# Patient Record
Sex: FEMALE | Race: BLACK OR AFRICAN AMERICAN | NOT HISPANIC OR LATINO | Employment: FULL TIME | URBAN - METROPOLITAN AREA
[De-identification: names, ages, dates, MRNs, and addresses within clinical notes are randomized per-mention and may not be internally consistent; named-entity substitution may affect disease eponyms.]

---

## 2017-01-17 ENCOUNTER — CLINICAL SUPPORT (OUTPATIENT)
Dept: OPHTHALMOLOGY | Facility: CLINIC | Age: 62
End: 2017-01-17
Payer: COMMERCIAL

## 2017-01-17 ENCOUNTER — OFFICE VISIT (OUTPATIENT)
Dept: OPHTHALMOLOGY | Facility: CLINIC | Age: 62
End: 2017-01-17
Payer: COMMERCIAL

## 2017-01-17 DIAGNOSIS — H35.373 EPIRETINAL MEMBRANE, BILATERAL: ICD-10-CM

## 2017-01-17 DIAGNOSIS — H43.11 VITREOUS HEMORRHAGE, RIGHT: ICD-10-CM

## 2017-01-17 DIAGNOSIS — H40.033 ANATOMICAL NARROW ANGLE BORDERLINE GLAUCOMA, BILATERAL: ICD-10-CM

## 2017-01-17 DIAGNOSIS — H40.053 OCULAR HYPERTENSION, BILATERAL: Primary | ICD-10-CM

## 2017-01-17 DIAGNOSIS — H26.9 CORTICAL CATARACT: ICD-10-CM

## 2017-01-17 DIAGNOSIS — E11.3593 PROLIFERATIVE DIABETIC RETINOPATHY OF BOTH EYES WITHOUT MACULAR EDEMA ASSOCIATED WITH TYPE 2 DIABETES MELLITUS: ICD-10-CM

## 2017-01-17 DIAGNOSIS — H40.053 OCULAR HYPERTENSION, BILATERAL: ICD-10-CM

## 2017-01-17 DIAGNOSIS — H35.033 HYPERTENSIVE RETINOPATHY, BILATERAL: ICD-10-CM

## 2017-01-17 PROCEDURE — 92134 CPTRZ OPH DX IMG PST SGM RTA: CPT | Mod: S$GLB,,, | Performed by: OPHTHALMOLOGY

## 2017-01-17 PROCEDURE — 92020 GONIOSCOPY: CPT | Mod: S$GLB,,, | Performed by: OPHTHALMOLOGY

## 2017-01-17 PROCEDURE — 99999 PR PBB SHADOW E&M-EST. PATIENT-LVL II: CPT | Mod: PBBFAC,,, | Performed by: OPHTHALMOLOGY

## 2017-01-17 PROCEDURE — 92012 INTRM OPH EXAM EST PATIENT: CPT | Mod: S$GLB,,, | Performed by: OPHTHALMOLOGY

## 2017-01-17 NOTE — PROGRESS NOTES
HPI     DLS: 7/18/16    Pt here for HRT review;    Meds:No GTTS    1. Ocular hypertension, bilateral  2. Anatomical narrow angle borderline glaucoma, bilateral  3. Proliferative diabetic retinopathy without macular edema associated   with type 2 diabetes mellitus  4. Vitreous hemorrhage, right  5. Hypertensive retinopathy, bilateral  6. Epiretinal membrane, bilateral  7. Cortical cataract        Last edited by Tomasa Montilla on 1/17/2017  8:27 AM.         Assessment /Plan     For exam results, see Encounter Report.    Ocular hypertension, bilateral    Proliferative diabetic retinopathy of both eyes without macular edema associated with type 2 diabetes mellitus    Hypertensive retinopathy, bilateral    Epiretinal membrane, bilateral    Vitreous hemorrhage, right    Cortical cataract    1. OHT vs Pre-perimetric POAG OU (angles  Narrowing over time)  Followed at [facility] since '04   First seen by Scarlet '08   Never on gtts   VF defects 2/2 PRP     Family history none   Glaucoma meds None   H/O adverse rxn to glaucoma drops none   LASERS Mulitple PRP OU   GLAUCOMA SURGERIES None   OTHER EYE SURGERIES none   CDR 0.3/0.35   Tbase 15-23/15-25   Tmax 25 OU   Ttarget ??   HVF 6 tests: 2008 to 2016 SAD/IAD OU 2/2 PRP OU   Gonio +1-3 OU 1/8/2016 (doubt occludable)   /575   OCT 3 tests: 2008 to 2016 -  RNFL nl od // nl os    HRT 5 tests: 2009 to 2016 - MR -  nl od // nl os /// CDR 0.30 od // 0.23 os  Disc photos 2002, 2003, 2005 (slides) // 2008, 2013  (OIS)     - Ttoday  21/20  - Test done today HRT/Gonio    2. Narrow Angles:  +1-3 w/ bowing. Consider PI. Likely resolve in future once pseudophakic.  Anterior Segment OCT today w/ Open Angles w/ narrow approach OU  Doubt occludable - monitor gonio     3. PDR OU s/p PDR OU -stable exam on last eval w/ Retina on 7/25/13.   S/p avastin x 4 od - last 1/4/2016     4. Hx of VH OD -stable. Last seen by retina - jesse 1/4/2016    5. Cataract OU -not visually significant. observe    6.  "Hx of Transient Visual Obscurations OS - ? BP elevation. Seen w/ stable Retinal exam on 9/13/12.     7. Had a gastric "sleve" done 12/18/2015 - for weight loss    So far doing well    On less insulin now     Plan:  Stable IOP (thick k's), and HRT wnl today   Cont no gtts  Continue to work on blood sugars  oht vs glaucoma    RTC:  Angles continue to narrow   rec laser PI's // OS then OD - argon then yag     Recheck gonio after PI's and then will need HVF / DFE / OCT     Cont with regular F/U's with Dr. Pagan    I have seen and personally examined the patient.  I agree with the findings, assessment and plan of the resident and/or fellow.     Alma Goodrich MD                          "

## 2017-01-26 ENCOUNTER — OFFICE VISIT (OUTPATIENT)
Dept: OPHTHALMOLOGY | Facility: CLINIC | Age: 62
End: 2017-01-26
Payer: COMMERCIAL

## 2017-01-26 DIAGNOSIS — E11.3593 PROLIFERATIVE DIABETIC RETINOPATHY OF BOTH EYES WITHOUT MACULAR EDEMA ASSOCIATED WITH TYPE 2 DIABETES MELLITUS: ICD-10-CM

## 2017-01-26 DIAGNOSIS — H43.11 VITREOUS HEMORRHAGE, RIGHT: ICD-10-CM

## 2017-01-26 DIAGNOSIS — H40.033 ANATOMICAL NARROW ANGLE BORDERLINE GLAUCOMA, BILATERAL: Primary | ICD-10-CM

## 2017-01-26 DIAGNOSIS — H35.373 EPIRETINAL MEMBRANE, BILATERAL: ICD-10-CM

## 2017-01-26 DIAGNOSIS — H35.033 HYPERTENSIVE RETINOPATHY, BILATERAL: ICD-10-CM

## 2017-01-26 DIAGNOSIS — H26.9 CORTICAL CATARACT: ICD-10-CM

## 2017-01-26 DIAGNOSIS — H40.053 OCULAR HYPERTENSION, BILATERAL: ICD-10-CM

## 2017-01-26 DIAGNOSIS — E11.3599 PROLIFERATIVE DIABETIC RETINOPATHY WITHOUT MACULAR EDEMA ASSOCIATED WITH TYPE 2 DIABETES MELLITUS, UNSPECIFIED LATERALITY: ICD-10-CM

## 2017-01-26 PROCEDURE — 66761 REVISION OF IRIS: CPT | Mod: LT,S$GLB,, | Performed by: OPHTHALMOLOGY

## 2017-01-26 PROCEDURE — 99499 UNLISTED E&M SERVICE: CPT | Mod: S$GLB,,, | Performed by: OPHTHALMOLOGY

## 2017-01-26 PROCEDURE — 99999 PR PBB SHADOW E&M-EST. PATIENT-LVL III: CPT | Mod: PBBFAC,,, | Performed by: OPHTHALMOLOGY

## 2017-01-26 NOTE — PROGRESS NOTES
HPI     Laser Treatment    Additional comments: laser pi           Comments    DLS: 9/17/13    1) OHT vs POAG  2) Narrow Angles  3) PDR OU  4) Vit. Heme OD  5) NS OU  6) Hx of TVO OS         Last edited by Alma Goodrich MD on 1/26/2017 12:23 PM. (History)            Assessment /Plan     For exam results, see Encounter Report.    Anatomical narrow angle borderline glaucoma, bilateral  -     Yag Iridotomy - OS - Left Eye    Ocular hypertension, bilateral    Proliferative diabetic retinopathy of both eyes without macular edema associated with type 2 diabetes mellitus    Hypertensive retinopathy, bilateral    Epiretinal membrane, bilateral    Vitreous hemorrhage, right    Cortical cataract    Proliferative diabetic retinopathy without macular edema associated with type 2 diabetes mellitus, unspecified laterality      1. OHT vs Pre-perimetric POAG OU (angles  Narrowing over time)  Followed at [facility] since '04   First seen by Scarlet '08   Never on gtts   VF defects 2/2 PRP     Family history none   Glaucoma meds None   H/O adverse rxn to glaucoma drops none   LASERS Mulitple PRP OU   GLAUCOMA SURGERIES None   OTHER EYE SURGERIES none   CDR 0.3/0.35   Tbase 15-23/15-25   Tmax 25 OU   Ttarget ??   HVF 6 tests: 2008 to 2016 SAD/IAD OU 2/2 PRP OU   Gonio +1-3 OU 1/8/2016 (doubt occludable)   /575   OCT 3 tests: 2008 to 2016 -  RNFL nl od // nl os    HRT 5 tests: 2009 to 2016 - MR -  nl od // nl os /// CDR 0.30 od // 0.23 os  Disc photos 2002, 2003, 2005 (slides) // 2008, 2013  (OIS)     - Ttoday  ?? / 18   - Test done today PI os     2. Narrow Angles:  +1-3 w/ bowing. Consider PI. Likely resolve in future once pseudophakic.  Anterior Segment OCT today w/ Open Angles w/ narrow approach OU  Doubt occludable - monitor gonio     3. PDR OU s/p PDR OU -stable exam on last eval w/ Retina on 7/25/13.   S/p avastin x 4 od - last 1/4/2016     4. Hx of VH OD -stable. Last seen by retina - arend 1/4/2016    5. Cataract OU  "-not visually significant. observe    6. Hx of Transient Visual Obscurations OS - ? BP elevation. Seen w/ stable Retinal exam on 9/13/12.     7. Had a gastric "sleve" done 12/18/2015 - for weight loss    So far doing well    On less insulin now     Plan:  Stable IOP (thick k's), and HRT wnl today   Cont no gtts  Continue to work on blood sugars  oht vs glaucoma    RTC:  Angles continue to narrow   rec laser PI's // OS then OD - argon then yag     PI done os 1/26/2017 - steroie taper   PI od - pending     Recheck gonio after PI's and then will need HVF / DFE / OCT     Cont with regular F/U's with Dr. Pagan    I have seen and personally examined the patient.  I agree with the findings, assessment and plan of the resident and/or fellow.     Alam Goodrich MD    "

## 2017-02-01 ENCOUNTER — OFFICE VISIT (OUTPATIENT)
Dept: ENDOCRINOLOGY | Facility: CLINIC | Age: 62
End: 2017-02-01
Payer: COMMERCIAL

## 2017-02-01 VITALS
SYSTOLIC BLOOD PRESSURE: 124 MMHG | WEIGHT: 231.38 LBS | HEART RATE: 70 BPM | DIASTOLIC BLOOD PRESSURE: 82 MMHG | HEIGHT: 66 IN | BODY MASS INDEX: 37.18 KG/M2

## 2017-02-01 DIAGNOSIS — E66.9 NON MORBID OBESITY, UNSPECIFIED OBESITY TYPE: ICD-10-CM

## 2017-02-01 DIAGNOSIS — I10 HTN (HYPERTENSION), BENIGN: ICD-10-CM

## 2017-02-01 DIAGNOSIS — Z98.84 HISTORY OF BARIATRIC SURGERY: ICD-10-CM

## 2017-02-01 DIAGNOSIS — E78.5 HYPERLIPIDEMIA, UNSPECIFIED HYPERLIPIDEMIA TYPE: ICD-10-CM

## 2017-02-01 DIAGNOSIS — E11.3599 PROLIFERATIVE DIABETIC RETINOPATHY WITHOUT MACULAR EDEMA ASSOCIATED WITH TYPE 2 DIABETES MELLITUS, UNSPECIFIED LATERALITY: Primary | ICD-10-CM

## 2017-02-01 PROCEDURE — 3060F POS MICROALBUMINURIA REV: CPT | Mod: S$GLB,,, | Performed by: NURSE PRACTITIONER

## 2017-02-01 PROCEDURE — 99214 OFFICE O/P EST MOD 30 MIN: CPT | Mod: S$GLB,,, | Performed by: NURSE PRACTITIONER

## 2017-02-01 PROCEDURE — 3045F PR MOST RECENT HEMOGLOBIN A1C LEVEL 7.0-9.0%: CPT | Mod: S$GLB,,, | Performed by: NURSE PRACTITIONER

## 2017-02-01 PROCEDURE — 3074F SYST BP LT 130 MM HG: CPT | Mod: S$GLB,,, | Performed by: NURSE PRACTITIONER

## 2017-02-01 PROCEDURE — 99999 PR PBB SHADOW E&M-EST. PATIENT-LVL III: CPT | Mod: PBBFAC,,, | Performed by: NURSE PRACTITIONER

## 2017-02-01 PROCEDURE — 3079F DIAST BP 80-89 MM HG: CPT | Mod: S$GLB,,, | Performed by: NURSE PRACTITIONER

## 2017-02-01 NOTE — MR AVS SNAPSHOT
[facility] - Endocrinology  [address]  [facility] [address]  Phone: [phone]                  Aaliyah Angela   2017 2:30 PM   Office Visit    Description:  Female : 1955   Provider:  DAMARI Rae, LIVIER   Department:  [facility]in[facility]           Reason for Visit     Diabetes Mellitus           Diagnoses this Visit        Comments    Proliferative diabetic retinopathy without macular edema associated with type 2 diabetes mellitus, unspecified laterality    -  Primary     HTN (hypertension), benign         History of bariatric surgery         Hyperlipidemia, unspecified hyperlipidemia type         Non morbid obesity, unspecified obesity type         Type 2 diabetes, uncontrolled, with neuropathy                To Do List           Future Appointments        Provider Department Dept Phone    2017 9:30 AM Alma Goodrich MD [facility] Ophthalmology [phone]    2017 9:00 AM LAB, APPOINTMENT [facility] [phone]    2017 9:00 AM LAB, APPOINTMENT [facility] [phone]    2017 8:00 AM DAMARI Rae, ELIAZARP [facility] [phone]      Goals (5 Years of Data)              Today    16    Blood Pressure < 140/90   124/82    160/80    HDL > 40     45      HEMOGLOBIN A1C < 7.0     8.1      LDL CHOLESTEROL < 70     67.8      Weight < 200 lb (90.719 kg)   105 kg (231 lb 6.4 oz)    104 kg (229 lb 4.8 oz)      Follow-Up and Disposition     Return in about 3 months (around 2017).       These Medications        Disp Refills Start End    dulaglutide (TRULICITY) 0.75 mg/0.5 mL PnIj 4 Syringe 6 2017     Inject 0.75 mg into the skin every 7 days. - Subcutaneous    Pharmacy: [id number] - [facility] [address] [place], [place] [address]GET RODRIGUEZ AT [facility] Ph #: [phone]         [facility]     [facility]  "Line - 24/7 Assistance  Registered nurses in the [facility] provide clinical advisement, health education, appointment booking, and other advisory services.  Call for this free service at [phone].             Medications           Message regarding Medications     Verify the changes and/or additions to your medication regime listed below are the same as discussed with your clinician today.  If any of these changes or additions are incorrect, please notify your healthcare provider.        START taking these NEW medications        Refills    dulaglutide (TRULICITY) 0.75 mg/0.5 mL PnIj 6    Sig: Inject 0.75 mg into the skin every 7 days.    Class: Print    Route: Subcutaneous           Verify that the below list of medications is an accurate representation of the medications you are currently taking.  If none reported, the list may be blank. If incorrect, please contact your healthcare provider. Carry this list with you in case of emergency.           Current Medications     amitriptyline (ELAVIL) 10 MG tablet TAKE 2 TABLETS BY MOUTH EVERY NIGHT AT DINNER    amlodipine (NORVASC) 5 MG tablet Take 1 tablet (5 mg total) by mouth 2 (two) times daily.    aspirin 81 MG Chew Take 81 mg by mouth once daily.      atorvastatin (LIPITOR) 20 MG tablet Take 1 tablet (20 mg total) by mouth once daily.    BD ULTRA-FINE DIEGO PEN NEEDLES 32 gauge x 5/32" Ndle USE THREE TIMES DAILY    BLOOD GLUCOSE TEST test strip TEST AS DIRECTED AFTER MEALS    calcium-vitamin D3 500 mg(1,250mg) -200 unit per tablet Take 1 tablet by mouth once daily.    diosmin complex no.1 630 mg Tab Take 1 tablet by mouth once daily.    ketorolac (TORADOL) 10 mg tablet 1 pill every 8 hours as needed for migraine, no more than 3 days use in week    metformin (GLUCOPHAGE-XR) 500 MG 24 hr tablet Take 2 tablets (1,000 mg total) by mouth 2 (two) times daily with meals.    metoprolol succinate (TOPROL-XL) 50 MG 24 hr tablet TAKE 1 TABLET BY MOUTH EVERY " "DAY    multivitamin (ONE DAILY MULTIVITAMIN) per tablet Take 1 tablet by mouth once daily.    ranitidine (ZANTAC) 150 MG tablet Take 150 mg by mouth as needed for Heartburn.    dulaglutide (TRULICITY) 0.75 mg/0.5 mL PnIj Inject 0.75 mg into the skin every 7 days.           Clinical Reference Information           Vital Signs - Last Recorded  Most recent update: 2/1/2017  2:44 PM by Lilliam Jim MA    BP Pulse Ht Wt BMI    124/82 (BP Location: Right arm, Patient Position: Sitting) 70 5' 6" (1.676 m) 105 kg (231 lb 6.4 oz) 37.35 kg/m2      Blood Pressure          Most Recent Value    BP  124/82      Allergies as of 2/1/2017     No Known Allergies      Immunizations Administered on Date of Encounter - 2/1/2017     None      Orders Placed During Today's Visit     Future Labs/Procedures Expected by Expires    FRUCTOSAMINE  2/1/2017 4/2/2018    Hemoglobin A1c  2/1/2017 4/2/2018      Instructions    Snacks can be an important part of a balanced, healthy meal plan. They allow you to eat more frequently, feeling full and satisfied throughout the day. Also, they allow you to spread carbohydrates evenly, which may stabilize blood sugars.  Plus, snacks are enjoyable!     The amount of carbohydrate needed at snacks varies. Generally, about 15-30 grams of carbohydrate per snack is recommended.  Below you will find some tasty treats.       0-5 gm carb   Crystal Light   Vitamin Water Zero   Herbal tea, unsweetened   2 tsp peanut butter on celery   1./2 cup sugar-free jell-o   1 sugar-free popsicle   ¼ cup blueberries   8oz Blue Brenda unsweetened almond milk   5 baby carrots & celery sticks, cucumbers, bell peppers dipped in ¼ cup salsa, 2Tbsp light ranch dressing or 2Tbsp plain Greek yogurt   10 Goldfish crackers   ½ oz low-fat cheese or string cheese   1 closed handful of nuts, unsalted   1 Tbsp of sunflower seeds, unsalted   1 cup Smart Pop popcorn   1 whole grain brown rice cake        15 gm carb   1 " small piece of fruit or ½ banana or 1/2 cup lite canned fruit   3 concepcion cracker squares   3 cups Smart Pop popcorn, top spray butter, Jaramillo lite salt or cinnamon and Truvia   5 Vanilla Wafers   ½ cup low fat, no added sugar ice cream or frozen yogurt (Blue bell, Blue Bunny, Weight Watchers, Skinny Cow)   ½ turkey, ham, or chicken sandwich   ½ c fruit with ½ c Cottage cheese   4-6 unsalted wheat crackers with 1 oz low fat cheese or 1 tbsp peanut butter    30-45 goldfish crackers (depending on flavor)    7-8 Sabianist mini brown rice cakes (caramel, apple cinnamon, chocolate)    12 Sabianist mini brown rice cakes (cheddar, bbq, ranch)    1/3 cup hummus dip with raw veg   1/2 whole wheat love, 1Tbsp hummus   Mini Pizza (1/2 whole wheat English muffin, low-fat  cheese, tomato sauce)   100 calorie snack pack (Oreo, Chips Ahoy, Ritz Mix, Baked Cheetos)   4-6 oz. light or Greek Style yogurt (Chobani, Yoplait, Okios, Stoneyfield)   ½ cup sugar-free pudding     6 in. wheat tortilla or love oven toasted chips (topped with spray butter flavoring, cinnamon, Truvia OR spray butter, garlic powder, chili powder)    18 BBQ Popchips (available at Target, Whole Foods, Fresh Market)                Diabetes Support Group Meetings    Date Topics   February 9 Health Promotion/Nutrition   March 9 Taking Care of Your Kidneys   April 13 Taking Care of Your Feet   May 11 Ease Your Mind with Diabetes   June 8 Hurricane and Emergency Preparedness   July 13 Family & Caregiver Support for Diabetes   *August 17  Taking Care of Your Eyes   September 14 Devices & Technology   October 12 Recipes & Treats   November 9 Getting Pumped Up for Diabetes   December 14 Year-End Close Out            Meetings are held in the [facility] (A) of the [facility] located at [address] [place] [address]. Please call [phone] for additional information.    Free service, offered every 2nd  Thursday of every month, except in August! Family members and/or friends are welcome as well!  Support group is for patients with type 1 or type 2 diabetes.    From 3:30p to 4:30p        Dulaglutide Solution for injection  What is this medicine?  DULAGLUTIDE (DOO la GLOO tide) is used to improve blood sugar control in adults with type 2 diabetes. This medicine may be used with other oral diabetes medicines.  This medicine may be used for other purposes; ask your health care provider or pharmacist if you have questions.  What should I tell my health care provider before I take this medicine?  They need to know if you have any of these conditions:  · endocrine tumors (MEN 2) or if someone in your family had these tumors  · history of pancreatitis  · kidney disease  · liver disease  · stomach problems  · thyroid cancer or if someone in your family had thyroid cancer  · an unusual or allergic reaction to dulaglutide, other medicines, foods, dyes, or preservatives  · pregnant or trying to get pregnant  · breast-feeding  How should I use this medicine?  This medicine is for injection under the skin of your upper leg (thigh), stomach area, or upper arm. It is usually given once every week (every 7 days). You will be taught how to prepare and give this medicine. Use exactly as directed. Take your medicine at regular intervals. Do not take it more often than directed.  If you use this medicine with insulin, you should inject this medicine and the insulin separately. Do not mix them together. Do not give the injections right next to each other. Change (rotate) injection sites with each injection.  It is important that you put your used needles and syringes in a special sharps container. Do not put them in a trash can. If you do not have a sharps container, call your pharmacist or healthcare provider to get one.  A special MedGuide will be given to you by the pharmacist with each prescription and refill. Be sure to read this  information carefully each time.  Talk to your pediatrician regarding the use of this medicine in children. Special care may be needed.  Overdosage: If you think you've taken too much of this medicine contact a poison control center or emergency room at once.  NOTE: This medicine is only for you. Do not share this medicine with others.  What if I miss a dose?  If you miss a dose, take it as soon as you can within 3 days after the missed dose. Then take your next dose at your regular weekly time. If it has been longer than 3 days after the missed dose, do not take the missed dose. Take the next dose at your regular time. Do not take double or extra doses. If you have questions about a missed dose, contact your health care provider for advice.  What may interact with this medicine?  Do not take this medicine with any of the following medications:  · gatifloxacin  Many medications may cause changes in blood sugar, these include:  · alcohol containing beverages  · aspirin and aspirin-like drugs  · chloramphenicol  · chromium  · diuretics  · female hormones, such as estrogens or progestins, birth control pills  · heart medicines  · isoniazid  · male hormones or anabolic steroids  · medications for weight loss  · medicines for allergies, asthma, cold, or cough  · medicines for mental problems  · medicines called MAO inhibitors - Nardil, Parnate, Marplan, Eldepryl  · niacin  · NSAIDS, such as ibuprofen  · pentamidine  · phenytoin  · probenecid  · quinolone antibiotics such as ciprofloxacin, levofloxacin, ofloxacin  · some herbal dietary supplements  · steroid medicines such as prednisone or cortisone  · thyroid hormones  Some medications can hide the warning symptoms of low blood sugar (hypoglycemia). You may need to monitor your blood sugar more closely if you are taking one of these medications. These include:  · beta-blockers, often used for high blood pressure or heart problems (examples include atenolol, metoprolol,  propranolol)  · clonidine  · guanethidine  · reserpine  This list may not describe all possible interactions. Give your health care provider a list of all the medicines, herbs, non-prescription drugs, or dietary supplements you use. Also tell them if you smoke, drink alcohol, or use illegal drugs. Some items may interact with your medicine.  What should I watch for while using this medicine?  Visit your doctor or health care professional for regular checks on your progress.  A test called the HbA1C (A1C) will be monitored. This is a simple blood test. It measures your blood sugar control over the last 2 to 3 months. You will receive this test every 3 to 6 months.  Learn how to check your blood sugar. Learn the symptoms of low and high blood sugar and how to manage them.  Always carry a quick-source of sugar with you in case you have symptoms of low blood sugar. Examples include hard sugar candy or glucose tablets. Make sure others know that you can choke if you eat or drink when you develop serious symptoms of low blood sugar, such as seizures or unconsciousness. They must get medical help at once.  Tell your doctor or health care professional if you have high blood sugar. You might need to change the dose of your medicine. If you are sick or exercising more than usual, you might need to change the dose of your medicine.  Do not skip meals. Ask your doctor or health care professional if you should avoid alcohol. Many nonprescription cough and cold products contain sugar or alcohol. These can affect blood sugar.  Wear a medical ID bracelet or chain, and carry a card that describes your disease and details of your medicine and dosage times.  What side effects may I notice from receiving this medicine?  Side effects that you should report to your doctor or health care professional as soon as possible:  · allergic reactions like skin rash, itching or hives, swelling of the face, lips, or tongue  · breathing  problems  · signs and symptoms of low blood sugar such as feeling anxious, confusion, dizziness, increased hunger, unusually weak or tired, sweating, shakiness, cold, irritable, headache, blurred vision, fast heartbeat, loss of consciousness  · unusual stomach upset or pain  · vomiting  Side effects that usually do not require medical attention (Report these to your doctor or health care professional if they continue or are bothersome.):diarrhea  · heartburn  · loss of appetite  · nausea  · pain, redness, or irritation at site where injected  This list may not describe all possible side effects. Call your doctor for medical advice about side effects. You may report side effects to FDA at 5-992-CMS-1155.  Where should I keep my medicine?  Keep out of the reach of children.  Store this medicine in a refrigerator between 2 and 8 degrees C (36 and 46 degrees F). Do not freeze or use if the medicine has been frozen. Protect from light and excessive heat. Each single-dose pen or prefilled syringe can be kept at room temperature, not to exceed 30 degrees C (86 degrees F) for a total of 14 days, if needed. Store in the carton until use. Throw away any unused medicine after the expiration date.  NOTE: This sheet is a summary. It may not cover all possible information. If you have questions about this medicine, talk to your doctor, pharmacist, or health care provider.  NOTE:This sheet is a summary. It may not cover all possible information. If you have questions about this medicine, talk to your doctor, pharmacist, or health care provider. Copyright© 2016 [employer] Standard        Hypoglycemia (Low Blood Sugar)  Too little sugar (glucose) in your blood is called hypoglycemia or low blood sugar. Low blood sugar usually means anything lower than 70 mg/dL. Talk with your healthcare provider about your target range and what level is too low for you. Diabetes itself doesnt cause low blood sugar. But some of the treatments for  diabetes, such as pills or insulin, may raise your risk for it. Low blood sugar may cause you to pass out or have a seizure. So always treat low blood sugar right away, but don't overeat.  Special note: Always carry a source of fast-acting sugar and a snack in case of hypoglycemia.     What you may notice  If you have low blood sugar, you may have one or more of these symptoms:  · Shakiness or dizziness  · Cold, clammy skin or sweating  · Feelings of hunger  · Headache  · Nervousness  · A hard, fast heartbeat  · Weakness  · Confusion or irritability  · Blurred vision  · Having nightmares or waking up confused or sweating  · Numbness or tingling in the lips or tongue  What you should do  Here are tips to follow if you have hypoglycemia:   · First check your blood sugar. If it is too low (out of your target range), eat or drink 15 to 20 grams of fast-acting sugar. This may be 3 to 4 glucose tablets, 4 ounces (half a cup) of fruit juice or regular (nondiet) soda, 8 ounces (1 cup) of fat-free milk, or 1 tablespoon of honey. Dont take more than this, or your blood sugar may go too high.  · Wait 15 minutes. Then recheck your blood sugar if you can.  · If your blood sugar is still too low, repeat the steps above and check your blood sugar again. If your blood sugar still has not returned to your target range, contact your healthcare provider or seek emergency care.  · Once your blood sugar returns to target range, eat a snack or meal.  Preventing low blood sugar  Things you can do include the following:   · If your condition needs a strict treatment plan, eat your meals and snacks at the same times each day. Dont skip meals!  · If your treatment plan lets you change when you eat and what you eat, learn how to change the time and dose of your rapid-acting insulin to match this.   · Ask your healthcare provider if it is safe for you to drink alcohol. Never drink on an empty stomach.  · Take your medicine at the prescribed  times.  · Always carry a source of fast-acting sugar and a snack when youre away from home.  Other things to do  Additional tips include the following:  · Carry a medical ID card, a compact USB drive, or wear a medical alert bracelet or necklace. It should say that you have diabetes. It should also say what to do if you pass out or have a seizure.  · Make sure your family, friends, and coworkers know the signs of low blood sugar. Tell them what to do if your blood sugar falls very low and you cant treat yourself.  · Keep a glucagon emergency kit handy. Be sure your family, friends, and coworkers know how and when to use it. Check it regularly and replace the glucagon before it expires.  · Talk with your health care team about other things you can do to prevent low blood sugar.     If you have unexplained hypoglycemia or hypoglycemia several times, call your healthcare provider.   © 5946-2525 [employer]. [address] [place] [address], [place] [address]. All rights reserved. This information is not intended as a substitute for professional medical care. Always follow your healthcare professional's instructions.

## 2017-02-01 NOTE — PROGRESS NOTES
"CC: Patient is here for management of Type 2 diabetes and review of medical conditions as listed in visit diagnosis.      HPI: Mrs. Aaliyah Angela is a 61 y.o. Black or  female who was diagnosed w/ gestational DM at age 25, then diagnosed in the 30s. Pt was last seen by Dr. Montero, followed by her for years.  Pt had gastric sleeve in December 2015 and has loss #39 since then.   a1c was recently checked x 6 weeks ago, 8.1%.  Pt was treated in November/December 2016 for shingles. No issues since then.  With this result, pt decided to go back to lantus 10 units at night, metformin 500 mg bid.       PREVIOUS DIABETES MEDICATIONS  Metformin, lantus, novolog, humalog  Januvia, onglyza    CURRENT DIABETIC MEDS: lantus 10 units qhs, metformin 500 mg bid    Last Podiatry Exam: 2016 annual    REVIEW OF SYSTEMS  General: no weakness, fatigue, or + weight changes (loss).   Eyes: no double or blurred vision, eye pain, or redness; last eye exam=2017.   Cardiovascular: no chest pain, palpitations, edema, or murmurs.   Respiratory: no cough or dyspnea.   GI: + heartburn, nausea, or changes in bowel patterns; good appetite +gastric sleeve 12/2015  Skin: no rashes, dryness, itching, or reactions at insulin injection sites.   Neuro: + (R) hand--numbness, tingling, tremors, or vertigo.   Endocrine: + polyuria (nocturia 3-4x), polydipsia, polyphagia, heat or cold intolerance.     Vital Signs  Visit Vitals    /82 (BP Location: Right arm, Patient Position: Sitting)    Pulse 70    Ht 5' 6" (1.676 m)    Wt 105 kg (231 lb 6.4 oz)    BMI 37.35 kg/m2       Hemoglobin A1C   Date Value Ref Range Status   12/17/2016 8.1 (H) 4.5 - 6.2 % Final     Comment:     According to ADA guidelines, hemoglobin A1C <7.0% represents  optimal control in non-pregnant diabetic patients.  Different  metrics may apply to specific populations.   Standards of Medical Care in Diabetes - 2016.  For the purpose of screening for the presence of " diabetes:  <5.7%     Consistent with the absence of diabetes  5.7-6.4%  Consistent with increasing risk for diabetes   (prediabetes)  >or=6.5%  Consistent with diabetes  Currently no consensus exists for use of hemoglobin A1C  for diagnosis of diabetes for children.     08/20/2016 7.1 (H) 4.5 - 6.2 % Final     Comment:     According to ADA guidelines, hemoglobin A1C <7.0% represents  optimal control in non-pregnant diabetic patients.  Different  metrics may apply to specific populations.   Standards of Medical Care in Diabetes - 2016.  For the purpose of screening for the presence of diabetes:  <5.7%     Consistent with the absence of diabetes  5.7-6.4%  Consistent with increasing risk for diabetes   (prediabetes)  >or=6.5%  Consistent with diabetes  Currently no consensus exists for use of hemoglobin A1C  for diagnosis of diabetes for children.     04/28/2016 7.5 (H) 4.5 - 6.2 % Final      Lab Results   Component Value Date    CREATININE 0.9 12/17/2016      Lab Results   Component Value Date    TSH 1.742 08/08/2015      Lab Results   Component Value Date    LDLCALC 67.8 12/17/2016        PHYSICAL EXAMINATION  Constitutional: Appears well, no distress  Neck: Supple, trachea midline.   Respiratory: CTA without wheezes, even and unlabored.  Cardiovascular: RRR; no carotid bruits or murmurs.   Lymph: DP pulses  2+ bilaterally; no edema.   Skin: warm and dry; no injection site reactions, no acanthosis nigracans observed.  Neuro:patient alert and cooperative, normal affect.    Diabetes Foot Exam:   Positive vibratory response to 128 Hz tuning fork bilaterally.     Visual Inspection:  Normal -  Bilateral    Pedal Pulses:   Right: Present  Left: Present    Posterior tibialis:   Right:Present  Left: Present        Assessment/Plan     1. Proliferative diabetic retinopathy without macular edema associated with type 2 diabetes mellitus, unspecified laterality/ neuropathy- a1c next time, contact info given, trial of no lantus,  start trulicity 0.75 mg weekly, print rx  Coupons given, continue metformin 1000 mg bid  novolog prn w/ mod correction scale  novolog mod dose correction only  F/u in 3 mos    2. HTN (hypertension), benign -controlled, continue med(s)   3. History of bariatric surgery,  -f/u with primary   4. Hyperlipidemia, unspecified hyperlipidemia type   Lab Results   Component Value Date    LDLCALC 67.8 12/17/2016        5. Non morbid obesity, unspecified obesity type Body mass index is 37.35 kg/(m^2). reviewed flowsheet, lose 10% in next 6 mos        FOLLOW UP  Return in about 3 months (around 5/1/2017).     Orders Placed This Encounter   Procedures    Hemoglobin A1c     Standing Status:   Future     Standing Expiration Date:   4/2/2018

## 2017-02-01 NOTE — LETTER
February 1, 2017    Aaliyah Angela  [address] [address]             [facility] - Endocrinology  [address] [facility] [address]  Phone: [phone] To whom it may concern:      Mrs. Angela was seen by me today on 2/1/17. She may return to work on 2/2/17. Mrs. Angela is followed here for diabetes management.       If you have any questions or concerns, please don't hesitate to call.    Sincerely,        Maribell Christianson, APRN, FNP

## 2017-02-01 NOTE — PATIENT INSTRUCTIONS
Snacks can be an important part of a balanced, healthy meal plan. They allow you to eat more frequently, feeling full and satisfied throughout the day. Also, they allow you to spread carbohydrates evenly, which may stabilize blood sugars.  Plus, snacks are enjoyable!     The amount of carbohydrate needed at snacks varies. Generally, about 15-30 grams of carbohydrate per snack is recommended.  Below you will find some tasty treats.       0-5 gm carb   Crystal Light   Vitamin Water Zero   Herbal tea, unsweetened   2 tsp peanut butter on celery   1./2 cup sugar-free jell-o   1 sugar-free popsicle   ¼ cup blueberries   8oz Blue Brenda unsweetened almond milk   5 baby carrots & celery sticks, cucumbers, bell peppers dipped in ¼ cup salsa, 2Tbsp light ranch dressing or 2Tbsp plain Greek yogurt   10 Goldfish crackers   ½ oz low-fat cheese or string cheese   1 closed handful of nuts, unsalted   1 Tbsp of sunflower seeds, unsalted   1 cup Smart Pop popcorn   1 whole grain brown rice cake        15 gm carb   1 small piece of fruit or ½ banana or 1/2 cup lite canned fruit   3 concepcion cracker squares   3 cups Smart Pop popcorn, top spray butter, Jaramillo lite salt or cinnamon and Truvia   5 Vanilla Wafers   ½ cup low fat, no added sugar ice cream or frozen yogurt (Blue bell, Blue Bunny, Weight Watchers, Skinny Cow)   ½ turkey, ham, or chicken sandwich   ½ c fruit with ½ c Cottage cheese   4-6 unsalted wheat crackers with 1 oz low fat cheese or 1 tbsp peanut butter    30-45 goldfish crackers (depending on flavor)    7-8 Christianity mini brown rice cakes (caramel, apple cinnamon, chocolate)    12 Christianity mini brown rice cakes (cheddar, bbq, ranch)    1/3 cup hummus dip with raw veg   1/2 whole wheat love, 1Tbsp hummus   Mini Pizza (1/2 whole wheat English muffin, low-fat  cheese, tomato sauce)   100 calorie snack pack (Oreo, Chips Ahoy, Ritz Mix, Baked Cheetos)   4-6 oz. light or Greek Style yogurt  (Andre Lyn, Barb, [facility])   ½ cup sugar-free pudding     6 in. wheat tortilla or love oven toasted chips (topped with spray butter flavoring, cinnamon, Truvia OR spray butter, garlic powder, chili powder)    18 BBQ Popchips (available at Target, Whole Foods, Fresh Market)                Diabetes Support Group Meetings    Date Topics   February 9 Health Promotion/Nutrition   March 9 Taking Care of Your Kidneys   April 13 Taking Care of Your Feet   May 11 Ease Your Mind with Diabetes   June 8 Hurricane and Emergency Preparedness   July 13 Family & Caregiver Support for Diabetes   *August 17  Taking Care of Your Eyes   September 14 Devices & Technology   October 12 Recipes & Treats   November 9 Getting Pumped Up for Diabetes   December 14 Year-End Close Out            Meetings are held in the [facility] (A) of the [facility] located at [address] [place] [address]. Please call [phone] for additional information.    Free service, offered every 2nd Thursday of every month, except in August! Family members and/or friends are welcome as well!  Support group is for patients with type 1 or type 2 diabetes.    From 3:30p to 4:30p        Dulaglutide Solution for injection  What is this medicine?  DULAGLUTIDE (DOO la GLOO tide) is used to improve blood sugar control in adults with type 2 diabetes. This medicine may be used with other oral diabetes medicines.  This medicine may be used for other purposes; ask your health care provider or pharmacist if you have questions.  What should I tell my health care provider before I take this medicine?  They need to know if you have any of these conditions:  · endocrine tumors (MEN 2) or if someone in your family had these tumors  · history of pancreatitis  · kidney disease  · liver disease  · stomach problems  · thyroid cancer or if someone in your family had thyroid cancer  · an unusual or allergic reaction to  dulaglutide, other medicines, foods, dyes, or preservatives  · pregnant or trying to get pregnant  · breast-feeding  How should I use this medicine?  This medicine is for injection under the skin of your upper leg (thigh), stomach area, or upper arm. It is usually given once every week (every 7 days). You will be taught how to prepare and give this medicine. Use exactly as directed. Take your medicine at regular intervals. Do not take it more often than directed.  If you use this medicine with insulin, you should inject this medicine and the insulin separately. Do not mix them together. Do not give the injections right next to each other. Change (rotate) injection sites with each injection.  It is important that you put your used needles and syringes in a special sharps container. Do not put them in a trash can. If you do not have a sharps container, call your pharmacist or healthcare provider to get one.  A special MedGuide will be given to you by the pharmacist with each prescription and refill. Be sure to read this information carefully each time.  Talk to your pediatrician regarding the use of this medicine in children. Special care may be needed.  Overdosage: If you think you've taken too much of this medicine contact a poison control center or emergency room at once.  NOTE: This medicine is only for you. Do not share this medicine with others.  What if I miss a dose?  If you miss a dose, take it as soon as you can within 3 days after the missed dose. Then take your next dose at your regular weekly time. If it has been longer than 3 days after the missed dose, do not take the missed dose. Take the next dose at your regular time. Do not take double or extra doses. If you have questions about a missed dose, contact your health care provider for advice.  What may interact with this medicine?  Do not take this medicine with any of the following medications:  · gatifloxacin  Many medications may cause changes in  blood sugar, these include:  · alcohol containing beverages  · aspirin and aspirin-like drugs  · chloramphenicol  · chromium  · diuretics  · female hormones, such as estrogens or progestins, birth control pills  · heart medicines  · isoniazid  · male hormones or anabolic steroids  · medications for weight loss  · medicines for allergies, asthma, cold, or cough  · medicines for mental problems  · medicines called MAO inhibitors - Nardil, Parnate, Marplan, Eldepryl  · niacin  · NSAIDS, such as ibuprofen  · pentamidine  · phenytoin  · probenecid  · quinolone antibiotics such as ciprofloxacin, levofloxacin, ofloxacin  · some herbal dietary supplements  · steroid medicines such as prednisone or cortisone  · thyroid hormones  Some medications can hide the warning symptoms of low blood sugar (hypoglycemia). You may need to monitor your blood sugar more closely if you are taking one of these medications. These include:  · beta-blockers, often used for high blood pressure or heart problems (examples include atenolol, metoprolol, propranolol)  · clonidine  · guanethidine  · reserpine  This list may not describe all possible interactions. Give your health care provider a list of all the medicines, herbs, non-prescription drugs, or dietary supplements you use. Also tell them if you smoke, drink alcohol, or use illegal drugs. Some items may interact with your medicine.  What should I watch for while using this medicine?  Visit your doctor or health care professional for regular checks on your progress.  A test called the HbA1C (A1C) will be monitored. This is a simple blood test. It measures your blood sugar control over the last 2 to 3 months. You will receive this test every 3 to 6 months.  Learn how to check your blood sugar. Learn the symptoms of low and high blood sugar and how to manage them.  Always carry a quick-source of sugar with you in case you have symptoms of low blood sugar. Examples include hard sugar candy or  glucose tablets. Make sure others know that you can choke if you eat or drink when you develop serious symptoms of low blood sugar, such as seizures or unconsciousness. They must get medical help at once.  Tell your doctor or health care professional if you have high blood sugar. You might need to change the dose of your medicine. If you are sick or exercising more than usual, you might need to change the dose of your medicine.  Do not skip meals. Ask your doctor or health care professional if you should avoid alcohol. Many nonprescription cough and cold products contain sugar or alcohol. These can affect blood sugar.  Wear a medical ID bracelet or chain, and carry a card that describes your disease and details of your medicine and dosage times.  What side effects may I notice from receiving this medicine?  Side effects that you should report to your doctor or health care professional as soon as possible:  · allergic reactions like skin rash, itching or hives, swelling of the face, lips, or tongue  · breathing problems  · signs and symptoms of low blood sugar such as feeling anxious, confusion, dizziness, increased hunger, unusually weak or tired, sweating, shakiness, cold, irritable, headache, blurred vision, fast heartbeat, loss of consciousness  · unusual stomach upset or pain  · vomiting  Side effects that usually do not require medical attention (Report these to your doctor or health care professional if they continue or are bothersome.):diarrhea  · heartburn  · loss of appetite  · nausea  · pain, redness, or irritation at site where injected  This list may not describe all possible side effects. Call your doctor for medical advice about side effects. You may report side effects to FDA at 2-442-FDA-8280.  Where should I keep my medicine?  Keep out of the reach of children.  Store this medicine in a refrigerator between 2 and 8 degrees C (36 and 46 degrees F). Do not freeze or use if the medicine has been  frozen. Protect from light and excessive heat. Each single-dose pen or prefilled syringe can be kept at room temperature, not to exceed 30 degrees C (86 degrees F) for a total of 14 days, if needed. Store in the carton until use. Throw away any unused medicine after the expiration date.  NOTE: This sheet is a summary. It may not cover all possible information. If you have questions about this medicine, talk to your doctor, pharmacist, or health care provider.  NOTE:This sheet is a summary. It may not cover all possible information. If you have questions about this medicine, talk to your doctor, pharmacist, or health care provider. Copyright© 2016 [employer]        Hypoglycemia (Low Blood Sugar)  Too little sugar (glucose) in your blood is called hypoglycemia or low blood sugar. Low blood sugar usually means anything lower than 70 mg/dL. Talk with your healthcare provider about your target range and what level is too low for you. Diabetes itself doesnt cause low blood sugar. But some of the treatments for diabetes, such as pills or insulin, may raise your risk for it. Low blood sugar may cause you to pass out or have a seizure. So always treat low blood sugar right away, but don't overeat.  Special note: Always carry a source of fast-acting sugar and a snack in case of hypoglycemia.     What you may notice  If you have low blood sugar, you may have one or more of these symptoms:  · Shakiness or dizziness  · Cold, clammy skin or sweating  · Feelings of hunger  · Headache  · Nervousness  · A hard, fast heartbeat  · Weakness  · Confusion or irritability  · Blurred vision  · Having nightmares or waking up confused or sweating  · Numbness or tingling in the lips or tongue  What you should do  Here are tips to follow if you have hypoglycemia:   · First check your blood sugar. If it is too low (out of your target range), eat or drink 15 to 20 grams of fast-acting sugar. This may be 3 to 4 glucose tablets, 4 ounces (half  a cup) of fruit juice or regular (nondiet) soda, 8 ounces (1 cup) of fat-free milk, or 1 tablespoon of honey. Dont take more than this, or your blood sugar may go too high.  · Wait 15 minutes. Then recheck your blood sugar if you can.  · If your blood sugar is still too low, repeat the steps above and check your blood sugar again. If your blood sugar still has not returned to your target range, contact your healthcare provider or seek emergency care.  · Once your blood sugar returns to target range, eat a snack or meal.  Preventing low blood sugar  Things you can do include the following:   · If your condition needs a strict treatment plan, eat your meals and snacks at the same times each day. Dont skip meals!  · If your treatment plan lets you change when you eat and what you eat, learn how to change the time and dose of your rapid-acting insulin to match this.   · Ask your healthcare provider if it is safe for you to drink alcohol. Never drink on an empty stomach.  · Take your medicine at the prescribed times.  · Always carry a source of fast-acting sugar and a snack when youre away from home.  Other things to do  Additional tips include the following:  · Carry a medical ID card, a compact USB drive, or wear a medical alert bracelet or necklace. It should say that you have diabetes. It should also say what to do if you pass out or have a seizure.  · Make sure your family, friends, and coworkers know the signs of low blood sugar. Tell them what to do if your blood sugar falls very low and you cant treat yourself.  · Keep a glucagon emergency kit handy. Be sure your family, friends, and coworkers know how and when to use it. Check it regularly and replace the glucagon before it expires.  · Talk with your health care team about other things you can do to prevent low blood sugar.     If you have unexplained hypoglycemia or hypoglycemia several times, call your healthcare provider.   © 9631-3780 [facility]  [employer]. [address] [place] [address], [place] [address]. All rights reserved. This information is not intended as a substitute for professional medical care. Always follow your healthcare professional's instructions.

## 2017-02-06 RX ORDER — AMITRIPTYLINE HYDROCHLORIDE 10 MG/1
TABLET, FILM COATED ORAL
Qty: 180 TABLET | Refills: 0 | Status: SHIPPED | OUTPATIENT
Start: 2017-02-06 | End: 2017-04-25 | Stop reason: SDUPTHER

## 2017-02-09 ENCOUNTER — OFFICE VISIT (OUTPATIENT)
Dept: OPHTHALMOLOGY | Facility: CLINIC | Age: 62
End: 2017-02-09
Payer: COMMERCIAL

## 2017-02-09 VITALS — SYSTOLIC BLOOD PRESSURE: 152 MMHG | DIASTOLIC BLOOD PRESSURE: 96 MMHG | HEART RATE: 91 BPM

## 2017-02-09 DIAGNOSIS — H26.9 CORTICAL CATARACT: ICD-10-CM

## 2017-02-09 DIAGNOSIS — H35.373 EPIRETINAL MEMBRANE, BILATERAL: ICD-10-CM

## 2017-02-09 DIAGNOSIS — H40.033 ANATOMICAL NARROW ANGLE BORDERLINE GLAUCOMA, BILATERAL: Primary | ICD-10-CM

## 2017-02-09 DIAGNOSIS — H40.053 OCULAR HYPERTENSION, BILATERAL: ICD-10-CM

## 2017-02-09 DIAGNOSIS — E11.3593 PROLIFERATIVE DIABETIC RETINOPATHY OF BOTH EYES WITHOUT MACULAR EDEMA ASSOCIATED WITH TYPE 2 DIABETES MELLITUS: ICD-10-CM

## 2017-02-09 DIAGNOSIS — H43.11 VITREOUS HEMORRHAGE, RIGHT: ICD-10-CM

## 2017-02-09 DIAGNOSIS — H35.033 HYPERTENSIVE RETINOPATHY, BILATERAL: ICD-10-CM

## 2017-02-09 PROCEDURE — 99999 PR PBB SHADOW E&M-EST. PATIENT-LVL III: CPT | Mod: PBBFAC,,, | Performed by: OPHTHALMOLOGY

## 2017-02-09 PROCEDURE — 99499 UNLISTED E&M SERVICE: CPT | Mod: S$GLB,,, | Performed by: OPHTHALMOLOGY

## 2017-02-09 PROCEDURE — 66761 REVISION OF IRIS: CPT | Mod: RT,S$GLB,, | Performed by: OPHTHALMOLOGY

## 2017-02-09 NOTE — PROGRESS NOTES
HPI     DLS: 1/26/17    Pt here for Argon/YAG PI OD  S/P Argon/YAG PI OS- 1/26/17    Meds; No GTTS    1. Anatomical narrow angle borderline glaucoma, bilateral  2. Ocular hypertension, bilateral  3. Proliferative diabetic retinopathy of both eyes without macular edema   associated with type 2 diabetes mellitus  4. Hypertensive retinopathy, bilateral  5. Epiretinal membrane, bilateral  6. Vitreous hemorrhage, right  7. Cortical cataract  8. Proliferative diabetic retinopathy without macular edema associated   with type 2 diabetes mellitus, unspecified laterality          Last edited by Tomasa Montilla on 2/9/2017  9:27 AM.         Assessment /Plan     For exam results, see Encounter Report.    Anatomical narrow angle borderline glaucoma, bilateral  -     Posterior Segment OCT Optic Nerve- Both eyes; Future  -     Crocker Visual Field - OU - Extended - Both Eyes; Future    Ocular hypertension, bilateral  -     Posterior Segment OCT Optic Nerve- Both eyes; Future  -     Crocker Visual Field - OU - Extended - Both Eyes; Future    Proliferative diabetic retinopathy of both eyes without macular edema associated with type 2 diabetes mellitus    Hypertensive retinopathy, bilateral    Epiretinal membrane, bilateral    Vitreous hemorrhage, right    Cortical cataract      1. OHT vs Pre-perimetric POAG OU (angles  Narrowing over time)  Followed at [facility] since '04   First seen by Scarlet '08   Never on gtts   VF defects 2/2 PRP     Family history none   Glaucoma meds None   H/O adverse rxn to glaucoma drops none   LASERS Mulitple PRP OU   GLAUCOMA SURGERIES None   OTHER EYE SURGERIES none   CDR 0.3/0.35   Tbase 15-23/15-25   Tmax 25 OU   Ttarget ??   HVF 6 tests: 2008 to 2016 SAD/IAD OU 2/2 PRP OU   Gonio +1-3 OU 1/8/2016 (doubt occludable)   /575   OCT 3 tests: 2008 to 2016 -  RNFL nl od // nl os    HRT 5 tests: 2009 to 2016 - MR -  nl od // nl os /// CDR 0.30 od // 0.23 os  Disc photos 2002, 2003, 2005 (slides) // 2008,  "2013  (OIS)     - Ttoday  ?? / 18   - Test done today PI os     2. Narrow Angles:  +1-3 w/ bowing. Consider PI. Likely resolve in future once pseudophakic.  Anterior Segment OCT today w/ Open Angles w/ narrow approach OU  Doubt occludable - monitor gonio     3. PDR OU s/p PDR OU -stable exam on last eval w/ Retina on 7/25/13.   S/p avastin x 4 od - last 1/4/2016     4. Hx of VH OD -stable. Last seen by retina - jesse 1/4/2016    5. Cataract OU -not visually significant. observe    6. Hx of Transient Visual Obscurations OS - ? BP elevation. Seen w/ stable Retinal exam on 9/13/12.     7. Had a gastric "sleve" done 12/18/2015 - for weight loss    So far doing well    On less insulin now     Plan:  Stable IOP (thick k's), and HRT wnl today   Cont no gtts  Continue to work on blood sugars  oht vs glaucoma    RTC:  Angles continue to narrow   rec laser PI's // OS then OD - argon then yag     PI done os 1/26/2017 -   PI od - 2/9/2017 - steroid taper     Recheck gonio after PI's and then will need - more open os post PI  F/U 2-3 months with HVF / DFE / OCT     Cont with regular F/U's with Dr. Pagan    I have seen and personally examined the patient.  I agree with the findings, assessment and plan of the resident and/or fellow.     Alma Goodrich MD  "

## 2017-02-11 ENCOUNTER — LAB VISIT (OUTPATIENT)
Dept: LAB | Facility: HOSPITAL | Age: 62
End: 2017-02-11
Payer: COMMERCIAL

## 2017-02-11 DIAGNOSIS — E11.3599 PROLIFERATIVE DIABETIC RETINOPATHY WITHOUT MACULAR EDEMA ASSOCIATED WITH TYPE 2 DIABETES MELLITUS, UNSPECIFIED LATERALITY: ICD-10-CM

## 2017-02-11 PROCEDURE — 82985 ASSAY OF GLYCATED PROTEIN: CPT

## 2017-02-11 PROCEDURE — 36415 COLL VENOUS BLD VENIPUNCTURE: CPT

## 2017-02-14 LAB — FRUCTOSAMINE SERPL-SCNC: 347 UMOL /L (ref 0–285)

## 2017-04-10 ENCOUNTER — CLINICAL SUPPORT (OUTPATIENT)
Dept: OPHTHALMOLOGY | Facility: CLINIC | Age: 62
End: 2017-04-10
Payer: COMMERCIAL

## 2017-04-10 ENCOUNTER — OFFICE VISIT (OUTPATIENT)
Dept: OPHTHALMOLOGY | Facility: CLINIC | Age: 62
End: 2017-04-10
Payer: COMMERCIAL

## 2017-04-10 DIAGNOSIS — H40.053 OCULAR HYPERTENSION, BILATERAL: ICD-10-CM

## 2017-04-10 DIAGNOSIS — H40.033 ANATOMICAL NARROW ANGLE BORDERLINE GLAUCOMA, BILATERAL: ICD-10-CM

## 2017-04-10 DIAGNOSIS — H43.11 VITREOUS HEMORRHAGE, RIGHT: ICD-10-CM

## 2017-04-10 DIAGNOSIS — H35.033 HYPERTENSIVE RETINOPATHY, BILATERAL: ICD-10-CM

## 2017-04-10 DIAGNOSIS — E11.3593 PROLIFERATIVE DIABETIC RETINOPATHY OF BOTH EYES WITHOUT MACULAR EDEMA ASSOCIATED WITH TYPE 2 DIABETES MELLITUS: Primary | ICD-10-CM

## 2017-04-10 DIAGNOSIS — H35.373 EPIRETINAL MEMBRANE, BILATERAL: ICD-10-CM

## 2017-04-10 DIAGNOSIS — H26.9 CORTICAL CATARACT: ICD-10-CM

## 2017-04-10 PROCEDURE — 92133 CPTRZD OPH DX IMG PST SGM ON: CPT | Mod: S$GLB,,, | Performed by: OPHTHALMOLOGY

## 2017-04-10 PROCEDURE — 92083 EXTENDED VISUAL FIELD XM: CPT | Mod: S$GLB,,, | Performed by: OPHTHALMOLOGY

## 2017-04-10 PROCEDURE — 92014 COMPRE OPH EXAM EST PT 1/>: CPT | Mod: S$GLB,,, | Performed by: OPHTHALMOLOGY

## 2017-04-10 PROCEDURE — 99999 PR PBB SHADOW E&M-EST. PATIENT-LVL III: CPT | Mod: PBBFAC,,, | Performed by: OPHTHALMOLOGY

## 2017-04-10 NOTE — PROGRESS NOTES
HPI     DLS: 2/09/17    Pt here for HVF review;    Meds: No GTTS    1. Anatomical narrow angle borderline glaucoma, bilateral  2. Ocular hypertension, bilateral  3. Proliferative diabetic retinopathy of both eyes without macular edema   associated with type 2 diabetes mellitus  4. Hypertensive retinopathy, bilateral  5. Epiretinal membrane, bilateral  6. Vitreous hemorrhage, right  7. Cortical cataract        Last edited by Tomasa Montilla on 4/10/2017  3:36 PM.         Assessment /Plan     For exam results, see Encounter Report.    Proliferative diabetic retinopathy of both eyes without macular edema associated with type 2 diabetes mellitus    Anatomical narrow angle borderline glaucoma, bilateral  -     Posterior Segment OCT Optic Nerve- Both eyes    Ocular hypertension, bilateral  -     Posterior Segment OCT Optic Nerve- Both eyes    Hypertensive retinopathy, bilateral    Epiretinal membrane, bilateral    Vitreous hemorrhage, right    Cortical cataract    1. OHT vs Pre-perimetric POAG OU (angles  Narrowing over time)  Followed at [facility] since '04   First seen by Scarlet '08   Never on gtts   VF defects 2/2 PRP     Family history none   Glaucoma meds None   H/O adverse rxn to glaucoma drops none   LASERS Mulitple PRP OU // PI OD 2/9/2017 /// PI os 1/26/2017   GLAUCOMA SURGERIES None   OTHER EYE SURGERIES none   CDR 0.3/0.35   Tbase 15-23/15-25   Tmax 25 OU   Ttarget ??   HVF 7 tests: 2008 to 2017 SAD/IAD OU 2/2 PRP OU   Gonio +1-3 OU 1/8/2016 (doubt occludable)   /575   OCT 4 tests: 2008 to 2017 -  RNFL nl od // nl os    HRT 5 tests: 2009 to 2016 - MR -  nl od // nl os /// CDR 0.30 od // 0.23 os  Disc photos 2002, 2003, 2005 (slides) // 2008, 2013  (OIS)     - Ttoday  17 / 17   - Test done today HVF/OCT/DFE     2. Narrow Angles:  +1-3 w/ bowing. Consider PI. Likely resolve in future once pseudophakic.  Anterior Segment OCT today w/ Open Angles w/ narrow approach OU  Doubt occludable - monitor gonio     3. PDR OU  "s/p PDR OU -stable exam on last eval w/ Retina on 7/25/13.   S/p avastin x 4 od - last 1/4/2016     4. Hx of VH OD -stable. Last seen by retina - jesse 1/4/2016    5. Cataract OU -not visually significant. observe    6. Hx of Transient Visual Obscurations OS - ? BP elevation. Seen w/ stable Retinal exam on 9/13/12.     7. Had a gastric "sleve" done 12/18/2015 - for weight loss    So far doing well    On less insulin now     Plan:  Stable IOP (thick k's), and HRT wnl today   Cont no gtts  Continue to work on blood sugars  oht vs glaucoma    RTC:  Angles continue to narrow    S/P PI done os 1/26/2017 -   S/P PI od - 2/9/2017 -     F/U 4 months with IOP check- other tests up to date // check gonio ou post PI's - done recently  // AR/MR/BAT cataract check     Cont with regular F/U's with Dr. Pagan or nae - needs yearly checks - last seen 9/2016     I have seen and personally examined the patient.  I agree with the findings, assessment and plan of the resident and/or fellow.     Alma Goodrich MD                 "

## 2017-04-20 ENCOUNTER — OFFICE VISIT (OUTPATIENT)
Dept: OPHTHALMOLOGY | Facility: CLINIC | Age: 62
End: 2017-04-20
Payer: COMMERCIAL

## 2017-04-20 DIAGNOSIS — H40.031 ANATOMICAL NARROW ANGLE OF RIGHT EYE: ICD-10-CM

## 2017-04-20 DIAGNOSIS — H40.032 ANATOMICAL NARROW ANGLE GLAUCOMA OF LEFT EYE: ICD-10-CM

## 2017-04-20 DIAGNOSIS — H25.13 NUCLEAR SCLEROSIS, BILATERAL: ICD-10-CM

## 2017-04-20 DIAGNOSIS — E11.3593 TYPE 2 DIABETES MELLITUS WITH BOTH EYES AFFECTED BY PROLIFERATIVE RETINOPATHY WITHOUT MACULAR EDEMA, WITH LONG-TERM CURRENT USE OF INSULIN: Primary | ICD-10-CM

## 2017-04-20 DIAGNOSIS — Z79.4 TYPE 2 DIABETES MELLITUS WITH BOTH EYES AFFECTED BY PROLIFERATIVE RETINOPATHY WITHOUT MACULAR EDEMA, WITH LONG-TERM CURRENT USE OF INSULIN: Primary | ICD-10-CM

## 2017-04-20 PROCEDURE — 92226 PR SPECIAL EYE EXAM, SUBSEQUENT: CPT | Mod: 59,LT,S$GLB, | Performed by: OPHTHALMOLOGY

## 2017-04-20 PROCEDURE — 99999 PR PBB SHADOW E&M-EST. PATIENT-LVL II: CPT | Mod: PBBFAC,,, | Performed by: OPHTHALMOLOGY

## 2017-04-20 PROCEDURE — 92134 CPTRZ OPH DX IMG PST SGM RTA: CPT | Mod: S$GLB,,, | Performed by: OPHTHALMOLOGY

## 2017-04-20 PROCEDURE — 92014 COMPRE OPH EXAM EST PT 1/>: CPT | Mod: S$GLB,,, | Performed by: OPHTHALMOLOGY

## 2017-04-20 NOTE — PROGRESS NOTES
[facility] SDOCT:   OD: good quality, good contour, no ME  OS: good quality, good contour, no ME    QPDR OU s/p PRP ou  H/o Avastin    Narrow angles.  Good IOP today.  Had laser PI recently  Keep scheduled f/u with Dr. Goodrich    Cataracts- happy with [facility]  Observe    Diabetic Retinopathy discussed in detail, all questions answered  Stressed importance of good BS/BP/Chol Control  RTC 1 yr, sooner PRN

## 2017-04-25 ENCOUNTER — OFFICE VISIT (OUTPATIENT)
Dept: OBSTETRICS AND GYNECOLOGY | Facility: CLINIC | Age: 62
End: 2017-04-25
Payer: COMMERCIAL

## 2017-04-25 ENCOUNTER — TELEPHONE (OUTPATIENT)
Dept: OBSTETRICS AND GYNECOLOGY | Facility: CLINIC | Age: 62
End: 2017-04-25

## 2017-04-25 VITALS
BODY MASS INDEX: 36.49 KG/M2 | HEIGHT: 66 IN | WEIGHT: 227.06 LBS | SYSTOLIC BLOOD PRESSURE: 136 MMHG | DIASTOLIC BLOOD PRESSURE: 72 MMHG

## 2017-04-25 DIAGNOSIS — Z01.419 VISIT FOR GYNECOLOGIC EXAMINATION: Primary | ICD-10-CM

## 2017-04-25 DIAGNOSIS — Z78.0 POSTMENOPAUSE: ICD-10-CM

## 2017-04-25 DIAGNOSIS — Z12.39 BREAST CANCER SCREENING: ICD-10-CM

## 2017-04-25 PROCEDURE — 3078F DIAST BP <80 MM HG: CPT | Mod: S$GLB,,, | Performed by: NURSE PRACTITIONER

## 2017-04-25 PROCEDURE — 99396 PREV VISIT EST AGE 40-64: CPT | Mod: S$GLB,,, | Performed by: NURSE PRACTITIONER

## 2017-04-25 PROCEDURE — 99999 PR PBB SHADOW E&M-EST. PATIENT-LVL III: CPT | Mod: PBBFAC,,, | Performed by: NURSE PRACTITIONER

## 2017-04-25 PROCEDURE — 3075F SYST BP GE 130 - 139MM HG: CPT | Mod: S$GLB,,, | Performed by: NURSE PRACTITIONER

## 2017-04-25 RX ORDER — METOPROLOL SUCCINATE 50 MG/1
TABLET, EXTENDED RELEASE ORAL
Qty: 90 TABLET | Refills: 0 | Status: SHIPPED | OUTPATIENT
Start: 2017-04-25 | End: 2017-07-27 | Stop reason: SDUPTHER

## 2017-04-25 NOTE — PROGRESS NOTES
"HISTORY OF PRESENT ILLNESS:    Aaliyah Angela is a 61 y.o. female , presents for a routine exam and has no gyn complaints.    -Reports at least 90 pound weight loss from the Gastric Bypass and has been able to stop some of her diabetic medications.    Past Medical History:   Diagnosis Date    Allergy     Anatomical narrow angle borderline glaucoma     AR (allergic rhinitis)     Arthritis     CAD (coronary artery disease) 2013    Non obstructive,  PET     Cataract     Diabetes mellitus type II     with retinopathy    Diabetic retinopathy     Epiretinal membrane, both eyes     Hyperlipidemia     Hypertensive retinopathy of both eyes     Lumbar disc disease     Migraines     Morbid obesity 10/3/2012    Proliferative diabetic retinopathy     PVD (peripheral vascular disease) 2013    Carotid US 1-39% bilat      Rupture of right Achilles tendon     Unspecified essential hypertension     Unspecified vitamin D deficiency     Vitreous hemorrhage of left eye        Past Surgical History:   Procedure Laterality Date     SECTION      x3    GASTRIC BYPASS      Sleeve    LASER PERIPHERAL IRIDOTOMY Bilateral         PANRETINAL PHOTOCOAGULATION      Both eyes    TUBAL LIGATION          MEDICATIONS AND ALLERGIES:      Current Outpatient Prescriptions:     amitriptyline (ELAVIL) 10 MG tablet, TAKE 2 TABLETS BY MOUTH EVERY NIGHT AT DINNER, Disp: 180 tablet, Rfl: 0    amlodipine (NORVASC) 5 MG tablet, Take 1 tablet (5 mg total) by mouth 2 (two) times daily., Disp: 180 tablet, Rfl: 3    aspirin 81 MG Chew, Take 81 mg by mouth once daily.  , Disp: , Rfl:     atorvastatin (LIPITOR) 20 MG tablet, Take 1 tablet (20 mg total) by mouth once daily., Disp: 90 tablet, Rfl: 3    BD ULTRA-FINE DIEGO PEN NEEDLES 32 gauge x 5/32" Ndle, USE THREE TIMES DAILY, Disp: 100 each, Rfl: 0    BLOOD GLUCOSE TEST test strip, TEST AS DIRECTED AFTER MEALS, Disp: 500 strip, Rfl: 0    " calcium-vitamin D3 500 mg(1,250mg) -200 unit per tablet, Take 1 tablet by mouth once daily., Disp: , Rfl:     dulaglutide (TRULICITY) 0.75 mg/0.5 mL PnIj, Inject 0.75 mg into the skin every 7 days., Disp: 4 Syringe, Rfl: 6    metformin (GLUCOPHAGE-XR) 500 MG 24 hr tablet, Take 2 tablets (1,000 mg total) by mouth 2 (two) times daily with meals., Disp: 360 tablet, Rfl: 2    multivitamin (ONE DAILY MULTIVITAMIN) per tablet, Take 1 tablet by mouth once daily., Disp: , Rfl:     ranitidine (ZANTAC) 150 MG tablet, Take 150 mg by mouth as needed for Heartburn., Disp: , Rfl:     Review of patient's allergies indicates:  No Known Allergies    Family History   Problem Relation Age of Onset    Diabetes Mother     Hypertension Mother     Diabetes Father     Hypertension Father     Stroke Father     Diabetes Sister     Diabetes Brother     Cancer Brother      prostate    Prostate cancer Brother     Diabetes Sister     Diabetes Sister     Diabetes Sister     Diabetes Brother     Stroke Brother     Diabetes Brother     Diabetes Brother     Breast cancer Neg Hx     Colon cancer Neg Hx     Ovarian cancer Neg Hx     Melanoma Neg Hx     Amblyopia Neg Hx     Blindness Neg Hx     Cataracts Neg Hx     Glaucoma Neg Hx     Macular degeneration Neg Hx     Retinal detachment Neg Hx     Strabismus Neg Hx        Social History     Social History    Marital status: Single     Spouse name: N/A    Number of children: N/A    Years of education: N/A     Occupational History    Not on file.     Social History Main Topics    Smoking status: Never Smoker    Smokeless tobacco: Never Used    Alcohol use No    Drug use: No    Sexual activity: No     Other Topics Concern    Not on file     Social History Narrative    Work a LPN at [employer]    Single, lives alone       OBSTETRIC HISTORY: Number of cesareans: 3    COMPREHENSIVE GYN HISTORY:  PAP History: Denies abnormal Paps. LAST PAP 3-15-16 NORMAL.  Infection  "History: Denies STDs. Denies PID.  Benign History: Denies uterine fibroids.Denies ovarian cysts. Denies endometriosis. Reports stable right hydrosalpinx.  Cancer History: Denies cervical cancer. Denies uterine cancer or hyperplasia. Denies ovarian cancer. Denies vulvar cancer or pre-cancer. Denies vaginal cancer or pre-cancer. Denies breast cancer. Denies colon cancer.   Sexual Activity History: Denies currently being sexually active.   Menstrual History: Denies menses. Pt is , not on HRT.    ROS:  GENERAL: + INTENTIONAL WEIGHT LOSS. No swelling. No fatigue. No fever.  CARDIOVASCULAR: No chest pain. No shortness of breath. No leg cramps.   NEUROLOGICAL: No headaches. No vision changes.  BREASTS: No pain. No lumps. No discharge.  ABDOMEN: No pain. No nausea. No vomiting. No diarrhea. No constipation.  REPRODUCTIVE: No abnormal bleeding.   VULVA: No pain. No lesions. No itching.  VAGINA: No relaxation. No itching. No odor. No discharge. No lesions.  URINARY: No incontinence. No nocturia. No frequency. No dysuria.    /72  Ht 5' 6" (1.676 m)  Wt 103 kg (227 lb 1.2 oz)  BMI 36.65 kg/m2    PE:  APPEARANCE: Well nourished, well developed, in no acute distress.  AFFECT: WNL, alert and oriented x 3.  SKIN: No hirsutism or acne.  NECK: Neck symmetric without masses or thyromegaly.  NODES: No inguinal, cervical, axillary or femoral lymph node enlargement.  CHEST: Good respiratory effort.   ABDOMEN: Soft. No tenderness or masses.   BREASTS: Symmetrical, no skin changes or visible lesions. No palpable masses, nipple discharge bilaterally. CHRONIC BILATERAL INVERTED NIPPLES.  PELVIC: ATROPHIC EXTERNAL FEMALE GENITALIA without lesions. Normal hair distribution. Adequate perineal body, normal urethral meatus. VAGINA DRY without lesions or discharge. CERVIX STENOTIC without lesions, discharge or tenderness. No significant cystocele or rectocele. Bimanual exam shows uterus to be normal size, regular, mobile and nontender. " Adnexa without masses or tenderness.  RECTAL: Rectovaginal exam confirms above with normal sphincter tone, no masses.  EXTREMITIES: No edema.    DIAGNOSIS:  1. Visit for gynecologic examination    2. Postmenopause        PLAN:    LABS AND TESTS ORDERED:  Mammogram  Need colonoscopy    MEDICATIONS PRESCRIBED:  None    COUNSELING:  The patient was counseled today on:  -osteoporosis prevention, calcium supplementation, regular weight bearing exercise;  -A.C.S. Pap and pelvic exam guidelines (pap every 3 years, no pap after age 65) and recommendations for yearly mammogram;  -to see her PCP for other health maintenance.    FOLLOW-UP with me annually.

## 2017-05-01 ENCOUNTER — HOSPITAL ENCOUNTER (OUTPATIENT)
Dept: RADIOLOGY | Facility: HOSPITAL | Age: 62
Discharge: HOME | End: 2017-05-01
Attending: NURSE PRACTITIONER
Payer: COMMERCIAL

## 2017-05-01 ENCOUNTER — OFFICE VISIT (OUTPATIENT)
Dept: INTERNAL MEDICINE | Facility: CLINIC | Age: 62
End: 2017-05-01
Payer: COMMERCIAL

## 2017-05-01 VITALS
DIASTOLIC BLOOD PRESSURE: 80 MMHG | OXYGEN SATURATION: 99 % | HEART RATE: 73 BPM | BODY MASS INDEX: 37.06 KG/M2 | SYSTOLIC BLOOD PRESSURE: 140 MMHG | WEIGHT: 230.63 LBS | HEIGHT: 66 IN

## 2017-05-01 DIAGNOSIS — I10 HTN (HYPERTENSION), BENIGN: Primary | ICD-10-CM

## 2017-05-01 DIAGNOSIS — E78.5 HYPERLIPIDEMIA, UNSPECIFIED HYPERLIPIDEMIA TYPE: ICD-10-CM

## 2017-05-01 DIAGNOSIS — E11.3593 TYPE 2 DIABETES MELLITUS WITH BOTH EYES AFFECTED BY PROLIFERATIVE RETINOPATHY WITHOUT MACULAR EDEMA, WITH LONG-TERM CURRENT USE OF INSULIN: ICD-10-CM

## 2017-05-01 DIAGNOSIS — I25.10 CORONARY ARTERY DISEASE INVOLVING NATIVE CORONARY ARTERY OF NATIVE HEART WITHOUT ANGINA PECTORIS: ICD-10-CM

## 2017-05-01 DIAGNOSIS — Z12.39 BREAST CANCER SCREENING: ICD-10-CM

## 2017-05-01 DIAGNOSIS — Z12.31 VISIT FOR SCREENING MAMMOGRAM: ICD-10-CM

## 2017-05-01 DIAGNOSIS — I73.9 PVD (PERIPHERAL VASCULAR DISEASE): ICD-10-CM

## 2017-05-01 DIAGNOSIS — Z79.4 TYPE 2 DIABETES MELLITUS WITH BOTH EYES AFFECTED BY PROLIFERATIVE RETINOPATHY WITHOUT MACULAR EDEMA, WITH LONG-TERM CURRENT USE OF INSULIN: ICD-10-CM

## 2017-05-01 LAB
CREAT UR-MCNC: 130 MG/DL
PROT UR-MCNC: 14 MG/DL
PROT/CREAT RATIO, UR: 0.11

## 2017-05-01 PROCEDURE — 3079F DIAST BP 80-89 MM HG: CPT | Mod: S$GLB,,, | Performed by: FAMILY MEDICINE

## 2017-05-01 PROCEDURE — 3077F SYST BP >= 140 MM HG: CPT | Mod: S$GLB,,, | Performed by: FAMILY MEDICINE

## 2017-05-01 PROCEDURE — 99214 OFFICE O/P EST MOD 30 MIN: CPT | Mod: S$GLB,,, | Performed by: FAMILY MEDICINE

## 2017-05-01 PROCEDURE — 1160F RVW MEDS BY RX/DR IN RCRD: CPT | Mod: S$GLB,,, | Performed by: FAMILY MEDICINE

## 2017-05-01 PROCEDURE — 77063 BREAST TOMOSYNTHESIS BI: CPT | Mod: 26,,, | Performed by: RADIOLOGY

## 2017-05-01 PROCEDURE — 82570 ASSAY OF URINE CREATININE: CPT

## 2017-05-01 PROCEDURE — 77067 SCR MAMMO BI INCL CAD: CPT | Mod: 26,,, | Performed by: RADIOLOGY

## 2017-05-01 PROCEDURE — 77067 SCR MAMMO BI INCL CAD: CPT | Mod: TC

## 2017-05-01 PROCEDURE — 99999 PR PBB SHADOW E&M-EST. PATIENT-LVL III: CPT | Mod: PBBFAC,,, | Performed by: FAMILY MEDICINE

## 2017-05-01 PROCEDURE — 3045F PR MOST RECENT HEMOGLOBIN A1C LEVEL 7.0-9.0%: CPT | Mod: S$GLB,,, | Performed by: FAMILY MEDICINE

## 2017-05-01 RX ORDER — LOSARTAN POTASSIUM 25 MG/1
25 TABLET ORAL DAILY
Qty: 90 TABLET | Refills: 3 | Status: SHIPPED | OUTPATIENT
Start: 2017-05-01 | End: 2017-11-21 | Stop reason: ALTCHOICE

## 2017-05-01 NOTE — MR AVS SNAPSHOT
[facility]  [address]  [facility] [address]  Phone: [phone]  Fax: [phone]                  Aaliyah Angela   2017 3:40 PM   Office Visit    Description:  Female : 1955   Provider:  Reggie Glover MD   Department:  [facility]           Reason for Visit     Follow-up           Diagnoses this Visit        Comments    HTN (hypertension), benign    -  Primary     PVD (peripheral vascular disease)         Coronary artery disease involving native coronary artery of native heart without angina pectoris         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 by proliferative retinopathy without macular edema, with long-term current use of insulin         Hyperlipidemia, unspecified hyperlipidemia type                To Do List           Future Appointments        Provider Department Dept Phone    2017 8:00 AM DAMARI Rae, LIVIER [facility] Endocrinology [phone]      Goals (5 Years of Data)              Today    17    Blood Pressure < 140/90   140/80  140/80  140/80    HDL > 40           HEMOGLOBIN A1C < 7.0           LDL CHOLESTEROL < 70           Weight < 200 lb (90.719 kg)   104.6 kg (230 lb 9.6 oz)  103 kg (227 lb 1.2 oz)        Follow-Up and Disposition     Return for bp.    Follow-up and Disposition History       These Medications        Disp Refills Start End    losartan (COZAAR) 25 MG tablet 90 tablet 3 2017    Take 1 tablet (25 mg total) by mouth once daily. - Oral    Pharmacy: [facility] [address] [place], [place] [address]DONTAE RODRIGUEZ AT [facility] Ph #: [phone]         [facility] On Call     [facility] On Call Nurse Care Line - / Assistance  Unless otherwise directed by your provider, please contact [facility], our nurse care line that is available for  assistance.     Registered nurses in the [facility] provide: appointment scheduling,  "clinical advisement, health education, and other advisory services.  Call: [phone] (toll free)               Medications           Message regarding Medications     Verify the changes and/or additions to your medication regime listed below are the same as discussed with your clinician today.  If any of these changes or additions are incorrect, please notify your healthcare provider.        START taking these NEW medications        Refills    losartan (COZAAR) 25 MG tablet 3    Sig: Take 1 tablet (25 mg total) by mouth once daily.    Class: Normal    Route: Oral           Verify that the below list of medications is an accurate representation of the medications you are currently taking.  If none reported, the list may be blank. If incorrect, please contact your healthcare provider. Carry this list with you in case of emergency.           Current Medications     amitriptyline (ELAVIL) 10 MG tablet TAKE 2 TABLETS BY MOUTH EVERY NIGHT AT DINNER    amlodipine (NORVASC) 5 MG tablet Take 1 tablet (5 mg total) by mouth 2 (two) times daily.    aspirin 81 MG Chew Take 81 mg by mouth once daily.      atorvastatin (LIPITOR) 20 MG tablet Take 1 tablet (20 mg total) by mouth once daily.    BD ULTRA-FINE DIEGO PEN NEEDLES 32 gauge x 5/32" Ndle USE THREE TIMES DAILY    BLOOD GLUCOSE TEST test strip TEST AS DIRECTED AFTER MEALS    calcium-vitamin D3 500 mg(1,250mg) -200 unit per tablet Take 1 tablet by mouth once daily.    dulaglutide (TRULICITY) 0.75 mg/0.5 mL PnIj Inject 0.75 mg into the skin every 7 days.    metformin (GLUCOPHAGE-XR) 500 MG 24 hr tablet Take 2 tablets (1,000 mg total) by mouth 2 (two) times daily with meals.    metoprolol succinate (TOPROL-XL) 50 MG 24 hr tablet TAKE 1 TABLET BY MOUTH EVERY DAY    multivitamin (ONE DAILY MULTIVITAMIN) per tablet Take 1 tablet by mouth once daily.    ranitidine (ZANTAC) 150 MG tablet Take 150 mg by mouth as needed for Heartburn.    losartan (COZAAR) 25 MG tablet Take 1 " "tablet (25 mg total) by mouth once daily.           Clinical Reference Information           Your Vitals Were     BP Pulse Height Weight SpO2 BMI    140/80 73 5' 6" (1.676 m) 104.6 kg (230 lb 9.6 oz) 99% 37.22 kg/m2      Blood Pressure          Most Recent Value    BP  (!)  140/80      Allergies as of 5/1/2017     No Known Allergies      Immunizations Administered on Date of Encounter - 5/1/2017     None      Orders Placed During Today's Visit      Normal Orders This Visit    Protein / creatinine ratio, urine       [facility] Sign-Up     Activating your [facility] account is as easy as 1-2-3!     1) Visit [email or web address], select Sign Up Now, enter this activation code and your date of birth, then select Next.  Activation code not generated  Current Patient Portal Status: Account disabled      2) Create a username and password to use when you visit [facility] in the future and select a security question in case you lose your password and select Next.    3) Enter your e-mail address and click Sign Up!    Additional Information  If you have questions, please e-mail [email or web address] or call [phone] to talk to our [facility] staff. Remember, [facility] is NOT to be used for urgent needs. For medical emergencies, dial 911.         Language Assistance Services     ATTENTION: Language assistance services are available, free of charge. Please call [phone].      ATENCIÓN: Si habla español, tiene a ambriz disposición servicios gratuitos de asistencia lingüística. Llame al [phone].     YOSEPH Ý: N?u b?n nói Ti?ng Vi?t, có các d?ch v? h? tr? ngôn ng? mi?n phí dành cho b?n. G?i s? [phone].         [facility] complies with applicable Federal civil rights laws and does not discriminate on the basis of race, color, national origin, age, disability, or sex.        "

## 2017-05-01 NOTE — PROGRESS NOTES
"Subjective:       Patient ID: Aaliyah Angela is a 61 y.o. female.    Chief Complaint: Follow-up    HPI Comments: Patient is here for follow-up of their chronic diseases, see last note for details  LPN at [employer]    DM followed by endocrine  Hemoglobin A1C       Date                     Value               Ref Range           Status                12/17/2016               8.1 (H)             4.5 - 6.2 %         Final                 08/20/2016               7.1 (H)             4.5 - 6.2 %         Final                 04/28/2016               7.5 (H)             4.5 - 6.2 %         Final            ----------  HTN not well controled with BP (!) 140/80  Pulse 73  Ht 5' 6" (1.676 m)  Wt 104.6 kg (230 lb 9.6 oz)  SpO2 99%  BMI 37.22 kg/m2    CAD and PVD (carotid stenosis <40%) require LDL <70 and meeting goal, she is also on ASA 81mg    S/p bariatric surgery - but not exercising  Wt Readings from Last 40 Encounters:  05/01/17 : 104.6 kg (230 lb 9.6 oz)  04/25/17 : 103 kg (227 lb 1.2 oz)  02/01/17 : 105 kg (231 lb 6.4 oz)  11/11/16 : 104 kg (229 lb 4.8 oz)  10/28/16 : 106.7 kg (235 lb 3.7 oz)  08/29/16 : 108.5 kg (239 lb 3.2 oz)  07/12/16 : 111.5 kg (245 lb 12.8 oz)  04/28/16 : 116.3 kg (256 lb 8 oz)  03/28/16 : 119 kg (262 lb 5.6 oz)  03/15/16 : 120.2 kg (264 lb 14.4 oz)  03/12/16 : 119.4 kg (263 lb 3.7 oz)  03/01/16 : 122 kg (269 lb)  03/01/16 : 122.2 kg (269 lb 6.4 oz)  02/15/16 : 122.8 kg (270 lb 12.8 oz)  01/04/16 : (!) 137.4 kg (303 lb)  12/09/15 : (!) 137.9 kg (303 lb 14.5 oz)  12/02/15 : (!) 138.4 kg (305 lb 1.9 oz)  11/09/15 : (!) 138.3 kg (304 lb 14.3 oz)  10/16/15 : (!) 137.5 kg (303 lb 1.6 oz)  08/17/15 : (!) 138.8 kg (306 lb)  06/15/15 : (!) 143.1 kg (315 lb 6.4 oz)  03/20/15 : (!) 143.3 kg (316 lb)  03/10/15 : (!) 144.1 kg (317 lb 11.2 oz)  02/13/15 : (!) 143.8 kg (317 lb)  11/05/14 : (!) 143.6 kg (316 lb 9.6 oz)  08/15/14 : (!) 144.2 kg (317 lb 12.8 oz)  08/08/14 : 135.2 kg (298 lb)  04/10/14 " : (!) 142.1 kg (313 lb 4.8 oz)  02/28/14 : (!) 140.2 kg (309 lb)  02/24/14 : (!) 142.9 kg (315 lb)  12/06/13 : (!) 141.7 kg (312 lb 6.4 oz)  11/12/13 : (!) 139.7 kg (308 lb)  09/23/13 : (!) 137.2 kg (302 lb 6.4 oz)  04/22/13 : (!) 139.1 kg (306 lb 11.2 oz)  03/22/13 : (!) 137.6 kg (303 lb 4.8 oz)  02/04/13 : (!) 137.4 kg (303 lb)  01/25/13 : (!) 137.9 kg (304 lb)  01/03/13 : (!) 138.6 kg (305 lb 9.6 oz)  12/26/12 : (!) 138.7 kg (305 lb 12.8 oz)  12/14/12 : (!) 138.2 kg (304 lb 9.6 oz)        Review of Systems   Constitutional: Negative for chills and fever.   HENT: Negative for ear pain.    Eyes: Negative for pain.   Respiratory: Negative for chest tightness.    Cardiovascular: Negative for chest pain.   Gastrointestinal: Negative for abdominal pain.   Genitourinary: Negative for flank pain.   Musculoskeletal: Negative for gait problem.   Neurological: Negative for syncope.   Psychiatric/Behavioral: Negative for behavioral problems.       Objective:      Physical Exam   Constitutional: She appears well-developed and well-nourished.   HENT:   Head: Normocephalic and atraumatic.   Eyes: EOM are normal.   Neck: Neck supple.   Cardiovascular: Normal rate and normal heart sounds.    Pulmonary/Chest: Breath sounds normal. No respiratory distress. She has no wheezes. She has no rales.   Abdominal: Soft.   Musculoskeletal: She exhibits no edema.   Neurological: She is alert.   Skin: Skin is dry.   Psychiatric: Her behavior is normal.   Nursing note and vitals reviewed.      Assessment:       1. HTN (hypertension), benign    2. PVD (peripheral vascular disease)    3. Coronary artery disease involving native coronary artery of native heart without angina pectoris    4. 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 by proliferative retinopathy without macular edema, with long-term current use of insulin    5. Hyperlipidemia, unspecified hyperlipidemia type        Plan:       Aaliyah was seen today for  follow-up.    Diagnoses and all orders for this visit:    HTN (hypertension), benign  -  ADD   losartan (COZAAR) 25 MG tablet; Take 1 tablet (25 mg total) by mouth once daily.  Return for bp.      PVD (peripheral vascular disease)  Coronary artery disease involving native coronary artery of native heart without angina pectoris  Hyperlipidemia, unspecified hyperlipidemia type  - meeting goal of LDL <70    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 by proliferative retinopathy without macular edema, with long-term current use of insulin  -     Protein / creatinine ratio, urine  - followed by endocrine

## 2017-05-05 ENCOUNTER — TELEPHONE (OUTPATIENT)
Dept: INTERNAL MEDICINE | Facility: CLINIC | Age: 62
End: 2017-05-05

## 2017-05-19 RX ORDER — AMLODIPINE BESYLATE 10 MG/1
TABLET ORAL
Qty: 90 TABLET | Refills: 3 | Status: SHIPPED | OUTPATIENT
Start: 2017-05-19 | End: 2018-08-24 | Stop reason: SDUPTHER

## 2017-06-02 DIAGNOSIS — E78.5 HYPERLIPIDEMIA: ICD-10-CM

## 2017-06-05 DIAGNOSIS — E78.5 HYPERLIPIDEMIA, UNSPECIFIED HYPERLIPIDEMIA TYPE: ICD-10-CM

## 2017-06-05 RX ORDER — ATORVASTATIN CALCIUM 20 MG/1
20 TABLET, FILM COATED ORAL DAILY
Qty: 90 TABLET | Refills: 3 | Status: SHIPPED | OUTPATIENT
Start: 2017-06-05 | End: 2018-07-26 | Stop reason: SDUPTHER

## 2017-06-05 NOTE — TELEPHONE ENCOUNTER
----- Message from Anitra Torre sent at 6/5/2017  4:33 PM CDT -----  Contact: self/[phone]  Pt called in regards to checking the status of her Rx refill request for atorvastatin (LIPITOR) 20 MG tablet.        [facility]  Please advise

## 2017-06-19 ENCOUNTER — OFFICE VISIT (OUTPATIENT)
Dept: PODIATRY | Facility: CLINIC | Age: 62
End: 2017-06-19
Payer: COMMERCIAL

## 2017-06-19 VITALS
HEIGHT: 66 IN | WEIGHT: 232 LBS | BODY MASS INDEX: 37.28 KG/M2 | DIASTOLIC BLOOD PRESSURE: 70 MMHG | SYSTOLIC BLOOD PRESSURE: 139 MMHG | HEART RATE: 88 BPM

## 2017-06-19 DIAGNOSIS — E11.65 UNCONTROLLED TYPE 2 DIABETES MELLITUS WITH DIABETIC NEUROPATHY, UNSPECIFIED LONG TERM INSULIN USE STATUS: Primary | ICD-10-CM

## 2017-06-19 DIAGNOSIS — E11.9 ENCOUNTER FOR DIABETIC FOOT EXAM: ICD-10-CM

## 2017-06-19 DIAGNOSIS — E11.40 UNCONTROLLED TYPE 2 DIABETES MELLITUS WITH DIABETIC NEUROPATHY, UNSPECIFIED LONG TERM INSULIN USE STATUS: Primary | ICD-10-CM

## 2017-06-19 PROCEDURE — 99213 OFFICE O/P EST LOW 20 MIN: CPT | Mod: S$GLB,,, | Performed by: PODIATRIST

## 2017-06-19 PROCEDURE — 99999 PR PBB SHADOW E&M-EST. PATIENT-LVL III: CPT | Mod: PBBFAC,,, | Performed by: PODIATRIST

## 2017-06-19 PROCEDURE — 3045F PR MOST RECENT HEMOGLOBIN A1C LEVEL 7.0-9.0%: CPT | Mod: S$GLB,,, | Performed by: PODIATRIST

## 2017-06-19 PROCEDURE — 4010F ACE/ARB THERAPY RXD/TAKEN: CPT | Mod: S$GLB,,, | Performed by: PODIATRIST

## 2017-06-19 NOTE — PROGRESS NOTES
"CC:   Diabetic foot exam    HPI:   Aaliyah Angela is a 61 y.o. female here for diabetic foot exam.  No pain in her feet today.   She is using diabetic foot cream daily.  No wounds noted.    She is wearing slip on clogs today.    S/p Gastric bypass on Dec. 18, 2015.        PCP:  Reggie Glover MD   Last PCP visit:   5/1/17         Past Medical History:   Diagnosis Date    Allergy     Anatomical narrow angle borderline glaucoma     AR (allergic rhinitis)     Arthritis     CAD (coronary artery disease) 4/22/2013    Non obstructive, 2012 PET     Cataract     Diabetes mellitus type II     with retinopathy    Diabetic retinopathy     Epiretinal membrane, both eyes     Hyperlipidemia     Hypertensive retinopathy of both eyes     Lumbar disc disease     Migraines     Morbid obesity 10/3/2012    Proliferative diabetic retinopathy     PVD (peripheral vascular disease) 4/22/2013    Carotid US 1-39% bilat 2012     Rupture of right Achilles tendon     Unspecified essential hypertension     Unspecified vitamin D deficiency     Vitreous hemorrhage of left eye            Current Outpatient Prescriptions on File Prior to Visit   Medication Sig Dispense Refill    amitriptyline (ELAVIL) 10 MG tablet TAKE 2 TABLETS BY MOUTH EVERY NIGHT AT DINNER 180 tablet 0    amlodipine (NORVASC) 10 MG tablet TAKE 1 TABLET BY MOUTH EVERY DAY 90 tablet 3    aspirin 81 MG Chew Take 81 mg by mouth once daily.        atorvastatin (LIPITOR) 20 MG tablet Take 1 tablet (20 mg total) by mouth once daily. 90 tablet 3    BD ULTRA-FINE DIEGO PEN NEEDLES 32 gauge x 5/32" Ndle USE THREE TIMES DAILY 100 each 0    BLOOD GLUCOSE TEST test strip TEST AS DIRECTED AFTER MEALS 500 strip 0    calcium-vitamin D3 500 mg(1,250mg) -200 unit per tablet Take 1 tablet by mouth once daily.      dulaglutide (TRULICITY) 0.75 mg/0.5 mL PnIj Inject 0.75 mg into the skin every 7 days. 4 Syringe 6    losartan (COZAAR) 25 MG tablet Take 1 tablet (25 mg " "total) by mouth once daily. 90 tablet 3    metformin (GLUCOPHAGE-XR) 500 MG 24 hr tablet Take 2 tablets (1,000 mg total) by mouth 2 (two) times daily with meals. 360 tablet 2    metoprolol succinate (TOPROL-XL) 50 MG 24 hr tablet TAKE 1 TABLET BY MOUTH EVERY DAY 90 tablet 0    multivitamin (ONE DAILY MULTIVITAMIN) per tablet Take 1 tablet by mouth once daily.      ranitidine (ZANTAC) 150 MG tablet Take 150 mg by mouth as needed for Heartburn.       No current facility-administered medications on file prior to visit.        ALLG:   No Known Allergies          ROS:   General ROS: negative for - chills, fever or night sweats  Respiratory ROS: no cough, shortness of breath, or wheezing  Cardiovascular ROS: no chest pain or dyspnea on exertion  Musculoskeletal ROS: negative  Neurological ROS: no TIA or stroke symptoms  Dermatological ROS: negative          EXAM:   Vitals:    06/19/17 1547   BP: 139/70   Pulse: 88   Weight: 105.2 kg (232 lb)   Height: 5' 6" (1.676 m)        General: Patient is well-developed, well-nourished. Alert and oriented x 3 and in no acute distress.   Lower extremity physical exam:   Vascular: Dorsalis pedis and posterior tibial pulses are 2/4 bilaterally. 3 secs capillary refill time and toes are warm to touch. There is presence of digital hair.   Neurological: Light touch, proprioception, and sharp/dull sensation are all intact bilaterally. Protective threshold with the [place]-Wienstein monofilament is intact bilaterally.   Dermatological: There is intact skin tone and Turgor. There is no open wounds. There is no ecchymoses.   Toenails mildly dystrophic.  bilateral heels dry and xerotic, no fissures.   Musculoskeletal:  Muscle strength is 5/5 in all groups bilaterally. Subtalar, and MTPJ range of motion are within normal limits without crepitus bilaterally.   right 2nd flexible hammertoe, mild hyperpigmentation at the proximal interphalangeal joint  No wounds or infection. "           ASSESSMENT / PLAN:    Uncontrolled type 2 diabetes mellitus with diabetic neuropathy, unspecified long term insulin use status    Encounter for diabetic foot exam      Shoe inspection. Diabetic Foot Education. Patient reminded of the importance of good nutrition and blood sugar control to help prevent podiatric complications of diabetes. Patient instructed on proper foot hygiene. We discussed wearing proper shoe gear, daily foot inspections, never walking without protective shoe gear, never putting sharp instruments to feet, and routine diabetic foot exam and care every 3-6 months to prevent complications.        Procedures:  None today.       follow up annually for diabetes foot exam.

## 2017-07-13 RX ORDER — ATORVASTATIN CALCIUM 20 MG/1
TABLET, FILM COATED ORAL
Qty: 90 TABLET | Refills: 0 | OUTPATIENT
Start: 2017-07-13

## 2017-07-18 ENCOUNTER — HOSPITAL ENCOUNTER (OUTPATIENT)
Dept: RADIOLOGY | Facility: HOSPITAL | Age: 62
Discharge: HOME | End: 2017-07-18
Attending: INTERNAL MEDICINE
Payer: COMMERCIAL

## 2017-07-18 ENCOUNTER — OFFICE VISIT (OUTPATIENT)
Dept: RHEUMATOLOGY | Facility: CLINIC | Age: 62
End: 2017-07-18
Payer: COMMERCIAL

## 2017-07-18 VITALS
HEIGHT: 66 IN | SYSTOLIC BLOOD PRESSURE: 140 MMHG | HEART RATE: 77 BPM | DIASTOLIC BLOOD PRESSURE: 79 MMHG | WEIGHT: 233 LBS | BODY MASS INDEX: 37.45 KG/M2

## 2017-07-18 DIAGNOSIS — M17.0 PRIMARY OSTEOARTHRITIS OF KNEES, BILATERAL: Primary | ICD-10-CM

## 2017-07-18 DIAGNOSIS — M17.0 PRIMARY OSTEOARTHRITIS OF KNEES, BILATERAL: ICD-10-CM

## 2017-07-18 DIAGNOSIS — M15.9 PRIMARY OSTEOARTHRITIS INVOLVING MULTIPLE JOINTS: ICD-10-CM

## 2017-07-18 PROBLEM — M15.0 PRIMARY OSTEOARTHRITIS INVOLVING MULTIPLE JOINTS: Status: ACTIVE | Noted: 2017-07-18

## 2017-07-18 PROCEDURE — 99999 PR PBB SHADOW E&M-EST. PATIENT-LVL III: CPT | Mod: PBBFAC,,, | Performed by: INTERNAL MEDICINE

## 2017-07-18 PROCEDURE — 73565 X-RAY EXAM OF KNEES: CPT | Mod: TC

## 2017-07-18 PROCEDURE — 73565 X-RAY EXAM OF KNEES: CPT | Mod: 26,,, | Performed by: RADIOLOGY

## 2017-07-18 PROCEDURE — 99214 OFFICE O/P EST MOD 30 MIN: CPT | Mod: S$GLB,,, | Performed by: INTERNAL MEDICINE

## 2017-07-18 RX ORDER — IBUPROFEN 800 MG/1
800 TABLET ORAL 3 TIMES DAILY
Qty: 90 TABLET | Refills: 3 | Status: SHIPPED | OUTPATIENT
Start: 2017-07-18 | End: 2017-09-12

## 2017-07-19 ENCOUNTER — TELEPHONE (OUTPATIENT)
Dept: RHEUMATOLOGY | Facility: CLINIC | Age: 62
End: 2017-07-19

## 2017-07-19 NOTE — PROGRESS NOTES
History of present illness: 61-year-old female I evaluated one year ago because of a positive ASHISH.  She had no evidence of underlying connective tissue disease.  She doesn't have evidence of osteoarthritis but did not require any treatment at that time.  She comes in now because of a three-week history of increased pain in the left knee.  The pain was of gradual onset.  She has had some swelling in the knee.  She has no erythema or increased warmth accompanying the swelling.  This is a recurrent problem.  She has had previous aspiration of the knee.  The pain is worse with change of position.  It really does not bother her with walking.  The pain does not wake her up at night.  She has no similar pain on the right side.  Had some pain in the right hip.  She has also had intermittent pain in the left shoulder.    She has tried topical medications with some relief.  Heat has helped.  Tylenol gives her some relief.  She had been able to tolerate ibuprofen in the past.  She denies other recent medical problems.    Physical examination:  Musculoskeletal: She has decreased range of motion of the left shoulder.  She is tender over the biceps tendon and the acromioclavicular joint.  She has no swelling.  Right shoulder is unremarkable.  Elbows, wrists, small joints of the hands are unremarkable.  Lumbar spine has good range of motion without pain on range of motion.  She has full range of motion of the hips.  She has bony hypertrophy of the knees, worse on the left than on the right.  She has pain on range of motion of both knees.  She has tenderness of the left knee but not the right knee.  She has no effusion.  She has bony hypertrophy of both ankles.  Small joints the feet are unremarkable.    Assessment: Her problem seems to be osteoarthritis    Plans: I offered to inject her knee but she declines at this time.  I placed her on ibuprofen 800 mg 3 times daily as needed.  I did not give her regular return appointment but  would be happy to see her in follow-up if her pain does not resolve.

## 2017-07-20 NOTE — PROGRESS NOTES
"Established Patient   SUBJECTIVE:  Patient ID: Aaliyah Angela is a 61 y.o. female.  Chief Complaint: Consult    History of Present Illness:  Aaliyah Angela is a 61 y.o. female with history of BRADFORD, chronic TTH, PVD, CAD, T2DM, HLD, and hx of bariatric surgery, who presents to the clinic alone for follow-up of headaches.     Recommendations made at last Office Visit on 3/28/2016:  - Continue use of CPAP for BRADFORD  - Continue Elavil 20 mg at dinner - refills given   - Toradol PRN for bad HA, no pain medicine more than 3 days per week   - Can consider nerve blocks if HA acutely worsen     07/21/2017 - Interval History:  - Headaches 1-2 times per week, When they come they only last 20 minutes to 1 hour   - Toradol 10 mg knocks out the headache   - Is happy with her current medication regimen and does not wish to make changes at this time   - Has been using her CPAP nightly     Initial HA HPI:  Start:teenager started, was put on topamax (and taken off in 2014 d/t her eye problems). Since then CISNEROS very regularly   History of trauma (no), History of CNS infection (no)   Location: Left parietal region most of the time and can radiate to whole head  Severity:  Range: 4-10/10, sharp pain then aching pain   Duration:5 hours on average up to a full day   Frequency:20/30 for bad HA ("feels whole side of head hurting"), and 30/30 for every day light HA (note with light HA, could last all day)   Associated factors (bolded positive) WITH HA: Nausea, vomiting, photophobia, phonophobia, tinnitus, scalp pain, vision loss, diplopia, scintillations, eye pain, jaw pain, weakness?    Tried:fioricet or excedrin migraine, will take norco once in every 3-4 months, excedrin ("I take this regularly").   Followed by opthalmology (last visit 11/2/2015, notes reviewed)  Triggers:stress is what she feels factoring into her HA, does have BRADFORD and is using CPAP (5 days out of 7).   Positives in bold: Hx of Kidney Stones, asthma, GI bleed, osteoporosis, " "CAD/MI, CVA/TIA, DM    Currently having one:yes mild HA (though told my staff she was not)   Imaging on file: CT Head 2015: NML  Therapies tried in past:  Topamax, worked, but had to stop d/t glaucoma  Norvasc for BP  Fioricet, ineff  Norco: will help  toprol : for BP  Cozaar : for BP  Excedrin: for HA, ineff though takes regularly   Elavil: took in past, up to 25 mg, felt it helped   Toradol: written today     Current Medications:    Current Outpatient Prescriptions:     amitriptyline (ELAVIL) 10 MG tablet, TAKE 2 TABLETS BY MOUTH EVERY NIGHT AT DINNER, Disp: 180 tablet, Rfl: 0    amlodipine (NORVASC) 10 MG tablet, TAKE 1 TABLET BY MOUTH EVERY DAY, Disp: 90 tablet, Rfl: 3    aspirin 81 MG Chew, Take 81 mg by mouth once daily.  , Disp: , Rfl:     atorvastatin (LIPITOR) 20 MG tablet, Take 1 tablet (20 mg total) by mouth once daily., Disp: 90 tablet, Rfl: 3    BD ULTRA-FINE DIEGO PEN NEEDLES 32 gauge x 5/32" Ndle, USE THREE TIMES DAILY, Disp: 100 each, Rfl: 0    BLOOD GLUCOSE TEST test strip, TEST AS DIRECTED AFTER MEALS, Disp: 500 strip, Rfl: 0    calcium-vitamin D3 500 mg(1,250mg) -200 unit per tablet, Take 1 tablet by mouth once daily., Disp: , Rfl:     dulaglutide (TRULICITY) 0.75 mg/0.5 mL PnIj, Inject 0.75 mg into the skin every 7 days., Disp: 4 Syringe, Rfl: 6    ibuprofen (ADVIL,MOTRIN) 800 MG tablet, Take 1 tablet (800 mg total) by mouth 3 (three) times daily., Disp: 90 tablet, Rfl: 3    losartan (COZAAR) 25 MG tablet, Take 1 tablet (25 mg total) by mouth once daily., Disp: 90 tablet, Rfl: 3    metformin (GLUCOPHAGE-XR) 500 MG 24 hr tablet, Take 2 tablets (1,000 mg total) by mouth 2 (two) times daily with meals., Disp: 360 tablet, Rfl: 2    metoprolol succinate (TOPROL-XL) 50 MG 24 hr tablet, TAKE 1 TABLET BY MOUTH EVERY DAY, Disp: 90 tablet, Rfl: 0    multivitamin (ONE DAILY MULTIVITAMIN) per tablet, Take 1 tablet by mouth once daily., Disp: , Rfl:     ranitidine (ZANTAC) 150 MG tablet, Take 150 " "mg by mouth as needed for Heartburn., Disp: , Rfl:     Review of Systems:    OBJECTIVE:  Vitals:  /74   Pulse 74   Ht 5' 6" (1.676 m)   Wt 106 kg (233 lb 11 oz)   BMI 37.72 kg/m²     Physical Exam:  Constitutional: She appears well-developed and well-nourished. She is well groomed. NAD  HENT:    Head: Normocephalic and atraumatic  Eyes: Conjunctivae and EOM are normal.   Musculoskeletal: Normal range of motion. No joint stiffness. No vertebral point tenderness.  Skin: Skin is warm and dry.  Psychiatric: Normal mood and affect.     Neuro exam:    Mental status:  The patient is awake, alert, and oriented to person, place and time.  Language is intact and fluent  Remote and recent memory are intact  Normal attention and concentration  Mood is stable    Cranial Nerves:  Extraocular movements are intact and without nystagmus.    Visual fields are full to confrontation testing.   Facial movement is symmetric.  Facial sensation is intact.    FROM of neck in all (6) directions.  Shoulder shrug symmetrical.    Motor:  Normal muscle bulk and symmetry. No fasciculations were noted.   RUE:appropriate against gravity and medium force as tested 5/5  LUE: appropriate against gravity and medium force as tested 5/5  RLE:appropriate against gravity and medium force as tested 5/5              LLE: appropriate against gravity and medium force as tested 5/5                        Sensory:  RUE  intact light touch and temperature  LUE intact light touch and temperature    RLE intact light touch  LLE intact light touch    Gait Normal     Review of Data:   Notes from Lev Hickman PA-C  Labs:  Results for CHRISTI DAVILA (MRN [id number]) as of 7/24/2017 13:05   Ref. Range 12/17/2016 08:34   Sodium Latest Ref Range: 136 - 145 mmol/L 139   Potassium Latest Ref Range: 3.5 - 5.1 mmol/L 4.4   Chloride Latest Ref Range: 95 - 110 mmol/L 105   CO2 Latest Ref Range: 23 - 29 mmol/L 29   Anion Gap Latest Ref Range: 8 - 16 mmol/L 5 (L)   BUN, " Bld Latest Ref Range: 6 - 20 mg/dL 15   Creatinine Latest Ref Range: 0.5 - 1.4 mg/dL 0.9   eGFR if non African American Latest Ref Range: >60 mL/min/1.73 m^2 >60.0   eGFR if African American Latest Ref Range: >60 mL/min/1.73 m^2 >60.0   Glucose Latest Ref Range: 70 - 110 mg/dL 124 (H)   Calcium Latest Ref Range: 8.7 - 10.5 mg/dL 9.0   Alkaline Phosphatase Latest Ref Range: 55 - 135 U/L 60   Total Protein Latest Ref Range: 6.0 - 8.4 g/dL 7.0   Albumin Latest Ref Range: 3.5 - 5.2 g/dL 3.5   Total Bilirubin Latest Ref Range: 0.1 - 1.0 mg/dL 0.3   AST Latest Ref Range: 10 - 40 U/L 17   ALT Latest Ref Range: 10 - 44 U/L 10   Triglycerides Latest Ref Range: 30 - 150 mg/dL 66   Cholesterol Latest Ref Range: 120 - 199 mg/dL 126   HDL Latest Ref Range: 40 - 75 mg/dL 45   LDL Cholesterol Latest Ref Range: 63.0 - 159.0 mg/dL 67.8   Total Cholesterol/HDL Ratio Latest Ref Range: 2.0 - 5.0  2.8   Hemoglobin A1C Latest Ref Range: 4.5 - 6.2 % 8.1 (H)   Estimated Avg Glucose Latest Ref Range: 68 - 131 mg/dL 186 (H)   HDL/Chol Ratio Latest Ref Range: 20.0 - 50.0 % 35.7   Non-HDL Cholesterol Latest Units: mg/dL 81     Imagin2015 - CT Head W/O Contrast - no acute abnormalities identified     Note: I have independently reviewed any/all imaging/labs/tests and agree with the report (s) as documented.  Any discrepancies will be as noted/demarcated by free text.  TOBIAS ANGUIANO 2017    Focused examination was undertaken today.     ASSESSMENT:  1. Chronic nonintractable headache, unspecified headache type    2. Migraine without aura and without status migrainosus, not intractable    3. Trouble in sleeping      PLAN:  - Continue Elavil 20 mg nightly   - For acute HA attacks - toradol 10 mg every 6-8 hours as needed   - Refills given of toradol and elavil   - Continue tracking headaches   - Discussed goals of therapy are to decrease the frequency, intensity, and duration of headaches  - Follow up in 6 months     Orders Placed This  Encounter    amitriptyline (ELAVIL) 10 MG tablet    ketorolac (TORADOL) 10 mg tablet     I spent 25 minutes face-to-face with the patient with >50% of the time spent with counseling and education regarding:  - results of data, diagnosis, and recommendations stated above  - risks and benefits of elavil including potential to cause weight gain  - importance of healthy diet, regular exercise and sleep hygiene in the treatment of headaches      The patient verbalizes understanding and agreement with the treatment plan. Questions were sought and answered to her stated verbal satisfaction.    CC: MD Eulalia Camejo, FNP-C  [facility]   [phone]

## 2017-07-21 ENCOUNTER — OFFICE VISIT (OUTPATIENT)
Dept: NEUROLOGY | Facility: CLINIC | Age: 62
End: 2017-07-21
Payer: COMMERCIAL

## 2017-07-21 VITALS
WEIGHT: 233.69 LBS | HEIGHT: 66 IN | HEART RATE: 74 BPM | BODY MASS INDEX: 37.56 KG/M2 | DIASTOLIC BLOOD PRESSURE: 74 MMHG | SYSTOLIC BLOOD PRESSURE: 139 MMHG

## 2017-07-21 DIAGNOSIS — G89.29 CHRONIC NONINTRACTABLE HEADACHE, UNSPECIFIED HEADACHE TYPE: Primary | ICD-10-CM

## 2017-07-21 DIAGNOSIS — G43.009 MIGRAINE WITHOUT AURA AND WITHOUT STATUS MIGRAINOSUS, NOT INTRACTABLE: ICD-10-CM

## 2017-07-21 DIAGNOSIS — R51.9 CHRONIC NONINTRACTABLE HEADACHE, UNSPECIFIED HEADACHE TYPE: Primary | ICD-10-CM

## 2017-07-21 DIAGNOSIS — G47.9 TROUBLE IN SLEEPING: ICD-10-CM

## 2017-07-21 PROCEDURE — 99999 PR PBB SHADOW E&M-EST. PATIENT-LVL III: CPT | Mod: PBBFAC,,, | Performed by: NURSE PRACTITIONER

## 2017-07-21 PROCEDURE — 99214 OFFICE O/P EST MOD 30 MIN: CPT | Mod: S$GLB,,, | Performed by: NURSE PRACTITIONER

## 2017-07-21 RX ORDER — KETOROLAC TROMETHAMINE 10 MG/1
TABLET, FILM COATED ORAL
Qty: 40 TABLET | Refills: 6 | Status: SHIPPED | OUTPATIENT
Start: 2017-07-21 | End: 2018-11-30

## 2017-07-21 RX ORDER — AMITRIPTYLINE HYDROCHLORIDE 10 MG/1
20 TABLET, FILM COATED ORAL NIGHTLY
Qty: 180 TABLET | Refills: 3 | Status: SHIPPED | OUTPATIENT
Start: 2017-07-21 | End: 2018-08-24 | Stop reason: SDUPTHER

## 2017-07-21 NOTE — LETTER
July 24, 2017      Reggie Glover MD  [address]  [facility] [address]           [place] [facility] Neurology  [address]  [facility] [address]  Phone: [phone]  Fax: [phone]          Patient: Aaliyah Angela   MR Number: [id number]   YOB: 1955   Date of Visit: 7/21/2017       Dear Dr. Reggie Glover:    Thank you for referring Aaliyah Angela to me for evaluation. Attached you will find relevant portions of my assessment and plan of care.    If you have questions, please do not hesitate to call me. I look forward to following Aaliyah Angela along with you.    Sincerely,        Enclosure  CC:  No Recipients    If you would like to receive this communication electronically, please contact [email or web address] or [phone] to request more information on [employer] access.    For providers and/or their staff who would like to refer a patient to [facility], please contact us through our one-stop-shop provider referral line, Ty Mcginnis, at [phone].    If you feel you have received this communication in error or would no longer like to receive these types of communications, please e-mail [email or web address]

## 2017-07-27 RX ORDER — METOPROLOL SUCCINATE 50 MG/1
TABLET, EXTENDED RELEASE ORAL
Qty: 90 TABLET | Refills: 0 | Status: SHIPPED | OUTPATIENT
Start: 2017-07-27 | End: 2017-10-22 | Stop reason: SDUPTHER

## 2017-08-03 ENCOUNTER — OFFICE VISIT (OUTPATIENT)
Dept: ENDOCRINOLOGY | Facility: CLINIC | Age: 62
End: 2017-08-03
Payer: COMMERCIAL

## 2017-08-03 VITALS
WEIGHT: 231.56 LBS | HEIGHT: 66 IN | HEART RATE: 76 BPM | DIASTOLIC BLOOD PRESSURE: 76 MMHG | BODY MASS INDEX: 37.21 KG/M2 | RESPIRATION RATE: 18 BRPM | SYSTOLIC BLOOD PRESSURE: 140 MMHG

## 2017-08-03 DIAGNOSIS — I10 HTN (HYPERTENSION), BENIGN: ICD-10-CM

## 2017-08-03 DIAGNOSIS — Z98.84 HISTORY OF BARIATRIC SURGERY: ICD-10-CM

## 2017-08-03 DIAGNOSIS — E11.3593 TYPE 2 DIABETES MELLITUS WITH BOTH EYES AFFECTED BY PROLIFERATIVE RETINOPATHY WITHOUT MACULAR EDEMA, WITH LONG-TERM CURRENT USE OF INSULIN: Primary | ICD-10-CM

## 2017-08-03 DIAGNOSIS — E78.5 HYPERLIPIDEMIA, UNSPECIFIED HYPERLIPIDEMIA TYPE: ICD-10-CM

## 2017-08-03 DIAGNOSIS — Z79.4 TYPE 2 DIABETES MELLITUS WITH BOTH EYES AFFECTED BY PROLIFERATIVE RETINOPATHY WITHOUT MACULAR EDEMA, WITH LONG-TERM CURRENT USE OF INSULIN: Primary | ICD-10-CM

## 2017-08-03 DIAGNOSIS — M15.9 PRIMARY OSTEOARTHRITIS INVOLVING MULTIPLE JOINTS: ICD-10-CM

## 2017-08-03 DIAGNOSIS — I25.10 CORONARY ARTERY DISEASE INVOLVING NATIVE CORONARY ARTERY OF NATIVE HEART WITHOUT ANGINA PECTORIS: ICD-10-CM

## 2017-08-03 DIAGNOSIS — H35.033 HYPERTENSIVE RETINOPATHY, BILATERAL: ICD-10-CM

## 2017-08-03 PROCEDURE — 3045F PR MOST RECENT HEMOGLOBIN A1C LEVEL 7.0-9.0%: CPT | Mod: S$GLB,,, | Performed by: NURSE PRACTITIONER

## 2017-08-03 PROCEDURE — 3008F BODY MASS INDEX DOCD: CPT | Mod: S$GLB,,, | Performed by: NURSE PRACTITIONER

## 2017-08-03 PROCEDURE — 99214 OFFICE O/P EST MOD 30 MIN: CPT | Mod: S$GLB,,, | Performed by: NURSE PRACTITIONER

## 2017-08-03 PROCEDURE — 99999 PR PBB SHADOW E&M-EST. PATIENT-LVL IV: CPT | Mod: PBBFAC,,, | Performed by: NURSE PRACTITIONER

## 2017-08-03 NOTE — PROGRESS NOTES
CHIEF COMPLAINT: Type 2 Diabetes     HPI: Mrs. Aaliyah Angela is a 61 y.o. female who was diagnosed with gestational diabetes age 20s, Type 2 DM age 30s.  Pt was last seen by me in February 2017 and is now being seen by me again today.  Needs lab work.    Current A1C -pending today  Lab Results   Component Value Date    HGBA1C 8.1 (H) 12/17/2016       Do you understand your A1C? yes      DM COMPLICATIONS:  Retinopathy  neuropathy      RECENT HOSPITALIZATIONS/PROCEDURES:  none      PREVIOUS DIABETES MEDICATIONS TRIED:  Metformin  trulicity  lantus   januvia  invokana-SEs  onglyza       CURRENT DIABETIC MEDS: trulicity 0.75 mg weekly  Metformin 500 mg bid     Any financial burdens/barriers?   No issues    Are you on a statin? yes    Any statin allergies/intolerance? No issues    Are you on a baby ASA? yes    Are you on ACEI/ARB? Any recent BP issues?arb, controlled    How is your current sleep pattern? How many hours of sleep per night?  7 hours nightly     Last Podiatry Exam: x 3 mos ago 2017     Any current issues w/ feet? No issues       REVIEW OF SYSTEMS  General: no weakness, fatigue, or weight changes.   Eyes: no double or blurred vision, eye pain, or redness; Last Eye Exam-appt next Friday 8/2017.   Cardiovascular: +chest pain(GERD), palpitations, edema, or murmurs.   Respiratory: no cough or dyspnea.   GI: no heartburn, nausea, or changes in bowel patterns; good appetite. +GERD  Skin: no rashes, dryness, itching, or reactions at insulin injection sites.  Neuro: + numbness, tingling, tremors, or vertigo.   Endocrine: no polyuria, polydipsia, polyphagia, heat or cold intolerance.     Psych/Holistic:    What are your fears?  None    How do you cope?  Take a ride, occupy self by staying busy    Where does your strength come from?  family    Do you have support?  Yes     Do you feel hopelessness or despair?  No issues    How is your dietary habits?  Not always eating the right things    Breakfast- protein bar-not a  "big breakfast eater    Lunch - half turkey sandwich, fries    Dinner - meat, crackers, breads    What goal do you have for caloric intake per day?  Trying to start a goal    Are you comfortable w/ your body wt?  Not so much    Are you interested in Wt Loss Clinic or Bariatric Consultation?   Has f/u annually    How is your activity level?  Restarting activity---    Treadmill goal 3 x a week     Do you need to see dietician/diabetes educator?  Not really    Recommendations: 150 minutes of moderate PA or 75 mins of vigorous PA a week  Moderate or high intensity, involve all major muscle groups on >2 days per week.    Vital Signs  BP (!) 140/76 (BP Location: Left arm, Patient Position: Sitting, BP Method: Manual)   Pulse 76   Resp 18   Ht 5' 6" (1.676 m)   Wt 105 kg (231 lb 9.5 oz)   BMI 37.38 kg/m²     Hemoglobin A1C   Date Value Ref Range Status   12/17/2016 8.1 (H) 4.5 - 6.2 % Final     Comment:     According to ADA guidelines, hemoglobin A1C <7.0% represents  optimal control in non-pregnant diabetic patients.  Different  metrics may apply to specific populations.   Standards of Medical Care in Diabetes - 2016.  For the purpose of screening for the presence of diabetes:  <5.7%     Consistent with the absence of diabetes  5.7-6.4%  Consistent with increasing risk for diabetes   (prediabetes)  >or=6.5%  Consistent with diabetes  Currently no consensus exists for use of hemoglobin A1C  for diagnosis of diabetes for children.     08/20/2016 7.1 (H) 4.5 - 6.2 % Final     Comment:     According to ADA guidelines, hemoglobin A1C <7.0% represents  optimal control in non-pregnant diabetic patients.  Different  metrics may apply to specific populations.   Standards of Medical Care in Diabetes - 2016.  For the purpose of screening for the presence of diabetes:  <5.7%     Consistent with the absence of diabetes  5.7-6.4%  Consistent with increasing risk for diabetes   (prediabetes)  >or=6.5%  Consistent with " diabetes  Currently no consensus exists for use of hemoglobin A1C  for diagnosis of diabetes for children.     04/28/2016 7.5 (H) 4.5 - 6.2 % Final        Chemistry        Component Value Date/Time     12/17/2016 0834    K 4.4 12/17/2016 0834     12/17/2016 0834    CO2 29 12/17/2016 0834    BUN 15 12/17/2016 0834    CREATININE 0.9 12/17/2016 0834     (H) 12/17/2016 0834        Component Value Date/Time    CALCIUM 9.0 12/17/2016 0834    ALKPHOS 60 12/17/2016 0834    AST 17 12/17/2016 0834    ALT 10 12/17/2016 0834    BILITOT 0.3 12/17/2016 0834    ESTGFRAFRICA >60.0 12/17/2016 0834    EGFRNONAA >60.0 12/17/2016 0834           Lab Results   Component Value Date    TSH 1.742 08/08/2015      Lab Results   Component Value Date    CHOL 126 12/17/2016    CHOL 126 08/20/2016    CHOL 98 (L) 02/15/2016     Lab Results   Component Value Date    HDL 45 12/17/2016    HDL 43 08/20/2016    HDL 31 (L) 02/15/2016     Lab Results   Component Value Date    LDLCALC 67.8 12/17/2016    LDLCALC 71.8 08/20/2016    LDLCALC 54.2 (L) 02/15/2016     Lab Results   Component Value Date    TRIG 66 12/17/2016    TRIG 56 08/20/2016    TRIG 64 02/15/2016     Lab Results   Component Value Date    CHOLHDL 35.7 12/17/2016    CHOLHDL 34.1 08/20/2016    CHOLHDL 31.6 02/15/2016         PHYSICAL EXAMINATION  Constitutional: Appears well, no distress  Neck: Supple, trachea midline.   Respiratory: Np wheezes, even and unlabored.  Cardiovascular: RRR; no murmurs; (+) radial pulses, no edema.   Lymph: no lymphadenopathy palpated,  hoses to BLE  Skin: warm and dry; no injection site reactions, no acanthosis nigracans observed.  Neuro:patient alert and cooperative, normal affect; steady gait.    Diabetes Foot Exam: defer    Assessment/Plan    1. 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 by proliferative retinopathy without macular edema, with long-term current use of insulin  Hemoglobin A1c today and next time f/u in 3 mos   Increase  trulicity to 1.5 mg weekly  Continue metformin  Discussed dietary habits  bg monitoring 1-2 times a day.   2. Coronary artery disease involving native coronary artery of native heart without angina pectoris  Avoid hypoglycemia, f/u with cards or primary   3. HTN (hypertension), benign  Controlled, continue med(s)   4. Hyperlipidemia, unspecified hyperlipidemia type  Lab Results   Component Value Date    LDLCALC 67.8 12/17/2016     At goal continue statin   5. Primary osteoarthritis involving multiple joints  F/u with primary   6. Hypertensive retinopathy, bilateral  F/u with retinal specialist   7. History of bariatric surgery, 12-  F/u annually w/ bariatrics        FOLLOW UP  Return in about 3 months (around 11/3/2017).

## 2017-08-03 NOTE — PATIENT INSTRUCTIONS
Snacks can be an important part of a balanced, healthy meal plan. They allow you to eat more frequently, feeling full and satisfied throughout the day. Also, they allow you to spread carbohydrates evenly, which may stabilize blood sugars.  Plus, snacks are enjoyable!     The amount of carbohydrate needed at snacks varies. Generally, about 15-30 grams of carbohydrate per snack is recommended.  Below you will find some tasty treats.       0-5 gm carb   Crystal Light   Vitamin Water Zero   Herbal tea, unsweetened   2 tsp peanut butter on celery   1./2 cup sugar-free jell-o   1 sugar-free popsicle   ¼ cup blueberries   8oz Blue Brenda unsweetened almond milk   5 baby carrots & celery sticks, cucumbers, bell peppers dipped in ¼ cup salsa, 2Tbsp light ranch dressing or 2Tbsp plain Greek yogurt   10 Goldfish crackers   ½ oz low-fat cheese or string cheese   1 closed handful of nuts, unsalted   1 Tbsp of sunflower seeds, unsalted   1 cup Smart Pop popcorn   1 whole grain brown rice cake        15 gm carb   1 small piece of fruit or ½ banana or 1/2 cup lite canned fruit   3 concepcion cracker squares   3 cups Smart Pop popcorn, top spray butter, Jaramillo lite salt or cinnamon and Truvia   5 Vanilla Wafers   ½ cup low fat, no added sugar ice cream or frozen yogurt (Blue bell, Blue Bunny, Weight Watchers, Skinny Cow)   ½ turkey, ham, or chicken sandwich   ½ c fruit with ½ c Cottage cheese   4-6 unsalted wheat crackers with 1 oz low fat cheese or 1 tbsp peanut butter    30-45 goldfish crackers (depending on flavor)    7-8 Taoism mini brown rice cakes (caramel, apple cinnamon, chocolate)    12 Taoism mini brown rice cakes (cheddar, bbq, ranch)    1/3 cup hummus dip with raw veg   1/2 whole wheat love, 1Tbsp hummus   Mini Pizza (1/2 whole wheat English muffin, low-fat  cheese, tomato sauce)   100 calorie snack pack (Oreo, Chips Ahoy, Ritz Mix, Baked Cheetos)   4-6 oz. light or Greek Style yogurt  (Andre Lyn, Barb, [facility])   ½ cup sugar-free pudding     6 in. wheat tortilla or love oven toasted chips (topped with spray butter flavoring, cinnamon, Truvia OR spray butter, garlic powder, chili powder)    18 BBQ Popchips (available at Target, Whole Foods, Fresh Market)                   Diabetes Support Group Meetings    Date Topics   February 9 Health Promotion/Nutrition   March 9 Taking Care of Your Kidneys   April 13 Taking Care of Your Feet   May 11 Ease Your Mind with Diabetes   June 8 Hurricane and Emergency Preparedness   July 13 Family & Caregiver Support for Diabetes   *August 17  Taking Care of Your Eyes   September 14 Devices & Technology   October 12 Recipes & Treats   November 9 Getting Pumped Up for Diabetes   December 14 Year-End Close Out            Meetings are held in the [facility] (A) of the [facility] located at [address] [place] [address]. Please call [phone] for additional information.    Free service, offered every 2nd Thursday of every month, except in August! Family members and/or friends are welcome as well!  Support group is for patients with type 1 or type 2 diabetes.    From 3:30p to 4:30p

## 2017-08-11 ENCOUNTER — OFFICE VISIT (OUTPATIENT)
Dept: OPHTHALMOLOGY | Facility: CLINIC | Age: 62
End: 2017-08-11
Payer: COMMERCIAL

## 2017-08-11 DIAGNOSIS — Z79.4 TYPE 2 DIABETES MELLITUS WITH BOTH EYES AFFECTED BY PROLIFERATIVE RETINOPATHY WITHOUT MACULAR EDEMA, WITH LONG-TERM CURRENT USE OF INSULIN: Primary | ICD-10-CM

## 2017-08-11 DIAGNOSIS — H25.13 NUCLEAR SCLEROSIS, BILATERAL: ICD-10-CM

## 2017-08-11 DIAGNOSIS — E11.3593 TYPE 2 DIABETES MELLITUS WITH BOTH EYES AFFECTED BY PROLIFERATIVE RETINOPATHY WITHOUT MACULAR EDEMA, WITH LONG-TERM CURRENT USE OF INSULIN: Primary | ICD-10-CM

## 2017-08-11 DIAGNOSIS — H40.053 OCULAR HYPERTENSION, BILATERAL: ICD-10-CM

## 2017-08-11 DIAGNOSIS — H40.032 ANATOMICAL NARROW ANGLE GLAUCOMA OF LEFT EYE: ICD-10-CM

## 2017-08-11 DIAGNOSIS — H40.031 ANATOMICAL NARROW ANGLE OF RIGHT EYE: ICD-10-CM

## 2017-08-11 PROCEDURE — 92020 GONIOSCOPY: CPT | Mod: S$GLB,,, | Performed by: OPHTHALMOLOGY

## 2017-08-11 PROCEDURE — 99999 PR PBB SHADOW E&M-EST. PATIENT-LVL II: CPT | Mod: PBBFAC,,, | Performed by: OPHTHALMOLOGY

## 2017-08-11 PROCEDURE — 92014 COMPRE OPH EXAM EST PT 1/>: CPT | Mod: S$GLB,,, | Performed by: OPHTHALMOLOGY

## 2017-08-11 PROCEDURE — 92250 FUNDUS PHOTOGRAPHY W/I&R: CPT | Mod: S$GLB,,, | Performed by: OPHTHALMOLOGY

## 2017-08-11 NOTE — PROGRESS NOTES
"HPI     Glaucoma    Additional comments: 4 mon iop chk/MR/AR/GONIO/BAT           Comments   DLS: 4/10/2017    Pt here for MR/AR/GONIO/BAT  Pt states she has been seeing a "black floater" [place] for about 1 week   now. No flashes.    Meds: No GTTS    1. Anatomical narrow angle borderline glaucoma, bilateral  2. Ocular hypertension, bilateral  3. Proliferative diabetic retinopathy of both eyes without macular edema   associated with type 2 diabetes mellitus  4. Hypertensive retinopathy, bilateral  5. Epiretinal membrane, bilateral  6. Vitreous hemorrhage, right  7. Cortical cataract        Last edited by David Peraza on 8/11/2017  3:08 PM. (History)            Assessment /Plan     For exam results, see Encounter Report.    There are no diagnoses linked to this encounter.    1. OHT vs Pre-perimetric POAG OU (angles  Narrowing over time)  Followed at [facility] since '04   First seen by Scarlet '08   Never on gtts   VF defects 2/2 PRP     Family history none   Glaucoma meds None   H/O adverse rxn to glaucoma drops none   LASERS Mulitple PRP OU // PI OD 2/9/2017 /// PI os 1/26/2017   GLAUCOMA SURGERIES None   OTHER EYE SURGERIES none   CDR 0.3/0.35   Tbase 15-23/15-25   Tmax 25 OU   Ttarget ??   HVF 7 tests: 2008 to 2017 SAD/IAD OU 2/2 PRP OU   Gonio 3+ with steep approach OU 1/8/2016 (doubt occludable)   /575   OCT 4 tests: 2008 to 2017 -  RNFL nl od // nl os    HRT 5 tests: 2009 to 2016 - MR -  nl od // nl os /// CDR 0.30 od // 0.23 os  Disc photos 2002, 2003, 2005 (slides) // 2008, 2013  (OIS)     - Ttoday  18/18  - Test done today Gonio / DFE / Photos    2. Narrow Angles:  +1-3 w/ bowing. s/p PI. Likely resolve in future once pseudophakic.  Anterior Segment OCT today w/ Open Angles w/ narrow approach OU  Doubt occludable - monitor gonio     3. PDR OU s/p PDR OU -stable exam on last eval w/ Retina on 7/25/13.   S/p avastin x 4 od - last 1/4/2016     4. Hx of VH OD -stable. Last seen by retina - jesse 1/4/2016    5. " "Cataract OU -not visually significant. observe    6. Hx of Transient Visual Obscurations OS - ? BP elevation. Seen w/ stable Retinal exam on 9/13/12.     7. Had a gastric "sleve" done 12/18/2015 - for weight loss    So far doing well    On less insulin now     Plan:  Stable IOP (thick k's), and HRT wnl today   Cont no gtts  Continue to work on blood sugars  oht vs glaucoma    RTC:  Angles continue narrow but open     S/P PI  os 1/26/2017 -   S/P PI od - 2/9/2017 -     F/U 9 months with IOP check- check gonio ou   // hrt //  AR/MR/BAT cataract check     Cont with regular F/U's with Dr. Mckeon     I have seen and personally examined the patient.  I agree with the findings, assessment and plan of the resident and/or fellow.     Alma Goodrich MD               "

## 2017-08-28 ENCOUNTER — PATIENT MESSAGE (OUTPATIENT)
Dept: ADMINISTRATIVE | Facility: OTHER | Age: 62
End: 2017-08-28

## 2017-08-28 ENCOUNTER — OFFICE VISIT (OUTPATIENT)
Dept: INTERNAL MEDICINE | Facility: CLINIC | Age: 62
End: 2017-08-28
Payer: COMMERCIAL

## 2017-08-28 VITALS
SYSTOLIC BLOOD PRESSURE: 164 MMHG | BODY MASS INDEX: 37.52 KG/M2 | WEIGHT: 233.44 LBS | HEIGHT: 66 IN | DIASTOLIC BLOOD PRESSURE: 78 MMHG | HEART RATE: 81 BPM

## 2017-08-28 DIAGNOSIS — I25.10 CORONARY ARTERY DISEASE INVOLVING NATIVE CORONARY ARTERY OF NATIVE HEART WITHOUT ANGINA PECTORIS: ICD-10-CM

## 2017-08-28 DIAGNOSIS — I10 ESSENTIAL HYPERTENSION: Primary | ICD-10-CM

## 2017-08-28 DIAGNOSIS — I10 HTN (HYPERTENSION), BENIGN: ICD-10-CM

## 2017-08-28 DIAGNOSIS — M25.562 CHRONIC PAIN OF LEFT KNEE: ICD-10-CM

## 2017-08-28 DIAGNOSIS — Z98.84 HISTORY OF BARIATRIC SURGERY: ICD-10-CM

## 2017-08-28 DIAGNOSIS — E11.3593 TYPE 2 DIABETES MELLITUS WITH BOTH EYES AFFECTED BY PROLIFERATIVE RETINOPATHY WITHOUT MACULAR EDEMA, WITH LONG-TERM CURRENT USE OF INSULIN: ICD-10-CM

## 2017-08-28 DIAGNOSIS — E66.9 NON MORBID OBESITY, UNSPECIFIED OBESITY TYPE: ICD-10-CM

## 2017-08-28 DIAGNOSIS — G89.29 CHRONIC PAIN OF LEFT KNEE: ICD-10-CM

## 2017-08-28 DIAGNOSIS — Z79.4 TYPE 2 DIABETES MELLITUS WITH BOTH EYES AFFECTED BY PROLIFERATIVE RETINOPATHY WITHOUT MACULAR EDEMA, WITH LONG-TERM CURRENT USE OF INSULIN: ICD-10-CM

## 2017-08-28 PROCEDURE — 3045F PR MOST RECENT HEMOGLOBIN A1C LEVEL 7.0-9.0%: CPT | Mod: S$GLB,,, | Performed by: FAMILY MEDICINE

## 2017-08-28 PROCEDURE — 3077F SYST BP >= 140 MM HG: CPT | Mod: S$GLB,,, | Performed by: FAMILY MEDICINE

## 2017-08-28 PROCEDURE — 99999 PR PBB SHADOW E&M-EST. PATIENT-LVL IV: CPT | Mod: PBBFAC,,, | Performed by: FAMILY MEDICINE

## 2017-08-28 PROCEDURE — 99214 OFFICE O/P EST MOD 30 MIN: CPT | Mod: S$GLB,,, | Performed by: FAMILY MEDICINE

## 2017-08-28 PROCEDURE — 3078F DIAST BP <80 MM HG: CPT | Mod: S$GLB,,, | Performed by: FAMILY MEDICINE

## 2017-08-28 PROCEDURE — 3008F BODY MASS INDEX DOCD: CPT | Mod: S$GLB,,, | Performed by: FAMILY MEDICINE

## 2017-08-28 NOTE — PROGRESS NOTES
Subjective:       Patient ID: Aaliyah Angela is a 61 y.o. female.    Chief Complaint: Follow-up    Patient is here for follow-up of their chronic diseases, see last note for details  Gets good BPs at home with diastolic down to 65 so reluctant to increase meds, but high today and often high in office  Agrees to sign up for digital HTN program and will use that to determine if she needs more meds for BP  Also given Rx for shingles vaccine for when she turns 62 next month  C/o left knee preventing more exercise. XRAY done when she saw rheum. SHe is on motrin and probably better off not on NSAIs from view of gerd/kidneys/heart - open to injection and discussion of knee replacement with ortho - will refer    Review of Systems   Constitutional: Negative for chills and fever.   HENT: Negative for ear pain.    Eyes: Negative for pain.   Respiratory: Negative for chest tightness.    Cardiovascular: Negative for chest pain.   Gastrointestinal: Negative for abdominal pain.   Genitourinary: Negative for flank pain.   Musculoskeletal: Positive for arthralgias. Negative for gait problem.   Neurological: Negative for syncope.   Psychiatric/Behavioral: Negative for behavioral problems.       Objective:      Physical Exam   Constitutional: She appears well-developed and well-nourished.   HENT:   Head: Normocephalic and atraumatic.   Eyes: EOM are normal.   Neck: Neck supple.   Cardiovascular: Normal rate and normal heart sounds.    Pulmonary/Chest: Breath sounds normal. No respiratory distress. She has no wheezes. She has no rales.   Abdominal: Soft.   Musculoskeletal: She exhibits no edema.   Neurological: She is alert.   Skin: Skin is dry.   Psychiatric: Her behavior is normal.   Nursing note and vitals reviewed.      Assessment:       1. Essential hypertension    2. 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 by proliferative retinopathy without macular edema, with long-term current use of insulin    3. HTN (hypertension),  benign    4. Coronary artery disease involving native coronary artery of native heart without angina pectoris    5. Non morbid obesity, unspecified obesity type    6. History of bariatric surgery, 12-    7. Chronic pain of left knee        Plan:       Aaliyah was seen today for follow-up.    Diagnoses and all orders for this visit:    Essential hypertension  -     NURSING COMMUNICATION: Create [facility] Account  -     Hypertension Digital Medicine (HDMP) Enrollment Order  -     Hypertension Digital Medicine (HDMP): Assign Onboarding Questionnaires    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 by proliferative retinopathy without macular edema, with long-term current use of insulin  -     Comprehensive metabolic panel; Future  - followed by endocrine    HTN (hypertension), benign - uncontrolled in office, very good control at home - will sign up for digital HTN program  -     CBC auto differential; Future  -     Comprehensive metabolic panel; Future    Coronary artery disease involving native coronary artery of native heart without angina pectoris  - stable, but needs good BP and a regular exercise program, so knee needs fixing via injection or replacement, and should avoid NSAIDs for safety    Non morbid obesity, unspecified obesity type  History of bariatric surgery, 12-  -stable    Chronic pain of left knee  -     Ambulatory consult to Orthopedics        f/u q 6 mo

## 2017-09-05 ENCOUNTER — PATIENT OUTREACH (OUTPATIENT)
Dept: OTHER | Facility: OTHER | Age: 62
End: 2017-09-05

## 2017-09-05 NOTE — LETTER
Kristie Pavon, Angelica  [address] [place] [address]     Dear Aaliyah Angela,    Welcome to the [facility] Hypert[facility] Digital Medicine Program!         My name is Kristie Pavon PharmD and I am your dedicated Digital Medicine clinician.  As an expert in medication management, I will help ensure that the medications you are taking continue to provide you with the intended benefits.      I am Sherley Guzman and I will be your health  for the duration of the program.  My  job is to help you identify lifestyle changes to improve your blood pressure control.  We will talk about nutrition, exercise, and other ways that you may be able to adjust your current habits to better your health. Together, we will work to improve your overall health and encourage you to meet your goals for a healthier lifestyle.    What we expect from YOU:    You will need to take blood pressure readings multiple times a week and no less than one reading per week.   It is important that you take your measurements at different times during the day, when possible.     What you should expect from your Digital Medicine Care Team:   We will provide you with education about high blood pressure, including lifestyle changes that could help you to control your blood pressure.   We will review your weekly readings and provide you with monthly blood pressure progress reports after you have been in the program for more than 30 days.   We will send monthly progress reports on your blood pressure control to your physician so they can follow along with your progress as well.    You will be able to reach me by phone at [phone] or through your [facility] account by clicking my name under Care Team on the right side of the home screen.    I look forward to working with you to achieve your blood pressure goals!    Sincerely,    Kristie Pavon PharmD  Your personal clinician    Please visit [email or web address]  to learn more about high blood pressure and what you can do lower your blood pressure.                                                                                           Aaliyah Angela  [address] [facility] [address]

## 2017-09-12 NOTE — TELEPHONE ENCOUNTER
"HTN Digital Medicine Program Medication Reconciliation Outreach    Called patient to introduce her into the HDMP. Reviewed program details including blood pressure goals, technique for taking readings (timing, cuff placement, body positioning, i[facility] milena), and what to do in case of emergency. Introduced patient to members of care team (health , clinician, and responsible provider). Verified patient's understanding of [facility] My[facility] milena use for contacting clinical team and to ensure that iHealth cuff readings continue to transmit by logging in once every 2 weeks. Confirmation text sent and patient received.  Pt has been taking BP readings regularly. Reportedly takes Amlodipine 10mg in PM, Losartan 25mg + Metoprolol XL 50mg in AM with no reported side effects.  Does have some vision impairment r/t DM, but reports compliance with all medications.  Has experienced episodes in the past where "her pressure shoots up and she gets a headache, but then it goes right back down again." Not currently experiencing any s/s of elevated BP.     Screening Questionnaire Review:  1. Depression - no  2. Sleep apnea - yes       BRADFORD screening results for this patient suggest a high likelihood of sleep apnea, which can contribute to hypertension. Patient has been previously diagnosed with sleep apnea and is not interested in referral at this time.  Pt has noticed decrease in symptoms since she had weight loss surgery.     Patient reports the following symptoms of sleep apnea: periods of not breathing.    Patient reports inconsistent CPAP use at least 8 hours per night. BRADFORD is managed effectively with CPAP use. Has not used machine consistently in about 1 month but states that she is sleeping ok without it.      Verified the following information with the patient:  1. Medication list  Current Outpatient Prescriptions on File Prior to Visit   Medication Sig Dispense Refill    amitriptyline (ELAVIL) 10 MG tablet Take 2 tablets (20 mg " "total) by mouth every evening. 180 tablet 3    amlodipine (NORVASC) 10 MG tablet TAKE 1 TABLET BY MOUTH EVERY DAY 90 tablet 3    aspirin 81 MG Chew Take 81 mg by mouth once daily.        atorvastatin (LIPITOR) 20 MG tablet Take 1 tablet (20 mg total) by mouth once daily. 90 tablet 3    BD ULTRA-FINE DIEGO PEN NEEDLES 32 gauge x 5/32" Ndle USE THREE TIMES DAILY 100 each 0    BLOOD GLUCOSE TEST test strip TEST AS DIRECTED AFTER MEALS 500 strip 0    calcium-vitamin D3 500 mg(1,250mg) -200 unit per tablet Take 1 tablet by mouth once daily.      dulaglutide (TRULICITY) 1.5 mg/0.5 mL PnIj Inject 1.5 mg into the skin every 7 days. 12 Syringe 3    ibuprofen (ADVIL,MOTRIN) 800 MG tablet Take 1 tablet (800 mg total) by mouth 3 (three) times daily. 90 tablet 3    ketorolac (TORADOL) 10 mg tablet 1-2 tabs every 6-10 hours as needed for headaches. 40 tablet 6    losartan (COZAAR) 25 MG tablet Take 1 tablet (25 mg total) by mouth once daily. 90 tablet 3    metformin (GLUCOPHAGE-XR) 500 MG 24 hr tablet Take 2 tablets (1,000 mg total) by mouth 2 (two) times daily with meals. (Patient taking differently: Take 500 mg by mouth 2 (two) times daily with meals. ) 360 tablet 2    metoprolol succinate (TOPROL-XL) 50 MG 24 hr tablet TAKE 1 TABLET BY MOUTH EVERY DAY 90 tablet 0    multivitamin (ONE DAILY MULTIVITAMIN) per tablet Take 1 tablet by mouth once daily.      ranitidine (ZANTAC) 150 MG tablet Take 150 mg by mouth as needed for Heartburn.       No current facility-administered medications on file prior to visit.        Previous ADRs  NKDA    2. Medication compliance: has been compliant with the medicaiton regimen    3. Medication Allergies: Review of patient's allergies indicates:  No Known Allergies    Explained that our goal is to get her BP consistently below 140/90mmHg.  Patient denies having further questions, concerns. BP is at goal.       Last 5 Patient Entered Redings Current 30 Day Average: 135/81     Recent " Readings 9/12/2017 9/11/2017 9/8/2017 9/7/2017 9/7/2017    Systolic BP (mmHg) 139 139 127 108 141    Diastolic BP (mmHg) 83 87 79 72 88    Pulse 78 77 80 80 80

## 2017-09-18 ENCOUNTER — PATIENT OUTREACH (OUTPATIENT)
Dept: OTHER | Facility: OTHER | Age: 62
End: 2017-09-18

## 2017-09-18 NOTE — LETTER
Sherley Guzman  [address] [facility] [place] [address]     Dear Aaliyah,    Thank you for enrolling in the [facility] Program. To participate in our program, we ask that you submit a blood pressure reading at least once weekly through your [facility] Account and maintain regular contact with your Care Team.  We have not received any data or heard from you in some time.     The Digital Medicine Care Team has attempted to reach you on multiple occasions to determine if you would like to continue participating in the program. While we encourage you to continue participating fully, we understand that circumstances may change.      To continue participating in the program, please contact me at [phone]. If we do not hear back, you will be un-enrolled and your physician will be notified of your decision.    If you have submitted blood pressure readings during the past 0 days and believe you are receiving this letter in error, please call the Digital Medicine Patient Support Line at [phone] for troubleshooting.      We look forward to hearing from you soon.    Sincerely,     Sherley Guzman  Your Personal Health                                                                                                                         Aaliyah Angela  [address] [facility] [address]

## 2017-09-18 NOTE — PROGRESS NOTES
Last 5 Patient Entered Redings Current 30 Day Average: 136/81     Recent Readings 9/18/2017 9/15/2017 9/13/2017 9/12/2017 9/11/2017    Systolic BP (mmHg) 136 141 147 139 139    Diastolic BP (mmHg) 80 81 82 83 87    Pulse 81 77 81 78 77        LVM and requested Ms. Aaliyah Angela call back so I can complete the initial introduction of the health coaches role in the program.

## 2017-09-18 NOTE — LETTER
Sherley Guzman  [address] [place] [address]     Dear Aaliyah,    Thank you for enrolling in the [facility] Program. To participate in our program, we ask that you submit a blood pressure reading at least once weekly through your [facility] and maintain regular contact with your Care Team.  We have not yet heard from you in some time.     The Digital Medicine Care Team has attempted to reach you on multiple occasions to determine if you would like to continue participating in the program. While we encourage you to continue participating fully, we understand that circumstances may change.      To continue participating in the program, please contact me at [phone]. If we do not hear back, you will be un-enrolled and your physician will be notified of your decision.    .      We look forward to hearing from you soon.    Sincerely,     Sherley Guzman  Your Personal Health                                                                                                                         Aaliyah Angela  [address] [facility] [address]

## 2017-09-19 ENCOUNTER — OFFICE VISIT (OUTPATIENT)
Dept: ORTHOPEDICS | Facility: CLINIC | Age: 62
End: 2017-09-19
Payer: COMMERCIAL

## 2017-09-19 VITALS — BODY MASS INDEX: 36.84 KG/M2 | WEIGHT: 229.25 LBS | HEIGHT: 66 IN

## 2017-09-19 DIAGNOSIS — M17.12 PRIMARY OSTEOARTHRITIS OF LEFT KNEE: Primary | ICD-10-CM

## 2017-09-19 PROCEDURE — 99999 PR PBB SHADOW E&M-EST. PATIENT-LVL III: CPT | Mod: PBBFAC,,, | Performed by: PHYSICIAN ASSISTANT

## 2017-09-19 PROCEDURE — 3008F BODY MASS INDEX DOCD: CPT | Mod: S$GLB,,, | Performed by: PHYSICIAN ASSISTANT

## 2017-09-19 PROCEDURE — 99203 OFFICE O/P NEW LOW 30 MIN: CPT | Mod: 25,S$GLB,, | Performed by: PHYSICIAN ASSISTANT

## 2017-09-19 PROCEDURE — 20610 DRAIN/INJ JOINT/BURSA W/O US: CPT | Mod: LT,S$GLB,, | Performed by: PHYSICIAN ASSISTANT

## 2017-09-19 RX ADMIN — TRIAMCINOLONE ACETONIDE 40 MG: 40 INJECTION, SUSPENSION INTRA-ARTICULAR; INTRAMUSCULAR at 04:09

## 2017-09-19 NOTE — LETTER
September 20, 2017      Reggie Glover MD  [address]  [facility] [address]           [facility]  [address], [address]  [facility] [address]  Phone: [phone]          Patient: Aaliyah Angela   MR Number: [id number]   YOB: 1955   Date of Visit: 9/19/2017       Dear Dr. Reggie Glover:    Thank you for referring Aaliyah Angela to me for evaluation. Attached you will find relevant portions of my assessment and plan of care.    If you have questions, please do not hesitate to call me. I look forward to following Aaliyah Angela along with you.    Sincerely,    Radha Wylie PA-C    Enclosure  CC:  No Recipients    If you would like to receive this communication electronically, please contact [email or web address] or [phone] to request more information on [facility] access.    For providers and/or their staff who would like to refer a patient to [facility], please contact us through our one-stop-shop provider referral line, [facility] , at [phone].    If you feel you have received this communication in error or would no longer like to receive these types of communications, please e-mail [email or web address]

## 2017-09-20 RX ORDER — TRIAMCINOLONE ACETONIDE 40 MG/ML
40 INJECTION, SUSPENSION INTRA-ARTICULAR; INTRAMUSCULAR
Status: COMPLETED | OUTPATIENT
Start: 2017-09-19 | End: 2017-09-19

## 2017-09-20 NOTE — PROGRESS NOTES
"  SUBJECTIVE:     Chief Complaint : left knee pain    History of Present Illness:  Aaliyah Angela is a 61 y.o. female seen in clinic today with a chief complaint of chronic left knee pain. Patient is LPN at [employer]. She has had pain for many years and had knee aspirated in the 80s.  Her pain is medial. She denies mechanical symptoms or instability. She has tried OTC medications. Pain is affecting ADL.     Past Medical History:   Diagnosis Date    Allergy     Anatomical narrow angle borderline glaucoma     AR (allergic rhinitis)     Arthritis     CAD (coronary artery disease) 4/22/2013    Non obstructive, 2012 PET     Cataract     Diabetes mellitus type II     with retinopathy    Diabetic retinopathy     Epiretinal membrane, both eyes     Hyperlipidemia     Hypertensive retinopathy of both eyes     Lumbar disc disease     Migraines     Morbid obesity 10/3/2012    Prolif diab retinopathy     PVD (peripheral vascular disease) 4/22/2013    Carotid US 1-39% bilat 2012     Rupture of right Achilles tendon     Unspecified essential hypertension     Unspecified vitamin D deficiency     Vitreous hemorrhage of left eye        Review of Systems:  Constitutional: no fever or chills  ENT: no nasal congestion or sore throat  Respiratory: no cough or shortness of breath  Cardiovascular: no chest pain or palpitations  Gastrointestinal: no nausea or vomiting, tolerating diet  Genitourinary: no hematuria or dysuria  Integument/Breast: no rash or pruritis  Hematologic/Lymphatic: no easy bruising or lymphadenopathy  Musculoskeletal: see HPI  Neurological: no seizures or tremors  Behavioral/Psych: no auditory or visual hallucinations    OBJECTIVE:     PHYSICAL EXAM:  Height 5' 6" (1.676 m), weight 104 kg (229 lb 4.5 oz).   General Appearance: WDWN, NAD  Gait: Normal  Neuro/Psych: Mood & affect appropriate  Lungs: Respirations equal and unlabored.   CV: 2+ bilateral upper and lower extremity pulses.   Skin: Intact " throughout LE  Extremities: No LE edema    Right Knee Exam  Range of Motion:0-130 active   Effusion:none  Condition of skin:intact  Location of tenderness:None   Strength:5 of 5 quadriceps strength and 5 of 5 hamstring strength  Stability:stable to testing    Left Knee Exam  Range of Motion:0-120 active   Effusion:none  Condition of skin:intact  Location of tenderness:Medial joint line   Strength:5 of 5 quadriceps strength and 5 of 5 hamstring strength  Stability:stable to testing  Karen: negative/negative    Left Hip Examination: no pain with PROM     RADIOGRAPHS: AP, lateral and merchant knee x-rays ordered and images reviewed today by me reveal advanced degenerative changes in both knees with loss of medial joint space    ASSESSMENT/PLAN:   Osteoarthritis of both knees  - Inject left knee today. Monitor glucose.   - Low impact activity  - F/u as needed    Knee Injection Procedure Note    Pre-operative Diagnosis: left knee degenerative arthritis    Post-operative Diagnosis: same    Indications: left knee pain    Anesthesia: none    Procedure Details     Verbal consent was obtained for the procedure. The injection site was identified and the skin was prepared with alcohol. The left knee was injected from an anterolateral approach with 1 ml of Kenalog and 3 ml Lidocaine under sterile technique using a 22 gauge needle. The needle was removed and the area cleansed and dressed.    Complications:  None; patient tolerated the procedure well.    she was advised to rest the knee today, using ice and elevation as needed for comfort and swelling. she did receive immediate relief of the knee pain. she was told this would be short lived and is secondary to the lidocaine. she may have an increase in discomfort tonight followed by steady improvement over the next several days. It may take 1-3 weeks following the injection to get the full benefit of the medication.

## 2017-10-03 NOTE — PROGRESS NOTES
Last 5 Patient Entered Redings Current 30 Day Average: 137/80     Recent Readings 9/29/2017 9/28/2017 9/28/2017 9/28/2017 9/27/2017    Systolic BP (mmHg) 143 151 152 159 121    Diastolic BP (mmHg) 73 82 82 92 75    Pulse 83 86 67 69 67          LVM and requested Ms. Aaliyah Angela call back so I can complete the initial introduction of the health coaches role in the program.

## 2017-10-11 NOTE — PROGRESS NOTES
Last 5 Patient Entered Redings Current 30 Day Average: 141/81     Recent Readings 10/11/2017 10/10/2017 10/9/2017 9/29/2017 9/28/2017    Systolic BP (mmHg) 145 141 148 143 151    Diastolic BP (mmHg) 81 85 84 73 82    Pulse 71 77 72 83 86          Hypertension Digital Medicine (HDMP) Health  Follow Up    LVM to follow up with Ms. Aaliyah Angela.    Per 30 day average, blood pressure is not well controlled 141/81 mmHg. Encouraged adherence to low sodium diet and physical activity guidelines. Advised patient to call or message with questions or concerns. WCB in 2 weeks.      I will be sending a non- compliance letter to the patient d/t not being able to contact her. I will wait for 3 weeks for patient to contact me or message me before I drop the patient.

## 2017-10-22 RX ORDER — METOPROLOL SUCCINATE 50 MG/1
TABLET, EXTENDED RELEASE ORAL
Qty: 90 TABLET | Refills: 0 | Status: SHIPPED | OUTPATIENT
Start: 2017-10-22 | End: 2018-01-20 | Stop reason: SDUPTHER

## 2017-11-03 NOTE — PROGRESS NOTES
Last 5 Patient Entered Readings Current 30 Day Average: 139/81     Recent Readings 11/2/2017 10/28/2017 10/25/2017 10/19/2017 10/16/2017    Systolic BP (mmHg) 120 142 129 138 145    Diastolic BP (mmHg) 67 84 80 83 82    Pulse 80 75 85 70 74          Hypertension Digital Medicine Program (HDMP): Health  Introduction    Introduced Ms. Aaliyah Angela to [facility]. Discussed health  role and recommended lifestyle modifications.    Diet (i.e. Low sodium, food labels): Patient reports she eats at her job. Patient reports she eats turkey on bun or fries at her job. Patient reports she eats a lot of junk food at home. We discussed cutting back on junk food and choosing low sodium options.     Exercise: Patient does not get any physical activity d/t having knee pain. Patient reports since she got injection in her knee she can start exercising. We discussed doing different exercises to increase her exercise. I will send patient some chair exercises to do dt/ having knee pain.     Alcohol/Tobacco: Patient does not drink or smoke.    Medication Adherence: has been compliant with the medicaiton regimen.    Other goals:Patient reports she wants to focus on diet and getting her BP down.     Patient reports she does get a lot of migraines and notices her BP readings are high. I will put in a task in for Angelica Pavon to see what's going on.     Reviewed AHA recommendations:  Limit sodium intake to <2000mg/day  Perform 150 minutes of physical activity per week    Patient is aware of the importance of taking daily BP readings at various times of the day  Patient aware that the health  can be used as a resource for lifestyle modifications to help reduce or maintain a healthy BP  Patient is aware of the importance of medication adherence.  Patient is aware that HDMP team is not available for emergencies. Patient should call 911 or [facility] on [facility] if one arises.

## 2017-11-04 ENCOUNTER — LAB VISIT (OUTPATIENT)
Dept: LAB | Facility: HOSPITAL | Age: 62
End: 2017-11-04
Payer: COMMERCIAL

## 2017-11-04 DIAGNOSIS — Z79.4 TYPE 2 DIABETES MELLITUS WITH BOTH EYES AFFECTED BY PROLIFERATIVE RETINOPATHY WITHOUT MACULAR EDEMA, WITH LONG-TERM CURRENT USE OF INSULIN: ICD-10-CM

## 2017-11-04 DIAGNOSIS — E11.3593 TYPE 2 DIABETES MELLITUS WITH BOTH EYES AFFECTED BY PROLIFERATIVE RETINOPATHY WITHOUT MACULAR EDEMA, WITH LONG-TERM CURRENT USE OF INSULIN: ICD-10-CM

## 2017-11-04 LAB
ESTIMATED AVG GLUCOSE: 154 MG/DL
HBA1C MFR BLD HPLC: 7 %

## 2017-11-04 PROCEDURE — 83036 HEMOGLOBIN GLYCOSYLATED A1C: CPT

## 2017-11-04 PROCEDURE — 36415 COLL VENOUS BLD VENIPUNCTURE: CPT

## 2017-11-06 ENCOUNTER — PATIENT OUTREACH (OUTPATIENT)
Dept: OTHER | Facility: OTHER | Age: 62
End: 2017-11-06

## 2017-11-06 DIAGNOSIS — I10 HTN (HYPERTENSION), BENIGN: Primary | ICD-10-CM

## 2017-11-06 NOTE — PROGRESS NOTES
Last 5 Patient Entered Readings Current 30 Day Average: 131/77     Recent Readings 11/17/2017 11/11/2017 11/11/2017 11/11/2017 11/8/2017    Systolic BP (mmHg) 137 133 109 147 147    Diastolic BP (mmHg) 81 82 84 85 84    Pulse 81 70 75 75 77        Called patient to introduce her to the Hypertension Digital Medicine Program.     Ms. Valeros BP is approaching goal of <130/80. She is on losartan 25 mg, will change to valsartan 80 mg QD and follow up in 2 weeks. Asked that she call with any concerns or questions once starting valsartan.     Reviewed patient's medications and verified allergies on file.   Hypertension Medications             amlodipine (NORVASC) 10 MG tablet TAKE 1 TABLET BY MOUTH EVERY DAY    valsartan (DIOVAN) 80 MG tablet Take 1 tablet (80 mg total) by mouth once daily.    metoprolol succinate (TOPROL-XL) 50 MG 24 hr tablet TAKE 1 TABLET BY MOUTH EVERY DAY        Explained that we expect her to obtain several blood pressures/week at random times of day. Also asked that the BP be taken at least 1 hour after taking BP medications.     Explained that our goal is to get her BP to consistently below 130/80mmHg.     Patient and I agreed that the patient will take her BP daily to every other day at varying times of the day.     Emailed patient link to [facility] HTN webpage as well as my direct phone number in case she has any questions.

## 2017-11-08 ENCOUNTER — OFFICE VISIT (OUTPATIENT)
Dept: ENDOCRINOLOGY | Facility: CLINIC | Age: 62
End: 2017-11-08
Payer: COMMERCIAL

## 2017-11-08 VITALS
DIASTOLIC BLOOD PRESSURE: 72 MMHG | WEIGHT: 229.13 LBS | SYSTOLIC BLOOD PRESSURE: 126 MMHG | HEART RATE: 79 BPM | BODY MASS INDEX: 36.82 KG/M2 | HEIGHT: 66 IN

## 2017-11-08 DIAGNOSIS — I10 HTN (HYPERTENSION), BENIGN: ICD-10-CM

## 2017-11-08 DIAGNOSIS — G47.33 OSA (OBSTRUCTIVE SLEEP APNEA): ICD-10-CM

## 2017-11-08 DIAGNOSIS — E78.5 HYPERLIPIDEMIA, UNSPECIFIED HYPERLIPIDEMIA TYPE: ICD-10-CM

## 2017-11-08 DIAGNOSIS — I25.10 CORONARY ARTERY DISEASE INVOLVING NATIVE CORONARY ARTERY OF NATIVE HEART WITHOUT ANGINA PECTORIS: ICD-10-CM

## 2017-11-08 DIAGNOSIS — Z98.84 HISTORY OF BARIATRIC SURGERY: ICD-10-CM

## 2017-11-08 DIAGNOSIS — Z79.4 TYPE 2 DIABETES MELLITUS WITH BOTH EYES AFFECTED BY PROLIFERATIVE RETINOPATHY WITHOUT MACULAR EDEMA, WITH LONG-TERM CURRENT USE OF INSULIN: Primary | ICD-10-CM

## 2017-11-08 DIAGNOSIS — E11.3593 TYPE 2 DIABETES MELLITUS WITH BOTH EYES AFFECTED BY PROLIFERATIVE RETINOPATHY WITHOUT MACULAR EDEMA, WITH LONG-TERM CURRENT USE OF INSULIN: Primary | ICD-10-CM

## 2017-11-08 PROCEDURE — 99214 OFFICE O/P EST MOD 30 MIN: CPT | Mod: S$GLB,,, | Performed by: NURSE PRACTITIONER

## 2017-11-08 PROCEDURE — 99999 PR PBB SHADOW E&M-EST. PATIENT-LVL IV: CPT | Mod: PBBFAC,,, | Performed by: NURSE PRACTITIONER

## 2017-11-08 RX ORDER — METFORMIN HYDROCHLORIDE 500 MG/1
500 TABLET, EXTENDED RELEASE ORAL 2 TIMES DAILY WITH MEALS
Qty: 180 TABLET | Refills: 3
Start: 2017-11-08 | End: 2018-02-09 | Stop reason: SDUPTHER

## 2017-11-08 NOTE — PATIENT INSTRUCTIONS
Snacks can be an important part of a balanced, healthy meal plan. They allow you to eat more frequently, feeling full and satisfied throughout the day. Also, they allow you to spread carbohydrates evenly, which may stabilize blood sugars.  Plus, snacks are enjoyable!     The amount of carbohydrate needed at snacks varies. Generally, about 15-30 grams of carbohydrate per snack is recommended.  Below you will find some tasty treats.       0-5 gm carb   Crystal Light   Vitamin Water Zero   Herbal tea, unsweetened   2 tsp peanut butter on celery   1./2 cup sugar-free jell-o   1 sugar-free popsicle   ¼ cup blueberries   8oz Blue Brenda unsweetened almond milk   5 baby carrots & celery sticks, cucumbers, bell peppers dipped in ¼ cup salsa, 2Tbsp light ranch dressing or 2Tbsp plain Greek yogurt   10 Goldfish crackers   ½ oz low-fat cheese or string cheese   1 closed handful of nuts, unsalted   1 Tbsp of sunflower seeds, unsalted   1 cup Smart Pop popcorn   1 whole grain brown rice cake        15 gm carb   1 small piece of fruit or ½ banana or 1/2 cup lite canned fruit   3 concepcion cracker squares   3 cups Smart Pop popcorn, top spray butter, Jaramillo lite salt or cinnamon and Truvia   5 Vanilla Wafers   ½ cup low fat, no added sugar ice cream or frozen yogurt (Blue bell, Blue Bunny, Weight Watchers, Skinny Cow)   ½ turkey, ham, or chicken sandwich   ½ c fruit with ½ c Cottage cheese   4-6 unsalted wheat crackers with 1 oz low fat cheese or 1 tbsp peanut butter    30-45 goldfish crackers (depending on flavor)    7-8 Temple mini brown rice cakes (caramel, apple cinnamon, chocolate)    12 Temple mini brown rice cakes (cheddar, bbq, ranch)    1/3 cup hummus dip with raw veg   1/2 whole wheat love, 1Tbsp hummus   Mini Pizza (1/2 whole wheat English muffin, low-fat  cheese, tomato sauce)   100 calorie snack pack (Oreo, Chips Ahoy, Ritz Mix, Baked Cheetos)   4-6 oz. light or Greek Style yogurt  (Andre Lyn, Barb, [facility])   ½ cup sugar-free pudding     6 in. wheat tortilla or love oven toasted chips (topped with spray butter flavoring, cinnamon, Truvia OR spray butter, garlic powder, chili powder)    18 BBQ Popchips (available at Target, Whole Foods, Fresh Market)                   Diabetes Support Group Meetings    Date Topics   February 9 Health Promotion/Nutrition   March 9 Taking Care of Your Kidneys   April 13 Taking Care of Your Feet   May 11 Ease Your Mind with Diabetes   June 8 Hurricane and Emergency Preparedness   July 13 Family & Caregiver Support for Diabetes   *August 17  Taking Care of Your Eyes   September 14 Devices & Technology   October 12 Recipes & Treats   November 9 Getting Pumped Up for Diabetes   December 14 Year-End Close Out            Meetings are held in the [facility] (A) of the [facility] located at [address] [place] [address]. Please call [phone] for additional information.    Free service, offered every 2nd Thursday of every month, except in August! Family members and/or friends are welcome as well!  Support group is for patients with type 1 or type 2 diabetes.    From 3:30p to 4:30p

## 2017-11-08 NOTE — PROGRESS NOTES
CHIEF COMPLAINT: Type 2 Diabetes     HPI: Mrs. Aaliyah Angela is a 62 y.o. female who was diagnosed with gestational diabetes age 20s, Type 2 DM age 30s.  Pt was last seen by me in August 2017 and is now being seen by me again today.  a1c is at goal, no issues.    Current A1C -pending today  Lab Results   Component Value Date    HGBA1C 7.0 (H) 11/04/2017       Do you understand your A1C? yes      DM COMPLICATIONS:  Retinopathy  neuropathy      RECENT HOSPITALIZATIONS/PROCEDURES:  none      PREVIOUS DIABETES MEDICATIONS TRIED:  Metformin  trulicity  lantus   januvia  invokana-SEs  onglyza       CURRENT DIABETIC MEDS: trulicity 1.5 mg weekly  Metformin 500 mg bid     Any financial burdens/barriers?   No issues    Are you on a statin? yes    Any statin allergies/intolerance? No issues    Are you on a baby ASA? yes    Are you on ACEI/ARB? Any recent BP issues?arb, controlled    How is your current sleep pattern? How many hours of sleep per night?  7 hours nightly     Last Podiatry Exam: 2017    Any current issues w/ feet? No issues       REVIEW OF SYSTEMS  General: no weakness, fatigue, or weight changes #2-4 wt loss.   Eyes: no double or blurred vision, eye pain, or redness; Last Eye Exam-appt 8/2017.   Cardiovascular: +chest pain(GERD), palpitations, edema, or murmurs.   Respiratory: no cough or dyspnea.   GI: no heartburn, nausea, or changes in bowel patterns; good appetite. +GERD  Skin: no rashes, dryness, itching, or reactions at insulin injection sites.  Neuro: + numbness, tingling, tremors, or vertigo. (L) knee-steroid injection x 1 mo. -much improved w/ pain  Endocrine: no polyuria, polydipsia, polyphagia, heat or cold intolerance.     Psych/Holistic:    What are your fears?  None    How do you cope?  Take a ride, occupy self by staying busy    Where does your strength come from?  family    Do you have support?  Yes     Do you feel hopelessness or despair?  No issues    How is your dietary habits?  Not always  "eating the right things    Breakfast- protein bar-not a big breakfast eater    Lunch - half turkey sandwich, fries    Dinner - meat, crackers, breads    What goal do you have for caloric intake per day?  Trying to start a goal    Are you comfortable w/ your body wt?  Not so much    Are you interested in Wt Loss Clinic or Bariatric Consultation?   Has f/u annually    How is your activity level?  Restarting activity---    Treadmill goal 3 x a week     Do you need to see dietician/diabetes educator?  Not really    Recommendations: 150 minutes of moderate PA or 75 mins of vigorous PA a week  Moderate or high intensity, involve all major muscle groups on >2 days per week.    Vital Signs  /72 (BP Location: Left arm, Patient Position: Sitting)   Pulse 79   Ht 5' 6" (1.676 m)   Wt 103.9 kg (229 lb 1.6 oz)   BMI 36.98 kg/m²     Hemoglobin A1C   Date Value Ref Range Status   11/04/2017 7.0 (H) 4.0 - 5.6 % Final     Comment:     According to ADA guidelines, hemoglobin A1c <7.0% represents  optimal control in non-pregnant diabetic patients. Different  metrics may apply to specific patient populations.   Standards of Medical Care in Diabetes-2016.  For the purpose of screening for the presence of diabetes:  <5.7%     Consistent with the absence of diabetes  5.7-6.4%  Consistent with increasing risk for diabetes   (prediabetes)  >or=6.5%  Consistent with diabetes  Currently, no consensus exists for use of hemoglobin A1c  for diagnosis of diabetes for children.  This Hemoglobin A1c assay has significant interference with fetal   hemoglobin   (HbF). The results are invalid for patients with abnormal amounts of   HbF,   including those with known Hereditary Persistence   of Fetal Hemoglobin. Heterozygous hemoglobin variants (HbAS, HbAC,   HbAD, HbAE, HbA2) do not significantly interfere with this assay;   however, presence of multiple variants in a sample may impact the %   interference.     08/03/2017 7.1 (H) 4.0 - 5.6 % " Final     Comment:     According to ADA guidelines, hemoglobin A1c <7.0% represents  optimal control in non-pregnant diabetic patients. Different  metrics may apply to specific patient populations.   Standards of Medical Care in Diabetes-2016.  For the purpose of screening for the presence of diabetes:  <5.7%     Consistent with the absence of diabetes  5.7-6.4%  Consistent with increasing risk for diabetes   (prediabetes)  >or=6.5%  Consistent with diabetes  Currently, no consensus exists for use of hemoglobin A1c  for diagnosis of diabetes for children.  This Hemoglobin A1c assay has significant interference with fetal   hemoglobin   (HbF). The results are invalid for patients with abnormal amounts of   HbF,   including those with known Hereditary Persistence   of Fetal Hemoglobin. Heterozygous hemoglobin variants (HbAS, HbAC,   HbAD, HbAE, HbA2) do not significantly interfere with this assay;   however, presence of multiple variants in a sample may impact the %   interference.     12/17/2016 8.1 (H) 4.5 - 6.2 % Final     Comment:     According to ADA guidelines, hemoglobin A1C <7.0% represents  optimal control in non-pregnant diabetic patients.  Different  metrics may apply to specific populations.   Standards of Medical Care in Diabetes - 2016.  For the purpose of screening for the presence of diabetes:  <5.7%     Consistent with the absence of diabetes  5.7-6.4%  Consistent with increasing risk for diabetes   (prediabetes)  >or=6.5%  Consistent with diabetes  Currently no consensus exists for use of hemoglobin A1C  for diagnosis of diabetes for children.          Chemistry        Component Value Date/Time     08/28/2017 1133    K 4.7 08/28/2017 1133     08/28/2017 1133    CO2 30 (H) 08/28/2017 1133    BUN 13 08/28/2017 1133    CREATININE 0.9 08/28/2017 1133     08/28/2017 1133        Component Value Date/Time    CALCIUM 9.3 08/28/2017 1133    ALKPHOS 69 08/28/2017 1133    AST 19 08/28/2017  1133    ALT 18 08/28/2017 1133    BILITOT 0.3 08/28/2017 1133    ESTGFRAFRICA >60 08/28/2017 1133    EGFRNONAA >60 08/28/2017 1133           Lab Results   Component Value Date    TSH 1.054 08/03/2017      Lab Results   Component Value Date    CHOL 126 12/17/2016    CHOL 126 08/20/2016    CHOL 98 (L) 02/15/2016     Lab Results   Component Value Date    HDL 45 12/17/2016    HDL 43 08/20/2016    HDL 31 (L) 02/15/2016     Lab Results   Component Value Date    LDLCALC 67.8 12/17/2016    LDLCALC 71.8 08/20/2016    LDLCALC 54.2 (L) 02/15/2016     Lab Results   Component Value Date    TRIG 66 12/17/2016    TRIG 56 08/20/2016    TRIG 64 02/15/2016     Lab Results   Component Value Date    CHOLHDL 35.7 12/17/2016    CHOLHDL 34.1 08/20/2016    CHOLHDL 31.6 02/15/2016         PHYSICAL EXAMINATION  Constitutional: Appears well, no distress  Neck: Supple, trachea midline.   Respiratory: Np wheezes, even and unlabored.  Cardiovascular: RRR; no murmurs; (+) radial pulses, no edema.   Lymph: no lymphadenopathy palpated,  hoses to BLE  Skin: warm and dry; no injection site reactions, no acanthosis nigracans observed.  Neuro:patient alert and cooperative, normal affect; steady gait.    Diabetes Foot Exam: x 3-4 mos ago - no issues     Assessment/Plan    1. 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 by proliferative retinopathy without macular edema, with long-term current use of insulin  Hemoglobin A1c, lipid next time   continue trulicity 1.5 mg weekly  Continue metformin 500 mg bid  Discussed dietary habits  bg monitoring 1-2 times a day.   2. Coronary artery disease involving native coronary artery of native heart without angina pectoris  Avoid hypoglycemia, f/u with cards or primary   3. HTN (hypertension), benign  Controlled, continue med(s)   4. Hyperlipidemia, unspecified hyperlipidemia type  Lab Results   Component Value Date    LDLCALC 67.8 12/17/2016     At goal continue statin   5. Hypertensive retinopathy, bilateral   F/u with retinal specialist   6. History of bariatric surgery, 12-  F/u annually w/ bariatrics        FOLLOW UP  Return in about 4 months (around 3/8/2018).

## 2017-11-21 RX ORDER — VALSARTAN 80 MG/1
80 TABLET ORAL DAILY
Qty: 30 TABLET | Refills: 2 | Status: SHIPPED | OUTPATIENT
Start: 2017-11-21 | End: 2018-02-28 | Stop reason: SDUPTHER

## 2017-11-22 ENCOUNTER — PATIENT OUTREACH (OUTPATIENT)
Dept: OTHER | Facility: OTHER | Age: 62
End: 2017-11-22

## 2017-11-22 NOTE — LETTER
Sherley Guzman  [address] [place] [address]     Dear Aaliyah,    Thank you for enrolling in the [facility]. To participate in our program, we ask that you submit a blood pressure reading at least once weekly through your [facility] Account and maintain regular contact with your Care Team.  We have not yet heard from you in some time.     The Digital Medicine Care Team has attempted to reach you on multiple occasions to determine if you would like to continue participating in the program. While we encourage you to continue participating fully, we understand that circumstances may change.      To continue participating in the program, please contact me at [phone]. If we do not hear back, you will be un-enrolled and your physician will be notified of your decision.      We look forward to hearing from you soon.    Sincerely,     Sherley Guzman  Your Personal Health                                                                                                                         Aaliyah Angela  [address] [facility] [address]

## 2017-12-06 ENCOUNTER — PATIENT OUTREACH (OUTPATIENT)
Dept: OTHER | Facility: OTHER | Age: 62
End: 2017-12-06

## 2017-12-06 NOTE — PROGRESS NOTES
Last 5 Patient Entered Readings Current 30 Day Average: 127/75     Recent Readings 12/4/2017 12/3/2017 12/2/2017 12/2/2017 12/1/2017    Systolic BP (mmHg) 121 106 111 146 138    Diastolic BP (mmHg) 72 68 71 90 76    Pulse 77 75 76 79 74        Patient's BP average is controlled based on 2017 ACC/AHA HTN guidelines of goal BP <130/80.      Ms. Angela's BP is improving and average is at goal now. She had a spike in BP yesterday, but it was prior to her morning medications. She is feeling well and has no questions or concerns.     Patient's health , Sherley Guzman, will be following up every 3-4 weeks. I will continue to monitor regularly and will follow up in 2-3 months, sooner if BP begins to trend upward or downward.    Patient has my contact information and knows to call with any concerns or clinical changes.     Current HTN regimen:  Hypertension Medications             amlodipine (NORVASC) 10 MG tablet TAKE 1 TABLET BY MOUTH EVERY DAY    metoprolol succinate (TOPROL-XL) 50 MG 24 hr tablet TAKE 1 TABLET BY MOUTH EVERY DAY    valsartan (DIOVAN) 80 MG tablet Take 1 tablet (80 mg total) by mouth once daily.

## 2018-01-16 RX ORDER — DULAGLUTIDE 0.75 MG/.5ML
INJECTION, SOLUTION SUBCUTANEOUS
Qty: 2 ML | Refills: 6 | Status: SHIPPED | OUTPATIENT
Start: 2018-01-16 | End: 2018-08-24

## 2018-01-18 ENCOUNTER — TELEPHONE (OUTPATIENT)
Dept: OPTOMETRY | Facility: CLINIC | Age: 63
End: 2018-01-18

## 2018-01-18 ENCOUNTER — PATIENT MESSAGE (OUTPATIENT)
Dept: OPTOMETRY | Facility: CLINIC | Age: 63
End: 2018-01-18

## 2018-01-18 NOTE — TELEPHONE ENCOUNTER
Called pt  Cell number, voice recorder informed mailbox full can not accept any incoming calls.  Called home number phone rung and rung then turned into fax machine.  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

## 2018-01-21 RX ORDER — METOPROLOL SUCCINATE 50 MG/1
TABLET, EXTENDED RELEASE ORAL
Qty: 90 TABLET | Refills: 3 | Status: SHIPPED | OUTPATIENT
Start: 2018-01-21 | End: 2019-01-13 | Stop reason: SDUPTHER

## 2018-02-09 RX ORDER — METFORMIN HYDROCHLORIDE 500 MG/1
500 TABLET, EXTENDED RELEASE ORAL 2 TIMES DAILY WITH MEALS
Qty: 180 TABLET | Refills: 3 | Status: SHIPPED | OUTPATIENT
Start: 2018-02-09 | End: 2018-08-24 | Stop reason: SDUPTHER

## 2018-02-09 NOTE — TELEPHONE ENCOUNTER
----- Message from Misti Guzman sent at 2/9/2018  1:01 PM CST -----  Contact: Pt  [phone]  Pt calling to get an refill on medication metFORMIN (GLUCOPHAGE-XR) 500 MG 24 hr tablet    Call the back to to verify

## 2018-02-10 ENCOUNTER — LAB VISIT (OUTPATIENT)
Dept: LAB | Facility: HOSPITAL | Age: 63
End: 2018-02-10
Attending: SURGERY
Payer: COMMERCIAL

## 2018-02-10 DIAGNOSIS — I10 HYPERTENSION: ICD-10-CM

## 2018-02-10 DIAGNOSIS — Z13.6 SCREENING FOR ISCHEMIC HEART DISEASE: ICD-10-CM

## 2018-02-10 DIAGNOSIS — R63.4 ABNORMAL WEIGHT LOSS: ICD-10-CM

## 2018-02-10 DIAGNOSIS — E11.9 DIABETES MELLITUS WITHOUT COMPLICATION: Primary | ICD-10-CM

## 2018-02-10 DIAGNOSIS — M15.0 PRIMARY GENERALIZED HYPERTROPHIC OSTEOARTHROSIS: ICD-10-CM

## 2018-02-10 DIAGNOSIS — Z13.21 SCREENING FOR MALNUTRITION: ICD-10-CM

## 2018-02-10 DIAGNOSIS — R53.83 FATIGUE: ICD-10-CM

## 2018-02-10 DIAGNOSIS — E78.5 HYPERLIPEMIA: ICD-10-CM

## 2018-02-10 LAB
25(OH)D3+25(OH)D2 SERPL-MCNC: 40 NG/ML
ALBUMIN SERPL BCP-MCNC: 3.7 G/DL
ALP SERPL-CCNC: 71 U/L
ALT SERPL W/O P-5'-P-CCNC: 29 U/L
ANION GAP SERPL CALC-SCNC: 7 MMOL/L
AST SERPL-CCNC: 24 U/L
BASOPHILS # BLD AUTO: 0.04 K/UL
BASOPHILS NFR BLD: 0.9 %
BILIRUB SERPL-MCNC: 0.4 MG/DL
BUN SERPL-MCNC: 19 MG/DL
CALCIUM SERPL-MCNC: 9.1 MG/DL
CHLORIDE SERPL-SCNC: 106 MMOL/L
CHOLEST SERPL-MCNC: 136 MG/DL
CHOLEST/HDLC SERPL: 2.6 {RATIO}
CO2 SERPL-SCNC: 28 MMOL/L
CREAT SERPL-MCNC: 0.8 MG/DL
DIFFERENTIAL METHOD: ABNORMAL
EOSINOPHIL # BLD AUTO: 0.1 K/UL
EOSINOPHIL NFR BLD: 2.4 %
ERYTHROCYTE [DISTWIDTH] IN BLOOD BY AUTOMATED COUNT: 12.8 %
EST. GFR  (AFRICAN AMERICAN): >60 ML/MIN/1.73 M^2
EST. GFR  (NON AFRICAN AMERICAN): >60 ML/MIN/1.73 M^2
ESTIMATED AVG GLUCOSE: 169 MG/DL
FERRITIN SERPL-MCNC: 67 NG/ML
FOLATE SERPL-MCNC: 13.3 NG/ML
GLUCOSE SERPL-MCNC: 136 MG/DL
HBA1C MFR BLD HPLC: 7.5 %
HCT VFR BLD AUTO: 35.7 %
HDLC SERPL-MCNC: 53 MG/DL
HDLC SERPL: 39 %
HGB BLD-MCNC: 11.4 G/DL
LDLC SERPL CALC-MCNC: 73.6 MG/DL
LYMPHOCYTES # BLD AUTO: 2.3 K/UL
LYMPHOCYTES NFR BLD: 49.5 %
MAGNESIUM SERPL-MCNC: 1.8 MG/DL
MCH RBC QN AUTO: 29.3 PG
MCHC RBC AUTO-ENTMCNC: 31.9 G/DL
MCV RBC AUTO: 92 FL
MONOCYTES # BLD AUTO: 0.4 K/UL
MONOCYTES NFR BLD: 8.3 %
NEUTROPHILS # BLD AUTO: 1.8 K/UL
NEUTROPHILS NFR BLD: 38.7 %
NONHDLC SERPL-MCNC: 83 MG/DL
PLATELET # BLD AUTO: 236 K/UL
PMV BLD AUTO: 10.2 FL
POTASSIUM SERPL-SCNC: 4.8 MMOL/L
PROT SERPL-MCNC: 7 G/DL
RBC # BLD AUTO: 3.89 M/UL
SODIUM SERPL-SCNC: 141 MMOL/L
TRIGL SERPL-MCNC: 47 MG/DL
TSH SERPL DL<=0.005 MIU/L-ACNC: 1.54 UIU/ML
VIT B12 SERPL-MCNC: 1309 PG/ML
WBC # BLD AUTO: 4.59 K/UL

## 2018-02-10 PROCEDURE — 85025 COMPLETE CBC W/AUTO DIFF WBC: CPT

## 2018-02-10 PROCEDURE — 82746 ASSAY OF FOLIC ACID SERUM: CPT

## 2018-02-10 PROCEDURE — 84443 ASSAY THYROID STIM HORMONE: CPT

## 2018-02-10 PROCEDURE — 82607 VITAMIN B-12: CPT

## 2018-02-10 PROCEDURE — 82728 ASSAY OF FERRITIN: CPT

## 2018-02-10 PROCEDURE — 83735 ASSAY OF MAGNESIUM: CPT

## 2018-02-10 PROCEDURE — 82306 VITAMIN D 25 HYDROXY: CPT

## 2018-02-10 PROCEDURE — 84630 ASSAY OF ZINC: CPT

## 2018-02-10 PROCEDURE — 80061 LIPID PANEL: CPT

## 2018-02-10 PROCEDURE — 84590 ASSAY OF VITAMIN A: CPT

## 2018-02-10 PROCEDURE — 84425 ASSAY OF VITAMIN B-1: CPT

## 2018-02-10 PROCEDURE — 80053 COMPREHEN METABOLIC PANEL: CPT

## 2018-02-10 PROCEDURE — 83036 HEMOGLOBIN GLYCOSYLATED A1C: CPT

## 2018-02-10 PROCEDURE — 36415 COLL VENOUS BLD VENIPUNCTURE: CPT

## 2018-02-13 LAB — ZINC SERPL-MCNC: 67 UG/DL (ref 60–130)

## 2018-02-15 LAB
VIT A SERPL-MCNC: 40 UG/DL (ref 38–106)
VIT B1 SERPL-MCNC: 72 UG/L (ref 38–122)

## 2018-02-16 ENCOUNTER — OFFICE VISIT (OUTPATIENT)
Dept: OPTOMETRY | Facility: CLINIC | Age: 63
End: 2018-02-16
Payer: COMMERCIAL

## 2018-02-16 DIAGNOSIS — H52.203 ASTIGMATISM OF BOTH EYES, UNSPECIFIED TYPE: Primary | ICD-10-CM

## 2018-02-16 DIAGNOSIS — H52.4 PRESBYOPIA: ICD-10-CM

## 2018-02-16 PROCEDURE — 92015 DETERMINE REFRACTIVE STATE: CPT | Mod: S$GLB,,, | Performed by: OPTOMETRIST

## 2018-02-16 PROCEDURE — 99999 PR PBB SHADOW E&M-EST. PATIENT-LVL I: CPT | Mod: PBBFAC,,, | Performed by: OPTOMETRIST

## 2018-02-16 RX ORDER — IBUPROFEN 800 MG/1
TABLET ORAL
Refills: 3 | COMMUNITY
Start: 2018-01-20 | End: 2018-11-30

## 2018-02-16 NOTE — PROGRESS NOTES
HPI     Concerns About Ocular Health    Additional comments: Eye glasses           Comments   Patient's age: 62 y.o.    Approximate date of last eye examination:  8/11/17  Name of last eye doctor seen: Dr. Goodrich    Pt states that she is here for get prescription for glasses, see the other   doctors for eye health.    Wears glasses? yes       ! OTC eyedrops currently using:  none   ! Prescription eye meds currently using:  none           Last edited by Yaquelin Griffith MA on 2/16/2018  2:17 PM. (History)            Assessment /Plan     For exam results, see Encounter Report.    Astigmatism of both eyes, unspecified type    Presbyopia      Rx specs  Pt has appt to return to Dr. Goodrich and Dr. Mckeon already scheduled

## 2018-02-28 DIAGNOSIS — I10 HTN (HYPERTENSION), BENIGN: ICD-10-CM

## 2018-02-28 RX ORDER — VALSARTAN 80 MG/1
TABLET ORAL
Qty: 30 TABLET | Refills: 0 | Status: SHIPPED | OUTPATIENT
Start: 2018-02-28 | End: 2018-03-02 | Stop reason: SDUPTHER

## 2018-03-02 ENCOUNTER — OFFICE VISIT (OUTPATIENT)
Dept: INTERNAL MEDICINE | Facility: CLINIC | Age: 63
End: 2018-03-02
Payer: COMMERCIAL

## 2018-03-02 VITALS
DIASTOLIC BLOOD PRESSURE: 70 MMHG | OXYGEN SATURATION: 99 % | HEIGHT: 66 IN | WEIGHT: 242.94 LBS | SYSTOLIC BLOOD PRESSURE: 140 MMHG | BODY MASS INDEX: 39.04 KG/M2 | HEART RATE: 93 BPM

## 2018-03-02 DIAGNOSIS — Z79.4 TYPE 2 DIABETES MELLITUS WITH BOTH EYES AFFECTED BY PROLIFERATIVE RETINOPATHY WITHOUT MACULAR EDEMA, WITH LONG-TERM CURRENT USE OF INSULIN: ICD-10-CM

## 2018-03-02 DIAGNOSIS — E11.3593 TYPE 2 DIABETES MELLITUS WITH BOTH EYES AFFECTED BY PROLIFERATIVE RETINOPATHY WITHOUT MACULAR EDEMA, WITH LONG-TERM CURRENT USE OF INSULIN: ICD-10-CM

## 2018-03-02 DIAGNOSIS — I10 HTN (HYPERTENSION), BENIGN: ICD-10-CM

## 2018-03-02 DIAGNOSIS — K21.9 GASTROESOPHAGEAL REFLUX DISEASE, ESOPHAGITIS PRESENCE NOT SPECIFIED: Primary | ICD-10-CM

## 2018-03-02 PROCEDURE — 3077F SYST BP >= 140 MM HG: CPT | Mod: S$GLB,,, | Performed by: FAMILY MEDICINE

## 2018-03-02 PROCEDURE — 99214 OFFICE O/P EST MOD 30 MIN: CPT | Mod: S$GLB,,, | Performed by: FAMILY MEDICINE

## 2018-03-02 PROCEDURE — 3078F DIAST BP <80 MM HG: CPT | Mod: S$GLB,,, | Performed by: FAMILY MEDICINE

## 2018-03-02 PROCEDURE — 99999 PR PBB SHADOW E&M-EST. PATIENT-LVL IV: CPT | Mod: PBBFAC,,, | Performed by: FAMILY MEDICINE

## 2018-03-02 RX ORDER — VALSARTAN 160 MG/1
160 TABLET ORAL DAILY
Qty: 90 TABLET | Refills: 3 | Status: SHIPPED | OUTPATIENT
Start: 2018-03-02 | End: 2018-04-17 | Stop reason: SDUPTHER

## 2018-03-02 RX ORDER — OMEPRAZOLE 20 MG/1
20 CAPSULE, DELAYED RELEASE ORAL DAILY
Qty: 30 CAPSULE | Refills: 0 | Status: SHIPPED | OUTPATIENT
Start: 2018-03-02 | End: 2020-08-24 | Stop reason: ALTCHOICE

## 2018-03-02 NOTE — PROGRESS NOTES
Subjective:       Patient ID: Aaliyah Angela is a 62 y.o. female.    Chief Complaint: Follow-up    Patient is here for follow-up of their chronic diseases, also complaing of bad GERD and epigastic soreness for the past week, started OTC zantac without benefit yet    Review of Systems   Constitutional: Negative for chills and fever.   HENT: Negative for ear pain.    Eyes: Negative for pain.   Respiratory: Negative for chest tightness.    Cardiovascular: Negative for chest pain.   Gastrointestinal: Negative for abdominal pain.        Epigastric burning pain   Genitourinary: Negative for flank pain.   Musculoskeletal: Negative for gait problem.   Neurological: Negative for syncope.   Psychiatric/Behavioral: Negative for behavioral problems.       Objective:      Physical Exam   Constitutional: She appears well-developed and well-nourished.   HENT:   Head: Normocephalic and atraumatic.   Eyes: EOM are normal.   Neck: Neck supple.   Cardiovascular: Normal rate and normal heart sounds.    Pulmonary/Chest: Breath sounds normal. No respiratory distress. She has no wheezes. She has no rales. She exhibits tenderness.   Soreness over GE junction/xiphoid area   Abdominal: Soft.   Musculoskeletal: She exhibits no edema.   Neurological: She is alert.   Skin: Skin is dry.   Psychiatric: Her behavior is normal.   Nursing note and vitals reviewed.      Assessment:       1. Gastroesophageal reflux disease, esophagitis presence not specified    2. HTN (hypertension), benign    3. 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 by proliferative retinopathy without macular edema, with long-term current use of insulin        Plan:       Aaliyah was seen today for follow-up.    Diagnoses and all orders for this visit:    Gastroesophageal reflux disease, esophagitis presence not specified  -     omeprazole (PRILOSEC) 20 MG capsule; Take 1 capsule (20 mg total) by mouth once daily. X 30 days only, then return to zantac  - if nto getting  "good results in 3-5 days, or it returns when she stops it in 30 days, notify me so we can set up a GI consult for possible EGD    HTN (hypertension), benign - not controlled  BP (!) 140/70   Pulse 93   Ht 5' 6" (1.676 m)   Wt 110.2 kg (242 lb 15.2 oz)   SpO2 99%   BMI 39.21 kg/m²     -     valsartan (DIOVAN) 160 MG tablet; Take 1 tablet (160 mg total) by mouth once daily. [doubled form 80mg]    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 by proliferative retinopathy without macular edema, with long-term current use of insulin  Diabetes Management Status    Statin: Taking  ACE/ARB: Taking    Screening or Prevention Patient's value Goal Complete/Controlled?   HgA1C Testing and Control   Lab Results   Component Value Date    HGBA1C 7.5 (H) 02/10/2018      Annually/Less than 8% Yes   Lipid profile : 02/10/2018 Annually Yes   LDL control Lab Results   Component Value Date    LDLCALC 73.6 02/10/2018    Annually/Less than 100 mg/dl  Yes   Nephropathy screening Lab Results   Component Value Date    LABMICR 4.0 12/09/2015     Lab Results   Component Value Date    PROTEINUA Negative 10/16/2015    Annually No   Blood pressure BP Readings from Last 1 Encounters:   03/02/18 (!) 140/70    Less than 140/90 Yes   Dilated retinal exam : 08/11/2017 Annually Yes   Foot exam   : 08/03/2017 Annually Yes       Follow-up in about 6 months (around 9/2/2018) for dm, htn.        "

## 2018-03-26 NOTE — PROGRESS NOTES
Last 5 Patient Entered Readings                                      Current 30 Day Average: 124/75     Recent Readings 3/23/2018 3/19/2018 3/14/2018 3/9/2018 3/2/2018    SBP (mmHg) 117 123 130 120 127    DBP (mmHg) 70 74 74 70 77    Pulse 76 77 80 81 80        3/26- Returning patient's call from 3/23. LVM for patient to call back and I will be meassaging her PCP today to encourage the patient to participate in the program by contacting her care team.  Will call back in two weeks, if no answer Ms. Angela will be receiving a discharge letter.

## 2018-04-17 ENCOUNTER — PATIENT OUTREACH (OUTPATIENT)
Dept: OTHER | Facility: OTHER | Age: 63
End: 2018-04-17

## 2018-04-17 DIAGNOSIS — I10 HTN (HYPERTENSION), BENIGN: ICD-10-CM

## 2018-04-17 RX ORDER — VALSARTAN 80 MG/1
80 TABLET ORAL DAILY
Qty: 90 TABLET | Refills: 1
Start: 2018-04-17 | End: 2018-04-26

## 2018-04-17 NOTE — PROGRESS NOTES
Last 5 Patient Entered Readings                                      Current 30 Day Average: 125/73     Recent Readings 4/13/2018 4/13/2018 4/13/2018 4/13/2018 4/13/2018    SBP (mmHg) 135 138 124 143 143    DBP (mmHg) 86 80 69 83 83    Pulse 75 76 76 77 77        Ms. Angela's BP average is well controlled. Dr. Glover increased her valsartan to 160 mg QD about 6 weeks ago, but she only took the first dose today of the higher dose. Her BP was 111/?? And she reports feeling dizzy/LH with this BP reading. Instructed her to continue on previous dose of valsartan 80 mg QD for now. She acknowledges spikes in BP and says it is due to sinus and sinus headaches. If readings remain above 130/80, will increase dose to 160 mg QD.     Will continue to monitor regularly. Will follow up in 2-3 weeks, sooner if BP begins to trend upward or downward.    Patient has my contact information and knows to call with any concerns or clinical changes.     Current HTN regimen:  Hypertension Medications             amlodipine (NORVASC) 10 MG tablet TAKE 1 TABLET BY MOUTH EVERY DAY    metoprolol succinate (TOPROL-XL) 50 MG 24 hr tablet TAKE 1 TABLET BY MOUTH EVERY DAY    valsartan (DIOVAN) 80 MG tablet Take 1 tablet (80 mg total) by mouth once daily.

## 2018-04-25 NOTE — PROGRESS NOTES
Called patient to follow-up and reminded her to take a B reading since her last BP reading was 4/17. Patient states she will take a BP reading today.   Last 5 Patient Entered Readings                                      Current 30 Day Average: 126/74     Recent Readings 4/17/2018 4/14/2018 4/13/2018 4/13/2018 4/13/2018    SBP (mmHg) 121 125 135 138 124    DBP (mmHg) 75 74 86 80 69    Pulse 90 77 75 76 76          Hypertension Digital Medicine Program (HDMP): Health  Follow Up    Lifestyle Modifications:    1.Low sodium diet: yes. Patient reports she has been watching her sodium intake.     2.Physical activity: yes. Patient reports she has been walking to work by parking her car in parking lot and waling from there to her job.     3.Hypotension/Hypertension symptoms: no. Patient reports she has no s/s of hypotension ( dizziness, LH , nausea, or fatigue ) with low readings. Patient reports she has no s/s of hypertension ( CP , SOB, severe headaches, or change in vision ) with high readings.   Frequency/Alleviating factors/Precipitating factors, etc.     4.Patient has been compliant with the medication regimen.     Follow up with MsAbram Aaliyah Julio César completed. No further questions or concerns.    Patient 's current 30 day average BP is at goal.     Plan:  I will follow up in a few weeks to assess progress.

## 2018-04-26 ENCOUNTER — OFFICE VISIT (OUTPATIENT)
Dept: OBSTETRICS AND GYNECOLOGY | Facility: CLINIC | Age: 63
End: 2018-04-26
Payer: COMMERCIAL

## 2018-04-26 ENCOUNTER — TELEPHONE (OUTPATIENT)
Dept: OBSTETRICS AND GYNECOLOGY | Facility: CLINIC | Age: 63
End: 2018-04-26

## 2018-04-26 VITALS
HEIGHT: 66 IN | SYSTOLIC BLOOD PRESSURE: 134 MMHG | WEIGHT: 243.81 LBS | BODY MASS INDEX: 39.18 KG/M2 | DIASTOLIC BLOOD PRESSURE: 76 MMHG

## 2018-04-26 DIAGNOSIS — Z01.419 WELL WOMAN EXAM WITH ROUTINE GYNECOLOGICAL EXAM: Primary | ICD-10-CM

## 2018-04-26 DIAGNOSIS — Z78.0 POSTMENOPAUSE: ICD-10-CM

## 2018-04-26 DIAGNOSIS — Z12.39 BREAST CANCER SCREENING: ICD-10-CM

## 2018-04-26 PROCEDURE — 3078F DIAST BP <80 MM HG: CPT | Mod: CPTII,S$GLB,, | Performed by: NURSE PRACTITIONER

## 2018-04-26 PROCEDURE — 3075F SYST BP GE 130 - 139MM HG: CPT | Mod: CPTII,S$GLB,, | Performed by: NURSE PRACTITIONER

## 2018-04-26 PROCEDURE — 99396 PREV VISIT EST AGE 40-64: CPT | Mod: S$GLB,,, | Performed by: NURSE PRACTITIONER

## 2018-04-26 PROCEDURE — 99999 PR PBB SHADOW E&M-EST. PATIENT-LVL III: CPT | Mod: PBBFAC,,, | Performed by: NURSE PRACTITIONER

## 2018-05-05 ENCOUNTER — HOSPITAL ENCOUNTER (OUTPATIENT)
Dept: RADIOLOGY | Facility: HOSPITAL | Age: 63
Discharge: HOME | End: 2018-05-05
Attending: NURSE PRACTITIONER
Payer: COMMERCIAL

## 2018-05-05 DIAGNOSIS — Z12.39 BREAST CANCER SCREENING: ICD-10-CM

## 2018-05-05 PROCEDURE — 77063 BREAST TOMOSYNTHESIS BI: CPT | Mod: 26,,, | Performed by: RADIOLOGY

## 2018-05-05 PROCEDURE — 77067 SCR MAMMO BI INCL CAD: CPT | Mod: 26,,, | Performed by: RADIOLOGY

## 2018-05-05 PROCEDURE — 77067 SCR MAMMO BI INCL CAD: CPT | Mod: TC

## 2018-05-07 RX ORDER — AMLODIPINE BESYLATE 10 MG/1
TABLET ORAL
Qty: 90 TABLET | Refills: 0 | Status: SHIPPED | OUTPATIENT
Start: 2018-05-07 | End: 2018-08-12 | Stop reason: SDUPTHER

## 2018-05-09 ENCOUNTER — OFFICE VISIT (OUTPATIENT)
Dept: OPHTHALMOLOGY | Facility: CLINIC | Age: 63
End: 2018-05-09
Payer: COMMERCIAL

## 2018-05-09 VITALS — HEART RATE: 83 BPM | SYSTOLIC BLOOD PRESSURE: 161 MMHG | DIASTOLIC BLOOD PRESSURE: 81 MMHG

## 2018-05-09 DIAGNOSIS — H40.033 ANATOMICAL NARROW ANGLE BORDERLINE GLAUCOMA OF BOTH EYES: ICD-10-CM

## 2018-05-09 DIAGNOSIS — E11.3593 TYPE 2 DIABETES MELLITUS WITH BOTH EYES AFFECTED BY PROLIFERATIVE RETINOPATHY WITHOUT MACULAR EDEMA, WITH LONG-TERM CURRENT USE OF INSULIN: Primary | ICD-10-CM

## 2018-05-09 DIAGNOSIS — Z79.4 TYPE 2 DIABETES MELLITUS WITH BOTH EYES AFFECTED BY PROLIFERATIVE RETINOPATHY WITHOUT MACULAR EDEMA, WITH LONG-TERM CURRENT USE OF INSULIN: Primary | ICD-10-CM

## 2018-05-09 DIAGNOSIS — H25.13 NUCLEAR SCLEROSIS OF BOTH EYES: ICD-10-CM

## 2018-05-09 PROCEDURE — 92014 COMPRE OPH EXAM EST PT 1/>: CPT | Mod: S$GLB,,, | Performed by: OPHTHALMOLOGY

## 2018-05-09 PROCEDURE — 92226 PR SPECIAL EYE EXAM, SUBSEQUENT: CPT | Mod: RT,S$GLB,, | Performed by: OPHTHALMOLOGY

## 2018-05-09 PROCEDURE — 92134 CPTRZ OPH DX IMG PST SGM RTA: CPT | Mod: S$GLB,,, | Performed by: OPHTHALMOLOGY

## 2018-05-09 PROCEDURE — 99999 PR PBB SHADOW E&M-EST. PATIENT-LVL III: CPT | Mod: PBBFAC,,, | Performed by: OPHTHALMOLOGY

## 2018-05-09 NOTE — PROGRESS NOTES
HPI     DLS: 4/10/2017  Dr. Mckeon    Pt states that she is here for annual  Retina eye exam, no change in   vision since last visit. VA stable. Rare floaters. Formications right   cheek 3 days at night. No pain/burning.     (-) Pain  (-)Flashe , (+)Floaters (-)Double Vision    Meds: No GTTS    LBS - 115 x 2 day ago a.m. Hour    Hemoglobin A1C       Date                     Value               Ref Range             Status                02/10/2018               7.5 (H)             4.0 - 5.6 %           Final                    11/04/2017               7.0 (H)             4.0 - 5.6 %           Final                     08/03/2017               7.1 (H)             4.0 - 5.6 %           Final                  1. Anatomical narrow angle borderline glaucoma, bilateral  2. Ocular hypertension, bilateral  3. Proliferative diabetic retinopathy of both eyes without macular edema   associated with type 2 diabetes mellitus  4. Hypertensive retinopathy, bilateral  5. Epiretinal membrane, bilateral  6. Vitreous hemorrhage, right  7. Cortical cataract     Last edited by Bhaskar Damon MD on 5/9/2018  3:12 PM. (History)        OCT   OD: Good quality. Normal foveal contour without IRF, SRF, or exudates.  stalbe since 4/20/17 scan  OS: Good quality. Normal foveal contour without IRF, SRF, or exudates. Inf thinning. OU stable from 4/20/17      Assessment /Plan     For exam results, see Encounter Report.    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 by proliferative retinopathy without macular edema, with long-term current use of insulin    Anatomical narrow angle borderline glaucoma of both eyes    Nuclear sclerosis of both eyes      T2DM with Stable PDR   - full PRP OU with hx of avastin   - Discussed blood sugar, pressure, and cholesterol control. Diet/weight loss   - Next PCP appt 6/2018    NAG OU s/p PI   - appear patent   - [facility] IOP check May 2018   - IOP WNL today without gtts.     Nuclear sclerosis   - Pt  satisfied with VA. Observe    Formications   - Feeling of bugs crawling on cheek. No evidence of dermatitis. Worse at night. Seems to be improving. Admits inc caffeine consumption. Advised to consult PCP regarding this issue.     RTC 1 year/OCT

## 2018-05-10 ENCOUNTER — PATIENT OUTREACH (OUTPATIENT)
Dept: OTHER | Facility: OTHER | Age: 63
End: 2018-05-10

## 2018-05-10 NOTE — PROGRESS NOTES
Last 5 Patient Entered Readings                                      Current 30 Day Average: 127/75     Recent Readings 5/8/2018 5/8/2018 5/7/2018 5/2/2018 4/26/2018    SBP (mmHg) 146 153 102 114 137    DBP (mmHg) 82 93 69 64 78    Pulse 76 81 79 78 75        Patient's BP average is controlled based on 2017 ACC/AHA HTN guidelines of goal BP <130/80.      Ms. Angela had a spike in BP this week. She is unsure if it was due to food or not resting prior to checking her BP. Encouraged her to follow low salt diet and rest before taking her BP. She is feeling well and has no questions or concerns at this time.     Patient's health , Sherley Guzman, will be following up every 3-4 weeks. I will continue to monitor regularly and will follow up in 2-3 months, sooner if BP begins to trend upward or downward.    Patient has my contact information and knows to call with any concerns or clinical changes.     Current HTN regimen:  Hypertension Medications             amlodipine (NORVASC) 10 MG tablet TAKE 1 TABLET BY MOUTH EVERY DAY    amLODIPine (NORVASC) 10 MG tablet TAKE 1 TABLET BY MOUTH EVERY DAY    metoprolol succinate (TOPROL-XL) 50 MG 24 hr tablet TAKE 1 TABLET BY MOUTH EVERY DAY

## 2018-05-13 RX ORDER — AMLODIPINE BESYLATE 10 MG/1
TABLET ORAL
Qty: 90 TABLET | Refills: 0 | Status: SHIPPED | OUTPATIENT
Start: 2018-05-13 | End: 2018-08-24 | Stop reason: SDUPTHER

## 2018-05-17 ENCOUNTER — PATIENT OUTREACH (OUTPATIENT)
Dept: OTHER | Facility: OTHER | Age: 63
End: 2018-05-17

## 2018-05-17 NOTE — PROGRESS NOTES
Called patient to follow-up and remind her to take a BP reading. Asked patient caused her BP to spike up on 5/8. Patient states she was snacking on some chips that day. Encouraged the patient to choose a low sodium snack .   Last 5 Patient Entered Readings                                      Current 30 Day Average: 124/74     Recent Readings 5/8/2018 5/8/2018 5/7/2018 5/2/2018 4/26/2018    SBP (mmHg) 146 153 102 114 137    DBP (mmHg) 82 93 69 64 78    Pulse 76 81 79 78 75          Digital Medicine: Health  Follow Up    Lifestyle Modifications:    1.Dietary Modifications (Sodium intake <2,000mg/day, food labels, dining out): Patient reports she still watching her sodium intake.     2.Physical Activity: Patient reports she still walking to walk from the parking lot.     3.Medication Therapy: Patient has been compliant with the medication regimen.    4.Patient has the following medication side effects/concerns: Patient reports she has no s/s of hypotension ( dizziness, LH, nausea, or fatigue) with low readings. Patient reports she has no s/s of hypertension ( CP, SOB, sever headaches, or change in vision  ) with high readings.   (Frequency/Alleviating factors/Precipitating factors, etc.)     Follow up with Ms. Aaliyah Angela completed. No further questions or concerns.     Patient's current 30 day average  BP is at goal.     Plan: Will continue follow up to achieve health goals.

## 2018-05-18 ENCOUNTER — TELEPHONE (OUTPATIENT)
Dept: INTERNAL MEDICINE | Facility: CLINIC | Age: 63
End: 2018-05-18

## 2018-05-18 DIAGNOSIS — E11.3593 TYPE 2 DIABETES MELLITUS WITH BOTH EYES AFFECTED BY PROLIFERATIVE RETINOPATHY WITHOUT MACULAR EDEMA, WITH LONG-TERM CURRENT USE OF INSULIN: Primary | ICD-10-CM

## 2018-05-18 DIAGNOSIS — Z79.4 TYPE 2 DIABETES MELLITUS WITH BOTH EYES AFFECTED BY PROLIFERATIVE RETINOPATHY WITHOUT MACULAR EDEMA, WITH LONG-TERM CURRENT USE OF INSULIN: Primary | ICD-10-CM

## 2018-05-18 NOTE — TELEPHONE ENCOUNTER
Received: Today   Message Contents   Tahmina JORDAN Staff   Caller: Pt [phone] (Today, 11:44 AM)             PT would like a script for FreeStyle Life meter and strips sent to [facility] [id number] [place], [place] [address]GET RODRIGUEZ AT [facility] [phone] (Phone)   [phone] (Fax)        Please advise.  Thank you  KAREN Contreras

## 2018-05-18 NOTE — TELEPHONE ENCOUNTER
----- Message from Tahmina Nance sent at 5/18/2018 11:44 AM CDT -----  Contact: Pt [phone]  PT would like a script for FreeStyle Life meter and strips [facility] to [facility] [address] [place] [address] - [address] [place] [address] MUBIGET RODRIGUEZ AT [facility] [phone] (Phone)  [phone] (Fax)

## 2018-05-31 ENCOUNTER — CLINICAL SUPPORT (OUTPATIENT)
Dept: OPHTHALMOLOGY | Facility: CLINIC | Age: 63
End: 2018-05-31
Payer: COMMERCIAL

## 2018-05-31 ENCOUNTER — OFFICE VISIT (OUTPATIENT)
Dept: OPHTHALMOLOGY | Facility: CLINIC | Age: 63
End: 2018-05-31
Payer: COMMERCIAL

## 2018-05-31 DIAGNOSIS — H35.033 HYPERTENSIVE RETINOPATHY, BILATERAL: ICD-10-CM

## 2018-05-31 DIAGNOSIS — Z79.4 TYPE 2 DIABETES MELLITUS WITH BOTH EYES AFFECTED BY PROLIFERATIVE RETINOPATHY WITHOUT MACULAR EDEMA, WITH LONG-TERM CURRENT USE OF INSULIN: ICD-10-CM

## 2018-05-31 DIAGNOSIS — H40.053 OCULAR HYPERTENSION, BILATERAL: ICD-10-CM

## 2018-05-31 DIAGNOSIS — E11.3593 TYPE 2 DIABETES MELLITUS WITH BOTH EYES AFFECTED BY PROLIFERATIVE RETINOPATHY WITHOUT MACULAR EDEMA, WITH LONG-TERM CURRENT USE OF INSULIN: ICD-10-CM

## 2018-05-31 DIAGNOSIS — H40.033 ANATOMICAL NARROW ANGLE BORDERLINE GLAUCOMA OF BOTH EYES: Primary | ICD-10-CM

## 2018-05-31 DIAGNOSIS — E11.3593 PROLIFERATIVE DIABETIC RETINOPATHY OF BOTH EYES WITHOUT MACULAR EDEMA ASSOCIATED WITH TYPE 2 DIABETES MELLITUS: ICD-10-CM

## 2018-05-31 DIAGNOSIS — H25.13 NUCLEAR SCLEROSIS OF BOTH EYES: ICD-10-CM

## 2018-05-31 DIAGNOSIS — H35.373 EPIRETINAL MEMBRANE, BILATERAL: ICD-10-CM

## 2018-05-31 PROCEDURE — 92012 INTRM OPH EXAM EST PATIENT: CPT | Mod: S$GLB,,, | Performed by: OPHTHALMOLOGY

## 2018-05-31 PROCEDURE — 92133 CPTRZD OPH DX IMG PST SGM ON: CPT | Mod: S$GLB,,, | Performed by: OPHTHALMOLOGY

## 2018-05-31 PROCEDURE — 92020 GONIOSCOPY: CPT | Mod: S$GLB,,, | Performed by: OPHTHALMOLOGY

## 2018-05-31 PROCEDURE — 99999 PR PBB SHADOW E&M-EST. PATIENT-LVL II: CPT | Mod: PBBFAC,,, | Performed by: OPHTHALMOLOGY

## 2018-05-31 NOTE — PROGRESS NOTES
"        Assessment /Plan     For exam results, see Encounter Report.    Anatomical narrow angle borderline glaucoma of both eyes    Nuclear sclerosis of both eyes    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 by proliferative retinopathy without macular edema, with long-term current use of insulin    Ocular hypertension, bilateral    Proliferative diabetic retinopathy of both eyes without macular edema associated with type 2 diabetes mellitus    Hypertensive retinopathy, bilateral    Epiretinal membrane, bilateral        1. OHT vs Pre-perimetric POAG OU (angles  Narrowing over time)  Followed at [facility] since '04   First seen by Scarlet '08   Never on gtts   VF defects 2/2 PRP     Family history none   Glaucoma meds None   H/O adverse rxn to glaucoma drops none   LASERS Mulitple PRP OU // PI OD 2/9/2017 /// PI os 1/26/2017   GLAUCOMA SURGERIES None   OTHER EYE SURGERIES none   CDR 0.3/0.35   Tbase 15-23/15-25   Tmax 25 OU   Ttarget ??   HVF 7 tests: 2008 to 2017 SAD/IAD OU 2/2 PRP OU   Gonio +1-3  with steep approach OU 2018  (doubt occludable)   /575   OCT 4 tests: 2008 to 2017 -  RNFL nl od // nl os    HRT 6 tests: 2009 to 2018 - MR -  nl od // nl os /// CDR 0.33 od // 0.26 os  Disc photos 2002, 2003, 2005 (slides) // 2008, 2013  (OIS)     - Ttoday  15/16  - Test done today HRT , gonio     2. Narrow Angles:  +1-3 w/ bowing  s/p PI. Likely will resolve in future once pseudophakic.  Anterior Segment OCT today w/ Open Angles w/ narrow approach OU  Doubt occludable - monitor gonio     3. PDR OU s/p PDR OU -stable exam on last eval w/ Retina on 7/25/13.   S/p avastin x 4 od - last 1/4/2016     4. Hx of VH OD -stable. Last seen by retina - arend 1/4/2016    5. Cataract OU -not visually significant. observe    6. Hx of Transient Visual Obscurations OS - ? BP elevation. Seen w/ stable Retinal exam on 9/13/12.     7. Had a gastric "sleve" done 12/18/2015 - for weight loss    So far doing well    On less insulin " now     Plan:  Stable IOP (thick k's), and HRT wnl today   Cont no gtts  Continue to work on blood sugars  oht vs glaucoma    RTC:  Angles continue narrow but open     S/P PI  os 1/26/2017 -   S/P PI od - 2/9/2017 -     F/U 9 months with IOP check- check gonio ou   HVF / DFE / OCT     Remove cataract  Prn - BATh 20/100 - pt is content with vision for now and IOP is still ok     Cont with regular F/U's with Dr. Mckeon     I have seen and personally examined the patient.  I agree with the findings, assessment and plan of the resident and/or fellow.     Alma Goodrich MD

## 2018-06-11 ENCOUNTER — PATIENT OUTREACH (OUTPATIENT)
Dept: OTHER | Facility: OTHER | Age: 63
End: 2018-06-11

## 2018-06-11 NOTE — PROGRESS NOTES
Last 5 Patient Entered Readings                                      Current 30 Day Average: 129/74     Recent Readings 6/7/2018 6/6/2018 6/6/2018 6/5/2018 6/5/2018    SBP (mmHg) 142 125 120 120 120    DBP (mmHg) 78 74 80 80 80    Pulse 74 83 80 80 80        6/11-Was not bale to [facility] d/t a busy signal. Will call back in one week.

## 2018-07-17 NOTE — PROGRESS NOTES
Last 5 Patient Entered Readings                                      Current 30 Day Average: 126/74     Recent Readings 7/5/2018 6/23/2018 6/22/2018 6/22/2018 6/22/2018    SBP (mmHg) 118 136 137 141 146    DBP (mmHg) 65 85 81 76 79    Pulse 81 72 73 73 76        7/17-Mail box was full and could not leave message.

## 2018-07-26 DIAGNOSIS — E78.5 HYPERLIPIDEMIA, UNSPECIFIED HYPERLIPIDEMIA TYPE: ICD-10-CM

## 2018-07-27 RX ORDER — ATORVASTATIN CALCIUM 20 MG/1
TABLET, FILM COATED ORAL
Qty: 90 TABLET | Refills: 0 | Status: SHIPPED | OUTPATIENT
Start: 2018-07-27 | End: 2018-11-15 | Stop reason: SDUPTHER

## 2018-07-27 RX ORDER — IBUPROFEN 800 MG/1
TABLET ORAL
Qty: 90 TABLET | Refills: 0 | OUTPATIENT
Start: 2018-07-27

## 2018-08-02 NOTE — PROGRESS NOTES
Last 5 Patient Entered Readings                                      Current 30 Day Average: 125/69     Recent Readings 7/31/2018 7/30/2018 7/19/2018 7/17/2018 7/17/2018    SBP (mmHg) 125 136 126 118 143    DBP (mmHg) 67 76 75 64 84    Pulse 74 75 92 76 74        8/2- Was not able to leave VM d/t getting a busy signal.

## 2018-08-12 RX ORDER — AMLODIPINE BESYLATE 10 MG/1
TABLET ORAL
Qty: 90 TABLET | Refills: 0 | Status: SHIPPED | OUTPATIENT
Start: 2018-08-12 | End: 2018-11-05 | Stop reason: SDUPTHER

## 2018-08-22 NOTE — PROGRESS NOTES
Called patient for a follow-up. Patient reports she had concerns about the Valsartan medicine. Patient states she saw on tv the medicine can casued liver cancer and would like to be switched to a new medication. Patient states she only takes half of valsartan when she feels her BP is high. Will let her PharmD Librado know about patient concerns.  Last 5 Patient Entered Readings                                      Current 30 Day Average: 124/73     Recent Readings 8/16/2018 8/15/2018 8/13/2018 8/4/2018 7/31/2018    SBP (mmHg) 122 129 117 115 125    DBP (mmHg) 76 74 69 74 67    Pulse 80 78 81 101 74        Digital Medicine: Health  Follow Up    Lifestyle Modifications:    1.Dietary Modifications (Sodium intake <2,000mg/day, food labels, dining out): Patient reports she has been watching her sodium intake and has cut back on sweet to help with her BG.    2.Physical Activity: Patient reports she still walking from the parking alot where she wilde her car and walks to work.     3.Medication Therapy: Patient has been compliant with the medication regimen.    4.Patient has the following medication side effects/concerns: Patient denies any s/s of hypotension (dizziness, LH,nausea,or fatigue) with low readings. Patient denies any s/s of hypertension (CP,SOB,severe headaches,or change in vision) with high readings.   (Frequency/Alleviating factors/Precipitating factors, etc.)     Follow up with MsAbram Aaliyah Angela completed. No further questions or concerns.     Patient's current 30 day average BP is at goal.     Plan: Will continue follow up to achieve health goals.

## 2018-08-24 ENCOUNTER — OFFICE VISIT (OUTPATIENT)
Dept: ENDOCRINOLOGY | Facility: CLINIC | Age: 63
End: 2018-08-24
Payer: COMMERCIAL

## 2018-08-24 ENCOUNTER — LAB VISIT (OUTPATIENT)
Dept: LAB | Facility: HOSPITAL | Age: 63
End: 2018-08-24
Payer: COMMERCIAL

## 2018-08-24 VITALS
BODY MASS INDEX: 40.71 KG/M2 | HEIGHT: 66 IN | WEIGHT: 253.31 LBS | DIASTOLIC BLOOD PRESSURE: 86 MMHG | RESPIRATION RATE: 16 BRPM | SYSTOLIC BLOOD PRESSURE: 126 MMHG

## 2018-08-24 DIAGNOSIS — I73.9 PVD (PERIPHERAL VASCULAR DISEASE): ICD-10-CM

## 2018-08-24 DIAGNOSIS — H35.033 HYPERTENSIVE RETINOPATHY OF BOTH EYES: ICD-10-CM

## 2018-08-24 DIAGNOSIS — G44.221 CHRONIC TENSION-TYPE HEADACHE, INTRACTABLE: ICD-10-CM

## 2018-08-24 DIAGNOSIS — Z98.84 HISTORY OF BARIATRIC SURGERY: ICD-10-CM

## 2018-08-24 DIAGNOSIS — Z79.4 TYPE 2 DIABETES MELLITUS WITH BOTH EYES AFFECTED BY PROLIFERATIVE RETINOPATHY WITHOUT MACULAR EDEMA, WITH LONG-TERM CURRENT USE OF INSULIN: Primary | ICD-10-CM

## 2018-08-24 DIAGNOSIS — H25.13 NUCLEAR SCLEROSIS OF BOTH EYES: ICD-10-CM

## 2018-08-24 DIAGNOSIS — I10 HTN (HYPERTENSION), BENIGN: ICD-10-CM

## 2018-08-24 DIAGNOSIS — E11.3593 TYPE 2 DIABETES MELLITUS WITH BOTH EYES AFFECTED BY PROLIFERATIVE RETINOPATHY WITHOUT MACULAR EDEMA, WITH LONG-TERM CURRENT USE OF INSULIN: ICD-10-CM

## 2018-08-24 DIAGNOSIS — Z79.4 TYPE 2 DIABETES MELLITUS WITH BOTH EYES AFFECTED BY PROLIFERATIVE RETINOPATHY WITHOUT MACULAR EDEMA, WITH LONG-TERM CURRENT USE OF INSULIN: ICD-10-CM

## 2018-08-24 DIAGNOSIS — E78.5 HYPERLIPIDEMIA, UNSPECIFIED HYPERLIPIDEMIA TYPE: ICD-10-CM

## 2018-08-24 DIAGNOSIS — E11.3593 TYPE 2 DIABETES MELLITUS WITH BOTH EYES AFFECTED BY PROLIFERATIVE RETINOPATHY WITHOUT MACULAR EDEMA, WITH LONG-TERM CURRENT USE OF INSULIN: Primary | ICD-10-CM

## 2018-08-24 LAB
ALBUMIN/CREAT UR: 10.6 UG/MG
CREAT UR-MCNC: 141 MG/DL
MICROALBUMIN UR DL<=1MG/L-MCNC: 15 UG/ML

## 2018-08-24 PROCEDURE — 3079F DIAST BP 80-89 MM HG: CPT | Mod: CPTII,S$GLB,, | Performed by: NURSE PRACTITIONER

## 2018-08-24 PROCEDURE — 3045F PR MOST RECENT HEMOGLOBIN A1C LEVEL 7.0-9.0%: CPT | Mod: CPTII,S$GLB,, | Performed by: NURSE PRACTITIONER

## 2018-08-24 PROCEDURE — 3008F BODY MASS INDEX DOCD: CPT | Mod: CPTII,S$GLB,, | Performed by: NURSE PRACTITIONER

## 2018-08-24 PROCEDURE — 82043 UR ALBUMIN QUANTITATIVE: CPT

## 2018-08-24 PROCEDURE — 99214 OFFICE O/P EST MOD 30 MIN: CPT | Mod: S$GLB,,, | Performed by: NURSE PRACTITIONER

## 2018-08-24 PROCEDURE — 3074F SYST BP LT 130 MM HG: CPT | Mod: CPTII,S$GLB,, | Performed by: NURSE PRACTITIONER

## 2018-08-24 PROCEDURE — 99999 PR PBB SHADOW E&M-EST. PATIENT-LVL V: CPT | Mod: PBBFAC,,, | Performed by: NURSE PRACTITIONER

## 2018-08-24 RX ORDER — AMITRIPTYLINE HYDROCHLORIDE 10 MG/1
20 TABLET, FILM COATED ORAL NIGHTLY
Qty: 180 TABLET | Refills: 3 | Status: SHIPPED | OUTPATIENT
Start: 2018-08-24 | End: 2019-09-25 | Stop reason: SDUPTHER

## 2018-08-24 RX ORDER — METFORMIN HYDROCHLORIDE 500 MG/1
500 TABLET, EXTENDED RELEASE ORAL 2 TIMES DAILY WITH MEALS
Qty: 180 TABLET | Refills: 3 | Status: SHIPPED | OUTPATIENT
Start: 2018-08-24 | End: 2018-08-24

## 2018-08-24 RX ORDER — INSULIN ASPART 100 [IU]/ML
6 INJECTION, SOLUTION INTRAVENOUS; SUBCUTANEOUS
COMMUNITY
End: 2018-08-24

## 2018-08-24 RX ORDER — METFORMIN HYDROCHLORIDE 500 MG/1
1000 TABLET, EXTENDED RELEASE ORAL 2 TIMES DAILY WITH MEALS
Qty: 360 TABLET | Refills: 3 | Status: SHIPPED | OUTPATIENT
Start: 2018-08-24 | End: 2019-05-08 | Stop reason: SDUPTHER

## 2018-08-24 NOTE — PROGRESS NOTES
CHIEF COMPLAINT: Type 2 Diabetes     HPI: Mrs. Aaliyah Angela is a 62 y.o. female who was diagnosed with gestational diabetes age 20s, Type 2 DM age 30s.  Pt was last seen by me late 2017 and is now being seen by me again today.  Pt had a fall at work, hit head, still has tenderness, has h/o chronic HAs (tension).  She is interested in freestyle [email or web address].  A1c, other labs are due.  a1c had gone up since last visit.  Checking bg 180-250s at times.  Has h/o gastric sleeve 2015. At that time, certain meds were d/heri.    Current A1C -pending today  Lab Results   Component Value Date    HGBA1C 7.5 (H) 02/10/2018       Do you understand your A1C? yes      DM COMPLICATIONS:  Retinopathy  neuropathy      RECENT HOSPITALIZATIONS/PROCEDURES:  none      PREVIOUS DIABETES MEDICATIONS TRIED:  Metformin  trulicity  lantus   januvia  invokana-SEs  onglyza       CURRENT DIABETIC MEDS: trulicity 1.5 mg weekly, Metformin 500 mg bid, novolog correction scale     Any financial burdens/barriers?   No issues    Are you on a statin? yes    Any statin allergies/intolerance? No issues    Are you on a baby ASA? yes    Are you on ACEI/ARB? Any recent BP issues?arb, controlled    How is your current sleep pattern? How many hours of sleep per night?  7 hours nightly     Last Podiatry Exam: 2017, overdue    Any current issues w/ feet? No issues       REVIEW OF SYSTEMS  General: no weakness, fatigue, + weight changes #24  Wt gain over the past 8 mos.   Eyes: no double or blurred vision, eye pain, or redness; Last Eye Exam-appt 7/2017, overdue   Cardiovascular:  No chest pain, palpitations, edema, or murmurs.   Respiratory: no cough or dyspnea.   GI: no heartburn, nausea, or changes in bowel patterns; good appetite. +GERD  Skin: no rashes, dryness, itching, or reactions at insulin injection sites.  Neuro: + numbness, tingling, tremors, or vertigo. +HAs (chronic)  Endocrine: no polyuria, polydipsia, polyphagia, heat or cold intolerance.  "    Psych/Holistic:    What are your fears?  None    How do you cope?  Take a ride, occupy self by staying busy    Where does your strength come from?  family    Do you have support?  Yes     Do you feel hopelessness or despair?  No issues    How is your dietary habits?  Not always eating the right things    Breakfast- protein bar-not a big breakfast eater    Lunch - half turkey sandwich, fries    Dinner - meat, crackers, breads    What goal do you have for caloric intake per day?  Trying to start a goal    Are you comfortable w/ your body wt?  Not so much    Are you interested in Wt Loss Clinic or Bariatric Consultation?   Has f/u annually    How is your activity level?  Not as much-exercise lately    Do you need to see dietician/diabetes educator?  Not really    Recommendations: 150 minutes of moderate PA or 75 mins of vigorous PA a week  Moderate or high intensity, involve all major muscle groups on >2 days per week.    Vital Signs  /86 (BP Location: Left arm, Patient Position: Sitting, BP Method: Medium (Manual))   Resp 16   Ht 5' 6" (1.676 m)   Wt 114.9 kg (253 lb 4.9 oz)   BMI 40.89 kg/m²     Hemoglobin A1C   Date Value Ref Range Status   02/10/2018 7.5 (H) 4.0 - 5.6 % Final     Comment:     According to ADA guidelines, hemoglobin A1c <7.0% represents  optimal control in non-pregnant diabetic patients. Different  metrics may apply to specific patient populations.   Standards of Medical Care in Diabetes-2016.  For the purpose of screening for the presence of diabetes:  <5.7%     Consistent with the absence of diabetes  5.7-6.4%  Consistent with increasing risk for diabetes   (prediabetes)  >or=6.5%  Consistent with diabetes  Currently, no consensus exists for use of hemoglobin A1c  for diagnosis of diabetes for children.  This Hemoglobin A1c assay has significant interference with fetal   hemoglobin   (HbF). The results are invalid for patients with abnormal amounts of   HbF,   including those with " known Hereditary Persistence   of Fetal Hemoglobin. Heterozygous hemoglobin variants (HbAS, HbAC,   HbAD, HbAE, HbA2) do not significantly interfere with this assay;   however, presence of multiple variants in a sample may impact the %   interference.     11/04/2017 7.0 (H) 4.0 - 5.6 % Final     Comment:     According to ADA guidelines, hemoglobin A1c <7.0% represents  optimal control in non-pregnant diabetic patients. Different  metrics may apply to specific patient populations.   Standards of Medical Care in Diabetes-2016.  For the purpose of screening for the presence of diabetes:  <5.7%     Consistent with the absence of diabetes  5.7-6.4%  Consistent with increasing risk for diabetes   (prediabetes)  >or=6.5%  Consistent with diabetes  Currently, no consensus exists for use of hemoglobin A1c  for diagnosis of diabetes for children.  This Hemoglobin A1c assay has significant interference with fetal   hemoglobin   (HbF). The results are invalid for patients with abnormal amounts of   HbF,   including those with known Hereditary Persistence   of Fetal Hemoglobin. Heterozygous hemoglobin variants (HbAS, HbAC,   HbAD, HbAE, HbA2) do not significantly interfere with this assay;   however, presence of multiple variants in a sample may impact the %   interference.     08/03/2017 7.1 (H) 4.0 - 5.6 % Final     Comment:     According to ADA guidelines, hemoglobin A1c <7.0% represents  optimal control in non-pregnant diabetic patients. Different  metrics may apply to specific patient populations.   Standards of Medical Care in Diabetes-2016.  For the purpose of screening for the presence of diabetes:  <5.7%     Consistent with the absence of diabetes  5.7-6.4%  Consistent with increasing risk for diabetes   (prediabetes)  >or=6.5%  Consistent with diabetes  Currently, no consensus exists for use of hemoglobin A1c  for diagnosis of diabetes for children.  This Hemoglobin A1c assay has significant interference with fetal    hemoglobin   (HbF). The results are invalid for patients with abnormal amounts of   HbF,   including those with known Hereditary Persistence   of Fetal Hemoglobin. Heterozygous hemoglobin variants (HbAS, HbAC,   HbAD, HbAE, HbA2) do not significantly interfere with this assay;   however, presence of multiple variants in a sample may impact the %   interference.          Chemistry        Component Value Date/Time     02/10/2018 0854    K 4.8 02/10/2018 0854     02/10/2018 0854    CO2 28 02/10/2018 0854    BUN 19 02/10/2018 0854    CREATININE 0.8 02/10/2018 0854     (H) 02/10/2018 0854        Component Value Date/Time    CALCIUM 9.1 02/10/2018 0854    ALKPHOS 71 02/10/2018 0854    AST 24 02/10/2018 0854    ALT 29 02/10/2018 0854    BILITOT 0.4 02/10/2018 0854    ESTGFRAFRICA >60 02/10/2018 0854    EGFRNONAA >60 02/10/2018 0854           Lab Results   Component Value Date    TSH 1.544 02/10/2018      Lab Results   Component Value Date    CHOL 136 02/10/2018    CHOL 126 12/17/2016    CHOL 126 08/20/2016     Lab Results   Component Value Date    HDL 53 02/10/2018    HDL 45 12/17/2016    HDL 43 08/20/2016     Lab Results   Component Value Date    LDLCALC 73.6 02/10/2018    LDLCALC 67.8 12/17/2016    LDLCALC 71.8 08/20/2016     Lab Results   Component Value Date    TRIG 47 02/10/2018    TRIG 66 12/17/2016    TRIG 56 08/20/2016     Lab Results   Component Value Date    CHOLHDL 39.0 02/10/2018    CHOLHDL 35.7 12/17/2016    CHOLHDL 34.1 08/20/2016         PHYSICAL EXAMINATION  Constitutional: Appears well, no distress  Neck: Supple, trachea midline.   Respiratory: Np wheezes, even and unlabored.  Cardiovascular: RRR; no murmurs; (+) radial pulses  Lymph: no lymphadenopathy palpated,  Trace  BLE edema  Skin: warm and dry; no injection site reactions, no acanthosis nigracans observed.  Neuro:patient alert and cooperative, normal affect; steady gait.    Diabetes Foot Exam:   Protective Sensation (w/ 10 gram  monofilament):  Right: Intact  Left: Intact    Visual Inspection:  Normal -  Bilateral, Callus -  Bilateral and Dry Skin -  Bilateral  Calluses to great toes, mid-heel bilaterally    Pedal Pulses:   Right: Present  Left: Present    Posterior tibialis:   Right:Present  Left: Present        Assessment/Plan  1. 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 by proliferative retinopathy without macular edema, with long-term current use of insulin  Hemoglobin A1c next time  a1c today  Urine mac next time  Bmp today  DE for freestyle kimo start  Increase metformin xr to 1000 mg bid  Continue trulicity 1.5 mg weekly  Add jardiance 10 mg daily  Info placed on avs, discussed MOA, SEs  a1c goal less than 7%  Above goal     2. Uncontrolled type 2 diabetes mellitus with diabetic neuropathy, with long-term current use of insulin  See above   3. History of bariatric surgery, 12-  Ambulatory Referral to Bariatric Medicine  Body mass index is 40.89 kg/m². has gained wt over the past 9 mos  Needs reinforcement, diet plan, etc.   4. HTN (hypertension), benign  Basic metabolic panel  Controlled, continue med(s)   5. Chronic tension-type headache, intractable  Send rx for elavil-f/u with neurology prn   6. Nuclear sclerosis of both eyes  F/u with ophthalmology    7. Hypertensive retinopathy of both eyes  F/u with retinal specialist   8. Hyperlipidemia, unspecified hyperlipidemia type  Lab Results   Component Value Date    LDLCALC 73.6 02/10/2018     Continue lipitor-not at mod/high dose   9. PVD (peripheral vascular disease)  Avoid hypoglycemia  F/u with specialist   10. Class 3 obesity with body mass index (BMI) of 40.0 to 44.9 in adult, unspecified obesity type, unspecified whether serious comorbidity present  Ambulatory Referral to Bariatric Medicine  See above  Exercise 3 times a week for at least 30 mins  Walking, cycling, aerobic         FOLLOW UP  Follow-up in about 3 months (around 11/24/2018).

## 2018-08-24 NOTE — PATIENT INSTRUCTIONS
Snacks can be an important part of a balanced, healthy meal plan. They allow you to eat more frequently, feeling full and satisfied throughout the day. Also, they allow you to spread carbohydrates evenly, which may stabilize blood sugars.  Plus, snacks are enjoyable!     The amount of carbohydrate needed at snacks varies. Generally, about 15-30 grams of carbohydrate per snack is recommended.  Below you will find some tasty treats.       0-5 gm carb   Crystal Light   Vitamin Water Zero   Herbal tea, unsweetened   2 tsp peanut butter on celery   1./2 cup sugar-free jell-o   1 sugar-free popsicle   ¼ cup blueberries   8oz Blue Brenda unsweetened almond milk   5 baby carrots & celery sticks, cucumbers, bell peppers dipped in ¼ cup salsa, 2Tbsp light ranch dressing or 2Tbsp plain Greek yogurt   10 Goldfish crackers   ½ oz low-fat cheese or string cheese   1 closed handful of nuts, unsalted   1 Tbsp of sunflower seeds, unsalted   1 cup Smart Pop popcorn   1 whole grain brown rice cake        15 gm carb   1 small piece of fruit or ½ banana or 1/2 cup lite canned fruit   3 concepcion cracker squares   3 cups Smart Pop popcorn, top spray butter, Jaramillo lite salt or cinnamon and Truvia   5 Vanilla Wafers   ½ cup low fat, no added sugar ice cream or frozen yogurt (Blue bell, Blue Bunny, Weight Watchers, Skinny Cow)   ½ turkey, ham, or chicken sandwich   ½ c fruit with ½ c Cottage cheese   4-6 unsalted wheat crackers with 1 oz low fat cheese or 1 tbsp peanut butter    30-45 goldfish crackers (depending on flavor)    7-8 Faith mini brown rice cakes (caramel, apple cinnamon, chocolate)    12 Faith mini brown rice cakes (cheddar, bbq, ranch)    1/3 cup hummus dip with raw veg   1/2 whole wheat love, 1Tbsp hummus   Mini Pizza (1/2 whole wheat English muffin, low-fat  cheese, tomato sauce)   100 calorie snack pack (Oreo, Chips Ahoy, Ritz Mix, Baked Cheetos)   4-6 oz. light or Greek Style yogurt  (Riki, Andre, Barb, [facility])   ½ cup sugar-free pudding     6 in. wheat tortilla or love oven toasted chips (topped with spray butter flavoring, cinnamon, Truvia OR spray butter, garlic powder, chili powder)    18 BBQ Popchips (available at [employer], [employer], Fresh Market)                 Diabetes Support Group Meetings         Date: Topic:   February 8 Health Promotion/Cooking Demo   March 8 Taking Care of Your Kidneys   April 12 Taking Care of Your Feet   May 10 Ease Your Mind with Diabetes   Roxie 14 Summer Treats/Cooking Demo   July 12 Super Market Sweep   August 9 Taking Care of Your Eyes   Sept 13 Technology/ADA updates   October 11 Recipes & Treats/Cooking Demo   November 8 Heart Health/Pump it up!   December 13 Year-End Close Out        Meetings are held in the [facility] (A) of the [facility] located at [address] [place] [address]. Please call [phone] for additional information.    Free service, offered every 2nd Thursday of every month! Family members and/or friends are welcome as well!  Support group is for patients with type 1 or type 2 diabetes.    From 3:30p to 4:30p        Empagliflozin oral tablets  What is this medicine?  EMPAGLIGLOZIN (EM pa gli FLOE zin) helps to treat type 2 diabetes. It helps to control blood sugar. Treatment is combined with diet and exercise.  How should I use this medicine?  Take this medicine by mouth with a glass of water. Follow the directions on the prescription label. Take it in the morning, with or without food. Take your dose at the same time each day. Do not take more often than directed. Do not stop taking except on your doctor's advice.  Talk to your pediatrician regarding the use of this medicine in children. Special care may be needed.  What side effects may I notice from receiving this medicine?  Side effects that you should report to your doctor or health care professional as soon as  possible:  · allergic reactions like skin rash, itching or hives, swelling of the face, lips, or tongue  · breathing problems  · dizziness  · fast or irregular heartbeat  · feeling faint or lightheaded, falls  · muscle weakness  · nausea, vomiting, unusual stomach upset or pain  · signs and symptoms of low blood sugar such as feeling anxious, confusion, dizziness, increased hunger, unusually weak or tired, sweating, shakiness, cold, irritable, headache, blurred vision, fast heartbeat, loss of consciousness  · signs and symptoms of a urinary tract infection, such as fever, chills, a burning feeling when urinating, blood in the urine, back pain  · trouble passing urine or change in the amount of urine, including an urgent need to urinate more often, in larger amounts, or at night  · penile discharge, itching, or pain in men  · unusual tiredness  · vaginal discharge, itching, or odor in women  Side effects that usually do not require medical attention (Report these to your doctor or health care professional if they continue or are bothersome.):  · joint pain  · mild increase in urination  · thirsty  What may interact with this medicine?  Do not take this medicine with any of the following medications:  · gatifloxacin  This medicine may also interact with the following medications:  · alcohol  · certain medicines for blood pressure, heart disease  · diuretics  What if I miss a dose?  If you miss a dose, take it as soon as you can. If it is almost time for your next dose, take only that dose. Do not take double or extra doses.  Where should I keep my medicine?  Keep out of the reach of children.  Store at room temperature between 20 and 25 degrees C (68 and 77 degrees F). Throw away any unused medicine after the expiration date.  What should I tell my health care provider before I take this medicine?  They need to know if you have any of these conditions:  · dehydration  · diabetic ketoacidosis  · diet low in  salt  · eating less due to illness, surgery, dieting, or any other reason  · having surgery  · high cholesterol  · high levels of potassium in the blood  · history of pancreatitis or pancreas problems  · history of yeast infection of the penis or vagina  · if you often drink alcohol  · infections in the bladder, kidneys, or urinary tract  · kidney disease  · liver disease  · low blood pressure  · on hemodialysis  · problems urinating  · type 1 diabetes  · uncircumcised male  · an unusual or allergic reaction to empagliflozin, other medicines, foods, dyes, or preservatives  · pregnant or trying to get pregnant  · breast-feeding  What should I watch for while using this medicine?  Visit your doctor or health care professional for regular checks on your progress.  This medicine can cause a serious condition in which there is too much acid in the blood. If you develop nausea, vomiting, stomach pain, unusual tiredness, or breathing problems, stop taking this medicine and call your doctor right away. If possible, use a ketone dipstick to check for ketones in your urine.  A test called the HbA1C (A1C) will be monitored. This is a simple blood test. It measures your blood sugar control over the last 2 to 3 months. You will receive this test every 3 to 6 months.  Learn how to check your blood sugar. Learn the symptoms of low and high blood sugar and how to manage them.  Always carry a quick-source of sugar with you in case you have symptoms of low blood sugar. Examples include hard sugar candy or glucose tablets. Make sure others know that you can choke if you eat or drink when you develop serious symptoms of low blood sugar, such as seizures or unconsciousness. They must get medical help at once.  Tell your doctor or health care professional if you have high blood sugar. You might need to change the dose of your medicine. If you are sick or exercising more than usual, you might need to change the dose of your medicine.  Do  not skip meals. Ask your doctor or health care professional if you should avoid alcohol. Many nonprescription cough and cold products contain sugar or alcohol. These can affect blood sugar.  Wear a medical ID bracelet or chain, and carry a card that describes your disease and details of your medicine and dosage times.  NOTE:This sheet is a summary. It may not cover all possible information. If you have questions about this medicine, talk to your doctor, pharmacist, or health care provider. Copyright© 2017 [employer]

## 2018-08-29 ENCOUNTER — PATIENT OUTREACH (OUTPATIENT)
Dept: OTHER | Facility: OTHER | Age: 63
End: 2018-08-29

## 2018-08-29 NOTE — PROGRESS NOTES
Last 5 Patient Entered Readings                                      Current 30 Day Average: 127/74     Recent Readings 8/30/2018 8/16/2018 8/15/2018 8/13/2018 8/4/2018    SBP (mmHg) 138 122 129 117 115    DBP (mmHg) 78 76 74 69 74    Pulse 77 80 78 81 101        Ms. Angela was taking valsartan 80 mg QD, but it was not on her active medication list. Once she heard about the recall, she stopped taking it. Her BP has been stable and at goal without the valsartan. She confirms she is taking amlodipine and metoprolol as prescribed. Will monitor BP on current regimen. If BP starts to trend up, will start irbesartan 75 mg QD.     Will continue to monitor regularly. Will follow up in 3-4 weeks, sooner if BP begins to trend upward or downward.    Patient has my contact information and knows to call with any concerns or clinical changes.     Current HTN regimen:  Hypertension Medications             amLODIPine (NORVASC) 10 MG tablet TAKE 1 TABLET BY MOUTH EVERY DAY    metoprolol succinate (TOPROL-XL) 50 MG 24 hr tablet TAKE 1 TABLET BY MOUTH EVERY DAY

## 2018-09-12 ENCOUNTER — PATIENT OUTREACH (OUTPATIENT)
Dept: OTHER | Facility: OTHER | Age: 63
End: 2018-09-12

## 2018-09-17 NOTE — PROGRESS NOTES
Called patient for a follow-up. Asked patient what she think caused her BP to fluctuate lately. Patient states she has not been relaxing at all when taking her BP reading. Advised patient to relax at least 5-10 minutes or longer then take BP reading.   Last 5 Patient Entered Readings                                      Current 30 Day Average: 137/77     Recent Readings 9/14/2018 9/13/2018 9/6/2018 8/30/2018 8/16/2018    SBP (mmHg) 142 138 129 138 122    DBP (mmHg) 79 77 73 78 76    Pulse 72 77 78 77 80        Digital Medicine: Health  Follow Up    Lifestyle Modifications:    1.Dietary Modifications (Sodium intake <2,000mg/day, food labels, dining out): Patient reports she still watching her sodium intake.     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     3.Medication Therapy: Patient has been compliant with the medication regimen.    4.Patient has the following medication side effects/concerns: Patient denies any s/s of hypotension ( dizziness, LH,nausea,or fatigue) with low readings. Patient denies any s/s of hypertension (CP,SOB,severe headaches,or change in vision) with high readings.   (Frequency/Alleviating factors/Precipitating factors, etc.)     Follow up with MsAbram TrivediAaliyahsana Angela completed. No further questions or concerns.     Patient's current 30 day average BP is not at goal.     Plan: Will continue to follow up to achieve health goals.

## 2018-09-27 ENCOUNTER — PATIENT MESSAGE (OUTPATIENT)
Dept: DIABETES | Facility: CLINIC | Age: 63
End: 2018-09-27

## 2018-10-03 ENCOUNTER — PATIENT OUTREACH (OUTPATIENT)
Dept: OTHER | Facility: OTHER | Age: 63
End: 2018-10-03

## 2018-10-03 NOTE — PROGRESS NOTES
Last 5 Patient Entered Readings                                      Current 30 Day Average: 132/78     Recent Readings 9/26/2018 9/20/2018 9/19/2018 9/17/2018 9/17/2018    SBP (mmHg) 120 133 133 130 137    DBP (mmHg) 80 77 78 80 77    Pulse 80 72 78 74 70        Ms. Angela's BP average is approaching goal. Will not add an ARB back to her regimen at this time. If average remains above goal at next encounter, will add irbesartan 75 mg (valsartan 80 mg if available) to regimen.     Ms. Angela is having connectivity issues with her BP monitor. Assisted with trouble shooting tips. Asked that she call if she continues to have problems and we will have tech support reach out.     Will continue to monitor regularly. Will follow up in 6-8 weeks, sooner if BP begins to trend upward or downward.     Patient has my contact information and knows to call with any concerns or clinical changes.     Current HTN regimen:  Hypertension Medications             amLODIPine (NORVASC) 10 MG tablet TAKE 1 TABLET BY MOUTH EVERY DAY    metoprolol succinate (TOPROL-XL) 50 MG 24 hr tablet TAKE 1 TABLET BY MOUTH EVERY DAY

## 2018-10-22 ENCOUNTER — PATIENT OUTREACH (OUTPATIENT)
Dept: OTHER | Facility: OTHER | Age: 63
End: 2018-10-22

## 2018-10-22 NOTE — PROGRESS NOTES
Last 5 Patient Entered Readings                                      Current 30 Day Average: 120/80     Recent Readings 10/7/2018 10/7/2018 9/26/2018 9/20/2018 9/19/2018    SBP (mmHg) 120 120 120 133 133    DBP (mmHg) 80 80 80 77 78    Pulse 80 80 80 72 78      10/22- LVM about lack of BP readings.

## 2018-10-23 NOTE — PROGRESS NOTES
Last 5 Patient Entered Readings                                      Current 30 Day Average: 120/80     Recent Readings 10/7/2018 10/7/2018 9/26/2018 9/20/2018 9/19/2018    SBP (mmHg) 120 120 120 133 133    DBP (mmHg) 80 80 80 77 78    Pulse 80 80 80 72 78        Patient called back and said she is sending her BP readings in. Patient states she has tired to charging her BP cuff and the readings will not submit through still.Patient states she logged into [employer] about two weeks ago.  Will place a task for tech support contact Ms. Angela.

## 2018-11-05 RX ORDER — AMLODIPINE BESYLATE 10 MG/1
TABLET ORAL
Qty: 90 TABLET | Refills: 0 | Status: SHIPPED | OUTPATIENT
Start: 2018-11-05 | End: 2019-02-01 | Stop reason: SDUPTHER

## 2018-11-15 DIAGNOSIS — E78.5 HYPERLIPIDEMIA, UNSPECIFIED HYPERLIPIDEMIA TYPE: ICD-10-CM

## 2018-11-15 RX ORDER — ATORVASTATIN CALCIUM 20 MG/1
TABLET, FILM COATED ORAL
Qty: 90 TABLET | Refills: 0 | Status: SHIPPED | OUTPATIENT
Start: 2018-11-15 | End: 2019-02-11 | Stop reason: SDUPTHER

## 2018-11-17 ENCOUNTER — LAB VISIT (OUTPATIENT)
Dept: LAB | Facility: HOSPITAL | Age: 63
End: 2018-11-17
Payer: COMMERCIAL

## 2018-11-17 DIAGNOSIS — E11.3593 TYPE 2 DIABETES MELLITUS WITH BOTH EYES AFFECTED BY PROLIFERATIVE RETINOPATHY WITHOUT MACULAR EDEMA, WITH LONG-TERM CURRENT USE OF INSULIN: ICD-10-CM

## 2018-11-17 DIAGNOSIS — Z79.4 TYPE 2 DIABETES MELLITUS WITH BOTH EYES AFFECTED BY PROLIFERATIVE RETINOPATHY WITHOUT MACULAR EDEMA, WITH LONG-TERM CURRENT USE OF INSULIN: ICD-10-CM

## 2018-11-17 LAB
ESTIMATED AVG GLUCOSE: 192 MG/DL
HBA1C MFR BLD HPLC: 8.3 %

## 2018-11-17 PROCEDURE — 83036 HEMOGLOBIN GLYCOSYLATED A1C: CPT

## 2018-11-17 PROCEDURE — 36415 COLL VENOUS BLD VENIPUNCTURE: CPT

## 2018-11-30 ENCOUNTER — OFFICE VISIT (OUTPATIENT)
Dept: INTERNAL MEDICINE | Facility: CLINIC | Age: 63
End: 2018-11-30
Payer: COMMERCIAL

## 2018-11-30 VITALS
SYSTOLIC BLOOD PRESSURE: 130 MMHG | HEART RATE: 83 BPM | BODY MASS INDEX: 39.88 KG/M2 | WEIGHT: 248.13 LBS | OXYGEN SATURATION: 99 % | HEIGHT: 66 IN | DIASTOLIC BLOOD PRESSURE: 60 MMHG

## 2018-11-30 DIAGNOSIS — E11.41 DIABETIC MONONEUROPATHY ASSOCIATED WITH TYPE 2 DIABETES MELLITUS: ICD-10-CM

## 2018-11-30 DIAGNOSIS — I25.10 CORONARY ARTERY DISEASE INVOLVING NATIVE CORONARY ARTERY OF NATIVE HEART WITHOUT ANGINA PECTORIS: ICD-10-CM

## 2018-11-30 DIAGNOSIS — E78.5 HYPERLIPIDEMIA, UNSPECIFIED HYPERLIPIDEMIA TYPE: ICD-10-CM

## 2018-11-30 DIAGNOSIS — E11.3593 TYPE 2 DIABETES MELLITUS WITH BOTH EYES AFFECTED BY PROLIFERATIVE RETINOPATHY WITHOUT MACULAR EDEMA, WITH LONG-TERM CURRENT USE OF INSULIN: Primary | ICD-10-CM

## 2018-11-30 DIAGNOSIS — I10 HTN (HYPERTENSION), BENIGN: ICD-10-CM

## 2018-11-30 DIAGNOSIS — I73.9 PVD (PERIPHERAL VASCULAR DISEASE): ICD-10-CM

## 2018-11-30 DIAGNOSIS — Z79.4 TYPE 2 DIABETES MELLITUS WITH BOTH EYES AFFECTED BY PROLIFERATIVE RETINOPATHY WITHOUT MACULAR EDEMA, WITH LONG-TERM CURRENT USE OF INSULIN: Primary | ICD-10-CM

## 2018-11-30 PROCEDURE — 99214 OFFICE O/P EST MOD 30 MIN: CPT | Mod: S$GLB,,, | Performed by: FAMILY MEDICINE

## 2018-11-30 PROCEDURE — 3045F PR MOST RECENT HEMOGLOBIN A1C LEVEL 7.0-9.0%: CPT | Mod: CPTII,S$GLB,, | Performed by: FAMILY MEDICINE

## 2018-11-30 PROCEDURE — 99999 PR PBB SHADOW E&M-EST. PATIENT-LVL IV: CPT | Mod: PBBFAC,,, | Performed by: FAMILY MEDICINE

## 2018-11-30 PROCEDURE — 3078F DIAST BP <80 MM HG: CPT | Mod: CPTII,S$GLB,, | Performed by: FAMILY MEDICINE

## 2018-11-30 PROCEDURE — 3008F BODY MASS INDEX DOCD: CPT | Mod: CPTII,S$GLB,, | Performed by: FAMILY MEDICINE

## 2018-11-30 PROCEDURE — 3075F SYST BP GE 130 - 139MM HG: CPT | Mod: CPTII,S$GLB,, | Performed by: FAMILY MEDICINE

## 2018-11-30 RX ORDER — GABAPENTIN 100 MG/1
100 CAPSULE ORAL 3 TIMES DAILY
Qty: 90 CAPSULE | Refills: 11 | Status: SHIPPED | OUTPATIENT
Start: 2018-11-30 | End: 2019-11-22

## 2018-11-30 NOTE — PROGRESS NOTES
Subjective:       Patient ID: Aaliyah Angela is a 63 y.o. female.    Chief Complaint: Follow-up (foot pain )    Patient is here for follow-up of their chronic diseases, see last note for details    Diabetes Management Status    Statin: Taking  ACE/ARB: Not taking    Screening or Prevention Patient's value Goal Complete/Controlled?   HgA1C Testing and Control   Lab Results   Component Value Date    HGBA1C 8.3 (H) 11/17/2018      Annually/Less than 8% No   Lipid profile : 02/10/2018 Annually Yes   LDL control Lab Results   Component Value Date    LDLCALC 73.6 02/10/2018    Annually/Less than 100 mg/dl  Yes   Nephropathy screening Lab Results   Component Value Date    LABMICR 15.0 08/24/2018     Lab Results   Component Value Date    PROTEINUA Negative 10/16/2015    Annually Yes   Blood pressure BP Readings from Last 1 Encounters:   11/30/18 130/60    Less than 140/90 Yes   Dilated retinal exam : 05/31/2018 Annually Yes   Foot exam   : 08/24/2018 Annually Yes         Review of Systems   Constitutional: Negative for chills and fever.   HENT: Negative for ear pain.    Eyes: Negative for pain.   Respiratory: Negative for chest tightness.    Cardiovascular: Negative for chest pain.   Gastrointestinal: Negative for abdominal pain.   Genitourinary: Negative for flank pain.   Musculoskeletal: Negative for gait problem.   Neurological: Positive for numbness. Negative for syncope.        Left foot burning sensation around base of toes and medial anterior side of foot. microfiliment is felt in area   Psychiatric/Behavioral: Negative for behavioral problems.       Objective:      Physical Exam   Constitutional: She appears well-developed and well-nourished.   HENT:   Head: Normocephalic and atraumatic.   Eyes: EOM are normal.   Neck: Neck supple.   Cardiovascular: Normal rate.   Pulmonary/Chest: Breath sounds normal. No respiratory distress. She has no wheezes. She has no rales.   Abdominal: Soft.   Musculoskeletal: She exhibits  "no edema.   Neurological: She is alert.   Skin: Skin is dry.   Psychiatric: Her behavior is normal.   Nursing note and vitals reviewed.      Assessment:       1. 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 by proliferative retinopathy without macular edema, with long-term current use of insulin    2. HTN (hypertension), benign    3. Hyperlipidemia, unspecified hyperlipidemia type    4. PVD (peripheral vascular disease)    5. Coronary artery disease involving native coronary artery of native heart without angina pectoris    6. Diabetic mononeuropathy associated with type 2 diabetes mellitus        Plan:       Aaliyah was seen today for follow-up.    Diagnoses and all orders for this visit:    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 by proliferative retinopathy without macular edema, with long-term current use of insulin  - followed by endocrine    HTN (hypertension), benign  /60   Pulse 83   Ht 5' 6" (1.676 m)   Wt 112.5 kg (248 lb 2 oz)   SpO2 99%   BMI 40.05 kg/m²   -controlled, stable, no change in meds  - f/u q 6 mo    Hyperlipidemia, unspecified hyperlipidemia type  -on atorvastatin, goal is LDL <70 and hs eis close to that, no change in dose    PVD (peripheral vascular disease)  - <40% carotid stenosis in past [place]    Coronary artery disease involving native coronary artery of native heart without angina pectoris  - has cardiac PET in 2012    Diabetic mononeuropathy associated with type 2 diabetes mellitus  -     START gabapentin (NEURONTIN) 100 MG capsule; Take 1 capsule (100 mg total) by mouth 3 (three) times daily.  - f/u prn          "

## 2018-11-30 NOTE — PATIENT INSTRUCTIONS
Wt Readings from Last 9 Encounters:   11/30/18 112.5 kg (248 lb 2 oz)   08/24/18 114.9 kg (253 lb 4.9 oz)   04/26/18 110.6 kg (243 lb 12.8 oz)   03/02/18 110.2 kg (242 lb 15.2 oz)   11/08/17 103.9 kg (229 lb 1.6 oz)   09/19/17 104 kg (229 lb 4.5 oz)   08/28/17 105.9 kg (233 lb 7.5 oz)   08/03/17 105 kg (231 lb 9.5 oz)   07/21/17 106 kg (233 lb 11 oz)

## 2018-12-15 ENCOUNTER — CLINICAL SUPPORT (OUTPATIENT)
Dept: DIABETES | Facility: CLINIC | Age: 63
End: 2018-12-15
Payer: COMMERCIAL

## 2018-12-15 DIAGNOSIS — Z79.4 TYPE 2 DIABETES MELLITUS WITH BOTH EYES AFFECTED BY PROLIFERATIVE RETINOPATHY WITHOUT MACULAR EDEMA, WITH LONG-TERM CURRENT USE OF INSULIN: ICD-10-CM

## 2018-12-15 DIAGNOSIS — E11.3593 TYPE 2 DIABETES MELLITUS WITH BOTH EYES AFFECTED BY PROLIFERATIVE RETINOPATHY WITHOUT MACULAR EDEMA, WITH LONG-TERM CURRENT USE OF INSULIN: ICD-10-CM

## 2018-12-15 DIAGNOSIS — E11.8 TYPE 2 DIABETES MELLITUS WITH COMPLICATION, UNSPECIFIED WHETHER LONG TERM INSULIN USE: Primary | ICD-10-CM

## 2018-12-15 PROCEDURE — G0108 DIAB MANAGE TRN  PER INDIV: HCPCS | Mod: S$GLB,,, | Performed by: DIETITIAN, REGISTERED

## 2018-12-15 NOTE — PROGRESS NOTES
DIABETES EDUCATION  FREESTYLE SAV CGM TRAINING  Date of Education: 12/15/2018     Patient referred to clinic today for Freestyle Sav continuous glucose sensor system training. Patient arrived with his reader charged and 1 sensor for application. Provided personal FreeStyle Sav training - reviewed the following with patient:   · no fingersticks required but if feeling s/s that do not match with SG reading or in event of hypoglycemia confirm with fingerstick,   · salicylic acid and ascorbic acid can cause false readings  · sensor is 10 day wear  · FDA approved for back of the arm wear  · site rotation  · 12 hour warm-up period  · scan minimum every 8 hours for continual graph. Encouraged pt to scan more often -  before each meal and before bed.      Pt successfully set up reader and placed sensor on back of upper left arm. Reviewed additional adhesive options if having any difficulty with sensor staying on. Target BG range set for  mg/dL.     Provided pt with coupon for free Freestyle 14 day reader for her to use at her next refill. Rx update requested.      Time spent with patient: 30 minutes

## 2018-12-18 ENCOUNTER — PATIENT OUTREACH (OUTPATIENT)
Dept: OTHER | Facility: OTHER | Age: 63
End: 2018-12-18

## 2018-12-18 NOTE — PROGRESS NOTES
Last 5 Patient Entered Readings                                      Current 30 Day Average: 133/75     Recent Readings 12/17/2018 12/15/2018 12/5/2018 12/4/2018 11/25/2018    SBP (mmHg) 147 137 129 136 116    DBP (mmHg) 78 79 69 76 75    Pulse 77 72 80 72 76        Digital Medicine: Health  Follow Up    Lifestyle Modifications:    1.Dietary Modifications (Sodium intake <2,000mg/day, food labels, dining out): Patient reports she still watching her sodium intake. Patient reports yesterday she did have a pretzel that caused her BP to spike. Advised patient to avoid foods that are high in sodium.     2.Physical Activity: Patient reports she still walks from the car lot to work everyday.     3.Medication Therapy: Patient has been compliant with the medication regimen.    4.Patient has the following medication side effects/concerns: Patient denies any s/s of hypotension (dizziness, LH, nausea, or fatigue) with low readings. Patient denies any s/s of hypertension (CP,SOB,severe headaches, or change in vision) with high readings.   (Frequency/Alleviating factors/Precipitating factors, etc.)     Follow up with Ms. Aaliyah Angela completed. No further questions or concerns.    Patient's current 30 day average BP is slightly elevated.     Plan: Will continue to follow up to achieve health goals.

## 2018-12-31 ENCOUNTER — TELEPHONE (OUTPATIENT)
Dept: PODIATRY | Facility: CLINIC | Age: 63
End: 2018-12-31

## 2018-12-31 ENCOUNTER — OFFICE VISIT (OUTPATIENT)
Dept: PODIATRY | Facility: CLINIC | Age: 63
End: 2018-12-31
Payer: COMMERCIAL

## 2018-12-31 VITALS — BODY MASS INDEX: 39.86 KG/M2 | WEIGHT: 248 LBS | HEIGHT: 66 IN

## 2018-12-31 DIAGNOSIS — L85.3 DRY SKIN: ICD-10-CM

## 2018-12-31 DIAGNOSIS — B35.1 DERMATOPHYTOSIS OF NAIL: ICD-10-CM

## 2018-12-31 DIAGNOSIS — L84 CORN OR CALLUS: ICD-10-CM

## 2018-12-31 DIAGNOSIS — M20.41 HAMMER TOES OF BOTH FEET: ICD-10-CM

## 2018-12-31 DIAGNOSIS — M20.42 HAMMER TOES OF BOTH FEET: ICD-10-CM

## 2018-12-31 DIAGNOSIS — E11.9 ENCOUNTER FOR DIABETIC FOOT EXAM: Primary | ICD-10-CM

## 2018-12-31 PROCEDURE — 11721 DEBRIDE NAIL 6 OR MORE: CPT | Mod: 59,S$GLB,, | Performed by: PODIATRIST

## 2018-12-31 PROCEDURE — 3008F BODY MASS INDEX DOCD: CPT | Mod: CPTII,S$GLB,, | Performed by: PODIATRIST

## 2018-12-31 PROCEDURE — 99999 PR PBB SHADOW E&M-EST. PATIENT-LVL III: CPT | Mod: PBBFAC,,, | Performed by: PODIATRIST

## 2018-12-31 PROCEDURE — 11055 PARING/CUTG B9 HYPRKER LES 1: CPT | Mod: S$GLB,,, | Performed by: PODIATRIST

## 2018-12-31 PROCEDURE — 3045F PR MOST RECENT HEMOGLOBIN A1C LEVEL 7.0-9.0%: CPT | Mod: CPTII,S$GLB,, | Performed by: PODIATRIST

## 2018-12-31 PROCEDURE — 99213 OFFICE O/P EST LOW 20 MIN: CPT | Mod: 25,S$GLB,, | Performed by: PODIATRIST

## 2018-12-31 NOTE — PATIENT INSTRUCTIONS
How to Check Your Feet    Below are tips to help you look for foot problems. Try to check your feet at the same time each day, such as when you get out of bed in the morning.    · Check the top of each foot. The tops of toes, back of the heel, and outer edge of the foot can get a lot of rubbing from poor-fitting shoes.    · Check the bottom of each foot. Daily wear and tear often leads to problems at pressure spots.    · Check the toes and nails. Fungal infections often occur between toes. Toenail problems can also be a sign of fungal infections or lead to breaks in the skin.    · Check your shoes, too. Loose objects inside a shoe can injure the foot. Use your hand to feel inside your shoes for things like hortencia, loose stitching, or rough areas that could irritate your skin.        Diabetic Foot Care    Diabetes can lead to a number of different foot complications. Fortunately, most of these complications can be prevented with a little extra foot care. If diabetes is not well controlled, the high blood sugar can cause damage to blood vessels and result in poor circulation to the foot. When the skin does not get enough blood flow, it becomes prone to pressure sores and ulcers, which heal slowly.  High blood sugar can also damage nerves, interfering with the ability to feel pain and pressure. When you cant feel your foot normally, it is easy to injure your skin, bones and joints without knowing it. For these reasons diabetes increases the risk of fungal infections, bunions and ulcers. Deep ulcers can lead to bone infection. Gangrene is the most serious foot complication of diabetes. It usually occurs on the tips of the toes as blacked areas of skin. The black area is dead tissue. In severe cases, gangrene spreads to involve the entire toe, other toes and the entire foot. Foot or toe amputation may be required. Good foot care and blood sugar control can prevent this.    Home Care  1. Wear comfortable, proper fitting  shoes.  2. Wash your feet daily with warm water and mild soap.  3. After drying, apply a moisturizing cream or lotion.  4. Check your feet daily for skin breaks, blisters, swelling, or redness. Look between your toes also.  5. Wear cotton socks and change them every day.  6. Trim toe nails carefully and do not cut your cuticles.  7. Strive to keep your blood sugar under control with a combination of medicines, diet and activity.  8. If you smoke and have diabetes, it is very important that you stop. Smoking reduces blood flow to your foot.  9. Avoid activities that increase your risk of foot injury:  · Do not walk barefoot.  · Do not use heating pads or hot water bottles on your feet.  · Do not put your foot in a hot tub without first checking the temperature with your hand.  10) Schedule yearly foot exams.    Follow Up  with your doctor or as advised by our staff. Report any cut, puncture, scrape, other injury, blister, ingrown toenail or ulcer on your foot.    Get Prompt Medical Attention  if any of the following occur:  -- Open ulcer with pus draining from the wound  -- Increasing foot or leg pain  -- New areas of redness or swelling or tender areas of the foot    © 7032-0162 [employer]. [address] [place] [address], [place] [address]. All rights reserved. This information is not intended as a substitute for professional medical care. Always follow your healthcare professional's instructions.

## 2018-12-31 NOTE — TELEPHONE ENCOUNTER
Attempted to call patient regarding rescheduling appointment, No answer on any phone numbers listed and unable to leave voicemail.

## 2018-12-31 NOTE — PROGRESS NOTES
"Chief Complaint   Patient presents with    Diabetic Foot Exam     PCP: Reggie Glover 11/30/2018  A1C: 11/17/2018 /8.3    Diabetes Mellitus             HPI:   Aaliyah Angela is a 63 y.o. female here for diabetic foot exam.  No pain in her feet today.   She is using vaseline to her feet daily.  No wounds noted.    She does not get salon pedicures.    She is wearing slip on clogs today.    Needs more hammertoe crest pads.   S/p Gastric bypass on Dec. 18, 2015.            PCP:  Reggie Glover MD   Last PCP visit:  11/30/18         Past Medical History:   Diagnosis Date    Allergy     Anatomical narrow angle borderline glaucoma     AR (allergic rhinitis)     Arthritis     CAD (coronary artery disease) 4/22/2013    Non obstructive, 2012 PET     Cataract     Diabetes mellitus type II     with retinopathy    Diabetic retinopathy     Epiretinal membrane, both eyes     Hyperlipidemia     Hypertensive retinopathy of both eyes     Lumbar disc disease     Migraines     Morbid obesity 10/3/2012    BRADFORD (obstructive sleep apnea) 8/4/2015    Proliferative diabetic retinopathy(362.02)     PVD (peripheral vascular disease) 4/22/2013    Carotid US 1-39% bilat 2012     Rupture of right Achilles tendon     Unspecified essential hypertension     Unspecified vitamin D deficiency     Vitreous hemorrhage of left eye            Current Outpatient Medications on File Prior to Visit   Medication Sig Dispense Refill    amitriptyline (ELAVIL) 10 MG tablet Take 2 tablets (20 mg total) by mouth every evening. 180 tablet 3    amLODIPine (NORVASC) 10 MG tablet TAKE 1 TABLET BY MOUTH EVERY DAY 90 tablet 0    aspirin 81 MG Chew Take 81 mg by mouth once daily.        atorvastatin (LIPITOR) 20 MG tablet TAKE 1 TABLET BY MOUTH EVERY DAY 90 tablet 0    BD ULTRA-FINE DIEGO PEN NEEDLES 32 gauge x 5/32" Ndle USE THREE TIMES DAILY 100 each 0    blood sugar diagnostic (BLOOD GLUCOSE TEST) Strp Inject 1 each into the skin 2 " "(two) times daily. 200 strip 3    calcium-vitamin D3 500 mg(1,250mg) -200 unit per tablet Take 1 tablet by mouth once daily.      dulaglutide (TRULICITY) 1.5 mg/0.5 mL PnIj Inject 1.5 mg into the skin every 7 days. 12 Syringe 3    empagliflozin (JARDIANCE) 10 mg Tab Take 10 mg by mouth once daily. 90 tablet 1    flash glucose sensor (FREESTYLE SAV 14 DAY SENSOR) Kit 1 sensor kit every 14 days (Requires 2 sensor kits per lucretia) 2 kit 11    flash glucose sensor (FREESTYLE SAV SENSOR) Kit 1 Device by Misc.(Non-Drug; Combo Route) route continuous. 3 kit 11    gabapentin (NEURONTIN) 100 MG capsule Take 1 capsule (100 mg total) by mouth 3 (three) times daily. 90 capsule 11    metFORMIN (GLUCOPHAGE-XR) 500 MG 24 hr tablet Take 2 tablets (1,000 mg total) by mouth 2 (two) times daily with meals. 360 tablet 3    metoprolol succinate (TOPROL-XL) 50 MG 24 hr tablet TAKE 1 TABLET BY MOUTH EVERY DAY 90 tablet 3    multivitamin (ONE DAILY MULTIVITAMIN) per tablet Take 1 tablet by mouth once daily.      ranitidine (ZANTAC) 150 MG tablet Take 150 mg by mouth as needed for Heartburn.      omeprazole (PRILOSEC) 20 MG capsule Take 1 capsule (20 mg total) by mouth once daily. 30 capsule 0     No current facility-administered medications on file prior to visit.        ALLG:   No Known Allergies          ROS:   General ROS: negative for - chills, fever or night sweats  Respiratory ROS: no cough, shortness of breath, or wheezing  Cardiovascular ROS: no chest pain or dyspnea on exertion  Musculoskeletal ROS: negative  Neurological ROS: no TIA or stroke symptoms  Dermatological ROS: negative          EXAM:   Vitals:    12/31/18 1104   Weight: 112.5 kg (248 lb)   Height: 5' 6" (1.676 m)        General: Patient is well-developed, well-nourished. Alert and oriented x 3 and in no acute distress.   Lower extremity physical exam:   Vascular: Dorsalis pedis and posterior tibial pulses are 2/4 bilaterally. 3 secs capillary refill time " and toes are warm to touch. There is presence of digital hair.   1+ edema  Neurological: Light touch, proprioception, and sharp/dull sensation are all intact bilaterally. Protective threshold with the Ringgold-Wienstein monofilament is intact bilaterally.   Dermatological: There is intact skin tone and Turgor. There is no open wounds. There is no ecchymoses.   Toenails mildly dystrophic, elongated by 1-2mm.  bilateral heels dry and xerotic, no fissures.   Musculoskeletal:  Muscle strength is 5/5 in all groups bilaterally. Subtalar, and MTPJ range of motion are within normal limits without crepitus bilaterally.   right 2nd flexible hammertoe, mild hyperpigmentation at the proximal interphalangeal joint  No wounds or infection.           ASSESSMENT / PLAN:    Problem List Items Addressed This Visit     None      Visit Diagnoses     Encounter for diabetic foot exam    -  Primary    Dermatophytosis of nail        Relevant Orders    Routine Foot Care    Dry skin        Hammer toes of both feet        Corn or callus        Relevant Orders    Routine Foot Care    Type 2 diabetes, uncontrolled, with neuropathy        Relevant Orders    Routine Foot Care            · Shoe inspection. Diabetic Foot Education. Patient reminded of the importance of good nutrition and blood sugar control to help prevent podiatric complications of diabetes. Patient instructed on proper foot hygiene. We discussed wearing proper shoe gear, daily foot inspections, never walking without protective shoe gear, never putting sharp instruments to feet, and routine diabetic foot exam and to prevent complications.       · Hammertoe crest pad as needed for toe pain relief.     · Consider urea 40% cream/gel for dry heels      Routine Foot Care  Date/Time: 12/31/2018 12:09 PM  Performed by: Mary Angulo DPM  Authorized by: Mary Angulo DPM     Consent Done?:  Yes (Verbal)  Hyperkeratotic Skin Lesions?: Yes    Number of trimmed lesions:  1  Location(s):  Left  Heel    Nail Care Type:  Debride  Location(s): All  (Left 1st Toe, Left 3rd Toe, Left 2nd Toe, Left 4th Toe, Left 5th Toe, Right 1st Toe, Right 2nd Toe, Right 3rd Toe, Right 4th Toe and Right 5th Toe)  Patient tolerance:  Patient tolerated the procedure well with no immediate complications     With patient's permission, the toenails mentioned above were aggressively reduced and debrided using a nail nipper, removing all offending nail and debris. Utilizing a #15 scalpel, I trimmed the corns and calluses at the above mentioned location.      The patient will continue to monitor the areas daily, inspect the feet, wear protective shoe gear when ambulatory, and moisturizer to maintain skin integrity.          follow up annually for diabetes foot exam.

## 2019-01-07 RX ORDER — DULAGLUTIDE 0.75 MG/.5ML
INJECTION, SOLUTION SUBCUTANEOUS
Qty: 2 ML | Refills: 5 | Status: SHIPPED | OUTPATIENT
Start: 2019-01-07 | End: 2019-05-08

## 2019-01-13 RX ORDER — METOPROLOL SUCCINATE 50 MG/1
TABLET, EXTENDED RELEASE ORAL
Qty: 90 TABLET | Refills: 0 | Status: SHIPPED | OUTPATIENT
Start: 2019-01-13 | End: 2019-04-27 | Stop reason: SDUPTHER

## 2019-02-01 RX ORDER — AMLODIPINE BESYLATE 10 MG/1
TABLET ORAL
Qty: 90 TABLET | Refills: 0 | Status: SHIPPED | OUTPATIENT
Start: 2019-02-01 | End: 2020-06-09

## 2019-02-08 ENCOUNTER — TELEPHONE (OUTPATIENT)
Dept: INTERNAL MEDICINE | Facility: CLINIC | Age: 64
End: 2019-02-08

## 2019-02-08 ENCOUNTER — OFFICE VISIT (OUTPATIENT)
Dept: INTERNAL MEDICINE | Facility: CLINIC | Age: 64
End: 2019-02-08
Payer: COMMERCIAL

## 2019-02-08 ENCOUNTER — HOSPITAL ENCOUNTER (OUTPATIENT)
Dept: RADIOLOGY | Facility: HOSPITAL | Age: 64
Discharge: HOME | End: 2019-02-08
Attending: FAMILY MEDICINE
Payer: COMMERCIAL

## 2019-02-08 VITALS
HEIGHT: 66 IN | HEART RATE: 76 BPM | WEIGHT: 256.38 LBS | BODY MASS INDEX: 41.2 KG/M2 | OXYGEN SATURATION: 97 % | SYSTOLIC BLOOD PRESSURE: 142 MMHG | DIASTOLIC BLOOD PRESSURE: 82 MMHG

## 2019-02-08 DIAGNOSIS — E66.01 CLASS 3 SEVERE OBESITY DUE TO EXCESS CALORIES WITH SERIOUS COMORBIDITY AND BODY MASS INDEX (BMI) OF 40.0 TO 44.9 IN ADULT: ICD-10-CM

## 2019-02-08 DIAGNOSIS — E78.5 HYPERLIPIDEMIA, UNSPECIFIED HYPERLIPIDEMIA TYPE: ICD-10-CM

## 2019-02-08 DIAGNOSIS — E11.3593 TYPE 2 DIABETES MELLITUS WITH BOTH EYES AFFECTED BY PROLIFERATIVE RETINOPATHY WITHOUT MACULAR EDEMA, WITH LONG-TERM CURRENT USE OF INSULIN: ICD-10-CM

## 2019-02-08 DIAGNOSIS — J40 BRONCHITIS: ICD-10-CM

## 2019-02-08 DIAGNOSIS — Z79.4 TYPE 2 DIABETES MELLITUS WITH BOTH EYES AFFECTED BY PROLIFERATIVE RETINOPATHY WITHOUT MACULAR EDEMA, WITH LONG-TERM CURRENT USE OF INSULIN: ICD-10-CM

## 2019-02-08 DIAGNOSIS — I10 HTN (HYPERTENSION), BENIGN: ICD-10-CM

## 2019-02-08 DIAGNOSIS — J40 BRONCHITIS: Primary | ICD-10-CM

## 2019-02-08 DIAGNOSIS — I73.9 PVD (PERIPHERAL VASCULAR DISEASE): ICD-10-CM

## 2019-02-08 PROCEDURE — 71046 XR CHEST PA AND LATERAL: ICD-10-PCS | Mod: 26,,, | Performed by: RADIOLOGY

## 2019-02-08 PROCEDURE — 3008F BODY MASS INDEX DOCD: CPT | Mod: CPTII,S$GLB,, | Performed by: FAMILY MEDICINE

## 2019-02-08 PROCEDURE — 71046 X-RAY EXAM CHEST 2 VIEWS: CPT | Mod: TC

## 2019-02-08 PROCEDURE — 3008F PR BODY MASS INDEX (BMI) DOCUMENTED: ICD-10-PCS | Mod: CPTII,S$GLB,, | Performed by: FAMILY MEDICINE

## 2019-02-08 PROCEDURE — 71046 X-RAY EXAM CHEST 2 VIEWS: CPT | Mod: 26,,, | Performed by: RADIOLOGY

## 2019-02-08 PROCEDURE — 3045F PR MOST RECENT HEMOGLOBIN A1C LEVEL 7.0-9.0%: ICD-10-PCS | Mod: CPTII,S$GLB,, | Performed by: FAMILY MEDICINE

## 2019-02-08 PROCEDURE — 99999 PR PBB SHADOW E&M-EST. PATIENT-LVL V: CPT | Mod: PBBFAC,,, | Performed by: FAMILY MEDICINE

## 2019-02-08 PROCEDURE — 3077F SYST BP >= 140 MM HG: CPT | Mod: CPTII,S$GLB,, | Performed by: FAMILY MEDICINE

## 2019-02-08 PROCEDURE — 3079F DIAST BP 80-89 MM HG: CPT | Mod: CPTII,S$GLB,, | Performed by: FAMILY MEDICINE

## 2019-02-08 PROCEDURE — 3077F PR MOST RECENT SYSTOLIC BLOOD PRESSURE >= 140 MM HG: ICD-10-PCS | Mod: CPTII,S$GLB,, | Performed by: FAMILY MEDICINE

## 2019-02-08 PROCEDURE — 99213 OFFICE O/P EST LOW 20 MIN: CPT | Mod: S$GLB,,, | Performed by: FAMILY MEDICINE

## 2019-02-08 PROCEDURE — 3045F PR MOST RECENT HEMOGLOBIN A1C LEVEL 7.0-9.0%: CPT | Mod: CPTII,S$GLB,, | Performed by: FAMILY MEDICINE

## 2019-02-08 PROCEDURE — 99999 PR PBB SHADOW E&M-EST. PATIENT-LVL V: ICD-10-PCS | Mod: PBBFAC,,, | Performed by: FAMILY MEDICINE

## 2019-02-08 PROCEDURE — 99213 PR OFFICE/OUTPT VISIT, EST, LEVL III, 20-29 MIN: ICD-10-PCS | Mod: S$GLB,,, | Performed by: FAMILY MEDICINE

## 2019-02-08 PROCEDURE — 3079F PR MOST RECENT DIASTOLIC BLOOD PRESSURE 80-89 MM HG: ICD-10-PCS | Mod: CPTII,S$GLB,, | Performed by: FAMILY MEDICINE

## 2019-02-08 NOTE — PROGRESS NOTES
"S/  UC visit for cough and chest congestion for the past week or so  No ear pain, +sore throat  No fever, chills, shortness of breath    O/  BP (!) 142/82 (BP Location: Right arm, Patient Position: Sitting, BP Method: Large (Manual))   Pulse 76   Ht 5' 6" (1.676 m)   Wt 116.3 kg (256 lb 6.3 oz)   SpO2 97%   BMI 41.38 kg/m²    TMs WNL  Pharynx WNL  Neck supple  Cor s1s2  Lungs CTA, LLL with less prominent breath sounds  Abd bengn    A/P  Aaliyah was seen today for follow-up, cough, headache and sinusitis.    Diagnoses and all orders for this visit:    Bronchitis  -     X-Ray Chest PA And Lateral; Future  - push fluids with 3 quarts of water/fluids a day for 3-5 days  - can use OTC mucinex DM prn  Colds and most upper respiratory infections are viruses that do not respond to antibiotics, and the main treatment is symptomatic care, e.g., Robitussin DM OTC for cough, lots of fluids and rest, etc.    For her chronic Dz below, keep routine 6 mo appt with me in APril.  These all appear stable:  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 by proliferative retinopathy without macular edema, with long-term current use of insulin  HTN (hypertension), benign  PVD (peripheral vascular disease)  Hyperlipidemia, unspecified hyperlipidemia type  Class 3 severe obesity due to excess calories with serious comorbidity and body mass index (BMI) of 40.0 to 44.9 in adult          "

## 2019-02-11 DIAGNOSIS — E78.5 HYPERLIPIDEMIA, UNSPECIFIED HYPERLIPIDEMIA TYPE: ICD-10-CM

## 2019-02-11 RX ORDER — ATORVASTATIN CALCIUM 20 MG/1
TABLET, FILM COATED ORAL
Qty: 90 TABLET | Refills: 3 | Status: SHIPPED | OUTPATIENT
Start: 2019-02-11 | End: 2020-03-23 | Stop reason: SDUPTHER

## 2019-02-13 ENCOUNTER — PATIENT OUTREACH (OUTPATIENT)
Dept: OTHER | Facility: OTHER | Age: 64
End: 2019-02-13

## 2019-02-13 DIAGNOSIS — I10 HTN (HYPERTENSION), BENIGN: Primary | ICD-10-CM

## 2019-02-13 NOTE — PROGRESS NOTES
Last 5 Patient Entered Readings                                      Current 30 Day Average: 141/78     Recent Readings 2/25/2019 2/23/2019 2/22/2019 2/22/2019 2/22/2019    SBP (mmHg) 124 144 140 120 150    DBP (mmHg) 63 80 74 73 95    Pulse 83 86 80 85 71        Ms. Angela's BP has been elevated. She reports having a headache when BP is elevated. She was previously on losartan 50 mg a few years ago and tolerated it well. Will restart this to see if BP returns to goal.     Patient's BP average is above goal of <130/80.     Will continue to monitor regularly. Will follow up in 2-3 weeks, sooner if BP begins to trend upward or downward.    Patient has my contact information and knows to call with any concerns or clinical changes.     Current HTN regimen:  Hypertension Medications             amLODIPine (NORVASC) 10 MG tablet TAKE 1 TABLET BY MOUTH EVERY DAY    losartan (COZAAR) 50 MG tablet Take 1 tablet (50 mg total) by mouth once daily.    metoprolol succinate (TOPROL-XL) 50 MG 24 hr tablet TAKE 1 TABLET BY MOUTH EVERY DAY

## 2019-02-27 ENCOUNTER — OFFICE VISIT (OUTPATIENT)
Dept: OPTOMETRY | Facility: CLINIC | Age: 64
End: 2019-02-27
Payer: COMMERCIAL

## 2019-02-27 DIAGNOSIS — H52.203 ASTIGMATISM OF BOTH EYES, UNSPECIFIED TYPE: ICD-10-CM

## 2019-02-27 DIAGNOSIS — H52.4 PRESBYOPIA: Primary | ICD-10-CM

## 2019-02-27 PROCEDURE — 99999 PR PBB SHADOW E&M-EST. PATIENT-LVL II: ICD-10-PCS | Mod: PBBFAC,,, | Performed by: OPTOMETRIST

## 2019-02-27 PROCEDURE — 99999 PR PBB SHADOW E&M-EST. PATIENT-LVL II: CPT | Mod: PBBFAC,,, | Performed by: OPTOMETRIST

## 2019-02-27 PROCEDURE — 92015 PR REFRACTION: ICD-10-PCS | Mod: S$GLB,,, | Performed by: OPTOMETRIST

## 2019-02-27 PROCEDURE — 92015 DETERMINE REFRACTIVE STATE: CPT | Mod: S$GLB,,, | Performed by: OPTOMETRIST

## 2019-02-27 RX ORDER — LOSARTAN POTASSIUM 50 MG/1
50 TABLET ORAL DAILY
Qty: 30 TABLET | Refills: 3 | Status: SHIPPED | OUTPATIENT
Start: 2019-02-27 | End: 2019-06-23 | Stop reason: SDUPTHER

## 2019-02-27 NOTE — PROGRESS NOTES
HPI     Aaliyah Angela is a/an 63 y.o. female who returns  for continued eye care   Aaliyah was last seen by us for diabetic eye care on 02/16/2018 for   astigmatism OU. She was also seen By Dr maxwell on 05/31/2018 for   physiological narrow angle and Dr Mckeon on 05/09/2018  for   proliferative retinopathy without macula involvement. Today she states   that she is still satisfied with the vision with her current glasses but   would like to check on her ocular health  Hx of several laser procedures ( retina) and bilateral  iridectomy    (--)blurred vision  (--)Headaches  (--)diplopia  (--)flashes  (+)floaters yes but stable  (--)pain  (--)Itching  (--)tearing  (--)burning  (--)Dryness  (--) OTC Drops  (--)Photophobia    Last edited by Tung Guzman on 2/27/2019  3:36 PM.   (History)            Assessment /Plan     For exam results, see Encounter Report.    Presbyopia    Astigmatism of both eyes, unspecified type    Rx specs  No significant change  Pt says she is ready for cat eval OS  Due for glaucoma folllow up with Dr. Maxwell

## 2019-03-13 ENCOUNTER — PATIENT OUTREACH (OUTPATIENT)
Dept: OTHER | Facility: OTHER | Age: 64
End: 2019-03-13

## 2019-03-13 NOTE — PROGRESS NOTES
Last 5 Patient Entered Readings                                      Current 30 Day Average: 134/79     Recent Readings 3/16/2019 3/16/2019 3/12/2019 3/3/2019 3/3/2019    SBP (mmHg) 146 150 141 120 139    DBP (mmHg) 78 87 87 80 81    Pulse 71 73 71 80 79        Ms. Angela is feeling better since starting losartan 50 mg QD. She has not submitted many readings since starting losartan. Asked that she check BP more frequently to determine if a dose increase is required.     Per 30 day average, 134/79 mmHg, patient's BP is not at goal.     Will continue to monitor regularly. Will follow up in 2-3 weeks, sooner if BP begins to trend upward or downward.    Asked patient to call or message with questions or concerns.     Current HTN regimen:  Hypertension Medications             amLODIPine (NORVASC) 10 MG tablet TAKE 1 TABLET BY MOUTH EVERY DAY    losartan (COZAAR) 50 MG tablet Take 1 tablet (50 mg total) by mouth once daily.    metoprolol succinate (TOPROL-XL) 50 MG 24 hr tablet TAKE 1 TABLET BY MOUTH EVERY DAY

## 2019-03-26 ENCOUNTER — PATIENT OUTREACH (OUTPATIENT)
Dept: OTHER | Facility: OTHER | Age: 64
End: 2019-03-26

## 2019-03-26 NOTE — PROGRESS NOTES
Last 5 Patient Entered Readings                                      Current 30 Day Average: 131/76     Recent Readings 3/25/2019 3/22/2019 3/22/2019 3/20/2019 3/16/2019    SBP (mmHg) 130 131 150 128 146    DBP (mmHg) 71 62 78 67 78    Pulse 88 77 77 73 71        3/26-[facility]  Follow Up  Left voicemail to follow up with MsAbram Aaliyah Angela.  Current BP average 131/76 mmHg is not at goal, [<130/80].

## 2019-04-27 RX ORDER — METOPROLOL SUCCINATE 50 MG/1
TABLET, EXTENDED RELEASE ORAL
Qty: 90 TABLET | Refills: 0 | Status: SHIPPED | OUTPATIENT
Start: 2019-04-27 | End: 2019-07-24 | Stop reason: SDUPTHER

## 2019-04-29 RX ORDER — AMLODIPINE BESYLATE 10 MG/1
TABLET ORAL
Qty: 90 TABLET | Refills: 3 | Status: SHIPPED | OUTPATIENT
Start: 2019-04-29 | End: 2019-06-03

## 2019-04-30 ENCOUNTER — TELEPHONE (OUTPATIENT)
Dept: OBSTETRICS AND GYNECOLOGY | Facility: CLINIC | Age: 64
End: 2019-04-30

## 2019-04-30 ENCOUNTER — OFFICE VISIT (OUTPATIENT)
Dept: OBSTETRICS AND GYNECOLOGY | Facility: CLINIC | Age: 64
End: 2019-04-30
Payer: COMMERCIAL

## 2019-04-30 VITALS
WEIGHT: 255 LBS | DIASTOLIC BLOOD PRESSURE: 80 MMHG | BODY MASS INDEX: 40.98 KG/M2 | SYSTOLIC BLOOD PRESSURE: 155 MMHG | HEIGHT: 66 IN

## 2019-04-30 DIAGNOSIS — Z12.39 BREAST CANCER SCREENING: ICD-10-CM

## 2019-04-30 DIAGNOSIS — Z78.0 POSTMENOPAUSE: ICD-10-CM

## 2019-04-30 DIAGNOSIS — Z01.419 WELL WOMAN EXAM WITH ROUTINE GYNECOLOGICAL EXAM: Primary | ICD-10-CM

## 2019-04-30 PROCEDURE — 3079F PR MOST RECENT DIASTOLIC BLOOD PRESSURE 80-89 MM HG: ICD-10-PCS | Mod: CPTII,S$GLB,, | Performed by: NURSE PRACTITIONER

## 2019-04-30 PROCEDURE — 99396 PR PREVENTIVE VISIT,EST,40-64: ICD-10-PCS | Mod: S$GLB,,, | Performed by: NURSE PRACTITIONER

## 2019-04-30 PROCEDURE — 3077F PR MOST RECENT SYSTOLIC BLOOD PRESSURE >= 140 MM HG: ICD-10-PCS | Mod: CPTII,S$GLB,, | Performed by: NURSE PRACTITIONER

## 2019-04-30 PROCEDURE — 3077F SYST BP >= 140 MM HG: CPT | Mod: CPTII,S$GLB,, | Performed by: NURSE PRACTITIONER

## 2019-04-30 PROCEDURE — 88175 CYTOPATH C/V AUTO FLUID REDO: CPT

## 2019-04-30 PROCEDURE — 99999 PR PBB SHADOW E&M-EST. PATIENT-LVL III: CPT | Mod: PBBFAC,,, | Performed by: NURSE PRACTITIONER

## 2019-04-30 PROCEDURE — 3079F DIAST BP 80-89 MM HG: CPT | Mod: CPTII,S$GLB,, | Performed by: NURSE PRACTITIONER

## 2019-04-30 PROCEDURE — 99396 PREV VISIT EST AGE 40-64: CPT | Mod: S$GLB,,, | Performed by: NURSE PRACTITIONER

## 2019-04-30 PROCEDURE — 99999 PR PBB SHADOW E&M-EST. PATIENT-LVL III: ICD-10-PCS | Mod: PBBFAC,,, | Performed by: NURSE PRACTITIONER

## 2019-04-30 NOTE — PROGRESS NOTES
"HISTORY OF PRESENT ILLNESS:    Aaliyah Angela is a 63 y.o. female , presents for a routine exam and has no complaints.      Past Medical History:   Diagnosis Date    Allergy     Anatomical narrow angle borderline glaucoma     AR (allergic rhinitis)     Arthritis     CAD (coronary artery disease) 2013    Non obstructive,  PET     Cataract     Diabetes mellitus type II     with retinopathy    Diabetic retinopathy     Epiretinal membrane, both eyes     Hyperlipidemia     Hypertensive retinopathy of both eyes     Lumbar disc disease     Migraines     Morbid obesity 10/3/2012    BRADFORD (obstructive sleep apnea) 2015    Proliferative diabetic retinopathy(362.02)     PVD (peripheral vascular disease) 2013    Carotid US 1-39% bilat      Rupture of right Achilles tendon     Unspecified essential hypertension     Unspecified vitamin D deficiency     Vitreous hemorrhage of left eye        Past Surgical History:   Procedure Laterality Date     SECTION      x3    GASTRIC BYPASS      Sleeve    LASER PERIPHERAL IRIDOTOMY Bilateral         PANRETINAL PHOTOCOAGULATION      Both eyes    TUBAL LIGATION          MEDICATIONS AND ALLERGIES:      Current Outpatient Medications:     amitriptyline (ELAVIL) 10 MG tablet, Take 2 tablets (20 mg total) by mouth every evening., Disp: 180 tablet, Rfl: 3    amLODIPine (NORVASC) 10 MG tablet, TAKE 1 TABLET BY MOUTH EVERY DAY, Disp: 90 tablet, Rfl: 0    amLODIPine (NORVASC) 10 MG tablet, TAKE 1 TABLET BY MOUTH EVERY DAY, Disp: 90 tablet, Rfl: 3    aspirin 81 MG Chew, Take 81 mg by mouth once daily.  , Disp: , Rfl:     atorvastatin (LIPITOR) 20 MG tablet, TAKE 1 TABLET BY MOUTH EVERY DAY, Disp: 90 tablet, Rfl: 3    BD ULTRA-FINE DIEGO PEN NEEDLES 32 gauge x 5/32" Ndle, USE THREE TIMES DAILY, Disp: 100 each, Rfl: 0    blood sugar diagnostic (BLOOD GLUCOSE TEST) Strp, Inject 1 each into the skin 2 (two) times daily., " Disp: 200 strip, Rfl: 3    calcium-vitamin D3 500 mg(1,250mg) -200 unit per tablet, Take 1 tablet by mouth once daily., Disp: , Rfl:     dulaglutide (TRULICITY) 1.5 mg/0.5 mL PnIj, Inject 1.5 mg into the skin every 7 days., Disp: 12 Syringe, Rfl: 3    empagliflozin (JARDIANCE) 10 mg Tab, Take 10 mg by mouth once daily., Disp: 90 tablet, Rfl: 1    flash glucose sensor (FREESTYLE SAV 14 DAY SENSOR) Kit, 1 sensor kit every 14 days (Requires 2 sensor kits per lucretia), Disp: 2 kit, Rfl: 11    flash glucose sensor (FREESTYLE SAV SENSOR) Kit, 1 Device by Misc.(Non-Drug; Combo Route) route continuous., Disp: 3 kit, Rfl: 11    gabapentin (NEURONTIN) 100 MG capsule, Take 1 capsule (100 mg total) by mouth 3 (three) times daily., Disp: 90 capsule, Rfl: 11    losartan (COZAAR) 50 MG tablet, Take 1 tablet (50 mg total) by mouth once daily., Disp: 30 tablet, Rfl: 3    metFORMIN (GLUCOPHAGE-XR) 500 MG 24 hr tablet, Take 2 tablets (1,000 mg total) by mouth 2 (two) times daily with meals., Disp: 360 tablet, Rfl: 3    metoprolol succinate (TOPROL-XL) 50 MG 24 hr tablet, TAKE 1 TABLET BY MOUTH EVERY DAY, Disp: 90 tablet, Rfl: 0    multivitamin (ONE DAILY MULTIVITAMIN) per tablet, Take 1 tablet by mouth once daily., Disp: , Rfl:     ranitidine (ZANTAC) 150 MG tablet, Take 150 mg by mouth as needed for Heartburn., Disp: , Rfl:     TRULICITY 0.75 mg/0.5 mL PnIj, INJECT 0.75 ML UNDER THE SKIN EVERY 7 DAYS, Disp: 2 mL, Rfl: 5    omeprazole (PRILOSEC) 20 MG capsule, Take 1 capsule (20 mg total) by mouth once daily., Disp: 30 capsule, Rfl: 0    Review of patient's allergies indicates:  No Known Allergies    Family History   Problem Relation Age of Onset    Diabetes Mother     Hypertension Mother     Diabetes Father     Hypertension Father     Stroke Father     Diabetes Sister     Diabetes Brother     Cancer Brother         prostate    Prostate cancer Brother     Diabetes Sister     Diabetes Sister     Diabetes  Sister     Diabetes Brother     Stroke Brother     Diabetes Brother     Diabetes Brother     Breast cancer Neg Hx     Colon cancer Neg Hx     Ovarian cancer Neg Hx        Social History     Socioeconomic History    Marital status: Single     Spouse name: Not on file    Number of children: Not on file    Years of education: Not on file    Highest education level: Not on file   Occupational History    Not on file   Social Needs    Financial resource strain: Not on file    Food insecurity:     Worry: Not on file     Inability: Not on file    Transportation needs:     Medical: Not on file     Non-medical: Not on file   Tobacco Use    Smoking status: Never Smoker    Smokeless tobacco: Never Used   Substance and Sexual Activity    Alcohol use: No    Drug use: No    Sexual activity: Not Currently     Birth control/protection: Post-menopausal   Lifestyle    Physical activity:     Days per week: Not on file     Minutes per session: Not on file    Stress: Not on file   Relationships    Social connections:     Talks on phone: Not on file     Gets together: Not on file     Attends Sabianism service: Not on file     Active member of club or organization: Not on file     Attends meetings of clubs or organizations: Not on file     Relationship status: Not on file   Other Topics Concern    Are you pregnant or think you may be? Not Asked    Breast-feeding Not Asked   Social History Narrative    Work a LPN at [employer]    Single, lives alone       OBSTETRIC HISTORY: Number of cesareans: 3    COMPREHENSIVE GYN HISTORY:  PAP History: Denies abnormal Paps. LAST PAP 3-15-16 NORMAL.  Infection History: Denies STDs. Denies PID.  Benign History: Denies uterine fibroids.Denies ovarian cysts. Denies endometriosis. Reports stable right hydrosalpinx.  Cancer History: Denies cervical cancer. Denies uterine cancer or hyperplasia. Denies ovarian cancer. Denies vulvar cancer or pre-cancer. Denies vaginal cancer or pre-cancer.  "Denies breast cancer. Denies colon cancer.   Sexual Activity History: Denies currently being sexually active.   Menstrual History: Denies menses. Pt is , not on HRT.       ROS:  GENERAL: + WT GAIN. No swelling. No fatigue. No fever.  CARDIOVASCULAR: No chest pain. No shortness of breath. No leg cramps.   NEUROLOGICAL: No headaches. No vision changes.  BREASTS: No pain. No lumps. No discharge.  ABDOMEN: No pain. No nausea. No vomiting. No diarrhea. No constipation.  REPRODUCTIVE: No abnormal bleeding.   VULVA: No pain. No lesions. No itching.  VAGINA: No relaxation. No itching. No odor. No discharge. No lesions.  URINARY: No incontinence. No nocturia. No frequency. No dysuria.    BP (!) 155/80   Ht 5' 6" (1.676 m)   Wt 115.7 kg (255 lb)   BMI 41.16 kg/m²    4-26-18 Wt 110.6 kg (243 lb 12.8 oz)     PE:  APPEARANCE: Well nourished, well developed, in no acute distress.  AFFECT: WNL, alert and oriented x 3.  SKIN: No hirsutism or acne.  NECK: Neck symmetric without masses or thyromegaly.  NODES: No inguinal, cervical, axillary or femoral lymph node enlargement.  CHEST: Good respiratory effort.   ABDOMEN: Soft. No tenderness or masses. OBESE.  BREASTS: Symmetrical, no skin changes or visible lesions. No palpable masses, nipple discharge bilaterally. CHRONIC INVERTED NIPPLES.  PELVIC: ATROPHIC EXTERNAL FEMALE GENITALIA without lesions. Normal hair distribution. Adequate perineal body, normal urethral meatus. VAGINA DRY / ATROPHIC without lesions or discharge. CERVIX STENOTIC without lesions, discharge or tenderness. No significant cystocele or rectocele. Bimanual exam shows uterus to be normal size, regular, mobile and nontender. Adnexa without masses or tenderness. EXAM DIFFICULT DUE TO BODY HABITUS.   RECTAL: Rectovaginal exam confirms above with normal sphincter tone, no masses.  EXTREMITIES: No edema.    DIAGNOSIS:  1. Well woman exam with routine gynecological exam    2. Postmenopause    3. Breast cancer " screening        PLAN:    Orders Placed This Encounter    Mammo Digital Screening Bilat w/ Vick    Liquid-based pap smear, screening   Up to date on colonoscopy    COUNSELING:  The patient was counseled today on:  -osteoporosis prevention and regular weight bearing exercise;  -diet, exercise, weight loss;  -A.C.S. Pap and pelvic exam guidelines (pap every 3 years, no pap after age 65) and recommendations for yearly mammogram;  -to see her PCP for other health maintenance.    FOLLOW-UP with Dr Patrick annually.

## 2019-05-01 ENCOUNTER — PATIENT OUTREACH (OUTPATIENT)
Dept: OTHER | Facility: OTHER | Age: 64
End: 2019-05-01

## 2019-05-01 ENCOUNTER — OFFICE VISIT (OUTPATIENT)
Dept: NEUROLOGY | Facility: CLINIC | Age: 64
End: 2019-05-01
Payer: COMMERCIAL

## 2019-05-01 VITALS
BODY MASS INDEX: 40.98 KG/M2 | WEIGHT: 255 LBS | HEIGHT: 66 IN | SYSTOLIC BLOOD PRESSURE: 154 MMHG | DIASTOLIC BLOOD PRESSURE: 72 MMHG | HEART RATE: 80 BPM

## 2019-05-01 DIAGNOSIS — M54.2 NECK PAIN: ICD-10-CM

## 2019-05-01 DIAGNOSIS — R20.2 PARESTHESIA OF BOTH HANDS: Primary | ICD-10-CM

## 2019-05-01 PROCEDURE — 99999 PR PBB SHADOW E&M-EST. PATIENT-LVL IV: ICD-10-PCS | Mod: PBBFAC,,, | Performed by: STUDENT IN AN ORGANIZED HEALTH CARE EDUCATION/TRAINING PROGRAM

## 2019-05-01 PROCEDURE — 3077F SYST BP >= 140 MM HG: CPT | Mod: CPTII,S$GLB,, | Performed by: PSYCHIATRY & NEUROLOGY

## 2019-05-01 PROCEDURE — 99215 OFFICE O/P EST HI 40 MIN: CPT | Mod: S$GLB,,, | Performed by: PSYCHIATRY & NEUROLOGY

## 2019-05-01 PROCEDURE — 3077F PR MOST RECENT SYSTOLIC BLOOD PRESSURE >= 140 MM HG: ICD-10-PCS | Mod: CPTII,S$GLB,, | Performed by: PSYCHIATRY & NEUROLOGY

## 2019-05-01 PROCEDURE — 3008F PR BODY MASS INDEX (BMI) DOCUMENTED: ICD-10-PCS | Mod: CPTII,S$GLB,, | Performed by: PSYCHIATRY & NEUROLOGY

## 2019-05-01 PROCEDURE — 99999 PR PBB SHADOW E&M-EST. PATIENT-LVL IV: CPT | Mod: PBBFAC,,, | Performed by: STUDENT IN AN ORGANIZED HEALTH CARE EDUCATION/TRAINING PROGRAM

## 2019-05-01 PROCEDURE — 3078F DIAST BP <80 MM HG: CPT | Mod: CPTII,S$GLB,, | Performed by: PSYCHIATRY & NEUROLOGY

## 2019-05-01 PROCEDURE — 99215 PR OFFICE/OUTPT VISIT, EST, LEVL V, 40-54 MIN: ICD-10-PCS | Mod: S$GLB,,, | Performed by: PSYCHIATRY & NEUROLOGY

## 2019-05-01 PROCEDURE — 3008F BODY MASS INDEX DOCD: CPT | Mod: CPTII,S$GLB,, | Performed by: PSYCHIATRY & NEUROLOGY

## 2019-05-01 PROCEDURE — 3078F PR MOST RECENT DIASTOLIC BLOOD PRESSURE < 80 MM HG: ICD-10-PCS | Mod: CPTII,S$GLB,, | Performed by: PSYCHIATRY & NEUROLOGY

## 2019-05-01 NOTE — PATIENT INSTRUCTIONS
Please obtain wrist splints and use them at nights while sleeping  You can obtain over the counter Capsaicin cream or lidoderm cream for topical use in hands and neck.  Start taking Gabapentin 300 mg every night.

## 2019-05-01 NOTE — PROGRESS NOTES
Neurology Clinic  Initial Consult Visit    Patient Name: Aaliyah Angela  MRN: [id number]    CC: tingling/numbness    HPI: Aaliyah Angela is julito pleasant 63 y.o.  female with  DM, HTN, CAD, HLD, PVD, and Hx of gastric Bypass, who is presenting to the clinic today for evaluation of tingling in hands.    Patient reports tingling in bilateral upper extremities that is worse in the mornings with opening and closing the fists and improves as the day goes by. She denies joint pain and swelling in the fingers. She has had a strongly positive ASHISH, however negative RF and Anti-CCP. She has been prescribed Gabapentin 100 mg TID, previously for lower extremity neuropathy, but has never taken it. 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 These symptoms have been going on intermittently within the last year, however it has become more frequent within the last two months. She also reports neck pain within the last two weeks. She has a history of mechanical fall and subsequent head trauma 3-4 months ago. She reports history of bariatric surgery for weight loss purposes, she denies any dietary restriction.      Review of Systems:  General: fevers -, chills -, fatigue -, change in appetite -  Eyes: blurred vision -, double vision -, photosensitivity -  ENT: hearing loss -, difficulty swallowing -, drooling -, change in voice -  Respiratory: SOB -, cough -,  choking -  CV: chest pain -, palpitations -  GI: nausea -, vomiting -, abdominal pain -  Urinary: dysuria -, hematuria -, frequency -, urine incontinence  Skin: rash -, change of color -  Neurological: Headache -, dizziness -, weakness -, numbness -, seizure -, gait problem -, difficulty in speech -  Psychiatric: hallucinations -, confusion -, dysphoric mood -, anxiety -      Past Medical History  Past Medical History:   Diagnosis Date    Allergy     Anatomical narrow angle borderline glaucoma     AR (allergic rhinitis)     Arthritis     CAD (coronary artery  "disease) 4/22/2013    Non obstructive, 2012 PET     Cataract     Diabetes mellitus type II     with retinopathy    Diabetic retinopathy     Epiretinal membrane, both eyes     Hyperlipidemia     Hypertensive retinopathy of both eyes     Lumbar disc disease     Migraines     Morbid obesity 10/3/2012    BRADFORD (obstructive sleep apnea) 8/4/2015    Proliferative diabetic retinopathy(362.02)     PVD (peripheral vascular disease) 4/22/2013    Carotid US 1-39% bilat 2012     Rupture of right Achilles tendon     Unspecified essential hypertension     Unspecified vitamin D deficiency     Vitreous hemorrhage of left eye        Medications    Current Outpatient Medications:     amitriptyline (ELAVIL) 10 MG tablet, Take 2 tablets (20 mg total) by mouth every evening., Disp: 180 tablet, Rfl: 3    amLODIPine (NORVASC) 10 MG tablet, TAKE 1 TABLET BY MOUTH EVERY DAY, Disp: 90 tablet, Rfl: 0    amLODIPine (NORVASC) 10 MG tablet, TAKE 1 TABLET BY MOUTH EVERY DAY, Disp: 90 tablet, Rfl: 3    aspirin 81 MG Chew, Take 81 mg by mouth once daily.  , Disp: , Rfl:     atorvastatin (LIPITOR) 20 MG tablet, TAKE 1 TABLET BY MOUTH EVERY DAY, Disp: 90 tablet, Rfl: 3    BD ULTRA-FINE DIEGO PEN NEEDLES 32 gauge x 5/32" Ndle, USE THREE TIMES DAILY, Disp: 100 each, Rfl: 0    blood sugar diagnostic (BLOOD GLUCOSE TEST) Strp, Inject 1 each into the skin 2 (two) times daily., Disp: 200 strip, Rfl: 3    calcium-vitamin D3 500 mg(1,250mg) -200 unit per tablet, Take 1 tablet by mouth once daily., Disp: , Rfl:     dulaglutide (TRULICITY) 1.5 mg/0.5 mL PnIj, Inject 1.5 mg into the skin every 7 days., Disp: 12 Syringe, Rfl: 3    empagliflozin (JARDIANCE) 10 mg Tab, Take 10 mg by mouth once daily., Disp: 90 tablet, Rfl: 1    flash glucose sensor (FREESTYLE SAV 14 DAY SENSOR) Kit, 1 sensor kit every 14 days (Requires 2 sensor kits per lucretia), Disp: 2 kit, Rfl: 11    flash glucose sensor (FREESTYLE SAV SENSOR) Kit, 1 Device by " Misc.(Non-Drug; Combo Route) route continuous., Disp: 3 kit, Rfl: 11    gabapentin (NEURONTIN) 100 MG capsule, Take 1 capsule (100 mg total) by mouth 3 (three) times daily., Disp: 90 capsule, Rfl: 11    losartan (COZAAR) 50 MG tablet, Take 1 tablet (50 mg total) by mouth once daily., Disp: 30 tablet, Rfl: 3    metFORMIN (GLUCOPHAGE-XR) 500 MG 24 hr tablet, Take 2 tablets (1,000 mg total) by mouth 2 (two) times daily with meals., Disp: 360 tablet, Rfl: 3    metoprolol succinate (TOPROL-XL) 50 MG 24 hr tablet, TAKE 1 TABLET BY MOUTH EVERY DAY, Disp: 90 tablet, Rfl: 0    multivitamin (ONE DAILY MULTIVITAMIN) per tablet, Take 1 tablet by mouth once daily., Disp: , Rfl:     ranitidine (ZANTAC) 150 MG tablet, Take 150 mg by mouth as needed for Heartburn., Disp: , Rfl:     TRULICITY 0.75 mg/0.5 mL PnIj, INJECT 0.75 ML UNDER THE SKIN EVERY 7 DAYS, Disp: 2 mL, Rfl: 5    omeprazole (PRILOSEC) 20 MG capsule, Take 1 capsule (20 mg total) by mouth once daily., Disp: 30 capsule, Rfl: 0  Any other notable medications as documented in HPI    Allergies  Review of patient's allergies indicates:  No Known Allergies    Social History  Social History     Socioeconomic History    Marital status: Single     Spouse name: Not on file    Number of children: Not on file    Years of education: Not on file    Highest education level: Not on file   Occupational History    Not on file   Social Needs    Financial resource strain: Not on file    Food insecurity:     Worry: Not on file     Inability: Not on file    Transportation needs:     Medical: Not on file     Non-medical: Not on file   Tobacco Use    Smoking status: Never Smoker    Smokeless tobacco: Never Used   Substance and Sexual Activity    Alcohol use: No    Drug use: No    Sexual activity: Not Currently     Birth control/protection: Post-menopausal   Lifestyle    Physical activity:     Days per week: Not on file     Minutes per session: Not on file    Stress: Not  "on file   Relationships    Social connections:     Talks on phone: Not on file     Gets together: Not on file     Attends Restoration service: Not on file     Active member of club or organization: Not on file     Attends meetings of clubs or organizations: Not on file     Relationship status: Not on file   Other Topics Concern    Are you pregnant or think you may be? Not Asked    Breast-feeding Not Asked   Social History Narrative    Work a LPN at [employer]    Single, lives alone     Any other notable Social History as documented in HPI.    Family History  Family History   Problem Relation Age of Onset    Diabetes Mother     Hypertension Mother     Diabetes Father     Hypertension Father     Stroke Father     Diabetes Sister     Diabetes Brother     Cancer Brother         prostate    Prostate cancer Brother     Diabetes Sister     Diabetes Sister     Diabetes Sister     Diabetes Brother     Stroke Brother     Diabetes Brother     Diabetes Brother     Breast cancer Neg Hx     Colon cancer Neg Hx     Ovarian cancer Neg Hx      Any other notable FMH as documented in HPI.    Physical Exam  BP (!) 154/72   Pulse 80   Ht 5' 6" (1.676 m)   Wt 115.7 kg (255 lb)   BMI 41.16 kg/m²     General: Well-developed, well-groomed. No apparent distress  HENT: Normocephalic, atraumatic.   Cardiovascular: Regular rate and rhythm with no murmurs, rubs or gallops.    Chest: Lungs clear to auscultation bilaterally.  No wheezes, stridor, ronchi appreciated.  Abdomen: Normoactive bowel sounds present.  Soft, nontender to palpation.  Musculoskeletal: No peripheral edema, No joint swelling    Neurologic Exam:   Mental status and Cognition:  Awake, alert and oriented x3  Follows commands, answers questions appropriately  Short term memory intact  Praxis normal. Speech Normal.     Cranial nerves:   PERRLA. EOM intact. No Nystagmus. No ophthalmoplegia.   Visual fields are full to confrontation.    Facial sensation is normal " to light touch.   Facial expression is full and symmetric.   Hearing is intact bilaterally.   Palate elevates symmetrically.   SCM and Trapezius full strength bilaterally.   Tongue is midline.     Motor examination   normal bulk and tone in all four limbs. There are no atrophy or fasciculations.   Strength is 5/5 in the upper and lower extremities bilaterally.    Sensory examination  Sensation to light touch: intact in BUE and BLE  Sensation to vibration: intact in BUE and BLE  Could not reproduce symptoms with Tinel and Phalen's tests    Deep tendon reflexes   1+ and symmetric in the upper and lower extremities bilaterally.    No clonus. Babinski normal bilaterally.    Gait and Coordination:  Normal gait.  Finger to nose is normal bilaterally.        Lab and Test Results    WBC   Date Value Ref Range Status   02/10/2018 4.59 3.90 - 12.70 K/uL Final   08/28/2017 6.35 3.90 - 12.70 K/uL Final   01/09/2016 4.16 3.90 - 12.70 K/uL Final     Hemoglobin   Date Value Ref Range Status   02/10/2018 11.4 (L) 12.0 - 16.0 g/dL Final   08/28/2017 11.5 (L) 12.0 - 16.0 g/dL Final   01/09/2016 12.7 12.0 - 16.0 g/dL Final     Hematocrit   Date Value Ref Range Status   02/10/2018 35.7 (L) 37.0 - 48.5 % Final   08/28/2017 36.4 (L) 37.0 - 48.5 % Final   01/09/2016 40.8 37.0 - 48.5 % Final     Platelets   Date Value Ref Range Status   02/10/2018 236 150 - 350 K/uL Final   08/28/2017 253 150 - 350 K/uL Final   01/09/2016 283 150 - 350 K/uL Final     Glucose   Date Value Ref Range Status   08/24/2018 221 (H) 70 - 110 mg/dL Final   02/10/2018 136 (H) 70 - 110 mg/dL Final   08/28/2017 109 70 - 110 mg/dL Final     Sodium   Date Value Ref Range Status   08/24/2018 140 136 - 145 mmol/L Final   02/10/2018 141 136 - 145 mmol/L Final   08/28/2017 141 136 - 145 mmol/L Final     Potassium   Date Value Ref Range Status   08/24/2018 4.0 3.5 - 5.1 mmol/L Final   02/10/2018 4.8 3.5 - 5.1 mmol/L Final   08/28/2017 4.7 3.5 - 5.1 mmol/L Final     Chloride    Date Value Ref Range Status   08/24/2018 104 95 - 110 mmol/L Final   02/10/2018 106 95 - 110 mmol/L Final   08/28/2017 103 95 - 110 mmol/L Final     CO2   Date Value Ref Range Status   08/24/2018 28 23 - 29 mmol/L Final   02/10/2018 28 23 - 29 mmol/L Final   08/28/2017 30 (H) 23 - 29 mmol/L Final     BUN, Bld   Date Value Ref Range Status   08/24/2018 19 6 - 20 mg/dL Final   02/10/2018 19 6 - 20 mg/dL Final   08/28/2017 13 6 - 20 mg/dL Final     Creatinine   Date Value Ref Range Status   08/24/2018 0.9 0.5 - 1.4 mg/dL Final   02/10/2018 0.8 0.5 - 1.4 mg/dL Final   08/28/2017 0.9 0.5 - 1.4 mg/dL Final     Calcium   Date Value Ref Range Status   08/24/2018 9.1 8.7 - 10.5 mg/dL Final   02/10/2018 9.1 8.7 - 10.5 mg/dL Final   08/28/2017 9.3 8.7 - 10.5 mg/dL Final     Magnesium   Date Value Ref Range Status   02/10/2018 1.8 1.6 - 2.6 mg/dL Final   01/09/2016 2.6 1.6 - 2.6 mg/dL Final     Alkaline Phosphatase   Date Value Ref Range Status   02/10/2018 71 55 - 135 U/L Final   08/28/2017 69 55 - 135 U/L Final   12/17/2016 60 55 - 135 U/L Final     ALT   Date Value Ref Range Status   02/10/2018 29 10 - 44 U/L Final   08/28/2017 18 10 - 44 U/L Final   12/17/2016 10 10 - 44 U/L Final     AST   Date Value Ref Range Status   02/10/2018 24 10 - 40 U/L Final   08/28/2017 19 10 - 40 U/L Final   12/17/2016 17 10 - 40 U/L Final     HbA1C 8.3  B12 1309  ASHISH positive, 1:2560  Anti-CCP negative  RF negative    Images:  MRI of brain (2012):  chronic microvascular ischemic changes.      Assessment and Plan    Problem List Items Addressed This Visit        Orthopedic    Neck pain    Current Assessment & Plan     - Lidoderm patch and Capsaicin topical cream as needed (OTC).             Other    Paresthesia of both hands - Primary    Current Assessment & Plan     Patients presentation most consistent with carpal tunnel syndrome.     - Recommend using wrist splints nightly to hold both wrists at neutral position.   - Will obtain EMG of  both upper extremitys to evaluate for CTS.   - Lidoderm patch and Capsaicin topical cream as needed (OTC).   - Gabapentin 300 mg nightly, can increase to 300 mg three times daily as tolerated (patient has supply at home)         Relevant Orders    EMG W/ ULTRASOUND AND NERVE CONDUCTION TEST 2 Extremities              Haven Starks MD  PGY-II, Neurology Resident  [facility]  [address] [place] [address]

## 2019-05-01 NOTE — PROGRESS NOTES
Last 5 Patient Entered Readings                                      Current 30 Day Average: 135/76     Recent Readings 4/30/2019 4/27/2019 4/27/2019 4/24/2019 4/18/2019    SBP (mmHg) 134 141 156 136 139    DBP (mmHg) 80 78 86 64 75    Pulse 77 71 78 75 79        LVM to discuss BP readings and medications.

## 2019-05-07 NOTE — ASSESSMENT & PLAN NOTE
Patients presentation most consistent with carpal tunnel syndrome.     - Recommend using wrist splints nightly to hold both wrists at neutral position.   - Will obtain EMG of both upper extremitys to evaluate for CTS.   - Lidoderm patch and Capsaicin topical cream as needed (OTC).   - Gabapentin 300 mg nightly, can increase to 300 mg three times daily as tolerated (patient has supply at home)

## 2019-05-08 ENCOUNTER — OFFICE VISIT (OUTPATIENT)
Dept: ENDOCRINOLOGY | Facility: CLINIC | Age: 64
End: 2019-05-08
Payer: COMMERCIAL

## 2019-05-08 VITALS
BODY MASS INDEX: 41.03 KG/M2 | DIASTOLIC BLOOD PRESSURE: 82 MMHG | HEIGHT: 66 IN | HEART RATE: 76 BPM | SYSTOLIC BLOOD PRESSURE: 121 MMHG | WEIGHT: 255.31 LBS

## 2019-05-08 DIAGNOSIS — E11.3593 TYPE 2 DIABETES MELLITUS WITH BOTH EYES AFFECTED BY PROLIFERATIVE RETINOPATHY WITHOUT MACULAR EDEMA, WITH LONG-TERM CURRENT USE OF INSULIN: Primary | ICD-10-CM

## 2019-05-08 DIAGNOSIS — Z79.4 TYPE 2 DIABETES MELLITUS WITH BOTH EYES AFFECTED BY PROLIFERATIVE RETINOPATHY WITHOUT MACULAR EDEMA, WITH LONG-TERM CURRENT USE OF INSULIN: Primary | ICD-10-CM

## 2019-05-08 DIAGNOSIS — E66.01 CLASS 3 SEVERE OBESITY DUE TO EXCESS CALORIES WITH SERIOUS COMORBIDITY AND BODY MASS INDEX (BMI) OF 40.0 TO 44.9 IN ADULT: ICD-10-CM

## 2019-05-08 DIAGNOSIS — I25.10 CORONARY ARTERY DISEASE INVOLVING NATIVE CORONARY ARTERY OF NATIVE HEART WITHOUT ANGINA PECTORIS: ICD-10-CM

## 2019-05-08 DIAGNOSIS — G47.33 OSA ON CPAP: ICD-10-CM

## 2019-05-08 PROCEDURE — 3008F PR BODY MASS INDEX (BMI) DOCUMENTED: ICD-10-PCS | Mod: CPTII,S$GLB,, | Performed by: NURSE PRACTITIONER

## 2019-05-08 PROCEDURE — 3008F BODY MASS INDEX DOCD: CPT | Mod: CPTII,S$GLB,, | Performed by: NURSE PRACTITIONER

## 2019-05-08 PROCEDURE — 3074F SYST BP LT 130 MM HG: CPT | Mod: CPTII,S$GLB,, | Performed by: NURSE PRACTITIONER

## 2019-05-08 PROCEDURE — 3079F DIAST BP 80-89 MM HG: CPT | Mod: CPTII,S$GLB,, | Performed by: NURSE PRACTITIONER

## 2019-05-08 PROCEDURE — 99214 PR OFFICE/OUTPT VISIT, EST, LEVL IV, 30-39 MIN: ICD-10-PCS | Mod: S$GLB,,, | Performed by: NURSE PRACTITIONER

## 2019-05-08 PROCEDURE — 3079F PR MOST RECENT DIASTOLIC BLOOD PRESSURE 80-89 MM HG: ICD-10-PCS | Mod: CPTII,S$GLB,, | Performed by: NURSE PRACTITIONER

## 2019-05-08 PROCEDURE — 3074F PR MOST RECENT SYSTOLIC BLOOD PRESSURE < 130 MM HG: ICD-10-PCS | Mod: CPTII,S$GLB,, | Performed by: NURSE PRACTITIONER

## 2019-05-08 PROCEDURE — 99214 OFFICE O/P EST MOD 30 MIN: CPT | Mod: S$GLB,,, | Performed by: NURSE PRACTITIONER

## 2019-05-08 PROCEDURE — 99999 PR PBB SHADOW E&M-EST. PATIENT-LVL IV: ICD-10-PCS | Mod: PBBFAC,,, | Performed by: NURSE PRACTITIONER

## 2019-05-08 PROCEDURE — 99999 PR PBB SHADOW E&M-EST. PATIENT-LVL IV: CPT | Mod: PBBFAC,,, | Performed by: NURSE PRACTITIONER

## 2019-05-08 PROCEDURE — 3045F PR MOST RECENT HEMOGLOBIN A1C LEVEL 7.0-9.0%: ICD-10-PCS | Mod: CPTII,S$GLB,, | Performed by: NURSE PRACTITIONER

## 2019-05-08 PROCEDURE — 3045F PR MOST RECENT HEMOGLOBIN A1C LEVEL 7.0-9.0%: CPT | Mod: CPTII,S$GLB,, | Performed by: NURSE PRACTITIONER

## 2019-05-08 RX ORDER — METFORMIN HYDROCHLORIDE 500 MG/1
1000 TABLET, EXTENDED RELEASE ORAL 2 TIMES DAILY WITH MEALS
Qty: 360 TABLET | Refills: 3 | Status: SHIPPED | OUTPATIENT
Start: 2019-05-08 | End: 2020-07-05 | Stop reason: SDUPTHER

## 2019-05-08 RX ORDER — PEN NEEDLE, DIABETIC 30 GX3/16"
NEEDLE, DISPOSABLE MISCELLANEOUS
Qty: 100 EACH | Refills: 11 | Status: SHIPPED | OUTPATIENT
Start: 2019-05-08 | End: 2022-04-29

## 2019-05-08 NOTE — PATIENT INSTRUCTIONS
Snacks can be an important part of a balanced, healthy meal plan. They allow you to eat more frequently, feeling full and satisfied throughout the day. Also, they allow you to spread carbohydrates evenly, which may stabilize blood sugars.  Plus, snacks are enjoyable!     The amount of carbohydrate needed at snacks varies. Generally, about 15-30 grams of carbohydrate per snack is recommended.  Below you will find some tasty treats.       0-5 gm carb   Crystal Light   Vitamin Water Zero   Herbal tea, unsweetened   2 tsp peanut butter on celery   1./2 cup sugar-free jell-o   1 sugar-free popsicle   ¼ cup blueberries   8oz Blue Brenda unsweetened almond milk   5 baby carrots & celery sticks, cucumbers, bell peppers dipped in ¼ cup salsa, 2Tbsp light ranch dressing or 2Tbsp plain Greek yogurt   10 Goldfish crackers   ½ oz low-fat cheese or string cheese   1 closed handful of nuts, unsalted   1 Tbsp of sunflower seeds, unsalted   1 cup Smart Pop popcorn   1 whole grain brown rice cake        15 gm carb   1 small piece of fruit or ½ banana or 1/2 cup lite canned fruit   3 concepcion cracker squares   3 cups Smart Pop popcorn, top spray butter, Jaramillo lite salt or cinnamon and Truvia   5 Vanilla Wafers   ½ cup low fat, no added sugar ice cream or frozen yogurt (Blue bell, Blue Bunny, Weight Watchers, Skinny Cow)   ½ turkey, ham, or chicken sandwich   ½ c fruit with ½ c Cottage cheese   4-6 unsalted wheat crackers with 1 oz low fat cheese or 1 tbsp peanut butter    30-45 goldfish crackers (depending on flavor)    7-8 Confucianism mini brown rice cakes (caramel, apple cinnamon, chocolate)    12 Confucianism mini brown rice cakes (cheddar, bbq, ranch)    1/3 cup hummus dip with raw veg   1/2 whole wheat love, 1Tbsp hummus   Mini Pizza (1/2 whole wheat English muffin, low-fat  cheese, tomato sauce)   100 calorie snack pack (Oreo, Chips Ahoy, Ritz Mix, Baked Cheetos)   4-6 oz. light or Greek Style yogurt  (Riki, Andre, Barb, [facility])   ½ cup sugar-free pudding     6 in. wheat tortilla or love oven toasted chips (topped with spray butter flavoring, cinnamon, Truvia OR spray butter, garlic powder, chili powder)    18 BBQ Popchips (available at Target, Whole Foods, Fresh Market)                   Diabetes Support Group Meetings         Date: Topic:   February 14 Eat Fit BRUNO/Health Promotion   March 14 Taking Care of Your Smile   April 11 Spring into Healthy Eating/Cooking Demo   May 9 Ease Your Mind with Diabetes   Roxie 13 Summer Treats/Cooking Demo   July 11 Eat Fit BRUNO/Super Market Sweep   August 8 Taking Care of Your Eyes and Feet   Sept 12 Technology/ADA updates   October 10 Recipes & Treats/Cooking Demo   November 14 Heart Health/Pump it up!   December 12 Year-End Close Out        Meetings are held in the [facility] (A) of the [facility] located at [address] [place] [address]. Please call [phone] for additional information.    Free service, offered every 2nd Thursday of every month! Family members and/or friends are welcome as well!  Support group is for patients with type 1 or type 2 diabetes.    From 3:30p to 4:30p

## 2019-05-08 NOTE — PROGRESS NOTES
CHIEF COMPLAINT: Type 2 Diabetes     HPI: Mrs. Aaliyah Angela is a 63 y.o. female who was diagnosed with gestational diabetes age 20s, Type 2 DM age 30s.    Pt was last seen by me in Nov 2018 and is now being seen by me again today.  Pt has not been taking jardiance r/t lawsuits/commericials, worried about SEs.  Has h/o gastric sleeve 2015.   Needs lab work today.     Current meds: off jardiance, trulicity 1.5 mg weekly, metformin 500 mg bid     Lab Results   Component Value Date    HGBA1C 8.3 (H) 11/17/2018     Diabetes Management Status    Statin: Taking  ACE/ARB: Taking    Screening or Prevention Patient's value Goal Complete/Controlled?   HgA1C Testing and Control   Lab Results   Component Value Date    HGBA1C 8.3 (H) 11/17/2018      Annually/Less than 8% No   Lipid profile : 02/10/2018 Annually No   LDL control Lab Results   Component Value Date    LDLCALC 73.6 02/10/2018    Annually/Less than 100 mg/dl  No   Nephropathy screening Lab Results   Component Value Date    LABMICR 15.0 08/24/2018     Lab Results   Component Value Date    PROTEINUA Negative 10/16/2015    Annually Yes   Blood pressure BP Readings from Last 1 Encounters:   05/08/19 121/82    Less than 140/90 No   Dilated retinal exam : 05/31/2018 Annually Yes   Foot exam   : 08/24/2018 Annually Yes     REVIEW OF SYSTEMS  General: no weakness, fatigue, + weight changes #7  Wt gain since Nov 2018   Eyes: no double or blurred vision, eye pain, or redness; Last Eye Exam 5/2018, due 5/16/19, 6/3/19 sees retinal specialist.   Cardiovascular:  No chest pain, palpitations, edema, or murmurs.   Respiratory: no cough or dyspnea.   GI: no heartburn, nausea, or changes in bowel patterns; good appetite. +GERD  Skin: no rashes, dryness, itching, or reactions at insulin injection sites.  Neuro: + numbness, tingling, tremors, or vertigo. +HAs (chronic)  Endocrine: no polyuria, polydipsia, polyphagia, heat or cold intolerance.     Recommendations: 150 minutes of  "moderate PA or 75 mins of vigorous PA a week  Moderate or high intensity, involve all major muscle groups on >2 days per week.    Vital Signs  /82 (BP Location: Left arm, Patient Position: Sitting, BP Method: Medium (Manual))   Pulse 76   Ht 5' 6" (1.676 m)   Wt 115.8 kg (255 lb 4.7 oz)   BMI 41.21 kg/m²     Hemoglobin A1C   Date Value Ref Range Status   11/17/2018 8.3 (H) 4.0 - 5.6 % Final     Comment:     ADA Screening Guidelines:  5.7-6.4%  Consistent with prediabetes  >or=6.5%  Consistent with diabetes  High levels of fetal hemoglobin interfere with the HbA1C  assay. Heterozygous hemoglobin variants (HbS, HgC, etc)do  not significantly interfere with this assay.   However, presence of multiple variants may affect accuracy.     08/24/2018 8.5 (H) 4.0 - 5.6 % Final     Comment:     ADA Screening Guidelines:  5.7-6.4%  Consistent with prediabetes  >or=6.5%  Consistent with diabetes  High levels of fetal hemoglobin interfere with the HbA1C  assay. Heterozygous hemoglobin variants (HbS, HgC, etc)do  not significantly interfere with this assay.   However, presence of multiple variants may affect accuracy.     02/10/2018 7.5 (H) 4.0 - 5.6 % Final     Comment:     According to ADA guidelines, hemoglobin A1c <7.0% represents  optimal control in non-pregnant diabetic patients. Different  metrics may apply to specific patient populations.   Standards of Medical Care in Diabetes-2016.  For the purpose of screening for the presence of diabetes:  <5.7%     Consistent with the absence of diabetes  5.7-6.4%  Consistent with increasing risk for diabetes   (prediabetes)  >or=6.5%  Consistent with diabetes  Currently, no consensus exists for use of hemoglobin A1c  for diagnosis of diabetes for children.  This Hemoglobin A1c assay has significant interference with fetal   hemoglobin   (HbF). The results are invalid for patients with abnormal amounts of   HbF,   including those with known Hereditary Persistence   of Fetal " Hemoglobin. Heterozygous hemoglobin variants (HbAS, HbAC,   HbAD, HbAE, HbA2) do not significantly interfere with this assay;   however, presence of multiple variants in a sample may impact the %   interference.          Chemistry        Component Value Date/Time     08/24/2018 1608    K 4.0 08/24/2018 1608     08/24/2018 1608    CO2 28 08/24/2018 1608    BUN 19 08/24/2018 1608    CREATININE 0.9 08/24/2018 1608     (H) 08/24/2018 1608        Component Value Date/Time    CALCIUM 9.1 08/24/2018 1608    ALKPHOS 71 02/10/2018 0854    AST 24 02/10/2018 0854    ALT 29 02/10/2018 0854    BILITOT 0.4 02/10/2018 0854    ESTGFRAFRICA >60.0 08/24/2018 1608    EGFRNONAA >60.0 08/24/2018 1608           Lab Results   Component Value Date    TSH 1.544 02/10/2018      Lab Results   Component Value Date    CHOL 136 02/10/2018    CHOL 126 12/17/2016    CHOL 126 08/20/2016     Lab Results   Component Value Date    HDL 53 02/10/2018    HDL 45 12/17/2016    HDL 43 08/20/2016     Lab Results   Component Value Date    LDLCALC 73.6 02/10/2018    LDLCALC 67.8 12/17/2016    LDLCALC 71.8 08/20/2016     Lab Results   Component Value Date    TRIG 47 02/10/2018    TRIG 66 12/17/2016    TRIG 56 08/20/2016     Lab Results   Component Value Date    CHOLHDL 39.0 02/10/2018    CHOLHDL 35.7 12/17/2016    CHOLHDL 34.1 08/20/2016       PHYSICAL EXAMINATION  Constitutional: Appears well, no distress  Neck: Supple, trachea midline.   Respiratory: Np wheezes, even and unlabored.  Cardiovascular: RRR; no murmurs; (+) radial pulses  Lymph: no lymphadenopathy palpated,  Trace  BLE edema  Skin: warm and dry; no injection site reactions, no acanthosis nigracans observed.  Neuro:patient alert and cooperative, normal affect; steady gait.    Diabetes Foot Exam:   Deferred   12/31/18         Assessment/Plan  1. 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 by proliferative retinopathy without macular edema, with long-term current use of  insulin  Hemoglobin A1c today    Hemoglobin A1c next time     TSH today    Lipid panel next time    Comprehensive metabolic panel today    Add back jardiance 10 mg daily  Discussed Benefits outweigh risks  Continue trulicity 1.5 mg weekly   Increase metformin to 1000 mg bid  novolog correction scale    Continue use of freestyle kimo    a1c goal < 7%  Needs labs today   2. Class 3 severe obesity due to excess calories with serious comorbidity and body mass index (BMI) of 40.0 to 44.9 in adult  Body mass index is 41.21 kg/m². may increase insulin resistance.  Encourage exercise 3 times a week 30 mins per session   On glp1a   3. Coronary artery disease involving native coronary artery of native heart without angina pectoris  Avoid hypoglycemia    4. BRADFORD on CPAP  Continue use  If uncontrolled, may increase insulin resistance   5. Uncontrolled type 2 diabetes mellitus with diabetic neuropathy, with long-term current use of insulin  metFORMIN (GLUCOPHAGE-XR) 500 MG 24 hr tablet  Optimize bg will help with condition      FOLLOW UP  Follow up in about 4 months (around 9/8/2019).

## 2019-05-11 ENCOUNTER — HOSPITAL ENCOUNTER (OUTPATIENT)
Dept: RADIOLOGY | Facility: HOSPITAL | Age: 64
Discharge: HOME | End: 2019-05-11
Attending: NURSE PRACTITIONER
Payer: COMMERCIAL

## 2019-05-11 DIAGNOSIS — Z12.39 BREAST CANCER SCREENING: ICD-10-CM

## 2019-05-11 PROCEDURE — 77063 MAMMO DIGITAL SCREENING BILAT WITH TOMOSYNTHESIS_CAD: ICD-10-PCS | Mod: 26,,, | Performed by: RADIOLOGY

## 2019-05-11 PROCEDURE — 77067 SCR MAMMO BI INCL CAD: CPT | Mod: 26,,, | Performed by: RADIOLOGY

## 2019-05-11 PROCEDURE — 77067 MAMMO DIGITAL SCREENING BILAT WITH TOMOSYNTHESIS_CAD: ICD-10-PCS | Mod: 26,,, | Performed by: RADIOLOGY

## 2019-05-11 PROCEDURE — 77063 BREAST TOMOSYNTHESIS BI: CPT | Mod: 26,,, | Performed by: RADIOLOGY

## 2019-05-11 PROCEDURE — 77067 SCR MAMMO BI INCL CAD: CPT | Mod: TC

## 2019-05-13 NOTE — PROGRESS NOTES
I have seen the patient, reviewed the Resident's history and physical, assessment and plan. I have personally interviewed and examined the patient at bedside and agree with the findings.       MD Jayden Madden - Neurology

## 2019-05-17 ENCOUNTER — OFFICE VISIT (OUTPATIENT)
Dept: OPHTHALMOLOGY | Facility: CLINIC | Age: 64
End: 2019-05-17
Payer: COMMERCIAL

## 2019-05-17 DIAGNOSIS — Z79.4 TYPE 2 DIABETES MELLITUS WITH BOTH EYES AFFECTED BY PROLIFERATIVE RETINOPATHY WITHOUT MACULAR EDEMA, WITH LONG-TERM CURRENT USE OF INSULIN: Primary | ICD-10-CM

## 2019-05-17 DIAGNOSIS — H25.13 NUCLEAR SCLEROSIS OF BOTH EYES: ICD-10-CM

## 2019-05-17 DIAGNOSIS — E11.3593 TYPE 2 DIABETES MELLITUS WITH BOTH EYES AFFECTED BY PROLIFERATIVE RETINOPATHY WITHOUT MACULAR EDEMA, WITH LONG-TERM CURRENT USE OF INSULIN: Primary | ICD-10-CM

## 2019-05-17 PROCEDURE — 99999 PR PBB SHADOW E&M-EST. PATIENT-LVL III: ICD-10-PCS | Mod: PBBFAC,,, | Performed by: OPHTHALMOLOGY

## 2019-05-17 PROCEDURE — 92134 POSTERIOR SEGMENT OCT RETINA (OCULAR COHERENCE TOMOGRAPHY)-BOTH EYES: ICD-10-PCS | Mod: S$GLB,,, | Performed by: OPHTHALMOLOGY

## 2019-05-17 PROCEDURE — 92014 COMPRE OPH EXAM EST PT 1/>: CPT | Mod: S$GLB,,, | Performed by: OPHTHALMOLOGY

## 2019-05-17 PROCEDURE — 92226 PR SPECIAL EYE EXAM, SUBSEQUENT: ICD-10-PCS | Mod: RT,S$GLB,, | Performed by: OPHTHALMOLOGY

## 2019-05-17 PROCEDURE — 92134 CPTRZ OPH DX IMG PST SGM RTA: CPT | Mod: S$GLB,,, | Performed by: OPHTHALMOLOGY

## 2019-05-17 PROCEDURE — 99999 PR PBB SHADOW E&M-EST. PATIENT-LVL III: CPT | Mod: PBBFAC,,, | Performed by: OPHTHALMOLOGY

## 2019-05-17 PROCEDURE — 92014 PR EYE EXAM, EST PATIENT,COMPREHESV: ICD-10-PCS | Mod: S$GLB,,, | Performed by: OPHTHALMOLOGY

## 2019-05-17 PROCEDURE — 92226 PR SPECIAL EYE EXAM, SUBSEQUENT: CPT | Mod: RT,S$GLB,, | Performed by: OPHTHALMOLOGY

## 2019-05-17 NOTE — PROGRESS NOTES
Subjective:       Patient ID: Aaliyah Angela is a 63 y.o. female      Chief Complaint   Patient presents with    Diabetic Eye Exam     History of Present Illness  HPI     DLS: 4/10/2017  Dr. Mckeon    Pt states that her vision is blurry sometimes. No flashes, some   floaters.OD on/off . No pain    Meds: No GTTS    LBS - 144 this  morning    Hemoglobin A1C       Date                     Value               Ref Range             Status                02/10/2018               7.5 (H)             4.0 - 5.6 %           Final                    11/04/2017               7.0 (H)             4.0 - 5.6 %           Final                     08/03/2017               7.1 (H)             4.0 - 5.6 %           Final                  1. Anatomical narrow angle borderline glaucoma, bilateral  2. Ocular hypertension, bilateral  3. Proliferative diabetic retinopathy of both eyes without macular edema   associated with type 2 diabetes mellitus  4. Hypertensive retinopathy, bilateral  5. Epiretinal membrane, bilateral  6. Vitreous hemorrhage, right  7. Cortical cataract     Last edited by Reggie Mckeon MD on 5/17/2019  5:38 PM. (History)        Imaging:    See report    Assessment/Plan:     1. 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 by proliferative retinopathy without macular edema, with long-term current use of insulin  Stable s/p PRP  Diabetic Retinopathy discussed in detail, all questions answered  Stressed importance of good BS/BP/Chol Control      - Posterior Segment OCT Retina-Both eyes    2. Nuclear sclerosis of both eyes  No contraindication to CE/IOL from retina standpoint if pt desires    Follow up in about 1 year (around 5/17/2020), or if symptoms worsen or fail to improve, for OCT Mac, Comprehensive Examination.

## 2019-05-24 ENCOUNTER — PATIENT OUTREACH (OUTPATIENT)
Dept: OTHER | Facility: OTHER | Age: 64
End: 2019-05-24

## 2019-05-24 NOTE — PROGRESS NOTES
Last 5 Patient Entered Readings                                      Current 30 Day Average: 134/76     Recent Readings 5/17/2019 4/30/2019 4/27/2019 4/27/2019 4/24/2019    SBP (mmHg) 123 134 141 156 136    DBP (mmHg) 74 80 78 86 64    Pulse 78 77 71 78 75          Digital Medicine: Health  Follow Up    Left voicemail to follow up with Ms. Aaliyah Angela.  Current BP average 134/76 mmHg is not at goal, 130/80.  Patient BP is trending downward. Sent [employer] message. WCB in 3 weeks.

## 2019-06-03 ENCOUNTER — OFFICE VISIT (OUTPATIENT)
Dept: OPHTHALMOLOGY | Facility: CLINIC | Age: 64
End: 2019-06-03
Payer: COMMERCIAL

## 2019-06-03 ENCOUNTER — CLINICAL SUPPORT (OUTPATIENT)
Dept: OPHTHALMOLOGY | Facility: CLINIC | Age: 64
End: 2019-06-03
Payer: COMMERCIAL

## 2019-06-03 DIAGNOSIS — H40.053 OCULAR HYPERTENSION, BILATERAL: Primary | ICD-10-CM

## 2019-06-03 DIAGNOSIS — H35.373 EPIRETINAL MEMBRANE, BILATERAL: ICD-10-CM

## 2019-06-03 DIAGNOSIS — E11.3593 TYPE 2 DIABETES MELLITUS WITH BOTH EYES AFFECTED BY PROLIFERATIVE RETINOPATHY WITHOUT MACULAR EDEMA, WITH LONG-TERM CURRENT USE OF INSULIN: ICD-10-CM

## 2019-06-03 DIAGNOSIS — H35.033 HYPERTENSIVE RETINOPATHY, BILATERAL: ICD-10-CM

## 2019-06-03 DIAGNOSIS — Z79.4 TYPE 2 DIABETES MELLITUS WITH BOTH EYES AFFECTED BY PROLIFERATIVE RETINOPATHY WITHOUT MACULAR EDEMA, WITH LONG-TERM CURRENT USE OF INSULIN: ICD-10-CM

## 2019-06-03 DIAGNOSIS — E11.3593 PROLIFERATIVE DIABETIC RETINOPATHY OF BOTH EYES WITHOUT MACULAR EDEMA ASSOCIATED WITH TYPE 2 DIABETES MELLITUS: ICD-10-CM

## 2019-06-03 DIAGNOSIS — H25.13 NUCLEAR SCLEROSIS OF BOTH EYES: ICD-10-CM

## 2019-06-03 DIAGNOSIS — H40.033 ANATOMICAL NARROW ANGLE BORDERLINE GLAUCOMA OF BOTH EYES: ICD-10-CM

## 2019-06-03 PROCEDURE — 92133 POSTERIOR SEGMENT OCT OPTIC NERVE(OCULAR COHERENCE TOMOGRAPHY) - OU - BOTH EYES: ICD-10-PCS | Mod: S$GLB,,, | Performed by: OPHTHALMOLOGY

## 2019-06-03 PROCEDURE — 99999 PR PBB SHADOW E&M-EST. PATIENT-LVL II: ICD-10-PCS | Mod: PBBFAC,,, | Performed by: OPHTHALMOLOGY

## 2019-06-03 PROCEDURE — 92014 COMPRE OPH EXAM EST PT 1/>: CPT | Mod: S$GLB,,, | Performed by: OPHTHALMOLOGY

## 2019-06-03 PROCEDURE — 92014 PR EYE EXAM, EST PATIENT,COMPREHESV: ICD-10-PCS | Mod: S$GLB,,, | Performed by: OPHTHALMOLOGY

## 2019-06-03 PROCEDURE — 92083 HUMPHREY VISUAL FIELD - OU - BOTH EYES: ICD-10-PCS | Mod: S$GLB,,, | Performed by: OPHTHALMOLOGY

## 2019-06-03 PROCEDURE — 92083 EXTENDED VISUAL FIELD XM: CPT | Mod: S$GLB,,, | Performed by: OPHTHALMOLOGY

## 2019-06-03 PROCEDURE — 99999 PR PBB SHADOW E&M-EST. PATIENT-LVL II: CPT | Mod: PBBFAC,,, | Performed by: OPHTHALMOLOGY

## 2019-06-03 PROCEDURE — 92133 CPTRZD OPH DX IMG PST SGM ON: CPT | Mod: S$GLB,,, | Performed by: OPHTHALMOLOGY

## 2019-06-03 NOTE — PROGRESS NOTES
HPI     Glaucoma      Additional comments: HVF and OCT review today               Comments      DLS: 5/31/18    1) OHT vs Pre-perimetric POAG  2) Narrow Angles  3) PDR OU  4) Hx VH OD  5) NS OU  6) Hx TVO OS            Last edited by Josselin Barros MA on 6/3/2019  4:03 PM. (History)            Assessment /Plan     For exam results, see Encounter Report.    Ocular hypertension, bilateral    Anatomical narrow angle borderline glaucoma of both eyes    Nuclear sclerosis of both eyes    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 by proliferative retinopathy without macular edema, with long-term current use of insulin    Proliferative diabetic retinopathy of both eyes without macular edema associated with type 2 diabetes mellitus    Hypertensive retinopathy, bilateral    Epiretinal membrane, bilateral        1. OHT vs Pre-perimetric POAG OU (angles  Narrowing over time)  Followed at [facility] since '04   First seen by [facility] '08   Never on gtts   VF defects 2/2 PRP     Family history none   Glaucoma meds None   H/O adverse rxn to glaucoma drops none   LASERS Mulitple PRP OU // PI OD 2/9/2017 /// PI os 1/26/2017   GLAUCOMA SURGERIES None   OTHER EYE SURGERIES none   CDR 0.3/0.35   Tbase 15-23/15-25   Tmax 25 OU   Ttarget ??   HVF 8 tests: 2008 to 2019 - SAD/IAD OU 2/2 PRP OU   Gonio +1-3  with steep approach OU 2018  (doubt occludable)   /575   OCT 5 tests: 2008 to 2019 -  RNFL nl od // nl os    HRT 6 tests: 2009 to 2018 - MR -  nl od // nl os /// CDR 0.33 od // 0.26 os  Disc photos 2002, 2003, 2005 (slides) // 2008, 2013  (OIS)     - Ttoday  20/21  - Test done today  HVF / DFE / OCT     2. Narrow Angles:  +1-3 w/ bowing  s/p PI. Likely will resolve in future once pseudophakic.  Anterior Segment OCT today w/ Open Angles w/ narrow approach OU  Doubt occludable - monitor gonio     3. PDR OU s/p PDR OU -stable exam on last eval w/ Retina on 7/25/13.   S/p avastin x 4 od - last 1/4/2016     4. Hx of VH OD -stable. Last  "seen by retina - jesse 1/4/2016    5. Cataract OU -not visually significant. observe    6. Hx of Transient Visual Obscurations OS - ? BP elevation. Seen w/ stable Retinal exam on 9/13/12.     7. Had a gastric "sleve" done 12/18/2015 - for weight loss    So far doing well    On less insulin now     Plan:  Stable IOP (thick k's), and HRT wnl today   Cont no gtts  Continue to work on blood sugars  oht vs glaucoma    RTC:  Angles continue narrow but open     S/P PI  os 1/26/2017 -   S/P PI od - 2/9/2017 -     F/U 9 months with IOP check-   HRT/Gonio/ AR/MR/BAT - cataract check     Remove cataract  Prn - BATh 20/100 - pt is content with vision for now and IOP is still ok     Cont with regular F/U's with Dr. Mckeon     I have seen and personally examined the patient.  I agree with the findings, assessment and plan of the resident and/or fellow.     Alma Goodrich MD                   "

## 2019-06-04 ENCOUNTER — PATIENT OUTREACH (OUTPATIENT)
Dept: OTHER | Facility: OTHER | Age: 64
End: 2019-06-04

## 2019-06-04 NOTE — PROGRESS NOTES
hvf done;rel/fix/Poor OD Good OS coop. Good /checked chart for latex allergy.-[facility]

## 2019-06-04 NOTE — PROGRESS NOTES
Last 5 Patient Entered Readings                                      Current 30 Day Average: 152/84     Recent Readings 5/28/2019 5/26/2019 5/26/2019 5/26/2019 5/25/2019    SBP (mmHg) 148 145 139 152 191    DBP (mmHg) 77 73 81 87 98    Pulse 85 85 80 76 105        LVM to discuss BP readings and medications.

## 2019-06-13 NOTE — PROGRESS NOTES
Last 5 Patient Entered Readings                                      Current 30 Day Average: 151/83     Recent Readings 6/5/2019 6/5/2019 6/4/2019 5/28/2019 5/26/2019    SBP (mmHg) 141 154 155 148 145    DBP (mmHg) 83 80 84 77 73    Pulse 73 76 79 85 85          Digital Medicine: Health  Follow Up    Left voicemail to follow up with Ms. Aaliyah Angela.  Current BP average 151/83 mmHg is not at goal, 130/80.  Patient BP is trending upward. WCB in 2 weeks

## 2019-06-20 NOTE — PROGRESS NOTES
Last 5 Patient Entered Readings                                      Current 30 Day Average: 151/82     Recent Readings 6/18/2019 6/17/2019 6/5/2019 6/5/2019 6/4/2019    SBP (mmHg) 138 138 141 154 155    DBP (mmHg) 75 67 83 80 84    Pulse 82 75 73 76 79        LVM to discuss BP readings and medications.

## 2019-06-23 DIAGNOSIS — I10 HTN (HYPERTENSION), BENIGN: ICD-10-CM

## 2019-06-24 RX ORDER — LOSARTAN POTASSIUM 50 MG/1
TABLET ORAL
Qty: 30 TABLET | Refills: 11 | Status: SHIPPED | OUTPATIENT
Start: 2019-06-24 | End: 2020-01-03

## 2019-06-27 NOTE — PROGRESS NOTES
Last 5 Patient Entered Readings                                      Current 30 Day Average: 142/77     Recent Readings 6/26/2019 6/18/2019 6/17/2019 6/5/2019 6/5/2019    SBP (mmHg) 132 138 138 141 154    DBP (mmHg) 75 75 67 83 80    Pulse 74 82 75 73 76          Digital Medicine: Health  Follow Up    Left voicemail to follow up with Ms. Aaliyah Angela.  Current BP average 142/77 mmHg is not at goal, 130/80.  Patient BP is trending downward. WCB in 2-3 weeks.

## 2019-07-02 NOTE — PROGRESS NOTES
Last 5 Patient Entered Readings                                      Current 30 Day Average: 141/78     Recent Readings 6/30/2019 6/28/2019 6/26/2019 6/18/2019 6/17/2019    SBP (mmHg) 137 147 132 138 138    DBP (mmHg) 84 74 75 75 67    Pulse 82 86 74 82 75        LVM to discuss BP readings and medications.

## 2019-07-15 NOTE — PROGRESS NOTES
Last 5 Patient Entered Readings                                      Current 30 Day Average: 133/76     Recent Readings 7/13/2019 7/10/2019 6/30/2019 6/28/2019 6/26/2019    SBP (mmHg) 118 119 137 147 132    DBP (mmHg) 82 73 84 74 75    Pulse 79 81 82 86 74          Digital Medicine: Health  Follow Up    Unable to leave voicemail to follow up with Mrs. Aaliyah Angela.  Current BP average 133/76 mmHg is not at goal, 130/80.  WCB in 2 weeks. Sent [employer] message as well.

## 2019-07-24 RX ORDER — METOPROLOL SUCCINATE 50 MG/1
TABLET, EXTENDED RELEASE ORAL
Qty: 90 TABLET | Refills: 0 | Status: SHIPPED | OUTPATIENT
Start: 2019-07-24 | End: 2019-10-20 | Stop reason: SDUPTHER

## 2019-07-29 NOTE — PROGRESS NOTES
Last 5 Patient Entered Readings                                      Current 30 Day Average: 128/78     Recent Readings 7/16/2019 7/13/2019 7/10/2019 6/30/2019 6/28/2019    SBP (mmHg) 137 118 119 137 147    DBP (mmHg) 73 82 73 84 74    Pulse 83 79 81 82 86          Digital Medicine: Health  Follow Up    Left voicemail to follow up with Ms. Aaliyah Angela.  Current BP average 128/78 mmHg is at goal, 130/80.  Patient BP is at goal. Patient hasn't submitted a reading since the 7/16/19.  Sent [employer] message. WCB in 3 weeks.

## 2019-08-02 ENCOUNTER — PATIENT MESSAGE (OUTPATIENT)
Dept: ENDOCRINOLOGY | Facility: CLINIC | Age: 64
End: 2019-08-02

## 2019-08-12 NOTE — PROGRESS NOTES
Last 5 Patient Entered Readings                                      Current 30 Day Average: 129/77     Recent Readings 8/4/2019 7/28/2019 7/16/2019 7/13/2019 7/10/2019    SBP (mmHg) 144 118 137 118 119    DBP (mmHg) 84 69 73 82 73    Pulse 90 86 83 79 81        Unable to LVM to discuss BP readings and medications.

## 2019-08-19 NOTE — PROGRESS NOTES
Last 5 Patient Entered Readings                                      Current 30 Day Average: 132/78     Recent Readings 8/14/2019 8/4/2019 7/28/2019 7/16/2019 7/13/2019    SBP (mmHg) 133 144 118 137 118    DBP (mmHg) 80 84 69 73 82    Pulse 77 90 86 83 79          Digital Medicine: Health  Follow Up    Unable to leave voicemail to follow up with Ms. Aaliyah Angela because mailbox is full.  Current BP average 132/78 mmHg is not at goal, 130/80.  Patient BP is trending up. Sent [employer] message. WCB in 2 weeks.   7th attempt to contact. Starting non-compliance.

## 2019-08-20 ENCOUNTER — PROCEDURE VISIT (OUTPATIENT)
Dept: NEUROLOGY | Facility: CLINIC | Age: 64
End: 2019-08-20
Payer: COMMERCIAL

## 2019-08-20 DIAGNOSIS — R20.2 PARESTHESIA OF BOTH HANDS: ICD-10-CM

## 2019-08-20 PROCEDURE — 95913 NRV CNDJ TEST 13/> STUDIES: CPT | Mod: S$GLB,,, | Performed by: PSYCHIATRY & NEUROLOGY

## 2019-08-20 PROCEDURE — 95886 MUSC TEST DONE W/N TEST COMP: CPT | Mod: S$GLB,,, | Performed by: PSYCHIATRY & NEUROLOGY

## 2019-08-20 PROCEDURE — 95886 PR EMG COMPLETE, W/ NERVE CONDUCTION STUDIES, 5+ MUSCLES: ICD-10-PCS | Mod: S$GLB,,, | Performed by: PSYCHIATRY & NEUROLOGY

## 2019-08-20 PROCEDURE — 95913 PR NERVE CONDUCTION STUDY; 13 OR MORE STUDIES: ICD-10-PCS | Mod: S$GLB,,, | Performed by: PSYCHIATRY & NEUROLOGY

## 2019-08-23 ENCOUNTER — PATIENT MESSAGE (OUTPATIENT)
Dept: NEUROLOGY | Facility: CLINIC | Age: 64
End: 2019-08-23

## 2019-08-26 NOTE — PROGRESS NOTES
Last 5 Patient Entered Readings                                      Current 30 Day Average: 136/84     Recent Readings 8/23/2019 8/23/2019 8/21/2019 8/14/2019 8/4/2019    SBP (mmHg) 150 134 134 133 144    DBP (mmHg) 84 104 82 80 84    Pulse 85 98 76 77 90        Per 30 day average, 136/84 mmHg, patient's BP is not at goal.     Ms. Angela will check her BP more frequently to ensure average is controlled. Most recent reading was elevated, but she states she wasn't feeling well. Will continue current regimen for now. If BP remains elevated, will increase losartan dose.     Will continue to monitor regularly. Will follow up in 2-3 weeks, sooner if BP begins to trend upward or downward.    Asked patient to call or message with questions or concerns.     Current HTN regimen:  Hypertension Medications             amLODIPine (NORVASC) 10 MG tablet TAKE 1 TABLET BY MOUTH EVERY DAY    losartan (COZAAR) 50 MG tablet TAKE 1 TABLET BY MOUTH EVERY DAY    metoprolol succinate (TOPROL-XL) 50 MG 24 hr tablet TAKE 1 TABLET BY MOUTH EVERY DAY

## 2019-09-12 ENCOUNTER — OFFICE VISIT (OUTPATIENT)
Dept: INTERNAL MEDICINE | Facility: CLINIC | Age: 64
End: 2019-09-12
Payer: COMMERCIAL

## 2019-09-12 ENCOUNTER — DOCUMENTATION ONLY (OUTPATIENT)
Dept: INTERNAL MEDICINE | Facility: CLINIC | Age: 64
End: 2019-09-12

## 2019-09-12 VITALS
HEIGHT: 66 IN | WEIGHT: 251 LBS | DIASTOLIC BLOOD PRESSURE: 80 MMHG | BODY MASS INDEX: 40.34 KG/M2 | SYSTOLIC BLOOD PRESSURE: 142 MMHG

## 2019-09-12 DIAGNOSIS — I10 HTN (HYPERTENSION), BENIGN: ICD-10-CM

## 2019-09-12 DIAGNOSIS — G47.33 OSA ON CPAP: ICD-10-CM

## 2019-09-12 DIAGNOSIS — E66.01 CLASS 3 SEVERE OBESITY DUE TO EXCESS CALORIES WITH SERIOUS COMORBIDITY AND BODY MASS INDEX (BMI) OF 40.0 TO 44.9 IN ADULT: ICD-10-CM

## 2019-09-12 DIAGNOSIS — E78.5 HYPERLIPIDEMIA, UNSPECIFIED HYPERLIPIDEMIA TYPE: ICD-10-CM

## 2019-09-12 DIAGNOSIS — E11.42 DIABETIC POLYNEUROPATHY ASSOCIATED WITH TYPE 2 DIABETES MELLITUS: ICD-10-CM

## 2019-09-12 DIAGNOSIS — Z79.4 TYPE 2 DIABETES MELLITUS WITH BOTH EYES AFFECTED BY PROLIFERATIVE RETINOPATHY WITHOUT MACULAR EDEMA, WITH LONG-TERM CURRENT USE OF INSULIN: Primary | ICD-10-CM

## 2019-09-12 DIAGNOSIS — Z98.84 HISTORY OF BARIATRIC SURGERY: ICD-10-CM

## 2019-09-12 DIAGNOSIS — E11.3593 TYPE 2 DIABETES MELLITUS WITH BOTH EYES AFFECTED BY PROLIFERATIVE RETINOPATHY WITHOUT MACULAR EDEMA, WITH LONG-TERM CURRENT USE OF INSULIN: Primary | ICD-10-CM

## 2019-09-12 PROCEDURE — 99999 PR PBB SHADOW E&M-EST. PATIENT-LVL III: ICD-10-PCS | Mod: PBBFAC,,, | Performed by: NURSE PRACTITIONER

## 2019-09-12 PROCEDURE — 3077F SYST BP >= 140 MM HG: CPT | Mod: CPTII,S$GLB,, | Performed by: NURSE PRACTITIONER

## 2019-09-12 PROCEDURE — 3045F PR MOST RECENT HEMOGLOBIN A1C LEVEL 7.0-9.0%: CPT | Mod: CPTII,S$GLB,, | Performed by: NURSE PRACTITIONER

## 2019-09-12 PROCEDURE — 3079F PR MOST RECENT DIASTOLIC BLOOD PRESSURE 80-89 MM HG: ICD-10-PCS | Mod: CPTII,S$GLB,, | Performed by: NURSE PRACTITIONER

## 2019-09-12 PROCEDURE — 3008F BODY MASS INDEX DOCD: CPT | Mod: CPTII,S$GLB,, | Performed by: NURSE PRACTITIONER

## 2019-09-12 PROCEDURE — 99214 OFFICE O/P EST MOD 30 MIN: CPT | Mod: S$GLB,,, | Performed by: NURSE PRACTITIONER

## 2019-09-12 PROCEDURE — 3045F PR MOST RECENT HEMOGLOBIN A1C LEVEL 7.0-9.0%: ICD-10-PCS | Mod: CPTII,S$GLB,, | Performed by: NURSE PRACTITIONER

## 2019-09-12 PROCEDURE — 99214 PR OFFICE/OUTPT VISIT, EST, LEVL IV, 30-39 MIN: ICD-10-PCS | Mod: S$GLB,,, | Performed by: NURSE PRACTITIONER

## 2019-09-12 PROCEDURE — 3008F PR BODY MASS INDEX (BMI) DOCUMENTED: ICD-10-PCS | Mod: CPTII,S$GLB,, | Performed by: NURSE PRACTITIONER

## 2019-09-12 PROCEDURE — 99999 PR PBB SHADOW E&M-EST. PATIENT-LVL III: CPT | Mod: PBBFAC,,, | Performed by: NURSE PRACTITIONER

## 2019-09-12 PROCEDURE — 3077F PR MOST RECENT SYSTOLIC BLOOD PRESSURE >= 140 MM HG: ICD-10-PCS | Mod: CPTII,S$GLB,, | Performed by: NURSE PRACTITIONER

## 2019-09-12 PROCEDURE — 3079F DIAST BP 80-89 MM HG: CPT | Mod: CPTII,S$GLB,, | Performed by: NURSE PRACTITIONER

## 2019-09-12 NOTE — PATIENT INSTRUCTIONS
Snacks can be an important part of a balanced, healthy meal plan. They allow you to eat more frequently, feeling full and satisfied throughout the day. Also, they allow you to spread carbohydrates evenly, which may stabilize blood sugars.  Plus, snacks are enjoyable!     The amount of carbohydrate needed at snacks varies. Generally, about 15-30 grams of carbohydrate per snack is recommended.  Below you will find some tasty treats.       0-5 gm carb   Crystal Light   Vitamin Water Zero   Herbal tea, unsweetened   2 tsp peanut butter on celery   1./2 cup sugar-free jell-o   1 sugar-free popsicle   ¼ cup blueberries   8oz Blue Brenda unsweetened almond milk   5 baby carrots & celery sticks, cucumbers, bell peppers dipped in ¼ cup salsa, 2Tbsp light ranch dressing or 2Tbsp plain Greek yogurt   10 Goldfish crackers   ½ oz low-fat cheese or string cheese   1 closed handful of nuts, unsalted   1 Tbsp of sunflower seeds, unsalted   1 cup Smart Pop popcorn   1 whole grain brown rice cake        15 gm carb   1 small piece of fruit or ½ banana or 1/2 cup lite canned fruit   3 concepcion cracker squares   3 cups Smart Pop popcorn, top spray butter, Jaramillo lite salt or cinnamon and Truvia   5 Vanilla Wafers   ½ cup low fat, no added sugar ice cream or frozen yogurt (Blue bell, Blue Bunny, Weight Watchers, Skinny Cow)   ½ turkey, ham, or chicken sandwich   ½ c fruit with ½ c Cottage cheese   4-6 unsalted wheat crackers with 1 oz low fat cheese or 1 tbsp peanut butter    30-45 goldfish crackers (depending on flavor)    7-8 Restorationism mini brown rice cakes (caramel, apple cinnamon, chocolate)    12 Restorationism mini brown rice cakes (cheddar, bbq, ranch)    1/3 cup hummus dip with raw veg   1/2 whole wheat love, 1Tbsp hummus   Mini Pizza (1/2 whole wheat English muffin, low-fat  cheese, tomato sauce)   100 calorie snack pack (Oreo, Chips Ahoy, Ritz Mix, Baked Cheetos)   4-6 oz. light or Greek Style yogurt  (Riki, Andre, Barb, [facility])   ½ cup sugar-free pudding     6 in. wheat tortilla or love oven toasted chips (topped with spray butter flavoring, cinnamon, Truvia OR spray butter, garlic powder, chili powder)    18 BBQ Popchips (available at Target, Whole Foods, Fresh Market)                   Diabetes Support Group Meetings         Date: Topic:   February 14 Eat Fit BRUNO/Health Promotion   March 14 Taking Care of Your Smile   April 11 Spring into Healthy Eating/Cooking Demo   May 9 Ease Your Mind with Diabetes   Roxie 13 Summer Treats/Cooking Demo   July 11 Eat Fit BRUNO/Super Market Sweep   August 8 Taking Care of Your Eyes and Feet   Sept 12 Technology/ADA updates   October 10 Recipes & Treats/Cooking Demo   November 14 Heart Health/Pump it up!   December 12 Year-End Close Out        Meetings are held in the [facility] (A) of the [facility] located at [address] [place] [address]. Please call [phone] for additional information.    Free service, offered every 2nd Thursday of every month! Family members and/or friends are welcome as well!  Support group is for patients with type 1 or type 2 diabetes.    From 3:30p to 4:30p

## 2019-09-12 NOTE — PROGRESS NOTES
CHIEF COMPLAINT: Type 2 Diabetes     HPI: Mrs. Aaliyah Angela is a 63 y.o. female who was diagnosed with gestational diabetes age 20s, Type 2 DM age 30s.    Pt was last seen by me in May 2019 and is now being seen by me again today.  Has h/o gastric sleeve 2015.-in [place]   Needs lab work today.   Has freestyle kimo 14 day wear.  Arrives alone.     About to go on cruise to [place] (sept/oct 2019).    Current meds: trulicity 1.5 mg weekly, metformin 500 mg bid, jardiance 10 mg daily, novolog correction scale 150-200+2, etc     BG 67   Highest 260s   Hypoglycemia---r/t gaps of not eating     Lab Results   Component Value Date    HGBA1C 8.1 (H) 05/08/2019     Diabetes Management Status    Statin: Taking  ACE/ARB: Taking    Screening or Prevention Patient's value Goal Complete/Controlled?   HgA1C Testing and Control   Lab Results   Component Value Date    HGBA1C 8.1 (H) 05/08/2019      Annually/Less than 8% No   Lipid profile : 02/10/2018 Annually No   LDL control Lab Results   Component Value Date    LDLCALC 73.6 02/10/2018    Annually/Less than 100 mg/dl  No   Nephropathy screening Lab Results   Component Value Date    LABMICR 15.0 08/24/2018     Lab Results   Component Value Date    PROTEINUA Negative 10/16/2015    Annually Yes   Blood pressure BP Readings from Last 1 Encounters:   09/12/19 (!) 142/80    Less than 140/90 No   Dilated retinal exam : 06/03/2019 Annually Yes   Foot exam   : 12/31/2018 Annually Yes     REVIEW OF SYSTEMS  General: no weakness, fatigue, + weight changes 4# (loss) in the past 5 mos   Eyes: no double or blurred vision, eye pain, or redness; Last Eye Exam 5/2018, due 5/16/19, 6/3/19 sees retinal specialist.   Cardiovascular:  No chest pain, palpitations, +trace edema-BLE, or murmurs.   Respiratory: no cough or dyspnea.   GI: + heartburn, nausea, or changes in bowel patterns; good appetite. +GERD-zantac  Skin: no rashes, dryness, itching, or reactions at insulin injection sites.  Neuro: +  "numbness, tingling-gabapentin (SEs too much, currently off), tremors, or vertigo. +HAs (chronic), +parethesia of both hands- procedure per neuro 8/20/19 -f/u 11/22/19  Endocrine: no polyuria, polydipsia, polyphagia, heat or cold intolerance.     Recommendations: 150 minutes of moderate PA or 75 mins of vigorous PA a week  Moderate or high intensity, involve all major muscle groups on >2 days per week.    Vital Signs  BP (!) 142/80 (BP Location: Left arm, Patient Position: Sitting, BP Method: Large (Manual))   Ht 5' 6" (1.676 m)   Wt 113.9 kg (251 lb)   BMI 40.51 kg/m²     Hemoglobin A1C   Date Value Ref Range Status   05/08/2019 8.1 (H) 4.0 - 5.6 % Final     Comment:     ADA Screening Guidelines:  5.7-6.4%  Consistent with prediabetes  >or=6.5%  Consistent with diabetes  High levels of fetal hemoglobin interfere with the HbA1C  assay. Heterozygous hemoglobin variants (HbS, HgC, etc)do  not significantly interfere with this assay.   However, presence of multiple variants may affect accuracy.     11/17/2018 8.3 (H) 4.0 - 5.6 % Final     Comment:     ADA Screening Guidelines:  5.7-6.4%  Consistent with prediabetes  >or=6.5%  Consistent with diabetes  High levels of fetal hemoglobin interfere with the HbA1C  assay. Heterozygous hemoglobin variants (HbS, HgC, etc)do  not significantly interfere with this assay.   However, presence of multiple variants may affect accuracy.     08/24/2018 8.5 (H) 4.0 - 5.6 % Final     Comment:     ADA Screening Guidelines:  5.7-6.4%  Consistent with prediabetes  >or=6.5%  Consistent with diabetes  High levels of fetal hemoglobin interfere with the HbA1C  assay. Heterozygous hemoglobin variants (HbS, HgC, etc)do  not significantly interfere with this assay.   However, presence of multiple variants may affect accuracy.          Chemistry        Component Value Date/Time     05/08/2019 1557    K 4.8 05/08/2019 1557     05/08/2019 1557    CO2 29 05/08/2019 1557    BUN 14 " 05/08/2019 1557    CREATININE 0.9 05/08/2019 1557     (H) 05/08/2019 1557        Component Value Date/Time    CALCIUM 9.9 05/08/2019 1557    ALKPHOS 78 05/08/2019 1557    AST 18 05/08/2019 1557    ALT 15 05/08/2019 1557    BILITOT 0.3 05/08/2019 1557    ESTGFRAFRICA >60.0 05/08/2019 1557    EGFRNONAA >60.0 05/08/2019 1557           Lab Results   Component Value Date    TSH 1.025 05/08/2019      Lab Results   Component Value Date    CHOL 136 02/10/2018    CHOL 126 12/17/2016    CHOL 126 08/20/2016     Lab Results   Component Value Date    HDL 53 02/10/2018    HDL 45 12/17/2016    HDL 43 08/20/2016     Lab Results   Component Value Date    LDLCALC 73.6 02/10/2018    LDLCALC 67.8 12/17/2016    LDLCALC 71.8 08/20/2016     Lab Results   Component Value Date    TRIG 47 02/10/2018    TRIG 66 12/17/2016    TRIG 56 08/20/2016     Lab Results   Component Value Date    CHOLHDL 39.0 02/10/2018    CHOLHDL 35.7 12/17/2016    CHOLHDL 34.1 08/20/2016       PHYSICAL EXAMINATION  Constitutional: Appears well, no distress  Neck: Supple, trachea midline.   Respiratory: Np wheezes, even and unlabored.  Cardiovascular: RRR; no murmurs; (+) radial pulses  Lymph: Trace  BLE edema  Skin: warm and dry; no injection site reactions, no acanthosis nigracans observed.  Neuro:patient alert and cooperative, normal affect; steady gait.    Diabetes Foot Exam:   Deferred   12/31/18     Assessment/Plan  1. 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 by proliferative retinopathy without macular edema, with long-term current use of insulin  Ambulatory Referral to Diabetes Education    Hemoglobin A1c    Hemoglobin A1c    Microalbumin/creatinine urine ratio    Vitamin D   2. Hyperlipidemia, unspecified hyperlipidemia type     3. HTN (hypertension), benign  Microalbumin/creatinine urine ratio   4. History of bariatric surgery, 12-     5. Class 3 severe obesity due to excess calories with serious comorbidity and body mass index (BMI) of  40.0 to 44.9 in adult     6. BRADFORD on CPAP     7. Diabetic polyneuropathy associated with type 2 diabetes mellitus       1. F/u in 3 mos   [place] in 3 mos   a1c today  a1c next time  New contact info given  a1c goal less than 7%  Continue metformin, trulicity, jardiance  Continue personal cgm freestyle kimo  2.   Lab Results   Component Value Date    LDLCALC 73.6 02/10/2018     At goal   Lipid next time  3. Controlled, continue med(s)  4. F/u with bariatric medicine in [place] /[place] in 6 weeks   5. Encourage exercise 3 x a week at least 30 mins   Freestyle kimo will help with behavior modification  carbs less than 120 per day (gm)  6. If uncontrolled may increase insulin resistance.  7. On gabapentin, optimize bg will help with condition     FOLLOW UP  Follow up in about 3 months (around 12/12/2019).

## 2019-09-13 RX ORDER — EMPAGLIFLOZIN 10 MG/1
TABLET, FILM COATED ORAL
Qty: 90 TABLET | Refills: 2 | Status: SHIPPED | OUTPATIENT
Start: 2019-09-13 | End: 2020-05-28

## 2019-09-25 RX ORDER — AMITRIPTYLINE HYDROCHLORIDE 10 MG/1
TABLET, FILM COATED ORAL
Qty: 180 TABLET | Refills: 2 | Status: SHIPPED | OUTPATIENT
Start: 2019-09-25 | End: 2019-11-22

## 2019-10-04 ENCOUNTER — PATIENT OUTREACH (OUTPATIENT)
Dept: OTHER | Facility: OTHER | Age: 64
End: 2019-10-04

## 2019-10-21 RX ORDER — METOPROLOL SUCCINATE 50 MG/1
TABLET, EXTENDED RELEASE ORAL
Qty: 90 TABLET | Refills: 0 | Status: SHIPPED | OUTPATIENT
Start: 2019-10-21 | End: 2019-10-26 | Stop reason: SDUPTHER

## 2019-10-23 ENCOUNTER — PATIENT OUTREACH (OUTPATIENT)
Dept: ADMINISTRATIVE | Facility: OTHER | Age: 64
End: 2019-10-23

## 2019-10-26 RX ORDER — METOPROLOL SUCCINATE 50 MG/1
TABLET, EXTENDED RELEASE ORAL
Qty: 90 TABLET | Refills: 0 | Status: SHIPPED | OUTPATIENT
Start: 2019-10-26 | End: 2020-01-24

## 2019-11-01 NOTE — PROGRESS NOTES
Digital Medicine: Health  Follow-Up    Patient seems to be doing well. Patient states that her diet needs improvement and she could probably be more physically active. Let patient know I would be her new health , to save my number, and that I would be reaching out to her monthly and would call again before thanksgiving. Will f/u in 3-4 weeks.     The history is provided by the patient.   Hypertension   The problem has been gradually improving.       INTERVENTION(S)  recommended diet modifications, recommend physical activity and encouragement/support          Topic    Lipid (Cholesterol) Test        Last 5 Patient Entered Readings                                      Current 30 Day Average: 133/79     Recent Readings 10/29/2019 10/27/2019 10/23/2019 10/15/2019 10/1/2019    SBP (mmHg) 138 120 141 133 128    DBP (mmHg) 88 81 73 72 67    Pulse 76 87 75 78 72                      Diet Screening   Patient reports eating or drinking the following: fast food, water and restaurant food    The patient drinks 52 ounces of water per day.    She has the following dietary restrictions: diabetic    Barriers to a Healthy Diet: time/convenience    Patient states that when she cooks dinner at home that it is mostly meat and sometimes there will be a vegetable. Patient usually purchases lunch at the cafeteria or whatever is closest for convenience. Will work on meal planning at next outreach.    Intervention(s): meal planning    Physical Activity Screening   When asked if exercising, patient responded: no    Patient says she gets most of her walking from the parking lot to her office and back. Will look for ways to increase physical activity.     Medication Adherence Screening       Patient identified the following reasons for non-compliance: none    Patient reports no s/s or issues taking BP medications as prescribed.       SDOH

## 2019-11-19 ENCOUNTER — PATIENT OUTREACH (OUTPATIENT)
Dept: ADMINISTRATIVE | Facility: OTHER | Age: 64
End: 2019-11-19

## 2019-11-22 ENCOUNTER — OFFICE VISIT (OUTPATIENT)
Dept: NEUROLOGY | Facility: CLINIC | Age: 64
End: 2019-11-22
Payer: COMMERCIAL

## 2019-11-22 VITALS
HEART RATE: 96 BPM | BODY MASS INDEX: 39.86 KG/M2 | SYSTOLIC BLOOD PRESSURE: 150 MMHG | WEIGHT: 248 LBS | HEIGHT: 66 IN | DIASTOLIC BLOOD PRESSURE: 80 MMHG

## 2019-11-22 DIAGNOSIS — G56.03 BILATERAL CARPAL TUNNEL SYNDROME: ICD-10-CM

## 2019-11-22 DIAGNOSIS — E11.42 DIABETIC POLYNEUROPATHY ASSOCIATED WITH TYPE 2 DIABETES MELLITUS: Primary | ICD-10-CM

## 2019-11-22 PROCEDURE — 3079F PR MOST RECENT DIASTOLIC BLOOD PRESSURE 80-89 MM HG: ICD-10-PCS | Mod: CPTII,S$GLB,, | Performed by: PSYCHIATRY & NEUROLOGY

## 2019-11-22 PROCEDURE — 3077F SYST BP >= 140 MM HG: CPT | Mod: CPTII,S$GLB,, | Performed by: PSYCHIATRY & NEUROLOGY

## 2019-11-22 PROCEDURE — 3008F PR BODY MASS INDEX (BMI) DOCUMENTED: ICD-10-PCS | Mod: CPTII,S$GLB,, | Performed by: PSYCHIATRY & NEUROLOGY

## 2019-11-22 PROCEDURE — 3008F BODY MASS INDEX DOCD: CPT | Mod: CPTII,S$GLB,, | Performed by: PSYCHIATRY & NEUROLOGY

## 2019-11-22 PROCEDURE — 99999 PR PBB SHADOW E&M-EST. PATIENT-LVL III: CPT | Mod: PBBFAC,,, | Performed by: STUDENT IN AN ORGANIZED HEALTH CARE EDUCATION/TRAINING PROGRAM

## 2019-11-22 PROCEDURE — 99214 OFFICE O/P EST MOD 30 MIN: CPT | Mod: S$GLB,,, | Performed by: PSYCHIATRY & NEUROLOGY

## 2019-11-22 PROCEDURE — 3079F DIAST BP 80-89 MM HG: CPT | Mod: CPTII,S$GLB,, | Performed by: PSYCHIATRY & NEUROLOGY

## 2019-11-22 PROCEDURE — 3077F PR MOST RECENT SYSTOLIC BLOOD PRESSURE >= 140 MM HG: ICD-10-PCS | Mod: CPTII,S$GLB,, | Performed by: PSYCHIATRY & NEUROLOGY

## 2019-11-22 PROCEDURE — 99999 PR PBB SHADOW E&M-EST. PATIENT-LVL III: ICD-10-PCS | Mod: PBBFAC,,, | Performed by: STUDENT IN AN ORGANIZED HEALTH CARE EDUCATION/TRAINING PROGRAM

## 2019-11-22 PROCEDURE — 99214 PR OFFICE/OUTPT VISIT, EST, LEVL IV, 30-39 MIN: ICD-10-PCS | Mod: S$GLB,,, | Performed by: PSYCHIATRY & NEUROLOGY

## 2019-11-22 RX ORDER — AMITRIPTYLINE HYDROCHLORIDE 25 MG/1
50 TABLET, FILM COATED ORAL NIGHTLY
Qty: 60 TABLET | Refills: 11 | Status: SHIPPED | OUTPATIENT
Start: 2019-11-22 | End: 2021-03-29 | Stop reason: SDUPTHER

## 2019-11-22 NOTE — PROGRESS NOTES
Neurology Clinic      Patient Name: Aaliyah Angela  MRN: [id number]    CC: tingling/numbness    HPI: Aaliyah Angela is julito pleasant 64 y.o. RH female with  DM, HTN, CAD, HLD, PVD, and Hx of gastric Bypass, who is presenting to the clinic today for follow up on hand numbness.    Interval Hx (11/22/19):  Patient reports continued numbness and tingling in both hands in the Median nerve distribution, this is interfering with fine motor tasks, sometimes causing her not be able to hold things. She has stopped Gabapentin due to drowsiness. She is on Elavil 20 mg daily for tension headaches and has been tolerating it well. We discussed the effect of Elavil on neuropathic pain and that we can increase the dose. Currently she does not complain of headaches at this visit (gets them twice weekly).    Initial encounter (5/13/19):  Patient reports tingling in bilateral upper extremities that is worse in the mornings with opening and closing the fists and improves as the day goes by. She denies joint pain and swelling in the fingers. She has had a strongly positive ASHISH, however negative RF and Anti-CCP. She has been prescribed Gabapentin 100 mg TID, previously for lower extremity neuropathy, but has never taken it. 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 These symptoms have been going on intermittently within the last year, however it has become more frequent within the last two months. She also reports neck pain within the last two weeks. She has a history of mechanical fall and subsequent head trauma 3-4 months ago. She reports history of bariatric surgery for weight loss purposes, she denies any dietary restriction.      Review of Systems:  General: fevers -, chills -, fatigue -, change in appetite -  Eyes: blurred vision -, double vision -, photosensitivity -  ENT: hearing loss -, difficulty swallowing -, drooling -, change in voice -  Respiratory: SOB -, cough -,  choking -  CV: chest pain -, palpitations -  GI:  nausea -, vomiting -, abdominal pain -  Urinary: dysuria -, hematuria -, frequency -, urine incontinence  Skin: rash -, change of color -  Neurological: Headache +, dizziness -, weakness -, numbness +, seizure -, gait problem -, difficulty in speech -  Psychiatric: hallucinations -, confusion -, dysphoric mood -, anxiety -      Past Medical History  Past Medical History:   Diagnosis Date    Allergy     Anatomical narrow angle borderline glaucoma     AR (allergic rhinitis)     Arthritis     CAD (coronary artery disease) 4/22/2013    Non obstructive, 2012 PET     Cataract     Diabetes mellitus type II     with retinopathy    Diabetic retinopathy     Epiretinal membrane, both eyes     Hyperlipidemia     Hypertensive retinopathy of both eyes     Lumbar disc disease     Migraines     Morbid obesity 10/3/2012    BRADFORD (obstructive sleep apnea) 8/4/2015    Proliferative diabetic retinopathy(362.02)     PVD (peripheral vascular disease) 4/22/2013    Carotid US 1-39% bilat 2012     Rupture of right Achilles tendon     Unspecified essential hypertension     Unspecified vitamin D deficiency     Vitreous hemorrhage of left eye        Medications    Current Outpatient Medications:     amitriptyline (ELAVIL) 25 MG tablet, Take 2 tablets (50 mg total) by mouth every evening., Disp: 60 tablet, Rfl: 11    amLODIPine (NORVASC) 10 MG tablet, TAKE 1 TABLET BY MOUTH EVERY DAY, Disp: 90 tablet, Rfl: 0    aspirin 81 MG Chew, Take 81 mg by mouth once daily.  , Disp: , Rfl:     atorvastatin (LIPITOR) 20 MG tablet, TAKE 1 TABLET BY MOUTH EVERY DAY, Disp: 90 tablet, Rfl: 3    blood sugar diagnostic (BLOOD GLUCOSE TEST) Strp, Inject 1 each into the skin 2 (two) times daily., Disp: 200 strip, Rfl: 3    calcium-vitamin D3 500 mg(1,250mg) -200 unit per tablet, Take 1 tablet by mouth once daily., Disp: , Rfl:     dulaglutide (TRULICITY) 1.5 mg/0.5 mL PnIj, Inject 1.5 mg into the skin every 7 days., Disp: 12 Syringe, Rfl:  "3    empagliflozin (JARDIANCE) 10 mg Tab, Take 10 mg by mouth once daily., Disp: 90 tablet, Rfl: 1    flash glucose sensor (FREESTYLE SAV 14 DAY SENSOR) Kit, 1 sensor kit every 14 days (Requires 2 sensor kits per lucretia), Disp: 2 kit, Rfl: 11    flash glucose sensor (FREESTYLE SAV SENSOR) Kit, 1 Device by Misc.(Non-Drug; Combo Route) route continuous., Disp: 3 kit, Rfl: 11    gabapentin (NEURONTIN) 100 MG capsule, Take 1 capsule (100 mg total) by mouth 3 (three) times daily., Disp: 90 capsule, Rfl: 11    JARDIANCE 10 mg Tab, TAKE 1 TABLET BY MOUTH EVERY DAY, Disp: 90 tablet, Rfl: 2    losartan (COZAAR) 50 MG tablet, TAKE 1 TABLET BY MOUTH EVERY DAY, Disp: 30 tablet, Rfl: 11    metFORMIN (GLUCOPHAGE-XR) 500 MG 24 hr tablet, Take 2 tablets (1,000 mg total) by mouth 2 (two) times daily with meals., Disp: 360 tablet, Rfl: 3    metoprolol succinate (TOPROL-XL) 50 MG 24 hr tablet, TAKE 1 TABLET BY MOUTH EVERY DAY, Disp: 90 tablet, Rfl: 0    multivitamin (ONE DAILY MULTIVITAMIN) per tablet, Take 1 tablet by mouth once daily., Disp: , Rfl:     omeprazole (PRILOSEC) 20 MG capsule, Take 1 capsule (20 mg total) by mouth once daily., Disp: 30 capsule, Rfl: 0    pen needle, diabetic (BD ULTRA-FINE DIEGO PEN NEEDLE) 32 gauge x 5/32" Ndle, Uses 3 times a day., Disp: 100 each, Rfl: 11    ranitidine (ZANTAC) 150 MG tablet, Take 150 mg by mouth as needed for Heartburn., Disp: , Rfl:   Any other notable medications as documented in HPI    Allergies  Review of patient's allergies indicates:  No Known Allergies    Social History  Socioeconomic History    Marital status: Single   Tobacco Use    Smoking status: Never Smoker    Smokeless tobacco: Never Used   Substance and Sexual Activity    Alcohol use: No    Drug use: No    Sexual activity: Not Currently     Birth control/protection: Post-menopausal   Social History Narrative    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    Family History  Family History   Problem Relation Age of Onset    Diabetes Mother     Hypertension Mother     Diabetes Father     Hypertension Father     Stroke Father     Diabetes Sister     Diabetes Brother     Cancer Brother         prostate    Prostate cancer Brother     Diabetes Sister     Diabetes Sister     Diabetes Sister     Diabetes Brother     Stroke Brother     Diabetes Brother     Diabetes Brother      Any other notable FMH as documented in HPI.    Physical Exam  BP (!) 150/80   Pulse 96   Ht 5' 6" (1.676 m)   Wt 112.5 kg (248 lb 0.3 oz)   BMI 40.03 kg/m²     General: Well-developed, well-groomed. No apparent distress  HENT: Normocephalic, atraumatic.   Musculoskeletal: No peripheral edema, No joint swelling  Skin: No rash    Neurologic Exam:   Awake, alert and oriented x3  Short term memory intact  Praxis normal  Speech Normal. Language is fluent.      CN II - CN XII:  PERRLA. EOM intact. No Nystagmus. No ophthalmoplegia.   Visual fields are full to confrontation.    Facial sensation is normal to light touch.   Facial expression is full and symmetric.   Hearing is intact bilaterally.   Palate elevates symmetrically.   SCM and Trapezius full strength bilaterally.   Tongue is midline.     Motor:  normal bulk and tone in all four limbs.   There are no atrophy or fasciculations.   Strength is 5/5 throughout.  No bradykinesia.  No dysdiadochokinesia.    Sensory:  Sensation to light touch: intact in BUE and BLE  Could not reproduce symptoms with Tinel and Phalen's tests    DTRs:  1+ and symmetric throughout.    Babinski normal bilaterally.  No clonus.     Gait and Coordination:  Normal gait.  Normal tandem walking.  Finger to nose and heel to shin testing is normal bilaterally.      Lab and Test Results    WBC   Date Value Ref Range Status   02/10/2018 4.59 3.90 - 12.70 K/uL Final   08/28/2017 6.35 3.90 - 12.70 K/uL Final   01/09/2016 4.16 3.90 - 12.70 K/uL Final "     Hemoglobin   Date Value Ref Range Status   02/10/2018 11.4 (L) 12.0 - 16.0 g/dL Final   08/28/2017 11.5 (L) 12.0 - 16.0 g/dL Final   01/09/2016 12.7 12.0 - 16.0 g/dL Final     Hematocrit   Date Value Ref Range Status   02/10/2018 35.7 (L) 37.0 - 48.5 % Final   08/28/2017 36.4 (L) 37.0 - 48.5 % Final   01/09/2016 40.8 37.0 - 48.5 % Final     Platelets   Date Value Ref Range Status   02/10/2018 236 150 - 350 K/uL Final   08/28/2017 253 150 - 350 K/uL Final   01/09/2016 283 150 - 350 K/uL Final     Glucose   Date Value Ref Range Status   05/08/2019 161 (H) 70 - 110 mg/dL Final   08/24/2018 221 (H) 70 - 110 mg/dL Final   02/10/2018 136 (H) 70 - 110 mg/dL Final     Sodium   Date Value Ref Range Status   05/08/2019 139 136 - 145 mmol/L Final   08/24/2018 140 136 - 145 mmol/L Final   02/10/2018 141 136 - 145 mmol/L Final     Potassium   Date Value Ref Range Status   05/08/2019 4.8 3.5 - 5.1 mmol/L Final   08/24/2018 4.0 3.5 - 5.1 mmol/L Final   02/10/2018 4.8 3.5 - 5.1 mmol/L Final     Chloride   Date Value Ref Range Status   05/08/2019 102 95 - 110 mmol/L Final   08/24/2018 104 95 - 110 mmol/L Final   02/10/2018 106 95 - 110 mmol/L Final     CO2   Date Value Ref Range Status   05/08/2019 29 23 - 29 mmol/L Final   08/24/2018 28 23 - 29 mmol/L Final   02/10/2018 28 23 - 29 mmol/L Final     BUN, Bld   Date Value Ref Range Status   05/08/2019 14 8 - 23 mg/dL Final   08/24/2018 19 6 - 20 mg/dL Final   02/10/2018 19 6 - 20 mg/dL Final     Creatinine   Date Value Ref Range Status   05/08/2019 0.9 0.5 - 1.4 mg/dL Final   08/24/2018 0.9 0.5 - 1.4 mg/dL Final   02/10/2018 0.8 0.5 - 1.4 mg/dL Final     Calcium   Date Value Ref Range Status   05/08/2019 9.9 8.7 - 10.5 mg/dL Final   08/24/2018 9.1 8.7 - 10.5 mg/dL Final   02/10/2018 9.1 8.7 - 10.5 mg/dL Final     Magnesium   Date Value Ref Range Status   02/10/2018 1.8 1.6 - 2.6 mg/dL Final   01/09/2016 2.6 1.6 - 2.6 mg/dL Final     Alkaline Phosphatase   Date Value Ref Range  Status   05/08/2019 78 55 - 135 U/L Final   02/10/2018 71 55 - 135 U/L Final   08/28/2017 69 55 - 135 U/L Final     ALT   Date Value Ref Range Status   05/08/2019 15 10 - 44 U/L Final   02/10/2018 29 10 - 44 U/L Final   08/28/2017 18 10 - 44 U/L Final     AST   Date Value Ref Range Status   05/08/2019 18 10 - 40 U/L Final   02/10/2018 24 10 - 40 U/L Final   08/28/2017 19 10 - 40 U/L Final     HbA1C 8.3 -> 8.1  B12 1309  ASHISH positive, 1:2560  Anti-CCP negative  RF negative    Images:  MRI Brain w/wo (2012):  chronic microvascular ischemic changes.    Other diagnostic studies:  EMG-NCS (8/20/19):  1. Mild to moderate severity right carpal tunnel syndrome;   2. Mild severity left carpal tunnel syndrome;   3. Primarily axonal bilateral ulnar sensory neuropathies, which may be secondary to a length dependant polyneuropathy secondary to her chronic diabetes. The sural sensory responses in the lower extremity were also diminished.       Assessment and Plan    Problem List Items Addressed This Visit        Neuro    Bilateral carpal tunnel syndrome    Current Assessment & Plan     Patient with numbness and tingling in bilateral Median nerve distribution  EMG-NCS consistent with bilateral CTS (R>L)    - Recommend using wrist splints nightly to hold both wrists at neutral position.   - can stop taking Gabapentin due to side effects (drowsiness)  - increase Amitriptyline to 50 mg nightly   - ambulatory referral to hand surgery clinic for evaluation and possible intervention (injection vs surgery)  - follow up in clinic in 3 months         Relevant Medications    amitriptyline (ELAVIL) 25 MG tablet    Other Relevant Orders    Ambulatory referral to Hand Surgery    Diabetic polyneuropathy associated with type 2 diabetes mellitus - Primary    Current Assessment & Plan     Evidence of axonal bilateral ulnar sensory neuropathies as well as diminished sensory responses in Sural nerves in lower extremities is consistent with diabetic  neuropathy    - advised tight glucose control with goal Hb A1C<7   - will treat symptomatically as with amitriptyline         Relevant Medications    amitriptyline (ELAVIL) 25 MG tablet              Haven Starks MD  PGY-II, Neurology Resident  [facility]  [address] [place] [address]

## 2019-11-22 NOTE — ASSESSMENT & PLAN NOTE
Patient with numbness and tingling in bilateral Median nerve distribution  EMG-NCS consistent with bilateral CTS (R>L)    - Recommend using wrist splints nightly to hold both wrists at neutral position.   - can stop taking Gabapentin due to side effects (drowsiness)  - increase Amitriptyline to 50 mg nightly   - ambulatory referral to hand surgery clinic for evaluation and possible intervention (injection vs surgery)  - follow up in clinic in 3 months

## 2019-11-22 NOTE — LETTER
November 22, 2019      [facility] - Neurology  [address]  [facility] [address]  Phone: [phone]  Fax: [phone]       Patient: Aaliyah Angela   YOB: 1955  Date of Visit: 11/22/2019    To Whom It May Concern:    Heath Angela  was at [facility] on 11/22/2019 for a clinic visit. If you have any questions or concerns, or if I can be of further assistance, please do not hesitate to contact me.    Sincerely,    Haven Starks MD

## 2019-11-22 NOTE — PROGRESS NOTES
I have seen the patient and reviewed the resident's history, physical, assessment, and plan. I have personally interviewed and examined the patient and agree with the findings.

## 2019-11-22 NOTE — ASSESSMENT & PLAN NOTE
Evidence of axonal bilateral ulnar sensory neuropathies as well as diminished sensory responses in Sural nerves in lower extremities is consistent with diabetic neuropathy    - advised tight glucose control with goal Hb A1C<7   - will treat symptomatically as with amitriptyline

## 2019-11-26 ENCOUNTER — PATIENT OUTREACH (OUTPATIENT)
Dept: OTHER | Facility: OTHER | Age: 64
End: 2019-11-26

## 2019-12-03 ENCOUNTER — PATIENT OUTREACH (OUTPATIENT)
Dept: ADMINISTRATIVE | Facility: OTHER | Age: 64
End: 2019-12-03

## 2019-12-19 ENCOUNTER — TELEPHONE (OUTPATIENT)
Dept: ORTHOPEDICS | Facility: CLINIC | Age: 64
End: 2019-12-19

## 2019-12-20 ENCOUNTER — TELEPHONE (OUTPATIENT)
Dept: ORTHOPEDICS | Facility: CLINIC | Age: 64
End: 2019-12-20

## 2019-12-20 ENCOUNTER — TELEPHONE (OUTPATIENT)
Dept: INTERNAL MEDICINE | Facility: CLINIC | Age: 64
End: 2019-12-20

## 2019-12-20 DIAGNOSIS — M25.532 BILATERAL WRIST PAIN: Primary | ICD-10-CM

## 2019-12-20 DIAGNOSIS — M25.531 BILATERAL WRIST PAIN: Primary | ICD-10-CM

## 2019-12-20 NOTE — TELEPHONE ENCOUNTER
vm box full couldn't leave vm    ----- Message from Tayla Lowe sent at 12/20/2019  3:14 PM CST -----  Contact: Self  Pt is calling to reschedule her appt for today because her job would not let her off; however,  was unable to find availability due to an exhausted template.    She can be reached at [phone].     Thank you.     no

## 2019-12-27 ENCOUNTER — PATIENT OUTREACH (OUTPATIENT)
Dept: ADMINISTRATIVE | Facility: OTHER | Age: 64
End: 2019-12-27

## 2019-12-27 DIAGNOSIS — Z12.11 ENCOUNTER FOR FECAL IMMUNOCHEMICAL TEST SCREENING: Primary | ICD-10-CM

## 2019-12-30 ENCOUNTER — TELEPHONE (OUTPATIENT)
Dept: ORTHOPEDICS | Facility: CLINIC | Age: 64
End: 2019-12-30

## 2019-12-31 ENCOUNTER — OFFICE VISIT (OUTPATIENT)
Dept: ORTHOPEDICS | Facility: CLINIC | Age: 64
End: 2019-12-31
Payer: COMMERCIAL

## 2019-12-31 ENCOUNTER — HOSPITAL ENCOUNTER (OUTPATIENT)
Dept: RADIOLOGY | Facility: OTHER | Age: 64
Discharge: HOME | End: 2019-12-31
Attending: PHYSICIAN ASSISTANT
Payer: COMMERCIAL

## 2019-12-31 VITALS
SYSTOLIC BLOOD PRESSURE: 130 MMHG | HEART RATE: 79 BPM | HEIGHT: 66 IN | DIASTOLIC BLOOD PRESSURE: 81 MMHG | BODY MASS INDEX: 39.86 KG/M2 | WEIGHT: 248 LBS

## 2019-12-31 DIAGNOSIS — G56.01 CARPAL TUNNEL SYNDROME ON RIGHT: Primary | ICD-10-CM

## 2019-12-31 DIAGNOSIS — G56.03 BILATERAL CARPAL TUNNEL SYNDROME: Primary | ICD-10-CM

## 2019-12-31 DIAGNOSIS — M25.532 BILATERAL WRIST PAIN: ICD-10-CM

## 2019-12-31 DIAGNOSIS — M25.531 BILATERAL WRIST PAIN: ICD-10-CM

## 2019-12-31 PROCEDURE — 73110 XR WRIST COMPLETE 3 VIEWS BILATERAL: ICD-10-PCS | Mod: 26,,, | Performed by: RADIOLOGY

## 2019-12-31 PROCEDURE — 99204 PR OFFICE/OUTPT VISIT, NEW, LEVL IV, 45-59 MIN: ICD-10-PCS | Mod: S$GLB,,, | Performed by: PHYSICIAN ASSISTANT

## 2019-12-31 PROCEDURE — 3079F DIAST BP 80-89 MM HG: CPT | Mod: CPTII,S$GLB,, | Performed by: PHYSICIAN ASSISTANT

## 2019-12-31 PROCEDURE — 73110 X-RAY EXAM OF WRIST: CPT | Mod: 50,TC,FY

## 2019-12-31 PROCEDURE — 3008F BODY MASS INDEX DOCD: CPT | Mod: CPTII,S$GLB,, | Performed by: PHYSICIAN ASSISTANT

## 2019-12-31 PROCEDURE — 3008F PR BODY MASS INDEX (BMI) DOCUMENTED: ICD-10-PCS | Mod: CPTII,S$GLB,, | Performed by: PHYSICIAN ASSISTANT

## 2019-12-31 PROCEDURE — 99204 OFFICE O/P NEW MOD 45 MIN: CPT | Mod: S$GLB,,, | Performed by: PHYSICIAN ASSISTANT

## 2019-12-31 PROCEDURE — 3079F PR MOST RECENT DIASTOLIC BLOOD PRESSURE 80-89 MM HG: ICD-10-PCS | Mod: CPTII,S$GLB,, | Performed by: PHYSICIAN ASSISTANT

## 2019-12-31 PROCEDURE — 99999 PR PBB SHADOW E&M-EST. PATIENT-LVL IV: CPT | Mod: PBBFAC,,, | Performed by: PHYSICIAN ASSISTANT

## 2019-12-31 PROCEDURE — 3075F SYST BP GE 130 - 139MM HG: CPT | Mod: CPTII,S$GLB,, | Performed by: PHYSICIAN ASSISTANT

## 2019-12-31 PROCEDURE — 99999 PR PBB SHADOW E&M-EST. PATIENT-LVL IV: ICD-10-PCS | Mod: PBBFAC,,, | Performed by: PHYSICIAN ASSISTANT

## 2019-12-31 PROCEDURE — 3075F PR MOST RECENT SYSTOLIC BLOOD PRESS GE 130-139MM HG: ICD-10-PCS | Mod: CPTII,S$GLB,, | Performed by: PHYSICIAN ASSISTANT

## 2019-12-31 PROCEDURE — 73110 X-RAY EXAM OF WRIST: CPT | Mod: 26,,, | Performed by: RADIOLOGY

## 2019-12-31 NOTE — LETTER
December 31, 2019      Haven Starks MD  [address]  [facility] [address]           [facility] [address]  [address], [address]  [facility] [address]  Phone: [phone]          Patient: Aaliyah Angela   MR Number: [id number]   YOB: 1955   Date of Visit: 12/31/2019       Dear Dr. Haven Starks:    Thank you for referring Aaliyah Angela to me for evaluation. Attached you will find relevant portions of my assessment and plan of care.    If you have questions, please do not hesitate to call me. I look forward to following Aaliyah Angela along with you.    Sincerely,    TONY Nascimento    Enclosure  CC:  No Recipients    If you would like to receive this communication electronically, please contact [email or web address] or [phone] to request more information on [employer] Link access.    For providers and/or their staff who would like to refer a patient to [facility], please contact us through our one-stop-shop provider referral line, [facility], at [phone].    If you feel you have received this communication in error or would no longer like to receive these types of communications, please e-mail [email or web address]

## 2019-12-31 NOTE — PROGRESS NOTES
Subjective:      Patient ID: Aaliyah Angela is a 64 y.o. female.    Chief Complaint: Pain of the Left Hand and Pain of the Right Hand      HPI  Aaliyah Angela is a right hand dominant 64 y.o. female presenting today for finger numbness and tingling.  There was not a history of trauma.  Her right hand numbness and tingling is worse at night, she occasionally has to shake out her hands.  The left finger numbness and tingling is not as severe, it is mostly in the small finger.  She reports feeling weakness in the right hand, occasionally having difficulty holding onto items.  She underwent EMG evaluation in August, reports that she was informed having carpal tunnel syndrome as well as some diabetic neuropathy.  She has tried using a right carpal tunnel brace at night, reports that she continues to get nighttime symptoms.  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      Review of patient's allergies indicates:  No Known Allergies      Current Outpatient Medications   Medication Sig Dispense Refill    amitriptyline (ELAVIL) 25 MG tablet Take 2 tablets (50 mg total) by mouth every evening. 60 tablet 11    amLODIPine (NORVASC) 10 MG tablet TAKE 1 TABLET BY MOUTH EVERY DAY 90 tablet 0    aspirin 81 MG Chew Take 81 mg by mouth once daily.        atorvastatin (LIPITOR) 20 MG tablet TAKE 1 TABLET BY MOUTH EVERY DAY 90 tablet 3    blood sugar diagnostic (BLOOD GLUCOSE TEST) Strp Inject 1 each into the skin 2 (two) times daily. 200 strip 3    calcium-vitamin D3 500 mg(1,250mg) -200 unit per tablet Take 1 tablet by mouth once daily.      dulaglutide (TRULICITY) 1.5 mg/0.5 mL PnIj Inject 1.5 mg into the skin every 7 days. 12 Syringe 3    empagliflozin (JARDIANCE) 10 mg Tab Take 10 mg by mouth once daily. 90 tablet 1    flash glucose sensor (FREESTYLE SAV 14 DAY SENSOR) Kit 1 sensor kit every 14 days (Requires 2 sensor kits per lucretia) 2 kit 11    flash glucose sensor (FREESTYLE SAV SENSOR) Kit 1 Device by  "Misc.(Non-Drug; Combo Route) route continuous. 3 kit 11    JARDIANCE 10 mg Tab TAKE 1 TABLET BY MOUTH EVERY DAY 90 tablet 2    losartan (COZAAR) 50 MG tablet TAKE 1 TABLET BY MOUTH EVERY DAY 30 tablet 11    metFORMIN (GLUCOPHAGE-XR) 500 MG 24 hr tablet Take 2 tablets (1,000 mg total) by mouth 2 (two) times daily with meals. 360 tablet 3    metoprolol succinate (TOPROL-XL) 50 MG 24 hr tablet TAKE 1 TABLET BY MOUTH EVERY DAY 90 tablet 0    multivitamin (ONE DAILY MULTIVITAMIN) per tablet Take 1 tablet by mouth once daily.      pen needle, diabetic (BD ULTRA-FINE DIEGO PEN NEEDLE) 32 gauge x 5/32" Ndle Uses 3 times a day. 100 each 11    ranitidine (ZANTAC) 150 MG tablet Take 150 mg by mouth as needed for Heartburn.      omeprazole (PRILOSEC) 20 MG capsule Take 1 capsule (20 mg total) by mouth once daily. 30 capsule 0     No current facility-administered medications for this visit.        Past Medical History:   Diagnosis Date    Allergy     Anatomical narrow angle borderline glaucoma     AR (allergic rhinitis)     Arthritis     CAD (coronary artery disease) 2013    Non obstructive, 2012 PET     Cataract     Diabetes mellitus type II     with retinopathy    Diabetic retinopathy     Epiretinal membrane, both eyes     Hyperlipidemia     Hypertensive retinopathy of both eyes     Lumbar disc disease     Migraines     Morbid obesity 10/3/2012    BRADFORD (obstructive sleep apnea) 2015    Proliferative diabetic retinopathy(362.02)     PVD (peripheral vascular disease) 2013    Carotid US 1-39% bilat      Rupture of right Achilles tendon     Unspecified essential hypertension     Unspecified vitamin D deficiency     Vitreous hemorrhage of left eye        Past Surgical History:   Procedure Laterality Date     SECTION      x3    GASTRIC BYPASS      Sleeve    LASER PERIPHERAL IRIDOTOMY Bilateral 2017 and 2017        PANRETINAL PHOTOCOAGULATION      Both eyes " "   TUBAL LIGATION           Review of Systems:  Review of Systems   Constitution: Negative for chills and fever.   Skin: Negative for rash and suspicious lesions.   Musculoskeletal:        See HPI   Neurological: Negative for dizziness, headaches, light-headedness, numbness and paresthesias.   Psychiatric/Behavioral: Negative for depression. The patient is not nervous/anxious.          OBJECTIVE:     PHYSICAL EXAM:  Height: 5' 6" (167.6 cm) Weight: 112.5 kg (248 lb)  Vitals:    12/31/19 0832   BP: 130/81   Pulse: 79   Weight: 112.5 kg (248 lb)   Height: 5' 6" (1.676 m)   PainSc:   3     General    Vitals reviewed.  Constitutional: She is oriented to person, place, and time. She appears well-developed and well-nourished.   HENT:   Head: Normocephalic and atraumatic.   Neck: Normal range of motion.   Cardiovascular: Normal rate.    Pulmonary/Chest: Effort normal. No respiratory distress.   Neurological: She is alert and oriented to person, place, and time.   Psychiatric: She has a normal mood and affect. Her behavior is normal. Judgment and thought content normal.             Musculoskeletal:  No scars or edema appreciated.  She is nontender to palpation.  Good finger, wrist, and elbow range of motion bilaterally. Neurovascularly intact-good sensation and motor function, good capillary refill, 2+ radial pulses. Positive Tinel's right carpal tunnel, negative over Guyon's canal and cubital tunnel.  Negative Tinel's over the left carpal tunnel, Guyon's canal, and cubital tunnel.  Positive ulnar nerve compression test on the left, negative on the right.  Positive Durkan's on the right, negative on the left.    RADIOGRAPHS:  Bilateral Wrist XRay,   FINDINGS:  On the patient's left side there is a small os styloideum present.  The radiocarpal and intercarpal anatomic relationships are preserved.  There is a carpal boss present.  No significant soft tissue swelling.    On the patient's right side the radiocarpal and " intercarpal relationships are preserved.  A carpal boss is also present.      Impression       Bilateral accessory ossicles as described above which are of uncertain clinical significance.     Comments: I have personally reviewed the imaging and I agree with the above radiologist's report.    EMG, 8/2019  Impression   This is an abnormal study. There is electrophysiologic evidence of:   1. Mild to moderate severity right carpal tunnel syndrome;   2. Mild severity left carpal tunnel syndrome;   3. Primarily axonal bilateral ulnar sensory neuropathies, which may be secondary to a length dependant polyneuropathy secondary to her chronic diabetes. The sural sensory responses in the lower extremity were also diminished.       ASSESSMENT/PLAN:   Aaliyah was seen today for pain and pain.    Diagnoses and all orders for this visit:    Bilateral carpal tunnel syndrome           - We talked at length about the anatomy and pathophysiology of   Encounter Diagnosis   Name Primary?    Bilateral carpal tunnel syndrome Yes       - patient's symptoms and physical exam findings were discussed in detail, we discussed the EMG results.  Discussed conservative and surgical treatment options for carpal tunnel syndrome.  Also discussed possible ulnar nerve compression on the left, also possible diabetic polyneuropathy.  - patient is not interested in steroid injection. She has previously tried bracing with no significant improvement.  - continue bracing, nerve glide handout provided.  - patient is interested in a right carpal tunnel release.  The procedure was discussed in detail, the indications and risks of the procedure were discussed.  Patient's questions were answered.  Consent form signed today in clinic.    Disclaimer: This note has been generated using voice-recognition software. There may be typographical errors that have been missed during proof-reading.

## 2020-01-03 ENCOUNTER — OFFICE VISIT (OUTPATIENT)
Dept: INTERNAL MEDICINE | Facility: CLINIC | Age: 65
End: 2020-01-03
Payer: COMMERCIAL

## 2020-01-03 ENCOUNTER — TELEPHONE (OUTPATIENT)
Dept: INTERNAL MEDICINE | Facility: CLINIC | Age: 65
End: 2020-01-03

## 2020-01-03 VITALS
WEIGHT: 244.69 LBS | OXYGEN SATURATION: 98 % | DIASTOLIC BLOOD PRESSURE: 84 MMHG | HEIGHT: 66 IN | HEART RATE: 75 BPM | SYSTOLIC BLOOD PRESSURE: 142 MMHG | BODY MASS INDEX: 39.32 KG/M2

## 2020-01-03 DIAGNOSIS — Z12.11 COLON CANCER SCREENING: ICD-10-CM

## 2020-01-03 DIAGNOSIS — E11.42 DIABETIC POLYNEUROPATHY ASSOCIATED WITH TYPE 2 DIABETES MELLITUS: ICD-10-CM

## 2020-01-03 DIAGNOSIS — G47.33 OSA ON CPAP: ICD-10-CM

## 2020-01-03 DIAGNOSIS — E66.01 CLASS 3 SEVERE OBESITY DUE TO EXCESS CALORIES WITH SERIOUS COMORBIDITY AND BODY MASS INDEX (BMI) OF 40.0 TO 44.9 IN ADULT: ICD-10-CM

## 2020-01-03 DIAGNOSIS — I10 HTN (HYPERTENSION), BENIGN: ICD-10-CM

## 2020-01-03 DIAGNOSIS — E11.3593 TYPE 2 DIABETES MELLITUS WITH BOTH EYES AFFECTED BY PROLIFERATIVE RETINOPATHY WITHOUT MACULAR EDEMA, WITH LONG-TERM CURRENT USE OF INSULIN: ICD-10-CM

## 2020-01-03 DIAGNOSIS — I73.9 PVD (PERIPHERAL VASCULAR DISEASE): Primary | ICD-10-CM

## 2020-01-03 DIAGNOSIS — Z98.84 HISTORY OF BARIATRIC SURGERY: ICD-10-CM

## 2020-01-03 DIAGNOSIS — E78.5 HYPERLIPIDEMIA, UNSPECIFIED HYPERLIPIDEMIA TYPE: ICD-10-CM

## 2020-01-03 DIAGNOSIS — I25.10 CORONARY ARTERY DISEASE INVOLVING NATIVE CORONARY ARTERY OF NATIVE HEART WITHOUT ANGINA PECTORIS: ICD-10-CM

## 2020-01-03 DIAGNOSIS — Z79.4 TYPE 2 DIABETES MELLITUS WITH BOTH EYES AFFECTED BY PROLIFERATIVE RETINOPATHY WITHOUT MACULAR EDEMA, WITH LONG-TERM CURRENT USE OF INSULIN: ICD-10-CM

## 2020-01-03 PROCEDURE — 99999 PR PBB SHADOW E&M-EST. PATIENT-LVL V: CPT | Mod: PBBFAC,,, | Performed by: FAMILY MEDICINE

## 2020-01-03 PROCEDURE — 99396 PR PREVENTIVE VISIT,EST,40-64: ICD-10-PCS | Mod: S$GLB,,, | Performed by: FAMILY MEDICINE

## 2020-01-03 PROCEDURE — 3077F SYST BP >= 140 MM HG: CPT | Mod: CPTII,S$GLB,, | Performed by: FAMILY MEDICINE

## 2020-01-03 PROCEDURE — 99999 PR PBB SHADOW E&M-EST. PATIENT-LVL V: ICD-10-PCS | Mod: PBBFAC,,, | Performed by: FAMILY MEDICINE

## 2020-01-03 PROCEDURE — 3077F PR MOST RECENT SYSTOLIC BLOOD PRESSURE >= 140 MM HG: ICD-10-PCS | Mod: CPTII,S$GLB,, | Performed by: FAMILY MEDICINE

## 2020-01-03 PROCEDURE — 99396 PREV VISIT EST AGE 40-64: CPT | Mod: S$GLB,,, | Performed by: FAMILY MEDICINE

## 2020-01-03 PROCEDURE — 3079F DIAST BP 80-89 MM HG: CPT | Mod: CPTII,S$GLB,, | Performed by: FAMILY MEDICINE

## 2020-01-03 PROCEDURE — 3079F PR MOST RECENT DIASTOLIC BLOOD PRESSURE 80-89 MM HG: ICD-10-PCS | Mod: CPTII,S$GLB,, | Performed by: FAMILY MEDICINE

## 2020-01-03 RX ORDER — LOSARTAN POTASSIUM 100 MG/1
100 TABLET ORAL DAILY
Qty: 90 TABLET | Refills: 3 | Status: SHIPPED | OUTPATIENT
Start: 2020-01-03 | End: 2021-02-18

## 2020-01-03 NOTE — TELEPHONE ENCOUNTER
----- Message from Lizeth Argueta sent at 1/3/2020  3:47 PM CST -----  Patient had to leave and stated will come back to schedule appointments.

## 2020-01-03 NOTE — PROGRESS NOTES
Subjective:       Patient ID: Aaliyah Angela is a 64 y.o. female.    Chief Complaint: Annual Exam    Patient is here for follow-up of their chronic diseases, annual exam  Works at [employer]    Diabetes Management Status    Statin: Taking  ACE/ARB: Taking    Screening or Prevention Patient's value Goal Complete/Controlled?   HgA1C Testing and Control   Lab Results   Component Value Date    HGBA1C 7.5 (H) 12/31/2019      Annually/Less than 8% Yes   Lipid profile : 02/10/2018 Annually No   LDL control Lab Results   Component Value Date    LDLCALC 73.6 02/10/2018    Annually/Less than 100 mg/dl  No   Nephropathy screening Lab Results   Component Value Date    LABMICR <2.5 09/12/2019     Lab Results   Component Value Date    PROTEINUA Negative 10/16/2015    Annually Yes   Blood pressure BP Readings from Last 1 Encounters:   01/03/20 (!) 142/84    Less than 140/90 No   Dilated retinal exam : 06/03/2019 Annually Yes   Foot exam   : 12/31/2018 Annually No         Review of Systems   Constitutional: Negative for chills and fever.   HENT: Negative for ear pain.    Eyes: Negative for pain.   Respiratory: Negative for chest tightness.    Cardiovascular: Negative for chest pain.   Gastrointestinal: Negative for abdominal pain.   Genitourinary: Negative for flank pain.   Musculoskeletal: Negative for gait problem.   Neurological: Negative for syncope.   Psychiatric/Behavioral: Negative for behavioral problems.       Objective:      Physical Exam   Constitutional: She appears well-developed and well-nourished.   HENT:   Head: Normocephalic and atraumatic.   Eyes: EOM are normal.   Neck: Neck supple.   Cardiovascular: Normal rate.   Pulmonary/Chest: Breath sounds normal. No respiratory distress. She has no wheezes. She has no rales.   Abdominal: Soft.   Musculoskeletal: She exhibits no edema.   Neurological: She is alert.   Skin: Skin is dry.   Psychiatric: Her behavior is normal.   Nursing note and vitals  "reviewed.      Assessment:       1. PVD (peripheral vascular disease)    2. Hyperlipidemia, unspecified hyperlipidemia type    3. HTN (hypertension), benign    4. Coronary artery disease involving native coronary artery of native heart without angina pectoris    5. History of bariatric surgery, 12-    6. BRADFORD on CPAP    7. Class 3 severe obesity due to excess calories with serious comorbidity and body mass index (BMI) of 40.0 to 44.9 in adult    8. 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 by proliferative retinopathy without macular edema, with long-term current use of insulin    9. Diabetic polyneuropathy associated with type 2 diabetes mellitus    10. Colon cancer screening        Plan:       Aaliyah was seen today for annual exam.    Diagnoses and all orders for this visit:    PVD (peripheral vascular disease)  -stable    Hyperlipidemia, unspecified hyperlipidemia type  -     Lipid panel; Future    HTN (hypertension), benign  BP (!) 142/84   Pulse 75   Ht 5' 6" (1.676 m)   Wt 111 kg (244 lb 11.4 oz)   SpO2 98%   BMI 39.50 kg/m²   -    DOUBLE LOSARTAN TO:  losartan (COZAAR) 100 MG tablet; Take 1 tablet (100 mg total) by mouth once daily.  -     CBC auto differential; Future  -     Lipid panel; Future  -     TSH; Future  -     Comprehensive metabolic panel; Future  -already in digital HTN program    Coronary artery disease involving native coronary artery of native heart without angina pectoris (2012 PET showed no ischemia but +CAD)  -     Lipid panel; Future  -     Comprehensive metabolic panel; Future  We discussed whole food plant based diets and the good outcomes from recent clinical trails. Recommended the book How Not to Die  by Edmundo Grimaldo M.D     History of bariatric surgery, 12-    BRADFORD on CPAP  -uses with some benefit but feels less need than in the past, does not use every night    Class 3 severe obesity due to excess calories with serious comorbidity and body mass index (BMI) " of 40.0 to 44.9 in adult  We discussed whole food plant based diets and the good outcomes from recent clinical trials. Recommended the book How Not to Die  by Edmundo Grimaldo M.D    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 by proliferative retinopathy without macular edema, with long-term current use of insulin  -     Lipid panel; Future  -     TSH; Future  -     Ambulatory consult to Podiatry    Diabetic polyneuropathy associated with type 2 diabetes mellitus  -stable    Colon cancer screening  -     Case request GI: COLONOSCOPY    Follow up in about 6 months (around 7/3/2020).

## 2020-01-04 ENCOUNTER — LAB VISIT (OUTPATIENT)
Dept: LAB | Facility: HOSPITAL | Age: 65
End: 2020-01-04
Attending: FAMILY MEDICINE
Payer: COMMERCIAL

## 2020-01-04 DIAGNOSIS — I10 HTN (HYPERTENSION), BENIGN: ICD-10-CM

## 2020-01-04 DIAGNOSIS — I25.10 CORONARY ARTERY DISEASE INVOLVING NATIVE CORONARY ARTERY OF NATIVE HEART WITHOUT ANGINA PECTORIS: ICD-10-CM

## 2020-01-04 DIAGNOSIS — E11.3593 TYPE 2 DIABETES MELLITUS WITH BOTH EYES AFFECTED BY PROLIFERATIVE RETINOPATHY WITHOUT MACULAR EDEMA, WITH LONG-TERM CURRENT USE OF INSULIN: ICD-10-CM

## 2020-01-04 DIAGNOSIS — E78.5 HYPERLIPIDEMIA, UNSPECIFIED HYPERLIPIDEMIA TYPE: ICD-10-CM

## 2020-01-04 DIAGNOSIS — Z79.4 TYPE 2 DIABETES MELLITUS WITH BOTH EYES AFFECTED BY PROLIFERATIVE RETINOPATHY WITHOUT MACULAR EDEMA, WITH LONG-TERM CURRENT USE OF INSULIN: ICD-10-CM

## 2020-01-04 LAB
ALBUMIN SERPL BCP-MCNC: 3.8 G/DL (ref 3.5–5.2)
ALP SERPL-CCNC: 63 U/L (ref 55–135)
ALT SERPL W/O P-5'-P-CCNC: 12 U/L (ref 10–44)
ANION GAP SERPL CALC-SCNC: 8 MMOL/L (ref 8–16)
AST SERPL-CCNC: 18 U/L (ref 10–40)
BASOPHILS # BLD AUTO: 0.03 K/UL (ref 0–0.2)
BASOPHILS NFR BLD: 0.7 % (ref 0–1.9)
BILIRUB SERPL-MCNC: 0.5 MG/DL (ref 0.1–1)
BUN SERPL-MCNC: 15 MG/DL (ref 8–23)
CALCIUM SERPL-MCNC: 9.5 MG/DL (ref 8.7–10.5)
CHLORIDE SERPL-SCNC: 104 MMOL/L (ref 95–110)
CHOLEST SERPL-MCNC: 115 MG/DL (ref 120–199)
CHOLEST/HDLC SERPL: 2.2 {RATIO} (ref 2–5)
CO2 SERPL-SCNC: 29 MMOL/L (ref 23–29)
CREAT SERPL-MCNC: 0.9 MG/DL (ref 0.5–1.4)
DIFFERENTIAL METHOD: ABNORMAL
EOSINOPHIL # BLD AUTO: 0.2 K/UL (ref 0–0.5)
EOSINOPHIL NFR BLD: 3.3 % (ref 0–8)
ERYTHROCYTE [DISTWIDTH] IN BLOOD BY AUTOMATED COUNT: 13.7 % (ref 11.5–14.5)
EST. GFR  (AFRICAN AMERICAN): >60 ML/MIN/1.73 M^2
EST. GFR  (NON AFRICAN AMERICAN): >60 ML/MIN/1.73 M^2
GLUCOSE SERPL-MCNC: 98 MG/DL (ref 70–110)
HCT VFR BLD AUTO: 37.9 % (ref 37–48.5)
HDLC SERPL-MCNC: 53 MG/DL (ref 40–75)
HDLC SERPL: 46.1 % (ref 20–50)
HGB BLD-MCNC: 12 G/DL (ref 12–16)
LDLC SERPL CALC-MCNC: 50.2 MG/DL (ref 63–159)
LYMPHOCYTES # BLD AUTO: 2.2 K/UL (ref 1–4.8)
LYMPHOCYTES NFR BLD: 48.1 % (ref 18–48)
MCH RBC QN AUTO: 29.1 PG (ref 27–31)
MCHC RBC AUTO-ENTMCNC: 31.7 G/DL (ref 32–36)
MCV RBC AUTO: 92 FL (ref 82–98)
MONOCYTES # BLD AUTO: 0.4 K/UL (ref 0.3–1)
MONOCYTES NFR BLD: 8.3 % (ref 4–15)
NEUTROPHILS # BLD AUTO: 1.8 K/UL (ref 1.8–7.7)
NEUTROPHILS NFR BLD: 39.6 % (ref 38–73)
NONHDLC SERPL-MCNC: 62 MG/DL
PLATELET # BLD AUTO: 256 K/UL (ref 150–350)
PMV BLD AUTO: 10.5 FL (ref 9.2–12.9)
POTASSIUM SERPL-SCNC: 4.4 MMOL/L (ref 3.5–5.1)
PROT SERPL-MCNC: 7.6 G/DL (ref 6–8.4)
RBC # BLD AUTO: 4.13 M/UL (ref 4–5.4)
SODIUM SERPL-SCNC: 141 MMOL/L (ref 136–145)
TRIGL SERPL-MCNC: 59 MG/DL (ref 30–150)
TSH SERPL DL<=0.005 MIU/L-ACNC: 1.07 UIU/ML (ref 0.4–4)
WBC # BLD AUTO: 4.57 K/UL (ref 3.9–12.7)

## 2020-01-04 PROCEDURE — 36415 COLL VENOUS BLD VENIPUNCTURE: CPT

## 2020-01-04 PROCEDURE — 80053 COMPREHEN METABOLIC PANEL: CPT

## 2020-01-04 PROCEDURE — 85025 COMPLETE CBC W/AUTO DIFF WBC: CPT

## 2020-01-04 PROCEDURE — 84443 ASSAY THYROID STIM HORMONE: CPT

## 2020-01-04 PROCEDURE — 80061 LIPID PANEL: CPT

## 2020-01-10 DIAGNOSIS — E11.8 TYPE 2 DIABETES MELLITUS WITH COMPLICATION: ICD-10-CM

## 2020-01-10 RX ORDER — FLASH GLUCOSE SENSOR
KIT MISCELLANEOUS
Qty: 2 KIT | Refills: 6 | Status: SHIPPED | OUTPATIENT
Start: 2020-01-10 | End: 2021-03-02

## 2020-01-15 ENCOUNTER — TELEPHONE (OUTPATIENT)
Dept: ORTHOPEDICS | Facility: CLINIC | Age: 65
End: 2020-01-15

## 2020-01-15 NOTE — TELEPHONE ENCOUNTER
----- Message from Ankit Padron sent at 1/15/2020  8:04 AM CST -----  Contact: CHRISTI DAVILA   Name of Who is Calling: CHRISTI DAVILA       What is the request in detail: Patient is requesting a call back regarding FMLA paperwork.. Please contact to further advise.      Can the clinic reply by [facility]: NO      What Number to Call Back [facility] not [facility]: [phone]

## 2020-01-20 ENCOUNTER — TELEPHONE (OUTPATIENT)
Dept: ORTHOPEDICS | Facility: CLINIC | Age: 65
End: 2020-01-20

## 2020-01-20 NOTE — TELEPHONE ENCOUNTER
----- Message from Moriah Daniels sent at 1/20/2020  8:38 AM CST -----  Contact: CHRISTI DAVILA [[id number]]  Name of Who is Calling : CHRISTI DAVILA [[id number]]    Patient is requesting a call from staff in regards to fmla paperwork patient states it is due by the 1/22/20 and would like an update. She states she faxed it over to the office twice  .....Please contact to further discuss and advise.    Can the clinic reply by [facility] : No    What Number to Call Back :  [phone]

## 2020-01-20 NOTE — TELEPHONE ENCOUNTER
Returned call to patient. Notified [facility] paperwork was sent to FMLA/Disability department for completion, they will send it back to us to have Dr Bran sign it and then we send it back to them to send off. She verbalized understanding and states she will let her job know.

## 2020-01-22 ENCOUNTER — TELEPHONE (OUTPATIENT)
Dept: ORTHOPEDICS | Facility: CLINIC | Age: 65
End: 2020-01-22

## 2020-01-22 NOTE — TELEPHONE ENCOUNTER
----- Message from Jocelyn Murdock sent at 1/22/2020 11:45 AM CST -----  Contact: Self            Name of Who is Calling: CHRISTI DAVILA [[id number]      What is the request in detail: Patient states she is checking the status of the FMLA forms being completed. Patient states the forms are due today. Please contact to further discuss and advise.        Can the clinic reply by [facility]: N      What Number to Call Back [facility] not [facility]: [phone]

## 2020-01-24 RX ORDER — METOPROLOL SUCCINATE 50 MG/1
TABLET, EXTENDED RELEASE ORAL
Qty: 90 TABLET | Refills: 3 | Status: SHIPPED | OUTPATIENT
Start: 2020-01-24 | End: 2020-12-31

## 2020-01-24 NOTE — PROGRESS NOTES
Refill Authorization Note     is requesting a refill authorization.    Brief assessment and rationale for refill: APPROVE: prr                                         Comments:   Requested Prescriptions   Pending Prescriptions Disp Refills    metoprolol succinate (TOPROL-XL) 50 MG 24 hr tablet [Pharmacy Med Name: METOPROLOL ER SUCCINATE 50MG TABS] 90 tablet 3     Sig: TAKE 1 TABLET BY MOUTH EVERY DAY       Cardiovascular:  Beta Blockers Failed - 1/24/2020  3:01 PM        Failed - Last BP in normal range within 360 days     BP Readings from Last 3 Encounters:   01/03/20 (!) 142/84   12/31/19 130/81   11/22/19 (!) 150/80              Passed - Patient is at least 18 years old        Passed - Last Heart Rate in normal range within 360 days     Pulse Readings from Last 3 Encounters:   01/03/20 75   12/31/19 79   11/22/19 96             Passed - Office visit in past 12 months or future 90 days     Recent Outpatient Visits            3 weeks ago PVD (peripheral vascular disease)    [facility] Reggie Glover MD    3 weeks ago Bilateral carpal tunnel syndrome    [facility] [place] [address] TONY Nascimento    2 months ago Diabetic polyneuropathy associated with type 2 diabetes mellitus    [facility] - Neurology Haven Starks MD    4 months ago 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 by proliferative retinopathy without macular edema, with long-term current use of insulin    [facility] Medicine DAMARI Raphael, FNP    7 months ago Ocular hypertension, bilateral    [facility] - Ophthalmology Alma Goodrich MD          Future Appointments              In 2 weeks Mary Angulo DPM [place] - Podiatry, [place]    In 3 weeks MD [facility] - [facility]    In 3 weeks TONY Nascimento [facility] [place] [address], [facility]    In 4 months DAMARI Raphael, FNP [facility] PC[facility]

## 2020-01-24 NOTE — TELEPHONE ENCOUNTER
----- Message from Anitra Torre sent at 1/24/2020  1:37 PM CST -----  Contact: self/[phone]  Pt called in regards to checking the status of her Rx refill request for     metoprolol succinate (TOPROL-XL) 50 MG 24 hr tablet 90 tablet 0 10/26/2019  No TAKE 1 TABLET BY MOUTH EVERY DAY    Pt is out of medication.    [facility] [phone]   Please advise

## 2020-01-27 RX ORDER — METOPROLOL SUCCINATE 50 MG/1
50 TABLET, EXTENDED RELEASE ORAL DAILY
Qty: 90 TABLET | Refills: 0 | OUTPATIENT
Start: 2020-01-27

## 2020-01-27 NOTE — PROGRESS NOTES
Quick DC. Duplicate Request  Refill Authorization Note     is requesting a refill authorization.    Brief assessment and rationale for refill: QUICK DC: duplicate request          Medication Therapy Plan: Duplicate request, quick dc; Handled in a previous encounter                              Comments:   Last Prescribed Info:  Authorizing Provider: Reggie Glover MD ALLEY #:  [id number] NPI:  [id number]    Ordering User:  Paulie Gurrola, PharmD               Original Order:  metoprolol succinate (TOPROL-XL) 50 MG 24 hr tablet [[id number]]      Pharmacy:  [facility] #[id number] [address] [employer] AT [facility]        metoprolol succinate (TOPROL-XL) 50 MG 24 hr tablet 90 tablet 3 1/24/2020     Sig: TAKE 1 TABLET BY MOUTH EVERY DAY    Sent to pharmacy as: metoprolol succinate (TOPROL-XL) 50 MG 24 hr tablet    Notes to Pharmacy: Please delete all prior scripts with same name and strength including on holds.    E-Prescribing Status: Receipt confirmed by pharmacy (1/24/2020  3:40 PM CST)

## 2020-01-27 NOTE — PROGRESS NOTES
Refill Authorization Note     is requesting a refill authorization.    Brief assessment and rationale for refill: QUICK DC: duplicate                                         Comments:   Last Prescribed Info:    Ordering Provider: Reggie Glover MD ALLEY #:  [id number] NPI:  [id number]    Authorizing Provider: Reggie Glover MD ALLEY #:  [id number] NPI:  [id number]    Ordering User:  Sun HardingD               Original Order:  metoprolol succinate (TOPROL-XL) 50 MG 24 hr tablet [[id number]]      Pharmacy[facility]  [id number] [address] ALLEY #:  [id number]     Pharmacy Comments:  --          Fill quantity remaining:  -- Fill quantity used:  -- Next fill due: --       Outpatient Medication Detail      Disp Refills Start End    metoprolol succinate (TOPROL-XL) 50 MG 24 hr tablet 90 tablet 3 1/24/2020     Sig: TAKE 1 TABLET BY MOUTH EVERY DAY    Sent to pharmacy as: metoprolol succinate (TOPROL-XL) 50 MG 24 hr tablet    Notes to Pharmacy: Please delete all prior scripts with same name and strength including on holds.    E-Prescribing Status: Receipt confirmed by pharmacy (1/24/2020  3:40 PM CST)         Appointments  past 12m or future 3m with PCP    Date Provider   Last Visit   1/3/2020 Reggie Glover MD   Next Visit   Visit date not found Reggie Glover MD

## 2020-02-03 RX ORDER — ACETAMINOPHEN AND CODEINE PHOSPHATE 300; 30 MG/1; MG/1
1 TABLET ORAL EVERY 6 HOURS PRN
Qty: 25 TABLET | Refills: 0 | Status: SHIPPED | OUTPATIENT
Start: 2020-02-03 | End: 2020-08-24 | Stop reason: ALTCHOICE

## 2020-02-03 NOTE — H&P
The patient has been examined and the H&P has been reviewed:    I concur with the findings and no changes have occurred since H&P was written. Surgical site marked with patient participation.    Anesthesia/Surgery risks, benefits and alternative options discussed and understood by patient/family.    12/31/2019    Subjective:       Patient ID: Aaliyah Angela is a 64 y.o. female.     Chief Complaint: Pain of the Left Hand and Pain of the Right Hand        HPI  Aaliyah Angela is a right hand dominant 64 y.o. female presenting today for finger numbness and tingling.  There was not a history of trauma.  Her right hand numbness and tingling is worse at night, she occasionally has to shake out her hands.  The left finger numbness and tingling is not as severe, it is mostly in the small finger.  She reports feeling weakness in the right hand, occasionally having difficulty holding onto items.  She underwent EMG evaluation in August, reports that she was informed having carpal tunnel syndrome as well as some diabetic neuropathy.  She has tried using a right carpal tunnel brace at night, reports that she continues to get nighttime symptoms.  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        Review of patient's allergies indicates:  No Known Allergies       Current Medications          Current Outpatient Medications   Medication Sig Dispense Refill    amitriptyline (ELAVIL) 25 MG tablet Take 2 tablets (50 mg total) by mouth every evening. 60 tablet 11    amLODIPine (NORVASC) 10 MG tablet TAKE 1 TABLET BY MOUTH EVERY DAY 90 tablet 0    aspirin 81 MG Chew Take 81 mg by mouth once daily.          atorvastatin (LIPITOR) 20 MG tablet TAKE 1 TABLET BY MOUTH EVERY DAY 90 tablet 3    blood sugar diagnostic (BLOOD GLUCOSE TEST) Strp Inject 1 each into the skin 2 (two) times daily. 200 strip 3    calcium-vitamin D3 500 mg(1,250mg) -200 unit per tablet Take 1 tablet by mouth once daily.        dulaglutide (TRULICITY) 1.5 mg/0.5  "mL PnIj Inject 1.5 mg into the skin every 7 days. 12 Syringe 3    empagliflozin (JARDIANCE) 10 mg Tab Take 10 mg by mouth once daily. 90 tablet 1    flash glucose sensor (FREESTYLE SAV 14 DAY SENSOR) Kit 1 sensor kit every 14 days (Requires 2 sensor kits per lucretia) 2 kit 11    flash glucose sensor (FREESTYLE SAV SENSOR) Kit 1 Device by Misc.(Non-Drug; Combo Route) route continuous. 3 kit 11    JARDIANCE 10 mg Tab TAKE 1 TABLET BY MOUTH EVERY DAY 90 tablet 2    losartan (COZAAR) 50 MG tablet TAKE 1 TABLET BY MOUTH EVERY DAY 30 tablet 11    metFORMIN (GLUCOPHAGE-XR) 500 MG 24 hr tablet Take 2 tablets (1,000 mg total) by mouth 2 (two) times daily with meals. 360 tablet 3    metoprolol succinate (TOPROL-XL) 50 MG 24 hr tablet TAKE 1 TABLET BY MOUTH EVERY DAY 90 tablet 0    multivitamin (ONE DAILY MULTIVITAMIN) per tablet Take 1 tablet by mouth once daily.        pen needle, diabetic (BD ULTRA-FINE DIEGO PEN NEEDLE) 32 gauge x 5/32" Ndle Uses 3 times a day. 100 each 11    ranitidine (ZANTAC) 150 MG tablet Take 150 mg by mouth as needed for Heartburn.        omeprazole (PRILOSEC) 20 MG capsule Take 1 capsule (20 mg total) by mouth once daily. 30 capsule 0      No current facility-administered medications for this visit.                  Past Medical History:   Diagnosis Date    Allergy      Anatomical narrow angle borderline glaucoma      AR (allergic rhinitis)      Arthritis      CAD (coronary artery disease) 4/22/2013     Non obstructive, 2012 PET     Cataract      Diabetes mellitus type II       with retinopathy    Diabetic retinopathy      Epiretinal membrane, both eyes      Hyperlipidemia      Hypertensive retinopathy of both eyes      Lumbar disc disease      Migraines      Morbid obesity 10/3/2012    BRADFORD (obstructive sleep apnea) 8/4/2015    Proliferative diabetic retinopathy(362.02)      PVD (peripheral vascular disease) 4/22/2013     Carotid US 1-39% bilat 2012     Rupture of right " "Achilles tendon      Unspecified essential hypertension      Unspecified vitamin D deficiency      Vitreous hemorrhage of left eye                 Past Surgical History:   Procedure Laterality Date     SECTION         x3    GASTRIC BYPASS         Sleeve    LASER PERIPHERAL IRIDOTOMY Bilateral 2017 and 2017         PANRETINAL PHOTOCOAGULATION         Both eyes    TUBAL LIGATION                Review of Systems:  Review of Systems   Constitution: Negative for chills and fever.   Skin: Negative for rash and suspicious lesions.   Musculoskeletal:        See HPI   Neurological: Negative for dizziness, headaches, light-headedness, numbness and paresthesias.   Psychiatric/Behavioral: Negative for depression. The patient is not nervous/anxious.             OBJECTIVE:      PHYSICAL EXAM:  Height: 5' 6" (167.6 cm) Weight: 112.5 kg (248 lb)      Vitals:     19 0832   BP: 130/81   Pulse: 79   Weight: 112.5 kg (248 lb)   Height: 5' 6" (1.676 m)   PainSc:   3      General     Vitals reviewed.  Constitutional: She is oriented to person, place, and time. She appears well-developed and well-nourished.   HENT:   Head: Normocephalic and atraumatic.   Neck: Normal range of motion.   Cardiovascular: Normal rate.    Pulmonary/Chest: Effort normal. No respiratory distress.   Neurological: She is alert and oriented to person, place, and time.   Psychiatric: She has a normal mood and affect. Her behavior is normal. Judgment and thought content normal.                  Musculoskeletal:  No scars or edema appreciated.  She is nontender to palpation.  Good finger, wrist, and elbow range of motion bilaterally. Neurovascularly intact-good sensation and motor function, good capillary refill, 2+ radial pulses. Positive Tinel's right carpal tunnel, negative over Guyon's canal and cubital tunnel.  Negative Tinel's over the left carpal tunnel, Guyon's canal, and cubital tunnel.  Positive ulnar nerve " compression test on the left, negative on the right.  Positive Durkan's on the right, negative on the left.     RADIOGRAPHS:  Bilateral Wrist XRay,        FINDINGS:  On the patient's left side there is a small os styloideum present.  The radiocarpal and intercarpal anatomic relationships are preserved.  There is a carpal boss present.  No significant soft tissue swelling.    On the patient's right side the radiocarpal and intercarpal relationships are preserved.  A carpal boss is also present.       Impression         Bilateral accessory ossicles as described above which are of uncertain clinical significance.      Comments: I have personally reviewed the imaging and I agree with the above radiologist's report.     EMG, 8/2019  Impression   This is an abnormal study. There is electrophysiologic evidence of:   1. Mild to moderate severity right carpal tunnel syndrome;   2. Mild severity left carpal tunnel syndrome;   3. Primarily axonal bilateral ulnar sensory neuropathies, which may be secondary to a length dependant polyneuropathy secondary to her chronic diabetes. The sural sensory responses in the lower extremity were also diminished.         ASSESSMENT/PLAN:   Aaliyah was seen today for pain and pain.     Diagnoses and all orders for this visit:     Bilateral carpal tunnel syndrome              - We talked at length about the anatomy and pathophysiology of        Encounter Diagnosis   Name Primary?    Bilateral carpal tunnel syndrome Yes         - patient's symptoms and physical exam findings were discussed in detail, we discussed the EMG results.  Discussed conservative and surgical treatment options for carpal tunnel syndrome.  Also discussed possible ulnar nerve compression on the left, also possible diabetic polyneuropathy.  - patient is not interested in steroid injection. She has previously tried bracing with no significant improvement.  - continue bracing, nerve glide handout provided.  - patient is  interested in a right carpal tunnel release.  The procedure was discussed in detail, the indications and risks of the procedure were discussed.  Patient's questions were answered.  Consent form signed today in clinic.

## 2020-02-04 ENCOUNTER — TELEPHONE (OUTPATIENT)
Dept: ORTHOPEDICS | Facility: CLINIC | Age: 65
End: 2020-02-04

## 2020-02-04 ENCOUNTER — PATIENT MESSAGE (OUTPATIENT)
Dept: ORTHOPEDICS | Facility: CLINIC | Age: 65
End: 2020-02-04

## 2020-02-04 NOTE — TELEPHONE ENCOUNTER
Informed patient to be at the surgery center 2/5/20 for 6   a.m. Also reminded patient of post op appointment on 2/19/20.Patient verbalized understanding.

## 2020-02-04 NOTE — PRE ADMISSION SCREENING
Pre admit phone call completed.    Instructions given to patient about NPO status as follows:     The evening before surgery do not eat anything after 9 p.m. ( this includes hard candy, chewing gum and mints).  You may only have GATORADE, POWERADE AND WATER from 9 p.m. until you leave your home. DO NOT  DRINK ANY LIQUIDS ON THE WAY TO THE HOSPITAL.      Patient was also instructed on the below information:    Park in the Parking lot behind the hospital or in the [employer] Park[facility]age across the street from the parking lot.  Parking is complimentary.  If you will be discharged the same day as your procedure, please arrange for a responsible adult to drive you home or  to accompany you if traveling by taxi.  YOU WILL NOT BE PERMITTED TO DRIVE OR TO LEAVE THE HOSPITAL ALONE AFTER SURGERY.  It is strongly recommended that you arrange for someone to remain with you for the first 24 hrs following your surgery.    Patient verbalized understanding of above instructions.

## 2020-02-05 ENCOUNTER — HOSPITAL ENCOUNTER (OUTPATIENT)
Facility: OTHER | Age: 65
Discharge: HOME | End: 2020-02-05
Attending: ORTHOPAEDIC SURGERY | Admitting: ORTHOPAEDIC SURGERY
Payer: COMMERCIAL

## 2020-02-05 ENCOUNTER — ANESTHESIA EVENT (OUTPATIENT)
Dept: SURGERY | Facility: OTHER | Age: 65
End: 2020-02-05
Payer: COMMERCIAL

## 2020-02-05 ENCOUNTER — PATIENT OUTREACH (OUTPATIENT)
Dept: ADMINISTRATIVE | Facility: OTHER | Age: 65
End: 2020-02-05

## 2020-02-05 ENCOUNTER — ANESTHESIA (OUTPATIENT)
Dept: SURGERY | Facility: OTHER | Age: 65
End: 2020-02-05
Payer: COMMERCIAL

## 2020-02-05 VITALS
TEMPERATURE: 98 F | OXYGEN SATURATION: 100 % | HEIGHT: 66 IN | BODY MASS INDEX: 39.53 KG/M2 | SYSTOLIC BLOOD PRESSURE: 133 MMHG | WEIGHT: 246 LBS | HEART RATE: 71 BPM | RESPIRATION RATE: 16 BRPM | DIASTOLIC BLOOD PRESSURE: 67 MMHG

## 2020-02-05 DIAGNOSIS — G56.01 RIGHT CARPAL TUNNEL SYNDROME: Primary | ICD-10-CM

## 2020-02-05 DIAGNOSIS — G56.01 CARPAL TUNNEL SYNDROME OF RIGHT WRIST: ICD-10-CM

## 2020-02-05 PROCEDURE — 25000003 PHARM REV CODE 250: Performed by: ORTHOPAEDIC SURGERY

## 2020-02-05 PROCEDURE — 63600175 PHARM REV CODE 636 W HCPCS: Performed by: NURSE ANESTHETIST, CERTIFIED REGISTERED

## 2020-02-05 PROCEDURE — 63600175 PHARM REV CODE 636 W HCPCS: Performed by: ANESTHESIOLOGY

## 2020-02-05 PROCEDURE — 64721 CARPAL TUNNEL SURGERY: CPT | Mod: RT,,, | Performed by: ORTHOPAEDIC SURGERY

## 2020-02-05 PROCEDURE — 71000015 HC POSTOP RECOV 1ST HR: Performed by: ORTHOPAEDIC SURGERY

## 2020-02-05 PROCEDURE — 71000016 HC POSTOP RECOV ADDL HR: Performed by: ORTHOPAEDIC SURGERY

## 2020-02-05 PROCEDURE — 36000707: Performed by: ORTHOPAEDIC SURGERY

## 2020-02-05 PROCEDURE — 64721 PR REVISE MEDIAN N/CARPAL TUNNEL SURG: ICD-10-PCS | Mod: RT,,, | Performed by: ORTHOPAEDIC SURGERY

## 2020-02-05 PROCEDURE — 37000008 HC ANESTHESIA 1ST 15 MINUTES: Performed by: ORTHOPAEDIC SURGERY

## 2020-02-05 PROCEDURE — 37000009 HC ANESTHESIA EA ADD 15 MINS: Performed by: ORTHOPAEDIC SURGERY

## 2020-02-05 PROCEDURE — 36000706: Performed by: ORTHOPAEDIC SURGERY

## 2020-02-05 RX ORDER — ONDANSETRON 2 MG/ML
4 INJECTION INTRAMUSCULAR; INTRAVENOUS DAILY PRN
Status: DISCONTINUED | OUTPATIENT
Start: 2020-02-05 | End: 2020-02-05 | Stop reason: HOSPADM

## 2020-02-05 RX ORDER — OXYCODONE HYDROCHLORIDE 5 MG/1
5 TABLET ORAL
Status: DISCONTINUED | OUTPATIENT
Start: 2020-02-05 | End: 2020-02-05 | Stop reason: HOSPADM

## 2020-02-05 RX ORDER — ACETAMINOPHEN AND CODEINE PHOSPHATE 300; 30 MG/1; MG/1
1 TABLET ORAL EVERY 4 HOURS PRN
Status: DISCONTINUED | OUTPATIENT
Start: 2020-02-05 | End: 2020-02-05 | Stop reason: HOSPADM

## 2020-02-05 RX ORDER — MIDAZOLAM HYDROCHLORIDE 1 MG/ML
INJECTION INTRAMUSCULAR; INTRAVENOUS
Status: DISCONTINUED | OUTPATIENT
Start: 2020-02-05 | End: 2020-02-05

## 2020-02-05 RX ORDER — BUPIVACAINE HYDROCHLORIDE 2.5 MG/ML
INJECTION, SOLUTION EPIDURAL; INFILTRATION; INTRACAUDAL
Status: DISCONTINUED | OUTPATIENT
Start: 2020-02-05 | End: 2020-02-05 | Stop reason: HOSPADM

## 2020-02-05 RX ORDER — PROPOFOL 10 MG/ML
VIAL (ML) INTRAVENOUS CONTINUOUS PRN
Status: DISCONTINUED | OUTPATIENT
Start: 2020-02-05 | End: 2020-02-05

## 2020-02-05 RX ORDER — BACITRACIN ZINC 500 UNIT/G
OINTMENT (GRAM) TOPICAL
Status: DISCONTINUED | OUTPATIENT
Start: 2020-02-05 | End: 2020-02-05 | Stop reason: HOSPADM

## 2020-02-05 RX ORDER — MUPIROCIN 20 MG/G
OINTMENT TOPICAL
Status: DISCONTINUED | OUTPATIENT
Start: 2020-02-05 | End: 2020-02-05

## 2020-02-05 RX ORDER — FENTANYL CITRATE 50 UG/ML
INJECTION, SOLUTION INTRAMUSCULAR; INTRAVENOUS
Status: DISCONTINUED | OUTPATIENT
Start: 2020-02-05 | End: 2020-02-05

## 2020-02-05 RX ORDER — HYDROMORPHONE HYDROCHLORIDE 2 MG/ML
0.4 INJECTION, SOLUTION INTRAMUSCULAR; INTRAVENOUS; SUBCUTANEOUS EVERY 5 MIN PRN
Status: DISCONTINUED | OUTPATIENT
Start: 2020-02-05 | End: 2020-02-05 | Stop reason: HOSPADM

## 2020-02-05 RX ORDER — SODIUM CHLORIDE, SODIUM LACTATE, POTASSIUM CHLORIDE, CALCIUM CHLORIDE 600; 310; 30; 20 MG/100ML; MG/100ML; MG/100ML; MG/100ML
INJECTION, SOLUTION INTRAVENOUS CONTINUOUS PRN
Status: DISCONTINUED | OUTPATIENT
Start: 2020-02-05 | End: 2020-02-05

## 2020-02-05 RX ORDER — PROPOFOL 10 MG/ML
VIAL (ML) INTRAVENOUS
Status: DISCONTINUED | OUTPATIENT
Start: 2020-02-05 | End: 2020-02-05

## 2020-02-05 RX ORDER — SODIUM CHLORIDE 9 MG/ML
INJECTION, SOLUTION INTRAVENOUS CONTINUOUS
Status: ACTIVE | OUTPATIENT
Start: 2020-02-05

## 2020-02-05 RX ORDER — MEPERIDINE HYDROCHLORIDE 25 MG/ML
12.5 INJECTION INTRAMUSCULAR; INTRAVENOUS; SUBCUTANEOUS ONCE AS NEEDED
Status: DISCONTINUED | OUTPATIENT
Start: 2020-02-05 | End: 2020-02-05 | Stop reason: HOSPADM

## 2020-02-05 RX ORDER — LIDOCAINE HYDROCHLORIDE 10 MG/ML
INJECTION, SOLUTION EPIDURAL; INFILTRATION; INTRACAUDAL; PERINEURAL
Status: DISCONTINUED | OUTPATIENT
Start: 2020-02-05 | End: 2020-02-05 | Stop reason: HOSPADM

## 2020-02-05 RX ORDER — LIDOCAINE HYDROCHLORIDE 20 MG/ML
INJECTION INTRAVENOUS
Status: DISCONTINUED | OUTPATIENT
Start: 2020-02-05 | End: 2020-02-05

## 2020-02-05 RX ORDER — SODIUM CHLORIDE 0.9 % (FLUSH) 0.9 %
3 SYRINGE (ML) INJECTION
Status: DISCONTINUED | OUTPATIENT
Start: 2020-02-05 | End: 2020-02-05 | Stop reason: HOSPADM

## 2020-02-05 RX ADMIN — PROPOFOL 75 MCG/KG/MIN: 10 INJECTION, EMULSION INTRAVENOUS at 08:02

## 2020-02-05 RX ADMIN — PROPOFOL 50 MG: 10 INJECTION, EMULSION INTRAVENOUS at 08:02

## 2020-02-05 RX ADMIN — DEXTROSE 2 G: 50 INJECTION, SOLUTION INTRAVENOUS at 08:02

## 2020-02-05 RX ADMIN — SODIUM CHLORIDE, SODIUM LACTATE, POTASSIUM CHLORIDE, AND CALCIUM CHLORIDE: 600; 310; 30; 20 INJECTION, SOLUTION INTRAVENOUS at 07:02

## 2020-02-05 RX ADMIN — LIDOCAINE HYDROCHLORIDE 50 MG: 20 INJECTION, SOLUTION INTRAVENOUS at 08:02

## 2020-02-05 RX ADMIN — MIDAZOLAM HYDROCHLORIDE 2 MG: 1 INJECTION, SOLUTION INTRAMUSCULAR; INTRAVENOUS at 08:02

## 2020-02-05 RX ADMIN — FENTANYL CITRATE 100 MCG: 50 INJECTION, SOLUTION INTRAMUSCULAR; INTRAVENOUS at 08:02

## 2020-02-05 NOTE — OR NURSING
Pt has Freestyle Jaymie glucose monitor on Right upper arm, blood glucose checked with her monitor =83.

## 2020-02-05 NOTE — ANESTHESIA POSTPROCEDURE EVALUATION
Anesthesia Post Evaluation    Patient: Aaliyah Angela    Procedure(s) Performed: Procedure(s) (LRB):  RELEASE, CARPAL TUNNEL RIGHT (Right)    Final Anesthesia Type: MAC    Patient location during evaluation: OPS  Patient participation: Yes- Able to Participate  Level of consciousness: awake and alert  Post-procedure vital signs: reviewed and stable  Pain management: adequate  Airway patency: patent    PONV status at discharge: No PONV  Anesthetic complications: no      Cardiovascular status: hemodynamically stable  Respiratory status: unassisted  Hydration status: euvolemic  Follow-up not needed.          Vitals Value Taken Time   /67 2/5/2020  9:40 AM   Temp 36.7 °C (98 °F) 2/5/2020  9:00 AM   Pulse 71 2/5/2020  9:40 AM   Resp 16 2/5/2020  9:40 AM   SpO2 100 % 2/5/2020  9:40 AM         No case tracking events are documented in the log.      Pain/Diallo Score: Diallo Score: 10 (2/5/2020 10:00 AM)

## 2020-02-05 NOTE — ANESTHESIA PREPROCEDURE EVALUATION
02/05/2020  Aaliyah Angela is a 64 y.o., female.    Anesthesia Evaluation    I have reviewed the Patient Summary Reports.    I have reviewed the Nursing Notes.   I have reviewed the Medications.     Review of Systems  Anesthesia Hx:  No problems with previous Anesthesia    Social:  Non-Smoker    Hematology/Oncology:     Oncology Normal     EENT/Dental:EENT/Dental Normal   Cardiovascular:   Hypertension, well controlled CAD      Pulmonary:   Sleep Apnea    Hepatic/GI:  Hepatic/GI Normal    Musculoskeletal:   Arthritis     Neurological:   Neuromuscular Disease, Headaches    Endocrine:   Diabetes, well controlled        Physical Exam  General:  Obesity    Airway/Jaw/Neck:  Airway Findings: Mouth Opening: Normal Tongue: Normal  General Airway Assessment: Adult  Mallampati: II  TM Distance: Normal, at least 6 cm  Jaw/Neck Findings:     Neck ROM: Normal ROM      Dental:  Dental Findings: In tact             Anesthesia Plan  Type of Anesthesia, risks & benefits discussed:  Anesthesia Type:  MAC  Patient's Preference:   Intra-op Monitoring Plan:   Intra-op Monitoring Plan Comments:   Post Op Pain Control Plan:   Post Op Pain Control Plan Comments:   Induction:   IV  Beta Blocker:         Informed Consent: Patient understands risks and agrees with Anesthesia plan.  Questions answered.   ASA Score: 3     Day of Surgery Review of History & Physical:    H&P update referred to the surgeon.         Ready For Surgery From Anesthesia Perspective.

## 2020-02-05 NOTE — PROGRESS NOTES
Chart reviewed.   Immunizations: Triggered Imm Registry     Orders placed: n/a  Upcoming appts to satisfy KATIE topics: n/a

## 2020-02-05 NOTE — OP NOTE
[facility]  Surgery Department  Operative Note    SUMMARY     Date of Procedure: 2/5/2020     Procedure: Procedure(s) (LRB):  RELEASE, CARPAL TUNNEL RIGHT (Right)     Surgeon(s) and Role:     * Tameka Bran MD - Primary    Assisting Surgeon: Ezequiel    Pre-Operative Diagnosis: Carpal tunnel syndrome on right [G56.01]    Post-Operative Diagnosis: Post-Op Diagnosis Codes:     * Carpal tunnel syndrome on right [G56.01]    Anesthesia: Local MAC    Technical Procedures Used: surgery    Description of the Findings of the Procedure:   SERVICE: Orthopedics.   ATTENDING SURGEON: Tameka Bran M.D.   ASSISTANT SURGEON: Ezequiel  PREOPERATIVE DIAGNOSIS: right carpal tunnel syndrome.   POSTOPERATIVE DIAGNOSIS: right carpal tunnel syndrome.   PROCEDURE: right carpal tunnel release.   ANESTHESIA: Local placed by surgeon and MAC.   FLUIDS: Lactated Ringers.   BLOOD LOSS: None. No blood was given.   TOURNIQUET TIME: 12 minutes.   PACKS AND DRAINS: None.   IMPLANTS: None.   SPECIMENS: None.   COMPLICATIONS: None.   INDICATIONS FOR PROCEDURE: Ms. Angela is a 64-year-old female who had numbness   and tingling into her right hand. She has failed conservative treatment, EMG   was positive for carpal tunnel syndrome. Therefore, operative intervention was   deemed necessary. Risks and benefits were explained to the patient in clinic   since performed in clinic.   PROCEDURE IN DETAIL: After correct site was marked with the patient's   participation in the holding area, the patient was brought to the Operating   Room, placed in supine position, underwent MAC anesthesia. A well-padded   nonsterile tourniquet was placed on the right arm. A time-out was called for   correct site, procedure and patient to be indicated. Under sterile conditions,   an injection of lidocaine 1% was injected into the carpal tunnel space. The arm   was prepped and draped in normal sterile fashion. The incision was marked out   using  Cook cardinal lines. The arm was exsanguinated using Esmarch.   Tourniquet was insufflated to 250 mmHg and that is where it remained for a total   of 10 minutes. The incision was made down to the palmar fascia. The palmar   fascia was sharply incised. The transverse ligament was identified. It was   very thick in nature. It was sharply incised. Median nerve was identified. A   Mosquito hemostat was passed from the undersurface of the transverse ligament   proximally and distally to free it from the median nerve. Metzenbaum scissors   were utilized to cut the transverse ligament proximally and distally. Once that   was completely released, a Mosquito hemostat was passed again to confirm a   complete release. Once that was performed, the area was irrigated with copious   amounts of normal saline. Nylon closed the skin. Sterile dressing was applied.   Tourniquet was deflated. Brisk capillary refill ensued. The patient was   transported to the Recovery Room in stable condition.   POSTOPERATIVE PLAN FOR THIS PATIENT: She is to keep her dressing clean, dry and   intact. We will see her back in 2 weeks for stitch removal.      Significant Surgical Tasks Conducted by the Assistant(s), if Applicable: none    Complications: No    Estimated Blood Loss (EBL): * No values recorded between 2/5/2020 12:00 AM and 2/5/2020  8:14 AM *           Implants: * No implants in log *    Specimens:   Specimen (12h ago, onward)    None                  Condition: Good    Disposition: PACU - hemodynamically stable.    Attestation: I performed the procedure.    Discharge Note    SUMMARY     Admit Date: 2/5/2020    Discharge Date and Time:  02/05/2020 8:16 AM    Hospital Course (synopsis of major diagnoses, care, treatment, and services provided during the course of the hospital stay): surgery     Final Diagnosis: Post-Op Diagnosis Codes:     * Carpal tunnel syndrome on right [G56.01]    Disposition: Home or Self Care    Follow Up/Patient  Instructions:     Medications:  Reconciled Home Medications:      Medication List      ASK your doctor about these medications    acetaminophen-codeine 300-30mg 300-30 mg Tab  Commonly known as:  TYLENOL #3  Take 1 tablet by mouth every 6 (six) hours as needed.     amitriptyline 25 MG tablet  Commonly known as:  ELAVIL  Take 2 tablets (50 mg total) by mouth every evening.     amLODIPine 10 MG tablet  Commonly known as:  NORVASC  TAKE 1 TABLET BY MOUTH EVERY DAY     aspirin 81 MG Chew  Take 81 mg by mouth once daily.     atorvastatin 20 MG tablet  Commonly known as:  LIPITOR  TAKE 1 TABLET BY MOUTH EVERY DAY     blood sugar diagnostic Strp  Commonly known as:  Blood Glucose Test  Inject 1 each into the skin 2 (two) times daily.     calcium-vitamin D3 500 mg(1,250mg) -200 unit per tablet  Commonly known as:  OS-JENNIFER 500 + D3  Take 1 tablet by mouth once daily.     dulaglutide 1.5 mg/0.5 mL Pnij  Commonly known as:  Trulicity  Inject 1.5 mg into the skin every 7 days.     * empagliflozin 10 mg Tab  Commonly known as:  Jardiance  Take 10 mg by mouth once daily.     * Jardiance 10 mg Tab  Generic drug:  empagliflozin  TAKE 1 TABLET BY MOUTH EVERY DAY     * flash glucose sensor Kit  Commonly known as:  FreeStyle Jaymie 10 Day Sensor  1 Device by Misc.(Non-Drug; Combo Route) route continuous.     * FreeStyle Jaymie 14 Day Sensor Kit  Generic drug:  flash glucose sensor  TEST BLOOD SUGAR AS DIRECTED     losartan 100 MG tablet  Commonly known as:  COZAAR  Take 1 tablet (100 mg total) by mouth once daily.     metFORMIN 500 MG XR 24hr tablet  Commonly known as:  GLUCOPHAGE-XR  Take 2 tablets (1,000 mg total) by mouth 2 (two) times daily with meals.     metoprolol succinate 50 MG 24 hr tablet  Commonly known as:  TOPROL-XL  TAKE 1 TABLET BY MOUTH EVERY DAY     omeprazole 20 MG capsule  Commonly known as:  PRILOSEC  Take 1 capsule (20 mg total) by mouth once daily.     One Daily Multivitamin per tablet  Generic drug:   "multivitamin  Take 1 tablet by mouth once daily.     pen needle, diabetic 32 gauge x 5/32" Ndle  Commonly known as:  BD Ultra-Fine Lilliana Pen Needle  Uses 3 times a day.         * This list has 4 medication(s) that are the same as other medications prescribed for you. Read the directions carefully, and ask your doctor or other care provider to review them with you.              No discharge procedures on file.    "

## 2020-02-05 NOTE — BRIEF OP NOTE
[facility]  Brief Operative Note    Surgery Date: 2/5/2020     Surgeon(s) and Role:     * Tameka Bran MD - Primary    Assisting Surgeon: None    Pre-op Diagnosis:  Carpal tunnel syndrome on right [G56.01]    Post-op Diagnosis:  Post-Op Diagnosis Codes:     * Carpal tunnel syndrome on right [G56.01]    Procedure(s) (LRB):  RELEASE, CARPAL TUNNEL RIGHT (Right)    Anesthesia: Local MAC    Description of the findings of the procedure(s): right carpal tunnel release    Estimated Blood Loss: * No values recorded between 2/5/2020 12:00 AM and 2/5/2020  8:25 AM *         Specimens:   Specimen (12h ago, onward)    None            Discharge Note    OUTCOME: Patient tolerated treatment/procedure well without complication and is now ready for discharge.    DISPOSITION: Home or Self Care    FINAL DIAGNOSIS:  Right carpal tunnel syndrome    FOLLOWUP: In clinic

## 2020-02-05 NOTE — PLAN OF CARE
Aaliyah Angela has met all discharge criteria from Phase II. Vital Signs are stable, ambulating  without difficulty. Discharge instructions given, patient verbalized understanding. Discharged from facility via wheelchair in stable condition.

## 2020-02-05 NOTE — DISCHARGE INSTRUCTIONS
Anesthesia: Monitored Anesthesia Care (MAC)  Anesthesia safety  Tips for anesthesia safety include the following:   · Follow all instructions you are given for how long not to eat or drink before your procedure.  · Be sure your healthcare provider knows what medicines you take, especially any anti-inflammatory medicine or blood thinners. This includes aspirin and any other over-the-counter medicines, herbs, and supplements.  · Have an adult family member or friend drive you home after the procedure.  · For the first 24 hours after your surgery:  ¨ Do not drive or use heavy equipment.  ¨ Do not make important decisions or sign documents.  ¨ Avoid alcohol.  ¨ Have someone stay with you, if possible. They can watch for problems and help keep you safe.  Date Last Reviewed: 12/1/2016 © 2000-2017 [employer]. [address] [place] [address], [place] [address]. All rights reserved. This information is not intended as a substitute for professional medical care. Always follow your healthcare professional's instructions.

## 2020-02-05 NOTE — ANESTHESIA POSTPROCEDURE EVALUATION
Anesthesia Post Evaluation    Patient: Aaliyah Angela    Procedure(s) Performed: Procedure(s) (LRB):  RELEASE, CARPAL TUNNEL RIGHT (Right)    Final Anesthesia Type: general    Patient location during evaluation: [facility]  Patient participation: Yes- Able to Participate  Level of consciousness: awake and alert  Post-procedure vital signs: reviewed and stable  Pain management: adequate  Airway patency: patent    PONV status at discharge: No PONV  Anesthetic complications: no      Cardiovascular status: blood pressure returned to baseline  Respiratory status: unassisted, room air and spontaneous ventilation  Hydration status: euvolemic  Follow-up not needed.          Vitals Value Taken Time   /81 2/5/2020  7:24 AM   Temp 36.5 °C (97.7 °F) 2/5/2020  7:24 AM   Pulse 73 2/5/2020  7:24 AM   Resp 16 2/5/2020  7:24 AM   SpO2 100 % 2/5/2020  7:24 AM         No case tracking events are documented in the log.      Pain/Diallo Score: No data recorded

## 2020-02-07 ENCOUNTER — OFFICE VISIT (OUTPATIENT)
Dept: PODIATRY | Facility: CLINIC | Age: 65
End: 2020-02-07
Payer: COMMERCIAL

## 2020-02-07 VITALS — DIASTOLIC BLOOD PRESSURE: 70 MMHG | HEART RATE: 76 BPM | SYSTOLIC BLOOD PRESSURE: 150 MMHG

## 2020-02-07 DIAGNOSIS — L84 CORN OR CALLUS: ICD-10-CM

## 2020-02-07 DIAGNOSIS — E11.9 ENCOUNTER FOR DIABETIC FOOT EXAM: Primary | ICD-10-CM

## 2020-02-07 DIAGNOSIS — L85.3 DRY SKIN: ICD-10-CM

## 2020-02-07 DIAGNOSIS — E11.8 TYPE 2 DIABETES MELLITUS WITH UNSPECIFIED COMPLICATIONS: ICD-10-CM

## 2020-02-07 PROCEDURE — 99213 PR OFFICE/OUTPT VISIT, EST, LEVL III, 20-29 MIN: ICD-10-PCS | Mod: 25,S$GLB,, | Performed by: PODIATRIST

## 2020-02-07 PROCEDURE — 99999 PR PBB SHADOW E&M-EST. PATIENT-LVL III: ICD-10-PCS | Mod: PBBFAC,,, | Performed by: PODIATRIST

## 2020-02-07 PROCEDURE — 11056 PARNG/CUTG B9 HYPRKR LES 2-4: CPT | Mod: S$GLB,,, | Performed by: PODIATRIST

## 2020-02-07 PROCEDURE — 3051F PR MOST RECENT HEMOGLOBIN A1C LEVEL 7.0 - < 8.0%: ICD-10-PCS | Mod: CPTII,S$GLB,, | Performed by: PODIATRIST

## 2020-02-07 PROCEDURE — 3077F PR MOST RECENT SYSTOLIC BLOOD PRESSURE >= 140 MM HG: ICD-10-PCS | Mod: CPTII,S$GLB,, | Performed by: PODIATRIST

## 2020-02-07 PROCEDURE — 3078F PR MOST RECENT DIASTOLIC BLOOD PRESSURE < 80 MM HG: ICD-10-PCS | Mod: CPTII,S$GLB,, | Performed by: PODIATRIST

## 2020-02-07 PROCEDURE — 3051F HG A1C>EQUAL 7.0%<8.0%: CPT | Mod: CPTII,S$GLB,, | Performed by: PODIATRIST

## 2020-02-07 PROCEDURE — 99213 OFFICE O/P EST LOW 20 MIN: CPT | Mod: 25,S$GLB,, | Performed by: PODIATRIST

## 2020-02-07 PROCEDURE — 3077F SYST BP >= 140 MM HG: CPT | Mod: CPTII,S$GLB,, | Performed by: PODIATRIST

## 2020-02-07 PROCEDURE — 3078F DIAST BP <80 MM HG: CPT | Mod: CPTII,S$GLB,, | Performed by: PODIATRIST

## 2020-02-07 PROCEDURE — 11056 ROUTINE FOOT CARE: ICD-10-PCS | Mod: S$GLB,,, | Performed by: PODIATRIST

## 2020-02-07 PROCEDURE — 99999 PR PBB SHADOW E&M-EST. PATIENT-LVL III: CPT | Mod: PBBFAC,,, | Performed by: PODIATRIST

## 2020-02-07 RX ORDER — UREA 200 MG/G
1 CREAM TOPICAL DAILY
Qty: 75 G | Refills: 10 | Status: SHIPPED | OUTPATIENT
Start: 2020-02-07 | End: 2022-02-07

## 2020-02-07 NOTE — LETTER
February 7, 2020      Reggie Glover MD  [address]  [facility] [address]           [facility]  [address] [address]  Phone: [phone]  Fax: [phone]          Patient: Aaliyah Angela   MR Number: [id number]   YOB: 1955   Date of Visit: 2/7/2020       Dear Dr. Reggie Glover:    Thank you for referring Aaliyah Angela to me for evaluation. Attached you will find relevant portions of my assessment and plan of care.    If you have questions, please do not hesitate to call me. I look forward to following Aaliyah Angela along with you.    Sincerely,    Mary Angulo DPM    Enclosure  CC:  No Recipients    If you would like to receive this communication electronically, please contact [email or web address] or [phone] to request more information on [facility] access.    For providers and/or their staff who would like to refer a patient to [facility], please contact us through our one-stop-shop provider referral line, [facility], at [phone].    If you feel you have received this communication in error or would no longer like to receive these types of communications, please e-mail [email or web address]

## 2020-02-07 NOTE — PATIENT INSTRUCTIONS
General nail care measures for abnormal nails include:    ? Keeping nails trimmed short    ? Avoiding trauma     ? Avoiding contact irritants     ? Keeping nails dry (avoiding wet work)    ? Avoiding all nail cosmetics   How to Check Your Feet    Below are tips to help you look for foot problems. Try to check your feet at the same time each day, such as when you get out of bed in the morning.    · Check the top of each foot. The tops of toes, back of the heel, and outer edge of the foot can get a lot of rubbing from poor-fitting shoes.    · Check the bottom of each foot. Daily wear and tear often leads to problems at pressure spots.    · Check the toes and nails. Fungal infections often occur between toes. Toenail problems can also be a sign of fungal infections or lead to breaks in the skin.    · Check your shoes, too. Loose objects inside a shoe can injure the foot. Use your hand to feel inside your shoes for things like hortencia, loose stitching, or rough areas that could irritate your skin.        Diabetic Foot Care    Diabetes can lead to a number of different foot complications. Fortunately, most of these complications can be prevented with a little extra foot care. If diabetes is not well controlled, the high blood sugar can cause damage to blood vessels and result in poor circulation to the foot. When the skin does not get enough blood flow, it becomes prone to pressure sores and ulcers, which heal slowly.  High blood sugar can also damage nerves, interfering with the ability to feel pain and pressure. When you cant feel your foot normally, it is easy to injure your skin, bones and joints without knowing it. For these reasons diabetes increases the risk of fungal infections, bunions and ulcers. Deep ulcers can lead to bone infection. Gangrene is the most serious foot complication of diabetes. It usually occurs on the tips of the toes as blacked areas of skin. The black area is dead tissue. In severe cases,  gangrene spreads to involve the entire toe, other toes and the entire foot. Foot or toe amputation may be required. Good foot care and blood sugar control can prevent this.    Home Care  1. Wear comfortable, proper fitting shoes.  2. Wash your feet daily with warm water and mild soap.  3. After drying, apply a moisturizing cream or lotion.  4. Check your feet daily for skin breaks, blisters, swelling, or redness. Look between your toes also.  5. Wear cotton socks and change them every day.  6. Trim toe nails carefully and do not cut your cuticles.  7. Strive to keep your blood sugar under control with a combination of medicines, diet and activity.  8. If you smoke and have diabetes, it is very important that you stop. Smoking reduces blood flow to your foot.  9. Avoid activities that increase your risk of foot injury:  · Do not walk barefoot.  · Do not use heating pads or hot water bottles on your feet.  · Do not put your foot in a hot tub without first checking the temperature with your hand.  10) Schedule yearly foot exams.    Follow Up  with your doctor or as advised by our staff. Report any cut, puncture, scrape, other injury, blister, ingrown toenail or ulcer on your foot.    Get Prompt Medical Attention  if any of the following occur:  -- Open ulcer with pus draining from the wound  -- Increasing foot or leg pain  -- New areas of redness or swelling or tender areas of the foot    © 0102-5732 [employer]. [address] [place] [address], [place] [address]. All rights reserved. This information is not intended as a substitute for professional medical care. Always follow your healthcare professional's instructions.

## 2020-02-07 NOTE — PROGRESS NOTES
Chief Complaint   Patient presents with    Diabetes Mellitus     PCP Reggie Glover 1/30/2020              HPI:   Aaliyah Angela is a 64 y.o. female here for diabetic foot exam.  No pain in her feet today.     She is using vaseline to her feet.  No wounds noted.    She does not get salon pedicures.    She is wearing slip on clogs today.      S/p Gastric bypass on Dec. 18, 2015.          PCP:  Reggie Glover MD   Last PCP visit: Diabetes Mellitus (PCP Reggie Glover 1/30/2020 )        Patient Active Problem List   Diagnosis    Hyperlipidemia    HTN (hypertension), benign    Chronic tension-type headache, intractable    Hypertensive retinopathy    Transient vision disturbance    Cortical cataract    Vitreous hemorrhage    Class 3 severe obesity due to excess calories with serious comorbidity and body mass index (BMI) of 40.0 to 44.9 in adult    Chest pain on exertion    Ocular hypertension    Nuclear sclerosis    Anatomical narrow angle borderline glaucoma    PVD (peripheral vascular disease)    CAD (coronary artery disease)    BRADFORD on CPAP    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 by proliferative retinopathy without macular edema, with long-term current use of insulin    History of bariatric surgery, 12-    Erythema nodosum    Primary osteoarthritis involving multiple joints    Bilateral carpal tunnel syndrome    Neck pain    Diabetic polyneuropathy associated with type 2 diabetes mellitus    Right carpal tunnel syndrome             Current Outpatient Medications on File Prior to Visit   Medication Sig Dispense Refill    amitriptyline (ELAVIL) 25 MG tablet Take 2 tablets (50 mg total) by mouth every evening. 60 tablet 11    amLODIPine (NORVASC) 10 MG tablet TAKE 1 TABLET BY MOUTH EVERY DAY (Patient taking differently: 10 mg every evening. ) 90 tablet 0    aspirin 81 MG Chew Take 81 mg by mouth once daily.        atorvastatin (LIPITOR) 20 MG tablet TAKE 1 TABLET BY MOUTH  "EVERY DAY 90 tablet 3    blood sugar diagnostic (BLOOD GLUCOSE TEST) Strp Inject 1 each into the skin 2 (two) times daily. 200 strip 3    calcium-vitamin D3 500 mg(1,250mg) -200 unit per tablet Take 1 tablet by mouth once daily.      dulaglutide (TRULICITY) 1.5 mg/0.5 mL PnIj Inject 1.5 mg into the skin every 7 days. 12 Syringe 3    empagliflozin (JARDIANCE) 10 mg Tab Take 10 mg by mouth once daily. 90 tablet 1    flash glucose sensor (FREESTYLE SAV SENSOR) Kit 1 Device by Misc.(Non-Drug; Combo Route) route continuous. 3 kit 11    FREESTYLE SAV 14 DAY SENSOR Kit TEST BLOOD SUGAR AS DIRECTED 2 kit 6    JARDIANCE 10 mg Tab TAKE 1 TABLET BY MOUTH EVERY DAY 90 tablet 2    losartan (COZAAR) 100 MG tablet Take 1 tablet (100 mg total) by mouth once daily. 90 tablet 3    metFORMIN (GLUCOPHAGE-XR) 500 MG 24 hr tablet Take 2 tablets (1,000 mg total) by mouth 2 (two) times daily with meals. 360 tablet 3    metoprolol succinate (TOPROL-XL) 50 MG 24 hr tablet TAKE 1 TABLET BY MOUTH EVERY DAY 90 tablet 3    multivitamin (ONE DAILY MULTIVITAMIN) per tablet Take 1 tablet by mouth once daily.      pen needle, diabetic (BD ULTRA-FINE DIEGO PEN NEEDLE) 32 gauge x 5/32" Ndle Uses 3 times a day. 100 each 11    acetaminophen-codeine 300-30mg (TYLENOL #3) 300-30 mg Tab Take 1 tablet by mouth every 6 (six) hours as needed. (Patient not taking: Reported on 2/7/2020) 25 tablet 0    omeprazole (PRILOSEC) 20 MG capsule Take 1 capsule (20 mg total) by mouth once daily. 30 capsule 0     Current Facility-Administered Medications on File Prior to Visit   Medication Dose Route Frequency Provider Last Rate Last Dose    0.9%  NaCl infusion   Intravenous Continuous Aristeo Bueno MD             ALLG:   No Known Allergies          ROS:   General ROS: negative for - chills, fever or night sweats  Respiratory ROS: no cough, shortness of breath, or wheezing  Cardiovascular ROS: no chest pain or dyspnea on " exertion  Musculoskeletal ROS: negative  Neurological ROS: no TIA or stroke symptoms  Dermatological ROS: negative          EXAM:   Vitals:    02/07/20 1142   BP: (!) 150/70   BP Location: Left arm   Patient Position: Sitting   BP Method: Large (Automatic)   Pulse: 76        General: Patient is well-developed, well-nourished. Alert and oriented x 3 and in no acute distress.   Lower extremity physical exam:     Vascular:   Dorsalis pedis and posterior tibial pulses are 1/4 bilaterally. 3 secs capillary refill time and toes are warm to touch.   There is reduced presence of digital hair.     1+ edema    Neurological:   Light touch, proprioception, and sharp/dull sensation are all intact bilaterally.   Protective threshold with the Grover-Wienstein monofilament is intact bilaterally.   +paresthesias (Abnormal spontaneous sensations in feet)      Dermatological:   There is thin and shiny skin.    There is no open wounds. There is no ecchymoses.     Toenails mildly dystrophic, elongated by 1-2mm.  bilateral heels dry and xerotic, no fissures.       Musculoskeletal:  Muscle strength is 5/5 in all groups bilaterally. Subtalar, and MTPJ range of motion are within normal limits without crepitus bilaterally.   right 2nd flexible hammertoe, mild hyperpigmentation at the proximal interphalangeal joint  No wounds or infection.           ASSESSMENT / PLAN:    Problem List Items Addressed This Visit     None      Visit Diagnoses     Encounter for diabetic foot exam    -  Primary    Dry skin        Relevant Medications    urea 20 % Crea    Corn or callus                · Shoe inspection. Diabetic Foot Education. Patient reminded of the importance of good nutrition and blood sugar control to help prevent podiatric complications of diabetes. Patient instructed on proper foot hygiene. We discussed wearing proper shoe gear, daily foot inspections, never walking without protective shoe gear, never putting sharp instruments to feet, and  routine diabetic foot exam and to prevent complications.     · Urea 20% for dry skin  · Follow up annually for diabetes foot exam.       Routine Foot Care  Date/Time: 2/7/2020 11:00 AM  Performed by: Mary Angulo DPM  Authorized by: Mary Angulo DPM     Consent Done?:  Yes (Verbal)  Hyperkeratotic Skin Lesions?: Yes    Number of trimmed lesions:  2  Location(s):  Left 1st Toe and Right 1st Toe    Nail Care Type:  Trim (no nails trimmed)  Patient tolerance:  Patient tolerated the procedure well with no immediate complications     With patient's permission, utilizing a #15 scalpel, I trimmed the corns and calluses at the above mentioned location.      The patient will continue to monitor the areas daily, inspect the feet, wear protective shoe gear when ambulatory, and moisturizer to maintain skin integrity.

## 2020-02-12 ENCOUNTER — PATIENT OUTREACH (OUTPATIENT)
Dept: ADMINISTRATIVE | Facility: OTHER | Age: 65
End: 2020-02-12

## 2020-02-17 ENCOUNTER — TELEPHONE (OUTPATIENT)
Dept: ORTHOPEDICS | Facility: CLINIC | Age: 65
End: 2020-02-17

## 2020-02-19 ENCOUNTER — OFFICE VISIT (OUTPATIENT)
Dept: ORTHOPEDICS | Facility: CLINIC | Age: 65
End: 2020-02-19
Payer: COMMERCIAL

## 2020-02-19 VITALS
WEIGHT: 246 LBS | DIASTOLIC BLOOD PRESSURE: 79 MMHG | BODY MASS INDEX: 39.53 KG/M2 | HEIGHT: 66 IN | HEART RATE: 65 BPM | SYSTOLIC BLOOD PRESSURE: 126 MMHG

## 2020-02-19 DIAGNOSIS — G56.03 BILATERAL CARPAL TUNNEL SYNDROME: Primary | ICD-10-CM

## 2020-02-19 PROBLEM — G56.01 RIGHT CARPAL TUNNEL SYNDROME: Status: RESOLVED | Noted: 2020-02-05 | Resolved: 2020-02-19

## 2020-02-19 PROCEDURE — 99999 PR PBB SHADOW E&M-EST. PATIENT-LVL IV: ICD-10-PCS | Mod: PBBFAC,,, | Performed by: PHYSICIAN ASSISTANT

## 2020-02-19 PROCEDURE — 99024 POSTOP FOLLOW-UP VISIT: CPT | Mod: S$GLB,,, | Performed by: PHYSICIAN ASSISTANT

## 2020-02-19 PROCEDURE — 99024 PR POST-OP FOLLOW-UP VISIT: ICD-10-PCS | Mod: S$GLB,,, | Performed by: PHYSICIAN ASSISTANT

## 2020-02-19 PROCEDURE — 99999 PR PBB SHADOW E&M-EST. PATIENT-LVL IV: CPT | Mod: PBBFAC,,, | Performed by: PHYSICIAN ASSISTANT

## 2020-02-19 NOTE — PROGRESS NOTES
"Ms. Angela is here today for a post-operative visit.  She is 14 days status post right carpal tunnel release by Dr. Bran on 2/5/2020. She reports that she is doing well.  Pain is mild, no current pain.  Finger numbness and tingling improved.  She denies fever, chills, and sweats since the time of the surgery.     Physical exam:    Vitals:    02/19/20 0806   BP: 126/79   Pulse: 65   Weight: 111.6 kg (246 lb)   Height: 5' 6" (1.676 m)   PainSc: 0-No pain     Vital signs are stable, patient is afebrile.  Patient is well dressed and well groomed, no acute distress.  Alert and oriented to person, place, and time.  Post op dressing taken down.  Incision is clean, dry and intact.  There is no erythema or exudate.  There is no sign of any infection. She is NVI. Sutures removed without difficulty.  Good finger ROM    EMG, 8/2019  Impression   This is an abnormal study. There is electrophysiologic evidence of:   1. Mild to moderate severity right carpal tunnel syndrome;   2. Mild severity left carpal tunnel syndrome;   3. Primarily axonal bilateral ulnar sensory neuropathies, which may be secondary to a length dependant polyneuropathy secondary to her chronic diabetes. The sural sensory responses in the lower extremity were also diminished.       Assessment: 14 days status post right carpal tunnel release    Plan:  Aaliyah was seen today for pain.    Diagnoses and all orders for this visit:    Bilateral carpal tunnel syndrome        - PO instruction reviewed and provided to patient  - Gel brace provided  - Discussed scar massage  - Follow up in 4 weeks if needed  - Carpal tunnel bracing for left hand  - Call with questions or concerns      "

## 2020-02-21 ENCOUNTER — PATIENT OUTREACH (OUTPATIENT)
Dept: OTHER | Facility: OTHER | Age: 65
End: 2020-02-21

## 2020-03-16 ENCOUNTER — PATIENT OUTREACH (OUTPATIENT)
Dept: ADMINISTRATIVE | Facility: OTHER | Age: 65
End: 2020-03-16

## 2020-03-16 NOTE — PROGRESS NOTES
LINKS immunization registry, Care Everywhere and Health Maintenance updated.  Chart reviewed for overdue Proactive [facility]coun[facility] health maintenance testing.Patient has open case request for colonoscopy.

## 2020-03-17 ENCOUNTER — OFFICE VISIT (OUTPATIENT)
Dept: OTOLARYNGOLOGY | Facility: CLINIC | Age: 65
End: 2020-03-17
Payer: COMMERCIAL

## 2020-03-17 ENCOUNTER — TELEPHONE (OUTPATIENT)
Dept: OTOLARYNGOLOGY | Facility: CLINIC | Age: 65
End: 2020-03-17

## 2020-03-17 DIAGNOSIS — E78.5 HYPERLIPIDEMIA, UNSPECIFIED HYPERLIPIDEMIA TYPE: ICD-10-CM

## 2020-03-17 DIAGNOSIS — H93.8X1 EAR POPPING, RIGHT: Primary | ICD-10-CM

## 2020-03-17 PROCEDURE — 99202 OFFICE O/P NEW SF 15 MIN: CPT | Mod: 95,,, | Performed by: NURSE PRACTITIONER

## 2020-03-17 PROCEDURE — 99202 PR OFFICE/OUTPT VISIT, NEW, LEVL II, 15-29 MIN: ICD-10-PCS | Mod: 95,,, | Performed by: NURSE PRACTITIONER

## 2020-03-17 NOTE — PATIENT INSTRUCTIONS
Here ear clicking is likely ETD vs TMJ.  I recommend fluticasone 2 sprays each nostril once daily.  Use ibuprofen for 2 weeks to reduce inflammation of the jaw joint.  Place an ice pack on jaw joint (in front of ear) 4 times a day for 2 weeks.  Avoid hard or crunchy food for 2 weeks.  If symptoms persist, you should be evaluated by a dentist or oral surgeon.

## 2020-03-17 NOTE — PROGRESS NOTES
The patient location is: work  The chief complaint leading to consultation is: ear clicking  Visit type: Virtual visit with synchronous audio and video  Total time spent with patient: 10 minutes  Each patient to whom he or she provides medical services by telemedicine is:  (1) informed of the relationship between the physician and patient and the respective role of any other health care provider with respect to management of the patient; and (2) notified that he or she may decline to receive medical services by telemedicine and may withdraw from such care at any time.        Subjective:      Aaliyah Angela is a 64 y.o. female who was self-referred for right ear clicking.    Mr. Angela reports a clicking in right ear for the past 2 weeks that occurs with chewing or opening jaw. She denies ear pain, ear drainage or change in hearing. She reports intermittent nasal congestion and sneezing. She denies nasal drainage, post-nasal drip, fever, sinus pressure or cough. She has not tried anything for her ear clicking.      Past Medical History  She has a past medical history of Allergy, Anatomical narrow angle borderline glaucoma, AR (allergic rhinitis), Arthritis, CAD (coronary artery disease), Cataract, Diabetes mellitus type II, Diabetic retinopathy, Epiretinal membrane, both eyes, Hyperlipidemia, Hypertensive retinopathy of both eyes, Lumbar disc disease, Migraines, Morbid obesity, BRADFORD (obstructive sleep apnea), Proliferative diabetic retinopathy(362.02), PVD (peripheral vascular disease), Rupture of right Achilles tendon, Unspecified essential hypertension, Unspecified vitamin D deficiency, and Vitreous hemorrhage of left eye.    Past Surgical History  She has a past surgical history that includes Tubal ligation; Panretinal photocoagulation;  section; Gastric bypass; LASER PERIPHERAL IRIDOTOMY (Bilateral, 2017 and 2017); Carpal tunnel release (Right, 2020); and Carpal tunnel release (Right,  02/03/2020).    Family History  Her family history includes Cancer in her brother; Diabetes in her brother, brother, brother, brother, father, mother, sister, sister, sister, and sister; Hypertension in her father and mother; Prostate cancer in her brother; Stroke in her brother and father.    Social History  She reports that she has never smoked. She has never used smokeless tobacco. She reports that she does not drink alcohol or use drugs.    Allergies  She has No Known Allergies.    Medications  She has a current medication list which includes the following prescription(s): acetaminophen-codeine 300-30mg, amitriptyline, amlodipine, aspirin, atorvastatin, blood sugar diagnostic, calcium-vitamin d3, dulaglutide, empagliflozin, flash glucose sensor, freestyle kimo 14 day sensor, jardiance, losartan, metformin, metoprolol succinate, multivitamin, omeprazole, pen needle, diabetic, and urea, and the following Facility-Administered Medications: sodium chloride 0.9%.    Review of Systems   Constitutional: Negative for chills, fever and unexpected weight change.   HENT: Positive for sneezing and tinnitus. Negative for congestion, ear discharge, ear pain, facial swelling, hearing loss, postnasal drip, sinus pressure, sore throat and trouble swallowing.    Eyes: Negative for pain and visual disturbance.   Respiratory: Negative for apnea and shortness of breath.    Cardiovascular: Negative for chest pain and palpitations.   Gastrointestinal: Negative for abdominal pain and nausea.   Endocrine: Negative for cold intolerance and heat intolerance.   Musculoskeletal: Negative for joint swelling and neck stiffness.   Skin: Negative for color change and rash.   Neurological: Positive for headaches. Negative for dizziness and facial asymmetry.   Hematological: Negative for adenopathy. Does not bruise/bleed easily.   Psychiatric/Behavioral: Negative for agitation. The patient is not nervous/anxious.           Objective:     There  were no vitals taken for this visit.   A complete physical exam was not performed due to this being a video clinic visit.     Procedure    None      Data Reviewed    WBC (K/uL)   Date Value   01/04/2020 4.57     Platelets (K/uL)   Date Value   01/04/2020 256      Creatinine (mg/dL)   Date Value   01/04/2020 0.9     TSH (uIU/mL)   Date Value   01/04/2020 1.067     Glucose (mg/dL)   Date Value   01/04/2020 98     Hemoglobin A1C (%)   Date Value   12/31/2019 7.5 (H)          Assessment:     1. Ear popping, right         Plan:     I had a long discussion with the patient regarding her condition and the further workup and management options.    Here ear clicking is likely ETD vs TMJ.  I recommend fluticasone 2 sprays each nostril once daily.  Use ibuprofen for 2 weeks to reduce inflammation of the jaw joint.  Place an ice pack on jaw joint (in front of ear) 4 times a day for 2 weeks.  Avoid hard or crunchy food for 2 weeks.  If symptoms persist, you should be evaluated by a dentist or oral surgeon.      Follow up if symptoms worsen or fail to improve.

## 2020-03-23 RX ORDER — ATORVASTATIN CALCIUM 20 MG/1
TABLET, FILM COATED ORAL
Qty: 90 TABLET | Refills: 3 | OUTPATIENT
Start: 2020-03-23

## 2020-03-23 NOTE — PROGRESS NOTES
Refill Authorization Note     is requesting a refill authorization.    Brief assessment and rationale for refill: APPROVE: prr                                         Comments:   Refill Center Care Gap Closure protocols temporarily suspended.   Requested Prescriptions   Pending Prescriptions Disp Refills    atorvastatin (LIPITOR) 20 MG tablet [Pharmacy Med Name: ATORVASTATIN 20MG TABLETS] 90 tablet 3     Sig: TAKE 1 TABLET BY MOUTH EVERY DAY       Cardiovascular:  Antilipid - Statins Passed - 3/17/2020  2:10 PM        Passed - Patient is at least 18 years old        Passed - Office visit in past 12 months or future 90 days.     Recent Outpatient Visits            5 days ago Ear popping, right    [facility] O[facility]ology Britt Duarte NP    1 month ago Bilateral carpal tunnel syndrome    [place] [address] TONY Nascimento    1 month ago Encounter for diabetic foot exam    [place] - Podiatry Mary Angulo DPM    2 months ago PVD (peripheral vascular disease)    [facility] - Internal Medicine Reggie Glover MD    2 months ago Bilateral carpal tunnel syndrome    [place] [address] TONY Nascimento          Future Appointments              In 1 month MD [facility] - Ophthalmology, [facility]    In 1 month MD [facility] - OB/GYN 5th Floor, [facility]    In 1 month MD [facility] - Ophthalmology, [facility]    In 2 months Maribell Noble, APRN, FNP [facility] Internal Medicine, [facility]                Passed - Lipid Panel completed in last 360 days     Lab Results   Component Value Date    CHOL 115 (L) 01/04/2020    HDL 53 01/04/2020    LDLCALC 50.2 (L) 01/04/2020    TRIG 59 01/04/2020             Passed - ALT is 94 or below and within 360 days     ALT   Date Value Ref Range Status   01/04/2020 12 10 - 44 U/L Final   05/08/2019 15 10 - 44 U/L Final   02/10/2018 29 10 - 44 U/L Final              Passed - AST  is 54 or below and within 360 days     AST   Date Value Ref Range Status   01/04/2020 18 10 - 40 U/L Final   05/08/2019 18 10 - 40 U/L Final   02/10/2018 24 10 - 40 U/L Final               Appointments  past 12m or future 3m with PCP    Date Provider   Last Visit   1/3/2020 Reggie Glover MD   Next Visit   Visit date not found Reggie Glover MD   .  ED visits in past 90 days: 0       Note composed:10:48 PM 03/22/2020

## 2020-03-23 NOTE — TELEPHONE ENCOUNTER
I have reviewed and agree with the assessment below with changes. Medication reordered by PCP. Quick dc duplicate.    Last Prescribed Info:   Outpatient Medication Detail      Disp Refills Start End    atorvastatin (LIPITOR) 20 MG tablet 90 tablet 3 3/23/2020     Sig - Route: Take 1 tablet (20 mg total) by mouth once daily. - Oral    Sent to pharmacy as: atorvastatin (LIPITOR) 20 MG tablet    E-Prescribing Status: Receipt confirmed by pharmacy (3/23/2020  2:07 PM CDT)    Ordering Encounter Report     Associated Reports   View Encounter

## 2020-04-20 ENCOUNTER — TELEPHONE (OUTPATIENT)
Dept: OBSTETRICS AND GYNECOLOGY | Facility: CLINIC | Age: 65
End: 2020-04-20

## 2020-04-29 NOTE — PROGRESS NOTES
Last 5 Patient Entered Readings                                      Current 30 Day Average: 138/77     Recent Readings 4/30/2019 4/27/2019 4/27/2019 4/24/2019 4/18/2019    SBP (mmHg) 134 141 156 136 139    DBP (mmHg) 80 78 86 64 75    Pulse 77 71 78 75 79        Ms. Angela's digital BP monitor is not working. She will be going to the [facility] on Friday to get a new one. Will follow up once she is able to submit readings.     Per 30 day average, 138/77 mmHg, patient's BP is not at goal.     Will continue to monitor regularly. Will follow up in 2-3 weeks, sooner if BP begins to trend upward or downward.    Asked patient to call or message with questions or concerns.     Current HTN regimen:  Hypertension Medications             amLODIPine (NORVASC) 10 MG tablet TAKE 1 TABLET BY MOUTH EVERY DAY    amLODIPine (NORVASC) 10 MG tablet TAKE 1 TABLET BY MOUTH EVERY DAY    losartan (COZAAR) 50 MG tablet Take 1 tablet (50 mg total) by mouth once daily.    metoprolol succinate (TOPROL-XL) 50 MG 24 hr tablet TAKE 1 TABLET BY MOUTH EVERY DAY                             Normal for race

## 2020-05-12 ENCOUNTER — PATIENT OUTREACH (OUTPATIENT)
Dept: ADMINISTRATIVE | Facility: OTHER | Age: 65
End: 2020-05-12

## 2020-05-12 ENCOUNTER — OFFICE VISIT (OUTPATIENT)
Dept: OBSTETRICS AND GYNECOLOGY | Facility: CLINIC | Age: 65
End: 2020-05-12
Payer: COMMERCIAL

## 2020-05-12 VITALS
WEIGHT: 240.31 LBS | BODY MASS INDEX: 38.62 KG/M2 | DIASTOLIC BLOOD PRESSURE: 80 MMHG | SYSTOLIC BLOOD PRESSURE: 138 MMHG | HEIGHT: 66 IN

## 2020-05-12 DIAGNOSIS — E11.9 TYPE 2 DIABETES MELLITUS WITHOUT COMPLICATION, UNSPECIFIED WHETHER LONG TERM INSULIN USE: Primary | ICD-10-CM

## 2020-05-12 DIAGNOSIS — Z12.39 BREAST CANCER SCREENING: ICD-10-CM

## 2020-05-12 DIAGNOSIS — N95.9 MENOPAUSAL AND PERIMENOPAUSAL DISORDER: ICD-10-CM

## 2020-05-12 DIAGNOSIS — Z01.419 VISIT FOR GYNECOLOGIC EXAMINATION: Primary | ICD-10-CM

## 2020-05-12 PROCEDURE — 99396 PR PREVENTIVE VISIT,EST,40-64: ICD-10-PCS | Mod: S$GLB,,, | Performed by: OBSTETRICS & GYNECOLOGY

## 2020-05-12 PROCEDURE — 3079F PR MOST RECENT DIASTOLIC BLOOD PRESSURE 80-89 MM HG: ICD-10-PCS | Mod: CPTII,S$GLB,, | Performed by: OBSTETRICS & GYNECOLOGY

## 2020-05-12 PROCEDURE — 99999 PR PBB SHADOW E&M-EST. PATIENT-LVL III: ICD-10-PCS | Mod: PBBFAC,,, | Performed by: OBSTETRICS & GYNECOLOGY

## 2020-05-12 PROCEDURE — 3075F PR MOST RECENT SYSTOLIC BLOOD PRESS GE 130-139MM HG: ICD-10-PCS | Mod: CPTII,S$GLB,, | Performed by: OBSTETRICS & GYNECOLOGY

## 2020-05-12 PROCEDURE — 99396 PREV VISIT EST AGE 40-64: CPT | Mod: S$GLB,,, | Performed by: OBSTETRICS & GYNECOLOGY

## 2020-05-12 PROCEDURE — 3075F SYST BP GE 130 - 139MM HG: CPT | Mod: CPTII,S$GLB,, | Performed by: OBSTETRICS & GYNECOLOGY

## 2020-05-12 PROCEDURE — 3079F DIAST BP 80-89 MM HG: CPT | Mod: CPTII,S$GLB,, | Performed by: OBSTETRICS & GYNECOLOGY

## 2020-05-12 PROCEDURE — 99999 PR PBB SHADOW E&M-EST. PATIENT-LVL III: CPT | Mod: PBBFAC,,, | Performed by: OBSTETRICS & GYNECOLOGY

## 2020-05-12 NOTE — PROGRESS NOTES
HISTORY OF PRESENT ILLNESS:    Aaliyah Angela is a 64 y.o. female , presents for a routine exam and has no complaints.  PAP DUE  AND MAMMO ORDERED.  NO GYN C/O, URINARY LEAKAGE.  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 female , presents for a routine exam and has no complaints.      Past Medical History:   Diagnosis Date    Allergy     Anatomical narrow angle borderline glaucoma     AR (allergic rhinitis)     Arthritis     CAD (coronary artery disease) 2013    Non obstructive,  PET     Cataract     Diabetes mellitus type II     with retinopathy    Diabetic retinopathy     Epiretinal membrane, both eyes     Hyperlipidemia     Hypertensive retinopathy of both eyes     Lumbar disc disease     Migraines     Morbid obesity 10/3/2012    BRADFORD (obstructive sleep apnea) 2015    Proliferative diabetic retinopathy(362.02)     PVD (peripheral vascular disease) 2013    Carotid US 1-39% bilat      Rupture of right Achilles tendon     Unspecified essential hypertension     Unspecified vitamin D deficiency     Vitreous hemorrhage of left eye        Past Surgical History:   Procedure Laterality Date    CARPAL TUNNEL RELEASE Right 2020    Procedure: RELEASE, CARPAL TUNNEL RIGHT;  Surgeon: Tameka Bran MD;  Location: [facility];  Service: Orthopedics;  Laterality: Right;    CARPAL TUNNEL RELEASE Right 2020     SECTION      x3    GASTRIC BYPASS      Sleeve    LASER PERIPHERAL IRIDOTOMY Bilateral 2017 and 2017        PANRETINAL PHOTOCOAGULATION      Both eyes    TUBAL LIGATION          MEDICATIONS AND ALLERGIES:      Current Outpatient Medications:     amitriptyline (ELAVIL) 25 MG tablet, Take 2 tablets (50 mg total) by mouth every evening., Disp: 60 tablet, Rfl: 11    amLODIPine (NORVASC) 10 MG tablet, TAKE 1 TABLET BY MOUTH EVERY DAY (Patient taking differently: 10 mg every evening. ),  "Disp: 90 tablet, Rfl: 0    aspirin 81 MG Chew, Take 81 mg by mouth once daily.  , Disp: , Rfl:     atorvastatin (LIPITOR) 20 MG tablet, Take 1 tablet (20 mg total) by mouth once daily., Disp: 90 tablet, Rfl: 3    blood sugar diagnostic (BLOOD GLUCOSE TEST) Strp, Inject 1 each into the skin 2 (two) times daily., Disp: 200 strip, Rfl: 3    calcium-vitamin D3 500 mg(1,250mg) -200 unit per tablet, Take 1 tablet by mouth once daily., Disp: , Rfl:     dulaglutide (TRULICITY) 1.5 mg/0.5 mL PnIj, Inject 1.5 mg into the skin every 7 days., Disp: 12 Syringe, Rfl: 3    flash glucose sensor (FREESTYLE SAV SENSOR) Kit, 1 Device by Misc.(Non-Drug; Combo Route) route continuous., Disp: 3 kit, Rfl: 11    FREESTYLE SAV 14 DAY SENSOR Kit, TEST BLOOD SUGAR AS DIRECTED, Disp: 2 kit, Rfl: 6    JARDIANCE 10 mg Tab, TAKE 1 TABLET BY MOUTH EVERY DAY, Disp: 90 tablet, Rfl: 2    losartan (COZAAR) 100 MG tablet, Take 1 tablet (100 mg total) by mouth once daily., Disp: 90 tablet, Rfl: 3    metFORMIN (GLUCOPHAGE-XR) 500 MG 24 hr tablet, Take 2 tablets (1,000 mg total) by mouth 2 (two) times daily with meals., Disp: 360 tablet, Rfl: 3    metoprolol succinate (TOPROL-XL) 50 MG 24 hr tablet, TAKE 1 TABLET BY MOUTH EVERY DAY, Disp: 90 tablet, Rfl: 3    multivitamin (ONE DAILY MULTIVITAMIN) per tablet, Take 1 tablet by mouth once daily., Disp: , Rfl:     pen needle, diabetic (BD ULTRA-FINE DIEGO PEN NEEDLE) 32 gauge x 5/32" Ndle, Uses 3 times a day., Disp: 100 each, Rfl: 11    acetaminophen-codeine 300-30mg (TYLENOL #3) 300-30 mg Tab, Take 1 tablet by mouth every 6 (six) hours as needed. (Patient not taking: Reported on 5/12/2020), Disp: 25 tablet, Rfl: 0    omeprazole (PRILOSEC) 20 MG capsule, Take 1 capsule (20 mg total) by mouth once daily., Disp: 30 capsule, Rfl: 0    urea 20 % Crea, Apply 1 application topically once daily. To dry skin on the heels (Patient not taking: Reported on 5/12/2020), Disp: 75 g, Rfl: 10  No current " facility-administered medications for this visit.     Facility-Administered Medications Ordered in Other Visits:     0.9%  NaCl infusion, , Intravenous, Continuous, Aristeo Bueno MD    Review of patient's allergies indicates:  No Known Allergies    Family History   Problem Relation Age of Onset    Diabetes Mother     Hypertension Mother     Diabetes Father     Hypertension Father     Stroke Father     Diabetes Sister     Diabetes Brother     Cancer Brother         prostate    Prostate cancer Brother     Diabetes Sister     Diabetes Sister     Diabetes Sister     Diabetes Brother     Stroke Brother     Diabetes Brother     Diabetes Brother     Breast cancer Neg Hx     Colon cancer Neg Hx     Ovarian cancer Neg Hx     Amblyopia Neg Hx     Blindness Neg Hx     Cataracts Neg Hx     Glaucoma Neg Hx     Macular degeneration Neg Hx     Retinal detachment Neg Hx     Strabismus Neg Hx        Social History     Socioeconomic History    Marital status: Single     Spouse name: Not on file    Number of children: Not on file    Years of education: Not on file    Highest education level: Not on file   Occupational History    Not on file   Social Needs    Financial resource strain: Not on file    Food insecurity:     Worry: Not on file     Inability: Not on file    Transportation needs:     Medical: Not on file     Non-medical: Not on file   Tobacco Use    Smoking status: Never Smoker    Smokeless tobacco: Never Used   Substance and Sexual Activity    Alcohol use: No    Drug use: No    Sexual activity: Not Currently     Birth control/protection: Post-menopausal   Lifestyle    Physical activity:     Days per week: Not on file     Minutes per session: Not on file    Stress: Not on file   Relationships    Social connections:     Talks on phone: Not on file     Gets together: Not on file     Attends Taoist service: Not on file     Active member of club or organization: Not on  "file     Attends meetings of clubs or organizations: Not on file     Relationship status: Not on file   Other Topics Concern    Are you pregnant or think you may be? Not Asked    Breast-feeding Not Asked   Social History Narrative    Work a LPN at [employer]    Single, lives alone       COMPREHENSIVE GYN HISTORY:  PAP History:  Denies abnormal Paps except a noted above.  Infection History: Denies STDs. Denies PID.  Benign History: Denies uterine fibroids. Denies ovarian cysts. Denies endometriosis.  Denies other conditions.  Cancer History: Denies cervical cancer. Denies uterine cancer or hyperplasia. Denies ovarian cancer. Denies vulvar cancer or pre-cancer. Denies vaginal cancer or pre-cancer. Denies breast cancer. Denies colon cancer.    ROS:  GENERAL: No weight changes. No swelling. No fatigue. No fever.  CARDIOVASCULAR: No chest pain. No shortness of breath. No leg cramps.   NEUROLOGICAL: No headaches. No vision changes.  BREASTS: No pain. No lumps. No discharge.  ABDOMEN: No pain. No nausea. No vomiting. No diarrhea. No constipation.  REPRODUCTIVE: No abnormal bleeding.   VULVA: No pain. No lesions. No itching.  VAGINA: No relaxation. No itching. No odor. No discharge. No lesions.  URINARY: No incontinence. No nocturia. No frequency. No dysuria.    /80   Ht 5' 6" (1.676 m)   Wt 109 kg (240 lb 4.8 oz)   BMI 38.79 kg/m²     PE:  APPEARANCE: Well nourished, well developed, in no acute distress.  AFFECT: WNL, alert and oriented x 3.  SKIN: No hirsutism or acne.  NECK: Neck symmetric without masses or thyromegaly.  NODES: No inguinal, cervical, axillary or femoral lymph node enlargement.  CHEST: Good respiratory effort.   ABDOMEN: Soft. No tenderness or masses. No hepatosplenomegaly. No hernias.  BREASTS: Symmetrical, no skin changes or visible lesions. No palpable masses, nipple discharge bilaterally.  PELVIC: ATROPHIC EXTERNAL FEMALE GENITALIA without lesions. Normal hair distribution. Adequate perineal " body, normal urethral meatus. VAGINA DRY without lesions or discharge. CERVIX STENOTIC without lesions, discharge or tenderness. No significant cystocele or rectocele. Bimanual exam shows uterus to be normal size, regular, mobile and nontender. Adnexa without masses or tenderness.  EXTREMITIES: No edema.    PROCEDURES:    DIAGNOSIS:  1. Visit for gynecologic examination     2. Menopausal and perimenopausal disorder     3. Breast cancer screening  Mammo Digital Screening Bilat w/ Vick       PLAN:    LABS AND TESTS ORDERED:  Mammogram    COUNSELING:  The patient was counseled today on osteoporosis prevention, calcium supplementation, and regular weight bearing exercise. The patient was also counseled today on ACS PAP guidelines, with recommendations for yearly pelvic exams unless their uterus, cervix, and ovaries were removed for benign reasons; in that case, examinations every 3-5 years are recommended.  The patient was also counseled regarding monthly breast self-examination, routine STD screening for at-risk populations, prophylactic immunizations for transmitted infections such as  HPV, Pertussis, or Influenza as appropriate, and yearly mammograms when indicated by ACS guidelines.  She was advised to see her primary care physician for all other health maintenance.    FOLLOW-UP with me annually.

## 2020-05-12 NOTE — PROGRESS NOTES
Care Everywhere: updated  Immunization: n/a  Health Maintenance: n/a  Media Review: reviewed for possible outside colon cancer report  Legacy Review: n/a  Order placed: hemoglobin a1c   Upcoming appts:n/a  Colonoscopy case request 1.3.2020

## 2020-05-16 RX ORDER — DULAGLUTIDE 1.5 MG/.5ML
INJECTION, SOLUTION SUBCUTANEOUS
Qty: 6 ML | Refills: 2 | Status: SHIPPED | OUTPATIENT
Start: 2020-05-16 | End: 2020-05-28 | Stop reason: SDUPTHER

## 2020-05-18 ENCOUNTER — OFFICE VISIT (OUTPATIENT)
Dept: OPHTHALMOLOGY | Facility: CLINIC | Age: 65
End: 2020-05-18
Payer: COMMERCIAL

## 2020-05-18 ENCOUNTER — PATIENT OUTREACH (OUTPATIENT)
Dept: ADMINISTRATIVE | Facility: OTHER | Age: 65
End: 2020-05-18

## 2020-05-18 DIAGNOSIS — Z79.4 TYPE 2 DIABETES MELLITUS WITH BOTH EYES AFFECTED BY PROLIFERATIVE RETINOPATHY WITHOUT MACULAR EDEMA, WITH LONG-TERM CURRENT USE OF INSULIN: Primary | ICD-10-CM

## 2020-05-18 DIAGNOSIS — E11.3593 TYPE 2 DIABETES MELLITUS WITH BOTH EYES AFFECTED BY PROLIFERATIVE RETINOPATHY WITHOUT MACULAR EDEMA, WITH LONG-TERM CURRENT USE OF INSULIN: Primary | ICD-10-CM

## 2020-05-18 DIAGNOSIS — H25.13 NUCLEAR SCLEROSIS OF BOTH EYES: ICD-10-CM

## 2020-05-18 DIAGNOSIS — H35.373 PRERETINAL FIBROSIS, BILATERAL: ICD-10-CM

## 2020-05-18 DIAGNOSIS — H40.033 ANATOMICAL NARROW ANGLE BORDERLINE GLAUCOMA OF BOTH EYES: ICD-10-CM

## 2020-05-18 PROCEDURE — 92014 COMPRE OPH EXAM EST PT 1/>: CPT | Mod: S$GLB,,, | Performed by: OPHTHALMOLOGY

## 2020-05-18 PROCEDURE — 92134 POSTERIOR SEGMENT OCT RETINA (OCULAR COHERENCE TOMOGRAPHY)-BOTH EYES: ICD-10-PCS | Mod: S$GLB,,, | Performed by: OPHTHALMOLOGY

## 2020-05-18 PROCEDURE — 99999 PR PBB SHADOW E&M-EST. PATIENT-LVL II: CPT | Mod: PBBFAC,,, | Performed by: OPHTHALMOLOGY

## 2020-05-18 PROCEDURE — 92014 PR EYE EXAM, EST PATIENT,COMPREHESV: ICD-10-PCS | Mod: S$GLB,,, | Performed by: OPHTHALMOLOGY

## 2020-05-18 PROCEDURE — 99999 PR PBB SHADOW E&M-EST. PATIENT-LVL II: ICD-10-PCS | Mod: PBBFAC,,, | Performed by: OPHTHALMOLOGY

## 2020-05-18 PROCEDURE — 92202 OPSCPY EXTND ON/MAC DRAW: CPT | Mod: S$GLB,,, | Performed by: OPHTHALMOLOGY

## 2020-05-18 PROCEDURE — 92134 CPTRZ OPH DX IMG PST SGM RTA: CPT | Mod: S$GLB,,, | Performed by: OPHTHALMOLOGY

## 2020-05-18 PROCEDURE — 92202 PR OPHTHALMOSCOPY, EXT, W/DRAW OPTIC NERVE/MACULA, I&R, UNI/BI: ICD-10-PCS | Mod: S$GLB,,, | Performed by: OPHTHALMOLOGY

## 2020-05-18 NOTE — PROGRESS NOTES
Patient's chart was reviewed.   Requested updates within Care Everywhere.  Immunizations reconciled.    Health Maintenance was updated.  Fit kit and A1c previously ordered.

## 2020-05-18 NOTE — PROGRESS NOTES
Subjective:       Patient ID: Aaliyah Angela is a 64 y.o. female      Chief Complaint   Patient presents with    Eye Problem     NODR w/o ME OU     History of Present Illness  HPI     Eye Problem      Additional comments: NODR w/o ME OU              Comments      DLS: 06/03/2019 Dr. Mckeon    Patient states her vision has been stable since her last visit. Denies   flashes, floaters, diplopia, photophobia, glare, HA's, or pain.    Her cataract surgery was rescheduled.    1) OHT vs Pre-perimetric POAG  2) Narrow Angles  3) PDR OU  4) Hx VH OD  5) NS OU  6) Hx TVO OS            Last edited by Reggie Mckeon MD on 5/18/2020  2:57 PM. (History)        Imaging:    See report    Assessment/Plan:     1. 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 by proliferative retinopathy without macular edema, with long-term current use of insulin  Stable.  Doing well s/p PRP.  Diabetic Retinopathy discussed in detail, all questions answered  Stressed importance of good BS/BP/Chol Control  RTC immediately PRN any vision changes, jonas blurry vision, missing vision, floaters, distortions, etc    - Posterior Segment OCT Retina-Both eyes    2. Nuclear sclerosis of both eyes  No contraindication to CE/IOL if pt desires    3. Anatomical narrow angle borderline glaucoma of both eyes  F/u Dr Goodrich in July as planned    4. Preretinal fibrosis, bilateral  Stable.  Observe    - Posterior Segment OCT Retina-Both eyes    Follow up in about 1 year (around 5/18/2021), or if symptoms worsen or fail to improve, for Comprehensive Examination, OCT Mac.

## 2020-05-23 ENCOUNTER — HOSPITAL ENCOUNTER (OUTPATIENT)
Dept: RADIOLOGY | Facility: HOSPITAL | Age: 65
Discharge: HOME | End: 2020-05-23
Attending: OBSTETRICS & GYNECOLOGY
Payer: COMMERCIAL

## 2020-05-23 DIAGNOSIS — Z12.39 BREAST CANCER SCREENING: ICD-10-CM

## 2020-05-23 PROCEDURE — 77067 MAMMO DIGITAL SCREENING BILAT WITH TOMOSYNTHESIS_CAD: ICD-10-PCS | Mod: 26,,, | Performed by: RADIOLOGY

## 2020-05-23 PROCEDURE — 77067 SCR MAMMO BI INCL CAD: CPT | Mod: 26,,, | Performed by: RADIOLOGY

## 2020-05-23 PROCEDURE — 77067 SCR MAMMO BI INCL CAD: CPT | Mod: TC

## 2020-05-23 PROCEDURE — 77063 BREAST TOMOSYNTHESIS BI: CPT | Mod: 26,,, | Performed by: RADIOLOGY

## 2020-05-23 PROCEDURE — 77063 MAMMO DIGITAL SCREENING BILAT WITH TOMOSYNTHESIS_CAD: ICD-10-PCS | Mod: 26,,, | Performed by: RADIOLOGY

## 2020-05-26 ENCOUNTER — PATIENT OUTREACH (OUTPATIENT)
Dept: ADMINISTRATIVE | Facility: OTHER | Age: 65
End: 2020-05-26

## 2020-05-27 NOTE — PROGRESS NOTES
CHIEF COMPLAINT: Type 2 Diabetes     HPI: Mrs. Aaliyah Angela is a 64 y.o. female who was diagnosed with gestational diabetes age 20s, Type 2 DM age 30s.    Pt was last seen by me in Sept 2019 and is now being seen by me again today.  Has h/o gastric sleeve 2015.-[place]   Needs lab work this week  See media for download of kimo.  Has freestyle kimo 14 day wear.  Snacking more during pandemic.    Current meds: trulicity 1.5 mg weekly, metformin 1000 mg bid, jardiance 10 mg daily, novolog correction scale 150-200+2, etc      BG 80  No hypoglycemia  Highest 302 mg/dl    Hypoglycemia---r/t gaps of not eating     Lab Results   Component Value Date    HGBA1C 7.5 (H) 12/31/2019     Diabetes Management Status    Statin: Taking  ACE/ARB: Taking    Screening or Prevention Patient's value Goal Complete/Controlled?   HgA1C Testing and Control   Lab Results   Component Value Date    HGBA1C 7.5 (H) 12/31/2019      Annually/Less than 8% No   Lipid profile : 01/04/2020 Annually No   LDL control Lab Results   Component Value Date    LDLCALC 50.2 (L) 01/04/2020    Annually/Less than 100 mg/dl  No   Nephropathy screening Lab Results   Component Value Date    LABMICR <2.5 09/12/2019     Lab Results   Component Value Date    PROTEINUA Negative 10/16/2015    Annually Yes   Blood pressure BP Readings from Last 1 Encounters:   05/12/20 138/80    Less than 140/90 No   Dilated retinal exam : 05/18/2020 Annually Yes   Foot exam   : 02/07/2020 Annually Yes     REVIEW OF SYSTEMS  General: no weakness, fatigue, + weight changes 6# (gain) in the past 4 mos   Eyes: no double or blurred vision, eye pain, or redness; Last Eye Exam 5/2018, due 5/16/19, 6/3/19 sees retinal /glaucoma specialist.   5/18/2020- cataract removal sx pending  Cardiovascular:  No chest pain, palpitations, +trace edema-BLE, or murmurs.   Respiratory: no cough or dyspnea.   GI: + heartburn, nausea, or changes in bowel patterns; good appetite. +GERD-zantac  Skin: no  "rashes, dryness, itching, or reactions at insulin injection sites.  Neuro: + numbness, tingling-gabapentin (SEs too much, currently off), tremors, or vertigo. +HAs (chronic), +parethesia of both hands- procedure per neuro 8/20/19 -f/u 11/22/19  Endocrine: no polyuria, polydipsia, polyphagia, heat or cold intolerance.     Recommendations: 150 minutes of moderate PA or 75 mins of vigorous PA a week  Moderate or high intensity, involve all major muscle groups on >2 days per week.    Vital Signs  /68   Pulse 76   Ht 5' 6" (1.676 m)   Wt 108.9 kg (240 lb)   SpO2 100%   BMI 38.74 kg/m²     Hemoglobin A1C   Date Value Ref Range Status   12/31/2019 7.5 (H) 4.0 - 5.6 % Final     Comment:     ADA Screening Guidelines:  5.7-6.4%  Consistent with prediabetes  >or=6.5%  Consistent with diabetes  High levels of fetal hemoglobin interfere with the HbA1C  assay. Heterozygous hemoglobin variants (HbS, HgC, etc)do  not significantly interfere with this assay.   However, presence of multiple variants may affect accuracy.     09/12/2019 7.7 (H) 4.0 - 5.6 % Final     Comment:     ADA Screening Guidelines:  5.7-6.4%  Consistent with prediabetes  >or=6.5%  Consistent with diabetes  High levels of fetal hemoglobin interfere with the HbA1C  assay. Heterozygous hemoglobin variants (HbS, HgC, etc)do  not significantly interfere with this assay.   However, presence of multiple variants may affect accuracy.     05/08/2019 8.1 (H) 4.0 - 5.6 % Final     Comment:     ADA Screening Guidelines:  5.7-6.4%  Consistent with prediabetes  >or=6.5%  Consistent with diabetes  High levels of fetal hemoglobin interfere with the HbA1C  assay. Heterozygous hemoglobin variants (HbS, HgC, etc)do  not significantly interfere with this assay.   However, presence of multiple variants may affect accuracy.          Chemistry        Component Value Date/Time     01/04/2020 0903    K 4.4 01/04/2020 0903     01/04/2020 0903    CO2 29 01/04/2020 " 0903    BUN 15 01/04/2020 0903    CREATININE 0.9 01/04/2020 0903    GLU 98 01/04/2020 0903        Component Value Date/Time    CALCIUM 9.5 01/04/2020 0903    ALKPHOS 63 01/04/2020 0903    AST 18 01/04/2020 0903    ALT 12 01/04/2020 0903    BILITOT 0.5 01/04/2020 0903    ESTGFRAFRICA >60.0 01/04/2020 0903    EGFRNONAA >60.0 01/04/2020 0903           Lab Results   Component Value Date    TSH 1.067 01/04/2020      Lab Results   Component Value Date    CHOL 115 (L) 01/04/2020    CHOL 136 02/10/2018    CHOL 126 12/17/2016     Lab Results   Component Value Date    HDL 53 01/04/2020    HDL 53 02/10/2018    HDL 45 12/17/2016     Lab Results   Component Value Date    LDLCALC 50.2 (L) 01/04/2020    LDLCALC 73.6 02/10/2018    LDLCALC 67.8 12/17/2016     Lab Results   Component Value Date    TRIG 59 01/04/2020    TRIG 47 02/10/2018    TRIG 66 12/17/2016     Lab Results   Component Value Date    CHOLHDL 46.1 01/04/2020    CHOLHDL 39.0 02/10/2018    CHOLHDL 35.7 12/17/2016       PHYSICAL EXAMINATION  Constitutional: Appears well, no distress  Neck: Supple, trachea midline.   Respiratory: Np wheezes, even and unlabored.  Cardiovascular: deferred  Lymph: no BLE edema  Skin: warm and dry; no injection site reactions, no acanthosis nigracans observed.  Neuro:patient alert and cooperative, normal affect; steady gait.    Diabetes Foot Exam:   Deferred   2/2020    Assessment/Plan  1. Diabetic polyneuropathy associated with type 2 diabetes mellitus  Hemoglobin A1C    Microalbumin/creatinine urine ratio    Hemoglobin A1C   2. 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 by proliferative retinopathy without macular edema, with long-term current use of insulin  Hemoglobin A1C    Microalbumin/creatinine urine ratio    Hemoglobin A1C   3. Class 3 severe obesity due to excess calories with serious comorbidity and body mass index (BMI) of 40.0 to 44.9 in adult     4. Coronary artery disease involving native coronary artery of native heart  without angina pectoris     5. BRADFORD on CPAP     6. Hyperlipidemia, unspecified hyperlipidemia type     7. HTN (hypertension), benign  Microalbumin/creatinine urine ratio   8. History of bariatric surgery, 12-     9. Proteinuria, unspecified type  Ambulatory referral/consult to Nephrology     1.-2. F/u in 3-4 mos w/ me  Increase jardiance to 25 mg daily   Continue max metformin and trulicity   a1c goal less than 7%  Discussed dietary habits  novolog prn correction scale   Voucher given-novocare  3. Body mass index is 38.74 kg/m². may increase insulin resistance.  4. Avoid hypoglycemia  5. On cpap-not always consistent  6.   Lab Results   Component Value Date    LDLCALC 50.2 (L) 01/04/2020     At goal   7. Controlled, continue med(s)  8. F/u with bariatric med prn   9. Nephrology referral -no recent proteinuria  Pt worried about appearance of urine lately-bubbling    FOLLOW UP  Follow up in about 4 months (around 9/28/2020).

## 2020-05-28 ENCOUNTER — OFFICE VISIT (OUTPATIENT)
Dept: INTERNAL MEDICINE | Facility: CLINIC | Age: 65
End: 2020-05-28
Payer: COMMERCIAL

## 2020-05-28 VITALS
BODY MASS INDEX: 38.57 KG/M2 | DIASTOLIC BLOOD PRESSURE: 68 MMHG | HEART RATE: 76 BPM | HEIGHT: 66 IN | WEIGHT: 240 LBS | SYSTOLIC BLOOD PRESSURE: 110 MMHG | OXYGEN SATURATION: 100 %

## 2020-05-28 DIAGNOSIS — E66.01 CLASS 3 SEVERE OBESITY DUE TO EXCESS CALORIES WITH SERIOUS COMORBIDITY AND BODY MASS INDEX (BMI) OF 40.0 TO 44.9 IN ADULT: ICD-10-CM

## 2020-05-28 DIAGNOSIS — Z98.84 HISTORY OF BARIATRIC SURGERY: ICD-10-CM

## 2020-05-28 DIAGNOSIS — E78.5 HYPERLIPIDEMIA, UNSPECIFIED HYPERLIPIDEMIA TYPE: ICD-10-CM

## 2020-05-28 DIAGNOSIS — Z79.4 TYPE 2 DIABETES MELLITUS WITH BOTH EYES AFFECTED BY PROLIFERATIVE RETINOPATHY WITHOUT MACULAR EDEMA, WITH LONG-TERM CURRENT USE OF INSULIN: ICD-10-CM

## 2020-05-28 DIAGNOSIS — E11.3593 TYPE 2 DIABETES MELLITUS WITH BOTH EYES AFFECTED BY PROLIFERATIVE RETINOPATHY WITHOUT MACULAR EDEMA, WITH LONG-TERM CURRENT USE OF INSULIN: ICD-10-CM

## 2020-05-28 DIAGNOSIS — E11.42 DIABETIC POLYNEUROPATHY ASSOCIATED WITH TYPE 2 DIABETES MELLITUS: Primary | ICD-10-CM

## 2020-05-28 DIAGNOSIS — I10 HTN (HYPERTENSION), BENIGN: ICD-10-CM

## 2020-05-28 DIAGNOSIS — I25.10 CORONARY ARTERY DISEASE INVOLVING NATIVE CORONARY ARTERY OF NATIVE HEART WITHOUT ANGINA PECTORIS: ICD-10-CM

## 2020-05-28 DIAGNOSIS — R80.9 PROTEINURIA, UNSPECIFIED TYPE: ICD-10-CM

## 2020-05-28 DIAGNOSIS — G47.33 OSA ON CPAP: ICD-10-CM

## 2020-05-28 PROCEDURE — 3074F PR MOST RECENT SYSTOLIC BLOOD PRESSURE < 130 MM HG: ICD-10-PCS | Mod: CPTII,S$GLB,, | Performed by: NURSE PRACTITIONER

## 2020-05-28 PROCEDURE — 99214 PR OFFICE/OUTPT VISIT, EST, LEVL IV, 30-39 MIN: ICD-10-PCS | Mod: S$GLB,,, | Performed by: NURSE PRACTITIONER

## 2020-05-28 PROCEDURE — 99999 PR PBB SHADOW E&M-EST. PATIENT-LVL V: ICD-10-PCS | Mod: PBBFAC,,, | Performed by: NURSE PRACTITIONER

## 2020-05-28 PROCEDURE — 3008F BODY MASS INDEX DOCD: CPT | Mod: CPTII,S$GLB,, | Performed by: NURSE PRACTITIONER

## 2020-05-28 PROCEDURE — 3078F DIAST BP <80 MM HG: CPT | Mod: CPTII,S$GLB,, | Performed by: NURSE PRACTITIONER

## 2020-05-28 PROCEDURE — 99214 OFFICE O/P EST MOD 30 MIN: CPT | Mod: S$GLB,,, | Performed by: NURSE PRACTITIONER

## 2020-05-28 PROCEDURE — 3051F PR MOST RECENT HEMOGLOBIN A1C LEVEL 7.0 - < 8.0%: ICD-10-PCS | Mod: CPTII,S$GLB,, | Performed by: NURSE PRACTITIONER

## 2020-05-28 PROCEDURE — 3074F SYST BP LT 130 MM HG: CPT | Mod: CPTII,S$GLB,, | Performed by: NURSE PRACTITIONER

## 2020-05-28 PROCEDURE — 3051F HG A1C>EQUAL 7.0%<8.0%: CPT | Mod: CPTII,S$GLB,, | Performed by: NURSE PRACTITIONER

## 2020-05-28 PROCEDURE — 99999 PR PBB SHADOW E&M-EST. PATIENT-LVL V: CPT | Mod: PBBFAC,,, | Performed by: NURSE PRACTITIONER

## 2020-05-28 PROCEDURE — 3078F PR MOST RECENT DIASTOLIC BLOOD PRESSURE < 80 MM HG: ICD-10-PCS | Mod: CPTII,S$GLB,, | Performed by: NURSE PRACTITIONER

## 2020-05-28 PROCEDURE — 3008F PR BODY MASS INDEX (BMI) DOCUMENTED: ICD-10-PCS | Mod: CPTII,S$GLB,, | Performed by: NURSE PRACTITIONER

## 2020-05-28 RX ORDER — INSULIN ASPART 100 [IU]/ML
INJECTION, SOLUTION INTRAVENOUS; SUBCUTANEOUS
Qty: 2 BOX | Refills: 1 | Status: SHIPPED | OUTPATIENT
Start: 2020-05-28 | End: 2021-02-22

## 2020-05-28 NOTE — PATIENT INSTRUCTIONS
Snacks can be an important part of a balanced, healthy meal plan. They allow you to eat more frequently, feeling full and satisfied throughout the day. Also, they allow you to spread carbohydrates evenly, which may stabilize blood sugars.  Plus, snacks are enjoyable!     The amount of carbohydrate needed at snacks varies. Generally, about 15-30 grams of carbohydrate per snack is recommended.  Below you will find some tasty treats.       0-5 gm carb   Crystal Light   Vitamin Water Zero   Herbal tea, unsweetened   2 tsp peanut butter on celery   1./2 cup sugar-free jell-o   1 sugar-free popsicle   ¼ cup blueberries   8oz Blue Brenda unsweetened almond milk   5 baby carrots & celery sticks, cucumbers, bell peppers dipped in ¼ cup salsa, 2Tbsp light ranch dressing or 2Tbsp plain Greek yogurt   10 Goldfish crackers   ½ oz low-fat cheese or string cheese   1 closed handful of nuts, unsalted   1 Tbsp of sunflower seeds, unsalted   1 cup Smart Pop popcorn   1 whole grain brown rice cake        15 gm carb   1 small piece of fruit or ½ banana or 1/2 cup lite canned fruit   3 concepcion cracker squares   3 cups Smart Pop popcorn, top spray butter, Jaramillo lite salt or cinnamon and Truvia   5 Vanilla Wafers   ½ cup low fat, no added sugar ice cream or frozen yogurt (Blue bell, Blue Bunny, Weight Watchers, Skinny Cow)   ½ turkey, ham, or chicken sandwich   ½ c fruit with ½ c Cottage cheese   4-6 unsalted wheat crackers with 1 oz low fat cheese or 1 tbsp peanut butter    30-45 goldfish crackers (depending on flavor)    7-8 Orthodoxy mini brown rice cakes (caramel, apple cinnamon, chocolate)    12 Orthodoxy mini brown rice cakes (cheddar, bbq, ranch)    1/3 cup hummus dip with raw veg   1/2 whole wheat love, 1Tbsp hummus   Mini Pizza (1/2 whole wheat English muffin, low-fat  cheese, tomato sauce)   100 calorie snack pack (Oreo, Chips Ahoy, Ritz Mix, Baked Cheetos)   4-6 oz. light or Greek Style yogurt  (Riki, Andre, [facility], [facility])   ½ cup sugar-free pudding     6 in. wheat tortilla or love oven toasted chips (topped with spray butter flavoring, cinnamon, Truvia OR spray butter, garlic powder, chili powder)    18 BBQ Popchips (available at [employer], [employer] Foods, Fresh Market)

## 2020-05-28 NOTE — LETTER
May 28, 2020    Aaliyah Angela  [address]  [facility] [address]             [facility]  [address]  [place] [address]  Phone: [phone]  Fax: [phone] To whom it may concern:    Ms. Angela was seen in clinic today, 5/28/20. She may return to work today, no restrictions.  If you have any questions or concerns, please contact me at .        Sincerely,        DAMARI Raphael, FNP

## 2020-06-09 RX ORDER — AMLODIPINE BESYLATE 10 MG/1
TABLET ORAL
Qty: 90 TABLET | Refills: 1 | Status: SHIPPED | OUTPATIENT
Start: 2020-06-09 | End: 2020-12-17 | Stop reason: SDUPTHER

## 2020-06-10 NOTE — PROGRESS NOTES
Refill Authorization Note    is requesting a refill authorization.    Brief assessment and rationale for refill: APPROVE: prr               Medication reconciliation completed: No                         Comments:      Requested Prescriptions   Pending Prescriptions Disp Refills    amLODIPine (NORVASC) 10 MG tablet [Pharmacy Med Name: AMLODIPINE BESYLATE 10MG TABLETS] 90 tablet 1     Sig: TAKE 1 TABLET BY MOUTH EVERY DAY       Cardiovascular:  Calcium Channel Blockers Passed - 6/9/2020 10:49 PM        Passed - Patient is at least 18 years old        Passed - Last BP in normal range within 360 days.     BP Readings from Last 3 Encounters:   05/28/20 110/68   05/12/20 138/80   02/19/20 126/79              Passed - Office visit in past 12 months or future 90 days.     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 edema, with long-term current use of insulin    [facility] Ophthalmology Reggie Mckeon MD    4 weeks ago Visit for gynecologic examination    [facility], [place] [address] Eleanor Fuentes MD    2 months ago Ear popping, right    [facility] Otorhinol[facility]ology Britt Duarte NP    3 months ago Bilateral carpal tunnel syndrome    [facility] [place] [address] TONY Nascimento          Future Appointments              In 2 weeks MD [facility] - Neph[facility], [facility]    In 1 month MD [facility] - Ophthalmology, [facility]    In 3 months LAB, APPOINTMENT [facility]    In 3 months LAB, APPOINTMENT [facility]    In 3 months DAMARI Raphael, ELIAZARP [facility], [facility]                 Appointments  past 12m  or future 3m with PCP    Date Provider   Last Visit   1/3/2020 Reggie Glover MD   Next Visit   Visit date not found Reggie Glover MD   ED visits in past 90 days: 0     Note composed:10:50 PM 06/09/2020

## 2020-06-17 ENCOUNTER — TELEPHONE (OUTPATIENT)
Dept: NEPHROLOGY | Facility: CLINIC | Age: 65
End: 2020-06-17

## 2020-06-22 NOTE — PROGRESS NOTES
Digital Medicine: Health  Follow-Up    Patient reports that she is doing well. Patient was at work so conversation was brief. Patient AVG BP is down a few points since our last encounter. Patient attributes elevated BP readings to eating food that she knows she shouldn't be eating. Encouraged patient to be mindful of her sodium intake and to keep salty meals to a minimum. Patient understood. Will f/u in 4-6 weeks.     The history is provided by the spouse. No  was used.   Follow Up  Follow-up reason(s): reading review          INTERVENTION(S)  encouragement/support          Topic    Hemoglobin A1C        Last 5 Patient Entered Readings                                      Current 30 Day Average: 132/81     Recent Readings 6/18/2020 6/8/2020 6/8/2020 6/6/2020 6/6/2020    SBP (mmHg) 139 133 137 138 148    DBP (mmHg) 79 76 95 84 82    Pulse 76 69 73 74 83                      Diet Screening   No change to diet.  She has the following dietary restrictions: low sodium diet and diabeticShe cooks for self.    Patient does the shopping for groceries.  She gets groceries from the grocery store.      Barriers to a Healthy Diet: no barriers to healthy eating    Patient reports that she has made no major changes to her diet since our last encounter.     Physical Activity Screening   No change to exercise routine.    When asked if exercising, patient responded: yes    Patient participates in the following activities: walking and yard/housework    She identified the following barriers to physical activity: no barriers to being active    Patient reports that she hasn't made any major changes to her activity levels since our last encounter.     Medication Adherence Screening   She did not miss a dose this month.    Patient identified the following reasons for non-compliance: None    Patient reports no s/s or issues taking BP medication as prescribed.       SDOH

## 2020-06-23 ENCOUNTER — PATIENT OUTREACH (OUTPATIENT)
Dept: ADMINISTRATIVE | Facility: OTHER | Age: 65
End: 2020-06-23

## 2020-06-23 ENCOUNTER — TELEPHONE (OUTPATIENT)
Dept: NEPHROLOGY | Facility: CLINIC | Age: 65
End: 2020-06-23

## 2020-06-24 ENCOUNTER — LAB VISIT (OUTPATIENT)
Dept: LAB | Facility: HOSPITAL | Age: 65
End: 2020-06-24
Attending: INTERNAL MEDICINE
Payer: COMMERCIAL

## 2020-06-24 DIAGNOSIS — I10 HYPERTENSION, UNSPECIFIED TYPE: ICD-10-CM

## 2020-06-24 DIAGNOSIS — I10 HYPERTENSION, UNSPECIFIED TYPE: Primary | ICD-10-CM

## 2020-06-24 LAB
ALBUMIN SERPL BCP-MCNC: 3.9 G/DL (ref 3.5–5.2)
ALP SERPL-CCNC: 76 U/L (ref 55–135)
ALT SERPL W/O P-5'-P-CCNC: 32 U/L (ref 10–44)
ANION GAP SERPL CALC-SCNC: 7 MMOL/L (ref 8–16)
AST SERPL-CCNC: 27 U/L (ref 10–40)
BASOPHILS # BLD AUTO: 0.05 K/UL (ref 0–0.2)
BASOPHILS NFR BLD: 0.8 % (ref 0–1.9)
BILIRUB SERPL-MCNC: 0.2 MG/DL (ref 0.1–1)
BUN SERPL-MCNC: 16 MG/DL (ref 8–23)
CALCIUM SERPL-MCNC: 9.1 MG/DL (ref 8.7–10.5)
CHLORIDE SERPL-SCNC: 102 MMOL/L (ref 95–110)
CHOLEST SERPL-MCNC: 140 MG/DL (ref 120–199)
CHOLEST/HDLC SERPL: 2.7 {RATIO} (ref 2–5)
CO2 SERPL-SCNC: 29 MMOL/L (ref 23–29)
CREAT SERPL-MCNC: 0.9 MG/DL (ref 0.5–1.4)
DIFFERENTIAL METHOD: ABNORMAL
EOSINOPHIL # BLD AUTO: 0.1 K/UL (ref 0–0.5)
EOSINOPHIL NFR BLD: 2.3 % (ref 0–8)
ERYTHROCYTE [DISTWIDTH] IN BLOOD BY AUTOMATED COUNT: 13.3 % (ref 11.5–14.5)
EST. GFR  (AFRICAN AMERICAN): >60 ML/MIN/1.73 M^2
EST. GFR  (NON AFRICAN AMERICAN): >60 ML/MIN/1.73 M^2
GLUCOSE SERPL-MCNC: 133 MG/DL (ref 70–110)
HCT VFR BLD AUTO: 37.9 % (ref 37–48.5)
HDLC SERPL-MCNC: 52 MG/DL (ref 40–75)
HDLC SERPL: 37.1 % (ref 20–50)
HGB BLD-MCNC: 11.4 G/DL (ref 12–16)
IMM GRANULOCYTES # BLD AUTO: 0.01 K/UL (ref 0–0.04)
IMM GRANULOCYTES NFR BLD AUTO: 0.2 % (ref 0–0.5)
LDLC SERPL CALC-MCNC: 68 MG/DL (ref 63–159)
LYMPHOCYTES # BLD AUTO: 2.7 K/UL (ref 1–4.8)
LYMPHOCYTES NFR BLD: 45.1 % (ref 18–48)
MCH RBC QN AUTO: 28.8 PG (ref 27–31)
MCHC RBC AUTO-ENTMCNC: 30.1 G/DL (ref 32–36)
MCV RBC AUTO: 96 FL (ref 82–98)
MONOCYTES # BLD AUTO: 0.6 K/UL (ref 0.3–1)
MONOCYTES NFR BLD: 9.4 % (ref 4–15)
NEUTROPHILS # BLD AUTO: 2.5 K/UL (ref 1.8–7.7)
NEUTROPHILS NFR BLD: 42.2 % (ref 38–73)
NONHDLC SERPL-MCNC: 88 MG/DL
NRBC BLD-RTO: 0 /100 WBC
PHOSPHATE SERPL-MCNC: 4.6 MG/DL (ref 2.7–4.5)
PLATELET # BLD AUTO: 253 K/UL (ref 150–350)
PMV BLD AUTO: 11.5 FL (ref 9.2–12.9)
POTASSIUM SERPL-SCNC: 4.5 MMOL/L (ref 3.5–5.1)
PROT SERPL-MCNC: 7.6 G/DL (ref 6–8.4)
PTH-INTACT SERPL-MCNC: 102 PG/ML (ref 9–77)
RBC # BLD AUTO: 3.96 M/UL (ref 4–5.4)
SODIUM SERPL-SCNC: 138 MMOL/L (ref 136–145)
TRIGL SERPL-MCNC: 100 MG/DL (ref 30–150)
URATE SERPL-MCNC: 3 MG/DL (ref 2.4–5.7)
WBC # BLD AUTO: 5.96 K/UL (ref 3.9–12.7)

## 2020-06-24 PROCEDURE — 83970 ASSAY OF PARATHORMONE: CPT

## 2020-06-24 PROCEDURE — 80061 LIPID PANEL: CPT

## 2020-06-24 PROCEDURE — 83036 HEMOGLOBIN GLYCOSYLATED A1C: CPT

## 2020-06-24 PROCEDURE — 84550 ASSAY OF BLOOD/URIC ACID: CPT

## 2020-06-24 PROCEDURE — 85025 COMPLETE CBC W/AUTO DIFF WBC: CPT

## 2020-06-24 PROCEDURE — 80053 COMPREHEN METABOLIC PANEL: CPT

## 2020-06-24 PROCEDURE — 84100 ASSAY OF PHOSPHORUS: CPT

## 2020-06-24 NOTE — PROGRESS NOTES
Patient's chart was reviewed.   Requested updates within Care Everywhere.  Immunizations reconciled.    Health Maintenance was updated.  Colonoscopy open case request 01/03/2020

## 2020-06-25 ENCOUNTER — OFFICE VISIT (OUTPATIENT)
Dept: NEPHROLOGY | Facility: CLINIC | Age: 65
End: 2020-06-25
Payer: COMMERCIAL

## 2020-06-25 VITALS
WEIGHT: 246.69 LBS | HEIGHT: 66 IN | BODY MASS INDEX: 39.65 KG/M2 | OXYGEN SATURATION: 98 % | HEART RATE: 84 BPM | SYSTOLIC BLOOD PRESSURE: 132 MMHG | DIASTOLIC BLOOD PRESSURE: 80 MMHG

## 2020-06-25 DIAGNOSIS — R80.9 PROTEINURIA, UNSPECIFIED TYPE: ICD-10-CM

## 2020-06-25 LAB
ESTIMATED AVG GLUCOSE: 177 MG/DL (ref 68–131)
HBA1C MFR BLD HPLC: 7.8 % (ref 4–5.6)

## 2020-06-25 PROCEDURE — 99204 OFFICE O/P NEW MOD 45 MIN: CPT | Mod: S$GLB,,, | Performed by: INTERNAL MEDICINE

## 2020-06-25 PROCEDURE — 3075F SYST BP GE 130 - 139MM HG: CPT | Mod: CPTII,S$GLB,, | Performed by: INTERNAL MEDICINE

## 2020-06-25 PROCEDURE — 3008F PR BODY MASS INDEX (BMI) DOCUMENTED: ICD-10-PCS | Mod: CPTII,S$GLB,, | Performed by: INTERNAL MEDICINE

## 2020-06-25 PROCEDURE — 3075F PR MOST RECENT SYSTOLIC BLOOD PRESS GE 130-139MM HG: ICD-10-PCS | Mod: CPTII,S$GLB,, | Performed by: INTERNAL MEDICINE

## 2020-06-25 PROCEDURE — 3008F BODY MASS INDEX DOCD: CPT | Mod: CPTII,S$GLB,, | Performed by: INTERNAL MEDICINE

## 2020-06-25 PROCEDURE — 3079F PR MOST RECENT DIASTOLIC BLOOD PRESSURE 80-89 MM HG: ICD-10-PCS | Mod: CPTII,S$GLB,, | Performed by: INTERNAL MEDICINE

## 2020-06-25 PROCEDURE — 99999 PR PBB SHADOW E&M-EST. PATIENT-LVL IV: ICD-10-PCS | Mod: PBBFAC,,, | Performed by: INTERNAL MEDICINE

## 2020-06-25 PROCEDURE — 3079F DIAST BP 80-89 MM HG: CPT | Mod: CPTII,S$GLB,, | Performed by: INTERNAL MEDICINE

## 2020-06-25 PROCEDURE — 99999 PR PBB SHADOW E&M-EST. PATIENT-LVL IV: CPT | Mod: PBBFAC,,, | Performed by: INTERNAL MEDICINE

## 2020-06-25 PROCEDURE — 99204 PR OFFICE/OUTPT VISIT, NEW, LEVL IV, 45-59 MIN: ICD-10-PCS | Mod: S$GLB,,, | Performed by: INTERNAL MEDICINE

## 2020-06-25 NOTE — LETTER
June 25, 2020      Maribell Noble, APRN, FNP  [address]  [facility] [address]           [facility]  [address]  [facility] [address]  Phone: [phone]  Fax: [phone]          Patient: Aaliyah Angela   MR Number: [id number]   YOB: 1955   Date of Visit: 6/25/2020       Dear Maribell Noble:    Thank you for referring Aaliyah Angela to me for evaluation. Attached you will find relevant portions of my assessment and plan of care.    If you have questions, please do not hesitate to call me. I look forward to following Aaliyah Angela along with you.    Sincerely,    Sonido Diez MD    Enclosure  CC:  No Recipients    If you would like to receive this communication electronically, please contact [email or web address] or [phone] to request more information on [employer] access.    For providers and/or their staff who would like to refer a patient to [facility], please contact us through our one-stop-shop provider referral line, [facility], at [phone].    If you feel you have received this communication in error or would no longer like to receive these types of communications, please e-mail [email or web address]

## 2020-06-25 NOTE — PROGRESS NOTES
New Consultation Report  Nephrology      Consult Requested By: Endocrinology  Reason for Consult: proteinuria    History of Present Illness:  Patient is a 64 y.o. female presents for a new consultation for evaluation of foamy urine and question about proteinuria.    The patient denies SOB, LE edema, hematuria or foamy urine.     The patient denies taking NSAIDs or new antibiotics, recreational drugs, recent episode of dehydration, diarrhea, nausea or vomiting, acute illness, hospitalization or exposure to IV radiocontrast.     Past Medical History:   Diagnosis Date    Allergy     Anatomical narrow angle borderline glaucoma     AR (allergic rhinitis)     Arthritis     CAD (coronary artery disease) 4/22/2013    Non obstructive, 2012 PET     Cataract     Diabetes mellitus type II     with retinopathy    Diabetic retinopathy     Epiretinal membrane, both eyes     Hyperlipidemia     Hypertensive retinopathy of both eyes     Lumbar disc disease     Migraines     Morbid obesity 10/3/2012    BRADFORD (obstructive sleep apnea) 8/4/2015    Proliferative diabetic retinopathy(362.02)     PVD (peripheral vascular disease) 4/22/2013    Carotid US 1-39% bilat 2012     Rupture of right Achilles tendon     Unspecified essential hypertension     Unspecified vitamin D deficiency     Vitreous hemorrhage of left eye          Current Outpatient Medications:     acetaminophen-codeine 300-30mg (TYLENOL #3) 300-30 mg Tab, Take 1 tablet by mouth every 6 (six) hours as needed., Disp: 25 tablet, Rfl: 0    amitriptyline (ELAVIL) 25 MG tablet, Take 2 tablets (50 mg total) by mouth every evening., Disp: 60 tablet, Rfl: 11    amLODIPine (NORVASC) 10 MG tablet, TAKE 1 TABLET BY MOUTH EVERY DAY, Disp: 90 tablet, Rfl: 1    aspirin 81 MG Chew, Take 81 mg by mouth once daily.  , Disp: , Rfl:     atorvastatin (LIPITOR) 20 MG tablet, Take 1 tablet (20 mg total) by mouth once daily., Disp: 90 tablet, Rfl: 3    blood sugar diagnostic  "(BLOOD GLUCOSE TEST) Strp, Inject 1 each into the skin 2 (two) times daily., Disp: 200 strip, Rfl: 3    calcium-vitamin D3 500 mg(1,250mg) -200 unit per tablet, Take 1 tablet by mouth once daily., Disp: , Rfl:     dulaglutide (TRULICITY) 1.5 mg/0.5 mL PnIj, Inject 1.5 mg weekly., Disp: 4 Syringe, Rfl: 1    empagliflozin (JARDIANCE) 25 mg Tab, Take 25 mg by mouth once daily., Disp: 90 tablet, Rfl: 2    flash glucose sensor (FREESTYLE SAV SENSOR) Kit, 1 Device by Misc.(Non-Drug; Combo Route) route continuous., Disp: 3 kit, Rfl: 11    FREESTYLE SAV 14 DAY SENSOR Kit, TEST BLOOD SUGAR AS DIRECTED, Disp: 2 kit, Rfl: 6    insulin aspart U-100 (NOVOLOG FLEXPEN U-100 INSULIN) 100 unit/mL (3 mL) InPn pen, Inject as needed, scale 150-200+2, 201-250+4, 251-300+6, 301-350+8, >350+10. Max daily 30 units. [employer] voucher free boxes(2), Disp: 2 Box, Rfl: 1    losartan (COZAAR) 100 MG tablet, Take 1 tablet (100 mg total) by mouth once daily., Disp: 90 tablet, Rfl: 3    metFORMIN (GLUCOPHAGE-XR) 500 MG 24 hr tablet, Take 2 tablets (1,000 mg total) by mouth 2 (two) times daily with meals., Disp: 360 tablet, Rfl: 3    metoprolol succinate (TOPROL-XL) 50 MG 24 hr tablet, TAKE 1 TABLET BY MOUTH EVERY DAY, Disp: 90 tablet, Rfl: 3    multivitamin (ONE DAILY MULTIVITAMIN) per tablet, Take 1 tablet by mouth once daily., Disp: , Rfl:     omeprazole (PRILOSEC) 20 MG capsule, Take 1 capsule (20 mg total) by mouth once daily., Disp: 30 capsule, Rfl: 0    pen needle, diabetic (BD ULTRA-FINE DIEGO PEN NEEDLE) 32 gauge x 5/32" Ndle, Uses 3 times a day., Disp: 100 each, Rfl: 11    urea 20 % Crea, Apply 1 application topically once daily. To dry skin on the heels, Disp: 75 g, Rfl: 10  No current facility-administered medications for this visit.     Facility-Administered Medications Ordered in Other Visits:     0.9%  NaCl infusion, , Intravenous, Continuous, Aristeo Bueno MD  Review of patient's allergies indicates:  No " Known Allergies     Past Surgical History:   Procedure Laterality Date    CARPAL TUNNEL RELEASE Right 2020    Procedure: RELEASE, CARPAL TUNNEL RIGHT;  Surgeon: Tameka Bran MD;  Location: [facility];  Service: Orthopedics;  Laterality: Right;    CARPAL TUNNEL RELEASE Right 2020     SECTION      x3    GASTRIC BYPASS      Sleeve    LASER PERIPHERAL IRIDOTOMY Bilateral 2017 and 2017        PANRETINAL PHOTOCOAGULATION      Both eyes    TUBAL LIGATION       Family History   Problem Relation Age of Onset    Diabetes Mother     Hypertension Mother     Diabetes Father     Hypertension Father     Stroke Father     Diabetes Sister     Diabetes Brother     Cancer Brother         prostate    Prostate cancer Brother     Diabetes Sister     Diabetes Sister     Diabetes Sister     Diabetes Brother     Stroke Brother     Diabetes Brother     Diabetes Brother     Breast cancer Neg Hx     Colon cancer Neg Hx     Ovarian cancer Neg Hx     Amblyopia Neg Hx     Blindness Neg Hx     Cataracts Neg Hx     Glaucoma Neg Hx     Macular degeneration Neg Hx     Retinal detachment Neg Hx     Strabismus Neg Hx      Social History     Tobacco Use    Smoking status: Never Smoker    Smokeless tobacco: Never Used   Substance Use Topics    Alcohol use: No    Drug use: No       Review of Systems   Constitutional: Negative.    Respiratory: Negative.    Cardiovascular: Negative.    Gastrointestinal: Negative.    Genitourinary: Negative for dysuria, frequency, hematuria and urgency.        Foamy urine   Skin: Negative.    All other systems reviewed and are negative.      Vitals:    20 1433   BP: 132/80   Pulse: 84       PHYSICAL EXAMINATION:  General: no distress, well nourished  Skin: color, texture, turgor normal. No rash or lesions  HEENT: Eyes: reactive pupils, normal conjunctiva. Oral mucosa moist, no ulcers. Throat: no erythema.  Neck: supple, symmetrical,  trachea midline, no JVD, no carotid bruit  Lungs: clear to auscultation bilaterally and normal respiratory effort  Cardiovascular: Heart: regular rate and rhythm, S1, S2 normal, no murmur, rub or gallop. Pulses: 2+ and symmetric.  Abdomen: bowel sounds present, no abdominal bruit, soft, non-tender non-distented; no masses, organomegaly or ascites.   Musculoskeletal: no pitting edema in lower extremities, no clubbing or cyanosis  Lymph Nodes: No cervical or supraclavicular adenopathy  Neurologic: AAOx3, normal strength and tone. No focal deficit. No asterixis.       LABORATORY DATA:  Lab Results   Component Value Date    CREATININE 0.9 06/24/2020       Prot/Creat Ratio, Ur   Date Value Ref Range Status   06/24/2020 Unable to calculate 0.00 - 0.20 Final   05/01/2017 0.11 0.00 - 0.20 Final   08/15/2014 Unable to calculate 0.0 - 0.2 Final       Lab Results   Component Value Date     06/24/2020    K 4.5 06/24/2020    CO2 29 06/24/2020       Lab Results   Component Value Date    .0 (H) 06/24/2020    CALCIUM 9.1 06/24/2020    PHOS 4.6 (H) 06/24/2020       Lab Results   Component Value Date    HGB 11.4 (L) 06/24/2020        Lab Results   Component Value Date    HGBA1C 7.8 (H) 06/24/2020       Lab Results   Component Value Date    LDLCALC 68.0 06/24/2020         IMPRESSION / RECOMMENDATIONS:     1. Proteinuria, unspecified type  No evidence of proteinuria, foamy urine not caused by kidney disease.

## 2020-07-14 ENCOUNTER — TELEPHONE (OUTPATIENT)
Dept: OPHTHALMOLOGY | Facility: CLINIC | Age: 65
End: 2020-07-14

## 2020-07-14 NOTE — TELEPHONE ENCOUNTER
Tried calling pt to reschedule her appt with Dr. Goodrich due to Dr. Goodrich being out but no answer and no voice mail set up.

## 2020-07-17 ENCOUNTER — TELEPHONE (OUTPATIENT)
Dept: OPHTHALMOLOGY | Facility: CLINIC | Age: 65
End: 2020-07-17

## 2020-07-17 ENCOUNTER — TELEPHONE (OUTPATIENT)
Dept: INTERNAL MEDICINE | Facility: CLINIC | Age: 65
End: 2020-07-17

## 2020-07-17 DIAGNOSIS — K21.9 GASTROESOPHAGEAL REFLUX DISEASE, ESOPHAGITIS PRESENCE NOT SPECIFIED: ICD-10-CM

## 2020-07-17 DIAGNOSIS — Z12.11 COLON CANCER SCREENING: Primary | ICD-10-CM

## 2020-07-17 NOTE — TELEPHONE ENCOUNTER
colonoscopy order is in as requested, which is a repeat of the order I placed in jan 2020    Decline order for egd - she should see gastro for evaluation to see if egd needed. Gastro referral is in.

## 2020-07-17 NOTE — TELEPHONE ENCOUNTER
Left voice message for pt to call back to reschedule her appt with Dr. Goodrich due to Dr. Goodrich being out.

## 2020-07-17 NOTE — TELEPHONE ENCOUNTER
----- Message from Sophie Springer MA sent at 7/17/2020  1:20 PM CDT -----  Pt is requesting orders for EGD and Colonoscopy.  Once orders are placed, pt can call or be transferred to [phone]

## 2020-07-19 ENCOUNTER — PATIENT OUTREACH (OUTPATIENT)
Dept: ADMINISTRATIVE | Facility: OTHER | Age: 65
End: 2020-07-19

## 2020-07-19 NOTE — PROGRESS NOTES
Requested updates within Care Everywhere.  Patient's chart was reviewed for overdue KATIE topics.  Open case request for Colonoscopy.  Immunizations reconciled.    Orders placed:n/a  Tasked appts:n/a  Labs Linked:n/a

## 2020-07-20 ENCOUNTER — TELEPHONE (OUTPATIENT)
Dept: OPHTHALMOLOGY | Facility: CLINIC | Age: 65
End: 2020-07-20

## 2020-08-17 ENCOUNTER — TELEPHONE (OUTPATIENT)
Dept: INTERNAL MEDICINE | Facility: CLINIC | Age: 65
End: 2020-08-17

## 2020-08-17 DIAGNOSIS — E11.8 TYPE 2 DIABETES MELLITUS WITH COMPLICATION: ICD-10-CM

## 2020-08-17 RX ORDER — FLASH GLUCOSE SENSOR
2 KIT MISCELLANEOUS
Qty: 2 KIT | Refills: 6 | Status: SHIPPED | OUTPATIENT
Start: 2020-08-17 | End: 2020-09-16

## 2020-08-17 NOTE — TELEPHONE ENCOUNTER
----- Message from Nia Roberto sent at 8/17/2020  3:31 PM CDT -----  Contact: Pt-- [phone]  Type:  Needs Medical Advice    Who Called:  Pt    Best Call Back Number:  [phone]    Additional Information:  Pt has labs scheduled for 12/26 without orders.

## 2020-08-20 NOTE — PROGRESS NOTES
HPI     Glaucoma      Additional comments: HRT review today               Comments      DLS: 6/3/19    1) OHT vs Pre-perimetric POAG  2) Narrow Angles  3) PDR OU  4) Hx VH OD  5) NS OU  6) Hx TVO OS            Last edited by Josselin Barros MA on 8/24/2020  1:36 PM. (History)            Assessment /Plan     For exam results, see Encounter Report.    Anatomical narrow angle borderline glaucoma of both eyes  -     Virgie Retina Tomography (HRT) - OU - Both Eyes    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 by proliferative retinopathy without macular edema, with long-term current use of insulin    Nuclear sclerosis of both eyes    Preretinal fibrosis, bilateral    Ocular hypertension, bilateral    Proliferative diabetic retinopathy of both eyes without macular edema associated with type 2 diabetes mellitus    Hypertensive retinopathy, bilateral          1. OHT vs Pre-perimetric POAG OU (angles  Narrowing over time)  Followed at [facility] since '04   First seen by [facility] '08   Never on gtts   VF defects 2/2 PRP     Family history none   Glaucoma meds None   H/O adverse rxn to glaucoma drops none   LASERS Mulitple PRP OU // PI OD 2/9/2017 /// PI os 1/26/2017   GLAUCOMA SURGERIES None   OTHER EYE SURGERIES none   CDR 0.3/0.35   Tbase 15-23/15-25   Tmax 25 OU   Ttarget ??   HVF 8 tests: 2008 to 2019 - SAD/IAD OU 2/2 PRP OU   Gonio +1-3  with steep approach OU 2018  (doubt occludable)   /575   OCT 5 tests: 2008 to 2019 -  RNFL nl od // nl os    HRT 7 tests: 2009 to 2020 -MR -  nl od // nl os /// CDR 0.33 od // 0.26 os   Disc photos 2002, 2003, 2005 (slides) // 2008, 2013  (OIS)     - Ttoday  20/19  - Test done today  HRT / gonio / AR/MR/BAT - cataract check     2. Narrow Angles:  +1-3 w/ bowing  s/p PI. Likely will resolve in future once pseudophakic.  Anterior Segment OCT today w/ Open Angles w/ narrow approach OU  Doubt occludable - monitor gonio     3. PDR OU s/p PDR OU -stable exam on last eval w/ Retina on  "7/25/13.   S/p avastin x 4 od - last 1/4/2016     4. Hx of VH OD -stable. Last seen by retina - arend 1/4/2016    5. Cataract OU -   Vis sign - OS > OD    Ok for CE per retina     6. Hx of Transient Visual Obscurations OS - ? BP elevation. Seen w/ stable Retinal exam on 9/13/12.     7. Had a gastric "sleve" done 12/18/2015 - for weight loss    So far doing well    On less insulin now     Plan:  Stable IOP (thick k's), and HRT wnl today   Cont no gtts  Continue to work on blood sugars  oht vs glaucoma    RTC:  Angles continue narrow but open     S/P PI  os 1/26/2017 -   S/P PI od - 2/9/2017 -     Remove cataract  Prn - BATh 20/100 - pt c/o blurry vision OS > OD   rec phaco/IOL os first - if does well consider OD   + trypan / dilates ok      Poor multifocal IOL candidate - 2/2 PDR   Suggest monofocal IOL - set for distance and correct with glasses over that   Will likely need glasses for distance and near vision     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 Mckeon     F/U pre-op os // IOL calc ect     I have seen and personally examined the patient.  I agree with the findings, assessment and plan of the resident and/or fellow.     Alma Goodrich MD  "

## 2020-08-21 ENCOUNTER — PATIENT OUTREACH (OUTPATIENT)
Dept: ADMINISTRATIVE | Facility: OTHER | Age: 65
End: 2020-08-21

## 2020-08-21 NOTE — PROGRESS NOTES
LINKS immunization registry updated  Care Everywhere updated  Health Maintenance updated  Chart reviewed for overdue Proactive [facility] Encounters (KATIE) health maintenance testing (CRS, Breast Ca, Diabetic Eye Exam)   Orders entered:N/A  Patient has open case request for colonoscopy.

## 2020-08-24 ENCOUNTER — TELEPHONE (OUTPATIENT)
Dept: ENDOSCOPY | Facility: HOSPITAL | Age: 65
End: 2020-08-24

## 2020-08-24 ENCOUNTER — OFFICE VISIT (OUTPATIENT)
Dept: GASTROENTEROLOGY | Facility: CLINIC | Age: 65
End: 2020-08-24
Payer: COMMERCIAL

## 2020-08-24 ENCOUNTER — OFFICE VISIT (OUTPATIENT)
Dept: OPHTHALMOLOGY | Facility: CLINIC | Age: 65
End: 2020-08-24
Payer: COMMERCIAL

## 2020-08-24 VITALS
DIASTOLIC BLOOD PRESSURE: 73 MMHG | HEIGHT: 66 IN | HEART RATE: 75 BPM | BODY MASS INDEX: 39.72 KG/M2 | WEIGHT: 247.13 LBS | SYSTOLIC BLOOD PRESSURE: 119 MMHG

## 2020-08-24 DIAGNOSIS — H35.033 HYPERTENSIVE RETINOPATHY, BILATERAL: ICD-10-CM

## 2020-08-24 DIAGNOSIS — K21.9 GASTROESOPHAGEAL REFLUX DISEASE, ESOPHAGITIS PRESENCE NOT SPECIFIED: Primary | ICD-10-CM

## 2020-08-24 DIAGNOSIS — H40.033 ANATOMICAL NARROW ANGLE BORDERLINE GLAUCOMA OF BOTH EYES: Primary | ICD-10-CM

## 2020-08-24 DIAGNOSIS — Z12.11 SCREENING FOR MALIGNANT NEOPLASM OF COLON: ICD-10-CM

## 2020-08-24 DIAGNOSIS — H25.13 NUCLEAR SCLEROSIS OF BOTH EYES: ICD-10-CM

## 2020-08-24 DIAGNOSIS — Z12.11 SPECIAL SCREENING FOR MALIGNANT NEOPLASMS, COLON: Primary | ICD-10-CM

## 2020-08-24 DIAGNOSIS — H40.053 OCULAR HYPERTENSION, BILATERAL: ICD-10-CM

## 2020-08-24 DIAGNOSIS — Z79.4 TYPE 2 DIABETES MELLITUS WITH BOTH EYES AFFECTED BY PROLIFERATIVE RETINOPATHY WITHOUT MACULAR EDEMA, WITH LONG-TERM CURRENT USE OF INSULIN: ICD-10-CM

## 2020-08-24 DIAGNOSIS — E11.3593 TYPE 2 DIABETES MELLITUS WITH BOTH EYES AFFECTED BY PROLIFERATIVE RETINOPATHY WITHOUT MACULAR EDEMA, WITH LONG-TERM CURRENT USE OF INSULIN: ICD-10-CM

## 2020-08-24 DIAGNOSIS — E11.3593 PROLIFERATIVE DIABETIC RETINOPATHY OF BOTH EYES WITHOUT MACULAR EDEMA ASSOCIATED WITH TYPE 2 DIABETES MELLITUS: ICD-10-CM

## 2020-08-24 DIAGNOSIS — Z01.818 PRE-OP TESTING: Primary | ICD-10-CM

## 2020-08-24 DIAGNOSIS — R12 PYROSIS: ICD-10-CM

## 2020-08-24 DIAGNOSIS — H35.373 PRERETINAL FIBROSIS, BILATERAL: ICD-10-CM

## 2020-08-24 PROCEDURE — 99999 PR PBB SHADOW E&M-EST. PATIENT-LVL V: CPT | Mod: PBBFAC,,, | Performed by: FAMILY MEDICINE

## 2020-08-24 PROCEDURE — 3078F DIAST BP <80 MM HG: CPT | Mod: CPTII,S$GLB,, | Performed by: FAMILY MEDICINE

## 2020-08-24 PROCEDURE — 3078F PR MOST RECENT DIASTOLIC BLOOD PRESSURE < 80 MM HG: ICD-10-PCS | Mod: CPTII,S$GLB,, | Performed by: FAMILY MEDICINE

## 2020-08-24 PROCEDURE — 3008F PR BODY MASS INDEX (BMI) DOCUMENTED: ICD-10-PCS | Mod: CPTII,S$GLB,, | Performed by: FAMILY MEDICINE

## 2020-08-24 PROCEDURE — 99999 PR PBB SHADOW E&M-EST. PATIENT-LVL III: CPT | Mod: PBBFAC,,, | Performed by: OPHTHALMOLOGY

## 2020-08-24 PROCEDURE — 99999 PR PBB SHADOW E&M-EST. PATIENT-LVL III: ICD-10-PCS | Mod: PBBFAC,,, | Performed by: OPHTHALMOLOGY

## 2020-08-24 PROCEDURE — 3074F PR MOST RECENT SYSTOLIC BLOOD PRESSURE < 130 MM HG: ICD-10-PCS | Mod: CPTII,S$GLB,, | Performed by: FAMILY MEDICINE

## 2020-08-24 PROCEDURE — 99999 PR PBB SHADOW E&M-EST. PATIENT-LVL V: ICD-10-PCS | Mod: PBBFAC,,, | Performed by: FAMILY MEDICINE

## 2020-08-24 PROCEDURE — 3008F BODY MASS INDEX DOCD: CPT | Mod: CPTII,S$GLB,, | Performed by: FAMILY MEDICINE

## 2020-08-24 PROCEDURE — 99204 OFFICE O/P NEW MOD 45 MIN: CPT | Mod: S$GLB,,, | Performed by: FAMILY MEDICINE

## 2020-08-24 PROCEDURE — 92133 HEIDELBERG RETINA TOMOGRAPHY (HRT) - OU - BOTH EYES: ICD-10-PCS | Mod: S$GLB,,, | Performed by: OPHTHALMOLOGY

## 2020-08-24 PROCEDURE — 3074F SYST BP LT 130 MM HG: CPT | Mod: CPTII,S$GLB,, | Performed by: FAMILY MEDICINE

## 2020-08-24 PROCEDURE — 92012 INTRM OPH EXAM EST PATIENT: CPT | Mod: S$GLB,,, | Performed by: OPHTHALMOLOGY

## 2020-08-24 PROCEDURE — 92012 PR EYE EXAM, EST PATIENT,INTERMED: ICD-10-PCS | Mod: S$GLB,,, | Performed by: OPHTHALMOLOGY

## 2020-08-24 PROCEDURE — 99204 PR OFFICE/OUTPT VISIT, NEW, LEVL IV, 45-59 MIN: ICD-10-PCS | Mod: S$GLB,,, | Performed by: FAMILY MEDICINE

## 2020-08-24 PROCEDURE — 92133 CPTRZD OPH DX IMG PST SGM ON: CPT | Mod: S$GLB,,, | Performed by: OPHTHALMOLOGY

## 2020-08-24 RX ORDER — POLYETHYLENE GLYCOL 3350, SODIUM SULFATE ANHYDROUS, SODIUM BICARBONATE, SODIUM CHLORIDE, POTASSIUM CHLORIDE 236; 22.74; 6.74; 5.86; 2.97 G/4L; G/4L; G/4L; G/4L; G/4L
4 POWDER, FOR SOLUTION ORAL ONCE
Qty: 4000 ML | Refills: 0 | Status: SHIPPED | OUTPATIENT
Start: 2020-08-24 | End: 2020-08-24

## 2020-08-24 RX ORDER — FAMOTIDINE 20 MG/1
20 TABLET, FILM COATED ORAL 2 TIMES DAILY PRN
Qty: 60 TABLET | Refills: 2 | Status: SHIPPED | OUTPATIENT
Start: 2020-08-24 | End: 2022-02-07

## 2020-08-24 RX ORDER — ESOMEPRAZOLE MAGNESIUM 20 MG/1
20 GRANULE, DELAYED RELEASE ORAL
Qty: 600 MG | Refills: 3 | Status: SHIPPED | OUTPATIENT
Start: 2020-08-24 | End: 2020-11-04

## 2020-08-24 NOTE — LETTER
August 24, 2020      Reggie Glover MD  [address]  [facility] [address]           [facility] [address] [address]  [address]  [facility] [address]  Phone: [phone]  Fax: [phone]          Patient: Aaliyah Angela   MR Number: [id number]   YOB: 1955   Date of Visit: 8/24/2020       Dear Dr. Reggie Glover:    Thank you for referring Aaliyah Angela to me for evaluation. Attached you will find relevant portions of my assessment and plan of care.    If you have questions, please do not hesitate to call me. I look forward to following Aaliyah Angela along with you.    Sincerely,    Marguerite Grimm, DNP    Enclosure  CC:  No Recipients    If you would like to receive this communication electronically, please contact [email or web address] or [phone] to request more information on [employer] Link access.    For providers and/or their staff who would like to refer a patient to [facility], please contact us through our one-stop-shop provider referral line, [facility], at [phone].    If you feel you have received this communication in error or would no longer like to receive these types of communications, please e-mail [email or web address]

## 2020-08-24 NOTE — PROGRESS NOTES
[facility] Consultation Note    Reason for Consult:  The primary encounter diagnosis was Gastroesophageal reflux disease, esophagitis presence not specified. Diagnoses of Pyrosis and Screening for malignant neoplasm of colon were also pertinent to this visit.    PCP:   Reggie Glover   [address] / [place] [address]    Referring MD:  Reggie Glover Md  [address] [place] [address]    HPI:  This is a 64 y.o. female here for evaluation of GERD. New patient as she has not been seen since 12/2/2015 by Dr Ramsey - visit was also for GERD. Experiences reflux symptoms 3-4 times per week. Improved slightly after weight loss surgery but has since worsened.    Gastric Sleeve sx: 12/2015 at [facility]  Current reflux regimen: Nexium, every other day, usually after dinner  Precipitating factors: certain foods, eating and laying down  Symptoms: +pyrosis, +gagging/regurgitation, +dysphagia  Meds tried:   Zantac (stopped b/c of recall)  Omeprazole (takes occasionally in place of nexium)  Bowel habits - alternating; sometimes requires enemas, sometimes has diarrhea; (always constipated prior to weight loss surgery)  Rectal Bleeding/Melena- no  NSAIDs - stopped NSAIDs ~1 year ago;  Fam hx - no CRC, no family member w/colon polyps      ROS:  Constitutional: No fevers, chills, + weight loss (intentional)  ENT: No allergies  CV: No chest pain  Pulm: No cough, No shortness of breath  Ophtho: No vision changes  GI: see HPI  Derm: No rash  Heme: No lymphadenopathy, No bruising  MSK: No arthritis  : No dysuria, No hematuria  Endo: No hot or cold intolerance  Neuro: No syncope, No seizure  Psych: No anxiety, No depression    Medical History:  has a past medical history of Allergy, Anatomical narrow angle borderline glaucoma, AR (allergic rhinitis), Arthritis, CAD (coronary artery disease) (4/22/2013), Cataract, Diabetes mellitus type II, Diabetic retinopathy, Epiretinal  membrane, both eyes, Hyperlipidemia, Hypertensive retinopathy of both eyes, Lumbar disc disease, Migraines, Morbid obesity (10/3/2012), BRADFORD (obstructive sleep apnea) (2015), Proliferative diabetic retinopathy(362.02), PVD (peripheral vascular disease) (2013), Rupture of right Achilles tendon, Unspecified essential hypertension, Unspecified vitamin D deficiency, and Vitreous hemorrhage of left eye.    Surgical History:  has a past surgical history that includes Tubal ligation; Panretinal photocoagulation;  section; Gastric bypass; LASER PERIPHERAL IRIDOTOMY (Bilateral, 2017 and 2017); Carpal tunnel release (Right, 2020); and Carpal tunnel release (Right, 2020).    Family History: family history includes Cancer in her brother; Diabetes in her brother, brother, brother, brother, father, mother, sister, sister, sister, and sister; Hypertension in her father and mother; Prostate cancer in her brother; Stroke in her brother and father..     Social History:  reports that she has never smoked. She has never used smokeless tobacco. She reports that she does not drink alcohol or use drugs.    Review of patient's allergies indicates:  No Known Allergies    Current Outpatient Medications on File Prior to Visit   Medication Sig Dispense Refill    acetaminophen-codeine 300-30mg (TYLENOL #3) 300-30 mg Tab Take 1 tablet by mouth every 6 (six) hours as needed. 25 tablet 0    amitriptyline (ELAVIL) 25 MG tablet Take 2 tablets (50 mg total) by mouth every evening. 60 tablet 11    amLODIPine (NORVASC) 10 MG tablet TAKE 1 TABLET BY MOUTH EVERY DAY 90 tablet 1    aspirin 81 MG Chew Take 81 mg by mouth once daily.        atorvastatin (LIPITOR) 20 MG tablet Take 1 tablet (20 mg total) by mouth once daily. 90 tablet 3    calcium-vitamin D3 500 mg(1,250mg) -200 unit per tablet Take 1 tablet by mouth once daily.      dulaglutide (TRULICITY) 1.5 mg/0.5 mL PnIj Inject 1.5 mg weekly. 4 Syringe 1     "empagliflozin (JARDIANCE) 25 mg Tab Take 25 mg by mouth once daily. 90 tablet 2    insulin aspart U-100 (NOVOLOG FLEXPEN U-100 INSULIN) 100 unit/mL (3 mL) InPn pen Inject as needed, scale 150-200+2, 201-250+4, 251-300+6, 301-350+8, >350+10. Max daily 30 units. novocare voucher free boxes(2) 2 Box 1    JARDIANCE 10 mg tablet TAKE 1 TABLET BY MOUTH EVERY DAY 90 tablet 2    losartan (COZAAR) 100 MG tablet Take 1 tablet (100 mg total) by mouth once daily. 90 tablet 3    metFORMIN (GLUCOPHAGE-XR) 500 MG XR 24hr tablet TAKE 2 TABLETS(1000 MG) BY MOUTH TWICE DAILY WITH MEALS 360 tablet 3    metoprolol succinate (TOPROL-XL) 50 MG 24 hr tablet TAKE 1 TABLET BY MOUTH EVERY DAY 90 tablet 3    multivitamin (ONE DAILY MULTIVITAMIN) per tablet Take 1 tablet by mouth once daily.      pen needle, diabetic (BD ULTRA-FINE DIEGO PEN NEEDLE) 32 gauge x 5/32" Ndle Uses 3 times a day. 100 each 11    urea 20 % Crea Apply 1 application topically once daily. To dry skin on the heels 75 g 10    blood sugar diagnostic (BLOOD GLUCOSE TEST) Strp Inject 1 each into the skin 2 (two) times daily. (Patient not taking: Reported on 8/24/2020) 200 strip 3    flash glucose sensor (FREESTYLE SAV 14 DAY SENSOR) Kit Inject 2 kits into the skin every 14 (fourteen) days. Place sensor into the skin every 14 days (Patient not taking: Reported on 8/24/2020) 2 kit 6    flash glucose sensor (FREESTYLE SAV SENSOR) Kit 1 Device by Misc.(Non-Drug; Combo Route) route continuous. (Patient not taking: Reported on 8/24/2020) 3 kit 11    [DISCONTINUED] FREESTYLE SAV 14 DAY SENSOR Kit TEST BLOOD SUGAR AS DIRECTED 2 kit 6    [DISCONTINUED] metFORMIN (GLUCOPHAGE-XR) 500 MG 24 hr tablet Take 2 tablets (1,000 mg total) by mouth 2 (two) times daily with meals. 360 tablet 3    [DISCONTINUED] omeprazole (PRILOSEC) 20 MG capsule Take 1 capsule (20 mg total) by mouth once daily. 30 capsule 0     Current Facility-Administered Medications on File Prior to Visit " "  Medication Dose Route Frequency Provider Last Rate Last Dose    0.9%  NaCl infusion   Intravenous Continuous Aristeo Bueno MD             Objective Findings:    Vital Signs Reviewed:  /73   Pulse 75   Ht 5' 6" (1.676 m)   Wt 112.1 kg (247 lb 2.2 oz)   BMI 39.89 kg/m²   Body mass index is 39.89 kg/m².    Physical Exam:  General Appearance: Well appearing in no acute distress. She is overweight  Head:   Normocephalic, without obvious abnormality  Eyes:    No scleral icterus  ENT: Neck supple  Lungs: CTA bilaterally in anterior and posterior fields, no wheezes, no crackles.  Heart:  Regular rate and rhythm, S1, S2 normal, no murmurs heard  Abdomen: Soft, non tender, non distended with positive bowel sounds in all four quadrants. No hepatosplenomegaly, ascites, or mass  Extremities: No edema  Skin: No rash  Neurologic: AAO x 3      Labs Reviewed:  Lab Results   Component Value Date    WBC 5.96 06/24/2020    HGB 11.4 (L) 06/24/2020    HCT 37.9 06/24/2020     06/24/2020    CRP 10.2 (H) 03/12/2016    CHOL 140 06/24/2020    TRIG 100 06/24/2020    HDL 52 06/24/2020    ALT 32 06/24/2020    AST 27 06/24/2020     06/24/2020    K 4.5 06/24/2020     06/24/2020    CREATININE 0.9 06/24/2020    BUN 16 06/24/2020    CO2 29 06/24/2020    TSH 1.067 01/04/2020    HGBA1C 7.8 (H) 06/24/2020       Imaging reviewed:  7/1/2011 CT Abd Pelvis W WO contrast for Microscopic hematuria  1. No abnormality identified to explain this finding, specifically no evidence for nephrolithiasis or solid renal mass  2. No evidence for bowel obstruction or inflammation    Endoscopy reviewed:  3/12/2010 screening colonoscopy with Dr. Watson  - Rayray prep, reached cecum  - Normal exam  - Recommend repeat in 7 years    Reports EGD performed at time of Gastric sleeve surgery, normal per patient    Assessment:  1. Gastroesophageal reflux disease, esophagitis presence not specified    2. Pyrosis    3. Screening for malignant " neoplasm of colon         64yoF for evaluation of recurrent GERD. Pyrosis, regurg, dysphagia. Improved on PPI and with gastric sleeve surgery but has worsened since then. Currently on nexium, we re-educated on how to take this in AM without food. Also add on famotidine PRN breakthrough reflux. Due for screening colon, will obtain EGD at same time for recurrent symptoms. Basic labs are UTD for anesthesia.    Recommendations:  1. Nexium once daily in AM, stop omeprazole  2. Famotidine PRN breakthrough reflux  3. Screening colon and EGD    F/U Pending scopes.      Order summary:  Orders Placed This Encounter    esomeprazole (NEXIUM) 20 mg GrPS    famotidine (PEPCID) 20 MG tablet    Case request GI: EGD (ESOPHAGOGASTRODUODENOSCOPY), COLONOSCOPY         Thank you so much for allowing me to participate in the care of Aaliyah Angela    TOBIAS Santana

## 2020-08-24 NOTE — PATIENT INSTRUCTIONS
Start Nexium once daily, in the morning before breakfast.  Can take Famotidine (pepcid) as needed for breakthrough reflux, especially at night.      Http://www.refluxcookbook.com/  Dropping Acid The Reflux Diet Cookbook and Cure -  Silviano Damon M.D.    GERD  Worst Foods for Acid Reflux  Chocolate (milk chocolate worse than dark chocolate)  Soda (all carbonated beverages)  Alcohol (beer, liquor, wine)  Fried foods  Johansen, sausage, ribs  Cream sauce  Fatty meats (beef)  Butter, margarine, lard, shortening  Coffee, tea  Mint   High fat nuts  Hot sauces and pepper  Citrus fruit/juices      Acidic foods (pH - 1 is MORE acidic, 5 is LESS acidic)     Do not eat or drink these (lower numbers are worse)    Induction diet - For 2 weeks eat nothing below pH 5     Lemon juice 2.3  Grape cranberry juice 2.5  Stomach Acid 2.5  Gelatin Dessert 2.6  Lemon/lime 2.9/2.7  Vinegar 2.9  Gatorade 3.0  Fruits - plums, apricots, strawberries, cherries 3.0  Vitamin C (ascorbic acid) 3.0  Iced tea, Snapple 3.1  Mustard 3.2  Soft drinks 3.3  Nectarines 3.3  Pomegranate 3.3  Applesauce 3.4  Grapefruit 3.4  Kiwi 3.4  Barbecue sauce 3.4  Caesar dressing 3.5  Thousand island dressing 3.6  Strawberries 3.5  Pineapple juice 3.5  Beer 3.5  Wine 3.5  Grape 3.6  Apples 3.6  Pineapple 3.7  Pickle 3.7  Blackberries, blueberries 3.7  Jamison 3.7  Orange 3.8  Cherries 3.9  Red Bull 3.9  Tomatoes 4.2  Coffee 5.1      These are Safe foods:  Agave  Aloe Vera  Apple (only red)  Bagels  Banana (worsens reflux in 1%)  Beans - black, red, lima, lentils  Bread - whole grain, rye  Caramel  Celery  Chamomile tea  Chicken - skinless, never fried  Chicken stock or bouillon  Coffee - one cup/day with milk  Fennel  Fish  Ginger  Green vegetables (no green peppers)  Herbs  Honey  Melon  Milk - skin, soy, or Lactaid skim milk  Mushrooms  Oatmeal  Olive oil  Parsley  Pasta  Pears  Popcorn  Potatoes  Red bell peppers  Rice  Soups  Tofu  Turkey Breast  Turnip  Vegetables -  no onion, tomatoes, peppers  Vinaigrette  Water - non carbonated  Whole grain breads, crackers, breakfast cereals      Best Foods for Acid Reflux  Whole grain breads  Oatmeal  Aloe Vera  Salad (no tomatoes, onions, cheese, or high fat dressing)  Banana  Melon  Fennel  Chicken and turkey (skinless, never fried)  Fish/seafood (never fried)  Celery  Parsley  Couscous and Rice    Maybe bad foods (Everyone is unique)  Tomatoes  Garlic  Onion  Nuts (macadamia nuts)  Apples (especially green)  Cucumber  Green peppers  Spicy food  Some herbal teas    GERD tips  Change what you eat:  Eat smaller meals  Eat slowly and chew thoroughly until food is almost liquid  Cut down on junk carbohydrates such as sugar and white flour  Use herbs in your cooking  Eat more raw foods (more than 10 ingredients is not a raw food)  Avoid trans fats and partially hydrogenated oils  Eat more fish and switch to grass fed beef  Switch your cooking oil to macadamia nut or olive oil  Watch extremes of salt intake (too high or too low is bad)    If just cutting out acidic foods is not enough, change how you eat:  Large breakfast, medium lunch, light dinner  Dont mix fruit juices, sweet fruits, and refined starches with meats and heavy food  Dont wash your food down with a lot of liquid      Change these habits:  Stop smoking  Eat dinner earlier (3-4 hours before lying down to sleep)  Elevate the head of your bed 6 inches (blocks under the head of the bed are better than pillows) OR [employer] sells a special OneSun Reflux relief system  That may be purchased online for a comfortable, individual solution for raising the head of the bed.  Exercise (but wait 2 hours after eating)  Drink more water (between meals)    Take these supplements:  Multi vitamin  Probiotic  Fish oil    Most common food allergens: milk, eggs, peanuts, tree nuts, fish, shellfish, wheat, and soy    All natural immediate relief:  Chew 2-3 soft probiotic capsules - Dr. Chapman's  Probiotics 12 Plus  Chew chewable DGL licorice tablet  Chew papaya tablet with high protein meal - [employer]  Drink 2 ounces of aloe vera juice  Swedish bitters  Prelief- reduce the acid in food to keep it form burning sensitive tissue  Iberogast  [place]  Drink Chamomile Tea  Teaspoon of baking soda in water  Spoonful of vinegar in water      All natural ulcer healers:  Zinc carnosine - 75.5 mg with food twice a day x 8 weeks   Sonido Esteban - $8 for 60 pills  DGL (deglycyrrhizinated licorice) - 2 tablets before meals. Heals stomach lining   Natural Factors brand, Enzymatic therapy brand.  Aloe Vera juice  - 2 to 8 ounces a day   [place] or [place]

## 2020-08-25 ENCOUNTER — PATIENT OUTREACH (OUTPATIENT)
Dept: OTHER | Facility: OTHER | Age: 65
End: 2020-08-25

## 2020-09-02 ENCOUNTER — TELEPHONE (OUTPATIENT)
Dept: OPHTHALMOLOGY | Facility: CLINIC | Age: 65
End: 2020-09-02

## 2020-09-02 ENCOUNTER — TELEPHONE (OUTPATIENT)
Dept: INTERNAL MEDICINE | Facility: CLINIC | Age: 65
End: 2020-09-02

## 2020-09-02 ENCOUNTER — PATIENT MESSAGE (OUTPATIENT)
Dept: OTHER | Facility: OTHER | Age: 65
End: 2020-09-02

## 2020-09-02 DIAGNOSIS — H25.12 NUCLEAR SCLEROTIC CATARACT OF LEFT EYE: Primary | ICD-10-CM

## 2020-09-02 DIAGNOSIS — Z13.9 SCREENING PROCEDURE: ICD-10-CM

## 2020-09-02 DIAGNOSIS — I10 HTN (HYPERTENSION), BENIGN: Primary | ICD-10-CM

## 2020-09-02 NOTE — TELEPHONE ENCOUNTER
----- Message from Prieto Thrasher sent at 9/2/2020  1:22 PM CDT -----  Ms. Angela is scheduled for cataract surgery with Dr. Jacinto on Wednesday 10/28/2020.  Pt will need medical clearance for this procedure.  Surgery is scheduled with local/MAC anesthesia.  Pt can be reached at: [phone]  Please call if you have any questions.    Thanks,   Prieto Thrasher  Surgery Coordinator  Ophthalmology  [facility] [[id number]]  [phone]

## 2020-09-03 ENCOUNTER — TELEPHONE (OUTPATIENT)
Dept: INTERNAL MEDICINE | Facility: CLINIC | Age: 65
End: 2020-09-03

## 2020-09-08 ENCOUNTER — TELEPHONE (OUTPATIENT)
Dept: INTERNAL MEDICINE | Facility: CLINIC | Age: 65
End: 2020-09-08

## 2020-09-08 NOTE — TELEPHONE ENCOUNTER
----- Message from Jalyn Kirkland sent at 9/8/2020  2:00 PM CDT -----  Contact: Pt [phone] work  Patient is returning a phone call.  Who left a message for the patient:   Does patient know what this is regarding:  Pre-op appt for cataract surgery on 10/28  Comments: Patient isn't always able to answer her phone. If no answer please make appt for any Friday at the latest time.

## 2020-09-26 ENCOUNTER — LAB VISIT (OUTPATIENT)
Dept: LAB | Facility: HOSPITAL | Age: 65
End: 2020-09-26
Attending: NURSE PRACTITIONER
Payer: COMMERCIAL

## 2020-09-26 DIAGNOSIS — E11.42 DIABETIC POLYNEUROPATHY ASSOCIATED WITH TYPE 2 DIABETES MELLITUS: ICD-10-CM

## 2020-09-26 DIAGNOSIS — E11.3593 TYPE 2 DIABETES MELLITUS WITH BOTH EYES AFFECTED BY PROLIFERATIVE RETINOPATHY WITHOUT MACULAR EDEMA, WITH LONG-TERM CURRENT USE OF INSULIN: ICD-10-CM

## 2020-09-26 DIAGNOSIS — I10 HTN (HYPERTENSION), BENIGN: ICD-10-CM

## 2020-09-26 DIAGNOSIS — Z79.4 TYPE 2 DIABETES MELLITUS WITH BOTH EYES AFFECTED BY PROLIFERATIVE RETINOPATHY WITHOUT MACULAR EDEMA, WITH LONG-TERM CURRENT USE OF INSULIN: ICD-10-CM

## 2020-09-26 LAB
ALBUMIN/CREAT UR: 10.3 UG/MG (ref 0–30)
CREAT UR-MCNC: 116 MG/DL (ref 15–325)
MICROALBUMIN UR DL<=1MG/L-MCNC: 12 UG/ML

## 2020-09-26 PROCEDURE — 82043 UR ALBUMIN QUANTITATIVE: CPT

## 2020-09-28 ENCOUNTER — OFFICE VISIT (OUTPATIENT)
Dept: INTERNAL MEDICINE | Facility: CLINIC | Age: 65
End: 2020-09-28
Payer: COMMERCIAL

## 2020-09-28 ENCOUNTER — IMMUNIZATION (OUTPATIENT)
Dept: INTERNAL MEDICINE | Facility: CLINIC | Age: 65
End: 2020-09-28
Payer: COMMERCIAL

## 2020-09-28 ENCOUNTER — TELEPHONE (OUTPATIENT)
Dept: INTERNAL MEDICINE | Facility: CLINIC | Age: 65
End: 2020-09-28

## 2020-09-28 VITALS
WEIGHT: 250.69 LBS | OXYGEN SATURATION: 99 % | DIASTOLIC BLOOD PRESSURE: 68 MMHG | SYSTOLIC BLOOD PRESSURE: 130 MMHG | BODY MASS INDEX: 40.46 KG/M2 | HEART RATE: 61 BPM

## 2020-09-28 DIAGNOSIS — H25.13 NUCLEAR SCLEROSIS OF BOTH EYES: ICD-10-CM

## 2020-09-28 DIAGNOSIS — E11.42 DIABETIC POLYNEUROPATHY ASSOCIATED WITH TYPE 2 DIABETES MELLITUS: ICD-10-CM

## 2020-09-28 DIAGNOSIS — G47.33 OSA ON CPAP: ICD-10-CM

## 2020-09-28 DIAGNOSIS — E66.01 CLASS 3 SEVERE OBESITY DUE TO EXCESS CALORIES WITH SERIOUS COMORBIDITY AND BODY MASS INDEX (BMI) OF 40.0 TO 44.9 IN ADULT: ICD-10-CM

## 2020-09-28 DIAGNOSIS — G44.221 CHRONIC TENSION-TYPE HEADACHE, INTRACTABLE: ICD-10-CM

## 2020-09-28 DIAGNOSIS — Z79.4 TYPE 2 DIABETES MELLITUS WITH BOTH EYES AFFECTED BY PROLIFERATIVE RETINOPATHY WITHOUT MACULAR EDEMA, WITH LONG-TERM CURRENT USE OF INSULIN: Primary | ICD-10-CM

## 2020-09-28 DIAGNOSIS — I73.9 PVD (PERIPHERAL VASCULAR DISEASE): ICD-10-CM

## 2020-09-28 DIAGNOSIS — E78.5 HYPERLIPIDEMIA, UNSPECIFIED HYPERLIPIDEMIA TYPE: ICD-10-CM

## 2020-09-28 DIAGNOSIS — I10 HTN (HYPERTENSION), BENIGN: ICD-10-CM

## 2020-09-28 DIAGNOSIS — E11.3593 TYPE 2 DIABETES MELLITUS WITH BOTH EYES AFFECTED BY PROLIFERATIVE RETINOPATHY WITHOUT MACULAR EDEMA, WITH LONG-TERM CURRENT USE OF INSULIN: Primary | ICD-10-CM

## 2020-09-28 PROCEDURE — 90471 IMMUNIZATION ADMIN: CPT | Mod: S$GLB,,, | Performed by: NURSE PRACTITIONER

## 2020-09-28 PROCEDURE — 3051F HG A1C>EQUAL 7.0%<8.0%: CPT | Mod: CPTII,S$GLB,, | Performed by: NURSE PRACTITIONER

## 2020-09-28 PROCEDURE — 3008F PR BODY MASS INDEX (BMI) DOCUMENTED: ICD-10-PCS | Mod: CPTII,S$GLB,, | Performed by: NURSE PRACTITIONER

## 2020-09-28 PROCEDURE — 3078F PR MOST RECENT DIASTOLIC BLOOD PRESSURE < 80 MM HG: ICD-10-PCS | Mod: CPTII,S$GLB,, | Performed by: NURSE PRACTITIONER

## 2020-09-28 PROCEDURE — 90686 IIV4 VACC NO PRSV 0.5 ML IM: CPT | Mod: S$GLB,,, | Performed by: NURSE PRACTITIONER

## 2020-09-28 PROCEDURE — 99999 PR PBB SHADOW E&M-EST. PATIENT-LVL IV: CPT | Mod: PBBFAC,,, | Performed by: NURSE PRACTITIONER

## 2020-09-28 PROCEDURE — 99999 PR PBB SHADOW E&M-EST. PATIENT-LVL IV: ICD-10-PCS | Mod: PBBFAC,,, | Performed by: NURSE PRACTITIONER

## 2020-09-28 PROCEDURE — 3075F PR MOST RECENT SYSTOLIC BLOOD PRESS GE 130-139MM HG: ICD-10-PCS | Mod: CPTII,S$GLB,, | Performed by: NURSE PRACTITIONER

## 2020-09-28 PROCEDURE — 3051F PR MOST RECENT HEMOGLOBIN A1C LEVEL 7.0 - < 8.0%: ICD-10-PCS | Mod: CPTII,S$GLB,, | Performed by: NURSE PRACTITIONER

## 2020-09-28 PROCEDURE — 99214 PR OFFICE/OUTPT VISIT, EST, LEVL IV, 30-39 MIN: ICD-10-PCS | Mod: 25,S$GLB,, | Performed by: NURSE PRACTITIONER

## 2020-09-28 PROCEDURE — 3078F DIAST BP <80 MM HG: CPT | Mod: CPTII,S$GLB,, | Performed by: NURSE PRACTITIONER

## 2020-09-28 PROCEDURE — 90686 FLU VACCINE (QUAD) GREATER THAN OR EQUAL TO 3YO PRESERVATIVE FREE IM: ICD-10-PCS | Mod: S$GLB,,, | Performed by: NURSE PRACTITIONER

## 2020-09-28 PROCEDURE — 90471 FLU VACCINE (QUAD) GREATER THAN OR EQUAL TO 3YO PRESERVATIVE FREE IM: ICD-10-PCS | Mod: S$GLB,,, | Performed by: NURSE PRACTITIONER

## 2020-09-28 PROCEDURE — 3008F BODY MASS INDEX DOCD: CPT | Mod: CPTII,S$GLB,, | Performed by: NURSE PRACTITIONER

## 2020-09-28 PROCEDURE — 3075F SYST BP GE 130 - 139MM HG: CPT | Mod: CPTII,S$GLB,, | Performed by: NURSE PRACTITIONER

## 2020-09-28 PROCEDURE — 99214 OFFICE O/P EST MOD 30 MIN: CPT | Mod: 25,S$GLB,, | Performed by: NURSE PRACTITIONER

## 2020-09-28 RX ORDER — EMPAGLIFLOZIN 25 MG/1
TABLET, FILM COATED ORAL
Qty: 90 TABLET | Refills: 2 | Status: SHIPPED | OUTPATIENT
Start: 2020-09-28 | End: 2021-09-22

## 2020-09-28 NOTE — PATIENT INSTRUCTIONS
Snacks can be an important part of a balanced, healthy meal plan. They allow you to eat more frequently, feeling full and satisfied throughout the day. Also, they allow you to spread carbohydrates evenly, which may stabilize blood sugars.  Plus, snacks are enjoyable!     The amount of carbohydrate needed at snacks varies. Generally, about 15-30 grams of carbohydrate per snack is recommended.  Below you will find some tasty treats.       0-5 gm carb   Crystal Light   Vitamin Water Zero   Herbal tea, unsweetened   2 tsp peanut butter on celery   1./2 cup sugar-free jell-o   1 sugar-free popsicle   ¼ cup blueberries   8oz Blue Brenda unsweetened almond milk   5 baby carrots & celery sticks, cucumbers, bell peppers dipped in ¼ cup salsa, 2Tbsp light ranch dressing or 2Tbsp plain Greek yogurt   10 Goldfish crackers   ½ oz low-fat cheese or string cheese   1 closed handful of nuts, unsalted   1 Tbsp of sunflower seeds, unsalted   1 cup Smart Pop popcorn   1 whole grain brown rice cake        15 gm carb   1 small piece of fruit or ½ banana or 1/2 cup lite canned fruit   3 concepcion cracker squares   3 cups Smart Pop popcorn, top spray butter, Jaramillo lite salt or cinnamon and Truvia   5 Vanilla Wafers   ½ cup low fat, no added sugar ice cream or frozen yogurt (Blue bell, Blue Bunny, Weight Watchers, Skinny Cow)   ½ turkey, ham, or chicken sandwich   ½ c fruit with ½ c Cottage cheese   4-6 unsalted wheat crackers with 1 oz low fat cheese or 1 tbsp peanut butter    30-45 goldfish crackers (depending on flavor)    7-8 Buddhist mini brown rice cakes (caramel, apple cinnamon, chocolate)    12 Buddhist mini brown rice cakes (cheddar, bbq, ranch)    1/3 cup hummus dip with raw veg   1/2 whole wheat love, 1Tbsp hummus   Mini Pizza (1/2 whole wheat English muffin, low-fat  cheese, tomato sauce)   100 calorie snack pack (Oreo, Chips Ahoy, Ritz Mix, Baked Cheetos)   4-6 oz. light or Greek Style yogurt  (Riki, Andre, Barb, [facility])   ½ cup sugar-free pudding     6 in. wheat tortilla or loev oven toasted chips (topped with spray butter flavoring, cinnamon, Truvia OR spray butter, garlic powder, chili powder)    18 BBQ Popchips (available at Target, Whole Foods, Fresh Market)                   Managing Diabetes: The A1C Test       Healthy red blood cells have some glucose stuck to them. A high A1C means that unhealthy amounts of glucose are stuck to the cells.   What is the A1C test?  Using your meter helps you track your blood sugar every day. But your glucose meter tells you the value at the time of testing only. You also need to know if your treatment plan is keeping you healthy over time. The hemoglobin A1C (or glycated hemoglobin) test can help. This test measures your average blood sugar level over a few months. A higher A1C result means that you have a higher risk of developing complications.  The A1C test  The A1C is a blood test done by your healthcare provider. You will likely have an A1C test every 3 to 6 months.  Your blood glucose goal  A1C has been shown as a percentage. But it can also be shown as a number representing the estimated Average Glucose (eAG). Unlike the A1C percentage, eAG is a number similar to the numbers listed on your daily glucose monitor. Both A1C and eAG measure the amount of glucose stuck to a protein called hemoglobin in red blood cells. Your healthcare provider will help you figure out what your ideal A1C or eAG should be. Your target number will depend on your age, general health, and other factors. If your current number is too high, your treatment plan may need changes, such as different medicines.  Sample results  Most people aim for an A1c lower than 7%. Thats an eAG less than 154 mg/dL. Or, your healthcare provider may want you to aim for an A1C of 6%. Thats an eAG of 126 mg/dL.     Glucose  calculator  Visit [email or web address].org/diapro/glucose_calc for a chart that helps convert your A1C percentages into eAG numbers.   Date Last Reviewed: 6/1/2016 © 2000-2017 [employer]. [address] [place] [address], [place] [address]. All rights reserved. This information is not intended as a substitute for professional medical care. Always follow your healthcare professional's instructions.        Hypoglycemia (Low Blood Sugar)     Fast-acting sugar includes a cup of nonfat milk.     Too little sugar (glucose) in your blood is called hypoglycemia or low blood sugar. Low blood sugar usually means anything lower than 70 mg/dL. Talk with your healthcare provider about your target range and what level is too low for you. Diabetes itself doesnt cause low blood sugar. But some of the treatments for diabetes, such as pills or insulin, may raise your risk for it. Low blood sugar may cause you to pass out or have a seizure. So always treat low blood sugar right away, but don't overeat.  Special note: Always carry a source of fast-acting sugar and a snack in case of hypoglycemia.   What you may notice  If you have low blood sugar, you may have one or more of these symptoms:  · Shakiness or dizziness  · Cold, clammy skin or sweating  · Feelings of hunger  · Headache  · Nervousness  · A hard, fast heartbeat  · Weakness  · Confusion or irritability  · Blurred vision  · Having nightmares or waking up confused or sweating  · Numbness or tingling in the lips or tongue  What you should do  Here are tips to follow if you have hypoglycemia:   · First check your blood sugar. If it is too low (out of your target range), eat or drink 15 to 20 grams of fast-acting sugar. This may be 3 to 4 glucose tablets, 4 ounces (half a cup) of fruit juice or regular (nondiet) soda, 8 ounces (1 cup) of fat-free milk, or 1 tablespoon of honey. Dont take more than this, or your blood sugar may go too high.  · Wait 15 minutes. Then  recheck your blood sugar if you can.  · If your blood sugar is still too low, repeat the steps above and check your blood sugar again. If your blood sugar still has not returned to your target range, contact your healthcare provider or seek emergency care.  · Once your blood sugar returns to target range, eat a snack or meal.  Preventing low blood sugar  Things you can do include the following:   · If your condition needs a strict treatment plan, eat your meals and snacks at the same times each day. Dont skip meals!  · If your treatment plan lets you change when you eat and what you eat, learn how to change the time and dose of your rapid-acting insulin to match this.   · Ask your healthcare provider if it is safe for you to drink alcohol. Never drink on an empty stomach.  · Take your medicine at the prescribed times.  · Always carry a source of fast-acting sugar and a snack when youre away from home.  Other things to do  Additional tips include the following:  · Carry a medical ID card, a compact USB drive, or wear a medical alert bracelet or necklace. It should say that you have diabetes. It should also say what to do if you pass out or have a seizure.  · Make sure your family, friends, and coworkers know the signs of low blood sugar. Tell them what to do if your blood sugar falls very low and you cant treat yourself.  · Keep a glucagon emergency kit handy. Be sure your family, friends, and coworkers know how and when to use it. Check it regularly and replace the glucagon before it expires.  · Talk with your health care team about other things you can do to prevent low blood sugar.     If you have unexplained hypoglycemia or hypoglycemia several times, call your healthcare provider.   Date Last Reviewed: 5/1/2016  © 1933-7528 [employer]. [address] [place] [address], [place] [address]. All rights reserved. This information is not intended as a substitute for professional medical care. Always follow  your healthcare professional's instructions.        Exercise: Adding Intensity  You have been exercising for 30 minutes most days of the week. Now you can move on to the next stage: increasing the intensity. This means doing your activity in one or more of these ways:  · Longer. Exercise for 30 minutes or more without a break.  · Faster. Hike, run, or skate fast enough to raise your heart rate moderately--as if you had walked fast to catch a bus.  · More often. Do your activity 4 to 6 times a week instead of 1 to 3 times.    Not just gym class  Be creative. You can reach your health and fitness goals in many ways. Try some of these activities:  · Team sports, like basketball or soccer  · Social or recreational activities, like hiking or dancing  · Individual exercise, like cycling, swimming, or skating  · Group fitness classes, like aerobic classes or weight training  Safety first  Whatever activity you choose, think about safety:  · Wear the right safety gear and shoes for your activity.  · Drink plenty of water during and after workouts.  · Wear light-colored clothing if youre out when its dark.  · Make time to warm up before you exercise and cool down after.  · Carry ID (identification) with you if youre out alone. And be sure someone knows where youre going.  · If youre on foot, travel against traffic (except on blind corners). If youre on a bike, go with traffic. Obey the rules of the road.   Tips for sticking with it  · Find a workout partner or sports club. If you know someone is expecting you, youll be less likely to skip your workout.  · Pack a workout bag with everything you need. Then its ready when you are.  · Choose a few different activities so youll stay interested. Make it fun!  What will help you to stick with it?  1.   2.   3.   Date Last Reviewed: 8/13/2015  © 5486-2554 [employer]. [address] [place] [address], [place] [address]. All rights reserved. This information is not  intended as a substitute for professional medical care. Always follow your healthcare professional's instructions.

## 2020-09-28 NOTE — TELEPHONE ENCOUNTER
----- Message from María Elena Landrum sent at 9/28/2020  8:35 AM CDT -----  Pt received flu shot today and is requesting us to print and mail proof of immunization. Thanks so much for your help!

## 2020-09-28 NOTE — PROGRESS NOTES
CHIEF COMPLAINT: Type 2 Diabetes     HPI: Mrs. Aaliyah Angela is a 65 y.o. female who was diagnosed with gestational diabetes age 20s, Type 2 DM age 30s.  Has h/o gastric sleeve 2015.-in [place]   Past Medical History:   Diagnosis Date    Allergy     Anatomical narrow angle borderline glaucoma     AR (allergic rhinitis)     Arthritis     CAD (coronary artery disease) 4/22/2013    Non obstructive, 2012 PET     Cataract     Diabetes mellitus type II     with retinopathy    Diabetic retinopathy     Epiretinal membrane, both eyes     Hyperlipidemia     Hypertensive retinopathy of both eyes     Lumbar disc disease     Migraines     Morbid obesity 10/3/2012    BRADFORD (obstructive sleep apnea) 8/4/2015    Proliferative diabetic retinopathy(362.02)     PVD (peripheral vascular disease) 4/22/2013    Carotid US 1-39% bilat 2012     Rupture of right Achilles tendon     Unspecified essential hypertension     Unspecified vitamin D deficiency     Vitreous hemorrhage of left eye        Last seen by me in x 4 mos ago  Uses [employer], does not have reader today  Invitation sent via [employer]  bg this am 83 mg/dl   Works 40 hours, walking 2 x a week      Current meds: trulicity 1.5 mg weekly, metformin 1000 mg bid, jardiance 25 mg daily, novolog correction scale 150-200+2, etc  -self adjustment per correction scale    BG 83-mostly under 150 in am  No hypoglycemia  Highest 280 mg/dl    Hypoglycemia---r/t gaps of not eating     Lab Results   Component Value Date    HGBA1C 7.7 (H) 09/26/2020     Diabetes Management Status    Statin: Taking  ACE/ARB: Taking    Screening or Prevention Patient's value Goal Complete/Controlled?   HgA1C Testing and Control   Lab Results   Component Value Date    HGBA1C 7.7 (H) 09/26/2020      Annually/Less than 8% No   Lipid profile : 06/24/2020 Annually No   LDL control Lab Results   Component Value Date    LDLCALC 68.0 06/24/2020    Annually/Less than 100 mg/dl  No   Nephropathy screening Lab  Results   Component Value Date    LABMICR 12.0 09/26/2020     Lab Results   Component Value Date    PROTEINUA Negative 06/24/2020    Annually Yes   Blood pressure BP Readings from Last 1 Encounters:   09/28/20 130/68    Less than 140/90 No   Dilated retinal exam : 08/24/2020 Annually Yes   Foot exam   : 02/07/2020 Annually Yes     REVIEW OF SYSTEMS  General: no weakness, fatigue, + weight changes 4# (gain) in the past 4 mos   Eyes: no double or blurred vision, eye pain, or redness; Last Eye Exam 8/24/20 cataract sx-pending sx , retinal/glaucoma specialist  Cardiovascular:  No chest pain, palpitations, +trace edema-BLE, or murmurs.   Respiratory: no cough or dyspnea.   GI: + heartburn, nausea, or changes in bowel patterns; good appetite. +GERD-nexium and pepcid, off zantac  Skin: no rashes, dryness, itching, or reactions at insulin injection sites.  Neuro: + numbness, tingling-gabapentin (SEs too much, currently off), tremors, or vertigo. +HAs (chronic-migraines), +parethesia of both hands- procedure per neuro 8/20/19 -f/u 11/22/19  Endocrine: no polyuria, polydipsia, polyphagia, heat or cold intolerance.     Vital Signs  /68 (BP Location: Left arm, Patient Position: Sitting, BP Method: Large (Manual))   Pulse 61   Wt 113.7 kg (250 lb 10.6 oz)   SpO2 99%   BMI 40.46 kg/m²     Hemoglobin A1C   Date Value Ref Range Status   09/26/2020 7.7 (H) 4.0 - 5.6 % Final     Comment:     ADA Screening Guidelines:  5.7-6.4%  Consistent with prediabetes  >or=6.5%  Consistent with diabetes  High levels of fetal hemoglobin interfere with the HbA1C  assay. Heterozygous hemoglobin variants (HbS, HgC, etc)do  not significantly interfere with this assay.   However, presence of multiple variants may affect accuracy.     06/24/2020 7.8 (H) 4.0 - 5.6 % Final     Comment:     ADA Screening Guidelines:  5.7-6.4%  Consistent with prediabetes  >or=6.5%  Consistent with diabetes  High levels of fetal hemoglobin interfere with the  HbA1C  assay. Heterozygous hemoglobin variants (HbS, HgC, etc)do  not significantly interfere with this assay.   However, presence of multiple variants may affect accuracy.     12/31/2019 7.5 (H) 4.0 - 5.6 % Final     Comment:     ADA Screening Guidelines:  5.7-6.4%  Consistent with prediabetes  >or=6.5%  Consistent with diabetes  High levels of fetal hemoglobin interfere with the HbA1C  assay. Heterozygous hemoglobin variants (HbS, HgC, etc)do  not significantly interfere with this assay.   However, presence of multiple variants may affect accuracy.          Chemistry        Component Value Date/Time     06/24/2020 1640    K 4.5 06/24/2020 1640     06/24/2020 1640    CO2 29 06/24/2020 1640    BUN 16 06/24/2020 1640    CREATININE 0.9 06/24/2020 1640     (H) 06/24/2020 1640        Component Value Date/Time    CALCIUM 9.1 06/24/2020 1640    ALKPHOS 76 06/24/2020 1640    AST 27 06/24/2020 1640    ALT 32 06/24/2020 1640    BILITOT 0.2 06/24/2020 1640    ESTGFRAFRICA >60.0 06/24/2020 1640    EGFRNONAA >60.0 06/24/2020 1640           Lab Results   Component Value Date    TSH 1.067 01/04/2020      Lab Results   Component Value Date    CHOL 140 06/24/2020    CHOL 115 (L) 01/04/2020    CHOL 136 02/10/2018     Lab Results   Component Value Date    HDL 52 06/24/2020    HDL 53 01/04/2020    HDL 53 02/10/2018     Lab Results   Component Value Date    LDLCALC 68.0 06/24/2020    LDLCALC 50.2 (L) 01/04/2020    LDLCALC 73.6 02/10/2018     Lab Results   Component Value Date    TRIG 100 06/24/2020    TRIG 59 01/04/2020    TRIG 47 02/10/2018     Lab Results   Component Value Date    CHOLHDL 37.1 06/24/2020    CHOLHDL 46.1 01/04/2020    CHOLHDL 39.0 02/10/2018       PHYSICAL EXAMINATION  Constitutional: Appears well, no distress  Neck: Supple, trachea midline.   Respiratory: Np wheezes, even and unlabored.  Cardiovascular: deferred  Lymph: no BLE edema  Skin: warm and dry; no injection site reactions, no acanthosis  nigracans observed.  Neuro:patient alert and cooperative, normal affect; steady gait.    Diabetes Foot Exam:   Deferred   2/2020    Assessment/Plan  1. 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 by proliferative retinopathy without macular edema, with long-term current use of insulin  Hemoglobin A1C   2. Diabetic polyneuropathy associated with type 2 diabetes mellitus  Hemoglobin A1C   3. Nuclear sclerosis of both eyes     4. Hyperlipidemia, unspecified hyperlipidemia type     5. HTN (hypertension), benign     6. BRADFORD on CPAP     7. PVD (peripheral vascular disease)     8. Class 3 severe obesity due to excess calories with serious comorbidity and body mass index (BMI) of 40.0 to 44.9 in adult     9. Chronic tension-type headache, intractable       1.-2. F/u in 4 mos w/ me  a1c next time  Consider switch to ozempic in 2021  Make sure to increase jardiance to 25 mg daily  Continue max metformin  novolog prn   a1c goal less than 7%  3. F/u with ophthalmology   4.   Lab Results   Component Value Date    LDLCALC 68.0 06/24/2020     At goal   5. Continue med(s)  6. Not consistent  Has cpap   7. F/u with vascular prn   8. Body mass index is 40.46 kg/m². may increase insulin resistance  Encourage weight loss 5-10% in the next 3-6 mos  9. F/ u with neuro    FOLLOW UP  Follow up in about 4 months (around 1/28/2021).

## 2020-10-02 ENCOUNTER — PATIENT OUTREACH (OUTPATIENT)
Dept: ADMINISTRATIVE | Facility: HOSPITAL | Age: 65
End: 2020-10-02

## 2020-10-02 NOTE — PROGRESS NOTES
Health Maintenance Due   Topic Date Due    Colorectal Cancer Screening  03/12/2017     Patient is scheduled for colonoscopy on 10/8/2020.  Chart review completed.

## 2020-10-05 ENCOUNTER — LAB VISIT (OUTPATIENT)
Dept: PRIMARY CARE CLINIC | Facility: CLINIC | Age: 65
End: 2020-10-05
Payer: COMMERCIAL

## 2020-10-05 DIAGNOSIS — Z01.818 PRE-OP TESTING: ICD-10-CM

## 2020-10-05 LAB — SARS-COV-2 RNA RESP QL NAA+PROBE: NOT DETECTED

## 2020-10-05 PROCEDURE — U0003 INFECTIOUS AGENT DETECTION BY NUCLEIC ACID (DNA OR RNA); SEVERE ACUTE RESPIRATORY SYNDROME CORONAVIRUS 2 (SARS-COV-2) (CORONAVIRUS DISEASE [COVID-19]), AMPLIFIED PROBE TECHNIQUE, MAKING USE OF HIGH THROUGHPUT TECHNOLOGIES AS DESCRIBED BY CMS-2020-01-R: HCPCS

## 2020-10-08 ENCOUNTER — HOSPITAL ENCOUNTER (OUTPATIENT)
Facility: HOSPITAL | Age: 65
Discharge: HOME | End: 2020-10-08
Attending: INTERNAL MEDICINE | Admitting: INTERNAL MEDICINE
Payer: COMMERCIAL

## 2020-10-08 ENCOUNTER — ANESTHESIA (OUTPATIENT)
Dept: ENDOSCOPY | Facility: HOSPITAL | Age: 65
End: 2020-10-08
Payer: COMMERCIAL

## 2020-10-08 ENCOUNTER — ANESTHESIA EVENT (OUTPATIENT)
Dept: ENDOSCOPY | Facility: HOSPITAL | Age: 65
End: 2020-10-08
Payer: COMMERCIAL

## 2020-10-08 VITALS
RESPIRATION RATE: 15 BRPM | WEIGHT: 248 LBS | TEMPERATURE: 98 F | SYSTOLIC BLOOD PRESSURE: 122 MMHG | OXYGEN SATURATION: 99 % | BODY MASS INDEX: 39.86 KG/M2 | HEART RATE: 76 BPM | DIASTOLIC BLOOD PRESSURE: 66 MMHG | HEIGHT: 66 IN

## 2020-10-08 DIAGNOSIS — K21.9 GASTROESOPHAGEAL REFLUX DISEASE, UNSPECIFIED WHETHER ESOPHAGITIS PRESENT: Primary | ICD-10-CM

## 2020-10-08 DIAGNOSIS — K21.9 GERD (GASTROESOPHAGEAL REFLUX DISEASE): ICD-10-CM

## 2020-10-08 LAB — POCT GLUCOSE: 126 MG/DL (ref 70–110)

## 2020-10-08 PROCEDURE — 37000009 HC ANESTHESIA EA ADD 15 MINS: Performed by: INTERNAL MEDICINE

## 2020-10-08 PROCEDURE — E9220 PRA ENDO ANESTHESIA: HCPCS | Mod: ,,, | Performed by: NURSE ANESTHETIST, CERTIFIED REGISTERED

## 2020-10-08 PROCEDURE — 27201012 HC FORCEPS, HOT/COLD, DISP: Performed by: INTERNAL MEDICINE

## 2020-10-08 PROCEDURE — G0121 COLON CA SCRN NOT HI RSK IND: HCPCS | Mod: ,,, | Performed by: INTERNAL MEDICINE

## 2020-10-08 PROCEDURE — E9220 PRA ENDO ANESTHESIA: ICD-10-PCS | Mod: ,,, | Performed by: NURSE ANESTHETIST, CERTIFIED REGISTERED

## 2020-10-08 PROCEDURE — 43239 EGD BIOPSY SINGLE/MULTIPLE: CPT | Performed by: INTERNAL MEDICINE

## 2020-10-08 PROCEDURE — 43239 EGD BIOPSY SINGLE/MULTIPLE: CPT | Mod: 51,,, | Performed by: INTERNAL MEDICINE

## 2020-10-08 PROCEDURE — G0121 COLON CA SCRN NOT HI RSK IND: HCPCS | Performed by: INTERNAL MEDICINE

## 2020-10-08 PROCEDURE — 82962 GLUCOSE BLOOD TEST: CPT | Performed by: INTERNAL MEDICINE

## 2020-10-08 PROCEDURE — 25000003 PHARM REV CODE 250: Performed by: NURSE ANESTHETIST, CERTIFIED REGISTERED

## 2020-10-08 PROCEDURE — G0121 COLON CA SCRN NOT HI RSK IND: ICD-10-PCS | Mod: ,,, | Performed by: INTERNAL MEDICINE

## 2020-10-08 PROCEDURE — 43239 PR EGD, FLEX, W/BIOPSY, SGL/MULTI: ICD-10-PCS | Mod: 51,,, | Performed by: INTERNAL MEDICINE

## 2020-10-08 PROCEDURE — 88305 TISSUE EXAM BY PATHOLOGIST: CPT | Performed by: PATHOLOGY

## 2020-10-08 PROCEDURE — 37000008 HC ANESTHESIA 1ST 15 MINUTES: Performed by: INTERNAL MEDICINE

## 2020-10-08 PROCEDURE — 25000003 PHARM REV CODE 250: Performed by: INTERNAL MEDICINE

## 2020-10-08 PROCEDURE — 88305 TISSUE EXAM BY PATHOLOGIST: CPT | Mod: 26,,, | Performed by: PATHOLOGY

## 2020-10-08 PROCEDURE — 63600175 PHARM REV CODE 636 W HCPCS: Performed by: NURSE ANESTHETIST, CERTIFIED REGISTERED

## 2020-10-08 PROCEDURE — 88305 TISSUE EXAM BY PATHOLOGIST: ICD-10-PCS | Mod: 26,,, | Performed by: PATHOLOGY

## 2020-10-08 RX ORDER — PROPOFOL 10 MG/ML
VIAL (ML) INTRAVENOUS
Status: DISCONTINUED | OUTPATIENT
Start: 2020-10-08 | End: 2020-10-08

## 2020-10-08 RX ORDER — PROPOFOL 10 MG/ML
VIAL (ML) INTRAVENOUS CONTINUOUS PRN
Status: DISCONTINUED | OUTPATIENT
Start: 2020-10-08 | End: 2020-10-08

## 2020-10-08 RX ORDER — SODIUM CHLORIDE 9 MG/ML
INJECTION, SOLUTION INTRAVENOUS CONTINUOUS
Status: DISCONTINUED | OUTPATIENT
Start: 2020-10-08 | End: 2020-10-08 | Stop reason: HOSPADM

## 2020-10-08 RX ORDER — LIDOCAINE HYDROCHLORIDE 20 MG/ML
INJECTION INTRAVENOUS
Status: DISCONTINUED | OUTPATIENT
Start: 2020-10-08 | End: 2020-10-08

## 2020-10-08 RX ADMIN — PROPOFOL 50 MG: 10 INJECTION, EMULSION INTRAVENOUS at 10:10

## 2020-10-08 RX ADMIN — PROPOFOL 125 MCG/KG/MIN: 10 INJECTION, EMULSION INTRAVENOUS at 10:10

## 2020-10-08 RX ADMIN — SODIUM CHLORIDE: 0.9 INJECTION, SOLUTION INTRAVENOUS at 09:10

## 2020-10-08 RX ADMIN — LIDOCAINE HYDROCHLORIDE 100 MG: 20 INJECTION, SOLUTION INTRAVENOUS at 10:10

## 2020-10-08 NOTE — ANESTHESIA PREPROCEDURE EVALUATION
10/08/2020  Aaliyah Angela is a 65 y.o., female.    Past Medical History:   Diagnosis Date    Allergy     Anatomical narrow angle borderline glaucoma     AR (allergic rhinitis)     Arthritis     CAD (coronary artery disease) 2013    Non obstructive,  PET     Cataract     Diabetes mellitus type II     with retinopathy    Diabetic retinopathy     Epiretinal membrane, both eyes     Hyperlipidemia     Hypertensive retinopathy of both eyes     Lumbar disc disease     Migraines     Morbid obesity 10/3/2012    BRADFORD (obstructive sleep apnea) 2015    Proliferative diabetic retinopathy(362.02)     PVD (peripheral vascular disease) 2013    Carotid US 1-39% bilat      Rupture of right Achilles tendon     Unspecified essential hypertension     Unspecified vitamin D deficiency     Vitreous hemorrhage of left eye      Past Surgical History:   Procedure Laterality Date    CARPAL TUNNEL RELEASE Right 2020    Procedure: RELEASE, CARPAL TUNNEL RIGHT;  Surgeon: Tameka Bran MD;  Location[facility];  Service: Orthopedics;  Laterality: Right;    CARPAL TUNNEL RELEASE Right 2020     SECTION      x3    COLONOSCOPY      GASTRIC BYPASS  2015    Sleeve    LASER PERIPHERAL IRIDOTOMY Bilateral 2017 and 2017        PANRETINAL PHOTOCOAGULATION      Both eyes    TUBAL LIGATION         Anesthesia Evaluation    I have reviewed the Patient Summary Reports.    I have reviewed the Nursing Notes. I have reviewed the NPO Status.   I have reviewed the Medications.     Review of Systems  Anesthesia Hx:  No problems with previous Anesthesia    Hematology/Oncology:  Hematology Normal   Oncology Normal     EENT/Dental:EENT/Dental Normal   Cardiovascular:   Hypertension CAD      Pulmonary:   Sleep Apnea    Renal/:  Renal/ Normal     Hepatic/GI:   GERD,  poorly controlled Adequate control today per patient   Musculoskeletal:   Arthritis     Neurological:   Headaches    Endocrine:   Diabetes    Dermatological:  Skin Normal    Psych:  Psychiatric Normal           Physical Exam  General:  Obesity, Well nourished    Airway/Jaw/Neck:  Airway Findings: Mouth Opening: Normal Tongue: Normal  General Airway Assessment: Adult  Mallampati: I  Improves to I with phonation.  TM Distance: Normal, at least 6 cm  Jaw/Neck Findings:  Neck ROM: Normal ROM     Eyes/Ears/Nose:  EYES/EARS/NOSE FINDINGS: Normal   Dental:  Dental Findings: In tact   Chest/Lungs:  Chest/Lungs Findings: Clear to auscultation, Normal Respiratory Rate     Heart/Vascular:  Heart Findings: Rate: Normal  Rhythm: Regular Rhythm  Sounds: Normal  Heart murmur: negative Vascular Findings: Normal    Abdomen:  Abdomen Findings: Normal    Musculoskeletal:  Musculoskeletal Findings: Normal   Skin:  Skin Findings: Normal    Mental Status:  Mental Status Findings: Normal        Anesthesia Plan  Type of Anesthesia, risks & benefits discussed:  Anesthesia Type:  general  Patient's Preference:   Intra-op Monitoring Plan: standard ASA monitors  Intra-op Monitoring Plan Comments:   Post Op Pain Control Plan: multimodal analgesia  Post Op Pain Control Plan Comments:   Induction:   IV  Beta Blocker:  Patient is on a Beta-Blocker and has received one dose within the past 24 hours (No further documentation required).       Informed Consent: Patient understands risks and agrees with Anesthesia plan.  Questions answered. Anesthesia consent signed with patient.  ASA Score: 3     Day of Surgery Review of History & Physical: I have interviewed and examined the patient. I have reviewed the patient's H&P dated:  There are no significant changes.  H&P update referred to the provider.         Ready For Surgery From Anesthesia Perspective.

## 2020-10-08 NOTE — PLAN OF CARE
Plan of care discussed with patient. All questions and concerns addressed and answered. Free of pain or distress. PIV removed without complications. VS stable. Procedure report and discharge instructions gone over and patient aware of restrictions and when to resume medications. Patient in agreement.    Dr. Talley has spoken with patient post-procedure.

## 2020-10-08 NOTE — H&P
Short Stay Endoscopy History and Physical    PCP - Reggie Glover MD  Referring Physician - Marguerite Grimm, DNP  [address] [place] [address]    Procedure - Endoscopy/Colonoscopy  ASA - per anesthesia  Mallampati - per anesthesia  History of Anesthesia problems - no  Family history Anesthesia problems -  no   Plan of anesthesia - General    HPI  65 y.o. female  Reason for procedure: GERD, dyspepsia, screening for colon cancer.       ROS:  Constitutional: No fevers, chills, No weight loss  CV: No chest pain  Pulm: No cough, No shortness of breath  GI: see HPI    Medical History:  has a past medical history of Allergy, Anatomical narrow angle borderline glaucoma, AR (allergic rhinitis), Arthritis, CAD (coronary artery disease) (2013), Cataract, Diabetes mellitus type II, Diabetic retinopathy, Epiretinal membrane, both eyes, Hyperlipidemia, Hypertensive retinopathy of both eyes, Lumbar disc disease, Migraines, Morbid obesity (10/3/2012), BRADFORD (obstructive sleep apnea) (2015), Proliferative diabetic retinopathy(362.02), PVD (peripheral vascular disease) (2013), Rupture of right Achilles tendon, Unspecified essential hypertension, Unspecified vitamin D deficiency, and Vitreous hemorrhage of left eye.    Surgical History:  has a past surgical history that includes Tubal ligation; Panretinal photocoagulation;  section; LASER PERIPHERAL IRIDOTOMY (Bilateral, 2017 and 2017); Carpal tunnel release (Right, 2020); Carpal tunnel release (Right, 2020); and Gastric bypass ().    Family History: family history includes Cancer in her brother; Diabetes in her brother, brother, brother, brother, father, mother, sister, sister, sister, and sister; Hypertension in her father and mother; Prostate cancer in her brother; Stroke in her brother and father..    Social History:  reports that she has never smoked. She has never used smokeless tobacco. She reports that she does  not drink alcohol or use drugs.    Review of patient's allergies indicates:  No Known Allergies    Medications:   No medications prior to admission.       Physical Exam:    Vital Signs: There were no vitals filed for this visit.    General Appearance: Well appearing in no acute distress  Abdomen: Soft, non tender, non distended with normal bowel sounds, no masses    Labs:  Lab Results   Component Value Date    WBC 5.96 06/24/2020    HGB 11.4 (L) 06/24/2020    HCT 37.9 06/24/2020     06/24/2020    CHOL 140 06/24/2020    TRIG 100 06/24/2020    HDL 52 06/24/2020    ALT 32 06/24/2020    AST 27 06/24/2020     06/24/2020    K 4.5 06/24/2020     06/24/2020    CREATININE 0.9 06/24/2020    BUN 16 06/24/2020    CO2 29 06/24/2020    TSH 1.067 01/04/2020    HGBA1C 7.7 (H) 09/26/2020       I have explained the risks and benefits of this endoscopic procedure to the patient including but not limited to bleeding, inflammation, infection, perforation, and death.      Tima Talley MD

## 2020-10-08 NOTE — [employer] PATIENT INSTRUCTIONS
Discharge Summary/Instructions after an Endoscopic Procedure  Patient Name: Aaliyah Angela  Patient MRN: [id number]  Patient YOB: 1955 Thursday, October 8, 2020  Tima Talley MD  RESTRICTIONS:  During your procedure today, you received medications for sedation.  These   medications may affect your judgment, balance and coordination.  Therefore,   for 24 hours, you have the following restrictions:   - DO NOT drive a car, operate machinery, make legal/financial decisions,   sign important papers or drink alcohol.    ACTIVITY:  Today: no heavy lifting, straining or running due to procedural   sedation/anesthesia.  The following day: return to full activity including work.  DIET:  Eat and drink normally unless instructed otherwise.     TREATMENT FOR COMMON SIDE EFFECTS:  - Mild abdominal pain, nausea, belching, bloating or excessive gas:  rest,   eat lightly and use a heating pad.  - Sore Throat: treat with throat lozenges and/or gargle with warm salt   water.  - Because air was used during the procedure, expelling large amounts of air   from your rectum or belching is normal.  - If a bowel prep was taken, you may not have a bowel movement for 1-3 days.    This is normal.  SYMPTOMS TO WATCH FOR AND REPORT TO YOUR PHYSICIAN:  1. Abdominal pain or bloating, other than gas cramps.  2. Chest pain.  3. Back pain.  4. Signs of infection such as: chills or fever occurring within 24 hours   after the procedure.  5. Rectal bleeding, which would show as bright red, maroon, or black stools.   (A tablespoon of blood from the rectum is not serious, especially if   hemorrhoids are present.)  6. Vomiting.  7. Weakness or dizziness.  GO DIRECTLY TO THE NEAREST EMERGENCY ROOM IF YOU HAVE ANY OF THE FOLLOWING:      Difficulty breathing              Chills and/or fever over 101 F   Persistent vomiting and/or vomiting blood   Severe abdominal pain   Severe chest pain   Black, tarry stools   Bleeding- more than one tablespoon   Any  other symptom or condition that you feel may need urgent attention  Your doctor recommends these additional instructions:  If any biopsies were taken, your doctors clinic will contact you in 1 to 2   weeks with any results.  - Patient has a contact number available for emergencies.  The signs and   symptoms of potential delayed complications were discussed with the   patient.  Return to normal activities tomorrow.  Written discharge   instructions were provided to the patient.   - Discharge patient to home.   - Repeat colonoscopy in 10 years for screening purposes.   - The findings and recommendations were discussed with the designated   responsible adult.   For questions, problems or results please call your physician - Tima Talley MD at Work:  [phone].  [facility], EMERGENCY ROOM PHONE NUMBER: [phone]  IF A COMPLICATION OR EMERGENCY SITUATION ARISES AND YOU ARE UNABLE TO REACH   YOUR PHYSICIAN - GO DIRECTLY TO THE EMERGENCY ROOM.  Tima Talley MD  10/8/2020 10:40:17 AM  This report has been verified and signed electronically.  [employer]

## 2020-10-08 NOTE — PROVATION PATIENT INSTRUCTIONS
Discharge Summary/Instructions after an Endoscopic Procedure  Patient Name: Aaliyah Angela  Patient MRN: [id number]  Patient YOB: 1955 Thursday, October 8, 2020  Tima Talley MD  RESTRICTIONS:  During your procedure today, you received medications for sedation.  These   medications may affect your judgment, balance and coordination.  Therefore,   for 24 hours, you have the following restrictions:   - DO NOT drive a car, operate machinery, make legal/financial decisions,   sign important papers or drink alcohol.    ACTIVITY:  Today: no heavy lifting, straining or running due to procedural   sedation/anesthesia.  The following day: return to full activity including work.  DIET:  Eat and drink normally unless instructed otherwise.     TREATMENT FOR COMMON SIDE EFFECTS:  - Mild abdominal pain, nausea, belching, bloating or excessive gas:  rest,   eat lightly and use a heating pad.  - Sore Throat: treat with throat lozenges and/or gargle with warm salt   water.  - Because air was used during the procedure, expelling large amounts of air   from your rectum or belching is normal.  - If a bowel prep was taken, you may not have a bowel movement for 1-3 days.    This is normal.  SYMPTOMS TO WATCH FOR AND REPORT TO YOUR PHYSICIAN:  1. Abdominal pain or bloating, other than gas cramps.  2. Chest pain.  3. Back pain.  4. Signs of infection such as: chills or fever occurring within 24 hours   after the procedure.  5. Rectal bleeding, which would show as bright red, maroon, or black stools.   (A tablespoon of blood from the rectum is not serious, especially if   hemorrhoids are present.)  6. Vomiting.  7. Weakness or dizziness.  GO DIRECTLY TO THE NEAREST EMERGENCY ROOM IF YOU HAVE ANY OF THE FOLLOWING:      Difficulty breathing              Chills and/or fever over 101 F   Persistent vomiting and/or vomiting blood   Severe abdominal pain   Severe chest pain   Black, tarry stools   Bleeding- more than one tablespoon   Any  other symptom or condition that you feel may need urgent attention  Your doctor recommends these additional instructions:  If any biopsies were taken, your doctors clinic will contact you in 1 to 2   weeks with any results.  - Patient has a contact number available for emergencies.  The signs and   symptoms of potential delayed complications were discussed with the   patient.  Return to normal activities tomorrow.  Written discharge   instructions were provided to the patient.   - Discharge patient to home.   - Await pathology results.   - Repeat upper endoscopy in 3 months for surveillance. Maximize ppi therapy   prior.  - The findings and recommendations were discussed with the designated   responsible adult.  For questions, problems or results please call your physician - Tima Talley MD at Work:  [phone].  [facility], EMERGENCY ROOM PHONE NUMBER: [phone]  IF A COMPLICATION OR EMERGENCY SITUATION ARISES AND YOU ARE UNABLE TO REACH   YOUR PHYSICIAN - GO DIRECTLY TO THE EMERGENCY ROOM.  Tima Talley MD  10/8/2020 10:22:10 AM  This report has been verified and signed electronically.  [employer]

## 2020-10-08 NOTE — ANESTHESIA POSTPROCEDURE EVALUATION
Anesthesia Post Evaluation    Patient: Aaliyah Angela    Procedure(s) Performed: Procedure(s) (LRB):  EGD (ESOPHAGOGASTRODUODENOSCOPY) (N/A)  COLONOSCOPY (N/A)    Final Anesthesia Type: general    Patient location during evaluation: GI PACU  Patient participation: Yes- Able to Participate  Level of consciousness: awake and alert and oriented  Post-procedure vital signs: reviewed and stable  Pain management: adequate  Airway patency: patent    PONV status at discharge: No PONV  Anesthetic complications: no      Cardiovascular status: blood pressure returned to baseline and hemodynamically stable  Respiratory status: unassisted, spontaneous ventilation and room air  Hydration status: euvolemic  Follow-up not needed.          Vitals Value Taken Time   /66 10/08/20 1115   Temp 36.7 °C (98.1 °F) 10/08/20 1045   Pulse 76 10/08/20 1115   Resp 15 10/08/20 1115   SpO2 99 % 10/08/20 1115         Event Time   Out of Recovery 11:24:06         Pain/Diallo Score: Diallo Score: 10 (10/8/2020 11:15 AM)

## 2020-10-13 ENCOUNTER — PATIENT MESSAGE (OUTPATIENT)
Dept: GASTROENTEROLOGY | Facility: CLINIC | Age: 65
End: 2020-10-13

## 2020-10-13 LAB
FINAL PATHOLOGIC DIAGNOSIS: NORMAL
GROSS: NORMAL
Lab: NORMAL

## 2020-10-15 ENCOUNTER — OFFICE VISIT (OUTPATIENT)
Dept: OPHTHALMOLOGY | Facility: CLINIC | Age: 65
End: 2020-10-15
Payer: COMMERCIAL

## 2020-10-15 DIAGNOSIS — H25.13 NUCLEAR SCLEROSIS OF BOTH EYES: ICD-10-CM

## 2020-10-15 DIAGNOSIS — E11.3593 PROLIFERATIVE DIABETIC RETINOPATHY OF BOTH EYES WITHOUT MACULAR EDEMA ASSOCIATED WITH TYPE 2 DIABETES MELLITUS: ICD-10-CM

## 2020-10-15 DIAGNOSIS — H35.373 EPIRETINAL MEMBRANE, BILATERAL: ICD-10-CM

## 2020-10-15 DIAGNOSIS — Z79.4 TYPE 2 DIABETES MELLITUS WITH BOTH EYES AFFECTED BY PROLIFERATIVE RETINOPATHY WITHOUT MACULAR EDEMA, WITH LONG-TERM CURRENT USE OF INSULIN: ICD-10-CM

## 2020-10-15 DIAGNOSIS — H35.373 PRERETINAL FIBROSIS, BILATERAL: ICD-10-CM

## 2020-10-15 DIAGNOSIS — H40.053 OCULAR HYPERTENSION, BILATERAL: ICD-10-CM

## 2020-10-15 DIAGNOSIS — E11.3593 TYPE 2 DIABETES MELLITUS WITH BOTH EYES AFFECTED BY PROLIFERATIVE RETINOPATHY WITHOUT MACULAR EDEMA, WITH LONG-TERM CURRENT USE OF INSULIN: ICD-10-CM

## 2020-10-15 DIAGNOSIS — H25.012 CORTICAL AGE-RELATED CATARACT OF LEFT EYE: ICD-10-CM

## 2020-10-15 DIAGNOSIS — H40.033 ANATOMICAL NARROW ANGLE BORDERLINE GLAUCOMA OF BOTH EYES: ICD-10-CM

## 2020-10-15 DIAGNOSIS — H35.033 HYPERTENSIVE RETINOPATHY, BILATERAL: ICD-10-CM

## 2020-10-15 DIAGNOSIS — H25.12 NUCLEAR SCLEROTIC CATARACT OF LEFT EYE: Primary | ICD-10-CM

## 2020-10-15 PROCEDURE — 99999 PR PBB SHADOW E&M-EST. PATIENT-LVL III: CPT | Mod: PBBFAC,,, | Performed by: OPHTHALMOLOGY

## 2020-10-15 PROCEDURE — 99499 NO LOS: ICD-10-PCS | Mod: S$GLB,,, | Performed by: OPHTHALMOLOGY

## 2020-10-15 PROCEDURE — 99999 PR PBB SHADOW E&M-EST. PATIENT-LVL III: ICD-10-PCS | Mod: PBBFAC,,, | Performed by: OPHTHALMOLOGY

## 2020-10-15 PROCEDURE — 92136 IOL MASTER - OU - BOTH EYES: ICD-10-PCS | Mod: LT,S$GLB,, | Performed by: OPHTHALMOLOGY

## 2020-10-15 PROCEDURE — 99499 UNLISTED E&M SERVICE: CPT | Mod: S$GLB,,, | Performed by: OPHTHALMOLOGY

## 2020-10-15 PROCEDURE — 92136 OPHTHALMIC BIOMETRY: CPT | Mod: LT,S$GLB,, | Performed by: OPHTHALMOLOGY

## 2020-10-15 RX ORDER — KETOROLAC TROMETHAMINE 5 MG/ML
1 SOLUTION OPHTHALMIC
Status: CANCELLED | OUTPATIENT
Start: 2020-10-15

## 2020-10-15 RX ORDER — MOXIFLOXACIN 5 MG/ML
1 SOLUTION/ DROPS OPHTHALMIC
Status: CANCELLED | OUTPATIENT
Start: 2020-10-15

## 2020-10-15 RX ORDER — PHENYLEPHRINE HYDROCHLORIDE 100 MG/ML
1 SOLUTION/ DROPS OPHTHALMIC
Status: CANCELLED | OUTPATIENT
Start: 2020-10-15

## 2020-10-15 RX ORDER — TROPICAMIDE 10 MG/ML
1 SOLUTION/ DROPS OPHTHALMIC
Status: CANCELLED | OUTPATIENT
Start: 2020-10-15

## 2020-10-15 RX ORDER — SODIUM CHLORIDE 0.9 % (FLUSH) 0.9 %
10 SYRINGE (ML) INJECTION
Status: DISCONTINUED | OUTPATIENT
Start: 2020-10-15 | End: 2021-02-05

## 2020-10-15 NOTE — PROGRESS NOTES
HPI     Pre-op Exam      Additional comments: phaoc iol os              Comments     Preop Phaco IOL OS (sx 10/28/20)    1) OHT vs Pre-perimetric POAG  2) Narrow Angles  3) PDR OU  4) Hx VH OD  5) NS OU  6) Hx TVO OS            Last edited by Josselin Barros MA on 10/15/2020  3:19 PM. (History)            Assessment /Plan     For exam results, see Encounter Report.    Nuclear sclerotic cataract of left eye    Cortical age-related cataract of left eye    Nuclear sclerosis of both eyes    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 by proliferative retinopathy without macular edema, with long-term current use of insulin    Anatomical narrow angle borderline glaucoma of both eyes    Preretinal fibrosis, bilateral    Ocular hypertension, bilateral    Proliferative diabetic retinopathy of both eyes without macular edema associated with type 2 diabetes mellitus    Hypertensive retinopathy, bilateral    Epiretinal membrane, bilateral          1. OHT vs Pre-perimetric POAG OU (angles  Narrowing over time)  Followed at [facility] since '04   First seen by [facility] '08   Never on gtts   VF defects 2/2 PRP     Family history none   Glaucoma meds None   H/O adverse rxn to glaucoma drops none   LASERS Mulitple PRP OU // PI OD 2/9/2017 /// PI os 1/26/2017   GLAUCOMA SURGERIES None   OTHER EYE SURGERIES none   CDR 0.3/0.35   Tbase 15-23/15-25   Tmax 25 OU   Ttarget ??   HVF 8 tests: 2008 to 2019 - SAD/IAD OU 2/2 PRP OU   Gonio +1-3  with steep approach OU 2018  (doubt occludable)   /575   OCT 5 tests: 2008 to 2019 -  RNFL nl od // nl os    HRT 7 tests: 2009 to 2020 -MR -  nl od // nl os /// CDR 0.33 od // 0.26 os   Disc photos 2002, 2003, 2005 (slides) // 2008, 2013  (OIS)     - Ttoday  18/17   - Test done today pre-op phaco/IOL OS     2. Narrow Angles:  +1-3 w/ bowing  s/p PI. Likely will resolve in future once pseudophakic.  Anterior Segment OCT today w/ Open Angles w/ narrow approach OU  Doubt occludable - monitor gonio  "    3. PDR OU s/p PDR OU -stable exam on last eval w/ Retina on 7/25/13.   S/p avastin x 4 od - last 1/4/2016     4. Hx of VH OD -stable. Last seen by retina - jesse 1/4/2016    5. Cataract OU -   Vis sign - OS > OD    Ok for CE per retina     6. Hx of Transient Visual Obscurations OS - ? BP elevation. Seen w/ stable Retinal exam on 9/13/12.     7. Had a gastric "sleve" done 12/18/2015 - for weight loss    So far doing well    On less insulin now     Plan:  Stable IOP (thick k's), and HRT wnl today   Cont no gtts  Continue to work on blood sugars  oht vs glaucoma    RTC:  Angles continue narrow but open     S/P PI  os 1/26/2017 -   S/P PI od - 2/9/2017 -     Remove cataract  Prn - BATh 20/100 - pt c/o blurry vision OS > OD   rec phaco/IOL os first - if does well consider OD   + trypan / dilates ok      Poor multifocal IOL candidate - 2/2 PDR   Suggest monofocal IOL - set for distance and correct with glasses over that   Will likely need glasses for distance and near vision     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 Mckeon     F/U pre-op os // IOL calc ect   OS  PCB00 23.0  AC IOL 19.5     Risks and benefits discussed and consent signed.      I have seen and personally examined the patient.  I agree with the findings, assessment and plan of the resident and/or fellow.     Alma Goodrich MD      "

## 2020-10-15 NOTE — H&P
Subjective:       Patient ID: Aaliyah Angela is a 65 y.o. female.    Chief Complaint: Pre-op Exam (phaoc iol os)    HPI  Review of Systems   Constitutional: Negative.    HENT: Negative.    Eyes: Positive for visual disturbance.   Respiratory: Negative.    Cardiovascular: Negative.    Neurological: Negative.    Psychiatric/Behavioral: Negative.          Objective:      Physical Exam  Constitutional:       Appearance: She is well-developed.   HENT:      Head: Normocephalic.   Eyes:      Comments: See eye exam   Cardiovascular:      Rate and Rhythm: Normal rate and regular rhythm.   Pulmonary:      Effort: Pulmonary effort is normal.   Neurological:      Mental Status: She is oriented to person, place, and time.         Assessment:       1. Nuclear sclerotic cataract of left eye    2. Cortical age-related cataract of left eye    3. Nuclear sclerosis of both eyes    4. 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 by proliferative retinopathy without macular edema, with long-term current use of insulin    5. Anatomical narrow angle borderline glaucoma of both eyes    6. Preretinal fibrosis, bilateral    7. Ocular hypertension, bilateral    8. Proliferative diabetic retinopathy of both eyes without macular edema associated with type 2 diabetes mellitus    9. Hypertensive retinopathy, bilateral    10. Epiretinal membrane, bilateral        Plan:       Phaco/IOL os - trypan blue

## 2020-10-16 ENCOUNTER — OFFICE VISIT (OUTPATIENT)
Dept: INTERNAL MEDICINE | Facility: CLINIC | Age: 65
End: 2020-10-16
Payer: COMMERCIAL

## 2020-10-16 ENCOUNTER — LAB VISIT (OUTPATIENT)
Dept: LAB | Facility: HOSPITAL | Age: 65
End: 2020-10-16
Attending: FAMILY MEDICINE
Payer: COMMERCIAL

## 2020-10-16 VITALS
OXYGEN SATURATION: 98 % | DIASTOLIC BLOOD PRESSURE: 72 MMHG | SYSTOLIC BLOOD PRESSURE: 150 MMHG | HEIGHT: 66 IN | BODY MASS INDEX: 40.22 KG/M2 | HEART RATE: 80 BPM | TEMPERATURE: 98 F | RESPIRATION RATE: 18 BRPM | WEIGHT: 250.25 LBS

## 2020-10-16 DIAGNOSIS — Z79.4 TYPE 2 DIABETES MELLITUS WITH BOTH EYES AFFECTED BY PROLIFERATIVE RETINOPATHY WITHOUT MACULAR EDEMA, WITH LONG-TERM CURRENT USE OF INSULIN: ICD-10-CM

## 2020-10-16 DIAGNOSIS — I73.9 PVD (PERIPHERAL VASCULAR DISEASE): ICD-10-CM

## 2020-10-16 DIAGNOSIS — Z01.818 PREOP EXAMINATION: Primary | ICD-10-CM

## 2020-10-16 DIAGNOSIS — R79.89 HIGH SERUM PARATHYROID HORMONE (PTH): ICD-10-CM

## 2020-10-16 DIAGNOSIS — I10 HTN (HYPERTENSION), BENIGN: ICD-10-CM

## 2020-10-16 DIAGNOSIS — E11.3593 TYPE 2 DIABETES MELLITUS WITH BOTH EYES AFFECTED BY PROLIFERATIVE RETINOPATHY WITHOUT MACULAR EDEMA, WITH LONG-TERM CURRENT USE OF INSULIN: ICD-10-CM

## 2020-10-16 DIAGNOSIS — E78.5 HYPERLIPIDEMIA, UNSPECIFIED HYPERLIPIDEMIA TYPE: ICD-10-CM

## 2020-10-16 DIAGNOSIS — I25.10 CORONARY ARTERY DISEASE INVOLVING NATIVE CORONARY ARTERY OF NATIVE HEART WITHOUT ANGINA PECTORIS: ICD-10-CM

## 2020-10-16 DIAGNOSIS — E66.01 CLASS 3 SEVERE OBESITY DUE TO EXCESS CALORIES WITH SERIOUS COMORBIDITY AND BODY MASS INDEX (BMI) OF 40.0 TO 44.9 IN ADULT: ICD-10-CM

## 2020-10-16 LAB
CA-I BLDV-SCNC: 1.2 MMOL/L (ref 1.06–1.42)
PTH-INTACT SERPL-MCNC: 83 PG/ML (ref 9–77)

## 2020-10-16 PROCEDURE — 3051F HG A1C>EQUAL 7.0%<8.0%: CPT | Mod: CPTII,S$GLB,, | Performed by: FAMILY MEDICINE

## 2020-10-16 PROCEDURE — 99999 PR PBB SHADOW E&M-EST. PATIENT-LVL V: CPT | Mod: PBBFAC,,, | Performed by: FAMILY MEDICINE

## 2020-10-16 PROCEDURE — 3077F SYST BP >= 140 MM HG: CPT | Mod: CPTII,S$GLB,, | Performed by: FAMILY MEDICINE

## 2020-10-16 PROCEDURE — 3008F PR BODY MASS INDEX (BMI) DOCUMENTED: ICD-10-PCS | Mod: CPTII,S$GLB,, | Performed by: FAMILY MEDICINE

## 2020-10-16 PROCEDURE — 99999 PR PBB SHADOW E&M-EST. PATIENT-LVL V: ICD-10-PCS | Mod: PBBFAC,,, | Performed by: FAMILY MEDICINE

## 2020-10-16 PROCEDURE — 36415 COLL VENOUS BLD VENIPUNCTURE: CPT

## 2020-10-16 PROCEDURE — 3078F PR MOST RECENT DIASTOLIC BLOOD PRESSURE < 80 MM HG: ICD-10-PCS | Mod: CPTII,S$GLB,, | Performed by: FAMILY MEDICINE

## 2020-10-16 PROCEDURE — 82330 ASSAY OF CALCIUM: CPT

## 2020-10-16 PROCEDURE — 83970 ASSAY OF PARATHORMONE: CPT

## 2020-10-16 PROCEDURE — 99214 OFFICE O/P EST MOD 30 MIN: CPT | Mod: S$GLB,,, | Performed by: FAMILY MEDICINE

## 2020-10-16 PROCEDURE — 3077F PR MOST RECENT SYSTOLIC BLOOD PRESSURE >= 140 MM HG: ICD-10-PCS | Mod: CPTII,S$GLB,, | Performed by: FAMILY MEDICINE

## 2020-10-16 PROCEDURE — 3008F BODY MASS INDEX DOCD: CPT | Mod: CPTII,S$GLB,, | Performed by: FAMILY MEDICINE

## 2020-10-16 PROCEDURE — 3051F PR MOST RECENT HEMOGLOBIN A1C LEVEL 7.0 - < 8.0%: ICD-10-PCS | Mod: CPTII,S$GLB,, | Performed by: FAMILY MEDICINE

## 2020-10-16 PROCEDURE — 3078F DIAST BP <80 MM HG: CPT | Mod: CPTII,S$GLB,, | Performed by: FAMILY MEDICINE

## 2020-10-16 PROCEDURE — 99214 PR OFFICE/OUTPT VISIT, EST, LEVL IV, 30-39 MIN: ICD-10-PCS | Mod: S$GLB,,, | Performed by: FAMILY MEDICINE

## 2020-10-16 NOTE — PROGRESS NOTES
Subjective:       Patient ID: Aaliyah Angela is a 65 y.o. female. 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    Chief Complaint: Pre-op Exam    Patient is here for pre-op clearance for eye surgery with Dr. Goodrich for left cataract    Diabetes Management Status - followed by DANAY Noble    Statin: Taking  ACE/ARB: Taking    Screening or Prevention Patient's value Goal Complete/Controlled?   HgA1C Testing and Control   Lab Results   Component Value Date    HGBA1C 7.7 (H) 09/26/2020      Annually/Less than 8% Yes   Lipid profile : 06/24/2020 Annually Yes   LDL control Lab Results   Component Value Date    LDLCALC 68.0 06/24/2020    Annually/Less than 100 mg/dl  Yes   Nephropathy screening Lab Results   Component Value Date    LABMICR 12.0 09/26/2020     Lab Results   Component Value Date    PROTEINUA Negative 06/24/2020    Annually Yes   Blood pressure BP Readings from Last 1 Encounters:   10/08/20 122/66    Less than 140/90 Yes   Dilated retinal exam : 08/24/2020 Annually Yes   Foot exam   : 02/07/2020 Annually Yes         Social History     Tobacco Use    Smoking status: Never Smoker    Smokeless tobacco: Never Used   Substance Use Topics    Alcohol use: No    Drug use: No       Family History   Problem Relation Age of Onset    Diabetes Mother     Hypertension Mother     Diabetes Father     Hypertension Father     Stroke Father     Diabetes Sister     Diabetes Brother     Cancer Brother         prostate    Prostate cancer Brother     Diabetes Sister     Diabetes Sister     Diabetes Sister     Diabetes Brother     Stroke Brother     Diabetes Brother     Diabetes Brother     Breast cancer Neg Hx     Colon cancer Neg Hx     Ovarian cancer Neg Hx     Amblyopia Neg Hx     Blindness Neg Hx     Cataracts Neg Hx     Glaucoma Neg Hx     Macular degeneration Neg Hx     Retinal detachment Neg Hx     Strabismus Neg Hx        Past Surgical History:   Procedure Laterality Date    CARPAL TUNNEL RELEASE Right 2/5/2020     Procedure: RELEASE, CARPAL TUNNEL RIGHT;  Surgeon: Tameka Bran MD;  Location: [facility] OR;  Service: Orthopedics;  Laterality: Right;    CARPAL TUNNEL RELEASE Right 2020     SECTION      x3    COLONOSCOPY      COLONOSCOPY N/A 10/8/2020    Procedure: COLONOSCOPY;  Surgeon: Tima Talley MD;  Location: [facility] ENDO (4TH FLR);  Service: Endoscopy;  Laterality: N/A;    ESOPHAGOGASTRODUODENOSCOPY N/A 10/8/2020    Procedure: EGD (ESOPHAGOGASTRODUODENOSCOPY);  Surgeon: Tima Talley MD;  Location: [facility] ENDO (4TH FLR);  Service: Endoscopy;  Laterality: N/A;    GASTRIC BYPASS      Sleeve    LASER PERIPHERAL IRIDOTOMY Bilateral 2017 and 2017        PANRETINAL PHOTOCOAGULATION      Both eyes    TUBAL LIGATION         Patient Active Problem List   Diagnosis    Hyperlipidemia    HTN (hypertension), benign    Chronic tension-type headache, intractable    Hypertensive retinopathy    Transient vision disturbance    Cortical cataract    Vitreous hemorrhage    Class 3 severe obesity due to excess calories with serious comorbidity and body mass index (BMI) of 40.0 to 44.9 in adult    Chest pain on exertion    Ocular hypertension    Nuclear sclerosis    Anatomical narrow angle borderline glaucoma    PVD (peripheral vascular disease)    CAD (coronary artery disease)    BRADFORD on CPAP    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 by proliferative retinopathy without macular edema, with long-term current use of insulin    History of bariatric surgery, 12-    Erythema nodosum    Primary osteoarthritis involving multiple joints    Bilateral carpal tunnel syndrome    Neck pain    Diabetic polyneuropathy associated with type 2 diabetes mellitus    GERD (gastroesophageal reflux disease)       Current Outpatient Medications on File Prior to Visit   Medication Sig Dispense Refill    amitriptyline (ELAVIL) 25 MG tablet Take 2 tablets (50 mg total) by mouth every  "evening. 60 tablet 11    amLODIPine (NORVASC) 10 MG tablet TAKE 1 TABLET BY MOUTH EVERY DAY 90 tablet 1    aspirin 81 MG Chew Take 81 mg by mouth once daily.        atorvastatin (LIPITOR) 20 MG tablet Take 1 tablet (20 mg total) by mouth once daily. 90 tablet 3    blood sugar diagnostic (BLOOD GLUCOSE TEST) Strp Inject 1 each into the skin 2 (two) times daily. 200 strip 3    calcium-vitamin D3 500 mg(1,250mg) -200 unit per tablet Take 1 tablet by mouth once daily.      dulaglutide (TRULICITY) 1.5 mg/0.5 mL PnIj Inject 1.5 mg weekly. 4 Syringe 1    empagliflozin (JARDIANCE) 25 mg tablet Take 1 tablet daily. Make sure it is the 25 mg daily 90 tablet 2    esomeprazole (NEXIUM) 20 mg GrPS Take 20 mg by mouth before breakfast. 600 mg 3    famotidine (PEPCID) 20 MG tablet Take 1 tablet (20 mg total) by mouth 2 (two) times daily as needed (as needed for breakthrough reflux). 60 tablet 2    flash glucose sensor (FREESTYLE SAV SENSOR) Kit 1 Device by Misc.(Non-Drug; Combo Route) route continuous. 3 kit 11    insulin aspart U-100 (NOVOLOG FLEXPEN U-100 INSULIN) 100 unit/mL (3 mL) InPn pen Inject as needed, scale 150-200+2, 201-250+4, 251-300+6, 301-350+8, >350+10. Max daily 30 units. novocare voucher free boxes(2) 2 Box 1    losartan (COZAAR) 100 MG tablet Take 1 tablet (100 mg total) by mouth once daily. 90 tablet 3    metFORMIN (GLUCOPHAGE-XR) 500 MG XR 24hr tablet TAKE 2 TABLETS(1000 MG) BY MOUTH TWICE DAILY WITH MEALS 360 tablet 3    metoprolol succinate (TOPROL-XL) 50 MG 24 hr tablet TAKE 1 TABLET BY MOUTH EVERY DAY 90 tablet 3    multivitamin (ONE DAILY MULTIVITAMIN) per tablet Take 1 tablet by mouth once daily.      pen needle, diabetic (BD ULTRA-FINE DIEGO PEN NEEDLE) 32 gauge x 5/32" Ndle Uses 3 times a day. 100 each 11    urea 20 % Crea Apply 1 application topically once daily. To dry skin on the heels 75 g 10    [DISCONTINUED] FREESTYLE SAV 14 DAY SENSOR Kit TEST BLOOD SUGAR AS DIRECTED 2 kit 6 " "   [DISCONTINUED] metFORMIN (GLUCOPHAGE-XR) 500 MG 24 hr tablet Take 2 tablets (1,000 mg total) by mouth 2 (two) times daily with meals. 360 tablet 3     Current Facility-Administered Medications on File Prior to Visit   Medication Dose Route Frequency Provider Last Rate Last Dose    0.9%  NaCl infusion   Intravenous Continuous Aristeo Bueno MD        sodium chloride 0.9% flush 10 mL  10 mL Intravenous PRN Alma Goodrich MD               Review of Systems   Constitutional: Negative for chills and fever.   HENT: Negative for ear pain.    Eyes: Negative for pain.   Respiratory: Negative for chest tightness.    Cardiovascular: Negative for chest pain.   Gastrointestinal: Negative for abdominal pain.   Genitourinary: Negative for flank pain.   Musculoskeletal: Negative for gait problem.   Neurological: Negative for syncope.   Psychiatric/Behavioral: Negative for behavioral problems.       Objective:     BP (!) 150/72 (BP Location: Right arm, Patient Position: Sitting, BP Method: Large (Manual))   Pulse 80   Temp 97.8 °F (36.6 °C)   Resp 18   Ht 5' 6" (1.676 m)   Wt 113.5 kg (250 lb 3.6 oz)   SpO2 98%   BMI 40.39 kg/m²     Physical Exam  Vitals signs and nursing note reviewed.   Constitutional:       Appearance: She is well-developed.   HENT:      Head: Normocephalic and atraumatic.   Neck:      Musculoskeletal: Neck supple.   Cardiovascular:      Rate and Rhythm: Normal rate.      Heart sounds: Normal heart sounds.   Pulmonary:      Effort: No respiratory distress.      Breath sounds: Normal breath sounds. No wheezing or rales.   Abdominal:      Palpations: Abdomen is soft.   Skin:     General: Skin is dry.   Neurological:      Mental Status: She is alert.   Psychiatric:         Behavior: Behavior normal.         Assessment:       1. Preop examination    2. 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 by proliferative retinopathy without macular edema, with long-term current use of insulin " "   3. HTN (hypertension), benign    4. Hyperlipidemia, unspecified hyperlipidemia type    5. Coronary artery disease involving native coronary artery of native heart without angina pectoris    6. PVD (peripheral vascular disease)    7. Class 3 severe obesity due to excess calories with serious comorbidity and body mass index (BMI) of 40.0 to 44.9 in adult    8. High serum parathyroid hormone (PTH)        Plan:       Aaliyah was seen today for pre-op exam.    Diagnoses and all orders for this visit:    Preop examination - for cataracts - Dr. Goodrich  -pt cleared for surgery with benefits > risks. Pt tells me DANAY Noble who manages her diabetes has already reviewed with her how to manage her insulin pre-op. OK ot hold/stop aspirin for up to 7 days pending surgeon's preference. Copy of this note to the requesting surgeon    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 by proliferative retinopathy without macular edema, with long-term current use of insulin  -controlled, managed by DANAY Noble    HTN (hypertension), benign  BP (!) 150/72 (BP Location: Right arm, Patient Position: Sitting, BP Method: Large (Manual))   Pulse 80   Temp 97.8 °F (36.6 °C)   Resp 18   Ht 5' 6" (1.676 m)   Wt 113.5 kg (250 lb 3.6 oz)   SpO2 98%   BMI 40.39 kg/m²    sometimes low, other times elevated (sucha as today), most often in an acceptable range.   -she will call in a week or two with hoem Bps so we can update the record - will copy my MA (JV) on this note to call her in a few weeks if she does not contact us.  -Keep Jan 2021 appt with me    Hyperlipidemia, unspecified hyperlipidemia type  Coronary artery disease involving native coronary artery of native heart without angina pectoris  PVD (peripheral vascular disease)  -on statin    Class 3 severe obesity due to excess calories with serious comorbidity and body mass index (BMI) of 40.0 to 44.9 in adult  -discussed diet and exercise    High serum parathyroid hormone (PTH) - found " incidentally when she saw nephrology, but normal eGFR, normal vit D  -     PTH, Intact; Future  -     Calcium, Ionized; Future  -if PTH remains elevated, will refer to justice for further evaluaiton

## 2020-10-19 ENCOUNTER — TELEPHONE (OUTPATIENT)
Dept: INTERNAL MEDICINE | Facility: CLINIC | Age: 65
End: 2020-10-19

## 2020-10-19 DIAGNOSIS — E21.3 HYPERPARATHYROIDISM: Primary | ICD-10-CM

## 2020-10-19 NOTE — TELEPHONE ENCOUNTER
Please call pt and set up endo appt - referral is in    ===  Aaliyah, the parathyroid test is still high, though closer to normal. Calcium is normal. I recommend you see the endocrinologist. I will put in a referral and ask my office to call you to set it up

## 2020-10-26 ENCOUNTER — LAB VISIT (OUTPATIENT)
Dept: PRIMARY CARE CLINIC | Facility: CLINIC | Age: 65
End: 2020-10-26
Payer: COMMERCIAL

## 2020-10-26 DIAGNOSIS — Z13.9 SCREENING PROCEDURE: ICD-10-CM

## 2020-10-26 PROCEDURE — U0003 INFECTIOUS AGENT DETECTION BY NUCLEIC ACID (DNA OR RNA); SEVERE ACUTE RESPIRATORY SYNDROME CORONAVIRUS 2 (SARS-COV-2) (CORONAVIRUS DISEASE [COVID-19]), AMPLIFIED PROBE TECHNIQUE, MAKING USE OF HIGH THROUGHPUT TECHNOLOGIES AS DESCRIBED BY CMS-2020-01-R: HCPCS

## 2020-10-27 ENCOUNTER — TELEPHONE (OUTPATIENT)
Dept: OPTOMETRY | Facility: CLINIC | Age: 65
End: 2020-10-27

## 2020-10-27 LAB — SARS-COV-2 RNA RESP QL NAA+PROBE: NOT DETECTED

## 2020-10-28 ENCOUNTER — ANESTHESIA EVENT (OUTPATIENT)
Dept: SURGERY | Facility: HOSPITAL | Age: 65
End: 2020-10-28
Payer: COMMERCIAL

## 2020-10-28 ENCOUNTER — ANESTHESIA (OUTPATIENT)
Dept: SURGERY | Facility: HOSPITAL | Age: 65
End: 2020-10-28
Payer: COMMERCIAL

## 2020-10-28 ENCOUNTER — HOSPITAL ENCOUNTER (OUTPATIENT)
Facility: HOSPITAL | Age: 65
Discharge: HOME | End: 2020-10-28
Attending: OPHTHALMOLOGY | Admitting: OPHTHALMOLOGY
Payer: COMMERCIAL

## 2020-10-28 VITALS
TEMPERATURE: 98 F | DIASTOLIC BLOOD PRESSURE: 63 MMHG | OXYGEN SATURATION: 99 % | HEART RATE: 76 BPM | WEIGHT: 250 LBS | BODY MASS INDEX: 40.18 KG/M2 | RESPIRATION RATE: 18 BRPM | SYSTOLIC BLOOD PRESSURE: 128 MMHG | HEIGHT: 66 IN

## 2020-10-28 DIAGNOSIS — H25.012 CORTICAL AGE-RELATED CATARACT OF LEFT EYE: ICD-10-CM

## 2020-10-28 DIAGNOSIS — H25.12 NUCLEAR SCLEROTIC CATARACT OF LEFT EYE: Primary | ICD-10-CM

## 2020-10-28 LAB
POCT GLUCOSE: 115 MG/DL (ref 70–110)
POCT GLUCOSE: 97 MG/DL (ref 70–110)

## 2020-10-28 PROCEDURE — D9220A PRA ANESTHESIA: Mod: ANES,,, | Performed by: ANESTHESIOLOGY

## 2020-10-28 PROCEDURE — 25000003 PHARM REV CODE 250: Performed by: OPHTHALMOLOGY

## 2020-10-28 PROCEDURE — 63600175 PHARM REV CODE 636 W HCPCS: Performed by: NURSE ANESTHETIST, CERTIFIED REGISTERED

## 2020-10-28 PROCEDURE — D9220A PRA ANESTHESIA: Mod: CRNA,,, | Performed by: NURSE ANESTHETIST, CERTIFIED REGISTERED

## 2020-10-28 PROCEDURE — 71000044 HC DOSC ROUTINE RECOVERY FIRST HOUR: Performed by: OPHTHALMOLOGY

## 2020-10-28 PROCEDURE — 25000003 PHARM REV CODE 250

## 2020-10-28 PROCEDURE — 63600175 PHARM REV CODE 636 W HCPCS: Performed by: OPHTHALMOLOGY

## 2020-10-28 PROCEDURE — D9220A PRA ANESTHESIA: ICD-10-PCS | Mod: CRNA,,, | Performed by: NURSE ANESTHETIST, CERTIFIED REGISTERED

## 2020-10-28 PROCEDURE — 37000008 HC ANESTHESIA 1ST 15 MINUTES: Performed by: OPHTHALMOLOGY

## 2020-10-28 PROCEDURE — 71000015 HC POSTOP RECOV 1ST HR: Performed by: OPHTHALMOLOGY

## 2020-10-28 PROCEDURE — 25000003 PHARM REV CODE 250: Performed by: STUDENT IN AN ORGANIZED HEALTH CARE EDUCATION/TRAINING PROGRAM

## 2020-10-28 PROCEDURE — 36000706: Performed by: OPHTHALMOLOGY

## 2020-10-28 PROCEDURE — 36000707: Performed by: OPHTHALMOLOGY

## 2020-10-28 PROCEDURE — D9220A PRA ANESTHESIA: ICD-10-PCS | Mod: ANES,,, | Performed by: ANESTHESIOLOGY

## 2020-10-28 PROCEDURE — 66982 XCAPSL CTRC RMVL CPLX WO ECP: CPT | Mod: LT,,, | Performed by: OPHTHALMOLOGY

## 2020-10-28 PROCEDURE — V2632 POST CHMBR INTRAOCULAR LENS: HCPCS | Performed by: OPHTHALMOLOGY

## 2020-10-28 PROCEDURE — 37000009 HC ANESTHESIA EA ADD 15 MINS: Performed by: OPHTHALMOLOGY

## 2020-10-28 PROCEDURE — 82962 GLUCOSE BLOOD TEST: CPT | Performed by: OPHTHALMOLOGY

## 2020-10-28 PROCEDURE — 66982 PR REMOVAL, CATARACT, W/INSRT INTRAOC LENS, W/O ENDO CYCLO, CMPLX: ICD-10-PCS | Mod: LT,,, | Performed by: OPHTHALMOLOGY

## 2020-10-28 DEVICE — LENS IOL ITEC PRELOAD 23.0D: Type: IMPLANTABLE DEVICE | Site: EYE | Status: FUNCTIONAL

## 2020-10-28 RX ORDER — MIDAZOLAM HYDROCHLORIDE 1 MG/ML
INJECTION, SOLUTION INTRAMUSCULAR; INTRAVENOUS
Status: DISCONTINUED | OUTPATIENT
Start: 2020-10-28 | End: 2020-10-28

## 2020-10-28 RX ORDER — PHENYLEPHRINE HYDROCHLORIDE 100 MG/ML
1 SOLUTION/ DROPS OPHTHALMIC
Status: DISCONTINUED | OUTPATIENT
Start: 2020-10-28 | End: 2020-10-28 | Stop reason: HOSPADM

## 2020-10-28 RX ORDER — PREDNISOLONE ACETATE 10 MG/ML
SUSPENSION/ DROPS OPHTHALMIC
Status: DISCONTINUED
Start: 2020-10-28 | End: 2020-10-28 | Stop reason: HOSPADM

## 2020-10-28 RX ORDER — MOXIFLOXACIN 5 MG/ML
1 SOLUTION/ DROPS OPHTHALMIC
Status: DISCONTINUED | OUTPATIENT
Start: 2020-10-28 | End: 2020-10-28 | Stop reason: HOSPADM

## 2020-10-28 RX ORDER — LIDOCAINE HYDROCHLORIDE 40 MG/ML
INJECTION, SOLUTION RETROBULBAR
Status: DISCONTINUED | OUTPATIENT
Start: 2020-10-28 | End: 2020-10-28 | Stop reason: HOSPADM

## 2020-10-28 RX ORDER — ACETAMINOPHEN 325 MG/1
650 TABLET ORAL EVERY 6 HOURS PRN
Status: DISCONTINUED | OUTPATIENT
Start: 2020-10-28 | End: 2020-10-28 | Stop reason: HOSPADM

## 2020-10-28 RX ORDER — SODIUM CHLORIDE 0.9 % (FLUSH) 0.9 %
3 SYRINGE (ML) INJECTION EVERY 30 MIN PRN
Status: DISCONTINUED | OUTPATIENT
Start: 2020-10-28 | End: 2020-10-28 | Stop reason: HOSPADM

## 2020-10-28 RX ORDER — LIDOCAINE HYDROCHLORIDE 40 MG/ML
INJECTION, SOLUTION RETROBULBAR
Status: DISCONTINUED
Start: 2020-10-28 | End: 2020-10-28 | Stop reason: HOSPADM

## 2020-10-28 RX ORDER — LIDOCAINE HYDROCHLORIDE 10 MG/ML
INJECTION, SOLUTION EPIDURAL; INFILTRATION; INTRACAUDAL; PERINEURAL
Status: DISCONTINUED
Start: 2020-10-28 | End: 2020-10-28 | Stop reason: HOSPADM

## 2020-10-28 RX ORDER — KETOROLAC TROMETHAMINE 5 MG/ML
1 SOLUTION OPHTHALMIC
Status: DISCONTINUED | OUTPATIENT
Start: 2020-10-28 | End: 2020-10-28 | Stop reason: HOSPADM

## 2020-10-28 RX ORDER — LIDOCAINE HYDROCHLORIDE 20 MG/ML
JELLY TOPICAL
Status: DISCONTINUED | OUTPATIENT
Start: 2020-10-28 | End: 2020-10-28 | Stop reason: HOSPADM

## 2020-10-28 RX ORDER — LIDOCAINE HYDROCHLORIDE 20 MG/ML
JELLY TOPICAL
Status: DISCONTINUED
Start: 2020-10-28 | End: 2020-10-28 | Stop reason: HOSPADM

## 2020-10-28 RX ORDER — ACETAZOLAMIDE 500 MG/1
500 CAPSULE, EXTENDED RELEASE ORAL ONCE
Status: COMPLETED | OUTPATIENT
Start: 2020-10-28 | End: 2020-10-28

## 2020-10-28 RX ORDER — PREDNISOLONE ACETATE 10 MG/ML
SUSPENSION/ DROPS OPHTHALMIC
Status: DISCONTINUED | OUTPATIENT
Start: 2020-10-28 | End: 2020-10-28 | Stop reason: HOSPADM

## 2020-10-28 RX ORDER — MOXIFLOXACIN 5 MG/ML
SOLUTION/ DROPS OPHTHALMIC
Status: DISCONTINUED | OUTPATIENT
Start: 2020-10-28 | End: 2020-10-28 | Stop reason: HOSPADM

## 2020-10-28 RX ORDER — TROPICAMIDE 10 MG/ML
1 SOLUTION/ DROPS OPHTHALMIC
Status: DISCONTINUED | OUTPATIENT
Start: 2020-10-28 | End: 2020-10-28 | Stop reason: HOSPADM

## 2020-10-28 RX ORDER — LIDOCAINE HYDROCHLORIDE 10 MG/ML
INJECTION, SOLUTION EPIDURAL; INFILTRATION; INTRACAUDAL; PERINEURAL
Status: DISCONTINUED | OUTPATIENT
Start: 2020-10-28 | End: 2020-10-28 | Stop reason: HOSPADM

## 2020-10-28 RX ADMIN — MIDAZOLAM HYDROCHLORIDE 1 MG: 1 INJECTION, SOLUTION INTRAMUSCULAR; INTRAVENOUS at 10:10

## 2020-10-28 RX ADMIN — PHENYLEPHRINE HYDROCHLORIDE 1 DROP: 100 SOLUTION/ DROPS OPHTHALMIC at 09:10

## 2020-10-28 RX ADMIN — TROPICAMIDE 1 DROP: 10 SOLUTION/ DROPS OPHTHALMIC at 09:10

## 2020-10-28 RX ADMIN — KETOROLAC TROMETHAMINE 1 DROP: 5 SOLUTION/ DROPS OPHTHALMIC at 09:10

## 2020-10-28 RX ADMIN — ACETAZOLAMIDE 500 MG: 500 CAPSULE, EXTENDED RELEASE ORAL at 11:10

## 2020-10-28 RX ADMIN — SODIUM CHLORIDE: 0.9 INJECTION, SOLUTION INTRAVENOUS at 10:10

## 2020-10-28 RX ADMIN — MOXIFLOXACIN 1 DROP: 5 SOLUTION/ DROPS OPHTHALMIC at 09:10

## 2020-10-28 NOTE — PROGRESS NOTES
Dr Goodrich at bedside talking to pt about surgery and post op care.  Pt verba;nalinied understanding.

## 2020-10-28 NOTE — OP NOTE
DATE OF PROCEDURE: 10/28/2020    PREOPERATIVE DIAGNOSIS: mixed Cataract - nuclear and cortical - small pupil  // Nuclear sclerotic cataract of left eye OS.     POSTOPERATIVE DIAGNOSIS: mixed  Cataract - nuclear and cortical  // small pupil // Nuclear sclerotic cataract of left eyeOS, status post procedure.     PROCEDURE PERFORMED: Complex c ataract extraction with trypan blue and phacoemulsification, posterior   chamber intraocular lens placement. - extra viscoat used to dilate the small pupil    SURGEON: Alma Goodrich M.D.     ASSISTANT: Sophie Mcguire MD      COMPLICATIONS: None.     BLOOD LOSS: Less than 5 mL.     ANESTHESIA: Local MAC    IMPLANT DATA:   1. Abbott PCB00 , power 23.0  diopters, serial #    PROCEDURE IN DETAIL: After informed consent including risks, benefits,   alternatives, the patient was brought to the operating room table, placed in   supine position. Monitored anesthesia care was used throughout the entire   procedure. Once adequate anesthesia was confirmed, the patient was then prepped   and draped in usual sterile fashion for intraocular surgery. Wire speculum was   used to hold the eyelids apart and the procedure was initiated by making   a partial thickness corneal wound with a yoli blade temporally.   A supersharp blade was then used to make a second entry into the eye via a paracentesis incision.  Intracameral lidocaine followed by trypan blue, followed by  Viscoat were placed in the anterior chamber.   Extra viscoat was used to physically dilate the pupil.   A 2.4 mm blade was then used to make to complete the clear corneal   incision temporally. A cystotome was used to make a tear in   the anterior capsular flap, which was continued around with Utrata forceps for   continuous curvilinear capsulorrhexis. BSS on a Ahuja cannula was then used for   hydrodissection and hydrodelineation of lens. The lens was noted to spin   freely in the bag. Phacoemulsification handpiece was  then used to remove the   lens in a divide-and-conquer manner. Irrigation aspiration handpiece removed   the remaining cortical material. Provisc was placed in the eye followed by the   lens as mentioned above without any complications. Once the lens was in proper   position, irrigation aspiration handpiece was used to remove the remaining Provisc. The   wounds were then hydrated with BSS and noted to be watertight. The eye had a   good physiological pressure and the lid speculum was removed under the   microscope without any shallowing. The eye was then covered with a collagen   shield soaked in Pred Forte and Vigamox and turned over to Anesthesia in stable   condition after placement of patch and shield.

## 2020-10-28 NOTE — INTERVAL H&P NOTE
H&P completed on 10/15/2020 has been reviewed, the patient examined and I concur with the findings and plan.

## 2020-10-28 NOTE — DISCHARGE INSTRUCTIONS
Discharge Instructions for Cataract Surgery  A surgeon removed the cloudy lens in your eye and replaced it with a clear man-made lens. Be sure to have an adult family member or friend drive you home after surgery. Heres what you can expect following surgery and tips for a healthy recovery.  It is normal to have the following:  · Bruised or bloodshot eye for 7 days  · Itching and mild discomfort for several days  · Some fluid discharge  · Sensitivity to light  · Scratchy, sandlike feeling in the eye for 2 weeks  · Feeling tired, especially during the first 24 hours  Activity level  · Do not drive for 2 days or as instructed by your doctor.  · Do not drink alcohol for at least 24 hours.  · Avoid bending at the waist to  objects or lifting anything heavy for 2 days.  · Relax for the first 24 hours after surgery. Watching TV and reading are OK and wont harm your eye.  Eye protection  · Do not rub or press on your eye.  · Sleep on your back or on your unoperated side for 2 nights.  · If instructed, wear a bandage over your eye for 2 days and 2 nights.  · If instructed, wear a shield to protect your eye for 2 days and 2 nights.  Using eyedrops  You may be given special eyedrops or ointment. Here is one way to use eyedrops:  · Tilt your head back.  · Pull your bottom eyelid down.  · Squeeze one drop into your eye. Do not touch your eye with the bottle tip.  · Close your eyes for a few seconds.  · If you need more than one drop, wait a few minutes before adding the next one.   Call your doctor right away if you have any of the following:  · Bleeding or discharge from the eye  · Your vision suddenly becomes worse  · Pain medicine you are told to take does not ease your pain  · Nausea or vomiting  · Chills or fever over 100.4°F (39.1°C)   Date Last Reviewed: 5/30/2015  © 8389-9629 [employer]. [address] [place] [address], [place] [address]. All rights reserved. This information is not intended as a  substitute for professional medical care. Always follow your healthcare professional's instructions.        Anesthesia: General Anesthesia     You are watched continuously during your procedure by your anesthesia provider.     Youre due to have surgery. During surgery, youll be given medicine called anesthesia or anesthetic. This will keep you comfortable and pain-free. Your anesthesia provider will use general anesthesia.  What is general anesthesia?  General anesthesia puts you into a state like deep sleep. It goes into the bloodstream (IV anesthetics), into the lungs (gas anesthetics), or both. You feel nothing during the procedure. You will not remember it. During the procedure, the anesthesia provider monitors you continuously. He or she checks your heart rate and rhythm, blood pressure, breathing, and blood oxygen.  · IV anesthetics. IV anesthetics are given through an IV line in your arm. Theyre often given first. This is so you are asleep before a gas anesthetic is started. Some kinds of IV anesthetics relieve pain. Others relax you. Your doctor will decide which kind is best in your case.  · Gas anesthetics. Gas anesthetics are breathed into the lungs. They are often used to keep you asleep. They can be given through a facemask or a tube placed in your larynx or trachea (breathing tube).  ¨ If you have a facemask, your anesthesia provider will most likely place it over your nose and mouth while youre still awake. Youll breathe oxygen through the mask as your IV anesthetic is started. Gas anesthetic may be added through the mask.  ¨ If you have a tube in the larynx or trachea, it will be inserted into your throat after youre asleep.  Anesthesia tools and medicines  You will likely have:  · IV anesthetics. These are put into an IV line into your bloodstream.  · Gas anesthetics. You breathe these anesthetics into your lungs, where they pass into your bloodstream.  · Pulse oximeter. This is a small clip  that is attached to the end of your finger. This measures your blood oxygen level.  · Electrocardiography leads (electrodes). These are small sticky pads that are placed on your chest. They record your heart rate and rhythm.  · Blood pressure cuff. This reads your blood pressure.  Risks and possible complications  General anesthesia has some risks. These include:  · Breathing problems  · Nausea and vomiting  · Sore throat or hoarseness (usually temporary)  · Allergic reaction to the anesthetic  · Irregular heartbeat (rare)  · Cardiac arrest (rare)   Anesthesia safety  · Follow all instructions you are given for how long not to eat or drink before your procedure.  · Be sure your doctor knows what medicines and drugs you take. This includes over-the-counter medicines, herbs, supplements, alcohol or other drugs. You will be asked when those were last taken.  · Have an adult family member or friend drive you home after the procedure.  · For the first 24 hours after your surgery:  ¨ Do not drive or use heavy equipment.  ¨ Do not make important decisions or sign legal documents. If important decisions or signing legal documents is necessary during the first 24 hours after surgery, have a trusted family member or spouse act on your behalf.  ¨ Avoid alcohol.  ¨ Have a responsible adult stay with you. He or she can watch for problems and help keep you safe.  Date Last Reviewed: 12/1/2016  © 3555-4692 [employer]. [address] [place] [address], [place] [address]. All rights reserved. This information is not intended as a substitute for professional medical care. Always follow your healthcare professional's instructions.

## 2020-10-28 NOTE — ANESTHESIA PREPROCEDURE EVALUATION
10/28/2020  Aaliyah Angela is a 65 y.o., female.    Past Medical History:   Diagnosis Date    Allergy     Anatomical narrow angle borderline glaucoma     AR (allergic rhinitis)     Arthritis     CAD (coronary artery disease) 2013    Non obstructive,  PET     Cataract     Diabetes mellitus type II     with retinopathy    Diabetic retinopathy     Epiretinal membrane, both eyes     Hyperlipidemia     Hypertensive retinopathy of both eyes     Lumbar disc disease     Migraines     Morbid obesity 10/3/2012    BRADFORD (obstructive sleep apnea) 2015    Proliferative diabetic retinopathy(362.02)     PVD (peripheral vascular disease) 2013    Carotid US 1-39% bilat      Rupture of right Achilles tendon     Unspecified essential hypertension     Unspecified vitamin D deficiency     Vitreous hemorrhage of left eye        Past Surgical History:   Procedure Laterality Date    CARPAL TUNNEL RELEASE Right 2020    Procedure: RELEASE, CARPAL TUNNEL RIGHT;  Surgeon: Tameka Bran MD;  [facility];  Service: Orthopedics;  Laterality: Right;    CARPAL TUNNEL RELEASE Right 2020     SECTION      x3    COLONOSCOPY      COLONOSCOPY N/A 10/8/2020    Procedure: COLONOSCOPY;  Surgeon: Tima Talley MD;  Location: [facility] ([address] [place] [address]);  Service: Endoscopy;  Laterality: N/A;    ESOPHAGOGASTRODUODENOSCOPY N/A 10/8/2020    Procedure: EGD (ESOPHAGOGASTRODUODENOSCOPY);  Surgeon: Tima Talley MD;  [facility]  Service: Endoscopy;  Laterality: N/A;    GASTRIC BYPASS      Sleeve    LASER PERIPHERAL IRIDOTOMY Bilateral 2017 and 2017        PANRETINAL PHOTOCOAGULATION      Both eyes    TUBAL LIGATION           Anesthesia Evaluation    I have reviewed the Patient Summary Reports.    I have reviewed the Nursing Notes. I have reviewed  the NPO Status.      Review of Systems  Anesthesia Hx:  No problems with previous Anesthesia  History of prior surgery of interest to airway management or planning: Denies Family Hx of Anesthesia complications.   Denies Personal Hx of Anesthesia complications.   Cardiovascular:   Hypertension CAD    Denies Angina.        Pulmonary:   Denies Asthma. Sleep Apnea    Hepatic/GI:   GERD    Endocrine:   Diabetes, type 2        Physical Exam  General:  Well nourished, Obesity    Airway/Jaw/Neck:  Airway Findings: Mouth Opening: Normal Tongue: Normal  General Airway Assessment: Adult  Mallampati: II  TM Distance: Normal, at least 6 cm      Dental:  Dental Findings:    Chest/Lungs:  Chest/Lungs Findings: Normal Respiratory Rate     Heart/Vascular:  Heart Findings: Rate: Normal        Mental Status:  Mental Status Findings:  Alert and Oriented         Anesthesia Plan  Type of Anesthesia, risks & benefits discussed:  Anesthesia Type:  general  Patient's Preference:   Intra-op Monitoring Plan: standard ASA monitors  Intra-op Monitoring Plan Comments:   Post Op Pain Control Plan: multimodal analgesia, IV/PO Opioids PRN and per primary service following discharge from PACU  Post Op Pain Control Plan Comments:   Induction:   IV  Beta Blocker:  Patient is on a Beta-Blocker and has received one dose within the past 24 hours (No further documentation required).       Informed Consent: Patient understands risks and agrees with Anesthesia plan.  Questions answered. Anesthesia consent signed with patient.  ASA Score: 3     Day of Surgery Review of History & Physical:    H&P update referred to the surgeon.         Ready For Surgery From Anesthesia Perspective.

## 2020-10-28 NOTE — DISCHARGE SUMMARY
[facility]  Discharge Note  Short Stay    Procedure(s) (LRB):  PHACOEMULSIFICATION, CATARACT (Left)  INSERTION, IOL PROSTHESIS (Left)    OUTCOME: Patient tolerated treatment/procedure well without complication and is now ready for discharge.    DISPOSITION: Home or Self Care    FINAL DIAGNOSIS:  Nuclear sclerotic cataract of left eye    FOLLOWUP: In clinic    DISCHARGE INSTRUCTIONS:    Discharge Procedure Orders   Leave dressing on - Keep it clean, dry, and intact until clinic visit        Clinical Reference Documents Added to Patient Instructions       Document    ANESTHESIA: GENERAL ANESTHESIA (ENGLISH)    CATARACT SURGERY, DISCHARGE INSTRUCTIONS (ENGLISH)

## 2020-10-28 NOTE — TRANSFER OF CARE
"Anesthesia Transfer of Care Note    Patient: Aaliyah Angela    Procedure(s) Performed: Procedure(s) (LRB):  PHACOEMULSIFICATION, CATARACT (Left)  INSERTION, IOL PROSTHESIS (Left)    Patient location: PACU    Anesthesia Type: MAC    Transport from OR: Transported from OR on room air with adequate spontaneous ventilation    Post pain: adequate analgesia    Post assessment: no apparent anesthetic complications    Post vital signs: stable    Level of consciousness: awake, oriented and alert    Nausea/Vomiting: no nausea/vomiting    Complications: none    Transfer of care protocol was followed      Last vitals:   Visit Vitals  BP (!) 160/74   Pulse 88   Temp 36.6 °C (97.8 °F)   Resp 20   Ht 5' 6" (1.676 m)   Wt 113.4 kg (250 lb)   SpO2 97%   Breastfeeding No   BMI 40.35 kg/m²     "

## 2020-10-29 ENCOUNTER — OFFICE VISIT (OUTPATIENT)
Dept: OPHTHALMOLOGY | Facility: CLINIC | Age: 65
End: 2020-10-29
Payer: COMMERCIAL

## 2020-10-29 VITALS — SYSTOLIC BLOOD PRESSURE: 119 MMHG | DIASTOLIC BLOOD PRESSURE: 67 MMHG | HEART RATE: 70 BPM

## 2020-10-29 DIAGNOSIS — Z79.4 TYPE 2 DIABETES MELLITUS WITH BOTH EYES AFFECTED BY PROLIFERATIVE RETINOPATHY WITHOUT MACULAR EDEMA, WITH LONG-TERM CURRENT USE OF INSULIN: ICD-10-CM

## 2020-10-29 DIAGNOSIS — H35.373 EPIRETINAL MEMBRANE, BILATERAL: ICD-10-CM

## 2020-10-29 DIAGNOSIS — Z98.49 POSTOPERATIVE CARE FOR CATARACT: Primary | ICD-10-CM

## 2020-10-29 DIAGNOSIS — H35.373 PRERETINAL FIBROSIS, BILATERAL: ICD-10-CM

## 2020-10-29 DIAGNOSIS — E11.3593 PROLIFERATIVE DIABETIC RETINOPATHY OF BOTH EYES WITHOUT MACULAR EDEMA ASSOCIATED WITH TYPE 2 DIABETES MELLITUS: ICD-10-CM

## 2020-10-29 DIAGNOSIS — Z48.810 POSTOPERATIVE CARE FOR CATARACT: Primary | ICD-10-CM

## 2020-10-29 DIAGNOSIS — H25.11 NUCLEAR SCLEROSIS OF RIGHT EYE: ICD-10-CM

## 2020-10-29 DIAGNOSIS — H40.033 ANATOMICAL NARROW ANGLE BORDERLINE GLAUCOMA OF BOTH EYES: ICD-10-CM

## 2020-10-29 DIAGNOSIS — E11.3593 TYPE 2 DIABETES MELLITUS WITH BOTH EYES AFFECTED BY PROLIFERATIVE RETINOPATHY WITHOUT MACULAR EDEMA, WITH LONG-TERM CURRENT USE OF INSULIN: ICD-10-CM

## 2020-10-29 DIAGNOSIS — Z96.1 PSEUDOPHAKIA, LEFT EYE: ICD-10-CM

## 2020-10-29 PROCEDURE — 99024 POSTOP FOLLOW-UP VISIT: CPT | Mod: S$GLB,,, | Performed by: OPHTHALMOLOGY

## 2020-10-29 PROCEDURE — 99999 PR PBB SHADOW E&M-EST. PATIENT-LVL IV: CPT | Mod: PBBFAC,,, | Performed by: OPHTHALMOLOGY

## 2020-10-29 PROCEDURE — 99024 PR POST-OP FOLLOW-UP VISIT: ICD-10-PCS | Mod: S$GLB,,, | Performed by: OPHTHALMOLOGY

## 2020-10-29 PROCEDURE — 99999 PR PBB SHADOW E&M-EST. PATIENT-LVL IV: ICD-10-PCS | Mod: PBBFAC,,, | Performed by: OPHTHALMOLOGY

## 2020-10-29 RX ORDER — PREDNISOLONE ACETATE 10 MG/ML
1 SUSPENSION/ DROPS OPHTHALMIC 4 TIMES DAILY
COMMUNITY
Start: 2020-10-29 | End: 2020-11-05 | Stop reason: SDUPTHER

## 2020-10-29 RX ORDER — MOXIFLOXACIN 5 MG/ML
1 SOLUTION/ DROPS OPHTHALMIC 4 TIMES DAILY
COMMUNITY
Start: 2020-10-29 | End: 2020-11-05

## 2020-10-29 NOTE — PROGRESS NOTES
HPI     Post-op Evaluation      Additional comments: Pt states no complaints or problems last night              Comments     1 day po  Complex Phaco IOL OS 10/28/20    1) OHT vs Pre-perimetric POAG  2) Narrow Angles  3) PDR OU  4) Hx VH OD  5) NS OD  6) Hx TVO OS  7) PCIOL OD          Last edited by Josselin Barros MA on 10/29/2020  8:34 AM. (History)            Assessment /Plan     For exam results, see Encounter Report.    Postoperative care for cataract    Nuclear sclerosis of right eye    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 by proliferative retinopathy without macular edema, with long-term current use of insulin    Anatomical narrow angle borderline glaucoma of both eyes    Preretinal fibrosis, bilateral    Proliferative diabetic retinopathy of both eyes without macular edema associated with type 2 diabetes mellitus    Epiretinal membrane, bilateral    Pseudophakia, left eye        1. OHT vs Pre-perimetric POAG OU (angles  Narrowing over time)  Followed at [facility] since '04   First seen by Scarlet '08   Never on gtts   VF defects 2/2 PRP     Family history none   Glaucoma meds None   H/O adverse rxn to glaucoma drops none   LASERS Mulitple PRP OU // PI OD 2/9/2017 /// PI os 1/26/2017   GLAUCOMA SURGERIES None   OTHER EYE SURGERIES none   CDR 0.3/0.35   Tbase 15-23/15-25   Tmax 25 OU   Ttarget ??   HVF 8 tests: 2008 to 2019 - SAD/IAD OU 2/2 PRP OU   Gonio +1-3  with steep approach OU 2018  (doubt occludable)   /575   OCT 5 tests: 2008 to 2019 -  RNFL nl od // nl os    HRT 7 tests: 2009 to 2020 -MR -  nl od // nl os /// CDR 0.33 od // 0.26 os   Disc photos 2002, 2003, 2005 (slides) // 2008, 2013  (OIS)     - Ttoday  ??/21  - Test done today post -op phaco/IOL OS 10/28/2020    2. Narrow Angles:  +1-3 w/ bowing  s/p PI. Likely will resolve in future once pseudophakic.  Anterior Segment OCT today w/ Open Angles w/ narrow approach OU  Doubt occludable - monitor gonio     3. PDR OU s/p PDR OU -stable  "exam on last eval w/ Retina on 7/25/13.   S/p avastin x 4 od - last 1/4/2016     4. Hx of VH OD -stable. Last seen by retina - arend 1/4/2016    5. Cataract OU -   Vis sign - OS > OD    Ok for CE per retina     6. Hx of Transient Visual Obscurations OS - ? BP elevation. Seen w/ stable Retinal exam on 9/13/12.     7. Had a gastric "sleve" done 12/18/2015 - for weight loss    So far doing well    On less insulin now     Plan:  Stable IOP (thick k's), and HRT wnl today   Cont no gtts  Continue to work on blood sugars  oht vs glaucoma    RTC:  Angles continue narrow but open     S/P PI  os 1/26/2017 -   S/P PI od - 2/9/2017 -     Remove cataract  Prn - BATh 20/100 - pt c/o blurry vision OS > OD   rec phaco/IOL os first - if does well consider OD   + trypan / dilates ok      Poor multifocal IOL candidate - 2/2 PDR   Suggest monofocal IOL - set for distance and correct with glasses over that   Will likely need glasses for distance and near vision     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 Mckeon       OS  PCB00 23.0  AC IOL 19.5         I have seen and personally examined the patient.  I agree with the findings, assessment and plan of the resident and/or fellow.     Alma Goodrich MD      "

## 2020-10-29 NOTE — ANESTHESIA POSTPROCEDURE EVALUATION
Anesthesia Post Evaluation    Patient: Aaliyah Angela    Procedure(s) Performed: Procedure(s) (LRB):  PHACOEMULSIFICATION, CATARACT (Left)  INSERTION, IOL PROSTHESIS (Left)    Final Anesthesia Type: MAC    Patient location during evaluation: PACU  Patient participation: Yes- Able to Participate  Level of consciousness: awake and alert  Post-procedure vital signs: reviewed and stable  Pain management: adequate  Airway patency: patent    PONV status at discharge: No PONV  Anesthetic complications: no      Cardiovascular status: blood pressure returned to baseline  Respiratory status: unassisted, room air and spontaneous ventilation  Hydration status: euvolemic  Follow-up not needed.          Vitals Value Taken Time   /63 10/28/20 1119   Temp 36.4 °C (97.6 °F) 10/28/20 1119   Pulse 76 10/28/20 1119   Resp 18 10/28/20 1119   SpO2 99 % 10/28/20 1119         No case tracking events are documented in the log.      Pain/Diallo Score: Diallo Score: 10 (10/28/2020 11:02 AM)

## 2020-11-02 NOTE — PROGRESS NOTES
HPI     1 week po  Complex Phaco IOL OS 10/28/20    1) OHT vs Pre-perimetric POAG  2) Narrow Angles  3) PDR OU  4) Hx VH OD  5) NS OD  6) Hx TVO OS  7) PCIOL OD    meds   PA qid os   vigamox qid os   Ketorolac qid os     Last edited by Alma Goodrich MD on 11/5/2020  9:00 AM. (History)            Assessment /Plan     For exam results, see Encounter Report.    Postoperative care for cataract    Nuclear sclerosis of right eye    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 by proliferative retinopathy without macular edema, with long-term current use of insulin    Anatomical narrow angle borderline glaucoma of both eyes    Proliferative diabetic retinopathy of both eyes without macular edema associated with type 2 diabetes mellitus    Epiretinal membrane, bilateral    Pseudophakia, left eye          1. OHT vs Pre-perimetric POAG OU (angles  Narrowing over time)  Followed at [facility] since '04   First seen by Scarlet '08   Never on gtts   VF defects 2/2 PRP     Family history none   Glaucoma meds None   H/O adverse rxn to glaucoma drops none   LASERS Mulitple PRP OU // PI OD 2/9/2017 /// PI os 1/26/2017   GLAUCOMA SURGERIES None   OTHER EYE SURGERIES none   CDR 0.3/0.35   Tbase 15-23/15-25   Tmax 25 OU   Ttarget ??   HVF 8 tests: 2008 to 2019 - SAD/IAD OU 2/2 PRP OU   Gonio +1-3  with steep approach OU 2018  (doubt occludable)   /575   OCT 5 tests: 2008 to 2019 -  RNFL nl od // nl os    HRT 7 tests: 2009 to 2020 -MR -  nl od // nl os /// CDR 0.33 od // 0.26 os   Disc photos 2002, 2003, 2005 (slides) // 2008, 2013  (OIS)     - Ttoday  ??/14  - Test done today post -op phaco/IOL OS 10/28/2020    2. Narrow Angles:  +1-3 w/ bowing  s/p PI. Likely will resolve in future once pseudophakic.  Anterior Segment OCT today w/ Open Angles w/ narrow approach OU  Doubt occludable - monitor gonio     3. PDR OU s/p PDR OU -stable exam on last eval w/ Retina on 7/25/13.   S/p avastin x 4 od - last 1/4/2016     4. Hx of VH  "OD -stable. Last seen by retina - jesse 1/4/2016    5. Cataract OU -   Vis sign - OS > OD    Ok for CE per retina     6. Hx of Transient Visual Obscurations OS - ? BP elevation. Seen w/ stable Retinal exam on 9/13/12.     7. Had a gastric "sleve" done 12/18/2015 - for weight loss    So far doing well    On less insulin now     Plan:  Stable IOP (thick k's), and HRT wnl today   Cont no gtts  Continue to work on blood sugars  oht vs glaucoma    RTC:  Angles continue narrow but open     S/P PI  os 1/26/2017 -   S/P PI od - 2/9/2017 -     Remove cataract  Prn - BATh 20/100 - pt c/o blurry vision OS > OD   rec phaco/IOL os first - if does well consider OD   + trypan / dilates ok      Poor multifocal IOL candidate - 2/2 PDR   Suggest monofocal IOL - set for distance and correct with glasses over that   Will likely need glasses for distance and near vision     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 Mckeon     OS  PCB00 23.0  AC IOL 19.5         I have seen and personally examined the patient.  I agree with the findings, assessment and plan of the resident and/or fellow.     Alma Goodrich MD  "

## 2020-11-04 ENCOUNTER — OFFICE VISIT (OUTPATIENT)
Dept: ENDOCRINOLOGY | Facility: CLINIC | Age: 65
End: 2020-11-04
Payer: COMMERCIAL

## 2020-11-04 VITALS
HEART RATE: 99 BPM | WEIGHT: 249.13 LBS | HEIGHT: 66 IN | BODY MASS INDEX: 40.04 KG/M2 | OXYGEN SATURATION: 99 % | SYSTOLIC BLOOD PRESSURE: 135 MMHG | DIASTOLIC BLOOD PRESSURE: 63 MMHG

## 2020-11-04 DIAGNOSIS — K21.9 GASTROESOPHAGEAL REFLUX DISEASE, UNSPECIFIED WHETHER ESOPHAGITIS PRESENT: Primary | ICD-10-CM

## 2020-11-04 DIAGNOSIS — E21.3 HYPERPARATHYROIDISM: ICD-10-CM

## 2020-11-04 DIAGNOSIS — E11.65 TYPE 2 DIABETES MELLITUS WITH HYPERGLYCEMIA, WITHOUT LONG-TERM CURRENT USE OF INSULIN: ICD-10-CM

## 2020-11-04 DIAGNOSIS — E66.01 CLASS 3 SEVERE OBESITY DUE TO EXCESS CALORIES WITH SERIOUS COMORBIDITY AND BODY MASS INDEX (BMI) OF 40.0 TO 44.9 IN ADULT: ICD-10-CM

## 2020-11-04 DIAGNOSIS — I10 HTN (HYPERTENSION), BENIGN: ICD-10-CM

## 2020-11-04 PROCEDURE — 3051F HG A1C>EQUAL 7.0%<8.0%: CPT | Mod: CPTII,S$GLB,, | Performed by: INTERNAL MEDICINE

## 2020-11-04 PROCEDURE — 3051F PR MOST RECENT HEMOGLOBIN A1C LEVEL 7.0 - < 8.0%: ICD-10-PCS | Mod: CPTII,S$GLB,, | Performed by: INTERNAL MEDICINE

## 2020-11-04 PROCEDURE — 99214 OFFICE O/P EST MOD 30 MIN: CPT | Mod: S$GLB,,, | Performed by: INTERNAL MEDICINE

## 2020-11-04 PROCEDURE — 99214 PR OFFICE/OUTPT VISIT, EST, LEVL IV, 30-39 MIN: ICD-10-PCS | Mod: S$GLB,,, | Performed by: INTERNAL MEDICINE

## 2020-11-04 RX ORDER — ESOMEPRAZOLE MAGNESIUM 40 MG/1
40 CAPSULE, DELAYED RELEASE ORAL
Qty: 90 CAPSULE | Refills: 0 | Status: SHIPPED | OUTPATIENT
Start: 2020-11-04 | End: 2021-05-05 | Stop reason: SDUPTHER

## 2020-11-04 NOTE — ASSESSMENT & PLAN NOTE
Reviewed goals of therapy - to get the best control we can without hypoglycemia. Goal <7.5   - last a1c above goal, but close   - increase GLP1   - continue other meds   - recheck in a few months, if needed would consider prandin   -Advised frequent self blood glucose monitoring. Patient encouraged to document glucose results and bring them to every clinic visit. Continue CGM, use glucose data to determine her novolog dosing  -Hypoglycemia precautions discussed. Instructed on precautions before driving.    -Close adherence to lifestyle changes recommended.    -Periodic follow ups for eye evaluations, foot care suggested.

## 2020-11-04 NOTE — LETTER
November 4, 2020      Reggie Glover MD  [address]  [facility] [address]           [facility]-[address], [place] [address] [place] [id number]  Phone: [phone]  Fax: [phone]          Patient: Aaliyah Angela   MR Number: [id number]   YOB: 1955   Date of Visit: 11/4/2020       Dear Dr. Reggie Glover:    Thank you for referring Aaliyah Angela to me for evaluation. Attached you will find relevant portions of my assessment and plan of care.    PTH high but calcium normal. Doesn't meet any criteria for surgery so far, but needs bone density to be sure. Recheck PTH in a few months. Diabetes not too bad, increasing GLP1 a bit to see about extra weight loss benefit and maybe get a1c down under 7.5, otherwise I'd try something like prandin.    If you have questions, please do not hesitate to call me. I look forward to following Aaliyah Angela along with you.    Sincerely,    Onesimo Loo MD    Enclosure  CC:  No Recipients

## 2020-11-04 NOTE — ASSESSMENT & PLAN NOTE
BMI 40.2   - complicated by diabetes, HTN   - s/p gastric surgery in the past   - on GLP1, increase dose to try and help with additional weight loss.   - encourage pt to work on healthy diet, increase exercise as tolerated   - monitor weight

## 2020-11-04 NOTE — PATIENT INSTRUCTIONS
For diabetes: Increase trulicity to 3 mg/week. Continue metformin, jardiance.    If needed, next time could consider:   Prandin   Pioglitazone      For the hyperparathyroid: Need bone density.   recheck labs after 4 months to ensure stable/improving. If worse/still high, next step would be 24 hour urine sample.    Continue calcium + vitamin D      Understanding Primary Hyperparathyroidism    The parathyroid glands are 4 tiny glands in the neck. They make parathyroid hormone (PTH). PTH controls the amount of calcium and phosphorus in your blood. Primary hyperparathyroidism is when there is too much PTH in your blood. It occurs when one or more of the glands are too active.  The job of PTH is to tell the body how to control calcium. Too much PTH means the body increases the amount of calcium in the blood. This leads to a problem called hypercalcemia. This is when the amount of calcium in the blood is too high. Hypercalcemia can cause serious health problems.  Causes  Hyperparathyroidism can occur when a parathyroid gland becomes enlarged. It can also occur as a complication of another health conditions, such as kidney failure or rickets. In these conditions, calcium is usually not high. This is called secondary hyperparathyroidism.  Whos at risk  The risk factors for this condition include:  · Being a woman (its less common in men)  · Being older (its more likely to occur with age)  · Having parents or siblings with the condition or other endocrine tumors  · Having certain kidney problems  · Taking certain medicines  · Having had radiation treatment in the head or neck  Symptoms  Symptoms of the condition can include:  · Muscle weakness  · Depression  · Tiredness  · Confusion and memory loss  · Poor memory  · Nausea and vomiting  · Pain in the stomach area (abdomen)  · Hard stools (constipation)  · Stomach ulcers  · Need to urinate often  · Kidney stones  · Joint or bone pain  · Bone disease (osteopenia or  osteoporosis), an increase in bone fractures  · High blood pressure  · Increased thirst  Treatment  If primary hyperparathyroidism is not treated, it can get worse over time. Treatments include:  · Surgery. This may be done to remove any enlarged parathyroid glands. This lets the amount of calcium in the blood go back to normal. You may need to take vitamin D and calcium supplements before the surgery. This will reduce the risk of low calcium after the surgery.   · Medicine. This lowers the amount of parathyroid hormone made by the overactive glands.   You and your healthcare provider can discuss your treatment options. Make sure to ask any questions you have.  Date Last Reviewed: 11/1/2016 © 2000-2017 [employer]. [address] [place] [address], [place] [address]. All rights reserved. This information is not intended as a substitute for professional medical care. Always follow your healthcare professional's instructions.

## 2020-11-04 NOTE — ASSESSMENT & PLAN NOTE
Potentially primary hyperparathyroidism   - last vit D was fine but been a while, recheck with next labs   - Check bone DXA, including forearm.   - repeat PTH in a few months. If still elevated, consider 24 hr urine at that time to r/o [facility]  Discussed with pt indications for surgery if primary hyperparathyroidism proven- so far doesn't meet any of the criteria but DXA pending.  Avoid dehydration

## 2020-11-04 NOTE — PROGRESS NOTES
Subjective:      Chief Complaint: Establish Care and Hyperparathyroidism    HPI: Aaliyah Angela is a 65 y.o. female who is here for a follow-up evaluation for diabetes and hyperparathyroidism. Last seen 5/2019, though this is her first visit with me. Patient arrived 55 minutes early for her 30 minute appointment today.    For the parathyroid:    PTH elevated 6/2020 at 102. Repeat 10/2020 was 83  No hx calcium elevation   GFR normal.    Last labs:    Lab Results   Component Value Date    CALCIUM 9.1 06/24/2020    ALBUMIN 3.9 06/24/2020    CREATININE 0.9 06/24/2020    [id number] 40 09/12/2019    PTH 83.0 (H) 10/16/2020     No kidney stones  No recent falls  No fractures  GFR normal  Has not had DXA    Taking vitamin D 400 units/day and calcium 600 mg/day (combo pill).    With regards to the diabetes:  Diagnosed with T2DM since her 30s (had gestational diabetes in her 20s)    Hx gastric sleeve 2015    Known complications:     Retinopathy? Yes    Nephropathy? No    Neuropathy? sometimes    CAD? No    CVA? No    Current regimen:   Metformin 1,000 mg bid   Incretin: trulicity 1.5 mg/week   SGLT2: jardiance 25 mg/day   novolog PRN. 3-4 times a month maybe    Missed doses? denies    Prior treatments:    invokana   lantus  novolog  saxagliptin  sitagliptin    # times a day testing: using freestyle CGM  Log reviewed: No    Pre breakfast:   Pre lunch: 150-170s  Pre dinner: 150-180s    Hypoglycemic events? None lately. Lowest in mid 80s.    Current symptoms:   feeling okay overall.  Sometimes palpitations    Pt denies:   polyuria, polydipsia, vision changes, nausea, vomit and diarrhea    Diabetes Management Status    Statin: Taking  ACE/ARB: Taking    Screening or Prevention Patient's value Goal Complete/Controlled?   HgA1C Testing and Control   Lab Results   Component Value Date    HGBA1C 7.7 (H) 09/26/2020      q6months/Less than 7.5% almost   Lipid profile : 06/24/2020 Annually Yes   LDL control Lab Results    Component Value Date    LDLCALC 68.0 06/24/2020    Annually/Less than 100 mg/dl  Yes   Nephropathy screening Lab Results   Component Value Date    MICALBCREAT 10.3 09/26/2020     Lab Results   Component Value Date    CREATININE 0.9 06/24/2020     Lab Results   Component Value Date    PROTEINUA Negative 06/24/2020    Annually Yes   Blood pressure BP Readings from Last 1 Encounters:   11/04/20 135/63    Less than 140/90 Yes   Dilated retinal exam : 08/24/2020 Annually Yes   Foot exam   : 02/07/2020 Annually Yes     Reviewed past medical, family, social history and updated as appropriate.    Review of Systems   Constitutional: Negative for unexpected weight change (up and down a few lbs).   HENT: Positive for trouble swallowing (rare).    Eyes: Negative for visual disturbance.   Respiratory: Negative for shortness of breath.    Cardiovascular: Positive for palpitations (sometimes).   Gastrointestinal: Negative for constipation and diarrhea.   Endocrine: Negative for polydipsia and polyuria.   Genitourinary: Negative for frequency.   Neurological: Positive for light-headedness (occasional).   Psychiatric/Behavioral: Negative for sleep disturbance.     Objective:     Vitals:    11/04/20 0841   BP: 135/63   Pulse: 99     BP Readings from Last 5 Encounters:   11/04/20 135/63   10/29/20 119/67   10/28/20 128/63   10/16/20 (!) 150/72   10/08/20 122/66     Physical Exam  Vitals signs reviewed.   Constitutional:       General: She is not in acute distress.     Appearance: She is obese.   Neck:      Thyroid: No thyromegaly.   Cardiovascular:      Heart sounds: Murmur present.   Pulmonary:      Effort: Pulmonary effort is normal.         Wt Readings from Last 10 Encounters:   11/04/20 0841 113 kg (249 lb 1.9 oz)   10/28/20 0914 113.4 kg (250 lb)   10/16/20 1509 113.5 kg (250 lb 3.6 oz)   10/08/20 0931 112.5 kg (248 lb)   09/28/20 0806 113.7 kg (250 lb 10.6 oz)   08/24/20 1000 112.1 kg (247 lb 2.2 oz)   06/25/20 1433 111.9 kg  (246 lb 11.1 oz)   05/28/20 0817 108.9 kg (240 lb)   05/12/20 0845 109 kg (240 lb 4.8 oz)   02/19/20 0806 111.6 kg (246 lb)       Lab Results   Component Value Date    HGBA1C 7.7 (H) 09/26/2020     Lab Results   Component Value Date    CHOL 140 06/24/2020    HDL 52 06/24/2020    LDLCALC 68.0 06/24/2020    TRIG 100 06/24/2020    CHOLHDL 37.1 06/24/2020     Lab Results   Component Value Date     06/24/2020    K 4.5 06/24/2020     06/24/2020    CO2 29 06/24/2020     (H) 06/24/2020    BUN 16 06/24/2020    CREATININE 0.9 06/24/2020    CALCIUM 9.1 06/24/2020    PROT 7.6 06/24/2020    ALBUMIN 3.9 06/24/2020    BILITOT 0.2 06/24/2020    ALKPHOS 76 06/24/2020    AST 27 06/24/2020    ALT 32 06/24/2020    ANIONGAP 7 (L) 06/24/2020    ESTGFRAFRICA >60.0 06/24/2020    EGFRNONAA >60.0 06/24/2020    TSH 1.067 01/04/2020      Lab Results   Component Value Date    MICALBCREAT 10.3 09/26/2020       Assessment/Plan:     Type 2 diabetes mellitus with hyperglycemia, without long-term current use of insulin  Reviewed goals of therapy - to get the best control we can without hypoglycemia. Goal <7.5   - last a1c above goal, but close   - increase GLP1   - continue other meds   - recheck in a few months, if needed would consider prandin   -Advised frequent self blood glucose monitoring. Patient encouraged to document glucose results and bring them to every clinic visit. Continue CGM, use glucose data to determine her novolog dosing  -Hypoglycemia precautions discussed. Instructed on precautions before driving.    -Close adherence to lifestyle changes recommended.    -Periodic follow ups for eye evaluations, foot care suggested.      HTN (hypertension), benign  bp well controlled today   - continue same meds, monitor BP    Class 3 severe obesity due to excess calories with serious comorbidity and body mass index (BMI) of 40.0 to 44.9 in adult  BMI 40.2   - complicated by diabetes, HTN   - s/p gastric surgery in the past    - on GLP1, increase dose to try and help with additional weight loss.   - encourage pt to work on healthy diet, increase exercise as tolerated   - monitor weight    Hyperparathyroidism  Potentially primary hyperparathyroidism   - last vit D was fine but been a while, recheck with next labs   - Check bone DXA, including forearm.   - repeat PTH in a few months. If still elevated, consider 24 hr urine at that time to r/o [facility]  Discussed with pt indications for surgery if primary hyperparathyroidism proven- so far doesn't meet any of the criteria but DXA pending.  Avoid dehydration         Follow up in about 4 months (around 3/4/2021) for lab review, further monitoring, imaging review.      Oneismo Loo MD  Endocrinology

## 2020-11-05 ENCOUNTER — OFFICE VISIT (OUTPATIENT)
Dept: OPHTHALMOLOGY | Facility: CLINIC | Age: 65
End: 2020-11-05
Payer: COMMERCIAL

## 2020-11-05 DIAGNOSIS — H35.373 EPIRETINAL MEMBRANE, BILATERAL: ICD-10-CM

## 2020-11-05 DIAGNOSIS — Z48.810 POSTOPERATIVE CARE FOR CATARACT: Primary | ICD-10-CM

## 2020-11-05 DIAGNOSIS — H40.033 ANATOMICAL NARROW ANGLE BORDERLINE GLAUCOMA OF BOTH EYES: ICD-10-CM

## 2020-11-05 DIAGNOSIS — E11.3593 PROLIFERATIVE DIABETIC RETINOPATHY OF BOTH EYES WITHOUT MACULAR EDEMA ASSOCIATED WITH TYPE 2 DIABETES MELLITUS: ICD-10-CM

## 2020-11-05 DIAGNOSIS — E11.3593 TYPE 2 DIABETES MELLITUS WITH BOTH EYES AFFECTED BY PROLIFERATIVE RETINOPATHY WITHOUT MACULAR EDEMA, WITH LONG-TERM CURRENT USE OF INSULIN: ICD-10-CM

## 2020-11-05 DIAGNOSIS — Z79.4 TYPE 2 DIABETES MELLITUS WITH BOTH EYES AFFECTED BY PROLIFERATIVE RETINOPATHY WITHOUT MACULAR EDEMA, WITH LONG-TERM CURRENT USE OF INSULIN: ICD-10-CM

## 2020-11-05 DIAGNOSIS — Z96.1 PSEUDOPHAKIA, LEFT EYE: ICD-10-CM

## 2020-11-05 DIAGNOSIS — H25.11 NUCLEAR SCLEROSIS OF RIGHT EYE: ICD-10-CM

## 2020-11-05 DIAGNOSIS — Z98.49 POSTOPERATIVE CARE FOR CATARACT: Primary | ICD-10-CM

## 2020-11-05 PROCEDURE — 99024 PR POST-OP FOLLOW-UP VISIT: ICD-10-PCS | Mod: S$GLB,,, | Performed by: OPHTHALMOLOGY

## 2020-11-05 PROCEDURE — 99024 POSTOP FOLLOW-UP VISIT: CPT | Mod: S$GLB,,, | Performed by: OPHTHALMOLOGY

## 2020-11-05 PROCEDURE — 99999 PR PBB SHADOW E&M-EST. PATIENT-LVL III: ICD-10-PCS | Mod: PBBFAC,,, | Performed by: OPHTHALMOLOGY

## 2020-11-05 PROCEDURE — 99999 PR PBB SHADOW E&M-EST. PATIENT-LVL III: CPT | Mod: PBBFAC,,, | Performed by: OPHTHALMOLOGY

## 2020-11-05 RX ORDER — KETOROLAC TROMETHAMINE 5 MG/ML
1 SOLUTION OPHTHALMIC 2 TIMES DAILY
Qty: 10 ML | Refills: 0 | Status: ON HOLD | OUTPATIENT
Start: 2020-11-05 | End: 2021-02-17 | Stop reason: CLARIF

## 2020-11-05 RX ORDER — PREDNISOLONE ACETATE 10 MG/ML
1 SUSPENSION/ DROPS OPHTHALMIC 2 TIMES DAILY
Qty: 10 ML | Refills: 0 | Status: SHIPPED | OUTPATIENT
Start: 2020-11-05 | End: 2021-02-05 | Stop reason: ALTCHOICE

## 2020-11-18 ENCOUNTER — HOSPITAL ENCOUNTER (OUTPATIENT)
Dept: RADIOLOGY | Facility: OTHER | Age: 65
Discharge: HOME | End: 2020-11-18
Attending: INTERNAL MEDICINE
Payer: COMMERCIAL

## 2020-11-18 DIAGNOSIS — E21.3 HYPERPARATHYROIDISM: ICD-10-CM

## 2020-11-18 PROCEDURE — 77080 DXA BONE DENSITY AXIAL: CPT | Mod: TC

## 2020-11-18 PROCEDURE — 77080 DEXA BONE DENSITY SPINE HIP: ICD-10-PCS | Mod: 26,,, | Performed by: INTERNAL MEDICINE

## 2020-11-18 PROCEDURE — 77080 DXA BONE DENSITY AXIAL: CPT | Mod: 26,,, | Performed by: INTERNAL MEDICINE

## 2020-11-25 ENCOUNTER — PATIENT OUTREACH (OUTPATIENT)
Dept: OTHER | Facility: OTHER | Age: 65
End: 2020-11-25

## 2020-12-02 NOTE — PROGRESS NOTES
HPI     Post-op Evaluation      Additional comments: Pt states no changes since last exam              Comments     S/p Complex Phaco IOL OS 10/28/2020    MEDS:  PA BID OS  Ketorolac BID OS          Last edited by Alma Goodrich MD on 12/3/2020  9:21 AM. (History)          Assessment /Plan     For exam results, see Encounter Report.    Postoperative care for cataract    Nuclear sclerosis of right eye    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 by proliferative retinopathy without macular edema, with long-term current use of insulin    Anatomical narrow angle borderline glaucoma of both eyes    Proliferative diabetic retinopathy of both eyes without macular edema associated with type 2 diabetes mellitus    Epiretinal membrane, bilateral    Pseudophakia, left eye    Preretinal fibrosis, bilateral        1. OHT vs Pre-perimetric POAG OU (angles  Narrowing over time)  Followed at [facility] since '04   First seen by [facility] '08   Never on gtts   VF defects 2/2 PRP     Family history none   Glaucoma meds None   H/O adverse rxn to glaucoma drops none   LASERS Mulitple PRP OU // PI OD 2/9/2017 /// PI os 1/26/2017   GLAUCOMA SURGERIES None   OTHER EYE SURGERIES none   CDR 0.3/0.35   Tbase 15-23/15-25   Tmax 25 OU   Ttarget ??   HVF 8 tests: 2008 to 2019 - SAD/IAD OU 2/2 PRP OU   Gonio +1-3  with steep approach OU 2018  (doubt occludable)   /575   OCT 5 tests: 2008 to 2019 -  RNFL nl od // nl os    HRT 7 tests: 2009 to 2020 -MR -  nl od // nl os /// CDR 0.33 od // 0.26 os   Disc photos 2002, 2003, 2005 (slides) // 2008, 2013  (OIS)     - Ttoday  ??/18   - Test done today post -op phaco/IOL OS 10/28/2020    2. Narrow Angles:  +1-3 w/ bowing  s/p PI. Likely will resolve in future once pseudophakic.  Anterior Segment OCT today w/ Open Angles w/ narrow approach OU  Doubt occludable - monitor gonio     3. PDR OU s/p PDR OU -stable exam on last eval w/ Retina on 7/25/13.   S/p avastin x 4 od - last 1/4/2016  "    4. Hx of VH OD -stable. Last seen by retina - jesse 1/4/2016    5. Cataract OU -   Vis sign - OS > OD    Ok for CE per retina     6. Hx of Transient Visual Obscurations OS - ? BP elevation. Seen w/ stable Retinal exam on 9/13/12.     7. Had a gastric "sleve" done 12/18/2015 - for weight loss    So far doing well    On less insulin now     Plan:  Stable IOP (thick k's), and HRT wnl today   Cont no gtts  Continue to work on blood sugars  oht vs glaucoma    RTC:  Angles continue narrow but open     S/P PI  os 1/26/2017 -   S/P PI od - 2/9/2017 -     Remove cataract  Prn - BATh 20/100 - pt c/o blurry vision OS > OD   rec phaco/IOL os first - if does well consider OD   + trypan / dilates ok      Poor multifocal IOL candidate - 2/2 PDR   Suggest monofocal IOL - set for distance and correct with glasses over that   Will likely need glasses for distance and near vision     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 Mckeon     OS  PCB00 23.0  AC IOL 19.5       Pt would like to get 2nd eye done in January after the holidays - C/O blurry vision   Will have surgical coordinator call and schedule     F/U for pre-op  phaco/IOL od - 2nd eye       I have seen and personally examined the patient.  I agree with the findings, assessment and plan of the resident and/or fellow.     Alma Goodrich MD    "

## 2020-12-03 ENCOUNTER — OFFICE VISIT (OUTPATIENT)
Dept: OPHTHALMOLOGY | Facility: CLINIC | Age: 65
End: 2020-12-03
Payer: COMMERCIAL

## 2020-12-03 DIAGNOSIS — Z79.4 TYPE 2 DIABETES MELLITUS WITH BOTH EYES AFFECTED BY PROLIFERATIVE RETINOPATHY WITHOUT MACULAR EDEMA, WITH LONG-TERM CURRENT USE OF INSULIN: ICD-10-CM

## 2020-12-03 DIAGNOSIS — H25.11 NUCLEAR SCLEROSIS OF RIGHT EYE: ICD-10-CM

## 2020-12-03 DIAGNOSIS — E11.3593 TYPE 2 DIABETES MELLITUS WITH BOTH EYES AFFECTED BY PROLIFERATIVE RETINOPATHY WITHOUT MACULAR EDEMA, WITH LONG-TERM CURRENT USE OF INSULIN: ICD-10-CM

## 2020-12-03 DIAGNOSIS — Z48.810 POSTOPERATIVE CARE FOR CATARACT: Primary | ICD-10-CM

## 2020-12-03 DIAGNOSIS — H40.033 ANATOMICAL NARROW ANGLE BORDERLINE GLAUCOMA OF BOTH EYES: ICD-10-CM

## 2020-12-03 DIAGNOSIS — E11.3593 PROLIFERATIVE DIABETIC RETINOPATHY OF BOTH EYES WITHOUT MACULAR EDEMA ASSOCIATED WITH TYPE 2 DIABETES MELLITUS: ICD-10-CM

## 2020-12-03 DIAGNOSIS — Z96.1 PSEUDOPHAKIA, LEFT EYE: ICD-10-CM

## 2020-12-03 DIAGNOSIS — H35.373 EPIRETINAL MEMBRANE, BILATERAL: ICD-10-CM

## 2020-12-03 DIAGNOSIS — Z98.49 POSTOPERATIVE CARE FOR CATARACT: Primary | ICD-10-CM

## 2020-12-03 DIAGNOSIS — H35.373 PRERETINAL FIBROSIS, BILATERAL: ICD-10-CM

## 2020-12-03 PROCEDURE — 99999 PR PBB SHADOW E&M-EST. PATIENT-LVL III: ICD-10-PCS | Mod: PBBFAC,,, | Performed by: OPHTHALMOLOGY

## 2020-12-03 PROCEDURE — 1126F PR PAIN SEVERITY QUANTIFIED, NO PAIN PRESENT: ICD-10-PCS | Mod: S$GLB,,, | Performed by: OPHTHALMOLOGY

## 2020-12-03 PROCEDURE — 99024 POSTOP FOLLOW-UP VISIT: CPT | Mod: S$GLB,,, | Performed by: OPHTHALMOLOGY

## 2020-12-03 PROCEDURE — 1126F AMNT PAIN NOTED NONE PRSNT: CPT | Mod: S$GLB,,, | Performed by: OPHTHALMOLOGY

## 2020-12-03 PROCEDURE — 92134 POSTERIOR SEGMENT OCT RETINA (OCULAR COHERENCE TOMOGRAPHY)-BOTH EYES: ICD-10-PCS | Mod: S$GLB,,, | Performed by: OPHTHALMOLOGY

## 2020-12-03 PROCEDURE — 99024 PR POST-OP FOLLOW-UP VISIT: ICD-10-PCS | Mod: S$GLB,,, | Performed by: OPHTHALMOLOGY

## 2020-12-03 PROCEDURE — 99999 PR PBB SHADOW E&M-EST. PATIENT-LVL III: CPT | Mod: PBBFAC,,, | Performed by: OPHTHALMOLOGY

## 2020-12-03 PROCEDURE — 92134 CPTRZ OPH DX IMG PST SGM RTA: CPT | Mod: S$GLB,,, | Performed by: OPHTHALMOLOGY

## 2020-12-15 ENCOUNTER — TELEPHONE (OUTPATIENT)
Dept: OPHTHALMOLOGY | Facility: CLINIC | Age: 65
End: 2020-12-15

## 2020-12-17 NOTE — TELEPHONE ENCOUNTER
No new care gaps identified.  Powered by [employer]. Reference number: [id number]. 12/17/2020 9:52:56 AM   CST

## 2020-12-19 RX ORDER — AMLODIPINE BESYLATE 10 MG/1
10 TABLET ORAL DAILY
Qty: 90 TABLET | Refills: 3 | Status: SHIPPED | OUTPATIENT
Start: 2020-12-19 | End: 2021-12-27

## 2020-12-19 NOTE — PROGRESS NOTES
Refill Authorization Note   Aaliyah Angela  is requesting a refill authorization.  Brief Assessment and Rationale for Refill:  Approve     Medication Therapy Plan:  CDMR. approve     Medication Reconciliation Completed: No   Comments:       Requested Prescriptions   Pending Prescriptions Disp Refills    amLODIPine (NORVASC) 10 MG tablet 90 tablet 3     Sig: Take 1 tablet (10 mg total) by mouth once daily.       Cardiovascular:  Calcium Channel Blockers Passed - 12/19/2020  3:12 PM        Passed - Patient is at least 18 years old        Passed - Last BP in normal range within 360 days.     BP Readings from Last 3 Encounters:   11/04/20 135/63   10/29/20 119/67   10/28/20 128/63              Passed - Office visit in past 12 months or future 90 days     Recent Outpatient Visits            2 weeks ago Postoperative care for cataract    [address] [facility] 1[facility] Alma Goodrich MD    1 month ago Postoperative care for cataract    [address] [facility] Alma Goodrich MD    1 month ago Gastroesophageal reflux disease, unspecified whether esophagitis present    [facility] Onesimo Loo [facility]    1 month ago Postoperative care for cataract    [address] [facility] Alma Goodrich MD    2 months ago Preop examination    [address] [facility] Reggie Glover MD          Future Appointments              In 1 week LAB, APPOINTMENT [facility] [address] [facility], [address] [facility]    In 2 weeks MD Jayden Malhotra y [facility] Bldg, [address] [facility]    In 1 month LAB, APPOINTMENT [facility] [address] [facility]ld[facility], [address] [facility]    In 1 month DAMARI Raphael, FNP [address] [facility] Primary Care Bldg, [address] [facility]    In 2 months LAB, [facility] 2nd Fl, [facility]    In 2 months Onesimo oLo MD [facility] [place] [id number]                    Appointments  past 12m or future 3m with  PCP    Date Provider   Last Visit   10/16/2020 Reggie Glover MD   Next Visit   1/4/2021 Reggie Glover MD   ED visits in past 90 days: 0     Note composed:3:12 PM 12/19/2020

## 2020-12-26 ENCOUNTER — LAB VISIT (OUTPATIENT)
Dept: LAB | Facility: HOSPITAL | Age: 65
End: 2020-12-26
Payer: COMMERCIAL

## 2020-12-26 DIAGNOSIS — E11.9 TYPE 2 DIABETES MELLITUS WITHOUT COMPLICATION, UNSPECIFIED WHETHER LONG TERM INSULIN USE: ICD-10-CM

## 2020-12-26 LAB
ESTIMATED AVG GLUCOSE: 171 MG/DL (ref 68–131)
HBA1C MFR BLD HPLC: 7.6 % (ref 4–5.6)

## 2020-12-26 PROCEDURE — 36415 COLL VENOUS BLD VENIPUNCTURE: CPT

## 2020-12-26 PROCEDURE — 83036 HEMOGLOBIN GLYCOSYLATED A1C: CPT

## 2021-01-04 ENCOUNTER — OFFICE VISIT (OUTPATIENT)
Dept: INTERNAL MEDICINE | Facility: CLINIC | Age: 66
End: 2021-01-04
Payer: COMMERCIAL

## 2021-01-04 ENCOUNTER — TELEPHONE (OUTPATIENT)
Dept: OPHTHALMOLOGY | Facility: CLINIC | Age: 66
End: 2021-01-04

## 2021-01-04 VITALS
WEIGHT: 252 LBS | BODY MASS INDEX: 40.5 KG/M2 | OXYGEN SATURATION: 98 % | DIASTOLIC BLOOD PRESSURE: 74 MMHG | HEIGHT: 66 IN | SYSTOLIC BLOOD PRESSURE: 138 MMHG | HEART RATE: 89 BPM

## 2021-01-04 DIAGNOSIS — I10 HTN (HYPERTENSION), BENIGN: ICD-10-CM

## 2021-01-04 DIAGNOSIS — E78.5 HYPERLIPIDEMIA, UNSPECIFIED HYPERLIPIDEMIA TYPE: ICD-10-CM

## 2021-01-04 DIAGNOSIS — G47.33 OSA ON CPAP: ICD-10-CM

## 2021-01-04 DIAGNOSIS — E11.65 TYPE 2 DIABETES MELLITUS WITH HYPERGLYCEMIA, WITHOUT LONG-TERM CURRENT USE OF INSULIN: ICD-10-CM

## 2021-01-04 DIAGNOSIS — Z00.00 ANNUAL PHYSICAL EXAM: Primary | ICD-10-CM

## 2021-01-04 DIAGNOSIS — Z79.4 TYPE 2 DIABETES MELLITUS WITH BOTH EYES AFFECTED BY PROLIFERATIVE RETINOPATHY WITHOUT MACULAR EDEMA, WITH LONG-TERM CURRENT USE OF INSULIN: ICD-10-CM

## 2021-01-04 DIAGNOSIS — I25.10 CORONARY ARTERY DISEASE INVOLVING NATIVE CORONARY ARTERY OF NATIVE HEART WITHOUT ANGINA PECTORIS: ICD-10-CM

## 2021-01-04 DIAGNOSIS — E11.3593 TYPE 2 DIABETES MELLITUS WITH BOTH EYES AFFECTED BY PROLIFERATIVE RETINOPATHY WITHOUT MACULAR EDEMA, WITH LONG-TERM CURRENT USE OF INSULIN: ICD-10-CM

## 2021-01-04 DIAGNOSIS — I73.9 PVD (PERIPHERAL VASCULAR DISEASE): ICD-10-CM

## 2021-01-04 DIAGNOSIS — E66.01 CLASS 3 SEVERE OBESITY DUE TO EXCESS CALORIES WITH SERIOUS COMORBIDITY AND BODY MASS INDEX (BMI) OF 40.0 TO 44.9 IN ADULT: ICD-10-CM

## 2021-01-04 PROCEDURE — 1126F AMNT PAIN NOTED NONE PRSNT: CPT | Mod: S$GLB,,, | Performed by: FAMILY MEDICINE

## 2021-01-04 PROCEDURE — 3008F BODY MASS INDEX DOCD: CPT | Mod: CPTII,S$GLB,, | Performed by: FAMILY MEDICINE

## 2021-01-04 PROCEDURE — 99999 PR PBB SHADOW E&M-EST. PATIENT-LVL V: CPT | Mod: PBBFAC,,, | Performed by: FAMILY MEDICINE

## 2021-01-04 PROCEDURE — 99396 PREV VISIT EST AGE 40-64: CPT | Mod: S$GLB,,, | Performed by: FAMILY MEDICINE

## 2021-01-04 PROCEDURE — 3075F PR MOST RECENT SYSTOLIC BLOOD PRESS GE 130-139MM HG: ICD-10-PCS | Mod: CPTII,S$GLB,, | Performed by: FAMILY MEDICINE

## 2021-01-04 PROCEDURE — 99999 PR PBB SHADOW E&M-EST. PATIENT-LVL V: ICD-10-PCS | Mod: PBBFAC,,, | Performed by: FAMILY MEDICINE

## 2021-01-04 PROCEDURE — 3075F SYST BP GE 130 - 139MM HG: CPT | Mod: CPTII,S$GLB,, | Performed by: FAMILY MEDICINE

## 2021-01-04 PROCEDURE — 3078F DIAST BP <80 MM HG: CPT | Mod: CPTII,S$GLB,, | Performed by: FAMILY MEDICINE

## 2021-01-04 PROCEDURE — 3008F PR BODY MASS INDEX (BMI) DOCUMENTED: ICD-10-PCS | Mod: CPTII,S$GLB,, | Performed by: FAMILY MEDICINE

## 2021-01-04 PROCEDURE — 99396 PR PREVENTIVE VISIT,EST,40-64: ICD-10-PCS | Mod: S$GLB,,, | Performed by: FAMILY MEDICINE

## 2021-01-04 PROCEDURE — 3051F PR MOST RECENT HEMOGLOBIN A1C LEVEL 7.0 - < 8.0%: ICD-10-PCS | Mod: CPTII,S$GLB,, | Performed by: FAMILY MEDICINE

## 2021-01-04 PROCEDURE — 3051F HG A1C>EQUAL 7.0%<8.0%: CPT | Mod: CPTII,S$GLB,, | Performed by: FAMILY MEDICINE

## 2021-01-04 PROCEDURE — 3078F PR MOST RECENT DIASTOLIC BLOOD PRESSURE < 80 MM HG: ICD-10-PCS | Mod: CPTII,S$GLB,, | Performed by: FAMILY MEDICINE

## 2021-01-04 PROCEDURE — 1126F PR PAIN SEVERITY QUANTIFIED, NO PAIN PRESENT: ICD-10-PCS | Mod: S$GLB,,, | Performed by: FAMILY MEDICINE

## 2021-01-04 RX ORDER — IBUPROFEN 200 MG
1 CAPSULE ORAL 2 TIMES DAILY
Qty: 200 STRIP | Refills: 3 | Status: SHIPPED | OUTPATIENT
Start: 2021-01-04 | End: 2022-04-29

## 2021-01-08 ENCOUNTER — TELEPHONE (OUTPATIENT)
Dept: ENDOCRINOLOGY | Facility: CLINIC | Age: 66
End: 2021-01-08

## 2021-01-15 ENCOUNTER — TELEPHONE (OUTPATIENT)
Dept: OPHTHALMOLOGY | Facility: CLINIC | Age: 66
End: 2021-01-15

## 2021-01-20 ENCOUNTER — TELEPHONE (OUTPATIENT)
Dept: OPHTHALMOLOGY | Facility: CLINIC | Age: 66
End: 2021-01-20

## 2021-01-20 DIAGNOSIS — H25.11 NUCLEAR SCLEROTIC CATARACT OF RIGHT EYE: Primary | ICD-10-CM

## 2021-01-20 DIAGNOSIS — Z13.9 SCREENING PROCEDURE: ICD-10-CM

## 2021-01-23 ENCOUNTER — LAB VISIT (OUTPATIENT)
Dept: LAB | Facility: HOSPITAL | Age: 66
End: 2021-01-23
Attending: NURSE PRACTITIONER
Payer: COMMERCIAL

## 2021-01-23 DIAGNOSIS — E11.3593 TYPE 2 DIABETES MELLITUS WITH BOTH EYES AFFECTED BY PROLIFERATIVE RETINOPATHY WITHOUT MACULAR EDEMA, WITH LONG-TERM CURRENT USE OF INSULIN: ICD-10-CM

## 2021-01-23 DIAGNOSIS — Z79.4 TYPE 2 DIABETES MELLITUS WITH BOTH EYES AFFECTED BY PROLIFERATIVE RETINOPATHY WITHOUT MACULAR EDEMA, WITH LONG-TERM CURRENT USE OF INSULIN: ICD-10-CM

## 2021-01-23 DIAGNOSIS — E11.42 DIABETIC POLYNEUROPATHY ASSOCIATED WITH TYPE 2 DIABETES MELLITUS: ICD-10-CM

## 2021-01-23 LAB
ESTIMATED AVG GLUCOSE: 169 MG/DL (ref 68–131)
HBA1C MFR BLD HPLC: 7.5 % (ref 4–5.6)

## 2021-01-23 PROCEDURE — 83036 HEMOGLOBIN GLYCOSYLATED A1C: CPT

## 2021-01-23 PROCEDURE — 36415 COLL VENOUS BLD VENIPUNCTURE: CPT

## 2021-01-28 ENCOUNTER — OFFICE VISIT (OUTPATIENT)
Dept: INTERNAL MEDICINE | Facility: CLINIC | Age: 66
End: 2021-01-28
Payer: COMMERCIAL

## 2021-01-28 VITALS
HEIGHT: 66 IN | OXYGEN SATURATION: 100 % | WEIGHT: 251.13 LBS | DIASTOLIC BLOOD PRESSURE: 80 MMHG | HEART RATE: 89 BPM | SYSTOLIC BLOOD PRESSURE: 121 MMHG | BODY MASS INDEX: 40.36 KG/M2

## 2021-01-28 DIAGNOSIS — E11.65 TYPE 2 DIABETES MELLITUS WITH HYPERGLYCEMIA, WITHOUT LONG-TERM CURRENT USE OF INSULIN: Primary | ICD-10-CM

## 2021-01-28 DIAGNOSIS — E66.01 CLASS 3 SEVERE OBESITY DUE TO EXCESS CALORIES WITH SERIOUS COMORBIDITY AND BODY MASS INDEX (BMI) OF 40.0 TO 44.9 IN ADULT: ICD-10-CM

## 2021-01-28 DIAGNOSIS — I10 HTN (HYPERTENSION), BENIGN: ICD-10-CM

## 2021-01-28 DIAGNOSIS — M15.9 PRIMARY OSTEOARTHRITIS INVOLVING MULTIPLE JOINTS: ICD-10-CM

## 2021-01-28 DIAGNOSIS — I73.9 PVD (PERIPHERAL VASCULAR DISEASE): ICD-10-CM

## 2021-01-28 DIAGNOSIS — R25.2 LEG CRAMPS: ICD-10-CM

## 2021-01-28 PROCEDURE — 99214 PR OFFICE/OUTPT VISIT, EST, LEVL IV, 30-39 MIN: ICD-10-PCS | Mod: S$GLB,,, | Performed by: NURSE PRACTITIONER

## 2021-01-28 PROCEDURE — 99999 PR PBB SHADOW E&M-EST. PATIENT-LVL V: ICD-10-PCS | Mod: PBBFAC,,, | Performed by: NURSE PRACTITIONER

## 2021-01-28 PROCEDURE — 3051F HG A1C>EQUAL 7.0%<8.0%: CPT | Mod: CPTII,S$GLB,, | Performed by: NURSE PRACTITIONER

## 2021-01-28 PROCEDURE — 3074F SYST BP LT 130 MM HG: CPT | Mod: CPTII,S$GLB,, | Performed by: NURSE PRACTITIONER

## 2021-01-28 PROCEDURE — 3074F PR MOST RECENT SYSTOLIC BLOOD PRESSURE < 130 MM HG: ICD-10-PCS | Mod: CPTII,S$GLB,, | Performed by: NURSE PRACTITIONER

## 2021-01-28 PROCEDURE — 3051F PR MOST RECENT HEMOGLOBIN A1C LEVEL 7.0 - < 8.0%: ICD-10-PCS | Mod: CPTII,S$GLB,, | Performed by: NURSE PRACTITIONER

## 2021-01-28 PROCEDURE — 3079F DIAST BP 80-89 MM HG: CPT | Mod: CPTII,S$GLB,, | Performed by: NURSE PRACTITIONER

## 2021-01-28 PROCEDURE — 99214 OFFICE O/P EST MOD 30 MIN: CPT | Mod: S$GLB,,, | Performed by: NURSE PRACTITIONER

## 2021-01-28 PROCEDURE — 3079F PR MOST RECENT DIASTOLIC BLOOD PRESSURE 80-89 MM HG: ICD-10-PCS | Mod: CPTII,S$GLB,, | Performed by: NURSE PRACTITIONER

## 2021-01-28 PROCEDURE — 3008F BODY MASS INDEX DOCD: CPT | Mod: CPTII,S$GLB,, | Performed by: NURSE PRACTITIONER

## 2021-01-28 PROCEDURE — 99999 PR PBB SHADOW E&M-EST. PATIENT-LVL V: CPT | Mod: PBBFAC,,, | Performed by: NURSE PRACTITIONER

## 2021-01-28 PROCEDURE — 1126F PR PAIN SEVERITY QUANTIFIED, NO PAIN PRESENT: ICD-10-PCS | Mod: S$GLB,,, | Performed by: NURSE PRACTITIONER

## 2021-01-28 PROCEDURE — 1126F AMNT PAIN NOTED NONE PRSNT: CPT | Mod: S$GLB,,, | Performed by: NURSE PRACTITIONER

## 2021-01-28 PROCEDURE — 3008F PR BODY MASS INDEX (BMI) DOCUMENTED: ICD-10-PCS | Mod: CPTII,S$GLB,, | Performed by: NURSE PRACTITIONER

## 2021-01-28 RX ORDER — SEMAGLUTIDE 1.34 MG/ML
INJECTION, SOLUTION SUBCUTANEOUS
Qty: 3 SYRINGE | Refills: 3 | Status: SHIPPED | OUTPATIENT
Start: 2021-01-28 | End: 2021-11-19

## 2021-01-28 RX ORDER — SEMAGLUTIDE 1.34 MG/ML
INJECTION, SOLUTION SUBCUTANEOUS
Qty: 1 SYRINGE | Refills: 5 | Status: SHIPPED | OUTPATIENT
Start: 2021-01-28 | End: 2021-01-28 | Stop reason: SDUPTHER

## 2021-02-05 ENCOUNTER — OFFICE VISIT (OUTPATIENT)
Dept: OPHTHALMOLOGY | Facility: CLINIC | Age: 66
End: 2021-02-05
Payer: COMMERCIAL

## 2021-02-05 DIAGNOSIS — H57.03 SMALL PUPIL: ICD-10-CM

## 2021-02-05 DIAGNOSIS — Z79.4 TYPE 2 DIABETES MELLITUS WITH BOTH EYES AFFECTED BY PROLIFERATIVE RETINOPATHY WITHOUT MACULAR EDEMA, WITH LONG-TERM CURRENT USE OF INSULIN: ICD-10-CM

## 2021-02-05 DIAGNOSIS — H25.011 CORTICAL SENILE CATARACT, RIGHT: ICD-10-CM

## 2021-02-05 DIAGNOSIS — H57.9 SHALLOW ANTERIOR CHAMBER OF EYE: ICD-10-CM

## 2021-02-05 DIAGNOSIS — E11.3593 TYPE 2 DIABETES MELLITUS WITH BOTH EYES AFFECTED BY PROLIFERATIVE RETINOPATHY WITHOUT MACULAR EDEMA, WITH LONG-TERM CURRENT USE OF INSULIN: ICD-10-CM

## 2021-02-05 DIAGNOSIS — H25.11 NUCLEAR SCLEROTIC CATARACT OF RIGHT EYE: Primary | ICD-10-CM

## 2021-02-05 DIAGNOSIS — Z96.1 PSEUDOPHAKIA, LEFT EYE: ICD-10-CM

## 2021-02-05 DIAGNOSIS — H35.373 EPIRETINAL MEMBRANE, BILATERAL: ICD-10-CM

## 2021-02-05 PROBLEM — H25.12 NUCLEAR SCLEROTIC CATARACT OF LEFT EYE: Status: RESOLVED | Noted: 2020-10-28 | Resolved: 2021-02-05

## 2021-02-05 PROCEDURE — 92136 IOL MASTER - OD - RIGHT EYE: ICD-10-PCS | Mod: RT,S$GLB,, | Performed by: OPHTHALMOLOGY

## 2021-02-05 PROCEDURE — 99999 PR PBB SHADOW E&M-EST. PATIENT-LVL III: CPT | Mod: PBBFAC,,, | Performed by: OPHTHALMOLOGY

## 2021-02-05 PROCEDURE — 1126F AMNT PAIN NOTED NONE PRSNT: CPT | Mod: S$GLB,,, | Performed by: OPHTHALMOLOGY

## 2021-02-05 PROCEDURE — 1126F PR PAIN SEVERITY QUANTIFIED, NO PAIN PRESENT: ICD-10-PCS | Mod: S$GLB,,, | Performed by: OPHTHALMOLOGY

## 2021-02-05 PROCEDURE — 99999 PR PBB SHADOW E&M-EST. PATIENT-LVL III: ICD-10-PCS | Mod: PBBFAC,,, | Performed by: OPHTHALMOLOGY

## 2021-02-05 PROCEDURE — 99499 UNLISTED E&M SERVICE: CPT | Mod: S$GLB,,, | Performed by: OPHTHALMOLOGY

## 2021-02-05 PROCEDURE — 99499 NO LOS: ICD-10-PCS | Mod: S$GLB,,, | Performed by: OPHTHALMOLOGY

## 2021-02-05 PROCEDURE — 92136 OPHTHALMIC BIOMETRY: CPT | Mod: RT,S$GLB,, | Performed by: OPHTHALMOLOGY

## 2021-02-08 RX ORDER — MOXIFLOXACIN 5 MG/ML
1 SOLUTION/ DROPS OPHTHALMIC
Status: CANCELLED | OUTPATIENT
Start: 2021-02-08

## 2021-02-08 RX ORDER — KETOROLAC TROMETHAMINE 5 MG/ML
1 SOLUTION OPHTHALMIC
Status: CANCELLED | OUTPATIENT
Start: 2021-02-08

## 2021-02-08 RX ORDER — TROPICAMIDE 10 MG/ML
1 SOLUTION/ DROPS OPHTHALMIC
Status: CANCELLED | OUTPATIENT
Start: 2021-02-08

## 2021-02-08 RX ORDER — PHENYLEPHRINE HYDROCHLORIDE 100 MG/ML
1 SOLUTION/ DROPS OPHTHALMIC
Status: CANCELLED | OUTPATIENT
Start: 2021-02-08

## 2021-02-08 RX ORDER — SODIUM CHLORIDE 0.9 % (FLUSH) 0.9 %
10 SYRINGE (ML) INJECTION
Status: DISCONTINUED | OUTPATIENT
Start: 2021-02-08 | End: 2021-12-09

## 2021-02-14 ENCOUNTER — LAB VISIT (OUTPATIENT)
Dept: URGENT CARE | Facility: CLINIC | Age: 66
End: 2021-02-14
Payer: COMMERCIAL

## 2021-02-14 DIAGNOSIS — Z13.9 SCREENING PROCEDURE: ICD-10-CM

## 2021-02-14 LAB — SARS-COV-2 RNA RESP QL NAA+PROBE: NOT DETECTED

## 2021-02-14 PROCEDURE — U0003 INFECTIOUS AGENT DETECTION BY NUCLEIC ACID (DNA OR RNA); SEVERE ACUTE RESPIRATORY SYNDROME CORONAVIRUS 2 (SARS-COV-2) (CORONAVIRUS DISEASE [COVID-19]), AMPLIFIED PROBE TECHNIQUE, MAKING USE OF HIGH THROUGHPUT TECHNOLOGIES AS DESCRIBED BY CMS-2020-01-R: HCPCS

## 2021-02-14 PROCEDURE — U0005 INFEC AGEN DETEC AMPLI PROBE: HCPCS

## 2021-02-15 ENCOUNTER — TELEPHONE (OUTPATIENT)
Dept: OPHTHALMOLOGY | Facility: CLINIC | Age: 66
End: 2021-02-15

## 2021-02-17 ENCOUNTER — HOSPITAL ENCOUNTER (OUTPATIENT)
Facility: HOSPITAL | Age: 66
Discharge: HOME | End: 2021-02-17
Attending: OPHTHALMOLOGY | Admitting: OPHTHALMOLOGY
Payer: COMMERCIAL

## 2021-02-17 ENCOUNTER — ANESTHESIA (OUTPATIENT)
Dept: SURGERY | Facility: HOSPITAL | Age: 66
End: 2021-02-17
Payer: COMMERCIAL

## 2021-02-17 ENCOUNTER — ANESTHESIA EVENT (OUTPATIENT)
Dept: SURGERY | Facility: HOSPITAL | Age: 66
End: 2021-02-17
Payer: COMMERCIAL

## 2021-02-17 VITALS
DIASTOLIC BLOOD PRESSURE: 63 MMHG | RESPIRATION RATE: 16 BRPM | HEIGHT: 66 IN | TEMPERATURE: 98 F | OXYGEN SATURATION: 99 % | BODY MASS INDEX: 40.36 KG/M2 | HEART RATE: 74 BPM | SYSTOLIC BLOOD PRESSURE: 130 MMHG | WEIGHT: 251.13 LBS

## 2021-02-17 DIAGNOSIS — I10 HTN (HYPERTENSION), BENIGN: ICD-10-CM

## 2021-02-17 DIAGNOSIS — H25.011 CORTICAL SENILE CATARACT, RIGHT: ICD-10-CM

## 2021-02-17 DIAGNOSIS — H25.11 NUCLEAR SCLEROTIC CATARACT OF RIGHT EYE: Primary | ICD-10-CM

## 2021-02-17 DIAGNOSIS — H57.9 SHALLOW ANTERIOR CHAMBER OF EYE: ICD-10-CM

## 2021-02-17 DIAGNOSIS — H57.03 SMALL PUPIL: ICD-10-CM

## 2021-02-17 LAB — POCT GLUCOSE: 132 MG/DL (ref 70–110)

## 2021-02-17 PROCEDURE — D9220A PRA ANESTHESIA: Mod: ANES,,, | Performed by: ANESTHESIOLOGY

## 2021-02-17 PROCEDURE — 37000008 HC ANESTHESIA 1ST 15 MINUTES: Performed by: OPHTHALMOLOGY

## 2021-02-17 PROCEDURE — 66982 PR REMOVAL, CATARACT, W/INSRT INTRAOC LENS, W/O ENDO CYCLO, CMPLX: ICD-10-PCS | Mod: RT,,, | Performed by: OPHTHALMOLOGY

## 2021-02-17 PROCEDURE — V2632 POST CHMBR INTRAOCULAR LENS: HCPCS | Performed by: OPHTHALMOLOGY

## 2021-02-17 PROCEDURE — 25000003 PHARM REV CODE 250: Performed by: OPHTHALMOLOGY

## 2021-02-17 PROCEDURE — 63600175 PHARM REV CODE 636 W HCPCS: Performed by: NURSE ANESTHETIST, CERTIFIED REGISTERED

## 2021-02-17 PROCEDURE — 71000044 HC DOSC ROUTINE RECOVERY FIRST HOUR: Performed by: OPHTHALMOLOGY

## 2021-02-17 PROCEDURE — 36000706: Performed by: OPHTHALMOLOGY

## 2021-02-17 PROCEDURE — 25000003 PHARM REV CODE 250: Performed by: STUDENT IN AN ORGANIZED HEALTH CARE EDUCATION/TRAINING PROGRAM

## 2021-02-17 PROCEDURE — D9220A PRA ANESTHESIA: ICD-10-PCS | Mod: ANES,,, | Performed by: ANESTHESIOLOGY

## 2021-02-17 PROCEDURE — 36000707: Performed by: OPHTHALMOLOGY

## 2021-02-17 PROCEDURE — 37000009 HC ANESTHESIA EA ADD 15 MINS: Performed by: OPHTHALMOLOGY

## 2021-02-17 PROCEDURE — 71000015 HC POSTOP RECOV 1ST HR: Performed by: OPHTHALMOLOGY

## 2021-02-17 PROCEDURE — D9220A PRA ANESTHESIA: ICD-10-PCS | Mod: CRNA,,, | Performed by: NURSE ANESTHETIST, CERTIFIED REGISTERED

## 2021-02-17 PROCEDURE — 63600175 PHARM REV CODE 636 W HCPCS: Performed by: OPHTHALMOLOGY

## 2021-02-17 PROCEDURE — D9220A PRA ANESTHESIA: Mod: CRNA,,, | Performed by: NURSE ANESTHETIST, CERTIFIED REGISTERED

## 2021-02-17 PROCEDURE — 82962 GLUCOSE BLOOD TEST: CPT | Performed by: OPHTHALMOLOGY

## 2021-02-17 PROCEDURE — 25000003 PHARM REV CODE 250

## 2021-02-17 PROCEDURE — 66982 XCAPSL CTRC RMVL CPLX WO ECP: CPT | Mod: RT,,, | Performed by: OPHTHALMOLOGY

## 2021-02-17 DEVICE — LENS IOL ITEC PRELOAD 23.5D: Type: IMPLANTABLE DEVICE | Site: EYE | Status: FUNCTIONAL

## 2021-02-17 RX ORDER — PREDNISOLONE ACETATE 10 MG/ML
SUSPENSION/ DROPS OPHTHALMIC
Status: DISCONTINUED
Start: 2021-02-17 | End: 2021-02-17 | Stop reason: HOSPADM

## 2021-02-17 RX ORDER — MOXIFLOXACIN 5 MG/ML
1 SOLUTION/ DROPS OPHTHALMIC
Status: DISCONTINUED | OUTPATIENT
Start: 2021-02-17 | End: 2021-02-17 | Stop reason: HOSPADM

## 2021-02-17 RX ORDER — ONDANSETRON 2 MG/ML
4 INJECTION INTRAMUSCULAR; INTRAVENOUS DAILY PRN
Status: DISCONTINUED | OUTPATIENT
Start: 2021-02-17 | End: 2021-02-17 | Stop reason: HOSPADM

## 2021-02-17 RX ORDER — TROPICAMIDE 10 MG/ML
1 SOLUTION/ DROPS OPHTHALMIC
Status: DISCONTINUED | OUTPATIENT
Start: 2021-02-17 | End: 2021-02-17 | Stop reason: HOSPADM

## 2021-02-17 RX ORDER — MOXIFLOXACIN 5 MG/ML
SOLUTION/ DROPS OPHTHALMIC
Status: DISCONTINUED | OUTPATIENT
Start: 2021-02-17 | End: 2021-02-17 | Stop reason: HOSPADM

## 2021-02-17 RX ORDER — LIDOCAINE HYDROCHLORIDE 10 MG/ML
INJECTION, SOLUTION EPIDURAL; INFILTRATION; INTRACAUDAL; PERINEURAL
Status: DISCONTINUED | OUTPATIENT
Start: 2021-02-17 | End: 2021-02-17 | Stop reason: HOSPADM

## 2021-02-17 RX ORDER — KETOROLAC TROMETHAMINE 5 MG/ML
1 SOLUTION OPHTHALMIC
Status: DISCONTINUED | OUTPATIENT
Start: 2021-02-17 | End: 2021-02-17 | Stop reason: HOSPADM

## 2021-02-17 RX ORDER — FENTANYL CITRATE 50 UG/ML
INJECTION, SOLUTION INTRAMUSCULAR; INTRAVENOUS
Status: DISCONTINUED | OUTPATIENT
Start: 2021-02-17 | End: 2021-02-17

## 2021-02-17 RX ORDER — MIDAZOLAM HYDROCHLORIDE 1 MG/ML
INJECTION, SOLUTION INTRAMUSCULAR; INTRAVENOUS
Status: DISCONTINUED | OUTPATIENT
Start: 2021-02-17 | End: 2021-02-17

## 2021-02-17 RX ORDER — PREDNISOLONE ACETATE 10 MG/ML
SUSPENSION/ DROPS OPHTHALMIC
Status: DISCONTINUED | OUTPATIENT
Start: 2021-02-17 | End: 2021-02-17 | Stop reason: HOSPADM

## 2021-02-17 RX ORDER — LIDOCAINE HYDROCHLORIDE 20 MG/ML
JELLY TOPICAL
Status: DISCONTINUED
Start: 2021-02-17 | End: 2021-02-17 | Stop reason: HOSPADM

## 2021-02-17 RX ORDER — PHENYLEPHRINE HYDROCHLORIDE 100 MG/ML
1 SOLUTION/ DROPS OPHTHALMIC
Status: DISCONTINUED | OUTPATIENT
Start: 2021-02-17 | End: 2021-02-17 | Stop reason: HOSPADM

## 2021-02-17 RX ORDER — SODIUM CHLORIDE 9 MG/ML
INJECTION, SOLUTION INTRAVENOUS CONTINUOUS
Status: DISCONTINUED | OUTPATIENT
Start: 2021-02-17 | End: 2021-02-17 | Stop reason: HOSPADM

## 2021-02-17 RX ORDER — LIDOCAINE HYDROCHLORIDE 40 MG/ML
INJECTION, SOLUTION RETROBULBAR
Status: DISCONTINUED
Start: 2021-02-17 | End: 2021-02-17 | Stop reason: HOSPADM

## 2021-02-17 RX ORDER — SODIUM CHLORIDE 0.9 % (FLUSH) 0.9 %
3 SYRINGE (ML) INJECTION EVERY 30 MIN PRN
Status: DISCONTINUED | OUTPATIENT
Start: 2021-02-17 | End: 2021-02-17 | Stop reason: HOSPADM

## 2021-02-17 RX ORDER — LIDOCAINE HYDROCHLORIDE 40 MG/ML
INJECTION, SOLUTION RETROBULBAR
Status: DISCONTINUED | OUTPATIENT
Start: 2021-02-17 | End: 2021-02-17 | Stop reason: HOSPADM

## 2021-02-17 RX ORDER — LIDOCAINE HYDROCHLORIDE 10 MG/ML
INJECTION, SOLUTION EPIDURAL; INFILTRATION; INTRACAUDAL; PERINEURAL
Status: DISCONTINUED
Start: 2021-02-17 | End: 2021-02-17 | Stop reason: HOSPADM

## 2021-02-17 RX ORDER — ACETAMINOPHEN 325 MG/1
650 TABLET ORAL EVERY 6 HOURS PRN
Status: DISCONTINUED | OUTPATIENT
Start: 2021-02-17 | End: 2021-02-17 | Stop reason: HOSPADM

## 2021-02-17 RX ORDER — ACETAZOLAMIDE 500 MG/1
500 CAPSULE, EXTENDED RELEASE ORAL ONCE
Status: COMPLETED | OUTPATIENT
Start: 2021-02-17 | End: 2021-02-17

## 2021-02-17 RX ORDER — LIDOCAINE HYDROCHLORIDE 10 MG/ML
1 INJECTION, SOLUTION EPIDURAL; INFILTRATION; INTRACAUDAL; PERINEURAL ONCE
Status: DISCONTINUED | OUTPATIENT
Start: 2021-02-17 | End: 2021-02-17 | Stop reason: HOSPADM

## 2021-02-17 RX ADMIN — TROPICAMIDE 1 DROP: 10 SOLUTION/ DROPS OPHTHALMIC at 06:02

## 2021-02-17 RX ADMIN — FENTANYL CITRATE 50 MCG: 50 INJECTION, SOLUTION INTRAMUSCULAR; INTRAVENOUS at 08:02

## 2021-02-17 RX ADMIN — MOXIFLOXACIN 1 DROP: 5 SOLUTION/ DROPS OPHTHALMIC at 06:02

## 2021-02-17 RX ADMIN — KETOROLAC TROMETHAMINE 1 DROP: 5 SOLUTION/ DROPS OPHTHALMIC at 06:02

## 2021-02-17 RX ADMIN — PHENYLEPHRINE HYDROCHLORIDE 1 DROP: 100 SOLUTION/ DROPS OPHTHALMIC at 06:02

## 2021-02-17 RX ADMIN — ACETAZOLAMIDE 500 MG: 500 CAPSULE, EXTENDED RELEASE ORAL at 09:02

## 2021-02-17 RX ADMIN — SODIUM CHLORIDE: 0.9 INJECTION, SOLUTION INTRAVENOUS at 07:02

## 2021-02-17 RX ADMIN — SODIUM CHLORIDE: 0.9 INJECTION, SOLUTION INTRAVENOUS at 08:02

## 2021-02-17 RX ADMIN — FENTANYL CITRATE 25 MCG: 50 INJECTION, SOLUTION INTRAMUSCULAR; INTRAVENOUS at 08:02

## 2021-02-17 RX ADMIN — MIDAZOLAM 2 MG: 1 INJECTION INTRAMUSCULAR; INTRAVENOUS at 08:02

## 2021-02-18 ENCOUNTER — OFFICE VISIT (OUTPATIENT)
Dept: OPHTHALMOLOGY | Facility: CLINIC | Age: 66
End: 2021-02-18
Payer: COMMERCIAL

## 2021-02-18 DIAGNOSIS — Z79.4 TYPE 2 DIABETES MELLITUS WITH BOTH EYES AFFECTED BY PROLIFERATIVE RETINOPATHY WITHOUT MACULAR EDEMA, WITH LONG-TERM CURRENT USE OF INSULIN: ICD-10-CM

## 2021-02-18 DIAGNOSIS — H57.9 SHALLOW ANTERIOR CHAMBER OF EYE: ICD-10-CM

## 2021-02-18 DIAGNOSIS — H57.03 SMALL PUPIL: ICD-10-CM

## 2021-02-18 DIAGNOSIS — Z98.49 POSTOPERATIVE CARE FOR CATARACT: Primary | ICD-10-CM

## 2021-02-18 DIAGNOSIS — Z96.1 PSEUDOPHAKIA, BOTH EYES: ICD-10-CM

## 2021-02-18 DIAGNOSIS — Z48.810 POSTOPERATIVE CARE FOR CATARACT: Primary | ICD-10-CM

## 2021-02-18 DIAGNOSIS — E11.3593 TYPE 2 DIABETES MELLITUS WITH BOTH EYES AFFECTED BY PROLIFERATIVE RETINOPATHY WITHOUT MACULAR EDEMA, WITH LONG-TERM CURRENT USE OF INSULIN: ICD-10-CM

## 2021-02-18 DIAGNOSIS — H35.373 EPIRETINAL MEMBRANE, BILATERAL: ICD-10-CM

## 2021-02-18 PROCEDURE — 1126F PR PAIN SEVERITY QUANTIFIED, NO PAIN PRESENT: ICD-10-PCS | Mod: S$GLB,,, | Performed by: OPHTHALMOLOGY

## 2021-02-18 PROCEDURE — 1126F AMNT PAIN NOTED NONE PRSNT: CPT | Mod: S$GLB,,, | Performed by: OPHTHALMOLOGY

## 2021-02-18 PROCEDURE — 99999 PR PBB SHADOW E&M-EST. PATIENT-LVL III: ICD-10-PCS | Mod: PBBFAC,,, | Performed by: OPHTHALMOLOGY

## 2021-02-18 PROCEDURE — 99024 POSTOP FOLLOW-UP VISIT: CPT | Mod: S$GLB,,, | Performed by: OPHTHALMOLOGY

## 2021-02-18 PROCEDURE — 99024 PR POST-OP FOLLOW-UP VISIT: ICD-10-PCS | Mod: S$GLB,,, | Performed by: OPHTHALMOLOGY

## 2021-02-18 PROCEDURE — 99999 PR PBB SHADOW E&M-EST. PATIENT-LVL III: CPT | Mod: PBBFAC,,, | Performed by: OPHTHALMOLOGY

## 2021-02-18 RX ORDER — LOSARTAN POTASSIUM 100 MG/1
TABLET ORAL
Qty: 90 TABLET | Refills: 0 | Status: SHIPPED | OUTPATIENT
Start: 2021-02-18 | End: 2021-05-05 | Stop reason: SDUPTHER

## 2021-02-18 RX ORDER — MOXIFLOXACIN 5 MG/ML
1 SOLUTION/ DROPS OPHTHALMIC 4 TIMES DAILY
COMMUNITY
Start: 2021-02-18 | End: 2021-02-25

## 2021-02-18 RX ORDER — PREDNISOLONE ACETATE 10 MG/ML
1 SUSPENSION/ DROPS OPHTHALMIC 4 TIMES DAILY
COMMUNITY
Start: 2021-02-18 | End: 2021-02-25 | Stop reason: SDUPTHER

## 2021-02-22 RX ORDER — INSULIN LISPRO 100 [IU]/ML
INJECTION, SOLUTION INTRAVENOUS; SUBCUTANEOUS
Qty: 1 BOX | Refills: 6 | Status: SHIPPED | OUTPATIENT
Start: 2021-02-22 | End: 2024-02-01 | Stop reason: SDUPTHER

## 2021-02-25 ENCOUNTER — OFFICE VISIT (OUTPATIENT)
Dept: OPHTHALMOLOGY | Facility: CLINIC | Age: 66
End: 2021-02-25
Payer: COMMERCIAL

## 2021-02-25 DIAGNOSIS — H57.03 SMALL PUPIL: ICD-10-CM

## 2021-02-25 DIAGNOSIS — Z48.810 POSTOPERATIVE CARE FOR CATARACT: Primary | ICD-10-CM

## 2021-02-25 DIAGNOSIS — Z96.1 PSEUDOPHAKIA, BOTH EYES: ICD-10-CM

## 2021-02-25 DIAGNOSIS — Z98.49 POSTOPERATIVE CARE FOR CATARACT: Primary | ICD-10-CM

## 2021-02-25 DIAGNOSIS — H57.9 SHALLOW ANTERIOR CHAMBER OF EYE: ICD-10-CM

## 2021-02-25 DIAGNOSIS — Z79.4 TYPE 2 DIABETES MELLITUS WITH BOTH EYES AFFECTED BY PROLIFERATIVE RETINOPATHY WITHOUT MACULAR EDEMA, WITH LONG-TERM CURRENT USE OF INSULIN: ICD-10-CM

## 2021-02-25 DIAGNOSIS — E11.3593 TYPE 2 DIABETES MELLITUS WITH BOTH EYES AFFECTED BY PROLIFERATIVE RETINOPATHY WITHOUT MACULAR EDEMA, WITH LONG-TERM CURRENT USE OF INSULIN: ICD-10-CM

## 2021-02-25 PROCEDURE — 99999 PR PBB SHADOW E&M-EST. PATIENT-LVL III: CPT | Mod: PBBFAC,,, | Performed by: OPHTHALMOLOGY

## 2021-02-25 PROCEDURE — 1126F PR PAIN SEVERITY QUANTIFIED, NO PAIN PRESENT: ICD-10-PCS | Mod: S$GLB,,, | Performed by: OPHTHALMOLOGY

## 2021-02-25 PROCEDURE — 99024 POSTOP FOLLOW-UP VISIT: CPT | Mod: S$GLB,,, | Performed by: OPHTHALMOLOGY

## 2021-02-25 PROCEDURE — 99024 PR POST-OP FOLLOW-UP VISIT: ICD-10-PCS | Mod: S$GLB,,, | Performed by: OPHTHALMOLOGY

## 2021-02-25 PROCEDURE — 1126F AMNT PAIN NOTED NONE PRSNT: CPT | Mod: S$GLB,,, | Performed by: OPHTHALMOLOGY

## 2021-02-25 PROCEDURE — 99999 PR PBB SHADOW E&M-EST. PATIENT-LVL III: ICD-10-PCS | Mod: PBBFAC,,, | Performed by: OPHTHALMOLOGY

## 2021-02-25 RX ORDER — KETOROLAC TROMETHAMINE 5 MG/ML
1 SOLUTION OPHTHALMIC 2 TIMES DAILY
Qty: 5 ML | Refills: 0 | Status: SHIPPED | OUTPATIENT
Start: 2021-02-25 | End: 2021-12-09 | Stop reason: ALTCHOICE

## 2021-02-25 RX ORDER — PREDNISOLONE ACETATE 10 MG/ML
1 SUSPENSION/ DROPS OPHTHALMIC 2 TIMES DAILY
Qty: 1 BOTTLE | Refills: 1 | Status: SHIPPED | OUTPATIENT
Start: 2021-02-25 | End: 2021-12-09 | Stop reason: ALTCHOICE

## 2021-02-27 ENCOUNTER — LAB VISIT (OUTPATIENT)
Dept: LAB | Facility: HOSPITAL | Age: 66
End: 2021-02-27
Attending: INTERNAL MEDICINE
Payer: COMMERCIAL

## 2021-02-27 DIAGNOSIS — E21.3 HYPERPARATHYROIDISM: ICD-10-CM

## 2021-02-27 DIAGNOSIS — E11.65 TYPE 2 DIABETES MELLITUS WITH HYPERGLYCEMIA, WITHOUT LONG-TERM CURRENT USE OF INSULIN: ICD-10-CM

## 2021-02-27 LAB
25(OH)D3+25(OH)D2 SERPL-MCNC: 36 NG/ML (ref 30–96)
ALBUMIN SERPL BCP-MCNC: 3.9 G/DL (ref 3.5–5.2)
ALP SERPL-CCNC: 61 U/L (ref 55–135)
ALT SERPL W/O P-5'-P-CCNC: 28 U/L (ref 10–44)
ANION GAP SERPL CALC-SCNC: 8 MMOL/L (ref 8–16)
ANION GAP SERPL CALC-SCNC: 8 MMOL/L (ref 8–16)
AST SERPL-CCNC: 30 U/L (ref 10–40)
BILIRUB SERPL-MCNC: 0.4 MG/DL (ref 0.1–1)
BUN SERPL-MCNC: 14 MG/DL (ref 8–23)
BUN SERPL-MCNC: 14 MG/DL (ref 8–23)
CALCIUM SERPL-MCNC: 9.2 MG/DL (ref 8.7–10.5)
CALCIUM SERPL-MCNC: 9.2 MG/DL (ref 8.7–10.5)
CHLORIDE SERPL-SCNC: 105 MMOL/L (ref 95–110)
CHLORIDE SERPL-SCNC: 105 MMOL/L (ref 95–110)
CO2 SERPL-SCNC: 25 MMOL/L (ref 23–29)
CO2 SERPL-SCNC: 25 MMOL/L (ref 23–29)
CREAT SERPL-MCNC: 0.9 MG/DL (ref 0.5–1.4)
CREAT SERPL-MCNC: 0.9 MG/DL (ref 0.5–1.4)
EST. GFR  (AFRICAN AMERICAN): >60 ML/MIN/1.73 M^2
EST. GFR  (AFRICAN AMERICAN): >60 ML/MIN/1.73 M^2
EST. GFR  (NON AFRICAN AMERICAN): >60 ML/MIN/1.73 M^2
EST. GFR  (NON AFRICAN AMERICAN): >60 ML/MIN/1.73 M^2
ESTIMATED AVG GLUCOSE: 177 MG/DL (ref 68–131)
GLUCOSE SERPL-MCNC: 135 MG/DL (ref 70–110)
GLUCOSE SERPL-MCNC: 135 MG/DL (ref 70–110)
HBA1C MFR BLD: 7.8 % (ref 4–5.6)
POTASSIUM SERPL-SCNC: 4.4 MMOL/L (ref 3.5–5.1)
POTASSIUM SERPL-SCNC: 4.4 MMOL/L (ref 3.5–5.1)
PROT SERPL-MCNC: 7.4 G/DL (ref 6–8.4)
PTH-INTACT SERPL-MCNC: 62 PG/ML (ref 9–77)
SODIUM SERPL-SCNC: 138 MMOL/L (ref 136–145)
SODIUM SERPL-SCNC: 138 MMOL/L (ref 136–145)

## 2021-02-27 PROCEDURE — 80053 COMPREHEN METABOLIC PANEL: CPT

## 2021-02-27 PROCEDURE — 82306 VITAMIN D 25 HYDROXY: CPT

## 2021-02-27 PROCEDURE — 83036 HEMOGLOBIN GLYCOSYLATED A1C: CPT

## 2021-02-27 PROCEDURE — 36415 COLL VENOUS BLD VENIPUNCTURE: CPT

## 2021-02-27 PROCEDURE — 83970 ASSAY OF PARATHORMONE: CPT

## 2021-03-02 DIAGNOSIS — E11.8 TYPE 2 DIABETES MELLITUS WITH COMPLICATION: ICD-10-CM

## 2021-03-02 RX ORDER — FLASH GLUCOSE SENSOR
KIT MISCELLANEOUS
Qty: 2 KIT | Refills: 8 | Status: SHIPPED | OUTPATIENT
Start: 2021-03-02 | End: 2021-10-24 | Stop reason: SDUPTHER

## 2021-03-12 ENCOUNTER — OFFICE VISIT (OUTPATIENT)
Dept: ENDOCRINOLOGY | Facility: CLINIC | Age: 66
End: 2021-03-12
Payer: COMMERCIAL

## 2021-03-12 VITALS
SYSTOLIC BLOOD PRESSURE: 160 MMHG | HEART RATE: 76 BPM | WEIGHT: 252.44 LBS | OXYGEN SATURATION: 99 % | DIASTOLIC BLOOD PRESSURE: 74 MMHG | BODY MASS INDEX: 40.57 KG/M2 | HEIGHT: 66 IN

## 2021-03-12 DIAGNOSIS — E21.3 HYPERPARATHYROIDISM: ICD-10-CM

## 2021-03-12 DIAGNOSIS — E66.01 CLASS 3 SEVERE OBESITY DUE TO EXCESS CALORIES WITH SERIOUS COMORBIDITY AND BODY MASS INDEX (BMI) OF 40.0 TO 44.9 IN ADULT: ICD-10-CM

## 2021-03-12 DIAGNOSIS — I10 HTN (HYPERTENSION), BENIGN: ICD-10-CM

## 2021-03-12 DIAGNOSIS — E11.65 TYPE 2 DIABETES MELLITUS WITH HYPERGLYCEMIA, WITHOUT LONG-TERM CURRENT USE OF INSULIN: ICD-10-CM

## 2021-03-12 PROCEDURE — 3051F PR MOST RECENT HEMOGLOBIN A1C LEVEL 7.0 - < 8.0%: ICD-10-PCS | Mod: CPTII,S$GLB,, | Performed by: INTERNAL MEDICINE

## 2021-03-12 PROCEDURE — 3008F PR BODY MASS INDEX (BMI) DOCUMENTED: ICD-10-PCS | Mod: CPTII,S$GLB,, | Performed by: INTERNAL MEDICINE

## 2021-03-12 PROCEDURE — 99214 OFFICE O/P EST MOD 30 MIN: CPT | Mod: S$GLB,,, | Performed by: INTERNAL MEDICINE

## 2021-03-12 PROCEDURE — 99214 PR OFFICE/OUTPT VISIT, EST, LEVL IV, 30-39 MIN: ICD-10-PCS | Mod: S$GLB,,, | Performed by: INTERNAL MEDICINE

## 2021-03-12 PROCEDURE — 3051F HG A1C>EQUAL 7.0%<8.0%: CPT | Mod: CPTII,S$GLB,, | Performed by: INTERNAL MEDICINE

## 2021-03-12 PROCEDURE — 1126F PR PAIN SEVERITY QUANTIFIED, NO PAIN PRESENT: ICD-10-PCS | Mod: S$GLB,,, | Performed by: INTERNAL MEDICINE

## 2021-03-12 PROCEDURE — 3008F BODY MASS INDEX DOCD: CPT | Mod: CPTII,S$GLB,, | Performed by: INTERNAL MEDICINE

## 2021-03-12 PROCEDURE — 1126F AMNT PAIN NOTED NONE PRSNT: CPT | Mod: S$GLB,,, | Performed by: INTERNAL MEDICINE

## 2021-03-15 DIAGNOSIS — E78.5 HYPERLIPIDEMIA, UNSPECIFIED HYPERLIPIDEMIA TYPE: ICD-10-CM

## 2021-03-17 RX ORDER — ATORVASTATIN CALCIUM 20 MG/1
TABLET, FILM COATED ORAL
Qty: 90 TABLET | Refills: 1 | Status: SHIPPED | OUTPATIENT
Start: 2021-03-17 | End: 2021-09-14

## 2021-03-26 ENCOUNTER — OFFICE VISIT (OUTPATIENT)
Dept: OPHTHALMOLOGY | Facility: CLINIC | Age: 66
End: 2021-03-26
Payer: COMMERCIAL

## 2021-03-26 DIAGNOSIS — Z48.810 POSTOPERATIVE CARE FOR CATARACT: Primary | ICD-10-CM

## 2021-03-26 DIAGNOSIS — E11.3593 TYPE 2 DIABETES MELLITUS WITH BOTH EYES AFFECTED BY PROLIFERATIVE RETINOPATHY WITHOUT MACULAR EDEMA, WITH LONG-TERM CURRENT USE OF INSULIN: ICD-10-CM

## 2021-03-26 DIAGNOSIS — H57.9 SHALLOW ANTERIOR CHAMBER OF EYE: ICD-10-CM

## 2021-03-26 DIAGNOSIS — Z98.49 POSTOPERATIVE CARE FOR CATARACT: Primary | ICD-10-CM

## 2021-03-26 DIAGNOSIS — H35.373 EPIRETINAL MEMBRANE, BILATERAL: ICD-10-CM

## 2021-03-26 DIAGNOSIS — Z96.1 PSEUDOPHAKIA, BOTH EYES: ICD-10-CM

## 2021-03-26 DIAGNOSIS — Z79.4 TYPE 2 DIABETES MELLITUS WITH BOTH EYES AFFECTED BY PROLIFERATIVE RETINOPATHY WITHOUT MACULAR EDEMA, WITH LONG-TERM CURRENT USE OF INSULIN: ICD-10-CM

## 2021-03-26 DIAGNOSIS — H57.03 SMALL PUPIL: ICD-10-CM

## 2021-03-26 PROCEDURE — 2023F PR DILATED RETINAL EXAM W/O EVID OF RETINOPATHY: ICD-10-PCS | Mod: S$GLB,,, | Performed by: OPHTHALMOLOGY

## 2021-03-26 PROCEDURE — 92134 CPTRZ OPH DX IMG PST SGM RTA: CPT | Mod: S$GLB,,, | Performed by: OPHTHALMOLOGY

## 2021-03-26 PROCEDURE — 99999 PR PBB SHADOW E&M-EST. PATIENT-LVL I: CPT | Mod: PBBFAC,,, | Performed by: OPHTHALMOLOGY

## 2021-03-26 PROCEDURE — 99024 PR POST-OP FOLLOW-UP VISIT: ICD-10-PCS | Mod: S$GLB,,, | Performed by: OPHTHALMOLOGY

## 2021-03-26 PROCEDURE — 99999 PR PBB SHADOW E&M-EST. PATIENT-LVL I: ICD-10-PCS | Mod: PBBFAC,,, | Performed by: OPHTHALMOLOGY

## 2021-03-26 PROCEDURE — 92134 POSTERIOR SEGMENT OCT RETINA (OCULAR COHERENCE TOMOGRAPHY)-BOTH EYES: ICD-10-PCS | Mod: S$GLB,,, | Performed by: OPHTHALMOLOGY

## 2021-03-26 PROCEDURE — 2023F DILAT RTA XM W/O RTNOPTHY: CPT | Mod: S$GLB,,, | Performed by: OPHTHALMOLOGY

## 2021-03-26 PROCEDURE — 99024 POSTOP FOLLOW-UP VISIT: CPT | Mod: S$GLB,,, | Performed by: OPHTHALMOLOGY

## 2021-03-29 ENCOUNTER — TELEPHONE (OUTPATIENT)
Dept: INTERNAL MEDICINE | Facility: CLINIC | Age: 66
End: 2021-03-29

## 2021-03-29 ENCOUNTER — PATIENT MESSAGE (OUTPATIENT)
Dept: INTERNAL MEDICINE | Facility: CLINIC | Age: 66
End: 2021-03-29

## 2021-03-29 DIAGNOSIS — E11.42 DIABETIC POLYNEUROPATHY ASSOCIATED WITH TYPE 2 DIABETES MELLITUS: ICD-10-CM

## 2021-03-29 DIAGNOSIS — G56.03 BILATERAL CARPAL TUNNEL SYNDROME: ICD-10-CM

## 2021-03-29 RX ORDER — AMITRIPTYLINE HYDROCHLORIDE 25 MG/1
50 TABLET, FILM COATED ORAL NIGHTLY
Qty: 60 TABLET | Refills: 11 | Status: SHIPPED | OUTPATIENT
Start: 2021-03-29 | End: 2022-04-18

## 2021-03-29 RX ORDER — AMITRIPTYLINE HYDROCHLORIDE 25 MG/1
50 TABLET, FILM COATED ORAL NIGHTLY
Qty: 60 TABLET | Refills: 11 | Status: CANCELLED | OUTPATIENT
Start: 2021-03-29 | End: 2022-03-29

## 2021-04-21 ENCOUNTER — TELEPHONE (OUTPATIENT)
Dept: OPHTHALMOLOGY | Facility: CLINIC | Age: 66
End: 2021-04-21

## 2021-05-05 ENCOUNTER — OFFICE VISIT (OUTPATIENT)
Dept: INTERNAL MEDICINE | Facility: CLINIC | Age: 66
End: 2021-05-05
Payer: COMMERCIAL

## 2021-05-05 ENCOUNTER — LAB VISIT (OUTPATIENT)
Dept: LAB | Facility: HOSPITAL | Age: 66
End: 2021-05-05
Attending: FAMILY MEDICINE
Payer: COMMERCIAL

## 2021-05-05 VITALS
SYSTOLIC BLOOD PRESSURE: 132 MMHG | BODY MASS INDEX: 40.32 KG/M2 | OXYGEN SATURATION: 98 % | HEIGHT: 66 IN | HEART RATE: 77 BPM | DIASTOLIC BLOOD PRESSURE: 74 MMHG | WEIGHT: 250.88 LBS

## 2021-05-05 DIAGNOSIS — I25.10 CORONARY ARTERY DISEASE INVOLVING NATIVE CORONARY ARTERY OF NATIVE HEART WITHOUT ANGINA PECTORIS: ICD-10-CM

## 2021-05-05 DIAGNOSIS — K21.9 GASTROESOPHAGEAL REFLUX DISEASE, UNSPECIFIED WHETHER ESOPHAGITIS PRESENT: ICD-10-CM

## 2021-05-05 DIAGNOSIS — I73.9 PVD (PERIPHERAL VASCULAR DISEASE): ICD-10-CM

## 2021-05-05 DIAGNOSIS — Z12.31 BREAST CANCER SCREENING BY MAMMOGRAM: ICD-10-CM

## 2021-05-05 DIAGNOSIS — E11.65 TYPE 2 DIABETES MELLITUS WITH HYPERGLYCEMIA, WITHOUT LONG-TERM CURRENT USE OF INSULIN: ICD-10-CM

## 2021-05-05 DIAGNOSIS — E66.01 CLASS 3 SEVERE OBESITY DUE TO EXCESS CALORIES WITH SERIOUS COMORBIDITY AND BODY MASS INDEX (BMI) OF 40.0 TO 44.9 IN ADULT: ICD-10-CM

## 2021-05-05 DIAGNOSIS — G47.33 OSA ON CPAP: ICD-10-CM

## 2021-05-05 DIAGNOSIS — L25.9 CONTACT DERMATITIS, UNSPECIFIED CONTACT DERMATITIS TYPE, UNSPECIFIED TRIGGER: ICD-10-CM

## 2021-05-05 DIAGNOSIS — E78.5 HYPERLIPIDEMIA, UNSPECIFIED HYPERLIPIDEMIA TYPE: ICD-10-CM

## 2021-05-05 DIAGNOSIS — I10 HTN (HYPERTENSION), BENIGN: ICD-10-CM

## 2021-05-05 DIAGNOSIS — I10 HTN (HYPERTENSION), BENIGN: Primary | ICD-10-CM

## 2021-05-05 LAB
ALBUMIN SERPL BCP-MCNC: 3.7 G/DL (ref 3.5–5.2)
ALP SERPL-CCNC: 66 U/L (ref 55–135)
ALT SERPL W/O P-5'-P-CCNC: 30 U/L (ref 10–44)
ANION GAP SERPL CALC-SCNC: 8 MMOL/L (ref 8–16)
AST SERPL-CCNC: 34 U/L (ref 10–40)
BASOPHILS # BLD AUTO: 0.04 K/UL (ref 0–0.2)
BASOPHILS NFR BLD: 0.7 % (ref 0–1.9)
BILIRUB SERPL-MCNC: 0.4 MG/DL (ref 0.1–1)
BUN SERPL-MCNC: 12 MG/DL (ref 8–23)
CALCIUM SERPL-MCNC: 9.5 MG/DL (ref 8.7–10.5)
CHLORIDE SERPL-SCNC: 105 MMOL/L (ref 95–110)
CHOLEST SERPL-MCNC: 120 MG/DL (ref 120–199)
CHOLEST/HDLC SERPL: 2.3 {RATIO} (ref 2–5)
CO2 SERPL-SCNC: 28 MMOL/L (ref 23–29)
CREAT SERPL-MCNC: 0.9 MG/DL (ref 0.5–1.4)
DIFFERENTIAL METHOD: ABNORMAL
EOSINOPHIL # BLD AUTO: 0.1 K/UL (ref 0–0.5)
EOSINOPHIL NFR BLD: 1.9 % (ref 0–8)
ERYTHROCYTE [DISTWIDTH] IN BLOOD BY AUTOMATED COUNT: 13 % (ref 11.5–14.5)
EST. GFR  (AFRICAN AMERICAN): >60 ML/MIN/1.73 M^2
EST. GFR  (NON AFRICAN AMERICAN): >60 ML/MIN/1.73 M^2
ESTIMATED AVG GLUCOSE: 194 MG/DL (ref 68–131)
GLUCOSE SERPL-MCNC: 179 MG/DL (ref 70–110)
HBA1C MFR BLD: 8.4 % (ref 4–5.6)
HCT VFR BLD AUTO: 39.5 % (ref 37–48.5)
HDLC SERPL-MCNC: 53 MG/DL (ref 40–75)
HDLC SERPL: 44.2 % (ref 20–50)
HGB BLD-MCNC: 11.9 G/DL (ref 12–16)
IMM GRANULOCYTES # BLD AUTO: 0.01 K/UL (ref 0–0.04)
IMM GRANULOCYTES NFR BLD AUTO: 0.2 % (ref 0–0.5)
LDLC SERPL CALC-MCNC: 53.4 MG/DL (ref 63–159)
LYMPHOCYTES # BLD AUTO: 2 K/UL (ref 1–4.8)
LYMPHOCYTES NFR BLD: 35.1 % (ref 18–48)
MCH RBC QN AUTO: 29 PG (ref 27–31)
MCHC RBC AUTO-ENTMCNC: 30.1 G/DL (ref 32–36)
MCV RBC AUTO: 96 FL (ref 82–98)
MONOCYTES # BLD AUTO: 0.5 K/UL (ref 0.3–1)
MONOCYTES NFR BLD: 8.2 % (ref 4–15)
NEUTROPHILS # BLD AUTO: 3.1 K/UL (ref 1.8–7.7)
NEUTROPHILS NFR BLD: 53.9 % (ref 38–73)
NONHDLC SERPL-MCNC: 67 MG/DL
NRBC BLD-RTO: 0 /100 WBC
PLATELET # BLD AUTO: 269 K/UL (ref 150–450)
PMV BLD AUTO: 11.2 FL (ref 9.2–12.9)
POTASSIUM SERPL-SCNC: 5 MMOL/L (ref 3.5–5.1)
PROT SERPL-MCNC: 7.5 G/DL (ref 6–8.4)
RBC # BLD AUTO: 4.11 M/UL (ref 4–5.4)
SODIUM SERPL-SCNC: 141 MMOL/L (ref 136–145)
TRIGL SERPL-MCNC: 68 MG/DL (ref 30–150)
TSH SERPL DL<=0.005 MIU/L-ACNC: 0.99 UIU/ML (ref 0.4–4)
WBC # BLD AUTO: 5.73 K/UL (ref 3.9–12.7)

## 2021-05-05 PROCEDURE — 3051F PR MOST RECENT HEMOGLOBIN A1C LEVEL 7.0 - < 8.0%: ICD-10-PCS | Mod: CPTII,S$GLB,, | Performed by: FAMILY MEDICINE

## 2021-05-05 PROCEDURE — 3008F BODY MASS INDEX DOCD: CPT | Mod: CPTII,S$GLB,, | Performed by: FAMILY MEDICINE

## 2021-05-05 PROCEDURE — 3075F PR MOST RECENT SYSTOLIC BLOOD PRESS GE 130-139MM HG: ICD-10-PCS | Mod: CPTII,S$GLB,, | Performed by: FAMILY MEDICINE

## 2021-05-05 PROCEDURE — 3078F PR MOST RECENT DIASTOLIC BLOOD PRESSURE < 80 MM HG: ICD-10-PCS | Mod: CPTII,S$GLB,, | Performed by: FAMILY MEDICINE

## 2021-05-05 PROCEDURE — 99214 PR OFFICE/OUTPT VISIT, EST, LEVL IV, 30-39 MIN: ICD-10-PCS | Mod: S$GLB,,, | Performed by: FAMILY MEDICINE

## 2021-05-05 PROCEDURE — 3008F PR BODY MASS INDEX (BMI) DOCUMENTED: ICD-10-PCS | Mod: CPTII,S$GLB,, | Performed by: FAMILY MEDICINE

## 2021-05-05 PROCEDURE — 99999 PR PBB SHADOW E&M-EST. PATIENT-LVL V: CPT | Mod: PBBFAC,,, | Performed by: FAMILY MEDICINE

## 2021-05-05 PROCEDURE — 3051F HG A1C>EQUAL 7.0%<8.0%: CPT | Mod: CPTII,S$GLB,, | Performed by: FAMILY MEDICINE

## 2021-05-05 PROCEDURE — 80061 LIPID PANEL: CPT | Performed by: FAMILY MEDICINE

## 2021-05-05 PROCEDURE — 1126F PR PAIN SEVERITY QUANTIFIED, NO PAIN PRESENT: ICD-10-PCS | Mod: S$GLB,,, | Performed by: FAMILY MEDICINE

## 2021-05-05 PROCEDURE — 3078F DIAST BP <80 MM HG: CPT | Mod: CPTII,S$GLB,, | Performed by: FAMILY MEDICINE

## 2021-05-05 PROCEDURE — 36415 COLL VENOUS BLD VENIPUNCTURE: CPT | Performed by: FAMILY MEDICINE

## 2021-05-05 PROCEDURE — 80053 COMPREHEN METABOLIC PANEL: CPT | Performed by: FAMILY MEDICINE

## 2021-05-05 PROCEDURE — 85025 COMPLETE CBC W/AUTO DIFF WBC: CPT | Performed by: FAMILY MEDICINE

## 2021-05-05 PROCEDURE — 84443 ASSAY THYROID STIM HORMONE: CPT | Performed by: FAMILY MEDICINE

## 2021-05-05 PROCEDURE — 99214 OFFICE O/P EST MOD 30 MIN: CPT | Mod: S$GLB,,, | Performed by: FAMILY MEDICINE

## 2021-05-05 PROCEDURE — 3075F SYST BP GE 130 - 139MM HG: CPT | Mod: CPTII,S$GLB,, | Performed by: FAMILY MEDICINE

## 2021-05-05 PROCEDURE — 83036 HEMOGLOBIN GLYCOSYLATED A1C: CPT | Performed by: FAMILY MEDICINE

## 2021-05-05 PROCEDURE — 1126F AMNT PAIN NOTED NONE PRSNT: CPT | Mod: S$GLB,,, | Performed by: FAMILY MEDICINE

## 2021-05-05 PROCEDURE — 99999 PR PBB SHADOW E&M-EST. PATIENT-LVL V: ICD-10-PCS | Mod: PBBFAC,,, | Performed by: FAMILY MEDICINE

## 2021-05-05 RX ORDER — ESOMEPRAZOLE MAGNESIUM 40 MG/1
40 CAPSULE, DELAYED RELEASE ORAL
Qty: 90 CAPSULE | Refills: 3 | Status: SHIPPED | OUTPATIENT
Start: 2021-05-05 | End: 2021-05-05 | Stop reason: SDUPTHER

## 2021-05-05 RX ORDER — ESOMEPRAZOLE MAGNESIUM 40 MG/1
40 CAPSULE, DELAYED RELEASE ORAL
Qty: 90 CAPSULE | Refills: 3 | Status: SHIPPED | OUTPATIENT
Start: 2021-05-05 | End: 2022-02-07 | Stop reason: SDUPTHER

## 2021-05-05 RX ORDER — TRIAMCINOLONE ACETONIDE 1 MG/G
CREAM TOPICAL 2 TIMES DAILY
Qty: 28.4 G | Refills: 0 | Status: SHIPPED | OUTPATIENT
Start: 2021-05-05 | End: 2022-02-07

## 2021-05-07 ENCOUNTER — OFFICE VISIT (OUTPATIENT)
Dept: OPHTHALMOLOGY | Facility: CLINIC | Age: 66
End: 2021-05-07
Payer: COMMERCIAL

## 2021-05-07 DIAGNOSIS — Z98.49 POSTOPERATIVE CARE FOR CATARACT: Primary | ICD-10-CM

## 2021-05-07 DIAGNOSIS — E11.3593 TYPE 2 DIABETES MELLITUS WITH BOTH EYES AFFECTED BY PROLIFERATIVE RETINOPATHY WITHOUT MACULAR EDEMA, WITH LONG-TERM CURRENT USE OF INSULIN: ICD-10-CM

## 2021-05-07 DIAGNOSIS — Z48.810 POSTOPERATIVE CARE FOR CATARACT: Primary | ICD-10-CM

## 2021-05-07 DIAGNOSIS — H57.9 SHALLOW ANTERIOR CHAMBER OF EYE: ICD-10-CM

## 2021-05-07 DIAGNOSIS — Z96.1 PSEUDOPHAKIA, BOTH EYES: ICD-10-CM

## 2021-05-07 DIAGNOSIS — Z79.4 TYPE 2 DIABETES MELLITUS WITH BOTH EYES AFFECTED BY PROLIFERATIVE RETINOPATHY WITHOUT MACULAR EDEMA, WITH LONG-TERM CURRENT USE OF INSULIN: ICD-10-CM

## 2021-05-07 DIAGNOSIS — H57.03 SMALL PUPIL: ICD-10-CM

## 2021-05-07 DIAGNOSIS — H35.373 EPIRETINAL MEMBRANE, BILATERAL: ICD-10-CM

## 2021-05-07 PROCEDURE — 99024 PR POST-OP FOLLOW-UP VISIT: ICD-10-PCS | Mod: S$GLB,,, | Performed by: OPHTHALMOLOGY

## 2021-05-07 PROCEDURE — 99024 POSTOP FOLLOW-UP VISIT: CPT | Mod: S$GLB,,, | Performed by: OPHTHALMOLOGY

## 2021-05-07 PROCEDURE — 99999 PR PBB SHADOW E&M-EST. PATIENT-LVL III: ICD-10-PCS | Mod: PBBFAC,,, | Performed by: OPHTHALMOLOGY

## 2021-05-07 PROCEDURE — 1126F AMNT PAIN NOTED NONE PRSNT: CPT | Mod: S$GLB,,, | Performed by: OPHTHALMOLOGY

## 2021-05-07 PROCEDURE — 1126F PR PAIN SEVERITY QUANTIFIED, NO PAIN PRESENT: ICD-10-PCS | Mod: S$GLB,,, | Performed by: OPHTHALMOLOGY

## 2021-05-07 PROCEDURE — 99999 PR PBB SHADOW E&M-EST. PATIENT-LVL III: CPT | Mod: PBBFAC,,, | Performed by: OPHTHALMOLOGY

## 2021-05-12 ENCOUNTER — TELEPHONE (OUTPATIENT)
Dept: DERMATOLOGY | Facility: CLINIC | Age: 66
End: 2021-05-12

## 2021-05-14 ENCOUNTER — TELEPHONE (OUTPATIENT)
Dept: INTERNAL MEDICINE | Facility: CLINIC | Age: 66
End: 2021-05-14

## 2021-05-16 ENCOUNTER — DOCUMENTATION ONLY (OUTPATIENT)
Dept: BARIATRICS | Facility: CLINIC | Age: 66
End: 2021-05-16

## 2021-05-22 ENCOUNTER — LAB VISIT (OUTPATIENT)
Dept: LAB | Facility: HOSPITAL | Age: 66
End: 2021-05-22
Payer: COMMERCIAL

## 2021-05-22 DIAGNOSIS — R25.2 LEG CRAMPS: ICD-10-CM

## 2021-05-22 DIAGNOSIS — E11.65 TYPE 2 DIABETES MELLITUS WITH HYPERGLYCEMIA, WITHOUT LONG-TERM CURRENT USE OF INSULIN: ICD-10-CM

## 2021-05-22 LAB
ESTIMATED AVG GLUCOSE: 186 MG/DL (ref 68–131)
HBA1C MFR BLD: 8.1 % (ref 4–5.6)
MAGNESIUM SERPL-MCNC: 2.2 MG/DL (ref 1.6–2.6)

## 2021-05-22 PROCEDURE — 36415 COLL VENOUS BLD VENIPUNCTURE: CPT | Performed by: NURSE PRACTITIONER

## 2021-05-22 PROCEDURE — 83036 HEMOGLOBIN GLYCOSYLATED A1C: CPT | Performed by: NURSE PRACTITIONER

## 2021-05-22 PROCEDURE — 83735 ASSAY OF MAGNESIUM: CPT | Performed by: NURSE PRACTITIONER

## 2021-05-24 ENCOUNTER — TELEPHONE (OUTPATIENT)
Dept: BARIATRICS | Facility: CLINIC | Age: 66
End: 2021-05-24

## 2021-05-27 ENCOUNTER — TELEPHONE (OUTPATIENT)
Dept: INTERNAL MEDICINE | Facility: CLINIC | Age: 66
End: 2021-05-27

## 2021-05-29 ENCOUNTER — HOSPITAL ENCOUNTER (OUTPATIENT)
Dept: RADIOLOGY | Facility: HOSPITAL | Age: 66
Discharge: HOME | End: 2021-05-29
Attending: FAMILY MEDICINE
Payer: COMMERCIAL

## 2021-05-29 DIAGNOSIS — Z12.31 BREAST CANCER SCREENING BY MAMMOGRAM: ICD-10-CM

## 2021-05-29 PROCEDURE — 77067 MAMMO DIGITAL SCREENING BILAT WITH TOMO: ICD-10-PCS | Mod: 26,,, | Performed by: RADIOLOGY

## 2021-05-29 PROCEDURE — 77067 SCR MAMMO BI INCL CAD: CPT | Mod: TC

## 2021-05-29 PROCEDURE — 77067 SCR MAMMO BI INCL CAD: CPT | Mod: 26,,, | Performed by: RADIOLOGY

## 2021-05-29 PROCEDURE — 77063 MAMMO DIGITAL SCREENING BILAT WITH TOMO: ICD-10-PCS | Mod: 26,,, | Performed by: RADIOLOGY

## 2021-05-29 PROCEDURE — 77063 BREAST TOMOSYNTHESIS BI: CPT | Mod: 26,,, | Performed by: RADIOLOGY

## 2021-05-31 ENCOUNTER — DOCUMENTATION ONLY (OUTPATIENT)
Dept: BARIATRICS | Facility: CLINIC | Age: 66
End: 2021-05-31

## 2021-06-01 ENCOUNTER — OFFICE VISIT (OUTPATIENT)
Dept: DERMATOLOGY | Facility: CLINIC | Age: 66
End: 2021-06-01
Payer: COMMERCIAL

## 2021-06-01 DIAGNOSIS — L30.9 DERMATITIS: Primary | ICD-10-CM

## 2021-06-01 PROCEDURE — 99999 PR PBB SHADOW E&M-EST. PATIENT-LVL III: ICD-10-PCS | Mod: PBBFAC,,,

## 2021-06-01 PROCEDURE — 99204 PR OFFICE/OUTPT VISIT, NEW, LEVL IV, 45-59 MIN: ICD-10-PCS | Mod: S$GLB,,, | Performed by: DERMATOLOGY

## 2021-06-01 PROCEDURE — 99999 PR PBB SHADOW E&M-EST. PATIENT-LVL III: CPT | Mod: PBBFAC,,,

## 2021-06-01 PROCEDURE — 99204 OFFICE O/P NEW MOD 45 MIN: CPT | Mod: S$GLB,,, | Performed by: DERMATOLOGY

## 2021-06-01 RX ORDER — BETAMETHASONE DIPROPIONATE 0.5 MG/G
CREAM TOPICAL 2 TIMES DAILY
Qty: 45 G | Refills: 2 | Status: SHIPPED | OUTPATIENT
Start: 2021-06-01 | End: 2023-04-13

## 2021-06-10 ENCOUNTER — TELEPHONE (OUTPATIENT)
Dept: BARIATRICS | Facility: CLINIC | Age: 66
End: 2021-06-10

## 2021-06-14 ENCOUNTER — TELEPHONE (OUTPATIENT)
Dept: BARIATRICS | Facility: CLINIC | Age: 66
End: 2021-06-14

## 2021-06-21 ENCOUNTER — TELEPHONE (OUTPATIENT)
Dept: BARIATRICS | Facility: CLINIC | Age: 66
End: 2021-06-21

## 2021-06-28 ENCOUNTER — OFFICE VISIT (OUTPATIENT)
Dept: PRIMARY CARE CLINIC | Facility: CLINIC | Age: 66
End: 2021-06-28
Payer: COMMERCIAL

## 2021-06-28 VITALS
DIASTOLIC BLOOD PRESSURE: 66 MMHG | WEIGHT: 252.31 LBS | OXYGEN SATURATION: 98 % | BODY MASS INDEX: 40.55 KG/M2 | HEART RATE: 77 BPM | SYSTOLIC BLOOD PRESSURE: 128 MMHG | HEIGHT: 66 IN

## 2021-06-28 DIAGNOSIS — M25.562 ACUTE PAIN OF LEFT KNEE: ICD-10-CM
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
## 2021-06-28 DIAGNOSIS — M25.512 ACUTE PAIN OF LEFT SHOULDER: ICD-10-CM

## 2021-06-28 PROCEDURE — 3008F BODY MASS INDEX DOCD: CPT | Mod: CPTII,S$GLB,, | Performed by: NURSE PRACTITIONER

## 2021-06-28 PROCEDURE — 1125F PR PAIN SEVERITY QUANTIFIED, PAIN PRESENT: ICD-10-PCS | Mod: S$GLB,,, | Performed by: NURSE PRACTITIONER

## 2021-06-28 PROCEDURE — 1125F AMNT PAIN NOTED PAIN PRSNT: CPT | Mod: S$GLB,,, | Performed by: NURSE PRACTITIONER

## 2021-06-28 PROCEDURE — 3008F PR BODY MASS INDEX (BMI) DOCUMENTED: ICD-10-PCS | Mod: CPTII,S$GLB,, | Performed by: NURSE PRACTITIONER

## 2021-06-28 PROCEDURE — 99213 PR OFFICE/OUTPT VISIT, EST, LEVL III, 20-29 MIN: ICD-10-PCS | Mod: S$GLB,,, | Performed by: NURSE PRACTITIONER

## 2021-06-28 PROCEDURE — 99999 PR PBB SHADOW E&M-EST. PATIENT-LVL III: CPT | Mod: PBBFAC,,, | Performed by: NURSE PRACTITIONER

## 2021-06-28 PROCEDURE — 99213 OFFICE O/P EST LOW 20 MIN: CPT | Mod: S$GLB,,, | Performed by: NURSE PRACTITIONER

## 2021-06-28 PROCEDURE — 99999 PR PBB SHADOW E&M-EST. PATIENT-LVL III: ICD-10-PCS | Mod: PBBFAC,,, | Performed by: NURSE PRACTITIONER

## 2021-06-28 RX ORDER — DEXTROMETHORPHAN HYDROBROMIDE, GUAIFENESIN 5; 100 MG/5ML; MG/5ML
650 LIQUID ORAL EVERY 8 HOURS PRN
Qty: 30 TABLET | Refills: 0 | Status: SHIPPED | OUTPATIENT
Start: 2021-06-28

## 2021-06-28 RX ORDER — TIZANIDINE 4 MG/1
4 TABLET ORAL NIGHTLY PRN
Qty: 20 TABLET | Refills: 0 | Status: SHIPPED | OUTPATIENT
Start: 2021-06-28 | End: 2021-07-21

## 2021-06-30 ENCOUNTER — OFFICE VISIT (OUTPATIENT)
Dept: OPHTHALMOLOGY | Facility: CLINIC | Age: 66
End: 2021-06-30
Payer: COMMERCIAL

## 2021-06-30 DIAGNOSIS — H35.373 PRERETINAL FIBROSIS, BILATERAL: ICD-10-CM

## 2021-06-30 DIAGNOSIS — Z79.4 TYPE 2 DIABETES MELLITUS WITH BOTH EYES AFFECTED BY PROLIFERATIVE RETINOPATHY WITHOUT MACULAR EDEMA, WITH LONG-TERM CURRENT USE OF INSULIN: Primary | ICD-10-CM

## 2021-06-30 DIAGNOSIS — E11.3593 TYPE 2 DIABETES MELLITUS WITH BOTH EYES AFFECTED BY PROLIFERATIVE RETINOPATHY WITHOUT MACULAR EDEMA, WITH LONG-TERM CURRENT USE OF INSULIN: Primary | ICD-10-CM

## 2021-06-30 DIAGNOSIS — H40.033 ANATOMICAL NARROW ANGLE BORDERLINE GLAUCOMA OF BOTH EYES: ICD-10-CM

## 2021-06-30 PROCEDURE — 99999 PR PBB SHADOW E&M-EST. PATIENT-LVL III: CPT | Mod: PBBFAC,,, | Performed by: OPHTHALMOLOGY

## 2021-06-30 PROCEDURE — 1126F PR PAIN SEVERITY QUANTIFIED, NO PAIN PRESENT: ICD-10-PCS | Mod: S$GLB,,, | Performed by: OPHTHALMOLOGY

## 2021-06-30 PROCEDURE — 3052F HG A1C>EQUAL 8.0%<EQUAL 9.0%: CPT | Mod: CPTII,S$GLB,, | Performed by: OPHTHALMOLOGY

## 2021-06-30 PROCEDURE — 92014 PR EYE EXAM, EST PATIENT,COMPREHESV: ICD-10-PCS | Mod: S$GLB,,, | Performed by: OPHTHALMOLOGY

## 2021-06-30 PROCEDURE — 1126F AMNT PAIN NOTED NONE PRSNT: CPT | Mod: S$GLB,,, | Performed by: OPHTHALMOLOGY

## 2021-06-30 PROCEDURE — 99999 PR PBB SHADOW E&M-EST. PATIENT-LVL III: ICD-10-PCS | Mod: PBBFAC,,, | Performed by: OPHTHALMOLOGY

## 2021-06-30 PROCEDURE — 92134 CPTRZ OPH DX IMG PST SGM RTA: CPT | Mod: S$GLB,,, | Performed by: OPHTHALMOLOGY

## 2021-06-30 PROCEDURE — 3052F PR MOST RECENT HEMOGLOBIN A1C LEVEL 8.0 - < 9.0%: ICD-10-PCS | Mod: CPTII,S$GLB,, | Performed by: OPHTHALMOLOGY

## 2021-06-30 PROCEDURE — 92014 COMPRE OPH EXAM EST PT 1/>: CPT | Mod: S$GLB,,, | Performed by: OPHTHALMOLOGY

## 2021-06-30 PROCEDURE — 92134 POSTERIOR SEGMENT OCT RETINA (OCULAR COHERENCE TOMOGRAPHY)-BOTH EYES: ICD-10-PCS | Mod: S$GLB,,, | Performed by: OPHTHALMOLOGY

## 2021-07-02 ENCOUNTER — TELEPHONE (OUTPATIENT)
Dept: PRIMARY CARE CLINIC | Facility: CLINIC | Age: 66
End: 2021-07-02

## 2021-07-16 NOTE — TRANSFER OF CARE
"Anesthesia Transfer of Care Note    Patient: Aaliyah Angela    Procedure(s) Performed: Procedure(s) (LRB):  EGD (ESOPHAGOGASTRODUODENOSCOPY) (N/A)  COLONOSCOPY (N/A)    Patient location: PACU    Anesthesia Type: general    Transport from OR: Transported from OR on 2-3 L/min O2 by NC with adequate spontaneous ventilation    Post pain: adequate analgesia    Post assessment: no apparent anesthetic complications and tolerated procedure well    Post vital signs: stable    Level of consciousness: responds to stimulation and sedated    Nausea/Vomiting: no nausea/vomiting    Complications: none    Transfer of care protocol was followed      Last vitals:   Visit Vitals  BP (!) 149/70 (Patient Position: Lying)   Pulse 86   Temp 36.5 °C (97.7 °F) (Temporal)   Resp 16   Ht 5' 6" (1.676 m)   Wt 112.5 kg (248 lb)   SpO2 99%   Breastfeeding No   BMI 40.03 kg/m²     " I would like to discuss with her clinically how she was doing before ordering any additional labs.

## 2021-08-09 ENCOUNTER — OFFICE VISIT (OUTPATIENT)
Dept: OBSTETRICS AND GYNECOLOGY | Facility: CLINIC | Age: 66
End: 2021-08-09
Payer: COMMERCIAL

## 2021-08-09 VITALS — DIASTOLIC BLOOD PRESSURE: 70 MMHG | HEIGHT: 66 IN | BODY MASS INDEX: 40.72 KG/M2 | SYSTOLIC BLOOD PRESSURE: 128 MMHG

## 2021-08-09 DIAGNOSIS — E66.01 CLASS 3 SEVERE OBESITY DUE TO EXCESS CALORIES WITH SERIOUS COMORBIDITY AND BODY MASS INDEX (BMI) OF 40.0 TO 44.9 IN ADULT: ICD-10-CM

## 2021-08-09 DIAGNOSIS — Z79.4 TYPE 2 DIABETES MELLITUS WITH BOTH EYES AFFECTED BY PROLIFERATIVE RETINOPATHY WITHOUT MACULAR EDEMA, WITH LONG-TERM CURRENT USE OF INSULIN: ICD-10-CM

## 2021-08-09 DIAGNOSIS — I73.9 PVD (PERIPHERAL VASCULAR DISEASE): ICD-10-CM

## 2021-08-09 DIAGNOSIS — Z12.31 VISIT FOR SCREENING MAMMOGRAM: ICD-10-CM

## 2021-08-09 DIAGNOSIS — E11.42 DIABETIC POLYNEUROPATHY ASSOCIATED WITH TYPE 2 DIABETES MELLITUS: ICD-10-CM

## 2021-08-09 DIAGNOSIS — H35.033 HYPERTENSIVE RETINOPATHY OF BOTH EYES: ICD-10-CM

## 2021-08-09 DIAGNOSIS — E11.3593 TYPE 2 DIABETES MELLITUS WITH BOTH EYES AFFECTED BY PROLIFERATIVE RETINOPATHY WITHOUT MACULAR EDEMA, WITH LONG-TERM CURRENT USE OF INSULIN: ICD-10-CM

## 2021-08-09 DIAGNOSIS — Z98.84 HISTORY OF BARIATRIC SURGERY: ICD-10-CM

## 2021-08-09 DIAGNOSIS — I25.10 CORONARY ARTERY DISEASE INVOLVING NATIVE CORONARY ARTERY OF NATIVE HEART WITHOUT ANGINA PECTORIS: ICD-10-CM

## 2021-08-09 DIAGNOSIS — Z01.419 ENCOUNTER FOR GYNECOLOGICAL EXAMINATION WITHOUT ABNORMAL FINDING: Primary | ICD-10-CM

## 2021-08-09 DIAGNOSIS — E11.65 TYPE 2 DIABETES MELLITUS WITH HYPERGLYCEMIA, WITHOUT LONG-TERM CURRENT USE OF INSULIN: ICD-10-CM

## 2021-08-09 PROCEDURE — 1160F RVW MEDS BY RX/DR IN RCRD: CPT | Mod: CPTII,S$GLB,, | Performed by: OBSTETRICS & GYNECOLOGY

## 2021-08-09 PROCEDURE — 99396 PR PREVENTIVE VISIT,EST,40-64: ICD-10-PCS | Mod: S$GLB,,, | Performed by: OBSTETRICS & GYNECOLOGY

## 2021-08-09 PROCEDURE — 1159F PR MEDICATION LIST DOCUMENTED IN MEDICAL RECORD: ICD-10-PCS | Mod: CPTII,S$GLB,, | Performed by: OBSTETRICS & GYNECOLOGY

## 2021-08-09 PROCEDURE — 3074F PR MOST RECENT SYSTOLIC BLOOD PRESSURE < 130 MM HG: ICD-10-PCS | Mod: CPTII,S$GLB,, | Performed by: OBSTETRICS & GYNECOLOGY

## 2021-08-09 PROCEDURE — 3008F BODY MASS INDEX DOCD: CPT | Mod: CPTII,S$GLB,, | Performed by: OBSTETRICS & GYNECOLOGY

## 2021-08-09 PROCEDURE — 1159F MED LIST DOCD IN RCRD: CPT | Mod: CPTII,S$GLB,, | Performed by: OBSTETRICS & GYNECOLOGY

## 2021-08-09 PROCEDURE — 3078F DIAST BP <80 MM HG: CPT | Mod: CPTII,S$GLB,, | Performed by: OBSTETRICS & GYNECOLOGY

## 2021-08-09 PROCEDURE — 1126F PR PAIN SEVERITY QUANTIFIED, NO PAIN PRESENT: ICD-10-PCS | Mod: CPTII,S$GLB,, | Performed by: OBSTETRICS & GYNECOLOGY

## 2021-08-09 PROCEDURE — 3052F HG A1C>EQUAL 8.0%<EQUAL 9.0%: CPT | Mod: CPTII,S$GLB,, | Performed by: OBSTETRICS & GYNECOLOGY

## 2021-08-09 PROCEDURE — 99999 PR PBB SHADOW E&M-EST. PATIENT-LVL IV: CPT | Mod: PBBFAC,,, | Performed by: OBSTETRICS & GYNECOLOGY

## 2021-08-09 PROCEDURE — 3008F PR BODY MASS INDEX (BMI) DOCUMENTED: ICD-10-PCS | Mod: CPTII,S$GLB,, | Performed by: OBSTETRICS & GYNECOLOGY

## 2021-08-09 PROCEDURE — 3078F PR MOST RECENT DIASTOLIC BLOOD PRESSURE < 80 MM HG: ICD-10-PCS | Mod: CPTII,S$GLB,, | Performed by: OBSTETRICS & GYNECOLOGY

## 2021-08-09 PROCEDURE — 1126F AMNT PAIN NOTED NONE PRSNT: CPT | Mod: CPTII,S$GLB,, | Performed by: OBSTETRICS & GYNECOLOGY

## 2021-08-09 PROCEDURE — 1160F PR REVIEW ALL MEDS BY PRESCRIBER/CLIN PHARMACIST DOCUMENTED: ICD-10-PCS | Mod: CPTII,S$GLB,, | Performed by: OBSTETRICS & GYNECOLOGY

## 2021-08-09 PROCEDURE — 99999 PR PBB SHADOW E&M-EST. PATIENT-LVL IV: ICD-10-PCS | Mod: PBBFAC,,, | Performed by: OBSTETRICS & GYNECOLOGY

## 2021-08-09 PROCEDURE — 3074F SYST BP LT 130 MM HG: CPT | Mod: CPTII,S$GLB,, | Performed by: OBSTETRICS & GYNECOLOGY

## 2021-08-09 PROCEDURE — 3052F PR MOST RECENT HEMOGLOBIN A1C LEVEL 8.0 - < 9.0%: ICD-10-PCS | Mod: CPTII,S$GLB,, | Performed by: OBSTETRICS & GYNECOLOGY

## 2021-08-09 PROCEDURE — 99396 PREV VISIT EST AGE 40-64: CPT | Mod: S$GLB,,, | Performed by: OBSTETRICS & GYNECOLOGY

## 2021-08-20 ENCOUNTER — PATIENT MESSAGE (OUTPATIENT)
Dept: BARIATRICS | Facility: CLINIC | Age: 66
End: 2021-08-20

## 2021-09-11 ENCOUNTER — PATIENT OUTREACH (OUTPATIENT)
Dept: ADMINISTRATIVE | Facility: OTHER | Age: 66
End: 2021-09-11

## 2021-09-13 DIAGNOSIS — E78.5 HYPERLIPIDEMIA, UNSPECIFIED HYPERLIPIDEMIA TYPE: ICD-10-CM

## 2021-09-14 RX ORDER — ATORVASTATIN CALCIUM 20 MG/1
TABLET, FILM COATED ORAL
Qty: 90 TABLET | Refills: 2 | Status: SHIPPED | OUTPATIENT
Start: 2021-09-14 | End: 2021-11-04

## 2021-09-22 ENCOUNTER — TELEPHONE (OUTPATIENT)
Dept: INTERNAL MEDICINE | Facility: CLINIC | Age: 66
End: 2021-09-22

## 2021-09-22 RX ORDER — EMPAGLIFLOZIN 25 MG/1
TABLET, FILM COATED ORAL
Qty: 90 TABLET | Refills: 2 | Status: SHIPPED | OUTPATIENT
Start: 2021-09-22 | End: 2022-05-24

## 2021-09-27 ENCOUNTER — PATIENT MESSAGE (OUTPATIENT)
Dept: INTERNAL MEDICINE | Facility: CLINIC | Age: 66
End: 2021-09-27

## 2021-10-04 ENCOUNTER — TELEPHONE (OUTPATIENT)
Dept: OPTOMETRY | Facility: CLINIC | Age: 66
End: 2021-10-04

## 2021-11-03 DIAGNOSIS — E78.5 HYPERLIPIDEMIA, UNSPECIFIED HYPERLIPIDEMIA TYPE: ICD-10-CM

## 2021-11-04 RX ORDER — ATORVASTATIN CALCIUM 20 MG/1
TABLET, FILM COATED ORAL
Qty: 90 TABLET | Refills: 0 | Status: SHIPPED | OUTPATIENT
Start: 2021-11-04 | End: 2022-02-07

## 2021-11-19 ENCOUNTER — LAB VISIT (OUTPATIENT)
Dept: LAB | Facility: HOSPITAL | Age: 66
End: 2021-11-19
Attending: NURSE PRACTITIONER
Payer: COMMERCIAL

## 2021-11-19 ENCOUNTER — OFFICE VISIT (OUTPATIENT)
Dept: INTERNAL MEDICINE | Facility: CLINIC | Age: 66
End: 2021-11-19
Payer: COMMERCIAL

## 2021-11-19 VITALS
DIASTOLIC BLOOD PRESSURE: 71 MMHG | SYSTOLIC BLOOD PRESSURE: 136 MMHG | BODY MASS INDEX: 40.85 KG/M2 | OXYGEN SATURATION: 100 % | HEIGHT: 66 IN | HEART RATE: 88 BPM | WEIGHT: 254.19 LBS

## 2021-11-19 DIAGNOSIS — G47.33 OSA ON CPAP: ICD-10-CM

## 2021-11-19 DIAGNOSIS — B35.3 TINEA PEDIS OF BOTH FEET: ICD-10-CM

## 2021-11-19 DIAGNOSIS — I73.9 PVD (PERIPHERAL VASCULAR DISEASE): ICD-10-CM

## 2021-11-19 DIAGNOSIS — E11.3593 TYPE 2 DIABETES MELLITUS WITH BOTH EYES AFFECTED BY PROLIFERATIVE RETINOPATHY WITHOUT MACULAR EDEMA, WITH LONG-TERM CURRENT USE OF INSULIN: Primary | ICD-10-CM

## 2021-11-19 DIAGNOSIS — Z79.4 TYPE 2 DIABETES MELLITUS WITH BOTH EYES AFFECTED BY PROLIFERATIVE RETINOPATHY WITHOUT MACULAR EDEMA, WITH LONG-TERM CURRENT USE OF INSULIN: ICD-10-CM

## 2021-11-19 DIAGNOSIS — E11.65 TYPE 2 DIABETES MELLITUS WITH HYPERGLYCEMIA, WITHOUT LONG-TERM CURRENT USE OF INSULIN: ICD-10-CM

## 2021-11-19 DIAGNOSIS — Z79.4 TYPE 2 DIABETES MELLITUS WITH BOTH EYES AFFECTED BY PROLIFERATIVE RETINOPATHY WITHOUT MACULAR EDEMA, WITH LONG-TERM CURRENT USE OF INSULIN: Primary | ICD-10-CM

## 2021-11-19 DIAGNOSIS — E11.42 DIABETIC POLYNEUROPATHY ASSOCIATED WITH TYPE 2 DIABETES MELLITUS: ICD-10-CM

## 2021-11-19 DIAGNOSIS — H35.033 HYPERTENSIVE RETINOPATHY OF BOTH EYES: ICD-10-CM

## 2021-11-19 DIAGNOSIS — Z98.84 HISTORY OF BARIATRIC SURGERY: ICD-10-CM

## 2021-11-19 DIAGNOSIS — E78.5 HYPERLIPIDEMIA, UNSPECIFIED HYPERLIPIDEMIA TYPE: ICD-10-CM

## 2021-11-19 DIAGNOSIS — E11.3593 TYPE 2 DIABETES MELLITUS WITH BOTH EYES AFFECTED BY PROLIFERATIVE RETINOPATHY WITHOUT MACULAR EDEMA, WITH LONG-TERM CURRENT USE OF INSULIN: ICD-10-CM

## 2021-11-19 DIAGNOSIS — I25.10 CORONARY ARTERY DISEASE INVOLVING NATIVE CORONARY ARTERY OF NATIVE HEART WITHOUT ANGINA PECTORIS: ICD-10-CM

## 2021-11-19 DIAGNOSIS — E66.01 CLASS 3 SEVERE OBESITY DUE TO EXCESS CALORIES WITH SERIOUS COMORBIDITY AND BODY MASS INDEX (BMI) OF 40.0 TO 44.9 IN ADULT: ICD-10-CM

## 2021-11-19 DIAGNOSIS — I10 HTN (HYPERTENSION), BENIGN: ICD-10-CM

## 2021-11-19 PROCEDURE — 99999 PR PBB SHADOW E&M-EST. PATIENT-LVL V: CPT | Mod: PBBFAC,,, | Performed by: NURSE PRACTITIONER

## 2021-11-19 PROCEDURE — 4010F ACE/ARB THERAPY RXD/TAKEN: CPT | Mod: CPTII,S$GLB,, | Performed by: NURSE PRACTITIONER

## 2021-11-19 PROCEDURE — 36415 COLL VENOUS BLD VENIPUNCTURE: CPT | Performed by: NURSE PRACTITIONER

## 2021-11-19 PROCEDURE — 3008F BODY MASS INDEX DOCD: CPT | Mod: CPTII,S$GLB,, | Performed by: NURSE PRACTITIONER

## 2021-11-19 PROCEDURE — 4010F PR ACE/ARB THEARPY RXD/TAKEN: ICD-10-PCS | Mod: CPTII,S$GLB,, | Performed by: NURSE PRACTITIONER

## 2021-11-19 PROCEDURE — 1160F PR REVIEW ALL MEDS BY PRESCRIBER/CLIN PHARMACIST DOCUMENTED: ICD-10-PCS | Mod: CPTII,S$GLB,, | Performed by: NURSE PRACTITIONER

## 2021-11-19 PROCEDURE — 99214 PR OFFICE/OUTPT VISIT, EST, LEVL IV, 30-39 MIN: ICD-10-PCS | Mod: S$GLB,,, | Performed by: NURSE PRACTITIONER

## 2021-11-19 PROCEDURE — 99214 OFFICE O/P EST MOD 30 MIN: CPT | Mod: S$GLB,,, | Performed by: NURSE PRACTITIONER

## 2021-11-19 PROCEDURE — 3008F PR BODY MASS INDEX (BMI) DOCUMENTED: ICD-10-PCS | Mod: CPTII,S$GLB,, | Performed by: NURSE PRACTITIONER

## 2021-11-19 PROCEDURE — 1159F PR MEDICATION LIST DOCUMENTED IN MEDICAL RECORD: ICD-10-PCS | Mod: CPTII,S$GLB,, | Performed by: NURSE PRACTITIONER

## 2021-11-19 PROCEDURE — 1160F RVW MEDS BY RX/DR IN RCRD: CPT | Mod: CPTII,S$GLB,, | Performed by: NURSE PRACTITIONER

## 2021-11-19 PROCEDURE — 3078F DIAST BP <80 MM HG: CPT | Mod: CPTII,S$GLB,, | Performed by: NURSE PRACTITIONER

## 2021-11-19 PROCEDURE — 3078F PR MOST RECENT DIASTOLIC BLOOD PRESSURE < 80 MM HG: ICD-10-PCS | Mod: CPTII,S$GLB,, | Performed by: NURSE PRACTITIONER

## 2021-11-19 PROCEDURE — 3052F HG A1C>EQUAL 8.0%<EQUAL 9.0%: CPT | Mod: CPTII,S$GLB,, | Performed by: NURSE PRACTITIONER

## 2021-11-19 PROCEDURE — 83036 HEMOGLOBIN GLYCOSYLATED A1C: CPT | Performed by: NURSE PRACTITIONER

## 2021-11-19 PROCEDURE — 99999 PR PBB SHADOW E&M-EST. PATIENT-LVL V: ICD-10-PCS | Mod: PBBFAC,,, | Performed by: NURSE PRACTITIONER

## 2021-11-19 PROCEDURE — 3052F PR MOST RECENT HEMOGLOBIN A1C LEVEL 8.0 - < 9.0%: ICD-10-PCS | Mod: CPTII,S$GLB,, | Performed by: NURSE PRACTITIONER

## 2021-11-19 PROCEDURE — 1159F MED LIST DOCD IN RCRD: CPT | Mod: CPTII,S$GLB,, | Performed by: NURSE PRACTITIONER

## 2021-11-19 PROCEDURE — 3075F PR MOST RECENT SYSTOLIC BLOOD PRESS GE 130-139MM HG: ICD-10-PCS | Mod: CPTII,S$GLB,, | Performed by: NURSE PRACTITIONER

## 2021-11-19 PROCEDURE — 3075F SYST BP GE 130 - 139MM HG: CPT | Mod: CPTII,S$GLB,, | Performed by: NURSE PRACTITIONER

## 2021-11-19 RX ORDER — ECONAZOLE NITRATE 10 MG/G
CREAM TOPICAL 2 TIMES DAILY
Qty: 30 G | Refills: 2 | Status: SHIPPED | OUTPATIENT
Start: 2021-11-19 | End: 2022-12-13

## 2021-11-19 RX ORDER — CICLOPIROX 80 MG/ML
SOLUTION TOPICAL NIGHTLY
Qty: 6.6 ML | Refills: 2 | Status: SHIPPED | OUTPATIENT
Start: 2021-11-19 | End: 2022-12-13

## 2021-11-19 RX ORDER — SEMAGLUTIDE 1.34 MG/ML
1 INJECTION, SOLUTION SUBCUTANEOUS
Qty: 3 PEN | Refills: 3 | Status: SHIPPED | OUTPATIENT
Start: 2021-11-19 | End: 2023-08-07 | Stop reason: SDUPTHER

## 2021-11-20 LAB
ESTIMATED AVG GLUCOSE: 197 MG/DL (ref 68–131)
HBA1C MFR BLD: 8.5 % (ref 4–5.6)

## 2021-11-21 ENCOUNTER — PATIENT MESSAGE (OUTPATIENT)
Dept: INTERNAL MEDICINE | Facility: CLINIC | Age: 66
End: 2021-11-21
Payer: COMMERCIAL

## 2021-12-09 ENCOUNTER — OFFICE VISIT (OUTPATIENT)
Dept: OPHTHALMOLOGY | Facility: CLINIC | Age: 66
End: 2021-12-09
Payer: COMMERCIAL

## 2021-12-09 ENCOUNTER — CLINICAL SUPPORT (OUTPATIENT)
Dept: OPHTHALMOLOGY | Facility: CLINIC | Age: 66
End: 2021-12-09
Payer: COMMERCIAL

## 2021-12-09 DIAGNOSIS — Z96.1 PSEUDOPHAKIA, BOTH EYES: ICD-10-CM

## 2021-12-09 DIAGNOSIS — H35.373 PRERETINAL FIBROSIS, BILATERAL: ICD-10-CM

## 2021-12-09 DIAGNOSIS — H57.9 SHALLOW ANTERIOR CHAMBER OF EYE: ICD-10-CM

## 2021-12-09 DIAGNOSIS — H57.03 SMALL PUPIL: ICD-10-CM

## 2021-12-09 DIAGNOSIS — H40.033 ANATOMICAL NARROW ANGLE BORDERLINE GLAUCOMA OF BOTH EYES: Primary | ICD-10-CM

## 2021-12-09 DIAGNOSIS — Z79.4 TYPE 2 DIABETES MELLITUS WITH BOTH EYES AFFECTED BY PROLIFERATIVE RETINOPATHY WITHOUT MACULAR EDEMA, WITH LONG-TERM CURRENT USE OF INSULIN: ICD-10-CM

## 2021-12-09 DIAGNOSIS — E11.3593 TYPE 2 DIABETES MELLITUS WITH BOTH EYES AFFECTED BY PROLIFERATIVE RETINOPATHY WITHOUT MACULAR EDEMA, WITH LONG-TERM CURRENT USE OF INSULIN: ICD-10-CM

## 2021-12-09 PROCEDURE — 3061F NEG MICROALBUMINURIA REV: CPT | Mod: CPTII,S$GLB,, | Performed by: OPHTHALMOLOGY

## 2021-12-09 PROCEDURE — 99999 PR PBB SHADOW E&M-EST. PATIENT-LVL IV: ICD-10-PCS | Mod: PBBFAC,,, | Performed by: OPHTHALMOLOGY

## 2021-12-09 PROCEDURE — 92083 EXTENDED VISUAL FIELD XM: CPT | Mod: S$GLB,,, | Performed by: OPHTHALMOLOGY

## 2021-12-09 PROCEDURE — 92250 FUNDUS PHOTOGRAPHY W/I&R: CPT | Mod: S$GLB,,, | Performed by: OPHTHALMOLOGY

## 2021-12-09 PROCEDURE — 3061F PR NEG MICROALBUMINURIA RESULT DOCUMENTED/REVIEW: ICD-10-PCS | Mod: CPTII,S$GLB,, | Performed by: OPHTHALMOLOGY

## 2021-12-09 PROCEDURE — 99999 PR PBB SHADOW E&M-EST. PATIENT-LVL IV: CPT | Mod: PBBFAC,,, | Performed by: OPHTHALMOLOGY

## 2021-12-09 PROCEDURE — 92250 COLOR FUNDUS PHOTOGRAPHY - OU - BOTH EYES: ICD-10-PCS | Mod: S$GLB,,, | Performed by: OPHTHALMOLOGY

## 2021-12-09 PROCEDURE — 92014 COMPRE OPH EXAM EST PT 1/>: CPT | Mod: S$GLB,,, | Performed by: OPHTHALMOLOGY

## 2021-12-09 PROCEDURE — 92014 PR EYE EXAM, EST PATIENT,COMPREHESV: ICD-10-PCS | Mod: S$GLB,,, | Performed by: OPHTHALMOLOGY

## 2021-12-09 PROCEDURE — 92083 HUMPHREY VISUAL FIELD - OU - BOTH EYES: ICD-10-PCS | Mod: S$GLB,,, | Performed by: OPHTHALMOLOGY

## 2021-12-09 PROCEDURE — 4010F ACE/ARB THERAPY RXD/TAKEN: CPT | Mod: CPTII,S$GLB,, | Performed by: OPHTHALMOLOGY

## 2021-12-09 PROCEDURE — 3066F PR DOCUMENTATION OF TREATMENT FOR NEPHROPATHY: ICD-10-PCS | Mod: CPTII,S$GLB,, | Performed by: OPHTHALMOLOGY

## 2021-12-09 PROCEDURE — 4010F PR ACE/ARB THEARPY RXD/TAKEN: ICD-10-PCS | Mod: CPTII,S$GLB,, | Performed by: OPHTHALMOLOGY

## 2021-12-09 PROCEDURE — 3066F NEPHROPATHY DOC TX: CPT | Mod: CPTII,S$GLB,, | Performed by: OPHTHALMOLOGY

## 2021-12-27 RX ORDER — METOPROLOL SUCCINATE 50 MG/1
TABLET, EXTENDED RELEASE ORAL
Qty: 90 TABLET | Refills: 1 | Status: SHIPPED | OUTPATIENT
Start: 2021-12-27 | End: 2022-02-07 | Stop reason: SDUPTHER

## 2021-12-27 RX ORDER — AMLODIPINE BESYLATE 10 MG/1
TABLET ORAL
Qty: 90 TABLET | Refills: 1 | Status: SHIPPED | OUTPATIENT
Start: 2021-12-27 | End: 2022-02-07 | Stop reason: SDUPTHER

## 2022-01-31 DIAGNOSIS — I10 HTN (HYPERTENSION), BENIGN: ICD-10-CM

## 2022-01-31 NOTE — TELEPHONE ENCOUNTER
Care Due:                  Date            Visit Type   Department     Provider  --------------------------------------------------------------------------------                                             [facility] INTERNAL  Last Visit: 05-      None         [facility]                              FOLLOWUP/OF  [facility] INTERNAL  Next Visit: 02-      FICE VISIT   MEDICINE       MARY MCKINLEY                                                            Last  Test          Frequency    Reason                     Performed    Due Date  --------------------------------------------------------------------------------    CMP.........  12 months..  atorvastatin, losartan...  Not Found    Overdue    Lipid Panel.  12 months..  atorvastatin.............  Not Found    Overdue    Powered by [employer]. Reference number: [id number].   1/31/2022 5:31:13 PM CST

## 2022-02-06 DIAGNOSIS — E78.5 HYPERLIPIDEMIA, UNSPECIFIED HYPERLIPIDEMIA TYPE: ICD-10-CM

## 2022-02-06 DIAGNOSIS — K21.9 GASTROESOPHAGEAL REFLUX DISEASE, UNSPECIFIED WHETHER ESOPHAGITIS PRESENT: ICD-10-CM

## 2022-02-06 NOTE — TELEPHONE ENCOUNTER
No new care gaps identified.  Powered by [employer]. Reference number: [id number].   2/06/2022 8:07:05 AM CST

## 2022-02-07 ENCOUNTER — OFFICE VISIT (OUTPATIENT)
Dept: INTERNAL MEDICINE | Facility: CLINIC | Age: 67
End: 2022-02-07
Payer: COMMERCIAL

## 2022-02-07 VITALS
DIASTOLIC BLOOD PRESSURE: 70 MMHG | SYSTOLIC BLOOD PRESSURE: 153 MMHG | BODY MASS INDEX: 40.02 KG/M2 | WEIGHT: 249 LBS | OXYGEN SATURATION: 99 % | HEART RATE: 96 BPM | HEIGHT: 66 IN

## 2022-02-07 DIAGNOSIS — E78.5 HYPERLIPIDEMIA, UNSPECIFIED HYPERLIPIDEMIA TYPE: ICD-10-CM

## 2022-02-07 DIAGNOSIS — E66.01 CLASS 3 SEVERE OBESITY DUE TO EXCESS CALORIES WITH SERIOUS COMORBIDITY AND BODY MASS INDEX (BMI) OF 40.0 TO 44.9 IN ADULT: ICD-10-CM

## 2022-02-07 DIAGNOSIS — I25.10 CORONARY ARTERY DISEASE INVOLVING NATIVE CORONARY ARTERY OF NATIVE HEART WITHOUT ANGINA PECTORIS: ICD-10-CM

## 2022-02-07 DIAGNOSIS — E21.3 HYPERPARATHYROIDISM: ICD-10-CM

## 2022-02-07 DIAGNOSIS — I10 PRIMARY HYPERTENSION: Primary | ICD-10-CM

## 2022-02-07 DIAGNOSIS — E11.3593 TYPE 2 DIABETES MELLITUS WITH BOTH EYES AFFECTED BY PROLIFERATIVE RETINOPATHY WITHOUT MACULAR EDEMA, WITHOUT LONG-TERM CURRENT USE OF INSULIN: ICD-10-CM

## 2022-02-07 DIAGNOSIS — K21.9 GASTROESOPHAGEAL REFLUX DISEASE, UNSPECIFIED WHETHER ESOPHAGITIS PRESENT: ICD-10-CM

## 2022-02-07 DIAGNOSIS — I73.9 PVD (PERIPHERAL VASCULAR DISEASE): ICD-10-CM

## 2022-02-07 DIAGNOSIS — E11.65 TYPE 2 DIABETES MELLITUS WITH HYPERGLYCEMIA, WITHOUT LONG-TERM CURRENT USE OF INSULIN: ICD-10-CM

## 2022-02-07 PROCEDURE — 1159F PR MEDICATION LIST DOCUMENTED IN MEDICAL RECORD: ICD-10-PCS | Mod: CPTII,S$GLB,, | Performed by: FAMILY MEDICINE

## 2022-02-07 PROCEDURE — 99396 PR PREVENTIVE VISIT,EST,40-64: ICD-10-PCS | Mod: S$GLB,,, | Performed by: FAMILY MEDICINE

## 2022-02-07 PROCEDURE — 3077F PR MOST RECENT SYSTOLIC BLOOD PRESSURE >= 140 MM HG: ICD-10-PCS | Mod: CPTII,S$GLB,, | Performed by: FAMILY MEDICINE

## 2022-02-07 PROCEDURE — 3077F SYST BP >= 140 MM HG: CPT | Mod: CPTII,S$GLB,, | Performed by: FAMILY MEDICINE

## 2022-02-07 PROCEDURE — 1159F MED LIST DOCD IN RCRD: CPT | Mod: CPTII,S$GLB,, | Performed by: FAMILY MEDICINE

## 2022-02-07 PROCEDURE — 99999 PR PBB SHADOW E&M-EST. PATIENT-LVL IV: ICD-10-PCS | Mod: PBBFAC,,, | Performed by: FAMILY MEDICINE

## 2022-02-07 PROCEDURE — 3008F PR BODY MASS INDEX (BMI) DOCUMENTED: ICD-10-PCS | Mod: CPTII,S$GLB,, | Performed by: FAMILY MEDICINE

## 2022-02-07 PROCEDURE — 99396 PREV VISIT EST AGE 40-64: CPT | Mod: S$GLB,,, | Performed by: FAMILY MEDICINE

## 2022-02-07 PROCEDURE — 3078F PR MOST RECENT DIASTOLIC BLOOD PRESSURE < 80 MM HG: ICD-10-PCS | Mod: CPTII,S$GLB,, | Performed by: FAMILY MEDICINE

## 2022-02-07 PROCEDURE — 99999 PR PBB SHADOW E&M-EST. PATIENT-LVL IV: CPT | Mod: PBBFAC,,, | Performed by: FAMILY MEDICINE

## 2022-02-07 PROCEDURE — 3008F BODY MASS INDEX DOCD: CPT | Mod: CPTII,S$GLB,, | Performed by: FAMILY MEDICINE

## 2022-02-07 PROCEDURE — 3052F HG A1C>EQUAL 8.0%<EQUAL 9.0%: CPT | Mod: CPTII,S$GLB,, | Performed by: FAMILY MEDICINE

## 2022-02-07 PROCEDURE — 3052F PR MOST RECENT HEMOGLOBIN A1C LEVEL 8.0 - < 9.0%: ICD-10-PCS | Mod: CPTII,S$GLB,, | Performed by: FAMILY MEDICINE

## 2022-02-07 PROCEDURE — 3078F DIAST BP <80 MM HG: CPT | Mod: CPTII,S$GLB,, | Performed by: FAMILY MEDICINE

## 2022-02-07 RX ORDER — AMLODIPINE BESYLATE 10 MG/1
10 TABLET ORAL DAILY
Qty: 90 TABLET | Refills: 3 | Status: SHIPPED | OUTPATIENT
Start: 2022-02-07 | End: 2022-04-01

## 2022-02-07 RX ORDER — METOPROLOL SUCCINATE 50 MG/1
50 TABLET, EXTENDED RELEASE ORAL DAILY
Qty: 90 TABLET | Refills: 3 | Status: SHIPPED | OUTPATIENT
Start: 2022-02-07 | End: 2022-04-01

## 2022-02-07 RX ORDER — ESOMEPRAZOLE MAGNESIUM 40 MG/1
40 CAPSULE, DELAYED RELEASE ORAL
Qty: 90 CAPSULE | Refills: 3 | Status: SHIPPED | OUTPATIENT
Start: 2022-02-07 | End: 2022-04-06 | Stop reason: SDUPTHER

## 2022-02-07 RX ORDER — ATORVASTATIN CALCIUM 20 MG/1
TABLET, FILM COATED ORAL
Qty: 90 TABLET | Refills: 0 | Status: SHIPPED | OUTPATIENT
Start: 2022-02-07 | End: 2022-05-13

## 2022-02-07 RX ORDER — LOSARTAN POTASSIUM 100 MG/1
100 TABLET ORAL DAILY
Qty: 90 TABLET | Refills: 3 | Status: SHIPPED | OUTPATIENT
Start: 2022-02-07 | End: 2023-03-14

## 2022-02-07 RX ORDER — ESOMEPRAZOLE MAGNESIUM 40 MG/1
40 CAPSULE, DELAYED RELEASE ORAL
Qty: 90 CAPSULE | Refills: 3 | Status: SHIPPED | OUTPATIENT
Start: 2022-02-07 | End: 2022-02-07 | Stop reason: SDUPTHER

## 2022-02-07 NOTE — PROGRESS NOTES
"Subjective:       Patient ID: Aaliyah Angela is a 66 y.o. female.    Chief Complaint: Follow-up    Patient is here for annual exam / follow-up of their chronic diseases    UGI 10/8/21: "- Repeat upper endoscopy in 3 months for surveillance. Maximize ppi therapy prior. " - not yet scheduled, will copy GI on this note    Diabetes Management Status - managed by Ms Noble; has [facility]io endo for Hx elevated PTH (has 3/14/21 blood test w/endocrine iclusing PTH, CMP)    Statin: Taking  ACE/ARB: Taking    Screening or Prevention Patient's value Goal Complete/Controlled?   HgA1C Testing and Control   Lab Results   Component Value Date    HGBA1C 8.5 (H) 11/19/2021      Annually/Less than 8% No   Lipid profile : 05/05/2021 Annually Yes   LDL control Lab Results   Component Value Date    LDLCALC 53.4 (L) 05/05/2021    Annually/Less than 100 mg/dl  Yes   Nephropathy screening Lab Results   Component Value Date    LABMICR 16.0 11/19/2021     Lab Results   Component Value Date    PROTEINUA Negative 06/24/2020     Lab Results   Component Value Date    UTPCR Unable to calculate 06/24/2020      Annually Yes   Blood pressure BP Readings from Last 1 Encounters:   11/19/21 136/71    Less than 140/90 Yes   Dilated retinal exam : 12/09/2021 Annually Yes   Foot exam   : 11/19/2021 Annually Yes         Social History     Tobacco Use    Smoking status: Never Smoker    Smokeless tobacco: Never Used   Substance Use Topics    Alcohol use: No    Drug use: No       Family History   Problem Relation Age of Onset    Diabetes Mother     Hypertension Mother     Diabetes Father     Hypertension Father     Stroke Father     Diabetes Sister     Diabetes Brother     Cancer Brother         prostate    Prostate cancer Brother     Diabetes Sister     Diabetes Sister     Diabetes Sister     Diabetes Brother     Stroke Brother     Diabetes Brother     Diabetes Brother     Breast cancer Neg Hx     Colon cancer Neg Hx     Ovarian cancer Neg Hx "     Amblyopia Neg Hx     Blindness Neg Hx     Cataracts Neg Hx     Glaucoma Neg Hx     Macular degeneration Neg Hx     Retinal detachment Neg Hx     Strabismus Neg Hx     Calcium disorder Neg Hx     Osteoporosis Neg Hx        Past Surgical History:   Procedure Laterality Date    CARPAL TUNNEL RELEASE Right 2020    Procedure: RELEASE, CARPAL TUNNEL RIGHT;  Surgeon: Tameka Bran MD;  [facility];  Service: Orthopedics;  Laterality: Right;    CARPAL TUNNEL RELEASE Right 2020    CATARACT EXTRACTION W/  INTRAOCULAR LENS IMPLANT Left 10/28/2020    COMPLEX ()    CATARACT EXTRACTION W/  INTRAOCULAR LENS IMPLANT Right 2021         SECTION      x3    COLONOSCOPY      COLONOSCOPY N/A 10/8/2020    Procedure: COLONOSCOPY;  Surgeon: Tima Talley MD;  Location: [facility] (4TH FLR);  Service: Endoscopy;  Laterality: N/A;    ESOPHAGOGASTRODUODENOSCOPY N/A 10/8/2020    Procedure: EGD (ESOPHAGOGASTRODUODENOSCOPY);  Surgeon: Tima Talley MD;  Location: [facility] (4TH FLR);  Service: Endoscopy;  Laterality: N/A;    GASTRIC BYPASS  2015    Sleeve    INTRAOCULAR PROSTHESES INSERTION Left 10/28/2020    Procedure: INSERTION, IOL PROSTHESIS;  Surgeon: Alma Goodrich MD;  [facility];  Service: Ophthalmology;  Laterality: Left;    INTRAOCULAR PROSTHESES INSERTION Right 2021    Procedure: INSERTION, IOL PROSTHESIS;  Surgeon: Alma Goodrich MD;  [facility];  Service: Ophthalmology;  Laterality: Right;    LASER PERIPHERAL IRIDOTOMY Bilateral 2017 and 2017        PANRETINAL PHOTOCOAGULATION      Both eyes    PHACOEMULSIFICATION OF CATARACT Left 10/28/2020    Procedure: PHACOEMULSIFICATION, CATARACT;  Surgeon: Alma Goodrich MD;  [facility];  Service: Ophthalmology;  Laterality: Left;    PHACOEMULSIFICATION OF CATARACT Right 2021    Procedure: PHACOEMULSIFICATION, CATARACT;   Surgeon: Alma Goodrich MD;  Location[facility] OR [address] [place] [address];  Service: Ophthalmology;  Laterality: Right;    TUBAL LIGATION         Patient Active Problem List   Diagnosis    Hyperlipidemia    Primary hypertension    Chronic tension-type headache, intractable    Hypertensive retinopathy    Transient vision disturbance    Cortical cataract    Vitreous hemorrhage    Class 3 severe obesity due to excess calories with serious comorbidity and body mass index (BMI) of 40.0 to 44.9 in adult    Chest pain on exertion    Ocular hypertension    Nuclear sclerosis    Anatomical narrow angle borderline glaucoma    PVD (peripheral vascular disease)    CAD (coronary artery disease)    BRADFORD on CPAP    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 by proliferative retinopathy without macular edema, without long-term current use of insulin    History of bariatric surgery, 12-    Erythema nodosum    Primary osteoarthritis involving multiple joints    Bilateral carpal tunnel syndrome    Neck pain    Diabetic polyneuropathy associated with type 2 diabetes mellitus    GERD (gastroesophageal reflux disease)    Type 2 diabetes mellitus with hyperglycemia, without long-term current use of insulin    Hyperparathyroidism    Small pupil    Shallow anterior chamber of eye    Nuclear sclerotic cataract of right eye    Tinea pedis of both feet       Current Outpatient Medications on File Prior to Visit   Medication Sig Dispense Refill    acetaminophen (TYLENOL) 650 MG TbSR Take 1 tablet (650 mg total) by mouth every 8 (eight) hours as needed (pain). 30 tablet 0    amitriptyline (ELAVIL) 25 MG tablet Take 2 tablets (50 mg total) by mouth every evening. 60 tablet 11    amLODIPine (NORVASC) 10 MG tablet TAKE 1 TABLET(10 MG) BY MOUTH EVERY DAY 90 tablet 1    aspirin 81 MG Chew Take 81 mg by mouth once daily.      atorvastatin (LIPITOR) 20 MG tablet TAKE 1 TABLET(20 MG) BY MOUTH EVERY DAY 90 tablet 0     "betamethasone dipropionate 0.05 % cream Apply topically 2 (two) times daily. 45 g 2    blood sugar diagnostic (BLOOD GLUCOSE TEST) Strp Inject 1 each into the skin 2 (two) times daily. 200 strip 3    calcium-vitamin D3 500 mg(1,250mg) -200 unit per tablet Take 1 tablet by mouth once daily.      ciclopirox (PENLAC) 8 % Soln Apply topically nightly. 6.6 mL 2    econazole nitrate 1 % cream Apply topically 2 (two) times daily. 30 g 2    empagliflozin (JARDIANCE) 25 mg tablet TAKE 1 TABLET BY MOUTH EVERY DAY MAKE SURE IT IS 25MG DAILY 90 tablet 2    esomeprazole (NEXIUM) 40 MG capsule Take 1 capsule (40 mg total) by mouth before breakfast. 90 capsule 3    famotidine (PEPCID) 20 MG tablet Take 1 tablet (20 mg total) by mouth 2 (two) times daily as needed (as needed for breakthrough reflux). 60 tablet 2    insulin lispro (HUMALOG KWIKPEN INSULIN) 100 unit/mL pen Use as directed based on sugar level, 150-200+2, 201-250+4, 251-300+6, 301-350+8, >350+10. Max daily 30 units. 1 Box 6    losartan (COZAAR) 100 MG tablet TAKE 1 TABLET(100 MG) BY MOUTH EVERY DAY 90 tablet 1    metFORMIN (GLUCOPHAGE-XR) 500 MG ER 24hr tablet TAKE 2 TABLETS(1000 MG) BY MOUTH TWICE DAILY WITH MEALS 360 tablet 3    metoprolol succinate (TOPROL-XL) 50 MG 24 hr tablet TAKE 1 TABLET BY MOUTH EVERY DAY 90 tablet 1    multivitamin (THERAGRAN) per tablet Take 1 tablet by mouth once daily.      pen needle, diabetic (BD ULTRA-FINE DIEGO PEN NEEDLE) 32 gauge x 5/32" Ndle Uses 3 times a day. 100 each 11    semaglutide (OZEMPIC) 1 mg/dose (4 mg/3 mL) Inject 1 mg into the skin every 7 days. 3 pen 3    tiZANidine (ZANAFLEX) 4 MG tablet TAKE 1 TABLET(4 MG) BY MOUTH EVERY NIGHT AS NEEDED FOR SPASM OR PAIN 20 tablet 0    triamcinolone acetonide 0.1% (KENALOG) 0.1 % cream Apply topically 2 (two) times daily. 28.4 g 0    urea 20 % Crea Apply 1 application topically once daily. To dry skin on the heels 75 g 10    [DISCONTINUED] atorvastatin (LIPITOR) 20 " "MG tablet TAKE 1 TABLET(20 MG) BY MOUTH EVERY DAY 90 tablet 0     Current Facility-Administered Medications on File Prior to Visit   Medication Dose Route Frequency Provider Last Rate Last Admin    0.9%  NaCl infusion   Intravenous Continuous Aristeo Bueno  mL/hr at 02/17/21 0754 New Bag at 02/17/21 0754           Review of Systems   Constitutional: Negative for chills and fever.   HENT: Negative for ear pain.    Eyes: Negative for pain.   Respiratory: Negative for chest tightness.    Cardiovascular: Negative for chest pain.   Gastrointestinal: Negative for abdominal pain.   Genitourinary: Negative for flank pain.   Musculoskeletal: Negative for gait problem.   Neurological: Negative for syncope.   Psychiatric/Behavioral: Negative for behavioral problems.       Objective:     BP (!) 153/70 (BP Location: Left arm, Patient Position: Sitting, BP Method: Large (Manual))   Pulse 96   Ht 5' 6" (1.676 m)   Wt 112.9 kg (249 lb)   SpO2 99%   BMI 40.19 kg/m²     Physical Exam  Vitals and nursing note reviewed.   Constitutional:       Appearance: She is well-developed and well-nourished.   HENT:      Head: Normocephalic and atraumatic.   Eyes:      Extraocular Movements: EOM normal.   Cardiovascular:      Rate and Rhythm: Normal rate.      Heart sounds: Normal heart sounds.   Pulmonary:      Effort: No respiratory distress.      Breath sounds: Normal breath sounds. No wheezing or rales.   Abdominal:      Palpations: Abdomen is soft.   Musculoskeletal:         General: No edema.      Cervical back: Neck supple.   Skin:     General: Skin is dry.   Neurological:      Mental Status: She is alert.   Psychiatric:         Behavior: Behavior normal.         Assessment:       1. Primary hypertension    2. Hyperlipidemia, unspecified hyperlipidemia type    3. PVD (peripheral vascular disease)    4. Coronary artery disease involving native coronary artery of native heart without angina pectoris    5. Type 2 " "diabetes mellitus with hyperglycemia, without long-term current use of insulin    6. Hyperparathyroidism    7. Gastroesophageal reflux disease, unspecified whether esophagitis present    8. Class 3 severe obesity due to excess calories with serious comorbidity and body mass index (BMI) of 40.0 to 44.9 in adult    9. 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 by proliferative retinopathy without macular edema, without long-term current use of insulin        Plan:       Aaliyah was seen today for follow-up.    Diagnoses and all orders for this visit:    Primary hypertension  -controlled well on the digital HTN program, elevated today in the office, will monitor thru the digital program  -     Lipid Panel; Future  -     losartan (COZAAR) 100 MG tablet; Take 1 tablet (100 mg total) by mouth once daily.  -     metoprolol succinate (TOPROL-XL) 50 MG 24 hr tablet; Take 1 tablet (50 mg total) by mouth once daily.  -     amLODIPine (NORVASC) 10 MG tablet; Take 1 tablet (10 mg total) by mouth once daily.    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  UGI 10/8/21: "- Repeat upper endoscopy " "in 3 months for surveillance. Maximize ppi therapy prior. " - not yet scheduled, will copy GI on this note    Class 3 severe obesity due to excess calories with serious comorbidity and body mass index (BMI) of 40.0 to 44.9 in adult  -discussed diet and weight loss    Follow up in about 6 months (around 8/7/2022).                  "

## 2022-02-07 NOTE — Clinical Note
"UGI 10/8/21: "- Repeat upper endoscopy in 3 months for surveillance. Maximize ppi therapy prior. " - not yet scheduled, will copy GI on this note"

## 2022-02-11 RX ORDER — LOSARTAN POTASSIUM 100 MG/1
TABLET ORAL
Qty: 90 TABLET | Refills: 1 | OUTPATIENT
Start: 2022-02-11

## 2022-02-25 ENCOUNTER — PATIENT OUTREACH (OUTPATIENT)
Dept: ADMINISTRATIVE | Facility: HOSPITAL | Age: 67
End: 2022-02-25
Payer: COMMERCIAL

## 2022-02-25 NOTE — PROGRESS NOTES
Health Maintenance Due   Topic Date Due    Hemoglobin A1c  02/19/2022     Triggered LINKS & reconciled immunizations.  Updated Care Everywhere.  Checked for outside lab results in [employer].  Pt is scheduled for lab appt on 3/14/2022.  Chart review completed.

## 2022-03-04 ENCOUNTER — PATIENT OUTREACH (OUTPATIENT)
Dept: ADMINISTRATIVE | Facility: OTHER | Age: 67
End: 2022-03-04
Payer: COMMERCIAL

## 2022-03-04 NOTE — PROGRESS NOTES
Care Everywhere: updated  Immunization:   Health Maintenance: updated  Media Review:   Legacy Review:   DIS:  Order placed:   Upcoming appts:hemoglobin 3.14   EFAX:  Task Tickets:  Referrals:

## 2022-03-07 ENCOUNTER — OFFICE VISIT (OUTPATIENT)
Dept: PODIATRY | Facility: CLINIC | Age: 67
End: 2022-03-07
Payer: COMMERCIAL

## 2022-03-07 VITALS
HEART RATE: 93 BPM | HEIGHT: 66 IN | WEIGHT: 249 LBS | SYSTOLIC BLOOD PRESSURE: 161 MMHG | BODY MASS INDEX: 40.02 KG/M2 | DIASTOLIC BLOOD PRESSURE: 71 MMHG

## 2022-03-07 DIAGNOSIS — E11.9 ENCOUNTER FOR DIABETIC FOOT EXAM: ICD-10-CM

## 2022-03-07 DIAGNOSIS — L85.3 DRY SKIN: Primary | ICD-10-CM

## 2022-03-07 DIAGNOSIS — B35.1 DERMATOPHYTOSIS OF NAIL: ICD-10-CM

## 2022-03-07 DIAGNOSIS — L84 CORN OR CALLUS: ICD-10-CM

## 2022-03-07 DIAGNOSIS — E11.42 DIABETIC POLYNEUROPATHY ASSOCIATED WITH TYPE 2 DIABETES MELLITUS: ICD-10-CM

## 2022-03-07 PROCEDURE — 99213 OFFICE O/P EST LOW 20 MIN: CPT | Mod: 25,S$GLB,, | Performed by: PODIATRIST

## 2022-03-07 PROCEDURE — 4010F PR ACE/ARB THEARPY RXD/TAKEN: ICD-10-PCS | Mod: CPTII,S$GLB,, | Performed by: PODIATRIST

## 2022-03-07 PROCEDURE — 3077F SYST BP >= 140 MM HG: CPT | Mod: CPTII,S$GLB,, | Performed by: PODIATRIST

## 2022-03-07 PROCEDURE — 3052F PR MOST RECENT HEMOGLOBIN A1C LEVEL 8.0 - < 9.0%: ICD-10-PCS | Mod: CPTII,S$GLB,, | Performed by: PODIATRIST

## 2022-03-07 PROCEDURE — 99213 PR OFFICE/OUTPT VISIT, EST, LEVL III, 20-29 MIN: ICD-10-PCS | Mod: 25,S$GLB,, | Performed by: PODIATRIST

## 2022-03-07 PROCEDURE — 3008F PR BODY MASS INDEX (BMI) DOCUMENTED: ICD-10-PCS | Mod: CPTII,S$GLB,, | Performed by: PODIATRIST

## 2022-03-07 PROCEDURE — 11721 ROUTINE FOOT CARE: ICD-10-PCS | Mod: 59,S$GLB,, | Performed by: PODIATRIST

## 2022-03-07 PROCEDURE — 3077F PR MOST RECENT SYSTOLIC BLOOD PRESSURE >= 140 MM HG: ICD-10-PCS | Mod: CPTII,S$GLB,, | Performed by: PODIATRIST

## 2022-03-07 PROCEDURE — 11721 DEBRIDE NAIL 6 OR MORE: CPT | Mod: 59,S$GLB,, | Performed by: PODIATRIST

## 2022-03-07 PROCEDURE — 3078F DIAST BP <80 MM HG: CPT | Mod: CPTII,S$GLB,, | Performed by: PODIATRIST

## 2022-03-07 PROCEDURE — 4010F ACE/ARB THERAPY RXD/TAKEN: CPT | Mod: CPTII,S$GLB,, | Performed by: PODIATRIST

## 2022-03-07 PROCEDURE — 1159F MED LIST DOCD IN RCRD: CPT | Mod: CPTII,S$GLB,, | Performed by: PODIATRIST

## 2022-03-07 PROCEDURE — 11056 PARNG/CUTG B9 HYPRKR LES 2-4: CPT | Mod: S$GLB,,, | Performed by: PODIATRIST

## 2022-03-07 PROCEDURE — 1160F PR REVIEW ALL MEDS BY PRESCRIBER/CLIN PHARMACIST DOCUMENTED: ICD-10-PCS | Mod: CPTII,S$GLB,, | Performed by: PODIATRIST

## 2022-03-07 PROCEDURE — 3078F PR MOST RECENT DIASTOLIC BLOOD PRESSURE < 80 MM HG: ICD-10-PCS | Mod: CPTII,S$GLB,, | Performed by: PODIATRIST

## 2022-03-07 PROCEDURE — 3052F HG A1C>EQUAL 8.0%<EQUAL 9.0%: CPT | Mod: CPTII,S$GLB,, | Performed by: PODIATRIST

## 2022-03-07 PROCEDURE — 99999 PR PBB SHADOW E&M-EST. PATIENT-LVL IV: CPT | Mod: PBBFAC,,, | Performed by: PODIATRIST

## 2022-03-07 PROCEDURE — 1160F RVW MEDS BY RX/DR IN RCRD: CPT | Mod: CPTII,S$GLB,, | Performed by: PODIATRIST

## 2022-03-07 PROCEDURE — 1159F PR MEDICATION LIST DOCUMENTED IN MEDICAL RECORD: ICD-10-PCS | Mod: CPTII,S$GLB,, | Performed by: PODIATRIST

## 2022-03-07 PROCEDURE — 3008F BODY MASS INDEX DOCD: CPT | Mod: CPTII,S$GLB,, | Performed by: PODIATRIST

## 2022-03-07 PROCEDURE — 99999 PR PBB SHADOW E&M-EST. PATIENT-LVL IV: ICD-10-PCS | Mod: PBBFAC,,, | Performed by: PODIATRIST

## 2022-03-07 PROCEDURE — 11056 ROUTINE FOOT CARE: ICD-10-PCS | Mod: S$GLB,,, | Performed by: PODIATRIST

## 2022-03-07 RX ORDER — UREA 200 MG/G
1 CREAM TOPICAL DAILY
Qty: 75 G | Refills: 10 | Status: SHIPPED | OUTPATIENT
Start: 2022-03-07 | End: 2023-01-11

## 2022-03-07 NOTE — PATIENT INSTRUCTIONS
How to Check Your Feet    Below are tips to help you look for foot problems. Try to check your feet at the same time each day, such as when you get out of bed in the morning.    Check the top of each foot. The tops of toes, back of the heel, and outer edge of the foot can get a lot of rubbing from poor-fitting shoes.    Check the bottom of each foot. Daily wear and tear often leads to problems at pressure spots.    Check the toes and nails. Fungal infections often occur between toes. Toenail problems can also be a sign of fungal infections or lead to breaks in the skin.    Check your shoes, too. Loose objects inside a shoe can injure the foot. Use your hand to feel inside your shoes for things like hortencia, loose stitching, or rough areas that could irritate your skin.        Diabetic Foot Care    Diabetes can lead to a number of different foot complications. Fortunately, most of these complications can be prevented with a little extra foot care. If diabetes is not well controlled, the high blood sugar can cause damage to blood vessels and result in poor circulation to the foot. When the skin does not get enough blood flow, it becomes prone to pressure sores and ulcers, which heal slowly.  High blood sugar can also damage nerves, interfering with the ability to feel pain and pressure. When you cant feel your foot normally, it is easy to injure your skin, bones and joints without knowing it. For these reasons diabetes increases the risk of fungal infections, bunions and ulcers. Deep ulcers can lead to bone infection. Gangrene is the most serious foot complication of diabetes. It usually occurs on the tips of the toes as blacked areas of skin. The black area is dead tissue. In severe cases, gangrene spreads to involve the entire toe, other toes and the entire foot. Foot or toe amputation may be required. Good foot care and blood sugar control can prevent this.    Home Care  Wear comfortable, proper fitting  shoes.  Wash your feet daily with warm water and mild soap.  After drying, apply a moisturizing cream or lotion.  Check your feet daily for skin breaks, blisters, swelling, or redness. Look between your toes also.  Wear cotton socks and change them every day.  Trim toe nails carefully and do not cut your cuticles.  Strive to keep your blood sugar under control with a combination of medicines, diet and activity.  If you smoke and have diabetes, it is very important that you stop. Smoking reduces blood flow to your foot.  Avoid activities that increase your risk of foot injury:  Do not walk barefoot.  Do not use heating pads or hot water bottles on your feet.  Do not put your foot in a hot tub without first checking the temperature with your hand.  10) Schedule yearly foot exams.    Follow Up  with your doctor or as advised by our staff. Report any cut, puncture, scrape, other injury, blister, ingrown toenail or ulcer on your foot.    Get Prompt Medical Attention  if any of the following occur:  -- Open ulcer with pus draining from the wound  -- Increasing foot or leg pain  -- New areas of redness or swelling or tender areas of the foot    © 7817-2760 [employer]. [address] [place] [address], [place] [address]. All rights reserved. This information is not intended as a substitute for professional medical care. Always follow your healthcare professional's instructions.

## 2022-03-07 NOTE — PROGRESS NOTES
Chief Complaint   Patient presents with    Diabetic Foot Exam     PCP Reggie Glover MD 2/7/2022               HPI:   Aaliyah Angela is a 66 y.o. female here for diabetic foot exam.  No pain in her feet today.     She is using vaseline to her feet.  Skin on the heel is dry.  No wounds noted.    She does not get salon pedicures.    She is wearing slip on clogs today.      S/p Gastric bypass on Dec. 18, 2015.          PCP:  Reggie Glover MD   Last PCP visit: Diabetic Foot Exam (PCP Reggie Glover MD 2/7/2022/)        Patient Active Problem List   Diagnosis    Hyperlipidemia    Primary hypertension    Chronic tension-type headache, intractable    Hypertensive retinopathy    Transient vision disturbance    Cortical cataract    Vitreous hemorrhage    Class 3 severe obesity due to excess calories with serious comorbidity and body mass index (BMI) of 40.0 to 44.9 in adult    Chest pain on exertion    Ocular hypertension    Nuclear sclerosis    Anatomical narrow angle borderline glaucoma    PVD (peripheral vascular disease)    CAD (coronary artery disease)    BRADFORD on CPAP    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 by proliferative retinopathy without macular edema, without long-term current use of insulin    History of bariatric surgery, 12-    Erythema nodosum    Primary osteoarthritis involving multiple joints    Bilateral carpal tunnel syndrome    Neck pain    Diabetic polyneuropathy associated with type 2 diabetes mellitus    GERD (gastroesophageal reflux disease)    Type 2 diabetes mellitus with hyperglycemia, without long-term current use of insulin    Hyperparathyroidism    Small pupil    Shallow anterior chamber of eye    Nuclear sclerotic cataract of right eye    Tinea pedis of both feet             Current Outpatient Medications on File Prior to Visit   Medication Sig Dispense Refill    acetaminophen (TYLENOL) 650 MG TbSR Take 1 tablet (650 mg total) by  "mouth every 8 (eight) hours as needed (pain). 30 tablet 0    amitriptyline (ELAVIL) 25 MG tablet Take 2 tablets (50 mg total) by mouth every evening. 60 tablet 11    amLODIPine (NORVASC) 10 MG tablet Take 1 tablet (10 mg total) by mouth once daily. 90 tablet 3    aspirin 81 MG Chew Take 81 mg by mouth once daily.      atorvastatin (LIPITOR) 20 MG tablet TAKE 1 TABLET(20 MG) BY MOUTH EVERY DAY 90 tablet 0    betamethasone dipropionate 0.05 % cream Apply topically 2 (two) times daily. 45 g 2    blood sugar diagnostic (BLOOD GLUCOSE TEST) Strp Inject 1 each into the skin 2 (two) times daily. 200 strip 3    calcium-vitamin D3 500 mg(1,250mg) -200 unit per tablet Take 1 tablet by mouth once daily.      ciclopirox (PENLAC) 8 % Soln Apply topically nightly. 6.6 mL 2    econazole nitrate 1 % cream Apply topically 2 (two) times daily. 30 g 2    empagliflozin (JARDIANCE) 25 mg tablet TAKE 1 TABLET BY MOUTH EVERY DAY MAKE SURE IT IS 25MG DAILY 90 tablet 2    esomeprazole (NEXIUM) 40 MG capsule Take 1 capsule (40 mg total) by mouth before breakfast. 90 capsule 3    insulin lispro (HUMALOG KWIKPEN INSULIN) 100 unit/mL pen Use as directed based on sugar level, 150-200+2, 201-250+4, 251-300+6, 301-350+8, >350+10. Max daily 30 units. 1 Box 6    losartan (COZAAR) 100 MG tablet Take 1 tablet (100 mg total) by mouth once daily. 90 tablet 3    metFORMIN (GLUCOPHAGE-XR) 500 MG ER 24hr tablet TAKE 2 TABLETS(1000 MG) BY MOUTH TWICE DAILY WITH MEALS 360 tablet 3    metoprolol succinate (TOPROL-XL) 50 MG 24 hr tablet Take 1 tablet (50 mg total) by mouth once daily. 90 tablet 3    multivitamin (THERAGRAN) per tablet Take 1 tablet by mouth once daily.      pen needle, diabetic (BD ULTRA-FINE DIEGO PEN NEEDLE) 32 gauge x 5/32" Ndle Uses 3 times a day. 100 each 11    semaglutide (OZEMPIC) 1 mg/dose (4 mg/3 mL) Inject 1 mg into the skin every 7 days. 3 pen 3     Current Facility-Administered Medications on File Prior to Visit " "  Medication Dose Route Frequency Provider Last Rate Last Admin    0.9%  NaCl infusion   Intravenous Continuous Aristeo Bueno  mL/hr at 02/17/21 0754 New Bag at 02/17/21 0754         ALLG:   No Known Allergies          ROS:   General ROS: negative for - chills, fever or night sweats  Respiratory ROS: no cough, shortness of breath, or wheezing  Cardiovascular ROS: no chest pain or dyspnea on exertion  Musculoskeletal ROS: negative  Neurological ROS: no TIA or stroke symptoms  Dermatological ROS: negative          EXAM:   Vitals:    03/07/22 1426   BP: (!) 161/71   Pulse: 93   Weight: 112.9 kg (249 lb)   Height: 5' 6" (1.676 m)        General: Patient is well-developed, well-nourished. Alert and oriented x 3 and in no acute distress.   Lower extremity physical exam:     Vascular:   Dorsalis pedis and posterior tibial pulses are 1/4 bilaterally. 3 secs capillary refill time and toes are warm to touch.   There is reduced presence of digital hair.     1+ edema      Neurological:   Light touch, proprioception, and sharp/dull sensation are all intact bilaterally.   Protective threshold with the [place]-Wienstein monofilament is intact bilaterally.   +paresthesias (Abnormal spontaneous sensations in feet)      Dermatological:   There is thin and shiny skin.    There is no open wounds. There is no ecchymoses.     Toenails mildly dystrophic, elongated by 1-2mm.  bilateral heels dry and xerotic.  Interdigital maceration left foot.  She has been using a cream on the right interdigital space with improvement.       Musculoskeletal:    Muscle strength is 5/5 in all groups bilaterally. Subtalar, and MTPJ range of motion are within normal limits without crepitus bilaterally.   right 2nd flexible hammertoe, mild hyperpigmentation at the proximal interphalangeal joint  No wounds or infection.           ASSESSMENT / PLAN:    Problem List Items Addressed This Visit     Diabetic polyneuropathy associated with type 2 " diabetes mellitus    Relevant Orders    Routine Foot Care      Other Visit Diagnoses     Dry skin    -  Primary    Relevant Medications    urea 20 % Crea    Corn or callus        Relevant Medications    urea 20 % Crea    Other Relevant Orders    Routine Foot Care    Encounter for diabetic foot exam        Relevant Orders    Routine Foot Care    Dermatophytosis of nail        Relevant Orders    Routine Foot Care          · I counseled the patient on the patient's conditions, their implications and medical management.   Shoe inspection.     Continue good nutrition and blood sugar control to help prevent podiatric complications of diabetes.   Maintain proper foot hygiene.   · Continue wearing proper shoe gear, daily foot inspections, never walking without protective shoe gear, never putting sharp instruments to feet.   · Follow up annually for diabetes foot exam.       Routine Foot Care    Date/Time: 3/7/2022 2:30 PM  Performed by: Mary Angulo DPM  Authorized by: Mary Angulo DPM     Consent Done?:  Yes (Verbal)  Hyperkeratotic Skin Lesions?: Yes    Number of trimmed lesions:  3  Location(s):  Left Heel, Right 1st Toe and Left 1st Toe    Nail Care Type:  Debride  Location(s): All  (Left 1st Toe, Left 3rd Toe, Left 2nd Toe, Left 4th Toe, Left 5th Toe, Right 1st Toe, Right 2nd Toe, Right 3rd Toe, Right 4th Toe and Right 5th Toe)  Patient tolerance:  Patient tolerated the procedure well with no immediate complications     With patient's permission, the toenails mentioned above were reduced and debrided using a nail nipper, removing offending nail and debris.   Utilizing a #15 scalpel, I trimmed the corns and calluses at the above mentioned location.      The patient will continue to monitor the areas daily, inspect the feet, wear protective shoe gear when ambulatory, and moisturizer to maintain skin integrity.

## 2022-03-19 ENCOUNTER — LAB VISIT (OUTPATIENT)
Dept: LAB | Facility: HOSPITAL | Age: 67
End: 2022-03-19
Attending: INTERNAL MEDICINE
Payer: COMMERCIAL

## 2022-03-19 DIAGNOSIS — E21.3 HYPERPARATHYROIDISM: ICD-10-CM

## 2022-03-19 DIAGNOSIS — I25.10 CORONARY ARTERY DISEASE INVOLVING NATIVE CORONARY ARTERY OF NATIVE HEART WITHOUT ANGINA PECTORIS: ICD-10-CM

## 2022-03-19 DIAGNOSIS — E11.65 TYPE 2 DIABETES MELLITUS WITH HYPERGLYCEMIA, WITHOUT LONG-TERM CURRENT USE OF INSULIN: ICD-10-CM

## 2022-03-19 DIAGNOSIS — E11.3593 TYPE 2 DIABETES MELLITUS WITH BOTH EYES AFFECTED BY PROLIFERATIVE RETINOPATHY WITHOUT MACULAR EDEMA, WITH LONG-TERM CURRENT USE OF INSULIN: ICD-10-CM

## 2022-03-19 DIAGNOSIS — Z79.4 TYPE 2 DIABETES MELLITUS WITH BOTH EYES AFFECTED BY PROLIFERATIVE RETINOPATHY WITHOUT MACULAR EDEMA, WITH LONG-TERM CURRENT USE OF INSULIN: ICD-10-CM

## 2022-03-19 DIAGNOSIS — E11.42 DIABETIC POLYNEUROPATHY ASSOCIATED WITH TYPE 2 DIABETES MELLITUS: ICD-10-CM

## 2022-03-19 DIAGNOSIS — E78.5 HYPERLIPIDEMIA, UNSPECIFIED HYPERLIPIDEMIA TYPE: ICD-10-CM

## 2022-03-19 DIAGNOSIS — I73.9 PVD (PERIPHERAL VASCULAR DISEASE): ICD-10-CM

## 2022-03-19 DIAGNOSIS — E11.3593 TYPE 2 DIABETES MELLITUS WITH BOTH EYES AFFECTED BY PROLIFERATIVE RETINOPATHY WITHOUT MACULAR EDEMA, WITHOUT LONG-TERM CURRENT USE OF INSULIN: ICD-10-CM

## 2022-03-19 DIAGNOSIS — I10 PRIMARY HYPERTENSION: ICD-10-CM

## 2022-03-19 LAB
ALBUMIN SERPL BCP-MCNC: 3.5 G/DL (ref 3.5–5.2)
ALP SERPL-CCNC: 66 U/L (ref 55–135)
ALT SERPL W/O P-5'-P-CCNC: 32 U/L (ref 10–44)
ANION GAP SERPL CALC-SCNC: 10 MMOL/L (ref 8–16)
AST SERPL-CCNC: 29 U/L (ref 10–40)
BILIRUB SERPL-MCNC: 0.5 MG/DL (ref 0.1–1)
BUN SERPL-MCNC: 13 MG/DL (ref 8–23)
CALCIUM SERPL-MCNC: 9.7 MG/DL (ref 8.7–10.5)
CHLORIDE SERPL-SCNC: 102 MMOL/L (ref 95–110)
CHOLEST SERPL-MCNC: 119 MG/DL (ref 120–199)
CHOLEST/HDLC SERPL: 2.2 {RATIO} (ref 2–5)
CO2 SERPL-SCNC: 28 MMOL/L (ref 23–29)
CREAT SERPL-MCNC: 0.9 MG/DL (ref 0.5–1.4)
EST. GFR  (AFRICAN AMERICAN): >60 ML/MIN/1.73 M^2
EST. GFR  (NON AFRICAN AMERICAN): >60 ML/MIN/1.73 M^2
ESTIMATED AVG GLUCOSE: 180 MG/DL (ref 68–131)
GLUCOSE SERPL-MCNC: 124 MG/DL (ref 70–110)
HBA1C MFR BLD: 7.9 % (ref 4–5.6)
HDLC SERPL-MCNC: 54 MG/DL (ref 40–75)
HDLC SERPL: 45.4 % (ref 20–50)
LDLC SERPL CALC-MCNC: 49.4 MG/DL (ref 63–159)
NONHDLC SERPL-MCNC: 65 MG/DL
POTASSIUM SERPL-SCNC: 4.7 MMOL/L (ref 3.5–5.1)
PROT SERPL-MCNC: 7.2 G/DL (ref 6–8.4)
PTH-INTACT SERPL-MCNC: 61 PG/ML (ref 9–77)
SODIUM SERPL-SCNC: 140 MMOL/L (ref 136–145)
TRIGL SERPL-MCNC: 78 MG/DL (ref 30–150)

## 2022-03-19 PROCEDURE — 83036 HEMOGLOBIN GLYCOSYLATED A1C: CPT | Performed by: NURSE PRACTITIONER

## 2022-03-19 PROCEDURE — 36415 COLL VENOUS BLD VENIPUNCTURE: CPT | Performed by: INTERNAL MEDICINE

## 2022-03-19 PROCEDURE — 80061 LIPID PANEL: CPT | Performed by: FAMILY MEDICINE

## 2022-03-19 PROCEDURE — 83970 ASSAY OF PARATHORMONE: CPT | Performed by: INTERNAL MEDICINE

## 2022-03-19 PROCEDURE — 80053 COMPREHEN METABOLIC PANEL: CPT | Performed by: INTERNAL MEDICINE

## 2022-03-25 ENCOUNTER — TELEPHONE (OUTPATIENT)
Dept: INTERNAL MEDICINE | Facility: CLINIC | Age: 67
End: 2022-03-25
Payer: COMMERCIAL

## 2022-03-25 NOTE — TELEPHONE ENCOUNTER
BP reading of 145//80 was taken from pt's [facility] flow sheet on 3/8/2022. Pt is being followed by [facility].

## 2022-03-28 ENCOUNTER — TELEPHONE (OUTPATIENT)
Dept: INTERNAL MEDICINE | Facility: CLINIC | Age: 67
End: 2022-03-28
Payer: COMMERCIAL

## 2022-03-28 NOTE — TELEPHONE ENCOUNTER
----- Message from Sarai Giron sent at 3/28/2022  3:32 PM CDT -----  Regarding: schedule  Contact: [phone]  Request Appt    Appt Type: 4 mth f/u  Call Back Number: [phone]  Additional Information:

## 2022-03-30 ENCOUNTER — PATIENT MESSAGE (OUTPATIENT)
Dept: ADMINISTRATIVE | Facility: OTHER | Age: 67
End: 2022-03-30
Payer: COMMERCIAL

## 2022-03-30 ENCOUNTER — TELEPHONE (OUTPATIENT)
Dept: INTERNAL MEDICINE | Facility: CLINIC | Age: 67
End: 2022-03-30
Payer: COMMERCIAL

## 2022-03-30 NOTE — TELEPHONE ENCOUNTER
----- Message from Renea Saleem sent at 3/30/2022  3:27 PM CDT -----  Regarding: appointmetn access  Contact: jeremiah [phone]  Patient is requesting to have appointment rescheduled.  Please call and advise.

## 2022-03-31 DIAGNOSIS — I10 PRIMARY HYPERTENSION: ICD-10-CM

## 2022-04-01 RX ORDER — METOPROLOL SUCCINATE 50 MG/1
TABLET, EXTENDED RELEASE ORAL
Qty: 90 TABLET | Refills: 3 | Status: SHIPPED | OUTPATIENT
Start: 2022-04-01 | End: 2023-03-23

## 2022-04-01 RX ORDER — AMLODIPINE BESYLATE 10 MG/1
TABLET ORAL
Qty: 90 TABLET | Refills: 3 | Status: SHIPPED | OUTPATIENT
Start: 2022-04-01 | End: 2022-12-30

## 2022-04-01 NOTE — TELEPHONE ENCOUNTER
This Rx Request does not qualify for assessment with the [facility]   Please review protocol details and the Care Due Message for extra clinical information    Reasons Rx Request may be deferred:   Labs/Vitals abnormal    Note composed:12:45 PM 04/01/2022

## 2022-04-01 NOTE — TELEPHONE ENCOUNTER
No new care gaps identified.  Powered by [employer]. Reference number: [id number].   3/31/2022 7:25:56 PM CDT

## 2022-04-06 ENCOUNTER — OFFICE VISIT (OUTPATIENT)
Dept: GASTROENTEROLOGY | Facility: CLINIC | Age: 67
End: 2022-04-06
Payer: COMMERCIAL

## 2022-04-06 VITALS
HEIGHT: 66 IN | HEART RATE: 90 BPM | WEIGHT: 251.31 LBS | SYSTOLIC BLOOD PRESSURE: 151 MMHG | DIASTOLIC BLOOD PRESSURE: 81 MMHG | BODY MASS INDEX: 40.39 KG/M2

## 2022-04-06 DIAGNOSIS — K20.80 LOS ANGELES GRADE C ESOPHAGITIS: ICD-10-CM

## 2022-04-06 DIAGNOSIS — R12 PYROSIS: Primary | ICD-10-CM

## 2022-04-06 DIAGNOSIS — K21.9 GASTROESOPHAGEAL REFLUX DISEASE, UNSPECIFIED WHETHER ESOPHAGITIS PRESENT: ICD-10-CM

## 2022-04-06 PROCEDURE — 3077F SYST BP >= 140 MM HG: CPT | Mod: CPTII,S$GLB,, | Performed by: FAMILY MEDICINE

## 2022-04-06 PROCEDURE — 3051F HG A1C>EQUAL 7.0%<8.0%: CPT | Mod: CPTII,S$GLB,, | Performed by: FAMILY MEDICINE

## 2022-04-06 PROCEDURE — 99999 PR PBB SHADOW E&M-EST. PATIENT-LVL V: ICD-10-PCS | Mod: PBBFAC,,, | Performed by: FAMILY MEDICINE

## 2022-04-06 PROCEDURE — 3077F PR MOST RECENT SYSTOLIC BLOOD PRESSURE >= 140 MM HG: ICD-10-PCS | Mod: CPTII,S$GLB,, | Performed by: FAMILY MEDICINE

## 2022-04-06 PROCEDURE — 99214 PR OFFICE/OUTPT VISIT, EST, LEVL IV, 30-39 MIN: ICD-10-PCS | Mod: S$GLB,,, | Performed by: FAMILY MEDICINE

## 2022-04-06 PROCEDURE — 1160F PR REVIEW ALL MEDS BY PRESCRIBER/CLIN PHARMACIST DOCUMENTED: ICD-10-PCS | Mod: CPTII,S$GLB,, | Performed by: FAMILY MEDICINE

## 2022-04-06 PROCEDURE — 3051F PR MOST RECENT HEMOGLOBIN A1C LEVEL 7.0 - < 8.0%: ICD-10-PCS | Mod: CPTII,S$GLB,, | Performed by: FAMILY MEDICINE

## 2022-04-06 PROCEDURE — 3008F BODY MASS INDEX DOCD: CPT | Mod: CPTII,S$GLB,, | Performed by: FAMILY MEDICINE

## 2022-04-06 PROCEDURE — 3008F PR BODY MASS INDEX (BMI) DOCUMENTED: ICD-10-PCS | Mod: CPTII,S$GLB,, | Performed by: FAMILY MEDICINE

## 2022-04-06 PROCEDURE — 99214 OFFICE O/P EST MOD 30 MIN: CPT | Mod: S$GLB,,, | Performed by: FAMILY MEDICINE

## 2022-04-06 PROCEDURE — 4010F PR ACE/ARB THEARPY RXD/TAKEN: ICD-10-PCS | Mod: CPTII,S$GLB,, | Performed by: FAMILY MEDICINE

## 2022-04-06 PROCEDURE — 3079F DIAST BP 80-89 MM HG: CPT | Mod: CPTII,S$GLB,, | Performed by: FAMILY MEDICINE

## 2022-04-06 PROCEDURE — 3079F PR MOST RECENT DIASTOLIC BLOOD PRESSURE 80-89 MM HG: ICD-10-PCS | Mod: CPTII,S$GLB,, | Performed by: FAMILY MEDICINE

## 2022-04-06 PROCEDURE — 1159F PR MEDICATION LIST DOCUMENTED IN MEDICAL RECORD: ICD-10-PCS | Mod: CPTII,S$GLB,, | Performed by: FAMILY MEDICINE

## 2022-04-06 PROCEDURE — 1160F RVW MEDS BY RX/DR IN RCRD: CPT | Mod: CPTII,S$GLB,, | Performed by: FAMILY MEDICINE

## 2022-04-06 PROCEDURE — 99999 PR PBB SHADOW E&M-EST. PATIENT-LVL V: CPT | Mod: PBBFAC,,, | Performed by: FAMILY MEDICINE

## 2022-04-06 PROCEDURE — 4010F ACE/ARB THERAPY RXD/TAKEN: CPT | Mod: CPTII,S$GLB,, | Performed by: FAMILY MEDICINE

## 2022-04-06 PROCEDURE — 1159F MED LIST DOCD IN RCRD: CPT | Mod: CPTII,S$GLB,, | Performed by: FAMILY MEDICINE

## 2022-04-06 RX ORDER — ESOMEPRAZOLE MAGNESIUM 40 MG/1
40 CAPSULE, DELAYED RELEASE ORAL
Qty: 90 CAPSULE | Refills: 3 | Status: SHIPPED | OUTPATIENT
Start: 2022-04-06 | End: 2023-04-29

## 2022-04-06 NOTE — PATIENT INSTRUCTIONS
Repeat EGD to evaluate for healing of esophagus inflammation  2.  Continue Nexium every day, take in the morning on an empty stomach 30-45 minutes prior to breakfast  3.  Start a daily fiber supplement (examples: metamucil or benefiber, capsule or powder is OK)  4.  OK to use Miralax 1-2 capfuls daily as needed for constipation/hard stools        [facility]  High Fiber Diet    25-30 grams of fiber per day is recommended  Fiber cereal = 5 grams (Raisin Bran, Shredded Wheat, Grape Nuts)  Konsyl 1 teaspoon = 6 grams  Metamucil 1 tablespoon = 3 grams  Citrucel 1 tablespoon = 2 grams  Fiber Choice = 3-4 per day    Drink at least 4-5 glasses of liquids per day or the fiber can be constipating rather then stimulating to your gut.  Boil and bake potatoes in their skin. Eat the skin, too.  Include fresh fruits and raw vegetables in your daily diet. Raw fruits and vegetables have more useful fiber than those that have been peeled, cooked, pureed, or otherwise processed.  Eat a wide variety of fibrous foods in reasonable amounts. Increase fiber intake slowly especially if you have been on a low-fiber diet.  Eat more legumes-peas, beans, soybeans.  For snacks, try dried fruit, whole wheat and rye crackers.  Avoid instant-cook hot cereals. Use the longer cooking cereals.  Use bran whenever possible. Sprinkle it on top of cereal, mix it into mashed potatoes or hamburger meat, or use it in combination dishes such as meat loaf.   Substitute whole grain, whole wheat and bran products for white flour products.  Eat slowly and chew your food thoroughly.        Start daily fiber.  Take 1 tsp of fiber powder (psyllium or other sugar-free powder).  Mix in 8 oz of water.  Take x 3-5 days.  Then, increase fiber by 1 tsp every 3-5 days until stool is easy to pass.  Stop and continue at that dose.   Do not exceed 6 tsps/day. GOAL:  More well-formed stool (one continuous well-formed piece vs. Pellets) and minimize straining  with initiation.        Foods High in Fiber    This diet furnishes adequate amounts of all the essential nutrients needed by the body and a very liberal fiber or roughage content. Roughage is indigestible fiber found in fruits, vegetables and whole grain cereals. It provides bulk to the large intestine and, accompanied by an adequate fluid intake, is a stimulant to elimination. Regular eating and elimination habits are vital to good health.     Fruits:  Use all fruits and juices liberally; fresh, cooked, dried or canned. Eat fruit raw and with skins when possible. Have at least four servings of fruit daily including a citrus fruit and a stewed dried fruit. Hard seeds of fruits (berries, figs, Grapes, mangoes, tomatoes) etc. may be removed.    Vegetables: Use all vegetables liberally. Green leafy vegetables, such as cabbage, spinach, lettuce, broccoli, and other greens are particularly good.    Potato: As desired. Serve baked frequently and eat the skin. Other starchy foods such as rice, macaroni, etc., may be occasionally substituted. Chew popcorn well and do not eat hard kernels.    Meat, Fish, Poultry: One or two servings daily.    Eggs: One daily if you are not on a low cholesterol diet.    Milk: Include at least one-half pint daily. Whole milk or skimmed may be used.     Cereals: Use whole grain breads and cereals such as bran, bran flakes, grape nut flakes, puffed wheat, oatmeal, Wheaties, etc., as much as possible. Refined breaks and cereals are not restricted; however, they do not contain the bulk necessary for this diet.     Sugars, Sweets: Use very moderately. Depend on fruit as dessert.    Fats: Use butter or margarine as desired. Oil or dressing on salads as desired. Fried foods may be used in moderation. Nuts may be eaten if you chew them well and may be ground or finely chopped for cooking.   Sample Menu                                                                                 Breakfast                           Lunch  Orange juice, 4 ounces                                                Vegetable soup                    Stewed fruit                                                                 Fresh fruit plate with cottage cheese  Shredded wheat                                                           Whole wheat toast  Scrambled eggs                                                           Butter  Whole wheat toast                                                       Coffee or tea  Dinner                                                                         Bedtime  Large glass tomato juice                                             1 glass milk  Roast beef                                                                   stewed fruit  Baked potato with skin  Buttered spinach  Raw vegetable salad  Baked apple with skin   Coffee or tea

## 2022-04-06 NOTE — PROGRESS NOTES
[facility] Consultation Note    Reason for Consult:  The primary encounter diagnosis was Pyrosis. Diagnoses of [place] grade C esophagitis and Gastroesophageal reflux disease, unspecified whether esophagitis present were also pertinent to this visit.    PCP:   Reggie Glover   [address] / [place] [address]    Referring MD:  Reggie Glover Md  [address] [place] [address]    Progress Note 4/6/2022:  Here for follow-up.  Previously seen for worsening reflux, EGD with [place] Grade C esophagitis, no metaplasia or dysplasia on biopsies.   States she was only taking nexium PRN heartburn until seeing PCP in February 2022, then started daily.  Since that time, reflux symptoms have remained under control.  Reports decrease in her dysphagia symptoms previously reported, no longer having regurgitation.  She does report occasional constipation.  Denies blood in her stools.  No fevers, chest pain, SOB, unintentional weight loss.        Initial HPI 8/24/2020:  This is a 64 y.o. female here for evaluation of GERD. New patient as she has not been seen since 12/2/2015 by Dr Ramsey - visit was also for GERD. Experiences reflux symptoms 3-4 times per week. Improved slightly after weight loss surgery but has since worsened.    Gastric Sleeve sx: 12/2015 at [facility]  Current reflux regimen: Nexium, every other day, usually after dinner  Precipitating factors: certain foods, eating and laying down  Symptoms: +pyrosis, +gagging/regurgitation, +dysphagia  Meds tried:   Zantac (stopped b/c of recall)  Omeprazole (takes occasionally in place of nexium)  Bowel habits - alternating; sometimes requires enemas, sometimes has diarrhea; (always constipated prior to weight loss surgery)  Rectal Bleeding/Melena- no  NSAIDs - stopped NSAIDs ~1 year ago;  Fam hx - no CRC, no family member w/colon polyps    ROS:  Constitutional: No fevers, chills, No weight loss  ENT: No allergies  CV:  No chest pain  Pulm: No cough, No shortness of breath  Ophtho: No vision changes  GI: see HPI  Derm: No rash  Heme: No lymphadenopathy, No bruising  MSK: No arthritis  : No dysuria, No hematuria  Endo: No hot or cold intolerance  Neuro: No syncope, No seizure  Psych: No anxiety, No depression    Medical History:  has a past medical history of Allergy, Anatomical narrow angle borderline glaucoma, AR (allergic rhinitis), Arthritis, CAD (coronary artery disease) (2013), Diabetes mellitus type II, Diabetic retinopathy, Epiretinal membrane, both eyes, Hyperlipidemia, Hypertensive retinopathy of both eyes, Lumbar disc disease, Migraines, Morbid obesity (10/3/2012), BRADFORD (obstructive sleep apnea) (2015), Proliferative diabetic retinopathy(362.02), PVD (peripheral vascular disease) (2013), Rupture of right Achilles tendon, Unspecified essential hypertension, Unspecified vitamin D deficiency, and Vitreous hemorrhage of left eye.    Surgical History:  has a past surgical history that includes Tubal ligation; Panretinal photocoagulation;  section; LASER PERIPHERAL IRIDOTOMY (Bilateral, 2017 and 2017); Carpal tunnel release (Right, 2020); Carpal tunnel release (Right, 2020); Gastric bypass (); Colonoscopy; Esophagogastroduodenoscopy (N/A, 10/8/2020); Colonoscopy (N/A, 10/8/2020); Phacoemulsification of cataract (Left, 10/28/2020); Intraocular prosthesis insertion (Left, 10/28/2020); Phacoemulsification of cataract (Right, 2021); Intraocular prosthesis insertion (Right, 2021); Cataract extraction w/  intraocular lens implant (Left, 10/28/2020); and Cataract extraction w/  intraocular lens implant (Right, 2021).    Family History: family history includes Cancer in her brother; Diabetes in her brother, brother, brother, brother, father, mother, sister, sister, sister, and sister; Hypertension in her father and mother; Prostate cancer in her brother; Stroke in her  brother and father..     Social History:  reports that she has never smoked. She has never used smokeless tobacco. She reports that she does not drink alcohol and does not use drugs.    Review of patient's allergies indicates:  No Known Allergies    Current Outpatient Medications on File Prior to Visit   Medication Sig Dispense Refill    acetaminophen (TYLENOL) 650 MG TbSR Take 1 tablet (650 mg total) by mouth every 8 (eight) hours as needed (pain). 30 tablet 0    amLODIPine (NORVASC) 10 MG tablet TAKE 1 TABLET(10 MG) BY MOUTH EVERY DAY 90 tablet 3    aspirin 81 MG Chew Take 81 mg by mouth once daily.      atorvastatin (LIPITOR) 20 MG tablet TAKE 1 TABLET(20 MG) BY MOUTH EVERY DAY 90 tablet 0    betamethasone dipropionate 0.05 % cream Apply topically 2 (two) times daily. 45 g 2    calcium-vitamin D3 500 mg(1,250mg) -200 unit per tablet Take 1 tablet by mouth once daily.      ciclopirox (PENLAC) 8 % Soln Apply topically nightly. 6.6 mL 2    econazole nitrate 1 % cream Apply topically 2 (two) times daily. 30 g 2    empagliflozin (JARDIANCE) 25 mg tablet TAKE 1 TABLET BY MOUTH EVERY DAY MAKE SURE IT IS 25MG DAILY 90 tablet 2    insulin lispro (HUMALOG KWIKPEN INSULIN) 100 unit/mL pen Use as directed based on sugar level, 150-200+2, 201-250+4, 251-300+6, 301-350+8, >350+10. Max daily 30 units. 1 Box 6    losartan (COZAAR) 100 MG tablet Take 1 tablet (100 mg total) by mouth once daily. 90 tablet 3    metFORMIN (GLUCOPHAGE-XR) 500 MG ER 24hr tablet TAKE 2 TABLETS(1000 MG) BY MOUTH TWICE DAILY WITH MEALS 360 tablet 3    metoprolol succinate (TOPROL-XL) 50 MG 24 hr tablet TAKE 1 TABLET BY MOUTH EVERY DAY 90 tablet 3    multivitamin (THERAGRAN) per tablet Take 1 tablet by mouth once daily.      semaglutide (OZEMPIC) 1 mg/dose (4 mg/3 mL) Inject 1 mg into the skin every 7 days. 3 pen 3    urea 20 % Crea Apply 1 application topically once daily. To dry skin on the feet. 75 g 10    [DISCONTINUED] esomeprazole  "(NEXIUM) 40 MG capsule Take 1 capsule (40 mg total) by mouth before breakfast. 90 capsule 3    amitriptyline (ELAVIL) 25 MG tablet Take 2 tablets (50 mg total) by mouth every evening. 60 tablet 11    blood sugar diagnostic (BLOOD GLUCOSE TEST) Strp Inject 1 each into the skin 2 (two) times daily. (Patient not taking: Reported on 4/6/2022) 200 strip 3    pen needle, diabetic (BD ULTRA-FINE DIEGO PEN NEEDLE) 32 gauge x 5/32" Ndle Uses 3 times a day. (Patient not taking: Reported on 4/6/2022) 100 each 11     Current Facility-Administered Medications on File Prior to Visit   Medication Dose Route Frequency Provider Last Rate Last Admin    0.9%  NaCl infusion   Intravenous Continuous Aristeo Bueno  mL/hr at 02/17/21 0754 New Bag at 02/17/21 0754         Objective Findings:    Vital Signs:  BP (!) 151/81   Pulse 90   Ht 5' 6" (1.676 m)   Wt 114 kg (251 lb 5.2 oz)   BMI 40.56 kg/m²   Body mass index is 40.56 kg/m².    Physical Exam:  General Appearance: Well appearing in no acute distress  Head:   Normocephalic, without obvious abnormality  Eyes:    No scleral icterus  ENT: Neck supple  Lungs: CTA bilaterally in anterior and posterior fields, no wheezes, no crackles.  Heart:  Regular rate and rhythm, S1, S2 normal, no murmurs heard  Abdomen: Soft, non tender, non distended with positive bowel sounds in all four quadrants. No hepatosplenomegaly, ascites, or mass  Extremities: No edema  Skin: No rash  Neurologic: AAO x 3      Labs:  Lab Results   Component Value Date    WBC 5.73 05/05/2021    HGB 11.9 (L) 05/05/2021    HCT 39.5 05/05/2021     05/05/2021    CRP 10.2 (H) 03/12/2016    CHOL 119 (L) 03/19/2022    TRIG 78 03/19/2022    HDL 54 03/19/2022    ALT 32 03/19/2022    AST 29 03/19/2022     03/19/2022    K 4.7 03/19/2022     03/19/2022    CREATININE 0.9 03/19/2022    BUN 13 03/19/2022    CO2 28 03/19/2022    TSH 0.987 05/05/2021    HGBA1C 7.9 (H) 03/19/2022       Imaging " reviewed:  7/1/2011 CT Abd Pelvis W WO contrast for Microscopic hematuria  1. No abnormality identified to explain this finding, specifically no evidence for nephrolithiasis or solid renal mass  2. No evidence for bowel obstruction or inflammation    Endoscopy reviewed:  10/8/2020 EGD  - [place] Grade C reflux esophagitis. Biopsied.   - Small hiatal hernia.   - A sleeve gastrectomy was found, characterized by healthy appearing mucosa.   - Normal examined duodenum.   Pathology:  ESOPHAGOGASTRIC JUNCTION, BIOPSY:   Squamous mucosa with mild chronic inflammation and reactive changes   No evidence of intestinal metaplasia, dysplasia or malignancy   No evidence of significant eosinophils present     10/8/2020 Colonoscopy for screening  - to cecum, good prep  - Normal examined colon.   - repeat in 10 years for screening purposes.    3/12/2010 screening colonoscopy with Dr. Watson  - Good prep, reached cecum  - Normal exam  - Recommend repeat in 7 years    66 y.o. female here for evaluation of:    Assessment:  1. Pyrosis    2. [place] grade C esophagitis    3. Gastroesophageal reflux disease, unspecified whether esophagitis present         Here for follow-up, most recent EGD with [place] Grade C esophagitis, needs repeat.  Reflux if not on Nexium daily.  We discussed taking daily in the morning, refills sent.  Occasional constipation, no blood in her stools.  We discussed starting a daily fiber supplement, Miralax PRN constipation.  Discussed that miralax is not a fast acting treatment, takes a few doses to achieve effects. Follow-up in 1 year or sooner if symptoms change/worsen.    Recommendations:  1. Repeat EGD  2. Nexium 40 mg once daily  3. Daily fiber supplement, Miralax PRN  4. Colon cancer screening: 10/2030    No follow-ups on file.      Order summary:  Orders Placed This Encounter    esomeprazole (NEXIUM) 40 MG capsule    Case Request Endoscopy: EGD (ESOPHAGOGASTRODUODENOSCOPY)         Thank you so much for allowing me  to participate in the care of LIVIER Wharton-KATHLEEN

## 2022-04-17 DIAGNOSIS — E11.42 DIABETIC POLYNEUROPATHY ASSOCIATED WITH TYPE 2 DIABETES MELLITUS: ICD-10-CM

## 2022-04-17 DIAGNOSIS — G56.03 BILATERAL CARPAL TUNNEL SYNDROME: ICD-10-CM

## 2022-04-17 NOTE — TELEPHONE ENCOUNTER
No new care gaps identified.  Powered by [employer]. Reference number: [id number].   4/17/2022 4:00:48 AM CDT

## 2022-04-18 RX ORDER — AMITRIPTYLINE HYDROCHLORIDE 25 MG/1
TABLET, FILM COATED ORAL
Qty: 60 TABLET | Refills: 11 | Status: SHIPPED | OUTPATIENT
Start: 2022-04-18 | End: 2023-01-19

## 2022-04-18 NOTE — TELEPHONE ENCOUNTER
Refill Routing Note   Medication(s) are not appropriate for processing by [facility] for the following reason(s):      - Indication is outside of scope for [facility]    [facility] action(s):  Route          Medication reconciliation completed: No     Appointments  past 12m or future 3m with PCP    Date Provider   Last Visit   2/7/2022 Reggie Glover MD   Next Visit   8/8/2022 Reggie Glover MD   ED visits in past 90 days: 0        Note composed:4:42 PM 04/18/2022

## 2022-04-28 NOTE — PROGRESS NOTES
CHIEF COMPLAINT: Type 2 Diabetes     HPI: Mrs. Aaliyah Angela is a 66 y.o. female who was diagnosed with gestational diabetes age 20s, Type 2 DM age 30s.  Has h/o gastric sleeve 2015.-in [place]   Past Medical History:   Diagnosis Date    Allergy     Anatomical narrow angle borderline glaucoma     AR (allergic rhinitis)     Arthritis     CAD (coronary artery disease) 4/22/2013    Non obstructive, 2012 PET     Diabetes mellitus type II     with retinopathy    Diabetic retinopathy     Epiretinal membrane, both eyes     Hyperlipidemia     Hypertensive retinopathy of both eyes     Lumbar disc disease     Migraines     Morbid obesity 10/3/2012    BRADFORD (obstructive sleep apnea) 8/4/2015    Proliferative diabetic retinopathy(362.02)     PVD (peripheral vascular disease) 4/22/2013    Carotid US 1-39% bilat 2012     Rupture of right Achilles tendon     Unspecified essential hypertension     Unspecified vitamin D deficiency     Vitreous hemorrhage of left eye      Last seen by me in fall 2021 and is now being seen by me again today.   Uses kimo 1, has reader  Recently fell off   Will like upgrade   Freestyle lite meter  a1c trending down from 8.5% to 7.9%  OA- knees  Limited activity  Last visit ozempic -went up to 1 mg weekly     Lab Results   Component Value Date    HGBA1C 7.9 (H) 03/19/2022       Has f/u with endocrinology for pth, calcium -hyperparathyroidism-annually  -last pth normal    On MDI injections 3 x a day   Testing 4 x a day max    Highest 250s mg/dl  Lowest 72 mg/dl  Patient is willing and able to use the device  Demonstrated an understanding of the technology and is motivated to use CGM  Patient expected to adhere to a comprehensive diabetes treatment plan and patient has adequate medical supervision  Multiple impaired awareness of hypoglycemia (hypoglycemia unawareness)  Works 40 hours, walking 5 x a week    Current meds: ozempic 1 mg weekly, metformin 1000 mg bid, jardiance 25 mg daily,  "humalog correction scale 150-200+2, etc  -self adjustment per correction scale  Stopped lantus x > 6 months    Diabetes Management Status    Statin: Taking  ACE/ARB: Taking    Screening or Prevention Patient's value Goal Complete/Controlled?   HgA1C Testing and Control   Lab Results   Component Value Date    HGBA1C 7.9 (H) 03/19/2022      Annually/Less than 8% No   Lipid profile : 03/19/2022 Annually No   LDL control Lab Results   Component Value Date    LDLCALC 49.4 (L) 03/19/2022    Annually/Less than 100 mg/dl  No   Nephropathy screening Lab Results   Component Value Date    LABMICR 16.0 11/19/2021     Lab Results   Component Value Date    PROTEINUA Negative 06/24/2020    Annually Yes   Blood pressure BP Readings from Last 1 Encounters:   04/29/22 137/71    Less than 140/90 No   Dilated retinal exam : 12/09/2021 Annually Yes   Foot exam   : 03/07/2022 Annually Yes     Cataract removal sx (L) eye  Then (R) eye     REVIEW OF SYSTEMS  General: no weakness, fatigue, + weight changes (gain) 4# over the past 4 months  Eyes: no double or blurred vision, eye pain, or redness; Last Eye Exam 2021, h/o cataract sx, retinal/glaucoma specialist  Cardiovascular:  No chest pain, palpitations, +trace edema-BLE, or murmurs.   Respiratory: no cough or dyspnea.   GI: + heartburn, nausea, or changes in bowel patterns; good appetite. +GERD-nexium and pepcid, off zantac  Skin: no rashes, dryness, itching, or reactions at insulin injection sites.  Neuro: + numbness, tingling-off gabapentin, on amitriptyline,  tremors, or vertigo. +OA in knees  Endocrine: no polyuria, polydipsia, polyphagia, heat or cold intolerance.     Vital Signs  /71 (BP Location: Left arm, Patient Position: Sitting, BP Method: Large (Manual))   Pulse 88   Ht 5' 6" (1.676 m)   Wt 114.9 kg (253 lb 4.9 oz)   SpO2 95%   BMI 40.89 kg/m²     Hemoglobin A1C   Date Value Ref Range Status   03/19/2022 7.9 (H) 4.0 - 5.6 % Final     Comment:     ADA Screening " Guidelines:  5.7-6.4%  Consistent with prediabetes  >or=6.5%  Consistent with diabetes    High levels of fetal hemoglobin interfere with the HbA1C  assay. Heterozygous hemoglobin variants (HbS, HgC, etc)do  not significantly interfere with this assay.   However, presence of multiple variants may affect accuracy.     11/19/2021 8.5 (H) 4.0 - 5.6 % Final     Comment:     ADA Screening Guidelines:  5.7-6.4%  Consistent with prediabetes  >or=6.5%  Consistent with diabetes    High levels of fetal hemoglobin interfere with the HbA1C  assay. Heterozygous hemoglobin variants (HbS, HgC, etc)do  not significantly interfere with this assay.   However, presence of multiple variants may affect accuracy.     05/22/2021 8.1 (H) 4.0 - 5.6 % Final     Comment:     ADA Screening Guidelines:  5.7-6.4%  Consistent with prediabetes  >or=6.5%  Consistent with diabetes    High levels of fetal hemoglobin interfere with the HbA1C  assay. Heterozygous hemoglobin variants (HbS, HgC, etc)do  not significantly interfere with this assay.   However, presence of multiple variants may affect accuracy.          Chemistry        Component Value Date/Time     03/19/2022 0823    K 4.7 03/19/2022 0823     03/19/2022 0823    CO2 28 03/19/2022 0823    BUN 13 03/19/2022 0823    CREATININE 0.9 03/19/2022 0823     (H) 03/19/2022 0823        Component Value Date/Time    CALCIUM 9.7 03/19/2022 0823    ALKPHOS 66 03/19/2022 0823    AST 29 03/19/2022 0823    ALT 32 03/19/2022 0823    BILITOT 0.5 03/19/2022 0823    ESTGFRAFRICA >60.0 03/19/2022 0823    EGFRNONAA >60.0 03/19/2022 0823           Lab Results   Component Value Date    TSH 0.987 05/05/2021      Lab Results   Component Value Date    CHOL 119 (L) 03/19/2022    CHOL 120 05/05/2021    CHOL 140 06/24/2020     Lab Results   Component Value Date    HDL 54 03/19/2022    HDL 53 05/05/2021    HDL 52 06/24/2020     Lab Results   Component Value Date    LDLCALC 49.4 (L) 03/19/2022    LDLCALC  53.4 (L) 05/05/2021    LDLCALC 68.0 06/24/2020     Lab Results   Component Value Date    TRIG 78 03/19/2022    TRIG 68 05/05/2021    TRIG 100 06/24/2020     Lab Results   Component Value Date    CHOLHDL 45.4 03/19/2022    CHOLHDL 44.2 05/05/2021    CHOLHDL 37.1 06/24/2020       PHYSICAL EXAMINATION  Constitutional: Appears well, no distress  Neck: Supple, trachea midline.   Respiratory: No wheezes, even and unlabored.  Cardiovascular: deferred  Lymph: no BLE edema  Skin: warm and dry; no injection site reactions, no acanthosis nigracans observed.  Neuro:patient alert and cooperative, normal affect; steady gait.    Diabetes Foot Exam: +fungal infection-nails    Assessment/Plan  1. 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 by proliferative retinopathy without macular edema, without long-term current use of insulin  Hemoglobin A1C   2. Type 2 diabetes mellitus with hyperglycemia, without long-term current use of insulin     3. PVD (peripheral vascular disease)     4. Primary osteoarthritis involving multiple joints     5. BRADFORD on CPAP     6. Primary hypertension     7. History of bariatric surgery, 12-     8. Diabetic polyneuropathy associated with type 2 diabetes mellitus     9. Class 3 severe obesity due to excess calories with serious comorbidity and body mass index (BMI) of 40.0 to 44.9 in adult     10. Coronary artery disease involving native coronary artery of native heart without angina pectoris     11. Encounter for screening mammogram for malignant neoplasm of breast  Mammo Digital Screening Bilat     1. F/u in 3.5 mos w/ me   continue ozempic 1 mg weekly  Continue jardiance max and metformin  humalog prn per scale above  a1c goal less than 7%   2. See above   3. F/u with vascular  4. F/u with ortho prn   5. Not on cpap   Needs new machine   7. Continue med(s)  Controlled  8. Optimize bg will help with condition   9. Body mass index is 40.89 kg/m².  On glp1a, sglt2i   10. Avoid hypoglycemia  11.  Mammogram 2022     FOLLOW UP  In 3.5 months

## 2022-04-29 ENCOUNTER — OFFICE VISIT (OUTPATIENT)
Dept: INTERNAL MEDICINE | Facility: CLINIC | Age: 67
End: 2022-04-29
Payer: COMMERCIAL

## 2022-04-29 VITALS
SYSTOLIC BLOOD PRESSURE: 137 MMHG | DIASTOLIC BLOOD PRESSURE: 71 MMHG | BODY MASS INDEX: 40.71 KG/M2 | HEIGHT: 66 IN | WEIGHT: 253.31 LBS | OXYGEN SATURATION: 95 % | HEART RATE: 88 BPM

## 2022-04-29 DIAGNOSIS — I10 PRIMARY HYPERTENSION: ICD-10-CM

## 2022-04-29 DIAGNOSIS — M15.9 PRIMARY OSTEOARTHRITIS INVOLVING MULTIPLE JOINTS: ICD-10-CM

## 2022-04-29 DIAGNOSIS — I25.10 CORONARY ARTERY DISEASE INVOLVING NATIVE CORONARY ARTERY OF NATIVE HEART WITHOUT ANGINA PECTORIS: ICD-10-CM

## 2022-04-29 DIAGNOSIS — I73.9 PVD (PERIPHERAL VASCULAR DISEASE): ICD-10-CM

## 2022-04-29 DIAGNOSIS — Z98.84 HISTORY OF BARIATRIC SURGERY: ICD-10-CM

## 2022-04-29 DIAGNOSIS — E11.65 TYPE 2 DIABETES MELLITUS WITH HYPERGLYCEMIA, WITHOUT LONG-TERM CURRENT USE OF INSULIN: ICD-10-CM

## 2022-04-29 DIAGNOSIS — G47.33 OSA ON CPAP: ICD-10-CM

## 2022-04-29 DIAGNOSIS — E11.3593 TYPE 2 DIABETES MELLITUS WITH BOTH EYES AFFECTED BY PROLIFERATIVE RETINOPATHY WITHOUT MACULAR EDEMA, WITHOUT LONG-TERM CURRENT USE OF INSULIN: Primary | ICD-10-CM

## 2022-04-29 DIAGNOSIS — E11.42 DIABETIC POLYNEUROPATHY ASSOCIATED WITH TYPE 2 DIABETES MELLITUS: ICD-10-CM

## 2022-04-29 DIAGNOSIS — Z12.31 ENCOUNTER FOR SCREENING MAMMOGRAM FOR MALIGNANT NEOPLASM OF BREAST: ICD-10-CM

## 2022-04-29 DIAGNOSIS — E66.01 CLASS 3 SEVERE OBESITY DUE TO EXCESS CALORIES WITH SERIOUS COMORBIDITY AND BODY MASS INDEX (BMI) OF 40.0 TO 44.9 IN ADULT: ICD-10-CM

## 2022-04-29 PROCEDURE — 3008F BODY MASS INDEX DOCD: CPT | Mod: CPTII,S$GLB,, | Performed by: NURSE PRACTITIONER

## 2022-04-29 PROCEDURE — 99214 PR OFFICE/OUTPT VISIT, EST, LEVL IV, 30-39 MIN: ICD-10-PCS | Mod: S$GLB,,, | Performed by: NURSE PRACTITIONER

## 2022-04-29 PROCEDURE — 3051F HG A1C>EQUAL 7.0%<8.0%: CPT | Mod: CPTII,S$GLB,, | Performed by: NURSE PRACTITIONER

## 2022-04-29 PROCEDURE — 4010F PR ACE/ARB THEARPY RXD/TAKEN: ICD-10-PCS | Mod: CPTII,S$GLB,, | Performed by: NURSE PRACTITIONER

## 2022-04-29 PROCEDURE — 1159F MED LIST DOCD IN RCRD: CPT | Mod: CPTII,S$GLB,, | Performed by: NURSE PRACTITIONER

## 2022-04-29 PROCEDURE — 1159F PR MEDICATION LIST DOCUMENTED IN MEDICAL RECORD: ICD-10-PCS | Mod: CPTII,S$GLB,, | Performed by: NURSE PRACTITIONER

## 2022-04-29 PROCEDURE — 3075F SYST BP GE 130 - 139MM HG: CPT | Mod: CPTII,S$GLB,, | Performed by: NURSE PRACTITIONER

## 2022-04-29 PROCEDURE — 3075F PR MOST RECENT SYSTOLIC BLOOD PRESS GE 130-139MM HG: ICD-10-PCS | Mod: CPTII,S$GLB,, | Performed by: NURSE PRACTITIONER

## 2022-04-29 PROCEDURE — 3008F PR BODY MASS INDEX (BMI) DOCUMENTED: ICD-10-PCS | Mod: CPTII,S$GLB,, | Performed by: NURSE PRACTITIONER

## 2022-04-29 PROCEDURE — 99999 PR PBB SHADOW E&M-EST. PATIENT-LVL V: CPT | Mod: PBBFAC,,, | Performed by: NURSE PRACTITIONER

## 2022-04-29 PROCEDURE — 4010F ACE/ARB THERAPY RXD/TAKEN: CPT | Mod: CPTII,S$GLB,, | Performed by: NURSE PRACTITIONER

## 2022-04-29 PROCEDURE — 1160F RVW MEDS BY RX/DR IN RCRD: CPT | Mod: CPTII,S$GLB,, | Performed by: NURSE PRACTITIONER

## 2022-04-29 PROCEDURE — 1160F PR REVIEW ALL MEDS BY PRESCRIBER/CLIN PHARMACIST DOCUMENTED: ICD-10-PCS | Mod: CPTII,S$GLB,, | Performed by: NURSE PRACTITIONER

## 2022-04-29 PROCEDURE — 99999 PR PBB SHADOW E&M-EST. PATIENT-LVL V: ICD-10-PCS | Mod: PBBFAC,,, | Performed by: NURSE PRACTITIONER

## 2022-04-29 PROCEDURE — 3051F PR MOST RECENT HEMOGLOBIN A1C LEVEL 7.0 - < 8.0%: ICD-10-PCS | Mod: CPTII,S$GLB,, | Performed by: NURSE PRACTITIONER

## 2022-04-29 PROCEDURE — 3078F DIAST BP <80 MM HG: CPT | Mod: CPTII,S$GLB,, | Performed by: NURSE PRACTITIONER

## 2022-04-29 PROCEDURE — 3078F PR MOST RECENT DIASTOLIC BLOOD PRESSURE < 80 MM HG: ICD-10-PCS | Mod: CPTII,S$GLB,, | Performed by: NURSE PRACTITIONER

## 2022-04-29 PROCEDURE — 99214 OFFICE O/P EST MOD 30 MIN: CPT | Mod: S$GLB,,, | Performed by: NURSE PRACTITIONER

## 2022-04-29 RX ORDER — FLASH GLUCOSE SENSOR
KIT MISCELLANEOUS
Qty: 2 KIT | Refills: 11 | Status: SHIPPED | OUTPATIENT
Start: 2022-04-29 | End: 2022-05-06

## 2022-04-29 RX ORDER — FLASH GLUCOSE SCANNING READER
EACH MISCELLANEOUS
Qty: 1 EACH | Refills: 2 | Status: SHIPPED | OUTPATIENT
Start: 2022-04-29 | End: 2023-01-11

## 2022-04-29 NOTE — PATIENT INSTRUCTIONS
Follow up in 3.5 months w/Irielle  A1c prior (1-7 days before appt)   Mammogram 2022   Simpatch- via [employer]- will help with keeping kimo sensor on or diabetic patch    Change to kimo 2  Bring blue reader to appointments  Goal  no higher than 180     Lab Results   Component Value Date    HGBA1C 7.9 (H) 03/19/2022     A1c goal less than 7%    Continue humalog as needed   If sugar is >180  Ozempic 1 mg weekly   Continue jardiance 25 mg daily     [email or web address]  Eat fit milena  Myfitnesspal milena  [email or web address]

## 2022-05-13 DIAGNOSIS — E78.5 HYPERLIPIDEMIA, UNSPECIFIED HYPERLIPIDEMIA TYPE: ICD-10-CM

## 2022-05-13 RX ORDER — ATORVASTATIN CALCIUM 20 MG/1
TABLET, FILM COATED ORAL
Qty: 90 TABLET | Refills: 2 | Status: SHIPPED | OUTPATIENT
Start: 2022-05-13 | End: 2023-03-12

## 2022-05-13 NOTE — TELEPHONE ENCOUNTER
No new care gaps identified.  [facility] Embedded Care Gaps. Reference number: [id number]. 5/13/2022   7:03:02 AM JULISSAT

## 2022-05-13 NOTE — TELEPHONE ENCOUNTER
Refill Authorization Note   Aaliyah Angela  is requesting a refill authorization.  Brief Assessment and Rationale for Refill:  Approve     Medication Therapy Plan:       Medication Reconciliation Completed: No   Comments:     No Care Gaps recommended.     Note composed:11:16 AM 05/13/2022

## 2022-05-20 DIAGNOSIS — M25.562 LEFT KNEE PAIN, UNSPECIFIED CHRONICITY: Primary | ICD-10-CM

## 2022-05-26 ENCOUNTER — OFFICE VISIT (OUTPATIENT)
Dept: ORTHOPEDICS | Facility: CLINIC | Age: 67
End: 2022-05-26
Payer: COMMERCIAL

## 2022-05-26 ENCOUNTER — HOSPITAL ENCOUNTER (OUTPATIENT)
Dept: RADIOLOGY | Facility: HOSPITAL | Age: 67
Discharge: HOME | End: 2022-05-26
Attending: PHYSICIAN ASSISTANT
Payer: COMMERCIAL

## 2022-05-26 ENCOUNTER — TELEPHONE (OUTPATIENT)
Dept: ENDOSCOPY | Facility: HOSPITAL | Age: 67
End: 2022-05-26
Payer: COMMERCIAL

## 2022-05-26 VITALS — BODY MASS INDEX: 40.74 KG/M2 | WEIGHT: 253.5 LBS | HEIGHT: 66 IN

## 2022-05-26 DIAGNOSIS — M25.562 LEFT KNEE PAIN, UNSPECIFIED CHRONICITY: ICD-10-CM

## 2022-05-26 DIAGNOSIS — M17.12 PRIMARY OSTEOARTHRITIS OF LEFT KNEE: Primary | ICD-10-CM

## 2022-05-26 PROCEDURE — 3008F BODY MASS INDEX DOCD: CPT | Mod: CPTII,S$GLB,, | Performed by: PHYSICIAN ASSISTANT

## 2022-05-26 PROCEDURE — 1160F PR REVIEW ALL MEDS BY PRESCRIBER/CLIN PHARMACIST DOCUMENTED: ICD-10-PCS | Mod: CPTII,S$GLB,, | Performed by: PHYSICIAN ASSISTANT

## 2022-05-26 PROCEDURE — 99204 PR OFFICE/OUTPT VISIT, NEW, LEVL IV, 45-59 MIN: ICD-10-PCS | Mod: 25,S$GLB,, | Performed by: PHYSICIAN ASSISTANT

## 2022-05-26 PROCEDURE — 73562 XR KNEE ORTHO LEFT WITH FLEXION: ICD-10-PCS | Mod: 26,RT,, | Performed by: RADIOLOGY

## 2022-05-26 PROCEDURE — 3051F PR MOST RECENT HEMOGLOBIN A1C LEVEL 7.0 - < 8.0%: ICD-10-PCS | Mod: CPTII,S$GLB,, | Performed by: PHYSICIAN ASSISTANT

## 2022-05-26 PROCEDURE — 4010F PR ACE/ARB THEARPY RXD/TAKEN: ICD-10-PCS | Mod: CPTII,S$GLB,, | Performed by: PHYSICIAN ASSISTANT

## 2022-05-26 PROCEDURE — 73564 XR KNEE ORTHO LEFT WITH FLEXION: ICD-10-PCS | Mod: 26,LT,, | Performed by: RADIOLOGY

## 2022-05-26 PROCEDURE — 3051F HG A1C>EQUAL 7.0%<8.0%: CPT | Mod: CPTII,S$GLB,, | Performed by: PHYSICIAN ASSISTANT

## 2022-05-26 PROCEDURE — 20610 PR DRAIN/INJECT LARGE JOINT/BURSA: ICD-10-PCS | Mod: LT,S$GLB,, | Performed by: PHYSICIAN ASSISTANT

## 2022-05-26 PROCEDURE — 73562 X-RAY EXAM OF KNEE 3: CPT | Mod: 26,RT,, | Performed by: RADIOLOGY

## 2022-05-26 PROCEDURE — 99999 PR PBB SHADOW E&M-EST. PATIENT-LVL IV: CPT | Mod: PBBFAC,,, | Performed by: PHYSICIAN ASSISTANT

## 2022-05-26 PROCEDURE — 73564 X-RAY EXAM KNEE 4 OR MORE: CPT | Mod: 26,LT,, | Performed by: RADIOLOGY

## 2022-05-26 PROCEDURE — 4010F ACE/ARB THERAPY RXD/TAKEN: CPT | Mod: CPTII,S$GLB,, | Performed by: PHYSICIAN ASSISTANT

## 2022-05-26 PROCEDURE — 1160F RVW MEDS BY RX/DR IN RCRD: CPT | Mod: CPTII,S$GLB,, | Performed by: PHYSICIAN ASSISTANT

## 2022-05-26 PROCEDURE — 99204 OFFICE O/P NEW MOD 45 MIN: CPT | Mod: 25,S$GLB,, | Performed by: PHYSICIAN ASSISTANT

## 2022-05-26 PROCEDURE — 73562 X-RAY EXAM OF KNEE 3: CPT | Mod: TC,RT

## 2022-05-26 PROCEDURE — 1159F MED LIST DOCD IN RCRD: CPT | Mod: CPTII,S$GLB,, | Performed by: PHYSICIAN ASSISTANT

## 2022-05-26 PROCEDURE — 3008F PR BODY MASS INDEX (BMI) DOCUMENTED: ICD-10-PCS | Mod: CPTII,S$GLB,, | Performed by: PHYSICIAN ASSISTANT

## 2022-05-26 PROCEDURE — 99999 PR PBB SHADOW E&M-EST. PATIENT-LVL IV: ICD-10-PCS | Mod: PBBFAC,,, | Performed by: PHYSICIAN ASSISTANT

## 2022-05-26 PROCEDURE — 20610 DRAIN/INJ JOINT/BURSA W/O US: CPT | Mod: LT,S$GLB,, | Performed by: PHYSICIAN ASSISTANT

## 2022-05-26 PROCEDURE — 1159F PR MEDICATION LIST DOCUMENTED IN MEDICAL RECORD: ICD-10-PCS | Mod: CPTII,S$GLB,, | Performed by: PHYSICIAN ASSISTANT

## 2022-05-26 RX ORDER — TRIAMCINOLONE ACETONIDE 40 MG/ML
60 INJECTION, SUSPENSION INTRA-ARTICULAR; INTRAMUSCULAR
Status: COMPLETED | OUTPATIENT
Start: 2022-05-26 | End: 2022-05-26

## 2022-05-26 RX ADMIN — TRIAMCINOLONE ACETONIDE 60 MG: 40 INJECTION, SUSPENSION INTRA-ARTICULAR; INTRAMUSCULAR at 08:05

## 2022-05-26 NOTE — PROGRESS NOTES
SUBJECTIVE:     Chief Complaint : left knee pain    History of Present Illness:  Aaliyah Angela is a 66 y.o. female seen in clinic today with a chief complaint of chronic left knee pain. 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 Her pain is medial. She denies mechanical symptoms or instability. Admits to intermittent swelling. She has had pain for many years and had knee aspirated in the 80s. I injected knee in 2017 with relief until about two weeks ago. No trauma. She has tried OTC medications including Tylenol and topical analgesics. Pain is affecting ADL and ability to work.    PMHx significant for CAD on asa, T2DM A1c 7.9, h/o gastric sleeve in 2015, BRADFORD, obesity BMI 40.92     Past Medical History:   Diagnosis Date    Allergy     Anatomical narrow angle borderline glaucoma     AR (allergic rhinitis)     Arthritis     CAD (coronary artery disease) 4/22/2013    Non obstructive, 2012 PET     Diabetes mellitus type II     with retinopathy    Diabetic retinopathy     Epiretinal membrane, both eyes     Hyperlipidemia     Hypertensive retinopathy of both eyes     Lumbar disc disease     Migraines     Morbid obesity 10/3/2012    BRADFORD (obstructive sleep apnea) 8/4/2015    Proliferative diabetic retinopathy(362.02)     PVD (peripheral vascular disease) 4/22/2013    Carotid US 1-39% bilat 2012     Rupture of right Achilles tendon     Unspecified essential hypertension     Unspecified vitamin D deficiency     Vitreous hemorrhage of left eye      Review of Systems:  Constitutional: no fever or chills  ENT: no nasal congestion or sore throat  Respiratory: no cough or shortness of breath  Cardiovascular: no chest pain or palpitations  Gastrointestinal: no nausea or vomiting, tolerating diet  Genitourinary: no hematuria or dysuria  Integument/Breast: no rash or pruritis  Hematologic/Lymphatic: no easy bruising or lymphadenopathy  Musculoskeletal: see HPI  Neurological: no seizures or tremors  Behavioral/Psych: no  "auditory or visual hallucinations    OBJECTIVE:     PHYSICAL EXAM:  Height 5' 6" (1.676 m), weight 115 kg (253 lb 8 oz).   General Appearance: WDWN, NAD  Gait: Normal  Neuro/Psych: Mood & affect appropriate  Lungs: Respirations equal and unlabored.   CV: 2+ bilateral upper and lower extremity pulses.   Skin: Intact throughout LE  Extremities: No LE edema    Right Knee Exam  Range of Motion:0-130 active   Effusion:none  Condition of skin:intact  Location of tenderness:None   Strength:5 of 5 quadriceps strength and 5 of 5 hamstring strength  Stability:stable to testing    Left Knee Exam  Range of Motion:0-115 active   Effusion:none  Condition of skin:intact  Location of tenderness:Medial joint line   Strength:5 of 5 quadriceps strength and 5 of 5 hamstring strength  Stability:stable to testing    Left Hip Examination: no pain with PROM     RADIOGRAPHS: AP, lateral and merchant knee x-rays ordered and images reviewed today by me reveal advanced degenerative changes in both knees with loss of medial joint space and large medial and patellofemoral osteophytes     ASSESSMENT/PLAN:   Primary osteoarthritis of both knees  Left knee pain  - Inject left knee today. Monitor glucose.   - Continue Tylenol, topical analgesics   - Low impact activity  - F/u as needed    Knee Injection Procedure Note  Diagnosis: left knee degenerative arthritis  Indications: left knee pain  Procedure Details: Verbal consent was obtained for the procedure. The injection site was identified and the skin was prepared with alcohol. The left knee was injected from an anterolateral approach with 1.5 ml of Kenalog and 2 ml Lidocaine under sterile technique using a 22 gauge needle. The needle was removed and the area cleansed and dressed.  Complications:  Patient tolerated the procedure well.    she was advised to rest the knee today, using ice and elevation as needed for comfort and swelling.Immediate relief of the knee pain may be short lived and " secondary to the lidocaine. she may have an increase in discomfort tonight followed by steady improvement over the next several days. It may take 1-2 weeks following the injection to get the full benefit of the medication.

## 2022-06-09 ENCOUNTER — TELEPHONE (OUTPATIENT)
Dept: ENDOSCOPY | Facility: HOSPITAL | Age: 67
End: 2022-06-09
Payer: COMMERCIAL

## 2022-06-09 NOTE — TELEPHONE ENCOUNTER
Returned patient's call regarding scheduling for an EGD. No answer. Left voicemail message with phone number provided.

## 2022-06-24 ENCOUNTER — TELEPHONE (OUTPATIENT)
Dept: INFECTIOUS DISEASES | Facility: HOSPITAL | Age: 67
End: 2022-06-24
Payer: COMMERCIAL

## 2022-06-24 ENCOUNTER — TELEPHONE (OUTPATIENT)
Dept: INTERNAL MEDICINE | Facility: CLINIC | Age: 67
End: 2022-06-24
Payer: COMMERCIAL

## 2022-06-24 DIAGNOSIS — U07.1 COVID-19: Primary | ICD-10-CM

## 2022-06-24 DIAGNOSIS — U07.1 COVID: ICD-10-CM

## 2022-06-24 NOTE — TELEPHONE ENCOUNTER
----- Message from Vinnie Guzman sent at 6/24/2022  7:29 AM CDT -----  Contact: Pt [phone]  Pt called in regards to she has testes positive for the covid virus she would like to get advice please advise

## 2022-06-24 NOTE — TELEPHONE ENCOUNTER
"Please call pt re: "Pt called in regards to she has testes positive for the covid virus she would like to get advice please advise "    covid risk score = 5     Therefore she qualifies for anti-covid medicaiton  Let her know:    I am coordinating your care with a team of [facility] pharmacists who will help get you on the most appropriate COVID-19 treatment. They will reach out to you in the next 24 to 48 hours.    EUA order entered, pharm will review and given her meds decide on paxlovoid vs infusion  "

## 2022-06-24 NOTE — TELEPHONE ENCOUNTER
Contacted patient about COVID-19 EUA treatments. Pt acknowledges that she does not have symptoms or need any additional therapy at this time. Symptom onset was 06/20/22.

## 2022-06-28 ENCOUNTER — TELEPHONE (OUTPATIENT)
Dept: ENDOSCOPY | Facility: HOSPITAL | Age: 67
End: 2022-06-28
Payer: COMMERCIAL

## 2022-06-28 NOTE — TELEPHONE ENCOUNTER
Returned pt call. lvm she was removed from schedule r/t positive covid on 6/24/22 per dr chawla after visit summary in media. Call back number provided for re-scheduling

## 2022-07-01 ENCOUNTER — HOSPITAL ENCOUNTER (OUTPATIENT)
Dept: RADIOLOGY | Facility: HOSPITAL | Age: 67
Discharge: HOME | End: 2022-07-01
Attending: NURSE PRACTITIONER
Payer: COMMERCIAL

## 2022-07-01 VITALS — HEIGHT: 66 IN | BODY MASS INDEX: 40.98 KG/M2 | WEIGHT: 255 LBS

## 2022-07-01 DIAGNOSIS — Z12.31 ENCOUNTER FOR SCREENING MAMMOGRAM FOR MALIGNANT NEOPLASM OF BREAST: ICD-10-CM

## 2022-07-01 PROCEDURE — 77067 MAMMO DIGITAL SCREENING BILAT WITH TOMO: ICD-10-PCS | Mod: 26,,, | Performed by: RADIOLOGY

## 2022-07-01 PROCEDURE — 77067 SCR MAMMO BI INCL CAD: CPT | Mod: 26,,, | Performed by: RADIOLOGY

## 2022-07-01 PROCEDURE — 77063 MAMMO DIGITAL SCREENING BILAT WITH TOMO: ICD-10-PCS | Mod: 26,,, | Performed by: RADIOLOGY

## 2022-07-01 PROCEDURE — 77063 BREAST TOMOSYNTHESIS BI: CPT | Mod: 26,,, | Performed by: RADIOLOGY

## 2022-07-01 PROCEDURE — 77063 BREAST TOMOSYNTHESIS BI: CPT | Mod: TC

## 2022-07-01 PROCEDURE — 77067 SCR MAMMO BI INCL CAD: CPT | Mod: TC

## 2022-07-08 ENCOUNTER — OFFICE VISIT (OUTPATIENT)
Dept: OPHTHALMOLOGY | Facility: CLINIC | Age: 67
End: 2022-07-08
Payer: COMMERCIAL

## 2022-07-08 DIAGNOSIS — Z79.4 TYPE 2 DIABETES MELLITUS WITH BOTH EYES AFFECTED BY PROLIFERATIVE RETINOPATHY WITHOUT MACULAR EDEMA, WITH LONG-TERM CURRENT USE OF INSULIN: Primary | ICD-10-CM

## 2022-07-08 DIAGNOSIS — E11.3593 TYPE 2 DIABETES MELLITUS WITH BOTH EYES AFFECTED BY PROLIFERATIVE RETINOPATHY WITHOUT MACULAR EDEMA, WITH LONG-TERM CURRENT USE OF INSULIN: Primary | ICD-10-CM

## 2022-07-08 DIAGNOSIS — H35.373 PRERETINAL FIBROSIS, BILATERAL: ICD-10-CM

## 2022-07-08 DIAGNOSIS — H43.391 VITREOUS FLOATERS OF RIGHT EYE: ICD-10-CM

## 2022-07-08 PROCEDURE — 1159F MED LIST DOCD IN RCRD: CPT | Mod: CPTII,S$GLB,, | Performed by: OPHTHALMOLOGY

## 2022-07-08 PROCEDURE — 92134 CPTRZ OPH DX IMG PST SGM RTA: CPT | Mod: S$GLB,,, | Performed by: OPHTHALMOLOGY

## 2022-07-08 PROCEDURE — 92201 OPSCPY EXTND RTA DRAW UNI/BI: CPT | Mod: S$GLB,,, | Performed by: OPHTHALMOLOGY

## 2022-07-08 PROCEDURE — 99999 PR PBB SHADOW E&M-EST. PATIENT-LVL III: ICD-10-PCS | Mod: PBBFAC,,, | Performed by: OPHTHALMOLOGY

## 2022-07-08 PROCEDURE — 4010F PR ACE/ARB THEARPY RXD/TAKEN: ICD-10-PCS | Mod: CPTII,S$GLB,, | Performed by: OPHTHALMOLOGY

## 2022-07-08 PROCEDURE — 92201 PR OPHTHALMOSCOPY, EXT, W/RET DRAW/SCLERAL DEPR, I&R, UNI/BI: ICD-10-PCS | Mod: S$GLB,,, | Performed by: OPHTHALMOLOGY

## 2022-07-08 PROCEDURE — 92014 COMPRE OPH EXAM EST PT 1/>: CPT | Mod: S$GLB,,, | Performed by: OPHTHALMOLOGY

## 2022-07-08 PROCEDURE — 4010F ACE/ARB THERAPY RXD/TAKEN: CPT | Mod: CPTII,S$GLB,, | Performed by: OPHTHALMOLOGY

## 2022-07-08 PROCEDURE — 3051F HG A1C>EQUAL 7.0%<8.0%: CPT | Mod: CPTII,S$GLB,, | Performed by: OPHTHALMOLOGY

## 2022-07-08 PROCEDURE — 1159F PR MEDICATION LIST DOCUMENTED IN MEDICAL RECORD: ICD-10-PCS | Mod: CPTII,S$GLB,, | Performed by: OPHTHALMOLOGY

## 2022-07-08 PROCEDURE — 99999 PR PBB SHADOW E&M-EST. PATIENT-LVL III: CPT | Mod: PBBFAC,,, | Performed by: OPHTHALMOLOGY

## 2022-07-08 PROCEDURE — 92014 PR EYE EXAM, EST PATIENT,COMPREHESV: ICD-10-PCS | Mod: S$GLB,,, | Performed by: OPHTHALMOLOGY

## 2022-07-08 PROCEDURE — 92134 POSTERIOR SEGMENT OCT RETINA (OCULAR COHERENCE TOMOGRAPHY)-BOTH EYES: ICD-10-PCS | Mod: S$GLB,,, | Performed by: OPHTHALMOLOGY

## 2022-07-08 PROCEDURE — 1160F RVW MEDS BY RX/DR IN RCRD: CPT | Mod: CPTII,S$GLB,, | Performed by: OPHTHALMOLOGY

## 2022-07-08 PROCEDURE — 1160F PR REVIEW ALL MEDS BY PRESCRIBER/CLIN PHARMACIST DOCUMENTED: ICD-10-PCS | Mod: CPTII,S$GLB,, | Performed by: OPHTHALMOLOGY

## 2022-07-08 PROCEDURE — 3051F PR MOST RECENT HEMOGLOBIN A1C LEVEL 7.0 - < 8.0%: ICD-10-PCS | Mod: CPTII,S$GLB,, | Performed by: OPHTHALMOLOGY

## 2022-07-08 NOTE — PROGRESS NOTES
Subjective:       Patient ID: Aaliyah Angela is a 66 y.o. female      No chief complaint on file.    History of Present Illness  HPI        DLS: 06/30/2021 by Dr. MARIBEL Mckeon MD    Patient states : vision has been stable OU    Has some occ floaters OD, longstanding.      Denies   eye pain.   No Flashes (++)Floaters stable OD    (-)Photophobia  (-)Glare  ++ h/o Migraines    Eye Meds: NONE       POHx:   Phaco IOL OD 02/17/2021  -S/p Phaco IOL OS 10/28/20     1) OHT vs Pre-perimetric POAG   2) Narrow Angles   3) PDR OU   4) Hx VH OD   5) NS OD   6) Hx TVO OS   7) PCIOL OU      Last edited by Reggie Mckeon MD on 7/8/2022  5:12 PM. (History)        Imaging:    See report    Assessment/Plan:     1. 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 by proliferative retinopathy without macular edema, with long-term current use of insulin  Stable.  s/p PRP OU  Observe    Diabetic Retinopathy discussed in detail, all questions answered  Stressed importance of good BS/BP/Chol Control  RTC immediately PRN any vision changes, jonas blurry vision, missing vision, floaters, distortions, etc    - Posterior Segment OCT Retina-Both eyes    2. Anatomical narrow angle borderline glaucoma of both eyes  F/u Dr Goodrich as planned  Excellent result s/p CE/IOL OU    3. Preretinal fibrosis, bilateral  Stable.  Observe    - Posterior Segment OCT Retina-Both eyes    4. Floaters OD  No heme, no breaks  Observe  RD precautions given    Follow up in about 1 year (around 7/8/2023), or if symptoms worsen or fail to improve, for Comprehensive Examination, OCT Mac.

## 2022-07-25 ENCOUNTER — TELEPHONE (OUTPATIENT)
Dept: PRIMARY CARE CLINIC | Facility: CLINIC | Age: 67
End: 2022-07-25
Payer: COMMERCIAL

## 2022-07-25 RX ORDER — METFORMIN HYDROCHLORIDE 500 MG/1
1000 TABLET, EXTENDED RELEASE ORAL 2 TIMES DAILY WITH MEALS
Qty: 360 TABLET | Refills: 3 | Status: SHIPPED | OUTPATIENT
Start: 2022-07-25 | End: 2023-07-27

## 2022-07-25 NOTE — TELEPHONE ENCOUNTER
No new care gaps identified.  [facility] Embedded Care Gaps. Reference number: [id number]. 7/25/2022   1:33:18 PM CDT

## 2022-07-25 NOTE — TELEPHONE ENCOUNTER
----- Message from Edwin Chen sent at 7/25/2022  9:10 AM CDT -----  Contact: PT @ [phone]  Requesting an RX refill or new RX.    Is this a refill or new RX: Refill     RX name and strength: metFORMIN (GLUCOPHAGE-XR) 500 MG ER 24hr tablet      Is this a 30 day or 90 day RX: 30     Pharmacy name and phone #:   [id number] - [place], [place] - [address] [facility] [address]  [address] [address]  Phone: [phone] Fax: [phone]    Patient stated she has been out of medication for almost a week. Please advise.         The doctors have asked that we provide their patients with the following 2 reminders -- prescription refills can take up to 72 hours, and a friendly reminder that in the future you can use your [facility] account to request refills: Yes

## 2022-08-08 ENCOUNTER — LAB VISIT (OUTPATIENT)
Dept: LAB | Facility: HOSPITAL | Age: 67
End: 2022-08-08
Attending: FAMILY MEDICINE
Payer: COMMERCIAL

## 2022-08-08 ENCOUNTER — IMMUNIZATION (OUTPATIENT)
Dept: INTERNAL MEDICINE | Facility: CLINIC | Age: 67
End: 2022-08-08
Payer: COMMERCIAL

## 2022-08-08 ENCOUNTER — OFFICE VISIT (OUTPATIENT)
Dept: INTERNAL MEDICINE | Facility: CLINIC | Age: 67
End: 2022-08-08
Payer: COMMERCIAL

## 2022-08-08 VITALS
OXYGEN SATURATION: 95 % | HEART RATE: 111 BPM | WEIGHT: 253.5 LBS | DIASTOLIC BLOOD PRESSURE: 60 MMHG | HEIGHT: 66 IN | BODY MASS INDEX: 40.74 KG/M2 | SYSTOLIC BLOOD PRESSURE: 122 MMHG

## 2022-08-08 DIAGNOSIS — G47.33 OSA ON CPAP: ICD-10-CM

## 2022-08-08 DIAGNOSIS — Z23 NEED FOR VACCINATION: Primary | ICD-10-CM

## 2022-08-08 DIAGNOSIS — E11.65 TYPE 2 DIABETES MELLITUS WITH HYPERGLYCEMIA, WITHOUT LONG-TERM CURRENT USE OF INSULIN: ICD-10-CM

## 2022-08-08 DIAGNOSIS — Z98.84 HISTORY OF BARIATRIC SURGERY: ICD-10-CM

## 2022-08-08 DIAGNOSIS — L05.01 PILONIDAL CYST WITH ABSCESS: ICD-10-CM

## 2022-08-08 DIAGNOSIS — Z12.4 CERVICAL CANCER SCREENING: ICD-10-CM

## 2022-08-08 DIAGNOSIS — E66.01 CLASS 3 SEVERE OBESITY DUE TO EXCESS CALORIES WITH SERIOUS COMORBIDITY AND BODY MASS INDEX (BMI) OF 40.0 TO 44.9 IN ADULT: ICD-10-CM

## 2022-08-08 DIAGNOSIS — I10 PRIMARY HYPERTENSION: Primary | ICD-10-CM

## 2022-08-08 DIAGNOSIS — R00.0 TACHYCARDIA: ICD-10-CM

## 2022-08-08 DIAGNOSIS — E78.5 HYPERLIPIDEMIA, UNSPECIFIED HYPERLIPIDEMIA TYPE: ICD-10-CM

## 2022-08-08 DIAGNOSIS — I10 PRIMARY HYPERTENSION: ICD-10-CM

## 2022-08-08 DIAGNOSIS — E21.3 HYPERPARATHYROIDISM: ICD-10-CM

## 2022-08-08 DIAGNOSIS — I73.9 PVD (PERIPHERAL VASCULAR DISEASE): ICD-10-CM

## 2022-08-08 LAB
ALBUMIN SERPL BCP-MCNC: 3.4 G/DL (ref 3.5–5.2)
ALP SERPL-CCNC: 70 U/L (ref 55–135)
ALT SERPL W/O P-5'-P-CCNC: 22 U/L (ref 10–44)
ANION GAP SERPL CALC-SCNC: 9 MMOL/L (ref 8–16)
AST SERPL-CCNC: 21 U/L (ref 10–40)
BASOPHILS # BLD AUTO: 0.05 K/UL (ref 0–0.2)
BASOPHILS NFR BLD: 0.4 % (ref 0–1.9)
BILIRUB SERPL-MCNC: 0.7 MG/DL (ref 0.1–1)
BUN SERPL-MCNC: 10 MG/DL (ref 8–23)
CALCIUM SERPL-MCNC: 9.6 MG/DL (ref 8.7–10.5)
CHLORIDE SERPL-SCNC: 98 MMOL/L (ref 95–110)
CO2 SERPL-SCNC: 27 MMOL/L (ref 23–29)
CREAT SERPL-MCNC: 1 MG/DL (ref 0.5–1.4)
DIFFERENTIAL METHOD: ABNORMAL
EOSINOPHIL # BLD AUTO: 0 K/UL (ref 0–0.5)
EOSINOPHIL NFR BLD: 0.3 % (ref 0–8)
ERYTHROCYTE [DISTWIDTH] IN BLOOD BY AUTOMATED COUNT: 14.2 % (ref 11.5–14.5)
EST. GFR  (NO RACE VARIABLE): >60 ML/MIN/1.73 M^2
ESTIMATED AVG GLUCOSE: 186 MG/DL (ref 68–131)
GLUCOSE SERPL-MCNC: 156 MG/DL (ref 70–110)
HBA1C MFR BLD: 8.1 % (ref 4–5.6)
HCT VFR BLD AUTO: 37.3 % (ref 37–48.5)
HGB BLD-MCNC: 11.4 G/DL (ref 12–16)
IMM GRANULOCYTES # BLD AUTO: 0.11 K/UL (ref 0–0.04)
IMM GRANULOCYTES NFR BLD AUTO: 0.8 % (ref 0–0.5)
LYMPHOCYTES # BLD AUTO: 1.3 K/UL (ref 1–4.8)
LYMPHOCYTES NFR BLD: 9.6 % (ref 18–48)
MCH RBC QN AUTO: 29.5 PG (ref 27–31)
MCHC RBC AUTO-ENTMCNC: 30.6 G/DL (ref 32–36)
MCV RBC AUTO: 96 FL (ref 82–98)
MONOCYTES # BLD AUTO: 1.4 K/UL (ref 0.3–1)
MONOCYTES NFR BLD: 10 % (ref 4–15)
NEUTROPHILS # BLD AUTO: 10.8 K/UL (ref 1.8–7.7)
NEUTROPHILS NFR BLD: 78.9 % (ref 38–73)
NRBC BLD-RTO: 0 /100 WBC
PLATELET # BLD AUTO: 269 K/UL (ref 150–450)
PMV BLD AUTO: 11.2 FL (ref 9.2–12.9)
POTASSIUM SERPL-SCNC: 4.7 MMOL/L (ref 3.5–5.1)
PROT SERPL-MCNC: 7.5 G/DL (ref 6–8.4)
RBC # BLD AUTO: 3.87 M/UL (ref 4–5.4)
SODIUM SERPL-SCNC: 134 MMOL/L (ref 136–145)
WBC # BLD AUTO: 13.74 K/UL (ref 3.9–12.7)

## 2022-08-08 PROCEDURE — 1159F MED LIST DOCD IN RCRD: CPT | Mod: CPTII,S$GLB,, | Performed by: FAMILY MEDICINE

## 2022-08-08 PROCEDURE — 3008F PR BODY MASS INDEX (BMI) DOCUMENTED: ICD-10-PCS | Mod: CPTII,S$GLB,, | Performed by: FAMILY MEDICINE

## 2022-08-08 PROCEDURE — 99999 PR PBB SHADOW E&M-EST. PATIENT-LVL V: CPT | Mod: PBBFAC,,, | Performed by: FAMILY MEDICINE

## 2022-08-08 PROCEDURE — 0054A COVID-19, MRNA, LNP-S, PF, 30 MCG/0.3 ML DOSE VACCINE (PFIZER): CPT | Mod: CV19,PBBFAC | Performed by: INTERNAL MEDICINE

## 2022-08-08 PROCEDURE — 4010F PR ACE/ARB THEARPY RXD/TAKEN: ICD-10-PCS | Mod: CPTII,S$GLB,, | Performed by: FAMILY MEDICINE

## 2022-08-08 PROCEDURE — 3074F SYST BP LT 130 MM HG: CPT | Mod: CPTII,S$GLB,, | Performed by: FAMILY MEDICINE

## 2022-08-08 PROCEDURE — 93005 ELECTROCARDIOGRAM TRACING: CPT | Mod: S$GLB,,, | Performed by: FAMILY MEDICINE

## 2022-08-08 PROCEDURE — 93010 ELECTROCARDIOGRAM REPORT: CPT | Mod: S$GLB,,, | Performed by: INTERNAL MEDICINE

## 2022-08-08 PROCEDURE — 85025 COMPLETE CBC W/AUTO DIFF WBC: CPT | Performed by: FAMILY MEDICINE

## 2022-08-08 PROCEDURE — 3078F DIAST BP <80 MM HG: CPT | Mod: CPTII,S$GLB,, | Performed by: FAMILY MEDICINE

## 2022-08-08 PROCEDURE — 1159F PR MEDICATION LIST DOCUMENTED IN MEDICAL RECORD: ICD-10-PCS | Mod: CPTII,S$GLB,, | Performed by: FAMILY MEDICINE

## 2022-08-08 PROCEDURE — 80053 COMPREHEN METABOLIC PANEL: CPT | Performed by: FAMILY MEDICINE

## 2022-08-08 PROCEDURE — 3051F PR MOST RECENT HEMOGLOBIN A1C LEVEL 7.0 - < 8.0%: ICD-10-PCS | Mod: CPTII,S$GLB,, | Performed by: FAMILY MEDICINE

## 2022-08-08 PROCEDURE — 99214 OFFICE O/P EST MOD 30 MIN: CPT | Mod: S$GLB,,, | Performed by: FAMILY MEDICINE

## 2022-08-08 PROCEDURE — 99999 PR PBB SHADOW E&M-EST. PATIENT-LVL V: ICD-10-PCS | Mod: PBBFAC,,, | Performed by: FAMILY MEDICINE

## 2022-08-08 PROCEDURE — 3008F BODY MASS INDEX DOCD: CPT | Mod: CPTII,S$GLB,, | Performed by: FAMILY MEDICINE

## 2022-08-08 PROCEDURE — 3074F PR MOST RECENT SYSTOLIC BLOOD PRESSURE < 130 MM HG: ICD-10-PCS | Mod: CPTII,S$GLB,, | Performed by: FAMILY MEDICINE

## 2022-08-08 PROCEDURE — 93005 EKG 12-LEAD: ICD-10-PCS | Mod: S$GLB,,, | Performed by: FAMILY MEDICINE

## 2022-08-08 PROCEDURE — 99214 PR OFFICE/OUTPT VISIT, EST, LEVL IV, 30-39 MIN: ICD-10-PCS | Mod: S$GLB,,, | Performed by: FAMILY MEDICINE

## 2022-08-08 PROCEDURE — 83036 HEMOGLOBIN GLYCOSYLATED A1C: CPT | Performed by: FAMILY MEDICINE

## 2022-08-08 PROCEDURE — 93010 EKG 12-LEAD: ICD-10-PCS | Mod: S$GLB,,, | Performed by: INTERNAL MEDICINE

## 2022-08-08 PROCEDURE — 36415 COLL VENOUS BLD VENIPUNCTURE: CPT | Performed by: FAMILY MEDICINE

## 2022-08-08 PROCEDURE — 4010F ACE/ARB THERAPY RXD/TAKEN: CPT | Mod: CPTII,S$GLB,, | Performed by: FAMILY MEDICINE

## 2022-08-08 PROCEDURE — 3078F PR MOST RECENT DIASTOLIC BLOOD PRESSURE < 80 MM HG: ICD-10-PCS | Mod: CPTII,S$GLB,, | Performed by: FAMILY MEDICINE

## 2022-08-08 PROCEDURE — 3051F HG A1C>EQUAL 7.0%<8.0%: CPT | Mod: CPTII,S$GLB,, | Performed by: FAMILY MEDICINE

## 2022-08-08 RX ORDER — SEMAGLUTIDE 2.68 MG/ML
2 INJECTION, SOLUTION SUBCUTANEOUS
Qty: 9 ML | Refills: 3 | Status: SHIPPED | OUTPATIENT
Start: 2022-08-08 | End: 2022-12-13 | Stop reason: SDUPTHER

## 2022-08-08 RX ORDER — AMOXICILLIN AND CLAVULANATE POTASSIUM 875; 125 MG/1; MG/1
1 TABLET, FILM COATED ORAL EVERY 12 HOURS
Qty: 14 TABLET | Refills: 0 | Status: SHIPPED | OUTPATIENT
Start: 2022-08-08 | End: 2022-08-15

## 2022-08-08 NOTE — PROGRESS NOTES
Subjective:       Patient ID: Aaliyah Angela is a 66 y.o. female.    Chief Complaint: Follow-up    Patient is here for follow-up of their chronic diseases    C/o 3 days of buttock pain, mass/boil near anus    Reports fast heart rate recently, last ECG rate=80 back in 2015    Diabetes Management Status - managed by Ms Noble; has [facility] endo for Hx elevated PTH, but it was normal in March  Lab Results   Component Value Date    HGBA1C 7.9 (H) 03/19/2022    HGBA1C 8.5 (H) 11/19/2021    HGBA1C 8.1 (H) 05/22/2021       Statin: Taking  ACE/ARB: Taking    Screening or Prevention Patient's value Goal Complete/Controlled?   HgA1C Testing and Control   Lab Results   Component Value Date    HGBA1C 7.9 (H) 03/19/2022      Annually/Less than 8% Yes   Lipid profile : 03/19/2022 Annually Yes   LDL control Lab Results   Component Value Date    LDLCALC 49.4 (L) 03/19/2022    Annually/Less than 100 mg/dl  Yes   Nephropathy screening Lab Results   Component Value Date    LABMICR 16.0 11/19/2021     Lab Results   Component Value Date    PROTEINUA Negative 06/24/2020     Lab Results   Component Value Date    UTPCR Unable to calculate 06/24/2020      Annually Yes   Blood pressure BP Readings from Last 1 Encounters:   08/08/22 122/60    Less than 140/90 Yes   Dilated retinal exam : 07/08/2022 Annually Yes   Foot exam   : 03/07/2022 Annually Yes         Review of patient's allergies indicates:  No Known Allergies    Social History     Tobacco Use    Smoking status: Never Smoker    Smokeless tobacco: Never Used   Substance Use Topics    Alcohol use: No    Drug use: No       Family History   Problem Relation Age of Onset    Diabetes Mother     Hypertension Mother     Diabetes Father     Hypertension Father     Stroke Father     Diabetes Sister     Diabetes Brother     Cancer Brother         prostate    Prostate cancer Brother     Diabetes Sister     Diabetes Sister     Diabetes Sister     Diabetes Brother     Stroke Brother      Diabetes Brother     Diabetes Brother     Breast cancer Neg Hx     Colon cancer Neg Hx     Ovarian cancer Neg Hx     Amblyopia Neg Hx     Blindness Neg Hx     Cataracts Neg Hx     Glaucoma Neg Hx     Macular degeneration Neg Hx     Retinal detachment Neg Hx     Strabismus Neg Hx     Calcium disorder Neg Hx     Osteoporosis Neg Hx        Past Surgical History:   Procedure Laterality Date    CARPAL TUNNEL RELEASE Right 2020    Procedure: RELEASE, CARPAL TUNNEL RIGHT;  Surgeon: Tameka Bran MD;  [facility];  Service: Orthopedics;  Laterality: Right;    CARPAL TUNNEL RELEASE Right 2020    CATARACT EXTRACTION W/  INTRAOCULAR LENS IMPLANT Left 10/28/2020    COMPLEX ()    CATARACT EXTRACTION W/  INTRAOCULAR LENS IMPLANT Right 2021         SECTION      x3    COLONOSCOPY      COLONOSCOPY N/A 10/8/2020    Procedure: COLONOSCOPY;  Surgeon: Tima Talley MD;  Location: [facility] (4TH FLR);  Service: Endoscopy;  Laterality: N/A;    ESOPHAGOGASTRODUODENOSCOPY N/A 10/8/2020    Procedure: EGD (ESOPHAGOGASTRODUODENOSCOPY);  Surgeon: Tima Talley MD;  Location: [facility] (4TH FLR);  Service: Endoscopy;  Laterality: N/A;    GASTRIC BYPASS  2015    Sleeve    INTRAOCULAR PROSTHESES INSERTION Left 10/28/2020    Procedure: INSERTION, IOL PROSTHESIS;  Surgeon: Alma Goodrich MD;  Location: [address];  Service: Ophthalmology;  Laterality: Left;    INTRAOCULAR PROSTHESES INSERTION Right 2021    Procedure: INSERTION, IOL PROSTHESIS;  Surgeon: Alma Goodrich MD;  Location: [address];  Service: Ophthalmology;  Laterality: Right;    LASER PERIPHERAL IRIDOTOMY Bilateral 2017 and 2017        PANRETINAL PHOTOCOAGULATION      Both eyes    PHACOEMULSIFICATION OF CATARACT Left 10/28/2020    Procedure: PHACOEMULSIFICATION, CATARACT;  Surgeon: Alma Goodrich MD;  Location: [address];  Service:  Ophthalmology;  Laterality: Left;    PHACOEMULSIFICATION OF CATARACT Right 2/17/2021    Procedure: PHACOEMULSIFICATION, CATARACT;  Surgeon: Alma Goodrich MD;  Location: [facility] OR [address] [place] [address];  Service: Ophthalmology;  Laterality: Right;    TUBAL LIGATION         Patient Active Problem List   Diagnosis    Hyperlipidemia    Primary hypertension    Chronic tension-type headache, intractable    Hypertensive retinopathy    Transient vision disturbance    Cortical cataract    Vitreous hemorrhage    Class 3 severe obesity due to excess calories with serious comorbidity and body mass index (BMI) of 40.0 to 44.9 in adult    Chest pain on exertion    Ocular hypertension    Nuclear sclerosis    Anatomical narrow angle borderline glaucoma    PVD (peripheral vascular disease)    CAD (coronary artery disease)    BRADFORD on CPAP    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 by proliferative retinopathy without macular edema, without long-term current use of insulin    History of bariatric surgery, 12-    Erythema nodosum    Primary osteoarthritis involving multiple joints    Bilateral carpal tunnel syndrome    Neck pain    Diabetic polyneuropathy associated with type 2 diabetes mellitus    GERD (gastroesophageal reflux disease)    Type 2 diabetes mellitus with hyperglycemia, without long-term current use of insulin    Hyperparathyroidism    Small pupil    Shallow anterior chamber of eye    Nuclear sclerotic cataract of right eye    Tinea pedis of both feet    COVID       Current Outpatient Medications on File Prior to Visit   Medication Sig Dispense Refill    acetaminophen (TYLENOL) 650 MG TbSR Take 1 tablet (650 mg total) by mouth every 8 (eight) hours as needed (pain). 30 tablet 0    amitriptyline (ELAVIL) 25 MG tablet TAKE 2 TABLETS(50 MG) BY MOUTH EVERY EVENING 60 tablet 11    amLODIPine (NORVASC) 10 MG tablet TAKE 1 TABLET(10 MG) BY MOUTH EVERY DAY 90 tablet 3    aspirin 81 MG  Chew Take 81 mg by mouth once daily.      atorvastatin (LIPITOR) 20 MG tablet TAKE 1 TABLET(20 MG) BY MOUTH EVERY DAY 90 tablet 2    betamethasone dipropionate 0.05 % cream Apply topically 2 (two) times daily. 45 g 2    calcium-vitamin D3 500 mg(1,250mg) -200 unit per tablet Take 1 tablet by mouth once daily.      ciclopirox (PENLAC) 8 % Soln Apply topically nightly. 6.6 mL 2    econazole nitrate 1 % cream Apply topically 2 (two) times daily. 30 g 2    empagliflozin (JARDIANCE) 25 mg tablet TAKE 1 TABLET BY MOUTH DAILY 90 tablet 2    esomeprazole (NEXIUM) 40 MG capsule Take 1 capsule (40 mg total) by mouth before breakfast. 90 capsule 3    flash glucose scanning reader ("[employer]"STYLE SAV 14 DAY READER) Misc Use as directed, e 11.65 sav 2 1 each 2    FREESTYLE SAV 14 DAY SENSOR Kit CHANGE EVERY 14 DAYS 2 kit 11    insulin lispro (HUMALOG KWIKPEN INSULIN) 100 unit/mL pen Use as directed based on sugar level, 150-200+2, 201-250+4, 251-300+6, 301-350+8, >350+10. Max daily 30 units. 1 Box 6    losartan (COZAAR) 100 MG tablet Take 1 tablet (100 mg total) by mouth once daily. 90 tablet 3    metFORMIN (GLUCOPHAGE-XR) 500 MG ER 24hr tablet Take 2 tablets (1,000 mg total) by mouth 2 (two) times daily with meals. 360 tablet 3    metoprolol succinate (TOPROL-XL) 50 MG 24 hr tablet TAKE 1 TABLET BY MOUTH EVERY DAY 90 tablet 3    multivitamin (THERAGRAN) per tablet Take 1 tablet by mouth once daily.      semaglutide (OZEMPIC) 1 mg/dose (4 mg/3 mL) Inject 1 mg into the skin every 7 days. 3 pen 3    urea 20 % Crea Apply 1 application topically once daily. To dry skin on the feet. 75 g 10     Current Facility-Administered Medications on File Prior to Visit   Medication Dose Route Frequency Provider Last Rate Last Admin    0.9%  NaCl infusion   Intravenous Continuous Aristeo Bueno  mL/hr at 02/17/21 0754 New Bag at 02/17/21 0754           Review of Systems   Constitutional: Negative for chills and  "fever.   HENT: Negative for ear pain.    Eyes: Negative for pain.   Respiratory: Negative for chest tightness.    Cardiovascular: Negative for chest pain.   Gastrointestinal: Negative for abdominal pain.   Genitourinary: Negative for flank pain.   Musculoskeletal: Negative for gait problem.   Neurological: Negative for syncope.   Psychiatric/Behavioral: Negative for behavioral problems.       Objective:     /60 (BP Location: Left arm, Patient Position: Sitting, BP Method: Large (Manual))   Pulse (!) 111   Ht 5' 6" (1.676 m)   Wt 115 kg (253 lb 8.5 oz)   SpO2 95%   BMI 40.92 kg/m²     Physical Exam  Vitals and nursing note reviewed.   Constitutional:       Appearance: She is well-developed.   HENT:      Head: Normocephalic and atraumatic.   Cardiovascular:      Rate and Rhythm: Normal rate.      Heart sounds: Normal heart sounds.   Pulmonary:      Effort: No respiratory distress.      Breath sounds: Normal breath sounds. No wheezing or rales.   Abdominal:      Palpations: Abdomen is soft.   Musculoskeletal:      Cervical back: Neck supple.   Skin:     General: Skin is dry.   Neurological:      Mental Status: She is alert.   Psychiatric:         Behavior: Behavior normal.         Assessment:       1. Primary hypertension    2. PVD (peripheral vascular disease)    3. Hyperlipidemia, unspecified hyperlipidemia type    4. Type 2 diabetes mellitus with hyperglycemia, without long-term current use of insulin    5. Class 3 severe obesity due to excess calories with serious comorbidity and body mass index (BMI) of 40.0 to 44.9 in adult    6. History of bariatric surgery, 12-    7. Hyperparathyroidism    8. BRADFORD on CPAP    9. Cervical cancer screening    10. Tachycardia    11. Pilonidal cyst with abscess        Plan:       Aaliyah was seen today for follow-up.    Diagnoses and all orders for this visit:    Primary hypertension  -controlled, stable, no change in meds   -     Comprehensive Metabolic Panel; " Future  -     CBC Auto Differential; Future    PVD (peripheral vascular disease)  -on statin    Hyperlipidemia, unspecified hyperlipidemia type  -     Comprehensive Metabolic Panel; Future  -on statin, meeting LDL goal    Type 2 diabetes mellitus with hyperglycemia, without long-term current use of insulin  -     Hemoglobin A1C; Future  -     Comprehensive Metabolic Panel; Future  -     semaglutide (OZEMPIC) 2 mg/dose (8 mg/3 mL) PnIj; Inject 2 mg into the skin every 7 days. [increased form 1mg, even if A1c at goal, needs more help with weight]  Cc: DANAY Noble    Class 3 severe obesity due to excess calories with serious comorbidity and body mass index (BMI) of 40.0 to 44.9 in adult  History of bariatric surgery, 12-  -increase ozempic from 1mg to 2mg weekly  Wt Readings from Last 3 Encounters:   08/08/22 115 kg (253 lb 8.5 oz)   07/01/22 115.7 kg (255 lb)   05/26/22 115 kg (253 lb 8 oz)     Hyperparathyroidism  -resolved on last blood test    BRADFORD on CPAP  -     CPAP FOR HOME USE - will send new oreder to [facility]  -she was using faithfully and getting benefits prior to it no longer working    Cervical cancer screening  -     Ambulatory referral/consult to Gynecology; Future    Tachycardia  -     Ambulatory referral/consult to Cardiology; Future  -     IN OFFICE EKG 12-LEAD (to Muse) - no afib, jjox=[id number]    Pilonidal cyst with abscess  -     Ambulatory referral/consult to Colorectal Surgery; Future/urgent  -     amoxicillin-clavulanate 875-125mg (AUGMENTIN) 875-125 mg per tablet; Take 1 tablet by mouth every 12 (twelve) hours. for 7 days    Follow up in about 6 months (around 2/8/2023) for htn.

## 2022-08-09 ENCOUNTER — PATIENT MESSAGE (OUTPATIENT)
Dept: INTERNAL MEDICINE | Facility: CLINIC | Age: 67
End: 2022-08-09
Payer: COMMERCIAL

## 2022-08-09 ENCOUNTER — OFFICE VISIT (OUTPATIENT)
Dept: SURGERY | Facility: CLINIC | Age: 67
End: 2022-08-09
Payer: COMMERCIAL

## 2022-08-09 VITALS
SYSTOLIC BLOOD PRESSURE: 135 MMHG | HEART RATE: 124 BPM | DIASTOLIC BLOOD PRESSURE: 60 MMHG | HEIGHT: 66 IN | WEIGHT: 251.63 LBS | BODY MASS INDEX: 40.44 KG/M2

## 2022-08-09 DIAGNOSIS — K61.0 PERIANAL ABSCESS: Primary | ICD-10-CM

## 2022-08-09 DIAGNOSIS — L05.01 PILONIDAL CYST WITH ABSCESS: ICD-10-CM

## 2022-08-09 PROCEDURE — 46050 PR I&D PERIANAL ABSCESS,SUPERFICIAL: ICD-10-PCS | Mod: S$GLB,,, | Performed by: COLON & RECTAL SURGERY

## 2022-08-09 PROCEDURE — 99203 OFFICE O/P NEW LOW 30 MIN: CPT | Mod: 25,S$GLB,, | Performed by: COLON & RECTAL SURGERY

## 2022-08-09 PROCEDURE — 99203 PR OFFICE/OUTPT VISIT, NEW, LEVL III, 30-44 MIN: ICD-10-PCS | Mod: 25,S$GLB,, | Performed by: COLON & RECTAL SURGERY

## 2022-08-09 PROCEDURE — 4010F ACE/ARB THERAPY RXD/TAKEN: CPT | Mod: CPTII,S$GLB,, | Performed by: COLON & RECTAL SURGERY

## 2022-08-09 PROCEDURE — 99999 PR PBB SHADOW E&M-EST. PATIENT-LVL V: ICD-10-PCS | Mod: PBBFAC,,, | Performed by: COLON & RECTAL SURGERY

## 2022-08-09 PROCEDURE — 3008F PR BODY MASS INDEX (BMI) DOCUMENTED: ICD-10-PCS | Mod: CPTII,S$GLB,, | Performed by: COLON & RECTAL SURGERY

## 2022-08-09 PROCEDURE — 1160F RVW MEDS BY RX/DR IN RCRD: CPT | Mod: CPTII,S$GLB,, | Performed by: COLON & RECTAL SURGERY

## 2022-08-09 PROCEDURE — 3078F DIAST BP <80 MM HG: CPT | Mod: CPTII,S$GLB,, | Performed by: COLON & RECTAL SURGERY

## 2022-08-09 PROCEDURE — 46050 I&D PERIANAL ABSCESS SUPFC: CPT | Mod: S$GLB,,, | Performed by: COLON & RECTAL SURGERY

## 2022-08-09 PROCEDURE — 1159F PR MEDICATION LIST DOCUMENTED IN MEDICAL RECORD: ICD-10-PCS | Mod: CPTII,S$GLB,, | Performed by: COLON & RECTAL SURGERY

## 2022-08-09 PROCEDURE — 3078F PR MOST RECENT DIASTOLIC BLOOD PRESSURE < 80 MM HG: ICD-10-PCS | Mod: CPTII,S$GLB,, | Performed by: COLON & RECTAL SURGERY

## 2022-08-09 PROCEDURE — 99999 PR PBB SHADOW E&M-EST. PATIENT-LVL V: CPT | Mod: PBBFAC,,, | Performed by: COLON & RECTAL SURGERY

## 2022-08-09 PROCEDURE — 3052F PR MOST RECENT HEMOGLOBIN A1C LEVEL 8.0 - < 9.0%: ICD-10-PCS | Mod: CPTII,S$GLB,, | Performed by: COLON & RECTAL SURGERY

## 2022-08-09 PROCEDURE — 1159F MED LIST DOCD IN RCRD: CPT | Mod: CPTII,S$GLB,, | Performed by: COLON & RECTAL SURGERY

## 2022-08-09 PROCEDURE — 3075F PR MOST RECENT SYSTOLIC BLOOD PRESS GE 130-139MM HG: ICD-10-PCS | Mod: CPTII,S$GLB,, | Performed by: COLON & RECTAL SURGERY

## 2022-08-09 PROCEDURE — 4010F PR ACE/ARB THEARPY RXD/TAKEN: ICD-10-PCS | Mod: CPTII,S$GLB,, | Performed by: COLON & RECTAL SURGERY

## 2022-08-09 PROCEDURE — 3075F SYST BP GE 130 - 139MM HG: CPT | Mod: CPTII,S$GLB,, | Performed by: COLON & RECTAL SURGERY

## 2022-08-09 PROCEDURE — 1160F PR REVIEW ALL MEDS BY PRESCRIBER/CLIN PHARMACIST DOCUMENTED: ICD-10-PCS | Mod: CPTII,S$GLB,, | Performed by: COLON & RECTAL SURGERY

## 2022-08-09 PROCEDURE — 3052F HG A1C>EQUAL 8.0%<EQUAL 9.0%: CPT | Mod: CPTII,S$GLB,, | Performed by: COLON & RECTAL SURGERY

## 2022-08-09 PROCEDURE — 3008F BODY MASS INDEX DOCD: CPT | Mod: CPTII,S$GLB,, | Performed by: COLON & RECTAL SURGERY

## 2022-08-09 RX ORDER — TRAMADOL HYDROCHLORIDE 50 MG/1
50 TABLET ORAL EVERY 6 HOURS PRN
Qty: 20 TABLET | Refills: 0 | Status: SHIPPED | OUTPATIENT
Start: 2022-08-09 | End: 2023-08-21

## 2022-08-09 NOTE — PROGRESS NOTES
CRS Office Visit History and Physical    Referring Md:   Reggie Glover Md  [address] [place] [address]    SUBJECTIVE:     Chief Complaint:  Pilonidal cyst    History of Present Illness:  The patient is a new patient to this practice.   Course is as follows:  Patient is a 66 y.o. female presents with perianal cyst.  Presents with a one-week history perianal drainage and swelling.  No prior similar episodes.  Prior vaginal cyst years ago.  No fevers or chills.   She is diabetic and her sugars have recently been very poorly controlled.  Nonsmoker.  She was seen by her primary care physician and prescribed antibiotics yesterday but is yet to pick them up.  No family history of colon rectal cancer.  Regular bowel movements.    Last Colonoscopy:  10/08/2020:  Impression:           - The entire examined colon is normal on direct and                         retroflexion views.     Review of patient's allergies indicates:  No Known Allergies    Past Medical History:   Diagnosis Date    Allergy     Anatomical narrow angle borderline glaucoma     AR (allergic rhinitis)     Arthritis     CAD (coronary artery disease) 4/22/2013    Non obstructive, 2012 PET     Diabetes mellitus type II     with retinopathy    Diabetic retinopathy     Epiretinal membrane, both eyes     Hyperlipidemia     Hypertensive retinopathy of both eyes     Lumbar disc disease     Migraines     Morbid obesity 10/3/2012    BRADFORD (obstructive sleep apnea) 8/4/2015    Proliferative diabetic retinopathy(362.02)     PVD (peripheral vascular disease) 4/22/2013    Carotid US 1-39% bilat 2012     Rupture of right Achilles tendon     Unspecified essential hypertension     Unspecified vitamin D deficiency     Vitreous hemorrhage of left eye      Past Surgical History:   Procedure Laterality Date    CARPAL TUNNEL RELEASE Right 2/5/2020    Procedure: RELEASE, CARPAL TUNNEL RIGHT;  Surgeon: Tameka Bran MD;  [facility]  OR;  Service: Orthopedics;  Laterality: Right;    CARPAL TUNNEL RELEASE Right 2020    CATARACT EXTRACTION W/  INTRAOCULAR LENS IMPLANT Left 10/28/2020    COMPLEX ()    CATARACT EXTRACTION W/  INTRAOCULAR LENS IMPLANT Right 2021         SECTION      x3    COLONOSCOPY      COLONOSCOPY N/A 10/8/2020    Procedure: COLONOSCOPY;  Surgeon: Tima Talley MD;  Location: [facility] ENDO (4TH FLR);  Service: Endoscopy;  Laterality: N/A;    ESOPHAGOGASTRODUODENOSCOPY N/A 10/8/2020    Procedure: EGD (ESOPHAGOGASTRODUODENOSCOPY);  Surgeon: Tima Talely MD;  Location: [facility] ENDO (4TH FLR);  Service: Endoscopy;  Laterality: N/A;    GASTRIC BYPASS  2015    Sleeve    INTRAOCULAR PROSTHESES INSERTION Left 10/28/2020    Procedure: INSERTION, IOL PROSTHESIS;  Surgeon: Alma Goodrich MD;  [facility];  Service: Ophthalmology;  Laterality: Left;    INTRAOCULAR PROSTHESES INSERTION Right 2021    Procedure: INSERTION, IOL PROSTHESIS;  Surgeon: Alma Goodrich MD;  Location: [facility] OR [address] [place] [address];  Service: Ophthalmology;  Laterality: Right;    LASER PERIPHERAL IRIDOTOMY Bilateral 2017 and 2017        PANRETINAL PHOTOCOAGULATION      Both eyes    PHACOEMULSIFICATION OF CATARACT Left 10/28/2020    Procedure: PHACOEMULSIFICATION, CATARACT;  Surgeon: Alma Goodrich MD;  Location: [facility] OR [address] [place] [address];  Service: Ophthalmology;  Laterality: Left;    PHACOEMULSIFICATION OF CATARACT Right 2021    Procedure: PHACOEMULSIFICATION, CATARACT;  Surgeon: Alma Goodrich MD;  Location: [facility] OR [address] [place] [address];  Service: Ophthalmology;  Laterality: Right;    TUBAL LIGATION       Family History   Problem Relation Age of Onset    Diabetes Mother     Hypertension Mother     Diabetes Father     Hypertension Father     Stroke Father     Diabetes Sister     Diabetes Brother     Cancer Brother         prostate    Prostate cancer Brother     Diabetes Sister      "Diabetes Sister     Diabetes Sister     Diabetes Brother     Stroke Brother     Diabetes Brother     Diabetes Brother     Breast cancer Neg Hx     Colon cancer Neg Hx     Ovarian cancer Neg Hx     Amblyopia Neg Hx     Blindness Neg Hx     Cataracts Neg Hx     Glaucoma Neg Hx     Macular degeneration Neg Hx     Retinal detachment Neg Hx     Strabismus Neg Hx     Calcium disorder Neg Hx     Osteoporosis Neg Hx      Social History     Tobacco Use    Smoking status: Never Smoker    Smokeless tobacco: Never Used   Substance Use Topics    Alcohol use: No    Drug use: No        Review of Systems:  Review of Systems   Constitutional: Negative for chills, diaphoresis, fever, malaise/fatigue and weight loss.   HENT: Negative for congestion.    Respiratory: Negative for shortness of breath.    Cardiovascular: Negative for chest pain and leg swelling.   Gastrointestinal: Negative for abdominal pain, blood in stool, constipation, nausea and vomiting.   Genitourinary: Negative for dysuria.   Musculoskeletal: Negative for back pain and myalgias.   Skin: Positive for rash.   Neurological: Negative for dizziness and weakness.   Endo/Heme/Allergies: Does not bruise/bleed easily.   Psychiatric/Behavioral: Negative for depression.       OBJECTIVE:     Vital Signs (Most Recent)  /60 (BP Location: Left arm, Patient Position: Sitting, BP Method: Large (Automatic))   Pulse (!) 124   Ht 5' 6" (1.676 m)   Wt 114.1 kg (251 lb 10.5 oz)   BMI 40.62 kg/m²     Physical Exam:  General: Black or  female in no distress   Neuro: alert and oriented x 4.  Moves all extremities.     HEENT: no icterus.  Trachea midline  Respiratory: respirations are even and unlabored  Cardiac: regular rate  Abdomen:   Soft, nontender, no masses  Extremities: Warm dry and intact  Skin: no rashes  Anorectal:  External exam with large right area of induration with fluctuance.  Tender on exam.  Extending to the right labia " anteriorly.    Labs:  Labs from 08/08/2022 personally reviewed demonstrates slight leukocytosis of 13.7.  Normal H&H.  Normal renal function.  Albumin 3.4.    Imaging:  No recent imaging      ASSESSMENT/PLAN:     Aaliyah was seen today for abscess.    Diagnoses and all orders for this visit:    Perianal abscess  -     traMADoL (ULTRAM) 50 mg tablet; Take 1 tablet (50 mg total) by mouth every 6 (six) hours as needed for Pain.    Pilonidal cyst with abscess  -     Ambulatory referral/consult to Colorectal Surgery        66 y.o. female with   Large right perianal abscess.  Incision and drainage performed today.  Sixteen French Pezzar drain placed.  I will follow up with her in a week.  Did recommend that she continue with her antibiotics given her diabetes.  Will plan to remove drain in a week.       Incision and drainage:    The left lateral decubitus position, the perianal skin over the right anal margin was cleansed with Betadine.  A mixture 1% lidocaine 0.25% Marcaine was injected into local anesthesia was achieved.  A 1/2 cm incision was made with an 11 blade.  A moderate amount of purulence and air was evacuated.  The surrounding tissues were compressed.  The abscess cavity was inspected with a hemostat and more drainage ensued.  A 16 French Pezzar drain was placed.  This was cut to the appropriate length.  Fluff dressings were applied.  She tolerated the procedure well.    TREY Shepard MD, FACS, FASCRS  Staff Surgeon  Colon & Rectal Surgery

## 2022-08-11 ENCOUNTER — TELEPHONE (OUTPATIENT)
Dept: OBSTETRICS AND GYNECOLOGY | Facility: CLINIC | Age: 67
End: 2022-08-11
Payer: COMMERCIAL

## 2022-08-11 NOTE — TELEPHONE ENCOUNTER
----- Message from Vickie Woodard sent at 8/11/2022 11:19 AM CDT -----  Pt has a referral in system. Pt would like to be seen in September 2022. Pt would like to be seen at the [place] office. Pt would like the office to give her a callback.            Pt can be reached at [phone]          TY

## 2022-08-15 ENCOUNTER — TELEPHONE (OUTPATIENT)
Dept: SURGERY | Facility: CLINIC | Age: 67
End: 2022-08-15
Payer: COMMERCIAL

## 2022-08-15 ENCOUNTER — TELEPHONE (OUTPATIENT)
Dept: INTERNAL MEDICINE | Facility: CLINIC | Age: 67
End: 2022-08-15
Payer: COMMERCIAL

## 2022-08-15 DIAGNOSIS — R11.0 NAUSEA: Primary | ICD-10-CM

## 2022-08-15 RX ORDER — ONDANSETRON 4 MG/1
4 TABLET, FILM COATED ORAL 2 TIMES DAILY PRN
Qty: 20 TABLET | Refills: 0 | Status: SHIPPED | OUTPATIENT
Start: 2022-08-15 | End: 2024-02-01

## 2022-08-15 NOTE — TELEPHONE ENCOUNTER
----- Message from Nathalie Francis sent at 8/15/2022  3:27 PM CDT -----  Contact: [phone]  Pt is calling for the nurse she states she is asking if the dr can give her a script for zofran please give return call

## 2022-08-16 ENCOUNTER — OFFICE VISIT (OUTPATIENT)
Dept: SURGERY | Facility: CLINIC | Age: 67
End: 2022-08-16
Payer: COMMERCIAL

## 2022-08-16 VITALS
HEIGHT: 66 IN | DIASTOLIC BLOOD PRESSURE: 70 MMHG | HEART RATE: 89 BPM | SYSTOLIC BLOOD PRESSURE: 153 MMHG | WEIGHT: 242.06 LBS | BODY MASS INDEX: 38.9 KG/M2

## 2022-08-16 DIAGNOSIS — K61.0 PERIANAL ABSCESS: Primary | ICD-10-CM

## 2022-08-16 PROCEDURE — 4010F PR ACE/ARB THEARPY RXD/TAKEN: ICD-10-PCS | Mod: CPTII,S$GLB,, | Performed by: COLON & RECTAL SURGERY

## 2022-08-16 PROCEDURE — 3077F SYST BP >= 140 MM HG: CPT | Mod: CPTII,S$GLB,, | Performed by: COLON & RECTAL SURGERY

## 2022-08-16 PROCEDURE — 1160F RVW MEDS BY RX/DR IN RCRD: CPT | Mod: CPTII,S$GLB,, | Performed by: COLON & RECTAL SURGERY

## 2022-08-16 PROCEDURE — 3077F PR MOST RECENT SYSTOLIC BLOOD PRESSURE >= 140 MM HG: ICD-10-PCS | Mod: CPTII,S$GLB,, | Performed by: COLON & RECTAL SURGERY

## 2022-08-16 PROCEDURE — 99024 PR POST-OP FOLLOW-UP VISIT: ICD-10-PCS | Mod: S$GLB,,, | Performed by: COLON & RECTAL SURGERY

## 2022-08-16 PROCEDURE — 1160F PR REVIEW ALL MEDS BY PRESCRIBER/CLIN PHARMACIST DOCUMENTED: ICD-10-PCS | Mod: CPTII,S$GLB,, | Performed by: COLON & RECTAL SURGERY

## 2022-08-16 PROCEDURE — 99024 POSTOP FOLLOW-UP VISIT: CPT | Mod: S$GLB,,, | Performed by: COLON & RECTAL SURGERY

## 2022-08-16 PROCEDURE — 4010F ACE/ARB THERAPY RXD/TAKEN: CPT | Mod: CPTII,S$GLB,, | Performed by: COLON & RECTAL SURGERY

## 2022-08-16 PROCEDURE — 3008F BODY MASS INDEX DOCD: CPT | Mod: CPTII,S$GLB,, | Performed by: COLON & RECTAL SURGERY

## 2022-08-16 PROCEDURE — 3052F HG A1C>EQUAL 8.0%<EQUAL 9.0%: CPT | Mod: CPTII,S$GLB,, | Performed by: COLON & RECTAL SURGERY

## 2022-08-16 PROCEDURE — 3078F DIAST BP <80 MM HG: CPT | Mod: CPTII,S$GLB,, | Performed by: COLON & RECTAL SURGERY

## 2022-08-16 PROCEDURE — 99999 PR PBB SHADOW E&M-EST. PATIENT-LVL V: CPT | Mod: PBBFAC,,, | Performed by: COLON & RECTAL SURGERY

## 2022-08-16 PROCEDURE — 3078F PR MOST RECENT DIASTOLIC BLOOD PRESSURE < 80 MM HG: ICD-10-PCS | Mod: CPTII,S$GLB,, | Performed by: COLON & RECTAL SURGERY

## 2022-08-16 PROCEDURE — 3052F PR MOST RECENT HEMOGLOBIN A1C LEVEL 8.0 - < 9.0%: ICD-10-PCS | Mod: CPTII,S$GLB,, | Performed by: COLON & RECTAL SURGERY

## 2022-08-16 PROCEDURE — 99999 PR PBB SHADOW E&M-EST. PATIENT-LVL V: ICD-10-PCS | Mod: PBBFAC,,, | Performed by: COLON & RECTAL SURGERY

## 2022-08-16 PROCEDURE — 3008F PR BODY MASS INDEX (BMI) DOCUMENTED: ICD-10-PCS | Mod: CPTII,S$GLB,, | Performed by: COLON & RECTAL SURGERY

## 2022-08-16 PROCEDURE — 1159F PR MEDICATION LIST DOCUMENTED IN MEDICAL RECORD: ICD-10-PCS | Mod: CPTII,S$GLB,, | Performed by: COLON & RECTAL SURGERY

## 2022-08-16 PROCEDURE — 1159F MED LIST DOCD IN RCRD: CPT | Mod: CPTII,S$GLB,, | Performed by: COLON & RECTAL SURGERY

## 2022-08-16 NOTE — PROGRESS NOTES
"CRS Office Visit Followup    Referring Md:   No referring provider defined for this encounter.    SUBJECTIVE:     Chief Complaint:  Perianal abscess    History of Present Illness:   Course is as follows:  Patient is a 66 y.o. female presents with perianal cyst.  Presents with a one-week history perianal drainage and swelling.  No prior similar episodes.  Prior vaginal cyst years ago.  No fevers or chills.   She is diabetic and her sugars have recently been very poorly controlled.  Nonsmoker.  She was seen by her primary care physician and prescribed antibiotics yesterday but is yet to pick them up.  No family history of colon rectal cancer.  Regular bowel movements.  8/9/22:  Incision and drainage of right anterior perianal abscess performed in clinic with placement of a Pezzar drain.  Current status:   8/16/22:  Drain fell out a few days ago.  No fevers.  Overall feels improved.  Continues to have purulent drainage.    Last Colonoscopy:  10/08/2020:  Impression:           - The entire examined colon is normal on direct and                         retroflexion views.       Review of Systems:  Review of Systems   Constitutional: Negative for chills, diaphoresis, fever, malaise/fatigue and weight loss.   HENT: Negative for congestion.    Respiratory: Negative for shortness of breath.    Cardiovascular: Negative for chest pain and leg swelling.   Gastrointestinal: Negative for abdominal pain, blood in stool, constipation, nausea and vomiting.   Genitourinary: Negative for dysuria.   Musculoskeletal: Negative for back pain and myalgias.   Skin: Positive for rash.   Neurological: Negative for dizziness and weakness.   Endo/Heme/Allergies: Does not bruise/bleed easily.   Psychiatric/Behavioral: Negative for depression.       OBJECTIVE:     Vital Signs (Most Recent)  BP (!) 153/70 (BP Location: Right arm, Patient Position: Sitting, BP Method: Large (Automatic))   Pulse 89   Ht 5' 6" (1.676 m)   Wt 109.8 kg (242 lb 1 oz) "   BMI 39.07 kg/m²     Physical Exam:  General: Black or  female in no distress   Neuro: alert and oriented x 4.  Moves all extremities.     HEENT: no icterus.  Trachea midline  Respiratory: respirations are even and unlabored  Cardiac: regular rate  Abdomen:   Soft, nontender, no masses  Extremities: Warm dry and intact  Skin: no rashes  Anorectal:  External exam was significantly decreased induration.  Opening probed and a small amount of necrotic tissue removed.  Digital rectal exam performed.  No obvious internal abnormalities palpated.          Labs:  Labs from 08/08/2022 personally reviewed demonstrates slight leukocytosis of 13.7.  Normal H&H.  Normal renal function.  Albumin 3.4.    Imaging:  No recent imaging      ASSESSMENT/PLAN:     Aaliyah was seen today for abscess and follow-up.    Diagnoses and all orders for this visit:    Perianal abscess        66 y.o. female with   Large right perianal abscess status post incision and drainage on 08/09/2022..   Induration improved today.  Continues to have drainage.  No obvious internal abnormalities to suggest a fistula.  I will follow back up with her in 6 weeks to ensure closure.  If it continues to be draining at that time, would plan for CASSIDY Shepard MD, FACS, FASCRS  Staff Surgeon  Colon & Rectal Surgery

## 2022-08-21 NOTE — PROGRESS NOTES
HPI     Glaucoma     Comments: Overdue ck and pt states no changes or complaints since last   exam               Comments     DLS: 12/9/21    1) OHT vs Pre-perimetric POAG  2) Narrow Angles  3) PDR OU  4) Hx VH OD  5) NS OD  6) Hx TVO OS  7) PCIOL OU          Last edited by Josselin Barros MA on 8/25/2022  2:49 PM. (History)            Assessment /Plan     For exam results, see Encounter Report.    Anatomical narrow angle borderline glaucoma of both eyes    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 by proliferative retinopathy without macular edema, with long-term current use of insulin    Preretinal fibrosis, bilateral    Vitreous floaters of right eye    Epiretinal membrane, bilateral    Small pupil    Pseudophakia, both eyes        1. OHT vs Pre-perimetric POAG OU (angles  Narrowing over time)  Followed at [facility] since '04   First seen by Scarlet '08   Never on gtts   VF defects 2/2 PRP     Family history none   Glaucoma meds None   H/O adverse rxn to glaucoma drops none   LASERS Mulitple PRP OU // PI OD 2/9/2017 /// PI os 1/26/2017   GLAUCOMA SURGERIES None   OTHER EYE SURGERIES PC IOL os 10/28/2020 // PC IOL od - 2/17/2021    CDR 0.3/0.35   Tbase 15-23/15-25   Tmax 25 OU   Ttarget ??   HVF 9 tests: 2008 to 2021 - SAD/IAD OU 2/2 PRP OU   Gonio +1-3  with steep approach OU 2018  (doubt occludable)   /575   OCT 5 tests: 2008 to 2019 -  RNFL nl od // nl os    HRT 7 tests: 2009 to 2020 -MR -  nl od // nl os /// CDR 0.33 od // 0.26 os   Disc photos 2002, 2003, 2005 (slides) // 2008, 2013  (OIS)     - Ttoday  17/16   - Test done today -IOP     2. Narrow Angles:  +1-3 w/ bowing  s/p PI. Likely will resolve in future once pseudophakic.  Anterior Segment OCT today w/ Open Angles w/ narrow approach OU  Doubt occludable - monitor gonio     3. PDR OU s/p PDR OU -stable exam on last eval w/ Retina on 7/25/13.   S/p avastin x 4 od - last 1/4/2016     4. Hx of VH OD -stable. Last seen by retina - jesse  "1/4/2016    5. Hx of Transient Visual Obscurations OS - ? BP elevation. Seen w/ stable Retinal exam on 9/13/12.    6. PC IOL OU - Kp   OS 10/28/2020 - PCB00 23.0   OD 2/17/2021 - PCB00 23.5     7. Had a gastric "sleve" done 12/18/2015 - for weight loss    So far doing well    On less insulin now     Plan:  Stable IOP (thick k's),  Cont no gtts  Continue to work on blood sugars  oht vs glaucoma    Angles continue narrow but open     S/P PI  os 1/26/2017 -   S/P PI od - 2/9/2017 -      Phaco / IOL OS Date: 10/28/2020 - PCB00 23.0 - trypan / medium pupil - extra viscoat   POY > 1.75  - phaco/IOL   Doing well    Cont with regular F/U's with Dr. Mckeon     Phaco / IOL OD Date: 2/17/2021 - PCB00 23.5  POY > 1    - phaco/IOL   Doing well    Pt is content with out any distance correction - will cont with OTC readers only    Rx for bifocals written - but most likely will not have made     OCT macula - 3/26/2021 - nl ou -- no cme    [place] - stable exam - F/U yearly or sooner prn     F/U 4 - 6 months with HVF - 24-2 ss ou / DFE // OCT     I have seen and personally examined the patient.  I agree with the findings, assessment and plan of the resident and/or fellow.     Alma Goodrich MD  "

## 2022-08-25 ENCOUNTER — OFFICE VISIT (OUTPATIENT)
Dept: OPHTHALMOLOGY | Facility: CLINIC | Age: 67
End: 2022-08-25
Payer: COMMERCIAL

## 2022-08-25 DIAGNOSIS — E11.3593 TYPE 2 DIABETES MELLITUS WITH BOTH EYES AFFECTED BY PROLIFERATIVE RETINOPATHY WITHOUT MACULAR EDEMA, WITH LONG-TERM CURRENT USE OF INSULIN: ICD-10-CM

## 2022-08-25 DIAGNOSIS — Z96.1 PSEUDOPHAKIA, BOTH EYES: ICD-10-CM

## 2022-08-25 DIAGNOSIS — H35.373 EPIRETINAL MEMBRANE, BILATERAL: ICD-10-CM

## 2022-08-25 DIAGNOSIS — Z79.4 TYPE 2 DIABETES MELLITUS WITH BOTH EYES AFFECTED BY PROLIFERATIVE RETINOPATHY WITHOUT MACULAR EDEMA, WITH LONG-TERM CURRENT USE OF INSULIN: ICD-10-CM

## 2022-08-25 DIAGNOSIS — H57.03 SMALL PUPIL: ICD-10-CM

## 2022-08-25 DIAGNOSIS — H43.391 VITREOUS FLOATERS OF RIGHT EYE: ICD-10-CM

## 2022-08-25 DIAGNOSIS — H35.373 PRERETINAL FIBROSIS, BILATERAL: ICD-10-CM

## 2022-08-25 DIAGNOSIS — H40.033 ANATOMICAL NARROW ANGLE BORDERLINE GLAUCOMA OF BOTH EYES: Primary | ICD-10-CM

## 2022-08-25 PROCEDURE — 99999 PR PBB SHADOW E&M-EST. PATIENT-LVL III: ICD-10-PCS | Mod: PBBFAC,,, | Performed by: OPHTHALMOLOGY

## 2022-08-25 PROCEDURE — 4010F ACE/ARB THERAPY RXD/TAKEN: CPT | Mod: CPTII,S$GLB,, | Performed by: OPHTHALMOLOGY

## 2022-08-25 PROCEDURE — 4010F PR ACE/ARB THEARPY RXD/TAKEN: ICD-10-PCS | Mod: CPTII,S$GLB,, | Performed by: OPHTHALMOLOGY

## 2022-08-25 PROCEDURE — 99999 PR PBB SHADOW E&M-EST. PATIENT-LVL III: CPT | Mod: PBBFAC,,, | Performed by: OPHTHALMOLOGY

## 2022-08-25 PROCEDURE — 3052F HG A1C>EQUAL 8.0%<EQUAL 9.0%: CPT | Mod: CPTII,S$GLB,, | Performed by: OPHTHALMOLOGY

## 2022-08-25 PROCEDURE — 92012 INTRM OPH EXAM EST PATIENT: CPT | Mod: S$GLB,,, | Performed by: OPHTHALMOLOGY

## 2022-08-25 PROCEDURE — 1159F PR MEDICATION LIST DOCUMENTED IN MEDICAL RECORD: ICD-10-PCS | Mod: CPTII,S$GLB,, | Performed by: OPHTHALMOLOGY

## 2022-08-25 PROCEDURE — 3052F PR MOST RECENT HEMOGLOBIN A1C LEVEL 8.0 - < 9.0%: ICD-10-PCS | Mod: CPTII,S$GLB,, | Performed by: OPHTHALMOLOGY

## 2022-08-25 PROCEDURE — 1160F PR REVIEW ALL MEDS BY PRESCRIBER/CLIN PHARMACIST DOCUMENTED: ICD-10-PCS | Mod: CPTII,S$GLB,, | Performed by: OPHTHALMOLOGY

## 2022-08-25 PROCEDURE — 92012 PR EYE EXAM, EST PATIENT,INTERMED: ICD-10-PCS | Mod: S$GLB,,, | Performed by: OPHTHALMOLOGY

## 2022-08-25 PROCEDURE — 1160F RVW MEDS BY RX/DR IN RCRD: CPT | Mod: CPTII,S$GLB,, | Performed by: OPHTHALMOLOGY

## 2022-08-25 PROCEDURE — 1159F MED LIST DOCD IN RCRD: CPT | Mod: CPTII,S$GLB,, | Performed by: OPHTHALMOLOGY

## 2022-09-08 ENCOUNTER — OFFICE VISIT (OUTPATIENT)
Dept: CARDIOLOGY | Facility: CLINIC | Age: 67
End: 2022-09-08
Payer: COMMERCIAL

## 2022-09-08 VITALS
BODY MASS INDEX: 40 KG/M2 | SYSTOLIC BLOOD PRESSURE: 145 MMHG | DIASTOLIC BLOOD PRESSURE: 69 MMHG | HEIGHT: 66 IN | WEIGHT: 248.88 LBS | HEART RATE: 88 BPM

## 2022-09-08 DIAGNOSIS — I10 PRIMARY HYPERTENSION: ICD-10-CM

## 2022-09-08 DIAGNOSIS — R00.0 TACHYCARDIA: ICD-10-CM

## 2022-09-08 DIAGNOSIS — E66.01 CLASS 3 SEVERE OBESITY DUE TO EXCESS CALORIES WITH SERIOUS COMORBIDITY AND BODY MASS INDEX (BMI) OF 40.0 TO 44.9 IN ADULT: ICD-10-CM

## 2022-09-08 DIAGNOSIS — E78.5 HYPERLIPIDEMIA, UNSPECIFIED HYPERLIPIDEMIA TYPE: Primary | ICD-10-CM

## 2022-09-08 PROCEDURE — 99203 OFFICE O/P NEW LOW 30 MIN: CPT | Mod: S$GLB,,, | Performed by: STUDENT IN AN ORGANIZED HEALTH CARE EDUCATION/TRAINING PROGRAM

## 2022-09-08 PROCEDURE — 3052F HG A1C>EQUAL 8.0%<EQUAL 9.0%: CPT | Mod: CPTII,S$GLB,, | Performed by: STUDENT IN AN ORGANIZED HEALTH CARE EDUCATION/TRAINING PROGRAM

## 2022-09-08 PROCEDURE — 3078F DIAST BP <80 MM HG: CPT | Mod: CPTII,S$GLB,, | Performed by: STUDENT IN AN ORGANIZED HEALTH CARE EDUCATION/TRAINING PROGRAM

## 2022-09-08 PROCEDURE — 3078F PR MOST RECENT DIASTOLIC BLOOD PRESSURE < 80 MM HG: ICD-10-PCS | Mod: CPTII,S$GLB,, | Performed by: STUDENT IN AN ORGANIZED HEALTH CARE EDUCATION/TRAINING PROGRAM

## 2022-09-08 PROCEDURE — 4010F ACE/ARB THERAPY RXD/TAKEN: CPT | Mod: CPTII,S$GLB,, | Performed by: STUDENT IN AN ORGANIZED HEALTH CARE EDUCATION/TRAINING PROGRAM

## 2022-09-08 PROCEDURE — 3077F SYST BP >= 140 MM HG: CPT | Mod: CPTII,S$GLB,, | Performed by: STUDENT IN AN ORGANIZED HEALTH CARE EDUCATION/TRAINING PROGRAM

## 2022-09-08 PROCEDURE — 3008F PR BODY MASS INDEX (BMI) DOCUMENTED: ICD-10-PCS | Mod: CPTII,S$GLB,, | Performed by: STUDENT IN AN ORGANIZED HEALTH CARE EDUCATION/TRAINING PROGRAM

## 2022-09-08 PROCEDURE — 99999 PR PBB SHADOW E&M-EST. PATIENT-LVL V: CPT | Mod: PBBFAC,,, | Performed by: STUDENT IN AN ORGANIZED HEALTH CARE EDUCATION/TRAINING PROGRAM

## 2022-09-08 PROCEDURE — 99999 PR PBB SHADOW E&M-EST. PATIENT-LVL V: ICD-10-PCS | Mod: PBBFAC,,, | Performed by: STUDENT IN AN ORGANIZED HEALTH CARE EDUCATION/TRAINING PROGRAM

## 2022-09-08 PROCEDURE — 3008F BODY MASS INDEX DOCD: CPT | Mod: CPTII,S$GLB,, | Performed by: STUDENT IN AN ORGANIZED HEALTH CARE EDUCATION/TRAINING PROGRAM

## 2022-09-08 PROCEDURE — 3077F PR MOST RECENT SYSTOLIC BLOOD PRESSURE >= 140 MM HG: ICD-10-PCS | Mod: CPTII,S$GLB,, | Performed by: STUDENT IN AN ORGANIZED HEALTH CARE EDUCATION/TRAINING PROGRAM

## 2022-09-08 PROCEDURE — 3052F PR MOST RECENT HEMOGLOBIN A1C LEVEL 8.0 - < 9.0%: ICD-10-PCS | Mod: CPTII,S$GLB,, | Performed by: STUDENT IN AN ORGANIZED HEALTH CARE EDUCATION/TRAINING PROGRAM

## 2022-09-08 PROCEDURE — 99203 PR OFFICE/OUTPT VISIT, NEW, LEVL III, 30-44 MIN: ICD-10-PCS | Mod: S$GLB,,, | Performed by: STUDENT IN AN ORGANIZED HEALTH CARE EDUCATION/TRAINING PROGRAM

## 2022-09-08 PROCEDURE — 4010F PR ACE/ARB THEARPY RXD/TAKEN: ICD-10-PCS | Mod: CPTII,S$GLB,, | Performed by: STUDENT IN AN ORGANIZED HEALTH CARE EDUCATION/TRAINING PROGRAM

## 2022-09-08 PROCEDURE — 1159F PR MEDICATION LIST DOCUMENTED IN MEDICAL RECORD: ICD-10-PCS | Mod: CPTII,S$GLB,, | Performed by: STUDENT IN AN ORGANIZED HEALTH CARE EDUCATION/TRAINING PROGRAM

## 2022-09-08 PROCEDURE — 1159F MED LIST DOCD IN RCRD: CPT | Mod: CPTII,S$GLB,, | Performed by: STUDENT IN AN ORGANIZED HEALTH CARE EDUCATION/TRAINING PROGRAM

## 2022-09-08 NOTE — PROGRESS NOTES
PCP - Reggie Glover MD  Referring Physician:     Subjective:   Patient ID:  Ms. Angela is a 67 y/o woman with the following cv risk factors, DM-II with retinopathy, HTN, dyslipidemia, and morbid obesity who was sent by her PCP for further evaluation after recent ECG showed sinus tachycardia. At the time of ECG, patient had just been diagnosed of perianal boil which required antibiotics. 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 She reports that her BP and HR have both been WNL. She is active with her job and denies any cardiac symptoms including chest pain, shortness of breath, palpitations.     History:     Social History     Tobacco Use    Smoking status: Never    Smokeless tobacco: Never   Substance Use Topics    Alcohol use: No     Family History   Problem Relation Age of Onset    Diabetes Mother     Hypertension Mother     Diabetes Father     Hypertension Father     Stroke Father     Diabetes Sister     Diabetes Brother     Cancer Brother         prostate    Prostate cancer Brother     Diabetes Sister     Diabetes Sister     Diabetes Sister     Diabetes Brother     Stroke Brother     Diabetes Brother     Diabetes Brother     Breast cancer Neg Hx     Colon cancer Neg Hx     Ovarian cancer Neg Hx     Amblyopia Neg Hx     Blindness Neg Hx     Cataracts Neg Hx     Glaucoma Neg Hx     Macular degeneration Neg Hx     Retinal detachment Neg Hx     Strabismus Neg Hx     Calcium disorder Neg Hx     Osteoporosis Neg Hx        Meds:   Review of patient's allergies indicates:  No Known Allergies    Current Outpatient Medications:     acetaminophen (TYLENOL) 650 MG TbSR, Take 1 tablet (650 mg total) by mouth every 8 (eight) hours as needed (pain)., Disp: 30 tablet, Rfl: 0    amitriptyline (ELAVIL) 25 MG tablet, TAKE 2 TABLETS(50 MG) BY MOUTH EVERY EVENING, Disp: 60 tablet, Rfl: 11    amLODIPine (NORVASC) 10 MG tablet, TAKE 1 TABLET(10 MG) BY MOUTH EVERY DAY, Disp: 90 tablet, Rfl: 3     aspirin 81 MG Chew, Take 81 mg by mouth once daily., Disp: , Rfl:     atorvastatin (LIPITOR) 20 MG tablet, TAKE 1 TABLET(20 MG) BY MOUTH EVERY DAY, Disp: 90 tablet, Rfl: 2    betamethasone dipropionate 0.05 % cream, Apply topically 2 (two) times daily., Disp: 45 g, Rfl: 2    calcium-vitamin D3 500 mg(1,250mg) -200 unit per tablet, Take 1 tablet by mouth once daily., Disp: , Rfl:     ciclopirox (PENLAC) 8 % Soln, Apply topically nightly., Disp: 6.6 mL, Rfl: 2    econazole nitrate 1 % cream, Apply topically 2 (two) times daily., Disp: 30 g, Rfl: 2    empagliflozin (JARDIANCE) 25 mg tablet, TAKE 1 TABLET BY MOUTH DAILY, Disp: 90 tablet, Rfl: 2    esomeprazole (NEXIUM) 40 MG capsule, Take 1 capsule (40 mg total) by mouth before breakfast., Disp: 90 capsule, Rfl: 3    flash glucose scanning reader (FREESTYLE SAV 14 DAY READER) Misc, Use as directed, e 11.65 sav 2, Disp: 1 each, Rfl: 2    FREESTYLE SAV 14 DAY SENSOR Kit, CHANGE EVERY 14 DAYS, Disp: 2 kit, Rfl: 11    insulin lispro (HUMALOG KWIKPEN INSULIN) 100 unit/mL pen, Use as directed based on sugar level, 150-200+2, 201-250+4, 251-300+6, 301-350+8, >350+10. Max daily 30 units., Disp: 1 Box, Rfl: 6    losartan (COZAAR) 100 MG tablet, Take 1 tablet (100 mg total) by mouth once daily., Disp: 90 tablet, Rfl: 3    metFORMIN (GLUCOPHAGE-XR) 500 MG ER 24hr tablet, Take 2 tablets (1,000 mg total) by mouth 2 (two) times daily with meals., Disp: 360 tablet, Rfl: 3    metoprolol succinate (TOPROL-XL) 50 MG 24 hr tablet, TAKE 1 TABLET BY MOUTH EVERY DAY, Disp: 90 tablet, Rfl: 3    multivitamin (THERAGRAN) per tablet, Take 1 tablet by mouth once daily., Disp: , Rfl:     ondansetron (ZOFRAN) 4 MG tablet, Take 1 tablet (4 mg total) by mouth 2 (two) times daily as needed for Nausea., Disp: 20 tablet, Rfl: 0    semaglutide (OZEMPIC) 2 mg/dose (8 mg/3 mL) PnIj, Inject 2 mg into the skin every 7 days., Disp: 9 mL, Rfl: 3    traMADoL (ULTRAM) 50 mg tablet, Take 1 tablet (50 mg  "total) by mouth every 6 (six) hours as needed for Pain., Disp: 20 tablet, Rfl: 0    urea 20 % Crea, Apply 1 application topically once daily. To dry skin on the feet., Disp: 75 g, Rfl: 10  No current facility-administered medications for this visit.    Facility-Administered Medications Ordered in Other Visits:     0.9%  NaCl infusion, , Intravenous, Continuous, Aristeo Bueno MD, Last Rate: 200 mL/hr at 02/17/21 0754, New Bag at 02/17/21 0754    Review of Systems   All other systems reviewed and are negative.    Objective:   BP (!) 145/69 (BP Location: Left arm, Patient Position: Sitting, BP Method: Large (Automatic))   Pulse 88   Ht 5' 6" (1.676 m)   Wt 112.9 kg (248 lb 14.4 oz)   BMI 40.17 kg/m²   Physical Exam  Vitals reviewed.   Constitutional:       Appearance: Normal appearance.   HENT:      Head: Normocephalic and atraumatic.   Cardiovascular:      Rate and Rhythm: Normal rate and regular rhythm.      Pulses: Normal pulses.      Heart sounds: Murmur heard.   Systolic murmur is present with a grade of 2/6.   Pulmonary:      Effort: Pulmonary effort is normal.      Breath sounds: Normal breath sounds.   Musculoskeletal:      Cervical back: Normal range of motion and neck supple.      Right lower leg: No edema.      Left lower leg: No edema.   Skin:     General: Skin is warm.   Neurological:      Mental Status: She is alert.     Labs:     Lab Results   Component Value Date     (L) 08/08/2022    K 4.7 08/08/2022    CL 98 08/08/2022    CO2 27 08/08/2022    BUN 10 08/08/2022    CREATININE 1.0 08/08/2022    ANIONGAP 9 08/08/2022     Lab Results   Component Value Date    HGBA1C 8.1 (H) 08/08/2022     No results found for: BNP, BNPTRIAGEBLO    Lab Results   Component Value Date    WBC 13.74 (H) 08/08/2022    HGB 11.4 (L) 08/08/2022    HCT 37.3 08/08/2022     08/08/2022    GRAN 10.8 (H) 08/08/2022    GRAN 78.9 (H) 08/08/2022     Lab Results   Component Value Date    CHOL 119 (L) 03/19/2022    " HDL 54 03/19/2022    LDLCALC 49.4 (L) 03/19/2022    TRIG 78 03/19/2022       Lab Results   Component Value Date     (L) 08/08/2022    K 4.7 08/08/2022    CL 98 08/08/2022    CO2 27 08/08/2022    BUN 10 08/08/2022    CREATININE 1.0 08/08/2022    ANIONGAP 9 08/08/2022     Lab Results   Component Value Date    HGBA1C 8.1 (H) 08/08/2022     No results found for: BNP, BNPTRIAGEBLO Lab Results   Component Value Date    WBC 13.74 (H) 08/08/2022    HGB 11.4 (L) 08/08/2022    HCT 37.3 08/08/2022     08/08/2022    GRAN 10.8 (H) 08/08/2022    GRAN 78.9 (H) 08/08/2022     Lab Results   Component Value Date    CHOL 119 (L) 03/19/2022    HDL 54 03/19/2022    LDLCALC 49.4 (L) 03/19/2022    TRIG 78 03/19/2022                Cardiovascular Imaging:   PET 10/26/12  CONCLUSIONS: NO CLINICALLY SIGNIFICANT STRESS INDUCED PERFUSION DEFECTS as assessed with PET perfusion imaging.   1. There is no evidence of a discrete hemodynamically significant coronary stenosis.   2. These results suggest mild endothelial dysfunction due to elevated cholesterol, high blood pressure, diabetes and mild, diffuse, non-obstructive coronary atherosclerosis without clinically significant, localized perfusion defects.   3. Resting wall motion is physiologic. Stress wall motion is physiologic.   4. LV function is normal at rest and stress.  (normal is >= 51%)   5. The ventricular volumes are normal at rest and stress.   6. The extracardiac distribution of radioactivity is normal.   7. There was no previous study available to compare.     Assessment & Plan:     1. Hyperlipidemia, unspecified hyperlipidemia type    2. Tachycardia    3. Primary hypertension    4. Class 3 severe obesity due to excess calories with serious comorbidity and body mass index (BMI) of 40.0 to 44.9 in adult      Ms. Angela is a 67 yo female with HTN, HLD, DM2 referred for evaluation of sinus tachycardia. Sinus tachycardia is likely secondary to infection at the time of ECG.  Since completing course of antibiotics she has not had any more tachycardia. She has not signs of ischemic heart disease, heart failure or arrhythmias. No further workup needed at this time.     #Sinus tachycardia- resolved  - no further evaluation    #HTN  - reports SBPs 110-130s at home  - Continue current regimen: Losartan 100, Toprol 50, Amlodipine 10    #HLD, LDL at goal <70  - continue atorvastatin 20      Signed:  Amarilis Chapman M.D.  Page # [phone]  Cardiovascular Fellow  [facility]

## 2022-09-10 NOTE — PROGRESS NOTES
I have reviewed the patient's chart and the fellow's clinic note, as well as discussed the case with the fellow. I agree with the findings, assessment, and plan.      Jensen Del Real MD  Consultative Cardiology - [facility]  .

## 2022-09-26 ENCOUNTER — TELEPHONE (OUTPATIENT)
Dept: INTERNAL MEDICINE | Facility: CLINIC | Age: 67
End: 2022-09-26

## 2022-09-26 ENCOUNTER — TELEPHONE (OUTPATIENT)
Dept: SURGERY | Facility: CLINIC | Age: 67
End: 2022-09-26

## 2022-09-26 RX ORDER — FLASH GLUCOSE SENSOR
KIT MISCELLANEOUS
Qty: 2 KIT | Refills: 5 | Status: SHIPPED | OUTPATIENT
Start: 2022-09-26 | End: 2022-12-13 | Stop reason: SDUPTHER

## 2022-09-26 NOTE — PROGRESS NOTES
CRS Office Visit Followup    Referring Md:   No referring provider defined for this encounter.    SUBJECTIVE:     Chief Complaint:  Perianal abscess    History of Present Illness:   Course is as follows:  Patient is a 67 y.o. female presents with perianal cyst.  Presents with a one-week history perianal drainage and swelling.  No prior similar episodes.  Prior vaginal cyst years ago.  No fevers or chills.   She is diabetic and her sugars have recently been very poorly controlled.  Nonsmoker.  She was seen by her primary care physician and prescribed antibiotics yesterday but is yet to pick them up.  No family history of colon rectal cancer.  Regular bowel movements.  8/9/22:  Incision and drainage of right anterior perianal abscess performed in clinic with placement of a Pezzar drain.  Current status:   8/16/22:  Drain fell out a few days ago.  No fevers.  Overall feels improved.  Continues to have purulent drainage.  On exam, significant improvement.  9/27/22: drainage stopped.  Feeling well.  Normal bowel movements.    Last Colonoscopy:  10/08/2020:  Impression:           - The entire examined colon is normal on direct and                         retroflexion views.       Review of Systems:  Review of Systems   Constitutional:  Negative for chills, diaphoresis, fever, malaise/fatigue and weight loss.   HENT:  Negative for congestion.    Respiratory:  Negative for shortness of breath.    Cardiovascular:  Negative for chest pain and leg swelling.   Gastrointestinal:  Negative for abdominal pain, blood in stool, constipation, nausea and vomiting.   Genitourinary:  Negative for dysuria.   Musculoskeletal:  Negative for back pain and myalgias.   Skin:  Positive for rash.   Neurological:  Negative for dizziness and weakness.   Endo/Heme/Allergies:  Does not bruise/bleed easily.   Psychiatric/Behavioral:  Negative for depression.      OBJECTIVE:     Vital Signs (Most Recent)  BP (!) 145/62 (BP Location: Left arm, Patient  "Position: Sitting, BP Method: Large (Automatic))   Pulse 75   Ht 5' 6" (1.676 m)   Wt 102.4 kg (225 lb 11.2 oz)   BMI 36.43 kg/m²     Physical Exam:  General: Black or  female in no distress   Neuro: alert and oriented x 4.  Moves all extremities.     HEENT: no icterus.  Trachea midline  Respiratory: respirations are even and unlabored  Cardiac: regular rate  Abdomen:   Soft, nontender, no masses  Extremities: Warm dry and intact  Skin: no rashes  Anorectal:  External exam was significantly decreased induration.  Opening has closed completely.  No further drainage.      Labs:  Labs from 08/08/2022 personally reviewed demonstrates slight leukocytosis of 13.7.  Normal H&H.  Normal renal function.  Albumin 3.4.    Imaging:  No recent imaging      ASSESSMENT/PLAN:     Diagnoses and all orders for this visit:    Perianal abscess        67 y.o. female with   Large right perianal abscess status post incision and drainage on 08/09/2022..   She appears to have completely healed.  She can follow up with me any future concerns or issues.      TREY Shepard MD, FACS, FASCRS  Staff Surgeon  Colon & Rectal Surgery          "

## 2022-09-26 NOTE — TELEPHONE ENCOUNTER
----- Message from Renea Saleem sent at 9/26/2022  1:34 PM CDT -----  Regarding: rx needed  Contact: patient [phone]  Requesting Rx for Freestyle Jaymie II.  Please send to    [id number] - [place], [place] - [address] AT [facility] [address]  [address]  [facility] [address]  Phone: [phone] Fax: [phone]

## 2022-09-27 ENCOUNTER — OFFICE VISIT (OUTPATIENT)
Dept: SURGERY | Facility: CLINIC | Age: 67
End: 2022-09-27
Payer: COMMERCIAL

## 2022-09-27 VITALS
BODY MASS INDEX: 36.27 KG/M2 | SYSTOLIC BLOOD PRESSURE: 145 MMHG | HEART RATE: 75 BPM | DIASTOLIC BLOOD PRESSURE: 62 MMHG | WEIGHT: 225.69 LBS | HEIGHT: 66 IN

## 2022-09-27 DIAGNOSIS — K61.0 PERIANAL ABSCESS: Primary | ICD-10-CM

## 2022-09-27 PROCEDURE — 1160F PR REVIEW ALL MEDS BY PRESCRIBER/CLIN PHARMACIST DOCUMENTED: ICD-10-PCS | Mod: CPTII,S$GLB,, | Performed by: COLON & RECTAL SURGERY

## 2022-09-27 PROCEDURE — 3008F PR BODY MASS INDEX (BMI) DOCUMENTED: ICD-10-PCS | Mod: CPTII,S$GLB,, | Performed by: COLON & RECTAL SURGERY

## 2022-09-27 PROCEDURE — 3052F PR MOST RECENT HEMOGLOBIN A1C LEVEL 8.0 - < 9.0%: ICD-10-PCS | Mod: CPTII,S$GLB,, | Performed by: COLON & RECTAL SURGERY

## 2022-09-27 PROCEDURE — 1160F RVW MEDS BY RX/DR IN RCRD: CPT | Mod: CPTII,S$GLB,, | Performed by: COLON & RECTAL SURGERY

## 2022-09-27 PROCEDURE — 4010F ACE/ARB THERAPY RXD/TAKEN: CPT | Mod: CPTII,S$GLB,, | Performed by: COLON & RECTAL SURGERY

## 2022-09-27 PROCEDURE — 3078F PR MOST RECENT DIASTOLIC BLOOD PRESSURE < 80 MM HG: ICD-10-PCS | Mod: CPTII,S$GLB,, | Performed by: COLON & RECTAL SURGERY

## 2022-09-27 PROCEDURE — 99212 PR OFFICE/OUTPT VISIT, EST, LEVL II, 10-19 MIN: ICD-10-PCS | Mod: S$GLB,,, | Performed by: COLON & RECTAL SURGERY

## 2022-09-27 PROCEDURE — 3288F FALL RISK ASSESSMENT DOCD: CPT | Mod: CPTII,S$GLB,, | Performed by: COLON & RECTAL SURGERY

## 2022-09-27 PROCEDURE — 1159F MED LIST DOCD IN RCRD: CPT | Mod: CPTII,S$GLB,, | Performed by: COLON & RECTAL SURGERY

## 2022-09-27 PROCEDURE — 3077F PR MOST RECENT SYSTOLIC BLOOD PRESSURE >= 140 MM HG: ICD-10-PCS | Mod: CPTII,S$GLB,, | Performed by: COLON & RECTAL SURGERY

## 2022-09-27 PROCEDURE — 1101F PT FALLS ASSESS-DOCD LE1/YR: CPT | Mod: CPTII,S$GLB,, | Performed by: COLON & RECTAL SURGERY

## 2022-09-27 PROCEDURE — 99999 PR PBB SHADOW E&M-EST. PATIENT-LVL IV: CPT | Mod: PBBFAC,,, | Performed by: COLON & RECTAL SURGERY

## 2022-09-27 PROCEDURE — 1101F PR PT FALLS ASSESS DOC 0-1 FALLS W/OUT INJ PAST YR: ICD-10-PCS | Mod: CPTII,S$GLB,, | Performed by: COLON & RECTAL SURGERY

## 2022-09-27 PROCEDURE — 3077F SYST BP >= 140 MM HG: CPT | Mod: CPTII,S$GLB,, | Performed by: COLON & RECTAL SURGERY

## 2022-09-27 PROCEDURE — 1159F PR MEDICATION LIST DOCUMENTED IN MEDICAL RECORD: ICD-10-PCS | Mod: CPTII,S$GLB,, | Performed by: COLON & RECTAL SURGERY

## 2022-09-27 PROCEDURE — 3008F BODY MASS INDEX DOCD: CPT | Mod: CPTII,S$GLB,, | Performed by: COLON & RECTAL SURGERY

## 2022-09-27 PROCEDURE — 99212 OFFICE O/P EST SF 10 MIN: CPT | Mod: S$GLB,,, | Performed by: COLON & RECTAL SURGERY

## 2022-09-27 PROCEDURE — 3288F PR FALLS RISK ASSESSMENT DOCUMENTED: ICD-10-PCS | Mod: CPTII,S$GLB,, | Performed by: COLON & RECTAL SURGERY

## 2022-09-27 PROCEDURE — 3078F DIAST BP <80 MM HG: CPT | Mod: CPTII,S$GLB,, | Performed by: COLON & RECTAL SURGERY

## 2022-09-27 PROCEDURE — 99999 PR PBB SHADOW E&M-EST. PATIENT-LVL IV: ICD-10-PCS | Mod: PBBFAC,,, | Performed by: COLON & RECTAL SURGERY

## 2022-09-27 PROCEDURE — 3052F HG A1C>EQUAL 8.0%<EQUAL 9.0%: CPT | Mod: CPTII,S$GLB,, | Performed by: COLON & RECTAL SURGERY

## 2022-09-27 PROCEDURE — 4010F PR ACE/ARB THEARPY RXD/TAKEN: ICD-10-PCS | Mod: CPTII,S$GLB,, | Performed by: COLON & RECTAL SURGERY

## 2022-10-25 DIAGNOSIS — R12 PYROSIS: ICD-10-CM

## 2022-10-25 DIAGNOSIS — K20.80 LOS ANGELES GRADE C ESOPHAGITIS: Primary | ICD-10-CM

## 2022-11-04 ENCOUNTER — TELEPHONE (OUTPATIENT)
Dept: INTERNAL MEDICINE | Facility: CLINIC | Age: 67
End: 2022-11-04

## 2022-11-06 ENCOUNTER — TELEPHONE (OUTPATIENT)
Dept: INTERNAL MEDICINE | Facility: CLINIC | Age: 67
End: 2022-11-06

## 2022-11-06 DIAGNOSIS — E11.65 TYPE 2 DIABETES MELLITUS WITH HYPERGLYCEMIA, WITHOUT LONG-TERM CURRENT USE OF INSULIN: Primary | ICD-10-CM

## 2022-11-12 ENCOUNTER — LAB VISIT (OUTPATIENT)
Dept: LAB | Facility: HOSPITAL | Age: 67
End: 2022-11-12
Attending: FAMILY MEDICINE
Payer: COMMERCIAL

## 2022-11-12 DIAGNOSIS — E11.65 TYPE 2 DIABETES MELLITUS WITH HYPERGLYCEMIA, WITHOUT LONG-TERM CURRENT USE OF INSULIN: ICD-10-CM

## 2022-11-12 LAB
ESTIMATED AVG GLUCOSE: 174 MG/DL (ref 68–131)
HBA1C MFR BLD: 7.7 % (ref 4–5.6)

## 2022-11-12 PROCEDURE — 36415 COLL VENOUS BLD VENIPUNCTURE: CPT | Performed by: FAMILY MEDICINE

## 2022-11-12 PROCEDURE — 83036 HEMOGLOBIN GLYCOSYLATED A1C: CPT | Performed by: FAMILY MEDICINE

## 2022-11-28 ENCOUNTER — OFFICE VISIT (OUTPATIENT)
Dept: OBSTETRICS AND GYNECOLOGY | Facility: CLINIC | Age: 67
End: 2022-11-28

## 2022-11-28 VITALS — WEIGHT: 250.31 LBS | BODY MASS INDEX: 40.4 KG/M2 | DIASTOLIC BLOOD PRESSURE: 80 MMHG | SYSTOLIC BLOOD PRESSURE: 144 MMHG

## 2022-11-28 DIAGNOSIS — Z12.4 CERVICAL CANCER SCREENING: ICD-10-CM

## 2022-11-28 DIAGNOSIS — Z01.419 ENCOUNTER FOR GYNECOLOGICAL EXAMINATION WITHOUT ABNORMAL FINDING: Primary | ICD-10-CM

## 2022-11-28 DIAGNOSIS — I10 PRIMARY HYPERTENSION: ICD-10-CM

## 2022-11-28 DIAGNOSIS — H35.033 HYPERTENSIVE RETINOPATHY OF BOTH EYES: ICD-10-CM

## 2022-11-28 DIAGNOSIS — E78.5 HYPERLIPIDEMIA, UNSPECIFIED HYPERLIPIDEMIA TYPE: ICD-10-CM

## 2022-11-28 DIAGNOSIS — E11.42 DIABETIC POLYNEUROPATHY ASSOCIATED WITH TYPE 2 DIABETES MELLITUS: ICD-10-CM

## 2022-11-28 DIAGNOSIS — K21.9 GASTROESOPHAGEAL REFLUX DISEASE, UNSPECIFIED WHETHER ESOPHAGITIS PRESENT: ICD-10-CM

## 2022-11-28 DIAGNOSIS — Z98.84 HISTORY OF BARIATRIC SURGERY: ICD-10-CM

## 2022-11-28 PROCEDURE — 4010F PR ACE/ARB THEARPY RXD/TAKEN: ICD-10-PCS | Mod: ,,, | Performed by: OBSTETRICS & GYNECOLOGY

## 2022-11-28 PROCEDURE — 99397 PR PREVENTIVE VISIT,EST,65 & OVER: ICD-10-PCS | Mod: S$PBB,,, | Performed by: OBSTETRICS & GYNECOLOGY

## 2022-11-28 PROCEDURE — 88141 CYTOPATH C/V INTERPRET: CPT | Mod: ,,, | Performed by: PATHOLOGY

## 2022-11-28 PROCEDURE — 99999 PR PBB SHADOW E&M-EST. PATIENT-LVL IV: ICD-10-PCS | Mod: PBBFAC,,, | Performed by: OBSTETRICS & GYNECOLOGY

## 2022-11-28 PROCEDURE — 4010F ACE/ARB THERAPY RXD/TAKEN: CPT | Mod: ,,, | Performed by: OBSTETRICS & GYNECOLOGY

## 2022-11-28 PROCEDURE — 88141 PR  CYTOPATH CERV/VAG INTERPRET: ICD-10-PCS | Mod: ,,, | Performed by: PATHOLOGY

## 2022-11-28 PROCEDURE — 99999 PR PBB SHADOW E&M-EST. PATIENT-LVL IV: CPT | Mod: PBBFAC,,, | Performed by: OBSTETRICS & GYNECOLOGY

## 2022-11-28 PROCEDURE — 3060F PR POS MICROALBUMINURIA RESULT DOCUMENTED/REVIEW: ICD-10-PCS | Mod: ,,, | Performed by: OBSTETRICS & GYNECOLOGY

## 2022-11-28 PROCEDURE — 87624 HPV HI-RISK TYP POOLED RSLT: CPT | Performed by: OBSTETRICS & GYNECOLOGY

## 2022-11-28 PROCEDURE — 3060F POS MICROALBUMINURIA REV: CPT | Mod: ,,, | Performed by: OBSTETRICS & GYNECOLOGY

## 2022-11-28 PROCEDURE — 3066F NEPHROPATHY DOC TX: CPT | Mod: ,,, | Performed by: OBSTETRICS & GYNECOLOGY

## 2022-11-28 PROCEDURE — 88175 CYTOPATH C/V AUTO FLUID REDO: CPT | Performed by: PATHOLOGY

## 2022-11-28 PROCEDURE — 99397 PER PM REEVAL EST PAT 65+ YR: CPT | Mod: S$PBB,,, | Performed by: OBSTETRICS & GYNECOLOGY

## 2022-11-28 PROCEDURE — 3066F PR DOCUMENTATION OF TREATMENT FOR NEPHROPATHY: ICD-10-PCS | Mod: ,,, | Performed by: OBSTETRICS & GYNECOLOGY

## 2022-12-02 LAB
HPV HR 12 DNA SPEC QL NAA+PROBE: NEGATIVE
HPV16 AG SPEC QL: NEGATIVE
HPV18 DNA SPEC QL NAA+PROBE: NEGATIVE

## 2022-12-06 LAB
FINAL PATHOLOGIC DIAGNOSIS: NORMAL
Lab: NORMAL

## 2022-12-06 NOTE — PROGRESS NOTES
CHIEF COMPLAINT: Type 2 Diabetes     HPI: Mrs. Aaliyah Angela is a 67 y.o. female who was diagnosed with gestational diabetes age 22s, Type 2 DM age 32s.  Has h/o gastric sleeve 2015.-in [place]   Past Medical History:   Diagnosis Date    Allergy     Anatomical narrow angle borderline glaucoma     AR (allergic rhinitis)     Arthritis     CAD (coronary artery disease) 04/22/2013    Non obstructive, 2012 PET     Diabetes mellitus type II     with retinopathy    Diabetic retinopathy     Epiretinal membrane, both eyes     Hyperlipidemia     Hypertensive retinopathy of both eyes     Lumbar disc disease     Migraines     Morbid obesity 10/03/2012    BRADFORD (obstructive sleep apnea) 08/04/2015    Proliferative diabetic retinopathy(362.02)     PVD (peripheral vascular disease) 04/22/2013    Carotid US 1-39% bilat 2012     Rupture of right Achilles tendon     Unspecified essential hypertension     Unspecified vitamin D deficiency     Vitreous hemorrhage of left eye      Last seen by me in 2022 and is now being seen by me again today.   Has c/o UTI x 2 weeks  Admits to not drinking enough water.   Uses reader, now has kimo 2   A1c improving, 7.7% from 7.9%   OA-no recent steroids      Lab Results   Component Value Date    HGBA1C 7.7 (H) 11/12/2022     Has f/u with endocrinology for pth, calcium -hyperparathyroidism-annually  -last pth normal    On MDI injections 3 x a day -correction scale   Testing 4 x a day max    Highest 300s mg/dl  Lowest 60s, 70s mg/dl  Patient is willing and able to use the device  Demonstrated an understanding of the technology and is motivated to use CGM  Patient expected to adhere to a comprehensive diabetes treatment plan and patient has adequate medical supervision  Multiple impaired awareness of hypoglycemia (hypoglycemia unawareness)  Works 40 hours, walking 5 x a week    Current meds: ozempic 2 mg weekly, metformin 1000 mg bid, jardiance 25 mg daily, humalog correction scale 150-200+2, etc  -self  "adjustment per correction scale    Diabetes Management Status    Statin: Taking  ACE/ARB: Taking    Screening or Prevention Patient's value Goal Complete/Controlled?   HgA1C Testing and Control   Lab Results   Component Value Date    HGBA1C 7.7 (H) 11/12/2022      Annually/Less than 8% No   Lipid profile : 03/19/2022 Annually No   LDL control Lab Results   Component Value Date    LDLCALC 49.4 (L) 03/19/2022    Annually/Less than 100 mg/dl  No   Nephropathy screening Lab Results   Component Value Date    LABMICR 16.0 11/19/2021     Lab Results   Component Value Date    PROTEINUA Negative 06/24/2020    Annually Yes   Blood pressure BP Readings from Last 1 Encounters:   12/13/22 132/68    Less than 140/90 No   Dilated retinal exam : 08/25/2022 Annually Yes   Foot exam   : 03/07/2022 Annually Yes     Cataract removal sx (L) eye  Then (R) eye     REVIEW OF SYSTEMS  General: no weakness, fatigue, + weight changes  Eyes: no double or blurred vision, eye pain, or redness; Last Eye Exam 2021, h/o cataract sx, retinal/glaucoma specialist  Cardiovascular:  No chest pain, palpitations, +trace edema-BLE, or murmurs.   Respiratory: no cough or dyspnea.   GI/: + heartburn, nausea, or changes in bowel patterns; good appetite. +GERD-nexium and pepcid, off zantac, +UTI  Skin: no rashes, dryness, itching, or reactions at insulin injection sites.  Neuro: + numbness, tingling-off gabapentin, on amitriptyline,  tremors, or vertigo. +OA in knees  Endocrine: no polyuria, polydipsia, polyphagia, heat or cold intolerance.     Vital Signs  /68 (BP Location: Left arm, Patient Position: Sitting, BP Method: Large (Manual))   Pulse 86   Ht 5' 6" (1.676 m)   Wt 112.5 kg (248 lb 0.3 oz)   SpO2 98%   BMI 40.03 kg/m²     Hemoglobin A1C   Date Value Ref Range Status   11/12/2022 7.7 (H) 4.0 - 5.6 % Final     Comment:     ADA Screening Guidelines:  5.7-6.4%  Consistent with prediabetes  >or=6.5%  Consistent with diabetes    High levels of " fetal hemoglobin interfere with the HbA1C  assay. Heterozygous hemoglobin variants (HbS, HgC, etc)do  not significantly interfere with this assay.   However, presence of multiple variants may affect accuracy.     08/08/2022 8.1 (H) 4.0 - 5.6 % Final     Comment:     ADA Screening Guidelines:  5.7-6.4%  Consistent with prediabetes  >or=6.5%  Consistent with diabetes    High levels of fetal hemoglobin interfere with the HbA1C  assay. Heterozygous hemoglobin variants (HbS, HgC, etc)do  not significantly interfere with this assay.   However, presence of multiple variants may affect accuracy.     03/19/2022 7.9 (H) 4.0 - 5.6 % Final     Comment:     ADA Screening Guidelines:  5.7-6.4%  Consistent with prediabetes  >or=6.5%  Consistent with diabetes    High levels of fetal hemoglobin interfere with the HbA1C  assay. Heterozygous hemoglobin variants (HbS, HgC, etc)do  not significantly interfere with this assay.   However, presence of multiple variants may affect accuracy.          Chemistry        Component Value Date/Time     (L) 08/08/2022 1536    K 4.7 08/08/2022 1536    CL 98 08/08/2022 1536    CO2 27 08/08/2022 1536    BUN 10 08/08/2022 1536    CREATININE 1.0 08/08/2022 1536     (H) 08/08/2022 1536        Component Value Date/Time    CALCIUM 9.6 08/08/2022 1536    ALKPHOS 70 08/08/2022 1536    AST 21 08/08/2022 1536    ALT 22 08/08/2022 1536    BILITOT 0.7 08/08/2022 1536    ESTGFRAFRICA >60.0 03/19/2022 0823    EGFRNONAA >60.0 03/19/2022 0823           Lab Results   Component Value Date    TSH 0.987 05/05/2021      Lab Results   Component Value Date    CHOL 119 (L) 03/19/2022    CHOL 120 05/05/2021    CHOL 140 06/24/2020     Lab Results   Component Value Date    HDL 54 03/19/2022    HDL 53 05/05/2021    HDL 52 06/24/2020     Lab Results   Component Value Date    LDLCALC 49.4 (L) 03/19/2022    LDLCALC 53.4 (L) 05/05/2021    LDLCALC 68.0 06/24/2020     Lab Results   Component Value Date    TRIG 78  03/19/2022    TRIG 68 05/05/2021    TRIG 100 06/24/2020     Lab Results   Component Value Date    CHOLHDL 45.4 03/19/2022    CHOLHDL 44.2 05/05/2021    CHOLHDL 37.1 06/24/2020       PHYSICAL EXAMINATION  Constitutional: Appears well, no distress  Neck: Supple, trachea midline.   Respiratory: No wheezes, even and unlabored.  Cardiovascular: deferred  Lymph: no BLE edema  Skin: warm and dry; no injection site reactions, no acanthosis nigracans observed.  Neuro:patient alert and cooperative, normal affect; steady gait.    Diabetes Foot Exam: +fungal infection-nails    Assessment/Plan  1. 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 by proliferative retinopathy without macular edema, without long-term current use of insulin  Hemoglobin A1C    Basic Metabolic Panel      2. BRADFORD on CPAP        3. Nuclear sclerotic cataract of right eye        4. PVD (peripheral vascular disease)        5. Primary osteoarthritis involving multiple joints        6. Primary hypertension  Microalbumin/Creatinine Ratio, Urine      7. Diabetic polyneuropathy associated with type 2 diabetes mellitus        8. Hyperlipidemia, unspecified hyperlipidemia type        9. Class 3 severe obesity due to excess calories with serious comorbidity and body mass index (BMI) of 40.0 to 44.9 in adult        10. Coronary artery disease involving native coronary artery of native heart without angina pectoris        11. Type 2 diabetes mellitus with hyperglycemia, without long-term current use of insulin  semaglutide (OZEMPIC) 2 mg/dose (8 mg/3 mL) PnIj      12. Acute cystitis without hematuria  Urinalysis      1-12. Reviewed above  Ua and urine mac today in clinic  A1c prior to next appt   F/u in 4 months   Continue regimen above -watch jardiance  Be sure to drink plenty of water, at least 84 oz   A1c goal less than 7%   Send refills   Jaymie 2 use- bring reader each visit for uploads         FOLLOW UP  In 3.5 months

## 2022-12-13 ENCOUNTER — OFFICE VISIT (OUTPATIENT)
Dept: INTERNAL MEDICINE | Facility: CLINIC | Age: 67
End: 2022-12-13

## 2022-12-13 VITALS
HEART RATE: 86 BPM | WEIGHT: 248 LBS | DIASTOLIC BLOOD PRESSURE: 68 MMHG | SYSTOLIC BLOOD PRESSURE: 132 MMHG | HEIGHT: 66 IN | BODY MASS INDEX: 39.86 KG/M2 | OXYGEN SATURATION: 98 %

## 2022-12-13 DIAGNOSIS — M15.9 PRIMARY OSTEOARTHRITIS INVOLVING MULTIPLE JOINTS: ICD-10-CM

## 2022-12-13 DIAGNOSIS — G47.33 OSA ON CPAP: ICD-10-CM

## 2022-12-13 DIAGNOSIS — N30.00 ACUTE CYSTITIS WITHOUT HEMATURIA: ICD-10-CM

## 2022-12-13 DIAGNOSIS — H25.11 NUCLEAR SCLEROTIC CATARACT OF RIGHT EYE: ICD-10-CM

## 2022-12-13 DIAGNOSIS — I10 PRIMARY HYPERTENSION: ICD-10-CM

## 2022-12-13 DIAGNOSIS — E11.3593 TYPE 2 DIABETES MELLITUS WITH BOTH EYES AFFECTED BY PROLIFERATIVE RETINOPATHY WITHOUT MACULAR EDEMA, WITHOUT LONG-TERM CURRENT USE OF INSULIN: Primary | ICD-10-CM

## 2022-12-13 DIAGNOSIS — E66.01 CLASS 3 SEVERE OBESITY DUE TO EXCESS CALORIES WITH SERIOUS COMORBIDITY AND BODY MASS INDEX (BMI) OF 40.0 TO 44.9 IN ADULT: ICD-10-CM

## 2022-12-13 DIAGNOSIS — E78.5 HYPERLIPIDEMIA, UNSPECIFIED HYPERLIPIDEMIA TYPE: ICD-10-CM

## 2022-12-13 DIAGNOSIS — E11.42 DIABETIC POLYNEUROPATHY ASSOCIATED WITH TYPE 2 DIABETES MELLITUS: ICD-10-CM

## 2022-12-13 DIAGNOSIS — I73.9 PVD (PERIPHERAL VASCULAR DISEASE): ICD-10-CM

## 2022-12-13 DIAGNOSIS — E11.65 TYPE 2 DIABETES MELLITUS WITH HYPERGLYCEMIA, WITHOUT LONG-TERM CURRENT USE OF INSULIN: ICD-10-CM

## 2022-12-13 DIAGNOSIS — I25.10 CORONARY ARTERY DISEASE INVOLVING NATIVE CORONARY ARTERY OF NATIVE HEART WITHOUT ANGINA PECTORIS: ICD-10-CM

## 2022-12-13 LAB
ALBUMIN/CREAT UR: 125.7 UG/MG (ref 0–30)
BACTERIA #/AREA URNS AUTO: ABNORMAL /HPF
BILIRUB UR QL STRIP: NEGATIVE
CAOX CRY UR QL COMP ASSIST: ABNORMAL
CLARITY UR REFRACT.AUTO: CLEAR
COLOR UR AUTO: YELLOW
CREAT UR-MCNC: 35 MG/DL (ref 15–325)
GLUCOSE UR QL STRIP: ABNORMAL
HGB UR QL STRIP: ABNORMAL
KETONES UR QL STRIP: NEGATIVE
LEUKOCYTE ESTERASE UR QL STRIP: ABNORMAL
MICROALBUMIN UR DL<=1MG/L-MCNC: 44 UG/ML
MICROSCOPIC COMMENT: ABNORMAL
NITRITE UR QL STRIP: NEGATIVE
PH UR STRIP: 6 [PH] (ref 5–8)
PROT UR QL STRIP: NEGATIVE
RBC #/AREA URNS AUTO: 3 /HPF (ref 0–4)
SP GR UR STRIP: 1.02 (ref 1–1.03)
SQUAMOUS #/AREA URNS AUTO: 1 /HPF
URN SPEC COLLECT METH UR: ABNORMAL
WBC #/AREA URNS AUTO: >100 /HPF (ref 0–5)
WBC CLUMPS UR QL AUTO: ABNORMAL
YEAST UR QL AUTO: ABNORMAL

## 2022-12-13 PROCEDURE — 99214 PR OFFICE/OUTPT VISIT, EST, LEVL IV, 30-39 MIN: ICD-10-PCS | Mod: S$PBB,,, | Performed by: NURSE PRACTITIONER

## 2022-12-13 PROCEDURE — 82570 ASSAY OF URINE CREATININE: CPT | Performed by: NURSE PRACTITIONER

## 2022-12-13 PROCEDURE — 99999 PR PBB SHADOW E&M-EST. PATIENT-LVL IV: CPT | Mod: PBBFAC,,, | Performed by: NURSE PRACTITIONER

## 2022-12-13 PROCEDURE — 99214 OFFICE O/P EST MOD 30 MIN: CPT | Mod: S$PBB,,, | Performed by: NURSE PRACTITIONER

## 2022-12-13 PROCEDURE — 82043 UR ALBUMIN QUANTITATIVE: CPT | Performed by: NURSE PRACTITIONER

## 2022-12-13 PROCEDURE — 81001 URINALYSIS AUTO W/SCOPE: CPT | Performed by: NURSE PRACTITIONER

## 2022-12-13 PROCEDURE — 4010F PR ACE/ARB THEARPY RXD/TAKEN: ICD-10-PCS | Mod: ,,, | Performed by: NURSE PRACTITIONER

## 2022-12-13 PROCEDURE — 4010F ACE/ARB THERAPY RXD/TAKEN: CPT | Mod: ,,, | Performed by: NURSE PRACTITIONER

## 2022-12-13 PROCEDURE — 87086 URINE CULTURE/COLONY COUNT: CPT | Performed by: NURSE PRACTITIONER

## 2022-12-13 PROCEDURE — 99999 PR PBB SHADOW E&M-EST. PATIENT-LVL IV: ICD-10-PCS | Mod: PBBFAC,,, | Performed by: NURSE PRACTITIONER

## 2022-12-13 RX ORDER — CIPROFLOXACIN 500 MG/1
500 TABLET ORAL EVERY 12 HOURS
Qty: 10 TABLET | Refills: 0 | Status: SHIPPED | OUTPATIENT
Start: 2022-12-13 | End: 2023-04-13

## 2022-12-13 RX ORDER — SEMAGLUTIDE 2.68 MG/ML
2 INJECTION, SOLUTION SUBCUTANEOUS
Qty: 9 ML | Refills: 3 | Status: SHIPPED | OUTPATIENT
Start: 2022-12-13 | End: 2023-03-16 | Stop reason: SDUPTHER

## 2022-12-13 RX ORDER — FLASH GLUCOSE SENSOR
KIT MISCELLANEOUS
Qty: 2 KIT | Refills: 5 | Status: SHIPPED | OUTPATIENT
Start: 2022-12-13 | End: 2023-01-11

## 2022-12-13 NOTE — Clinical Note
OA worsening- may need DMV form for handicap sign for car next month when she sees you  Still using kimo 2 A1c slowly coming down  Having UTI- doing UA in office today Not drinking enough water

## 2022-12-13 NOTE — PATIENT INSTRUCTIONS
Follow up in 4 months w/ Irielle  A1c bmp prior to appt march /April 2023   Urine mac, urine analysis, urine cx today     Ozempic 2 mg weekly  Metformin xr 1000 mg twice a day   Jardiance 25 mg daily   Humalog as needed- correction scale       Drink plenty of water  Cipro 500 mg twice a day- 5 days -urinary tract infection     Jaymie 2 use    Lab Results   Component Value Date    HGBA1C 7.7 (H) 11/12/2022       [email or web address]  Eat fit milena  Myfitnesspal milena  [email or web address]

## 2022-12-15 LAB — BACTERIA UR CULT: NORMAL

## 2022-12-15 NOTE — PROGRESS NOTES
HISTORY OF PRESENT ILLNESS:    Aaliyah Angela is a 67 y.o. female, , No LMP recorded. Patient is postmenopausal.,  presents for a routine exam and has no complaints.    Past Medical History:   Diagnosis Date    Allergy     Anatomical narrow angle borderline glaucoma     AR (allergic rhinitis)     Arthritis     CAD (coronary artery disease) 2013    Non obstructive,  PET     Diabetes mellitus type II     with retinopathy    Diabetic retinopathy     Epiretinal membrane, both eyes     Hyperlipidemia     Hypertensive retinopathy of both eyes     Lumbar disc disease     Migraines     Morbid obesity 10/03/2012    BRADFORD (obstructive sleep apnea) 2015    Proliferative diabetic retinopathy(362.02)     PVD (peripheral vascular disease) 2013    Carotid US 1-39% bilat      Rupture of right Achilles tendon     Unspecified essential hypertension     Unspecified vitamin D deficiency     Vitreous hemorrhage of left eye        Past Surgical History:   Procedure Laterality Date    CARPAL TUNNEL RELEASE Right 2020    Procedure: RELEASE, CARPAL TUNNEL RIGHT;  Surgeon: Tameka Bran MD;  Location: [facility];  Service: Orthopedics;  Laterality: Right;    CARPAL TUNNEL RELEASE Right 2020    CATARACT EXTRACTION W/  INTRAOCULAR LENS IMPLANT Left 10/28/2020    COMPLEX ()    CATARACT EXTRACTION W/  INTRAOCULAR LENS IMPLANT Right 2021         SECTION      x3    COLONOSCOPY      COLONOSCOPY N/A 10/08/2020    Procedure: COLONOSCOPY;  Surgeon: Tima Talley MD;  [facility]  Service: Endoscopy;  Laterality: N/A;    ESOPHAGOGASTRODUODENOSCOPY N/A 10/08/2020    Procedure: EGD (ESOPHAGOGASTRODUODENOSCOPY);  Surgeon: Tima Talley MD;  [facility]  Service: Endoscopy;  Laterality: N/A;    GASTRIC BYPASS      Sleeve    INTRAOCULAR PROSTHESES INSERTION Left 10/28/2020    Procedure: INSERTION, IOL PROSTHESIS;  Surgeon: Alma  MD Kp;  Location: [facility] OR [address] [place] [address];  Service: Ophthalmology;  Laterality: Left;    INTRAOCULAR PROSTHESES INSERTION Right 02/17/2021    Procedure: INSERTION, IOL PROSTHESIS;  Surgeon: Alma Goodrich MD;  Location: [facility] OR [address] [place] [address];  Service: Ophthalmology;  Laterality: Right;    LASER PERIPHERAL IRIDOTOMY Bilateral 1/26/2017 and 2/9/2017        PANRETINAL PHOTOCOAGULATION      Both eyes    PHACOEMULSIFICATION OF CATARACT Left 10/28/2020    Procedure: PHACOEMULSIFICATION, CATARACT;  Surgeon: Alma Goodrich MD;  Location: [facility] OR [address] [place] [address];  Service: Ophthalmology;  Laterality: Left;    PHACOEMULSIFICATION OF CATARACT Right 02/17/2021    Procedure: PHACOEMULSIFICATION, CATARACT;  Surgeon: Alma Goodrich MD;  Location: [facility] OR [address] [place] [address];  Service: Ophthalmology;  Laterality: Right;    TUBAL LIGATION         MEDICATIONS AND ALLERGIES:      Current Outpatient Medications:     acetaminophen (TYLENOL) 650 MG TbSR, Take 1 tablet (650 mg total) by mouth every 8 (eight) hours as needed (pain)., Disp: 30 tablet, Rfl: 0    amitriptyline (ELAVIL) 25 MG tablet, TAKE 2 TABLETS(50 MG) BY MOUTH EVERY EVENING, Disp: 60 tablet, Rfl: 11    amLODIPine (NORVASC) 10 MG tablet, TAKE 1 TABLET(10 MG) BY MOUTH EVERY DAY, Disp: 90 tablet, Rfl: 3    aspirin 81 MG Chew, Take 81 mg by mouth once daily., Disp: , Rfl:     atorvastatin (LIPITOR) 20 MG tablet, TAKE 1 TABLET(20 MG) BY MOUTH EVERY DAY, Disp: 90 tablet, Rfl: 2    betamethasone dipropionate 0.05 % cream, Apply topically 2 (two) times daily., Disp: 45 g, Rfl: 2    calcium-vitamin D3 500 mg(1,250mg) -200 unit per tablet, Take 1 tablet by mouth once daily., Disp: , Rfl:     esomeprazole (NEXIUM) 40 MG capsule, Take 1 capsule (40 mg total) by mouth before breakfast., Disp: 90 capsule, Rfl: 3    flash glucose scanning reader ([employer]Cloud.comSTYLE SAV 14 DAY READER) Misc, Use as directed, e 11.65 sav 2, Disp: 1 each, Rfl: 2    insulin lispro (HUMALOG KWIKPEN INSULIN)  100 unit/mL pen, Use as directed based on sugar level, 150-200+2, 201-250+4, 251-300+6, 301-350+8, >350+10. Max daily 30 units., Disp: 1 Box, Rfl: 6    losartan (COZAAR) 100 MG tablet, Take 1 tablet (100 mg total) by mouth once daily., Disp: 90 tablet, Rfl: 3    metFORMIN (GLUCOPHAGE-XR) 500 MG ER 24hr tablet, Take 2 tablets (1,000 mg total) by mouth 2 (two) times daily with meals., Disp: 360 tablet, Rfl: 3    metoprolol succinate (TOPROL-XL) 50 MG 24 hr tablet, TAKE 1 TABLET BY MOUTH EVERY DAY, Disp: 90 tablet, Rfl: 3    multivitamin (THERAGRAN) per tablet, Take 1 tablet by mouth once daily., Disp: , Rfl:     ondansetron (ZOFRAN) 4 MG tablet, Take 1 tablet (4 mg total) by mouth 2 (two) times daily as needed for Nausea., Disp: 20 tablet, Rfl: 0    traMADoL (ULTRAM) 50 mg tablet, Take 1 tablet (50 mg total) by mouth every 6 (six) hours as needed for Pain., Disp: 20 tablet, Rfl: 0    urea 20 % Crea, Apply 1 application topically once daily. To dry skin on the feet. (Patient not taking: Reported on 12/13/2022), Disp: 75 g, Rfl: 10    ciprofloxacin HCl (CIPRO) 500 MG tablet, Take 1 tablet (500 mg total) by mouth every 12 (twelve) hours., Disp: 10 tablet, Rfl: 0    empagliflozin (JARDIANCE) 25 mg tablet, TAKE 1 TABLET BY MOUTH DAILY, Disp: 90 tablet, Rfl: 2    flash glucose sensor (FREESTYLE SAV 2 SENSOR) Kit, Change sensor every 14 days., Disp: 2 kit, Rfl: 5    semaglutide (OZEMPIC) 2 mg/dose (8 mg/3 mL) PnIj, Inject 2 mg into the skin every 7 days., Disp: 9 mL, Rfl: 3  No current facility-administered medications for this visit.    Facility-Administered Medications Ordered in Other Visits:     0.9%  NaCl infusion, , Intravenous, Continuous, Aristeo Bueno MD, Last Rate: 200 mL/hr at 02/17/21 0754, New Bag at 02/17/21 0754    Review of patient's allergies indicates:  No Known Allergies    Family History   Problem Relation Age of Onset    Diabetes Mother     Hypertension Mother     Diabetes Father      Hypertension Father     Stroke Father     Diabetes Sister     Diabetes Brother     Cancer Brother         prostate    Prostate cancer Brother     Diabetes Sister     Diabetes Sister     Diabetes Sister     Diabetes Brother     Stroke Brother     Diabetes Brother     Diabetes Brother     Breast cancer Neg Hx     Colon cancer Neg Hx     Ovarian cancer Neg Hx     Amblyopia Neg Hx     Blindness Neg Hx     Cataracts Neg Hx     Glaucoma Neg Hx     Macular degeneration Neg Hx     Retinal detachment Neg Hx     Strabismus Neg Hx     Calcium disorder Neg Hx     Osteoporosis Neg Hx        Social History     Socioeconomic History    Marital status: Single   Tobacco Use    Smoking status: Never    Smokeless tobacco: Never   Substance and Sexual Activity    Alcohol use: No    Drug use: No    Sexual activity: Not Currently     Birth control/protection: Post-menopausal   Social History Narrative    Work a LPN at [employer]    Single, lives alone       COMPREHENSIVE GYN HISTORY:  PAP History: Denies abnormal Paps.  Infection History: Denies STDs. Denies PID.  Benign History: Denies uterine fibroids. Denies ovarian cysts. Denies endometriosis. Denies other conditions.  Cancer History: Denies cervical cancer. Denies uterine cancer or hyperplasia. Denies ovarian cancer. Denies vulvar cancer or pre-cancer. Denies vaginal cancer or pre-cancer. Denies breast cancer. Denies colon cancer.  Sexual Activity History: Reports currently being sexually active  Menstrual History: Monthly. Mod then light flow.   Dysmenorrhea History: Reports mild dysmenorrhea.       ROS:  GENERAL: No weight changes. No swelling. No fatigue. No fever.  CARDIOVASCULAR: No chest pain. No shortness of breath. No leg cramps.   NEUROLOGICAL: No headaches. No vision changes.  BREASTS: No pain. No lumps. No discharge.  ABDOMEN: No pain. No nausea. No vomiting. No diarrhea. No constipation.  REPRODUCTIVE: No abnormal bleeding.   VULVA: No pain. No lesions. No itching.  VAGINA:  No relaxation. No itching. No odor. No discharge. No lesions.  URINARY: No incontinence. No nocturia. No frequency. No dysuria.    BP (!) 144/80 (BP Location: Left arm, Patient Position: Sitting)   Wt 113.6 kg (250 lb 5.3 oz)   BMI 40.40 kg/m²     PE:  APPEARANCE: Well nourished, well developed, in no acute distress.  AFFECT: WNL, alert and oriented x 3.  SKIN: No acne or hirsutism.  NECK: Neck symmetric, without masses or thyromegaly.  NODES: No inguinal, cervical, axillary or femoral lymph node enlargement.  CHEST: Good respiratory effort.   ABDOMEN: Soft. No tenderness or masses. No hepatosplenomegaly. No hernias.  BREASTS: Symmetrical, no skin changes, visible lesions, palpable masses or nipple discharge bilaterally.  PELVIC: External female genitalia without lesions.  Female hair distribution. Adequate perineal body, Normal urethral meatus. Vagina moist and well rugated without lesions or discharge.  No significant cystocele or rectocele present. Cervix pink without lesions, discharge or tenderness. Uterus is 4-6 week size, regular, mobile and nontender. Adnexa without masses or tenderness. Exam limited by body habitus, patient made aware and voices understanding.   EXTREMITIES: No edema    DIAGNOSIS:  1. Encounter for gynecological examination without abnormal finding    2. Cervical cancer screening    3. Diabetic polyneuropathy associated with type 2 diabetes mellitus    4. Hypertensive retinopathy of both eyes    5. Hyperlipidemia, unspecified hyperlipidemia type    6. Primary hypertension    7. History of bariatric surgery, 12-    8. Gastroesophageal reflux disease, unspecified whether esophagitis present        PLAN:  COUNSELING:  The patient was counseled today on:  -A.C.S. Pap and pelvic exam guidelines (pap every 3 years), recomendations for yearly mammogram;  -to follow up with her PCP for other health maintenance.    FOLLOW-UP with me annually.

## 2023-01-11 ENCOUNTER — TELEPHONE (OUTPATIENT)
Dept: INTERNAL MEDICINE | Facility: CLINIC | Age: 68
End: 2023-01-11
Payer: COMMERCIAL

## 2023-01-11 RX ORDER — BLOOD-GLUCOSE SENSOR
EACH MISCELLANEOUS
Qty: 3 EACH | Refills: 11 | Status: SHIPPED | OUTPATIENT
Start: 2023-01-11 | End: 2023-01-13 | Stop reason: SDUPTHER

## 2023-01-11 RX ORDER — BLOOD-GLUCOSE TRANSMITTER
EACH MISCELLANEOUS
Qty: 1 EACH | Refills: 3 | Status: SHIPPED | OUTPATIENT
Start: 2023-01-11 | End: 2023-01-13 | Stop reason: SDUPTHER

## 2023-01-11 RX ORDER — BLOOD-GLUCOSE,RECEIVER,CONT
EACH MISCELLANEOUS
Qty: 1 EACH | Refills: 1 | Status: SHIPPED | OUTPATIENT
Start: 2023-01-11 | End: 2023-01-13 | Stop reason: SDUPTHER

## 2023-01-11 NOTE — TELEPHONE ENCOUNTER
----- Message from Anastacia Arauz sent at 1/11/2023 12:43 PM CST -----  Contact: self [phone]  Per pt need her Dexcom sent to    [facility]/pharmacy #[id number] - [place], [place] - [address]  [address]  [place] [address]  Phone: [phone] Fax: [phone]    Please call and advise

## 2023-01-13 RX ORDER — BLOOD-GLUCOSE,RECEIVER,CONT
EACH MISCELLANEOUS
Qty: 1 EACH | Refills: 1 | Status: SHIPPED | OUTPATIENT
Start: 2023-01-13 | End: 2023-12-11 | Stop reason: SDUPTHER

## 2023-01-13 RX ORDER — BLOOD-GLUCOSE TRANSMITTER
EACH MISCELLANEOUS
Qty: 1 EACH | Refills: 3 | Status: SHIPPED | OUTPATIENT
Start: 2023-01-13 | End: 2023-12-26

## 2023-01-13 RX ORDER — BLOOD-GLUCOSE SENSOR
EACH MISCELLANEOUS
Qty: 3 EACH | Refills: 11 | Status: SHIPPED | OUTPATIENT
Start: 2023-01-13 | End: 2024-02-01

## 2023-01-13 NOTE — TELEPHONE ENCOUNTER
Called [facility] to do PA [id number] which was Approved for Dexcom G6 Transmitter. Coverage period is 01/13/2023 thru 01/13/2024.

## 2023-02-06 ENCOUNTER — LAB VISIT (OUTPATIENT)
Dept: LAB | Facility: HOSPITAL | Age: 68
End: 2023-02-06
Attending: FAMILY MEDICINE
Payer: COMMERCIAL

## 2023-02-06 ENCOUNTER — OFFICE VISIT (OUTPATIENT)
Dept: INTERNAL MEDICINE | Facility: CLINIC | Age: 68
End: 2023-02-06
Payer: COMMERCIAL

## 2023-02-06 VITALS
DIASTOLIC BLOOD PRESSURE: 82 MMHG | HEIGHT: 66 IN | BODY MASS INDEX: 40.5 KG/M2 | HEART RATE: 92 BPM | OXYGEN SATURATION: 99 % | SYSTOLIC BLOOD PRESSURE: 138 MMHG | WEIGHT: 252 LBS

## 2023-02-06 DIAGNOSIS — E78.5 HYPERLIPIDEMIA, UNSPECIFIED HYPERLIPIDEMIA TYPE: ICD-10-CM

## 2023-02-06 DIAGNOSIS — E11.65 TYPE 2 DIABETES MELLITUS WITH HYPERGLYCEMIA, WITHOUT LONG-TERM CURRENT USE OF INSULIN: ICD-10-CM

## 2023-02-06 DIAGNOSIS — I73.9 PVD (PERIPHERAL VASCULAR DISEASE): ICD-10-CM

## 2023-02-06 DIAGNOSIS — E66.01 CLASS 3 SEVERE OBESITY DUE TO EXCESS CALORIES WITH SERIOUS COMORBIDITY AND BODY MASS INDEX (BMI) OF 40.0 TO 44.9 IN ADULT: ICD-10-CM

## 2023-02-06 DIAGNOSIS — Z23 NEED FOR PNEUMOCOCCAL VACCINATION: Primary | ICD-10-CM

## 2023-02-06 DIAGNOSIS — I10 PRIMARY HYPERTENSION: ICD-10-CM

## 2023-02-06 DIAGNOSIS — E11.3593 TYPE 2 DIABETES MELLITUS WITH BOTH EYES AFFECTED BY PROLIFERATIVE RETINOPATHY WITHOUT MACULAR EDEMA, WITHOUT LONG-TERM CURRENT USE OF INSULIN: ICD-10-CM

## 2023-02-06 DIAGNOSIS — G47.33 OSA ON CPAP: ICD-10-CM

## 2023-02-06 PROBLEM — E21.3 HYPERPARATHYROIDISM: Status: RESOLVED | Noted: 2020-11-04 | Resolved: 2023-02-06

## 2023-02-06 LAB
ALBUMIN SERPL BCP-MCNC: 3.8 G/DL (ref 3.5–5.2)
ALP SERPL-CCNC: 70 U/L (ref 55–135)
ALT SERPL W/O P-5'-P-CCNC: 13 U/L (ref 10–44)
ANION GAP SERPL CALC-SCNC: 8 MMOL/L (ref 8–16)
AST SERPL-CCNC: 16 U/L (ref 10–40)
BASOPHILS # BLD AUTO: 0.04 K/UL (ref 0–0.2)
BASOPHILS NFR BLD: 0.8 % (ref 0–1.9)
BILIRUB SERPL-MCNC: 0.2 MG/DL (ref 0.1–1)
BUN SERPL-MCNC: 15 MG/DL (ref 8–23)
CALCIUM SERPL-MCNC: 9.5 MG/DL (ref 8.7–10.5)
CHLORIDE SERPL-SCNC: 104 MMOL/L (ref 95–110)
CHOLEST SERPL-MCNC: 119 MG/DL (ref 120–199)
CHOLEST/HDLC SERPL: 2.4 {RATIO} (ref 2–5)
CO2 SERPL-SCNC: 26 MMOL/L (ref 23–29)
CREAT SERPL-MCNC: 0.9 MG/DL (ref 0.5–1.4)
DIFFERENTIAL METHOD: ABNORMAL
EOSINOPHIL # BLD AUTO: 0.1 K/UL (ref 0–0.5)
EOSINOPHIL NFR BLD: 1.8 % (ref 0–8)
ERYTHROCYTE [DISTWIDTH] IN BLOOD BY AUTOMATED COUNT: 13.7 % (ref 11.5–14.5)
EST. GFR  (NO RACE VARIABLE): >60 ML/MIN/1.73 M^2
ESTIMATED AVG GLUCOSE: 166 MG/DL (ref 68–131)
GLUCOSE SERPL-MCNC: 161 MG/DL (ref 70–110)
HBA1C MFR BLD: 7.4 % (ref 4–5.6)
HCT VFR BLD AUTO: 37.4 % (ref 37–48.5)
HDLC SERPL-MCNC: 49 MG/DL (ref 40–75)
HDLC SERPL: 41.2 % (ref 20–50)
HGB BLD-MCNC: 11.2 G/DL (ref 12–16)
IMM GRANULOCYTES # BLD AUTO: 0.01 K/UL (ref 0–0.04)
IMM GRANULOCYTES NFR BLD AUTO: 0.2 % (ref 0–0.5)
LDLC SERPL CALC-MCNC: 54 MG/DL (ref 63–159)
LYMPHOCYTES # BLD AUTO: 2 K/UL (ref 1–4.8)
LYMPHOCYTES NFR BLD: 40.8 % (ref 18–48)
MCH RBC QN AUTO: 28.4 PG (ref 27–31)
MCHC RBC AUTO-ENTMCNC: 29.9 G/DL (ref 32–36)
MCV RBC AUTO: 95 FL (ref 82–98)
MONOCYTES # BLD AUTO: 0.6 K/UL (ref 0.3–1)
MONOCYTES NFR BLD: 11 % (ref 4–15)
NEUTROPHILS # BLD AUTO: 2.3 K/UL (ref 1.8–7.7)
NEUTROPHILS NFR BLD: 45.4 % (ref 38–73)
NONHDLC SERPL-MCNC: 70 MG/DL
NRBC BLD-RTO: 0 /100 WBC
PLATELET # BLD AUTO: 274 K/UL (ref 150–450)
PMV BLD AUTO: 10.9 FL (ref 9.2–12.9)
POTASSIUM SERPL-SCNC: 4.8 MMOL/L (ref 3.5–5.1)
PROT SERPL-MCNC: 7.5 G/DL (ref 6–8.4)
RBC # BLD AUTO: 3.95 M/UL (ref 4–5.4)
SODIUM SERPL-SCNC: 138 MMOL/L (ref 136–145)
TRIGL SERPL-MCNC: 80 MG/DL (ref 30–150)
TSH SERPL DL<=0.005 MIU/L-ACNC: 1 UIU/ML (ref 0.4–4)
WBC # BLD AUTO: 4.98 K/UL (ref 3.9–12.7)

## 2023-02-06 PROCEDURE — 99999 PR PBB SHADOW E&M-EST. PATIENT-LVL V: ICD-10-PCS | Mod: PBBFAC,,, | Performed by: FAMILY MEDICINE

## 2023-02-06 PROCEDURE — 3288F PR FALLS RISK ASSESSMENT DOCUMENTED: ICD-10-PCS | Mod: CPTII,S$GLB,, | Performed by: FAMILY MEDICINE

## 2023-02-06 PROCEDURE — 90471 IMMUNIZATION ADMIN: CPT | Mod: S$GLB,,, | Performed by: FAMILY MEDICINE

## 2023-02-06 PROCEDURE — 99214 OFFICE O/P EST MOD 30 MIN: CPT | Mod: 25,S$GLB,, | Performed by: FAMILY MEDICINE

## 2023-02-06 PROCEDURE — 3075F SYST BP GE 130 - 139MM HG: CPT | Mod: CPTII,S$GLB,, | Performed by: FAMILY MEDICINE

## 2023-02-06 PROCEDURE — 84443 ASSAY THYROID STIM HORMONE: CPT | Performed by: FAMILY MEDICINE

## 2023-02-06 PROCEDURE — 3079F DIAST BP 80-89 MM HG: CPT | Mod: CPTII,S$GLB,, | Performed by: FAMILY MEDICINE

## 2023-02-06 PROCEDURE — 1126F PR PAIN SEVERITY QUANTIFIED, NO PAIN PRESENT: ICD-10-PCS | Mod: CPTII,S$GLB,, | Performed by: FAMILY MEDICINE

## 2023-02-06 PROCEDURE — 1159F PR MEDICATION LIST DOCUMENTED IN MEDICAL RECORD: ICD-10-PCS | Mod: CPTII,S$GLB,, | Performed by: FAMILY MEDICINE

## 2023-02-06 PROCEDURE — 90471 PNEUMOCOCCAL CONJUGATE VACCINE 20-VALENT: ICD-10-PCS | Mod: S$GLB,,, | Performed by: FAMILY MEDICINE

## 2023-02-06 PROCEDURE — 3008F BODY MASS INDEX DOCD: CPT | Mod: CPTII,S$GLB,, | Performed by: FAMILY MEDICINE

## 2023-02-06 PROCEDURE — 3079F PR MOST RECENT DIASTOLIC BLOOD PRESSURE 80-89 MM HG: ICD-10-PCS | Mod: CPTII,S$GLB,, | Performed by: FAMILY MEDICINE

## 2023-02-06 PROCEDURE — 85025 COMPLETE CBC W/AUTO DIFF WBC: CPT | Performed by: FAMILY MEDICINE

## 2023-02-06 PROCEDURE — 99999 PR PBB SHADOW E&M-EST. PATIENT-LVL V: CPT | Mod: PBBFAC,,, | Performed by: FAMILY MEDICINE

## 2023-02-06 PROCEDURE — 3288F FALL RISK ASSESSMENT DOCD: CPT | Mod: CPTII,S$GLB,, | Performed by: FAMILY MEDICINE

## 2023-02-06 PROCEDURE — 1126F AMNT PAIN NOTED NONE PRSNT: CPT | Mod: CPTII,S$GLB,, | Performed by: FAMILY MEDICINE

## 2023-02-06 PROCEDURE — 1101F PR PT FALLS ASSESS DOC 0-1 FALLS W/OUT INJ PAST YR: ICD-10-PCS | Mod: CPTII,S$GLB,, | Performed by: FAMILY MEDICINE

## 2023-02-06 PROCEDURE — 3008F PR BODY MASS INDEX (BMI) DOCUMENTED: ICD-10-PCS | Mod: CPTII,S$GLB,, | Performed by: FAMILY MEDICINE

## 2023-02-06 PROCEDURE — 1159F MED LIST DOCD IN RCRD: CPT | Mod: CPTII,S$GLB,, | Performed by: FAMILY MEDICINE

## 2023-02-06 PROCEDURE — 80061 LIPID PANEL: CPT | Performed by: FAMILY MEDICINE

## 2023-02-06 PROCEDURE — 36415 COLL VENOUS BLD VENIPUNCTURE: CPT | Performed by: FAMILY MEDICINE

## 2023-02-06 PROCEDURE — 99214 PR OFFICE/OUTPT VISIT, EST, LEVL IV, 30-39 MIN: ICD-10-PCS | Mod: 25,S$GLB,, | Performed by: FAMILY MEDICINE

## 2023-02-06 PROCEDURE — 90677 PCV20 VACCINE IM: CPT | Mod: S$GLB,,, | Performed by: FAMILY MEDICINE

## 2023-02-06 PROCEDURE — 83036 HEMOGLOBIN GLYCOSYLATED A1C: CPT | Performed by: FAMILY MEDICINE

## 2023-02-06 PROCEDURE — 80053 COMPREHEN METABOLIC PANEL: CPT | Performed by: FAMILY MEDICINE

## 2023-02-06 PROCEDURE — 90677 PNEUMOCOCCAL CONJUGATE VACCINE 20-VALENT: ICD-10-PCS | Mod: S$GLB,,, | Performed by: FAMILY MEDICINE

## 2023-02-06 PROCEDURE — 1101F PT FALLS ASSESS-DOCD LE1/YR: CPT | Mod: CPTII,S$GLB,, | Performed by: FAMILY MEDICINE

## 2023-02-06 PROCEDURE — 3075F PR MOST RECENT SYSTOLIC BLOOD PRESS GE 130-139MM HG: ICD-10-PCS | Mod: CPTII,S$GLB,, | Performed by: FAMILY MEDICINE

## 2023-02-06 NOTE — PROGRESS NOTES
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   HgA1C Testing and Control   Lab Results   Component Value Date    HGBA1C 7.7 (H) 11/12/2022      Annually/Less than 8% Yes     Lipid profile : 03/19/2022 Annually Yes     LDL control Lab Results   Component Value Date    LDLCALC 49.4 (L) 03/19/2022    Annually/Less than 100 mg/dl  Yes     Nephropathy screening Lab Results   Component Value Date    LABMICR 44.0 12/13/2022     Lab Results   Component Value Date    PROTEINUA Negative 12/13/2022     Lab Results   Component Value Date    UTPCR Unable to calculate 06/24/2020      Annually Yes     Blood pressure BP Readings from Last 1 Encounters:   02/06/23 138/82    Less than 140/90 Yes     Dilated retinal exam : 08/25/2022 Annually Yes     Foot exam   : 03/07/2022 Annually Yes       Wt Readings from Last 20 Encounters:   02/06/23 114.3 kg (251 lb 15.8 oz)   12/13/22 112.5 kg (248 lb 0.3 oz)   11/28/22 113.6 kg (250 lb 5.3 oz)   09/27/22 102.4 kg (225 lb 11.2 oz)   09/08/22 112.9 kg (248 lb 14.4 oz)   08/16/22 109.8 kg (242 lb 1 oz)   08/09/22 114.1 kg (251 lb 10.5 oz)   08/08/22 115 kg (253 lb 8.5 oz)   07/01/22 115.7 kg (255 lb)   05/26/22 115 kg (253 lb 8 oz)   04/29/22 114.9 kg (253 lb 4.9 oz)   04/06/22 114 kg (251 lb 5.2 oz)   03/07/22 112.9 kg (249 lb)   02/07/22 112.9 kg (249 lb)   11/19/21 115.3 kg (254 lb 3.1 oz)   06/28/21 114.4 kg (252 lb 5.1 oz)   05/05/21 113.8 kg (250 lb 14.1 oz)   03/12/21 114.5 kg (252 lb 6.8 oz)   02/17/21 113.9 kg (251 lb 1.7 oz)   01/28/21 113.9 kg (251 lb 1.7 oz)         Review of patient's allergies  indicates:  No Known Allergies    Social History     Tobacco Use    Smoking status: Never    Smokeless tobacco: Never   Substance Use Topics    Alcohol use: No    Drug use: No       Family History   Problem Relation Age of Onset    Diabetes Mother     Hypertension Mother     Diabetes Father     Hypertension Father     Stroke Father     Diabetes Sister     Diabetes Brother     Cancer Brother         prostate    Prostate cancer Brother     Diabetes Sister     Diabetes Sister     Diabetes Sister     Diabetes Brother     Stroke Brother     Diabetes Brother     Diabetes Brother     Breast cancer Neg Hx     Colon cancer Neg Hx     Ovarian cancer Neg Hx     Amblyopia Neg Hx     Blindness Neg Hx     Cataracts Neg Hx     Glaucoma Neg Hx     Macular degeneration Neg Hx     Retinal detachment Neg Hx     Strabismus Neg Hx     Calcium disorder Neg Hx     Osteoporosis Neg Hx        Past Surgical History:   Procedure Laterality Date    CARPAL TUNNEL RELEASE Right 2020    Procedure: RELEASE, CARPAL TUNNEL RIGHT;  Surgeon: Tameka Bran MD;  Location: [facility];  Service: Orthopedics;  Laterality: Right;    CARPAL TUNNEL RELEASE Right 2020    CATARACT EXTRACTION W/  INTRAOCULAR LENS IMPLANT Left 10/28/2020    COMPLEX ()    CATARACT EXTRACTION W/  INTRAOCULAR LENS IMPLANT Right 2021         SECTION      x3    COLONOSCOPY      COLONOSCOPY N/A 10/08/2020    Procedure: COLONOSCOPY;  Surgeon: Tima Talley MD;  Location: [facility] (4TH FLR);  Service: Endoscopy;  Laterality: N/A;    ESOPHAGOGASTRODUODENOSCOPY N/A 10/08/2020    Procedure: EGD (ESOPHAGOGASTRODUODENOSCOPY);  Surgeon: Tima Talley MD;  Location: [facility] (4TH FLR);  Service: Endoscopy;  Laterality: N/A;    GASTRIC BYPASS  2015    Sleeve    INTRAOCULAR PROSTHESES INSERTION Left 10/28/2020    Procedure: INSERTION, IOL PROSTHESIS;  Surgeon: Alma Goodrich MD;  [address];  Service: Ophthalmology;   Laterality: Left;    INTRAOCULAR PROSTHESES INSERTION Right 02/17/2021    Procedure: INSERTION, IOL PROSTHESIS;  Surgeon: Alma Goodrich MD;  Location: [facility] OR [address] [place] [address];  Service: Ophthalmology;  Laterality: Right;    LASER PERIPHERAL IRIDOTOMY Bilateral 1/26/2017 and 2/9/2017        PANRETINAL PHOTOCOAGULATION      Both eyes    PHACOEMULSIFICATION OF CATARACT Left 10/28/2020    Procedure: PHACOEMULSIFICATION, CATARACT;  Surgeon: Alma Goodrich MD;  Location: [facility] OR [address] [place] [address];  Service: Ophthalmology;  Laterality: Left;    PHACOEMULSIFICATION OF CATARACT Right 02/17/2021    Procedure: PHACOEMULSIFICATION, CATARACT;  Surgeon: Alma Goodrich MD;  Location: [facility] OR [address] [place] [address];  Service: Ophthalmology;  Laterality: Right;    TUBAL LIGATION         Patient Active Problem List   Diagnosis    Hyperlipidemia    Primary hypertension    Chronic tension-type headache, intractable    Hypertensive retinopathy    Transient vision disturbance    Cortical cataract    Vitreous hemorrhage    Class 3 severe obesity due to excess calories with serious comorbidity and body mass index (BMI) of 40.0 to 44.9 in adult    Chest pain on exertion    Ocular hypertension    Nuclear sclerosis    Anatomical narrow angle borderline glaucoma    PVD (peripheral vascular disease)    CAD (coronary artery disease)    BR[place]FORD on CPAP    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 by proliferative retinopathy without macular edema, without long-term current use of insulin    History of bariatric surgery, 12-    Erythema nodosum    Primary osteoarthritis involving multiple joints    Bilateral carpal tunnel syndrome    Neck pain    Diabetic polyneuropathy associated with type 2 diabetes mellitus    GERD (gastroesophageal reflux disease)    Type 2 diabetes mellitus with hyperglycemia, without long-term current use of insulin    Hyperparathyroidism    Small pupil    Shallow anterior chamber of eye    Nuclear sclerotic cataract  of right eye    Tinea pedis of both feet    COVID       Current Outpatient Medications on File Prior to Visit   Medication Sig Dispense Refill    acetaminophen (TYLENOL) 650 MG TbSR Take 1 tablet (650 mg total) by mouth every 8 (eight) hours as needed (pain). 30 tablet 0    amitriptyline (ELAVIL) 25 MG tablet TAKE 2 TABLETS(50 MG) BY MOUTH EVERY EVENING 180 tablet 2    amLODIPine (NORVASC) 10 MG tablet TAKE 1 TABLET(10 MG) BY MOUTH EVERY DAY 90 tablet 2    aspirin 81 MG Chew Take 81 mg by mouth once daily.      atorvastatin (LIPITOR) 20 MG tablet TAKE 1 TABLET(20 MG) BY MOUTH EVERY DAY 90 tablet 2    betamethasone dipropionate 0.05 % cream Apply topically 2 (two) times daily. 45 g 2    blood-glucose meter,continuous (DEXCOM G6 ) Misc Use as directed, e 11.65 1 each 1    blood-glucose sensor (DEXCOM G6 SENSOR) Carley Change every 10 days. E 11.65 3 each 11    blood-glucose transmitter (DEXCOM G6 TRANSMITTER) Carley Change every 3 months 1 each 3    calcium-vitamin D3 500 mg(1,250mg) -200 unit per tablet Take 1 tablet by mouth once daily.      ciprofloxacin HCl (CIPRO) 500 MG tablet Take 1 tablet (500 mg total) by mouth every 12 (twelve) hours. 10 tablet 0    empagliflozin (JARDIANCE) 25 mg tablet TAKE 1 TABLET BY MOUTH DAILY 90 tablet 2    esomeprazole (NEXIUM) 40 MG capsule Take 1 capsule (40 mg total) by mouth before breakfast. 90 capsule 3    insulin lispro (HUMALOG KWIKPEN INSULIN) 100 unit/mL pen Use as directed based on sugar level, 150-200+2, 201-250+4, 251-300+6, 301-350+8, >350+10. Max daily 30 units. 1 Box 6    losartan (COZAAR) 100 MG tablet Take 1 tablet (100 mg total) by mouth once daily. 90 tablet 3    metFORMIN (GLUCOPHAGE-XR) 500 MG ER 24hr tablet Take 2 tablets (1,000 mg total) by mouth 2 (two) times daily with meals. 360 tablet 3    metoprolol succinate (TOPROL-XL) 50 MG 24 hr tablet TAKE 1 TABLET BY MOUTH EVERY DAY 90 tablet 3    multivitamin (THERAGRAN) per tablet Take 1 tablet by mouth once  "daily.      ondansetron (ZOFRAN) 4 MG tablet Take 1 tablet (4 mg total) by mouth 2 (two) times daily as needed for Nausea. 20 tablet 0    semaglutide (OZEMPIC) 2 mg/dose (8 mg/3 mL) PnIj Inject 2 mg into the skin every 7 days. 9 mL 3    traMADoL (ULTRAM) 50 mg tablet Take 1 tablet (50 mg total) by mouth every 6 (six) hours as needed for Pain. 20 tablet 0     Current Facility-Administered Medications on File Prior to Visit   Medication Dose Route Frequency Provider Last Rate Last Admin    0.9%  NaCl infusion   Intravenous Continuous Aristeo Bueno  mL/hr at 02/17/21 0754 New Bag at 02/17/21 0754           Review of Systems   Constitutional:  Negative for chills and fever.   HENT:  Negative for ear pain.    Eyes:  Negative for pain.   Respiratory:  Negative for chest tightness.    Cardiovascular:  Negative for chest pain.   Gastrointestinal:  Negative for abdominal pain.   Genitourinary:  Negative for flank pain.   Musculoskeletal:  Negative for gait problem.   Neurological:  Negative for syncope.   Psychiatric/Behavioral:  Negative for behavioral problems.      Objective:    /82 (BP Location: Left arm, Patient Position: Sitting, BP Method: Large (Manual))   Pulse 92   Ht 5' 6" (1.676 m)   Wt 114.3 kg (251 lb 15.8 oz)   SpO2 99%   BMI 40.67 kg/m²     Physical Exam  Vitals and nursing note reviewed.   Constitutional:       Appearance: She is well-developed.   HENT:      Head: Normocephalic and atraumatic.   Cardiovascular:      Rate and Rhythm: Normal rate.      Heart sounds: Normal heart sounds.   Pulmonary:      Effort: No respiratory distress.      Breath sounds: Normal breath sounds. No wheezing or rales.   Abdominal:      Palpations: Abdomen is soft.   Musculoskeletal:      Cervical back: Neck supple.   Skin:     General: Skin is dry.   Neurological:      Mental Status: She is alert.   Psychiatric:         Behavior: Behavior normal.       Assessment:       1. Primary hypertension    2. " Hyperlipidemia, unspecified hyperlipidemia type    3. PVD (peripheral vascular disease)    4. 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 by proliferative retinopathy without macular edema, without long-term current use of insulin    5. Class 3 severe obesity due to excess calories with serious comorbidity and body mass index (BMI) of 40.0 to 44.9 in adult    6. BR[place] on CPAP    7. Type 2 diabetes mellitus with hyperglycemia, without long-term current use of insulin    8. Need for pneumococcal vaccination          Plan:       Aaliyah was seen today for follow-up.    Diagnoses and all orders for this visit:    Primary hypertension  -controlled, stable, no change in meds   -     CBC Auto Differential; Future  -     Comprehensive Metabolic Panel; Future  -     Lipid Panel; Future  -     TSH; Future    Hyperlipidemia, unspecified hyperlipidemia type  -monitoring today   -     Lipid Panel; Future    PVD (peripheral vascular disease)  -monitoring today   -     Lipid Panel; Future    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 by proliferative retinopathy without macular edema, without long-term current use of insulin  Type 2 diabetes mellitus with hyperglycemia, without long-term current use of insulin - FBS good low 100s, post prandial can be up to 300  -monitoring today   -     Comprehensive Metabolic Panel; Future  -     Hemoglobin A1C; Future  -     Lipid Panel; Future  -     TSH; Future  -     Ambulatory referral/consult to Podiatry; Future  -followed by NP Real for DM, so can see me annually & prn    Class 3 severe obesity due to excess calories with serious comorbidity and body mass index (BMI) of 40.0 to 44.9 in adult  -discussed diet and exercise  -is already on ozempic 2mg weekly    BR[place]FORD on CPAP  -using CPAP most nights and getting benefits    Need for pneumococcal vaccination  -prevnar-20 today      Follow up in about 1 year (around 2/6/2024) for annual.

## 2023-02-14 ENCOUNTER — OFFICE VISIT (OUTPATIENT)
Dept: PODIATRY | Facility: CLINIC | Age: 68
End: 2023-02-14
Payer: COMMERCIAL

## 2023-02-14 VITALS
WEIGHT: 251 LBS | DIASTOLIC BLOOD PRESSURE: 68 MMHG | HEART RATE: 84 BPM | SYSTOLIC BLOOD PRESSURE: 149 MMHG | BODY MASS INDEX: 40.34 KG/M2 | HEIGHT: 66 IN

## 2023-02-14 DIAGNOSIS — E11.65 TYPE 2 DIABETES MELLITUS WITH HYPERGLYCEMIA, WITHOUT LONG-TERM CURRENT USE OF INSULIN: ICD-10-CM

## 2023-02-14 DIAGNOSIS — E11.9 ENCOUNTER FOR DIABETIC FOOT EXAM: Primary | ICD-10-CM

## 2023-02-14 DIAGNOSIS — E11.3593 TYPE 2 DIABETES MELLITUS WITH BOTH EYES AFFECTED BY PROLIFERATIVE RETINOPATHY WITHOUT MACULAR EDEMA, WITHOUT LONG-TERM CURRENT USE OF INSULIN: ICD-10-CM

## 2023-02-14 PROCEDURE — 1160F RVW MEDS BY RX/DR IN RCRD: CPT | Mod: CPTII,S$GLB,, | Performed by: PODIATRIST

## 2023-02-14 PROCEDURE — 1101F PT FALLS ASSESS-DOCD LE1/YR: CPT | Mod: CPTII,S$GLB,, | Performed by: PODIATRIST

## 2023-02-14 PROCEDURE — 1101F PR PT FALLS ASSESS DOC 0-1 FALLS W/OUT INJ PAST YR: ICD-10-PCS | Mod: CPTII,S$GLB,, | Performed by: PODIATRIST

## 2023-02-14 PROCEDURE — 3078F DIAST BP <80 MM HG: CPT | Mod: CPTII,S$GLB,, | Performed by: PODIATRIST

## 2023-02-14 PROCEDURE — 99213 PR OFFICE/OUTPT VISIT, EST, LEVL III, 20-29 MIN: ICD-10-PCS | Mod: S$GLB,,, | Performed by: PODIATRIST

## 2023-02-14 PROCEDURE — 99999 PR PBB SHADOW E&M-EST. PATIENT-LVL V: ICD-10-PCS | Mod: PBBFAC,,, | Performed by: PODIATRIST

## 2023-02-14 PROCEDURE — 3008F PR BODY MASS INDEX (BMI) DOCUMENTED: ICD-10-PCS | Mod: CPTII,S$GLB,, | Performed by: PODIATRIST

## 2023-02-14 PROCEDURE — 3288F FALL RISK ASSESSMENT DOCD: CPT | Mod: CPTII,S$GLB,, | Performed by: PODIATRIST

## 2023-02-14 PROCEDURE — 1159F MED LIST DOCD IN RCRD: CPT | Mod: CPTII,S$GLB,, | Performed by: PODIATRIST

## 2023-02-14 PROCEDURE — 3077F SYST BP >= 140 MM HG: CPT | Mod: CPTII,S$GLB,, | Performed by: PODIATRIST

## 2023-02-14 PROCEDURE — 99999 PR PBB SHADOW E&M-EST. PATIENT-LVL V: CPT | Mod: PBBFAC,,, | Performed by: PODIATRIST

## 2023-02-14 PROCEDURE — 1126F AMNT PAIN NOTED NONE PRSNT: CPT | Mod: CPTII,S$GLB,, | Performed by: PODIATRIST

## 2023-02-14 PROCEDURE — 3288F PR FALLS RISK ASSESSMENT DOCUMENTED: ICD-10-PCS | Mod: CPTII,S$GLB,, | Performed by: PODIATRIST

## 2023-02-14 PROCEDURE — 1160F PR REVIEW ALL MEDS BY PRESCRIBER/CLIN PHARMACIST DOCUMENTED: ICD-10-PCS | Mod: CPTII,S$GLB,, | Performed by: PODIATRIST

## 2023-02-14 PROCEDURE — 3077F PR MOST RECENT SYSTOLIC BLOOD PRESSURE >= 140 MM HG: ICD-10-PCS | Mod: CPTII,S$GLB,, | Performed by: PODIATRIST

## 2023-02-14 PROCEDURE — 1126F PR PAIN SEVERITY QUANTIFIED, NO PAIN PRESENT: ICD-10-PCS | Mod: CPTII,S$GLB,, | Performed by: PODIATRIST

## 2023-02-14 PROCEDURE — 3008F BODY MASS INDEX DOCD: CPT | Mod: CPTII,S$GLB,, | Performed by: PODIATRIST

## 2023-02-14 PROCEDURE — 3051F PR MOST RECENT HEMOGLOBIN A1C LEVEL 7.0 - < 8.0%: ICD-10-PCS | Mod: CPTII,S$GLB,, | Performed by: PODIATRIST

## 2023-02-14 PROCEDURE — 99213 OFFICE O/P EST LOW 20 MIN: CPT | Mod: S$GLB,,, | Performed by: PODIATRIST

## 2023-02-14 PROCEDURE — 3078F PR MOST RECENT DIASTOLIC BLOOD PRESSURE < 80 MM HG: ICD-10-PCS | Mod: CPTII,S$GLB,, | Performed by: PODIATRIST

## 2023-02-14 PROCEDURE — 1159F PR MEDICATION LIST DOCUMENTED IN MEDICAL RECORD: ICD-10-PCS | Mod: CPTII,S$GLB,, | Performed by: PODIATRIST

## 2023-02-14 PROCEDURE — 3051F HG A1C>EQUAL 7.0%<8.0%: CPT | Mod: CPTII,S$GLB,, | Performed by: PODIATRIST

## 2023-02-14 RX ORDER — FLASH GLUCOSE SCANNING READER
EACH MISCELLANEOUS
COMMUNITY
Start: 2023-02-05 | End: 2023-08-21

## 2023-02-14 NOTE — PATIENT INSTRUCTIONS
How to Check Your Feet    Below are tips to help you look for foot problems. Try to check your feet at the same time each day, such as when you get out of bed in the morning.    Check the top of each foot. The tops of toes, back of the heel, and outer edge of the foot can get a lot of rubbing from poor-fitting shoes.    Check the bottom of each foot. Daily wear and tear often leads to problems at pressure spots.    Check the toes and nails. Fungal infections often occur between toes. Toenail problems can also be a sign of fungal infections or lead to breaks in the skin.    Check your shoes, too. Loose objects inside a shoe can injure the foot. Use your hand to feel inside your shoes for things like hortencia, loose stitching, or rough areas that could irritate your skin.        Diabetic Foot Care    Diabetes can lead to a number of different foot complications. Fortunately, most of these complications can be prevented with a little extra foot care. If diabetes is not well controlled, the high blood sugar can cause damage to blood vessels and result in poor circulation to the foot. When the skin does not get enough blood flow, it becomes prone to pressure sores and ulcers, which heal slowly.  High blood sugar can also damage nerves, interfering with the ability to feel pain and pressure. When you cant feel your foot normally, it is easy to injure your skin, bones and joints without knowing it. For these reasons diabetes increases the risk of fungal infections, bunions and ulcers. Deep ulcers can lead to bone infection. Gangrene is the most serious foot complication of diabetes. It usually occurs on the tips of the toes as blacked areas of skin. The black area is dead tissue. In severe cases, gangrene spreads to involve the entire toe, other toes and the entire foot. Foot or toe amputation may be required. Good foot care and blood sugar control can prevent this.    Home Care  Wear comfortable, proper fitting  shoes.  Wash your feet daily with warm water and mild soap.  After drying, apply a moisturizing cream or lotion.  Check your feet daily for skin breaks, blisters, swelling, or redness. Look between your toes also.  Wear cotton socks and change them every day.  Trim toe nails carefully and do not cut your cuticles.  Strive to keep your blood sugar under control with a combination of medicines, diet and activity.  If you smoke and have diabetes, it is very important that you stop. Smoking reduces blood flow to your foot.  Avoid activities that increase your risk of foot injury:  Do not walk barefoot.  Do not use heating pads or hot water bottles on your feet.  Do not put your foot in a hot tub without first checking the temperature with your hand.  10) Schedule yearly foot exams.    Follow Up  with your doctor or as advised by our staff. Report any cut, puncture, scrape, other injury, blister, ingrown toenail or ulcer on your foot.    Get Prompt Medical Attention  if any of the following occur:  -- Open ulcer with pus draining from the wound  -- Increasing foot or leg pain  -- New areas of redness or swelling or tender areas of the foot    © 0649-5848 [employer]. [address] [place] [id number]. All rights reserved. This information is not intended as a substitute for professional medical care. Always follow your healthcare professional's instructions.     How to Check Your Feet    Below are tips to help you look for foot problems. Try to check your feet at the same time each day, such as when you get out of bed in the morning.    Check the top of each foot. The tops of toes, back of the heel, and outer edge of the foot can get a lot of rubbing from poor-fitting shoes.    Check the bottom of each foot. Daily wear and tear often leads to problems at pressure spots.    Check the toes and nails. Fungal infections often occur between toes. Toenail problems can also be a sign of fungal infections  or lead to breaks in the skin.    Check your shoes, too. Loose objects inside a shoe can injure the foot. Use your hand to feel inside your shoes for things like hortencia, loose stitching, or rough areas that could irritate your skin.        Diabetic Foot Care    Diabetes can lead to a number of different foot complications. Fortunately, most of these complications can be prevented with a little extra foot care. If diabetes is not well controlled, the high blood sugar can cause damage to blood vessels and result in poor circulation to the foot. When the skin does not get enough blood flow, it becomes prone to pressure sores and ulcers, which heal slowly.  High blood sugar can also damage nerves, interfering with the ability to feel pain and pressure. When you cant feel your foot normally, it is easy to injure your skin, bones and joints without knowing it. For these reasons diabetes increases the risk of fungal infections, bunions and ulcers. Deep ulcers can lead to bone infection. Gangrene is the most serious foot complication of diabetes. It usually occurs on the tips of the toes as blacked areas of skin. The black area is dead tissue. In severe cases, gangrene spreads to involve the entire toe, other toes and the entire foot. Foot or toe amputation may be required. Good foot care and blood sugar control can prevent this.    Home Care  Wear comfortable, proper fitting shoes.  Wash your feet daily with warm water and mild soap.  After drying, apply a moisturizing cream or lotion.  Check your feet daily for skin breaks, blisters, swelling, or redness. Look between your toes also.  Wear cotton socks and change them every day.  Trim toe nails carefully and do not cut your cuticles.  Strive to keep your blood sugar under control with a combination of medicines, diet and activity.  If you smoke and have diabetes, it is very important that you stop. Smoking reduces blood flow to your foot.  Avoid activities that increase  your risk of foot injury:  Do not walk barefoot.  Do not use heating pads or hot water bottles on your feet.  Do not put your foot in a hot tub without first checking the temperature with your hand.  10) Schedule yearly foot exams.    Follow Up  with your doctor or as advised by our staff. Report any cut, puncture, scrape, other injury, blister, ingrown toenail or ulcer on your foot.    Get Prompt Medical Attention  if any of the following occur:  -- Open ulcer with pus draining from the wound  -- Increasing foot or leg pain  -- New areas of redness or swelling or tender areas of the foot    © 3257-5151 [employer]. [address] [place] [address], [place] [address]. All rights reserved. This information is not intended as a substitute for professional medical care. Always follow your healthcare professional's instructions.

## 2023-02-14 NOTE — PROGRESS NOTES
Chief Complaint   Patient presents with    Diabetic Foot Exam     Foot Exam/PCP Reggie Glover MD  02/06/23             HPI:   Aaliyah Angela is a 67 y.o. female here for diabetic foot exam.  No pain in her feet today.     She is using vaseline to her feet.  Skin on the heel is dry.  No wounds noted.    She does not get salon pedicures.    She is wearing slip on clogs today.      S/p Gastric bypass on Dec. 18, 2015.          PCP:  Reggie Glover MD   Last PCP visit: Diabetic Foot Exam (Foot Exam/PCP Reggie Glover MD  02/06/23)        Patient Active Problem List   Diagnosis    Hyperlipidemia    Primary hypertension    Chronic tension-type headache, intractable    Hypertensive retinopathy    Transient vision disturbance    Cortical cataract    Vitreous hemorrhage    Class 3 severe obesity due to excess calories with serious comorbidity and body mass index (BMI) of 40.0 to 44.9 in adult    Chest pain on exertion    Ocular hypertension    Nuclear sclerosis    Anatomical narrow angle borderline glaucoma    PVD (peripheral vascular disease)    CAD (coronary artery disease)    BRADFORD on CPAP    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 by proliferative retinopathy without macular edema, without long-term current use of insulin    History of bariatric surgery, 12-    Erythema nodosum    Primary osteoarthritis involving multiple joints    Bilateral carpal tunnel syndrome    Neck pain    Diabetic polyneuropathy associated with type 2 diabetes mellitus    GERD (gastroesophageal reflux disease)    Type 2 diabetes mellitus with hyperglycemia, without long-term current use of insulin    Small pupil    Shallow anterior chamber of eye    Nuclear sclerotic cataract of right eye    Tinea pedis of both feet    COVID             Current Outpatient Medications on File Prior to Visit   Medication Sig Dispense Refill    acetaminophen (TYLENOL) 650 MG TbSR Take 1 tablet (650 mg total) by mouth every 8 (eight) hours  as needed (pain). 30 tablet 0    amitriptyline (ELAVIL) 25 MG tablet TAKE 2 TABLETS(50 MG) BY MOUTH EVERY EVENING 180 tablet 2    amLODIPine (NORVASC) 10 MG tablet TAKE 1 TABLET(10 MG) BY MOUTH EVERY DAY 90 tablet 2    aspirin 81 MG Chew Take 81 mg by mouth once daily.      atorvastatin (LIPITOR) 20 MG tablet TAKE 1 TABLET(20 MG) BY MOUTH EVERY DAY 90 tablet 2    betamethasone dipropionate 0.05 % cream Apply topically 2 (two) times daily. 45 g 2    blood-glucose meter,continuous (DEXCOM G6 ) Misc Use as directed, e 11.65 1 each 1    blood-glucose sensor (DEXCOM G6 SENSOR) Carley Change every 10 days. E 11.65 3 each 11    blood-glucose transmitter (DEXCOM G6 TRANSMITTER) Carley Change every 3 months 1 each 3    calcium-vitamin D3 500 mg(1,250mg) -200 unit per tablet Take 1 tablet by mouth once daily.      ciprofloxacin HCl (CIPRO) 500 MG tablet Take 1 tablet (500 mg total) by mouth every 12 (twelve) hours. 10 tablet 0    empagliflozin (JARDIANCE) 25 mg tablet TAKE 1 TABLET BY MOUTH DAILY 90 tablet 2    esomeprazole (NEXIUM) 40 MG capsule Take 1 capsule (40 mg total) by mouth before breakfast. 90 capsule 3    FREESTYLE SAV 2 READER Misc use as directed      insulin lispro (HUMALOG KWIKPEN INSULIN) 100 unit/mL pen Use as directed based on sugar level, 150-200+2, 201-250+4, 251-300+6, 301-350+8, >350+10. Max daily 30 units. 1 Box 6    losartan (COZAAR) 100 MG tablet Take 1 tablet (100 mg total) by mouth once daily. 90 tablet 3    metFORMIN (GLUCOPHAGE-XR) 500 MG ER 24hr tablet Take 2 tablets (1,000 mg total) by mouth 2 (two) times daily with meals. 360 tablet 3    metoprolol succinate (TOPROL-XL) 50 MG 24 hr tablet TAKE 1 TABLET BY MOUTH EVERY DAY 90 tablet 3    multivitamin (THERAGRAN) per tablet Take 1 tablet by mouth once daily.      ondansetron (ZOFRAN) 4 MG tablet Take 1 tablet (4 mg total) by mouth 2 (two) times daily as needed for Nausea. 20 tablet 0    semaglutide (OZEMPIC) 2 mg/dose (8 mg/3 mL) PnIj  "Inject 2 mg into the skin every 7 days. 9 mL 3    traMADoL (ULTRAM) 50 mg tablet Take 1 tablet (50 mg total) by mouth every 6 (six) hours as needed for Pain. 20 tablet 0     Current Facility-Administered Medications on File Prior to Visit   Medication Dose Route Frequency Provider Last Rate Last Admin    0.9%  NaCl infusion   Intravenous Continuous Aristeo Bueno  mL/hr at 02/17/21 0754 New Bag at 02/17/21 0754         ALLG:   No Known Allergies          ROS:   General ROS: negative for - chills, fever or night sweats  Respiratory ROS: no cough, shortness of breath, or wheezing  Cardiovascular ROS: no chest pain or dyspnea on exertion  Musculoskeletal ROS: negative  Neurological ROS: no TIA or stroke symptoms  Dermatological ROS: negative          EXAM:   Vitals:    02/14/23 1331   BP: (!) 149/68   Pulse: 84   Weight: 113.9 kg (251 lb)   Height: 5' 6" (1.676 m)        General: Patient is well-developed, well-nourished. Alert and oriented x 3 and in no acute distress.   Lower extremity physical exam:     Vascular:   Dorsalis pedis and posterior tibial pulses are 1/4 bilaterally. 3 secs capillary refill time and toes are warm to touch.   There is reduced presence of digital hair.     1+ edema      Neurological:   Light touch, proprioception, and sharp/dull sensation are all intact bilaterally.   Protective threshold with the Tidioute-Wienstein monofilament is intact bilaterally.   +paresthesias (Abnormal spontaneous sensations in feet)      Dermatological:   There is thin and shiny skin.    There is no open wounds. There is no ecchymoses.     Toenails mildly dystrophic, elongated by 1-2mm.  bilateral heels dry and xerotic.  Interdigital maceration left foot.  She has been using a cream on the right interdigital space with improvement.       Musculoskeletal:    Muscle strength is 5/5 in all groups bilaterally. Subtalar, and MTPJ range of motion are within normal limits without crepitus bilaterally.   " right 2nd flexible hammertoe, mild hyperpigmentation at the proximal interphalangeal joint  No wounds or infection.           ASSESSMENT / PLAN:    Problem List Items Addressed This Visit       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 by proliferative retinopathy without macular edema, without long-term current use of insulin    Type 2 diabetes mellitus with hyperglycemia, without long-term current use of insulin     Other Visit Diagnoses       Encounter for diabetic foot exam    -  Primary          I counseled the patient on the patient's conditions, their implications and medical management.   Shoe inspection.     Continue good nutrition and blood sugar control to help prevent podiatric complications of diabetes.   Maintain proper foot hygiene.   Continue wearing proper shoe gear, daily foot inspections, never walking without protective shoe gear, never putting sharp instruments to feet.   Follow up annually for diabetes foot exam.

## 2023-03-01 ENCOUNTER — TELEPHONE (OUTPATIENT)
Dept: GASTROENTEROLOGY | Facility: CLINIC | Age: 68
End: 2023-03-01
Payer: COMMERCIAL

## 2023-03-01 NOTE — TELEPHONE ENCOUNTER
----- Message from Francesca Lock sent at 3/1/2023  3:07 PM CST -----  Regarding: Appt  Contact: [phone]  Pt requesting appt for a follow up for anthony .... Please call and adv @ [phone]

## 2023-03-11 NOTE — PROGRESS NOTES
HPI    DLS: 8/25/2022    Pt here for HVF review/OCT;  Pt states at times she does get a pain to her OS that comes and goes.     Meds;  No GTTS    1) OHT vs Pre-perimetric POAG   2) Narrow Angles   3) PDR OU   4) Hx VH OD   5) NS OD   6) Hx TVO OS   7) PCIOL OU    Last edited by Tomasa Montilla on 3/16/2023  3:44 PM.            Assessment /Plan     For exam results, see Encounter Report.    Anatomical narrow angle borderline glaucoma of both eyes    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 by proliferative retinopathy without macular edema, with long-term current use of insulin    Preretinal fibrosis, bilateral    Vitreous floaters of right eye    Epiretinal membrane, bilateral    Shallow anterior chamber of eye    PCO (posterior capsular opacification), bilateral    Pseudophakia, both eyes        1. OHT vs Pre-perimetric POAG OU (angles  Narrowing over time)  Followed at [facility] since '04   First seen by Scarlet '08   Never on gtts   VF defects 2/2 PRP     Family history none   Glaucoma meds None   H/O adverse rxn to glaucoma drops none   LASERS Mulitple PRP OU // PI OD 2/9/2017 /// PI os 1/26/2017   GLAUCOMA SURGERIES None   OTHER EYE SURGERIES PC IOL os 10/28/2020 // PC IOL od - 2/17/2021    CDR 0.3/0.35   Tbase 15-23/15-25   Tmax 25 OU   Ttarget ??   HVF 10 tests: 2008 to 2023 - SAD/IAD OU 2/2 PRP OU   Gonio +1-3  with steep approach OU 2018  (doubt occludable)   /575   OCT 6 tests: 2008 to 2023 -  RNFL nl od // nl os    HRT 7 tests: 2009 to 2020 -MR -  nl od // nl os /// CDR 0.33 od // 0.26 os   Disc photos 2002, 2003, 2005 (slides) // 2008, 2013  (OIS)     - Ttoday  15/14  - Test done today -IOP / HVF / DFE / OCT     2. Narrow Angles:  +1-3 w/ bowing  s/p PI. Likely will resolve in future once pseudophakic.  Anterior Segment OCT today w/ Open Angles w/ narrow approach OU  Doubt occludable - monitor gonio     3. PDR OU s/p PDR OU -stable exam on last eval w/ Retina on 7/25/13.   S/p avastin x 4 od -  "last 1/4/2016     4. Hx of VH OD -stable. Last seen by retina - jesse 1/4/2016    5. Hx of Transient Visual Obscurations OS - ? BP elevation. Seen w/ stable Retinal exam on 9/13/12.    6. PC IOL OU - Kp   OS 10/28/2020 - PCB00 23.0   OD 2/17/2021 - PCB00 23.5     7. Had a gastric "sleve" done 12/18/2015 - for weight loss    So far doing well    On less insulin now     8. PCO - monitor - consider yag cap in future     Plan:  Stable IOP (thick k's),  Cont no gtts  Continue to work on blood sugars  oht vs glaucoma    Angles continue narrow but open     S/P PI  os 1/26/2017 -   S/P PI od - 2/9/2017 -      Phaco / IOL OS Date: 10/28/2020 - PCB00 23.0 - trypan / medium pupil - extra viscoat   POY > 2 yrears  - phaco/IOL   Doing well    Cont with regular F/U's with Dr. Mckeon - yearly     Phaco / IOL OD Date: 2/17/2021 - PCB00 23.5  POY > 2 - phaco/IOL   Doing well    Pt is content with out any distance correction - will cont with OTC readers only    Rx for bifocals written - but most likely will not have made     OCT macula - 3/26/2021 - nl ou -- no cme    Photo file updated 3/16/2023    Mike - stable exam - F/U yearly or sooner prn     F/U  9 months with IOP and gonio // after that yearly and try to alternate q 6 months with Branventjerry     I have seen and personally examined the patient.  I agree with the findings, assessment and plan of the resident and/or fellow.     Alma Goodrich MD    "

## 2023-03-16 ENCOUNTER — CLINICAL SUPPORT (OUTPATIENT)
Dept: OPHTHALMOLOGY | Facility: CLINIC | Age: 68
End: 2023-03-16
Payer: COMMERCIAL

## 2023-03-16 ENCOUNTER — OFFICE VISIT (OUTPATIENT)
Dept: OPHTHALMOLOGY | Facility: CLINIC | Age: 68
End: 2023-03-16
Payer: COMMERCIAL

## 2023-03-16 DIAGNOSIS — Z79.4 TYPE 2 DIABETES MELLITUS WITH BOTH EYES AFFECTED BY PROLIFERATIVE RETINOPATHY WITHOUT MACULAR EDEMA, WITH LONG-TERM CURRENT USE OF INSULIN: ICD-10-CM

## 2023-03-16 DIAGNOSIS — E11.65 TYPE 2 DIABETES MELLITUS WITH HYPERGLYCEMIA, WITHOUT LONG-TERM CURRENT USE OF INSULIN: ICD-10-CM

## 2023-03-16 DIAGNOSIS — H35.373 PRERETINAL FIBROSIS, BILATERAL: ICD-10-CM

## 2023-03-16 DIAGNOSIS — H40.033 ANATOMICAL NARROW ANGLE BORDERLINE GLAUCOMA OF BOTH EYES: Primary | ICD-10-CM

## 2023-03-16 DIAGNOSIS — E11.3593 TYPE 2 DIABETES MELLITUS WITH BOTH EYES AFFECTED BY PROLIFERATIVE RETINOPATHY WITHOUT MACULAR EDEMA, WITH LONG-TERM CURRENT USE OF INSULIN: ICD-10-CM

## 2023-03-16 DIAGNOSIS — Z96.1 PSEUDOPHAKIA, BOTH EYES: ICD-10-CM

## 2023-03-16 DIAGNOSIS — H43.391 VITREOUS FLOATERS OF RIGHT EYE: ICD-10-CM

## 2023-03-16 DIAGNOSIS — H57.9 SHALLOW ANTERIOR CHAMBER OF EYE: ICD-10-CM

## 2023-03-16 DIAGNOSIS — H26.493 PCO (POSTERIOR CAPSULAR OPACIFICATION), BILATERAL: ICD-10-CM

## 2023-03-16 DIAGNOSIS — H35.373 EPIRETINAL MEMBRANE, BILATERAL: ICD-10-CM

## 2023-03-16 DIAGNOSIS — H40.033 ANATOMICAL NARROW ANGLE BORDERLINE GLAUCOMA OF BOTH EYES: ICD-10-CM

## 2023-03-16 DIAGNOSIS — I10 PRIMARY HYPERTENSION: ICD-10-CM

## 2023-03-16 PROCEDURE — 1159F PR MEDICATION LIST DOCUMENTED IN MEDICAL RECORD: ICD-10-PCS | Mod: CPTII,S$GLB,, | Performed by: OPHTHALMOLOGY

## 2023-03-16 PROCEDURE — 1160F PR REVIEW ALL MEDS BY PRESCRIBER/CLIN PHARMACIST DOCUMENTED: ICD-10-PCS | Mod: CPTII,S$GLB,, | Performed by: OPHTHALMOLOGY

## 2023-03-16 PROCEDURE — 1101F PT FALLS ASSESS-DOCD LE1/YR: CPT | Mod: CPTII,S$GLB,, | Performed by: OPHTHALMOLOGY

## 2023-03-16 PROCEDURE — 99214 OFFICE O/P EST MOD 30 MIN: CPT | Mod: S$GLB,,, | Performed by: OPHTHALMOLOGY

## 2023-03-16 PROCEDURE — 3051F HG A1C>EQUAL 7.0%<8.0%: CPT | Mod: CPTII,S$GLB,, | Performed by: OPHTHALMOLOGY

## 2023-03-16 PROCEDURE — 4010F ACE/ARB THERAPY RXD/TAKEN: CPT | Mod: CPTII,S$GLB,, | Performed by: OPHTHALMOLOGY

## 2023-03-16 PROCEDURE — 3051F PR MOST RECENT HEMOGLOBIN A1C LEVEL 7.0 - < 8.0%: ICD-10-PCS | Mod: CPTII,S$GLB,, | Performed by: OPHTHALMOLOGY

## 2023-03-16 PROCEDURE — 99999 PR PBB SHADOW E&M-EST. PATIENT-LVL IV: CPT | Mod: PBBFAC,,, | Performed by: OPHTHALMOLOGY

## 2023-03-16 PROCEDURE — 3288F PR FALLS RISK ASSESSMENT DOCUMENTED: ICD-10-PCS | Mod: CPTII,S$GLB,, | Performed by: OPHTHALMOLOGY

## 2023-03-16 PROCEDURE — 1126F PR PAIN SEVERITY QUANTIFIED, NO PAIN PRESENT: ICD-10-PCS | Mod: CPTII,S$GLB,, | Performed by: OPHTHALMOLOGY

## 2023-03-16 PROCEDURE — 1101F PR PT FALLS ASSESS DOC 0-1 FALLS W/OUT INJ PAST YR: ICD-10-PCS | Mod: CPTII,S$GLB,, | Performed by: OPHTHALMOLOGY

## 2023-03-16 PROCEDURE — 99214 PR OFFICE/OUTPT VISIT, EST, LEVL IV, 30-39 MIN: ICD-10-PCS | Mod: S$GLB,,, | Performed by: OPHTHALMOLOGY

## 2023-03-16 PROCEDURE — 99999 PR PBB SHADOW E&M-EST. PATIENT-LVL IV: ICD-10-PCS | Mod: PBBFAC,,, | Performed by: OPHTHALMOLOGY

## 2023-03-16 PROCEDURE — 1159F MED LIST DOCD IN RCRD: CPT | Mod: CPTII,S$GLB,, | Performed by: OPHTHALMOLOGY

## 2023-03-16 PROCEDURE — 1126F AMNT PAIN NOTED NONE PRSNT: CPT | Mod: CPTII,S$GLB,, | Performed by: OPHTHALMOLOGY

## 2023-03-16 PROCEDURE — 1160F RVW MEDS BY RX/DR IN RCRD: CPT | Mod: CPTII,S$GLB,, | Performed by: OPHTHALMOLOGY

## 2023-03-16 PROCEDURE — 3288F FALL RISK ASSESSMENT DOCD: CPT | Mod: CPTII,S$GLB,, | Performed by: OPHTHALMOLOGY

## 2023-03-16 PROCEDURE — 4010F PR ACE/ARB THEARPY RXD/TAKEN: ICD-10-PCS | Mod: CPTII,S$GLB,, | Performed by: OPHTHALMOLOGY

## 2023-03-16 RX ORDER — LOSARTAN POTASSIUM 100 MG/1
100 TABLET ORAL DAILY
Qty: 90 TABLET | Refills: 3 | OUTPATIENT
Start: 2023-03-16

## 2023-03-16 RX ORDER — SEMAGLUTIDE 2.68 MG/ML
2 INJECTION, SOLUTION SUBCUTANEOUS
Qty: 9 ML | Refills: 3 | Status: SHIPPED | OUTPATIENT
Start: 2023-03-16 | End: 2024-02-01

## 2023-03-16 NOTE — TELEPHONE ENCOUNTER
----- Message from Dinora Sorto sent at 3/16/2023  9:56 AM CDT -----  Contact: Self [phone]  Requesting an RX refill or new RX.    Is this a refill or new RX: refill    RX name and strength (copy/paste from chart):  losartan (COZAAR) 100 MG tablet    Is this a 30 day or 90 day RX: 90    Pharmacy name and phone # (copy/paste from chart):      [facility]/pharmacy #[id number] - [place], [place] - [address]  [address]  [place] [address]  Phone: [phone] Fax: [phone]    Pt states pharm above states  location does not have medication refill for a request. Pt is requesting a call back.

## 2023-03-16 NOTE — TELEPHONE ENCOUNTER
----- Message from Dinora Sorto sent at 3/16/2023 10:00 AM CDT -----  Contact: Self Self [phone]  Requesting an RX refill or new RX.    Is this a refill or new RX: refill    RX name and strength (copy/paste from chart):  empagliflozin (JARDIANCE) 25 mg tablet and semaglutide (OZEMPIC) 2 mg/dose (8 mg/3 mL) PnIj    Is this a 30 day or 90 day RX: 30    Pharmacy name and phone # (copy/paste from chart):      [id number] - [place], [place] - [address]  [address]  [place] [address]  Phone: [phone] Fax: [phone]    Pt states medications will need a PA. Pt is requesting a call back.

## 2023-03-16 NOTE — TELEPHONE ENCOUNTER
No new care gaps identified.  [facility] Embedded Care Gaps. Reference number: [id number]. 3/16/2023   1:26:48 PM CDT

## 2023-03-16 NOTE — PROGRESS NOTES
Visual field test done.  Patient stated no latex allergies used coverlet  Glasses Prescription     Sphere Cylinder Axis Dist VA Add   Right [place] +1.25 005 20/30 +2.75   Left +0.50 +1.50 005 20/40 +2.75       First pair since cataract surgery

## 2023-03-17 NOTE — TELEPHONE ENCOUNTER
Refill Decision Note   Aaliyah Angela  is requesting a refill authorization.  Brief Assessment and Rationale for Refill:  Quick Discontinue     Medication Therapy Plan:  E-Prescribing Status: Receipt confirmed by pharmacy (3/14/2023  9:45 AM CDT) Pharmacy is requesting new scripts for the following medications without required information, (sig/ frequency/qty/etc)      Medication Reconciliation Completed: No     Comments: Pharmacies have been requesting medications for patients without required information, (sig, frequency, qty, etc.). In addition, requests are sent for medication(s) pt. are currently not taking, and medications patients have never taken.    We have spoken to the pharmacies about these request types and advised their teams previously that we are unable to assess these New Script requests and require all details for these requests. This is a known issue and has been reported.     Note composed:9:05 PM 03/16/2023

## 2023-03-20 DIAGNOSIS — I10 PRIMARY HYPERTENSION: ICD-10-CM

## 2023-03-20 RX ORDER — LOSARTAN POTASSIUM 100 MG/1
100 TABLET ORAL DAILY
Qty: 90 TABLET | Refills: 3 | Status: SHIPPED | OUTPATIENT
Start: 2023-03-20

## 2023-03-20 NOTE — TELEPHONE ENCOUNTER
----- Message from Dinora Sorto sent at 3/20/2023 10:21 AM CDT -----  Contact: Self [phone]  Requesting an RX refill or new RX.    Is this a refill or new RX: refill    RX name and strength (copy/paste from chart):  losartan (COZAAR) 100 MG tablet    Is this a 30 day or 90 day RX: 90    Pharmacy name and phone # (copy/paste from chart):      [facility]/pharmacy #[id number] - [place], [place] - [address]  [address]  [place] [address]  Phone: [phone] Fax: [phone]    Pt is requesting a call back.

## 2023-03-20 NOTE — TELEPHONE ENCOUNTER
No new care gaps identified.  [facility] Embedded Care Gaps. Reference number: [id number]. 3/20/2023   10:30:31 AM CDT

## 2023-04-08 ENCOUNTER — LAB VISIT (OUTPATIENT)
Dept: LAB | Facility: HOSPITAL | Age: 68
End: 2023-04-08
Payer: COMMERCIAL

## 2023-04-08 DIAGNOSIS — E11.3593 TYPE 2 DIABETES MELLITUS WITH BOTH EYES AFFECTED BY PROLIFERATIVE RETINOPATHY WITHOUT MACULAR EDEMA, WITHOUT LONG-TERM CURRENT USE OF INSULIN: ICD-10-CM

## 2023-04-08 LAB
ANION GAP SERPL CALC-SCNC: 9 MMOL/L (ref 8–16)
BUN SERPL-MCNC: 14 MG/DL (ref 8–23)
CALCIUM SERPL-MCNC: 9.5 MG/DL (ref 8.7–10.5)
CHLORIDE SERPL-SCNC: 103 MMOL/L (ref 95–110)
CO2 SERPL-SCNC: 26 MMOL/L (ref 23–29)
CREAT SERPL-MCNC: 0.9 MG/DL (ref 0.5–1.4)
EST. GFR  (NO RACE VARIABLE): >60 ML/MIN/1.73 M^2
ESTIMATED AVG GLUCOSE: 157 MG/DL (ref 68–131)
GLUCOSE SERPL-MCNC: 95 MG/DL (ref 70–110)
HBA1C MFR BLD: 7.1 % (ref 4–5.6)
POTASSIUM SERPL-SCNC: 4.6 MMOL/L (ref 3.5–5.1)
SODIUM SERPL-SCNC: 138 MMOL/L (ref 136–145)

## 2023-04-08 PROCEDURE — 36415 COLL VENOUS BLD VENIPUNCTURE: CPT | Performed by: NURSE PRACTITIONER

## 2023-04-08 PROCEDURE — 80048 BASIC METABOLIC PNL TOTAL CA: CPT | Performed by: NURSE PRACTITIONER

## 2023-04-08 PROCEDURE — 83036 HEMOGLOBIN GLYCOSYLATED A1C: CPT | Performed by: NURSE PRACTITIONER

## 2023-04-12 NOTE — PROGRESS NOTES
CHIEF COMPLAINT: Type 2 Diabetes     HPI: Mrs. Aaliyah Angela is a 67 y.o. female who was diagnosed with gestational diabetes age 20s, Type 2 DM age 30s.  Has h/o gastric sleeve 2015.-in [place]   Past Medical History:   Diagnosis Date    Allergy     Anatomical narrow angle borderline glaucoma     AR (allergic rhinitis)     Arthritis     CAD (coronary artery disease) 04/22/2013    Non obstructive, 2012 PET     Diabetes mellitus type II     with retinopathy    Diabetic retinopathy     Epiretinal membrane, both eyes     Hyperlipidemia     Hypertensive retinopathy of both eyes     Lumbar disc disease     Migraines     Morbid obesity 10/03/2012    BRADFORD (obstructive sleep apnea) 08/04/2015    Proliferative diabetic retinopathy(362.02)     PVD (peripheral vascular disease) 04/22/2013    Carotid US 1-39% bilat 2012     Rupture of right Achilles tendon     Unspecified essential hypertension     Unspecified vitamin D deficiency     Vitreous hemorrhage of left eye      Last seen by me in fall 2022 and is now being seen by me again today.   Not using kimo 2 anymore  A1c improving, 7.7% to 7.4% to 7.1%   OA-no recent steroids   Working on water intake 2-3 bottles/ a day  Recent UTI in the past 4 months     Switched to dexcom  Has   Left at home today  Highest 298 mg/dl shortly after eating  Lowest 95 mg/dl      Lab Results   Component Value Date    HGBA1C 7.1 (H) 04/08/2023     Has f/u with endocrinology for pth, calcium -hyperparathyroidism-annually  -last pth normal    On MDI injections 3 x a day -correction scale   Testing 4 x a day max  Patient is willing and able to use the device  Demonstrated an understanding of the technology and is motivated to use CGM  Patient expected to adhere to a comprehensive diabetes treatment plan and patient has adequate medical supervision  Multiple impaired awareness of hypoglycemia (hypoglycemia unawareness)    Works 40 hours, walking 5 x a week    Current meds: ozempic 2 mg weekly,  "metformin 1000 mg bid, jardiance 25 mg daily, humalog correction scale 150-200+2, etc  -self adjustment per correction scale    Diabetes Management Status    Statin: Taking  ACE/ARB: Taking    Screening or Prevention Patient's value Goal Complete/Controlled?   HgA1C Testing and Control   Lab Results   Component Value Date    HGBA1C 7.1 (H) 04/08/2023      Annually/Less than 8% No   Lipid profile : 02/06/2023 Annually No   LDL control Lab Results   Component Value Date    LDLCALC 54.0 (L) 02/06/2023    Annually/Less than 100 mg/dl  No   Nephropathy screening Lab Results   Component Value Date    LABMICR 44.0 12/13/2022     Lab Results   Component Value Date    PROTEINUA Negative 12/13/2022    Annually Yes   Blood pressure BP Readings from Last 1 Encounters:   04/13/23 132/72    Less than 140/90 No   Dilated retinal exam : 03/16/2023 Annually Yes   Foot exam   : 04/13/2023 Annually Yes     Cataract removal sx (L) eye  Then (R) eye     REVIEW OF SYSTEMS  General: no weakness, fatigue, + weight changes  5-6#  loss   Eyes: no double or blurred vision, eye pain, or redness; Last Eye Exam 2021, h/o cataract sx, retinal/glaucoma specialist  Cardiovascular:  No chest pain, palpitations, +trace edema-BLE, or murmurs.   Respiratory: no cough or dyspnea.   GI/: + heartburn, nausea, or changes in bowel patterns; good appetite. +GERD-nexium and pepcid, off zantac, +UTI  Skin: no rashes, dryness, itching, or reactions at insulin injection sites.  Neuro: + numbness, tingling-off gabapentin, on amitriptyline,  tremors, or vertigo. +OA in knees  Endocrine: no polyuria, polydipsia, polyphagia, heat or cold intolerance.     Vital Signs  /72 (BP Location: Left arm, Patient Position: Sitting, BP Method: Large (Manual))   Pulse 77   Ht 5' 6" (1.676 m)   Wt 111.7 kg (246 lb 4.1 oz)   SpO2 98%   BMI 39.75 kg/m²     Hemoglobin A1C   Date Value Ref Range Status   04/08/2023 7.1 (H) 4.0 - 5.6 % Final     Comment:     ADA Screening " Guidelines:  5.7-6.4%  Consistent with prediabetes  >or=6.5%  Consistent with diabetes    High levels of fetal hemoglobin interfere with the HbA1C  assay. Heterozygous hemoglobin variants (HbS, HgC, etc)do  not significantly interfere with this assay.   However, presence of multiple variants may affect accuracy.     02/06/2023 7.4 (H) 4.0 - 5.6 % Final     Comment:     ADA Screening Guidelines:  5.7-6.4%  Consistent with prediabetes  >or=6.5%  Consistent with diabetes    High levels of fetal hemoglobin interfere with the HbA1C  assay. Heterozygous hemoglobin variants (HbS, HgC, etc)do  not significantly interfere with this assay.   However, presence of multiple variants may affect accuracy.     11/12/2022 7.7 (H) 4.0 - 5.6 % Final     Comment:     ADA Screening Guidelines:  5.7-6.4%  Consistent with prediabetes  >or=6.5%  Consistent with diabetes    High levels of fetal hemoglobin interfere with the HbA1C  assay. Heterozygous hemoglobin variants (HbS, HgC, etc)do  not significantly interfere with this assay.   However, presence of multiple variants may affect accuracy.          Chemistry        Component Value Date/Time     04/08/2023 1032    K 4.6 04/08/2023 1032     04/08/2023 1032    CO2 26 04/08/2023 1032    BUN 14 04/08/2023 1032    CREATININE 0.9 04/08/2023 1032    GLU 95 04/08/2023 1032        Component Value Date/Time    CALCIUM 9.5 04/08/2023 1032    ALKPHOS 70 02/06/2023 1422    AST 16 02/06/2023 1422    ALT 13 02/06/2023 1422    BILITOT 0.2 02/06/2023 1422    ESTGFRAFRICA >60.0 03/19/2022 0823    EGFRNONAA >60.0 03/19/2022 0823           Lab Results   Component Value Date    TSH 0.999 02/06/2023      Lab Results   Component Value Date    CHOL 119 (L) 02/06/2023    CHOL 119 (L) 03/19/2022    CHOL 120 05/05/2021     Lab Results   Component Value Date    HDL 49 02/06/2023    HDL 54 03/19/2022    HDL 53 05/05/2021     Lab Results   Component Value Date    LDLCALC 54.0 (L) 02/06/2023    LDLCALC  49.4 (L) 03/19/2022    LDLCALC 53.4 (L) 05/05/2021     Lab Results   Component Value Date    TRIG 80 02/06/2023    TRIG 78 03/19/2022    TRIG 68 05/05/2021     Lab Results   Component Value Date    CHOLHDL 41.2 02/06/2023    CHOLHDL 45.4 03/19/2022    CHOLHDL 44.2 05/05/2021       PHYSICAL EXAMINATION  Constitutional: Appears well, no distress  Neck: Supple, trachea midline.   Respiratory: No wheezes, even and unlabored.  Cardiovascular: deferred  Lymph: no BLE edema  Skin: warm and dry; no injection site reactions, no acanthosis nigracans observed.  Neuro:patient alert and cooperative, normal affect; steady gait.    Diabetes Foot Exam: deferred     Assessment/Plan  1. 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 by proliferative retinopathy without macular edema, without long-term current use of insulin  Hemoglobin A1C      2. Type 2 diabetes mellitus with hyperglycemia, without long-term current use of insulin  Hemoglobin A1C      3. Nuclear sclerotic cataract of right eye        4. BRADFORD on CPAP        5. Primary hypertension        6. PVD (peripheral vascular disease)        7. Hyperlipidemia, unspecified hyperlipidemia type        8. Hypertensive retinopathy of both eyes        9. History of bariatric surgery, 12-        10. Class 3 severe obesity due to excess calories with serious comorbidity and body mass index (BMI) of 40.0 to 44.9 in adult        11. Coronary artery disease involving native coronary artery of native heart without angina pectoris          1-11. Follow up in 4 months w/ me   A1c prior   A1c goal less than 7%  Near goal   F/u with retinal specialist, ophthalmologist   F/u with podiatry, vascular prn   Body mass index is 39.75 kg/m².  May increase insulin resistance

## 2023-04-13 ENCOUNTER — OFFICE VISIT (OUTPATIENT)
Dept: INTERNAL MEDICINE | Facility: CLINIC | Age: 68
End: 2023-04-13
Payer: COMMERCIAL

## 2023-04-13 VITALS
WEIGHT: 246.25 LBS | HEART RATE: 77 BPM | OXYGEN SATURATION: 98 % | DIASTOLIC BLOOD PRESSURE: 72 MMHG | BODY MASS INDEX: 39.58 KG/M2 | SYSTOLIC BLOOD PRESSURE: 132 MMHG | HEIGHT: 66 IN

## 2023-04-13 DIAGNOSIS — Z98.84 HISTORY OF BARIATRIC SURGERY: ICD-10-CM

## 2023-04-13 DIAGNOSIS — E66.01 CLASS 3 SEVERE OBESITY DUE TO EXCESS CALORIES WITH SERIOUS COMORBIDITY AND BODY MASS INDEX (BMI) OF 40.0 TO 44.9 IN ADULT: ICD-10-CM

## 2023-04-13 DIAGNOSIS — I25.10 CORONARY ARTERY DISEASE INVOLVING NATIVE CORONARY ARTERY OF NATIVE HEART WITHOUT ANGINA PECTORIS: ICD-10-CM

## 2023-04-13 DIAGNOSIS — H35.033 HYPERTENSIVE RETINOPATHY OF BOTH EYES: ICD-10-CM

## 2023-04-13 DIAGNOSIS — E11.65 TYPE 2 DIABETES MELLITUS WITH HYPERGLYCEMIA, WITHOUT LONG-TERM CURRENT USE OF INSULIN: ICD-10-CM

## 2023-04-13 DIAGNOSIS — G47.33 OSA ON CPAP: ICD-10-CM

## 2023-04-13 DIAGNOSIS — E78.5 HYPERLIPIDEMIA, UNSPECIFIED HYPERLIPIDEMIA TYPE: ICD-10-CM

## 2023-04-13 DIAGNOSIS — H25.11 NUCLEAR SCLEROTIC CATARACT OF RIGHT EYE: ICD-10-CM

## 2023-04-13 DIAGNOSIS — E11.3593 TYPE 2 DIABETES MELLITUS WITH BOTH EYES AFFECTED BY PROLIFERATIVE RETINOPATHY WITHOUT MACULAR EDEMA, WITHOUT LONG-TERM CURRENT USE OF INSULIN: Primary | ICD-10-CM

## 2023-04-13 DIAGNOSIS — I10 PRIMARY HYPERTENSION: ICD-10-CM

## 2023-04-13 DIAGNOSIS — I73.9 PVD (PERIPHERAL VASCULAR DISEASE): ICD-10-CM

## 2023-04-13 PROCEDURE — 3075F SYST BP GE 130 - 139MM HG: CPT | Mod: CPTII,S$GLB,, | Performed by: NURSE PRACTITIONER

## 2023-04-13 PROCEDURE — 99214 PR OFFICE/OUTPT VISIT, EST, LEVL IV, 30-39 MIN: ICD-10-PCS | Mod: S$GLB,,, | Performed by: NURSE PRACTITIONER

## 2023-04-13 PROCEDURE — 1159F PR MEDICATION LIST DOCUMENTED IN MEDICAL RECORD: ICD-10-PCS | Mod: CPTII,S$GLB,, | Performed by: NURSE PRACTITIONER

## 2023-04-13 PROCEDURE — 3051F PR MOST RECENT HEMOGLOBIN A1C LEVEL 7.0 - < 8.0%: ICD-10-PCS | Mod: CPTII,S$GLB,, | Performed by: NURSE PRACTITIONER

## 2023-04-13 PROCEDURE — 1160F RVW MEDS BY RX/DR IN RCRD: CPT | Mod: CPTII,S$GLB,, | Performed by: NURSE PRACTITIONER

## 2023-04-13 PROCEDURE — 3078F DIAST BP <80 MM HG: CPT | Mod: CPTII,S$GLB,, | Performed by: NURSE PRACTITIONER

## 2023-04-13 PROCEDURE — 4010F PR ACE/ARB THEARPY RXD/TAKEN: ICD-10-PCS | Mod: CPTII,S$GLB,, | Performed by: NURSE PRACTITIONER

## 2023-04-13 PROCEDURE — 3008F PR BODY MASS INDEX (BMI) DOCUMENTED: ICD-10-PCS | Mod: CPTII,S$GLB,, | Performed by: NURSE PRACTITIONER

## 2023-04-13 PROCEDURE — 3288F PR FALLS RISK ASSESSMENT DOCUMENTED: ICD-10-PCS | Mod: CPTII,S$GLB,, | Performed by: NURSE PRACTITIONER

## 2023-04-13 PROCEDURE — 99999 PR PBB SHADOW E&M-EST. PATIENT-LVL IV: ICD-10-PCS | Mod: PBBFAC,,, | Performed by: NURSE PRACTITIONER

## 2023-04-13 PROCEDURE — 99999 PR PBB SHADOW E&M-EST. PATIENT-LVL IV: CPT | Mod: PBBFAC,,, | Performed by: NURSE PRACTITIONER

## 2023-04-13 PROCEDURE — 1101F PR PT FALLS ASSESS DOC 0-1 FALLS W/OUT INJ PAST YR: ICD-10-PCS | Mod: CPTII,S$GLB,, | Performed by: NURSE PRACTITIONER

## 2023-04-13 PROCEDURE — 1160F PR REVIEW ALL MEDS BY PRESCRIBER/CLIN PHARMACIST DOCUMENTED: ICD-10-PCS | Mod: CPTII,S$GLB,, | Performed by: NURSE PRACTITIONER

## 2023-04-13 PROCEDURE — 3078F PR MOST RECENT DIASTOLIC BLOOD PRESSURE < 80 MM HG: ICD-10-PCS | Mod: CPTII,S$GLB,, | Performed by: NURSE PRACTITIONER

## 2023-04-13 PROCEDURE — 99214 OFFICE O/P EST MOD 30 MIN: CPT | Mod: S$GLB,,, | Performed by: NURSE PRACTITIONER

## 2023-04-13 PROCEDURE — 3008F BODY MASS INDEX DOCD: CPT | Mod: CPTII,S$GLB,, | Performed by: NURSE PRACTITIONER

## 2023-04-13 PROCEDURE — 3051F HG A1C>EQUAL 7.0%<8.0%: CPT | Mod: CPTII,S$GLB,, | Performed by: NURSE PRACTITIONER

## 2023-04-13 PROCEDURE — 1126F PR PAIN SEVERITY QUANTIFIED, NO PAIN PRESENT: ICD-10-PCS | Mod: CPTII,S$GLB,, | Performed by: NURSE PRACTITIONER

## 2023-04-13 PROCEDURE — 1159F MED LIST DOCD IN RCRD: CPT | Mod: CPTII,S$GLB,, | Performed by: NURSE PRACTITIONER

## 2023-04-13 PROCEDURE — 3075F PR MOST RECENT SYSTOLIC BLOOD PRESS GE 130-139MM HG: ICD-10-PCS | Mod: CPTII,S$GLB,, | Performed by: NURSE PRACTITIONER

## 2023-04-13 PROCEDURE — 1126F AMNT PAIN NOTED NONE PRSNT: CPT | Mod: CPTII,S$GLB,, | Performed by: NURSE PRACTITIONER

## 2023-04-13 PROCEDURE — 4010F ACE/ARB THERAPY RXD/TAKEN: CPT | Mod: CPTII,S$GLB,, | Performed by: NURSE PRACTITIONER

## 2023-04-13 PROCEDURE — 1101F PT FALLS ASSESS-DOCD LE1/YR: CPT | Mod: CPTII,S$GLB,, | Performed by: NURSE PRACTITIONER

## 2023-04-13 PROCEDURE — 3288F FALL RISK ASSESSMENT DOCD: CPT | Mod: CPTII,S$GLB,, | Performed by: NURSE PRACTITIONER

## 2023-04-13 NOTE — PATIENT INSTRUCTIONS
Follow up in 4 months w/Irielle   A1c prior (1-7 days before appt)- august     Jardiance 25 mg daily   Ozempic 2 mg weekly  Metformin 1000 mg twice a day  Humalog as needed if sugar is > 150    Bring dexcom  each visit   [email or web address]    Lab Results   Component Value Date    HGBA1C 7.1 (H) 04/08/2023     Goal less than 7%    [email or web address]  Eat fit milena  Party Over Herepal milena  [email or web address]

## 2023-04-17 NOTE — TELEPHONE ENCOUNTER
----- Message from Erika Sorto sent at 4/17/2023 12:13 PM CDT -----  Contact: [phone]  Requesting an RX refill or new RX.  Is this a refill or new RX: refill  RX name and strength :  blood sugar diagnostic Strp 200 each free style  Is this a 30 day or 90 day RX:   Pharmacy name and phone # :  [id number] - [place], [place] - [address]   Phone:  [phone]  Fax:  [phone]    The doctors have asked that we provide their patients with the following 2 reminders -- prescription refills can take up to 72 hours, and a friendly reminder that in the future you can use your [facility] account to request refills: yes

## 2023-04-28 DIAGNOSIS — K21.9 GASTROESOPHAGEAL REFLUX DISEASE, UNSPECIFIED WHETHER ESOPHAGITIS PRESENT: ICD-10-CM

## 2023-04-28 NOTE — TELEPHONE ENCOUNTER
No care due was identified.  [facility] Embedded Care Due Messages. Reference number: [id number].   4/28/2023 4:13:25 PM CDT

## 2023-04-29 RX ORDER — ESOMEPRAZOLE MAGNESIUM 40 MG/1
CAPSULE, DELAYED RELEASE ORAL
Qty: 90 CAPSULE | Refills: 3 | Status: SHIPPED | OUTPATIENT
Start: 2023-04-29 | End: 2023-08-21 | Stop reason: SDUPTHER

## 2023-04-29 NOTE — TELEPHONE ENCOUNTER
Refill Decision Note   Aaliyah Angela  is requesting a refill authorization.  Brief Assessment and Rationale for Refill:  Approve     Medication Therapy Plan:       Medication Reconciliation Completed: No   Comments:     No Care Gaps recommended.     Note composed:10:58 AM 04/29/2023

## 2023-05-08 NOTE — TELEPHONE ENCOUNTER
----- Message from Kinsey Guzman MA sent at 5/8/2023  8:42 AM CDT -----  Contact: [phone] Patient    ----- Message -----  From: Feli Lee  Sent: 5/8/2023   8:31 AM CDT  To: Adalberto JORDAN Staff    Pt states she needs a PA for JARDIANCE 10 mg Tab .    [id number] [place], [place] - [address]  [address]  [place] [address]  Phone: [phone] Fax: [phone]

## 2023-05-08 NOTE — TELEPHONE ENCOUNTER
Message left. Prior Auth placed however Patient Inactive. Error came back. WIll get Rx Bin and Group number from pt to verify insurance.

## 2023-05-09 ENCOUNTER — PATIENT MESSAGE (OUTPATIENT)
Dept: INTERNAL MEDICINE | Facility: CLINIC | Age: 68
End: 2023-05-09
Payer: COMMERCIAL

## 2023-05-10 ENCOUNTER — TELEPHONE (OUTPATIENT)
Dept: INTERNAL MEDICINE | Facility: CLINIC | Age: 68
End: 2023-05-10
Payer: COMMERCIAL

## 2023-05-10 NOTE — TELEPHONE ENCOUNTER
----- Message from Kinsey Guzman MA sent at 5/10/2023  3:18 PM CDT -----  Contact: [phone] Patient    ----- Message -----  From: Dea Lluvia  Sent: 5/9/2023   8:11 AM CDT  To: Adalberto JORDAN Staff    Patient is returning a phone call.  Who left a message for the patient: Yumi Arauz LPN  Does patient know what this is regarding:  PA for JARDIANCE   Would you like a call back, or a response through your [facility] portal?:   call back  Comments:

## 2023-05-11 NOTE — TELEPHONE ENCOUNTER
----- Message from Kinsey Guzman MA sent at 5/11/2023  9:23 AM CDT -----  Contact: [phone]    ----- Message -----  From: Lday Yee  Sent: 5/11/2023   9:03 AM CDT  To: Adalberto JORDAN Staff    Pt returning call from Yumi       Please call and advise @ # [phone]

## 2023-05-15 ENCOUNTER — OFFICE VISIT (OUTPATIENT)
Dept: INTERNAL MEDICINE | Facility: CLINIC | Age: 68
End: 2023-05-15
Payer: COMMERCIAL

## 2023-05-15 VITALS
OXYGEN SATURATION: 98 % | WEIGHT: 242 LBS | SYSTOLIC BLOOD PRESSURE: 128 MMHG | BODY MASS INDEX: 38.89 KG/M2 | DIASTOLIC BLOOD PRESSURE: 86 MMHG | HEIGHT: 66 IN | HEART RATE: 77 BPM

## 2023-05-15 DIAGNOSIS — E78.5 HYPERLIPIDEMIA, UNSPECIFIED HYPERLIPIDEMIA TYPE: ICD-10-CM

## 2023-05-15 DIAGNOSIS — E11.65 TYPE 2 DIABETES MELLITUS WITH HYPERGLYCEMIA, WITHOUT LONG-TERM CURRENT USE OF INSULIN: ICD-10-CM

## 2023-05-15 DIAGNOSIS — I10 PRIMARY HYPERTENSION: Primary | ICD-10-CM

## 2023-05-15 DIAGNOSIS — Z12.31 BREAST CANCER SCREENING BY MAMMOGRAM: ICD-10-CM

## 2023-05-15 DIAGNOSIS — G47.33 OSA ON CPAP: ICD-10-CM

## 2023-05-15 DIAGNOSIS — I25.10 CORONARY ARTERY DISEASE INVOLVING NATIVE CORONARY ARTERY OF NATIVE HEART WITHOUT ANGINA PECTORIS: ICD-10-CM

## 2023-05-15 DIAGNOSIS — I73.9 PVD (PERIPHERAL VASCULAR DISEASE): ICD-10-CM

## 2023-05-15 PROCEDURE — 3008F BODY MASS INDEX DOCD: CPT | Mod: CPTII,S$GLB,, | Performed by: FAMILY MEDICINE

## 2023-05-15 PROCEDURE — 1101F PR PT FALLS ASSESS DOC 0-1 FALLS W/OUT INJ PAST YR: ICD-10-PCS | Mod: CPTII,S$GLB,, | Performed by: FAMILY MEDICINE

## 2023-05-15 PROCEDURE — 99215 OFFICE O/P EST HI 40 MIN: CPT | Mod: S$GLB,,, | Performed by: FAMILY MEDICINE

## 2023-05-15 PROCEDURE — 3288F PR FALLS RISK ASSESSMENT DOCUMENTED: ICD-10-PCS | Mod: CPTII,S$GLB,, | Performed by: FAMILY MEDICINE

## 2023-05-15 PROCEDURE — 4010F PR ACE/ARB THEARPY RXD/TAKEN: ICD-10-PCS | Mod: CPTII,S$GLB,, | Performed by: FAMILY MEDICINE

## 2023-05-15 PROCEDURE — 3079F DIAST BP 80-89 MM HG: CPT | Mod: CPTII,S$GLB,, | Performed by: FAMILY MEDICINE

## 2023-05-15 PROCEDURE — 99999 PR PBB SHADOW E&M-EST. PATIENT-LVL V: ICD-10-PCS | Mod: PBBFAC,,, | Performed by: FAMILY MEDICINE

## 2023-05-15 PROCEDURE — 1126F AMNT PAIN NOTED NONE PRSNT: CPT | Mod: CPTII,S$GLB,, | Performed by: FAMILY MEDICINE

## 2023-05-15 PROCEDURE — 3074F PR MOST RECENT SYSTOLIC BLOOD PRESSURE < 130 MM HG: ICD-10-PCS | Mod: CPTII,S$GLB,, | Performed by: FAMILY MEDICINE

## 2023-05-15 PROCEDURE — 3051F PR MOST RECENT HEMOGLOBIN A1C LEVEL 7.0 - < 8.0%: ICD-10-PCS | Mod: CPTII,S$GLB,, | Performed by: FAMILY MEDICINE

## 2023-05-15 PROCEDURE — 99999 PR PBB SHADOW E&M-EST. PATIENT-LVL V: CPT | Mod: PBBFAC,,, | Performed by: FAMILY MEDICINE

## 2023-05-15 PROCEDURE — 3079F PR MOST RECENT DIASTOLIC BLOOD PRESSURE 80-89 MM HG: ICD-10-PCS | Mod: CPTII,S$GLB,, | Performed by: FAMILY MEDICINE

## 2023-05-15 PROCEDURE — 4010F ACE/ARB THERAPY RXD/TAKEN: CPT | Mod: CPTII,S$GLB,, | Performed by: FAMILY MEDICINE

## 2023-05-15 PROCEDURE — 1101F PT FALLS ASSESS-DOCD LE1/YR: CPT | Mod: CPTII,S$GLB,, | Performed by: FAMILY MEDICINE

## 2023-05-15 PROCEDURE — 3074F SYST BP LT 130 MM HG: CPT | Mod: CPTII,S$GLB,, | Performed by: FAMILY MEDICINE

## 2023-05-15 PROCEDURE — 1159F PR MEDICATION LIST DOCUMENTED IN MEDICAL RECORD: ICD-10-PCS | Mod: CPTII,S$GLB,, | Performed by: FAMILY MEDICINE

## 2023-05-15 PROCEDURE — 3008F PR BODY MASS INDEX (BMI) DOCUMENTED: ICD-10-PCS | Mod: CPTII,S$GLB,, | Performed by: FAMILY MEDICINE

## 2023-05-15 PROCEDURE — 99215 PR OFFICE/OUTPT VISIT, EST, LEVL V, 40-54 MIN: ICD-10-PCS | Mod: S$GLB,,, | Performed by: FAMILY MEDICINE

## 2023-05-15 PROCEDURE — 3051F HG A1C>EQUAL 7.0%<8.0%: CPT | Mod: CPTII,S$GLB,, | Performed by: FAMILY MEDICINE

## 2023-05-15 PROCEDURE — 1159F MED LIST DOCD IN RCRD: CPT | Mod: CPTII,S$GLB,, | Performed by: FAMILY MEDICINE

## 2023-05-15 PROCEDURE — 1126F PR PAIN SEVERITY QUANTIFIED, NO PAIN PRESENT: ICD-10-PCS | Mod: CPTII,S$GLB,, | Performed by: FAMILY MEDICINE

## 2023-05-15 PROCEDURE — 3288F FALL RISK ASSESSMENT DOCD: CPT | Mod: CPTII,S$GLB,, | Performed by: FAMILY MEDICINE

## 2023-05-15 NOTE — PROGRESS NOTES
Subjective:       Patient ID: Aaliyah Angela is a 67 y.o. female.    Chief Complaint: Follow-up    Patient is here for follow-up of their chronic diseases    Had recent leg cellulitis treated in ER, notes reviewed, still has a few days of antibiotics left but now looks good and seems resolved    Diabetes Management Status - managed by DANAY Noble    Statin: Taking  ACE/ARB: Taking    Screening or Prevention Patient's value Goal Complete/Controlled?   HgA1C Testing and Control   Lab Results   Component Value Date    HGBA1C 7.1 (H) 04/08/2023      Annually/Less than 8% Yes   Lipid profile : 02/06/2023 Annually Yes   LDL control Lab Results   Component Value Date    LDLCALC 54.0 (L) 02/06/2023    Annually/Less than 100 mg/dl  Yes   Nephropathy screening Lab Results   Component Value Date    LABMICR 44.0 12/13/2022     Lab Results   Component Value Date    PROTEINUA Negative 12/13/2022     Lab Results   Component Value Date    UTPCR Unable to calculate 06/24/2020      Annually Yes   Blood pressure BP Readings from Last 1 Encounters:   05/15/23 128/86    Less than 140/90 Yes   Dilated retinal exam : 03/16/2023 Annually Yes   Foot exam   : 04/13/2023 Annually Yes         Review of patient's allergies indicates:  No Known Allergies    Social History     Tobacco Use    Smoking status: Never    Smokeless tobacco: Never   Substance Use Topics    Alcohol use: No    Drug use: No       Family History   Problem Relation Age of Onset    Diabetes Mother     Hypertension Mother     Diabetes Father     Hypertension Father     Stroke Father     Diabetes Sister     Diabetes Brother     Cancer Brother         prostate    Prostate cancer Brother     Diabetes Sister     Diabetes Sister     Diabetes Sister     Diabetes Brother     Stroke Brother     Diabetes Brother     Diabetes Brother     Breast cancer Neg Hx     Colon cancer Neg Hx     Ovarian cancer Neg Hx     Amblyopia Neg Hx     Blindness Neg Hx     Cataracts Neg Hx     Glaucoma Neg Hx      Macular degeneration Neg Hx     Retinal detachment Neg Hx     Strabismus Neg Hx     Calcium disorder Neg Hx     Osteoporosis Neg Hx        Past Surgical History:   Procedure Laterality Date    CARPAL TUNNEL RELEASE Right 2020    Procedure: RELEASE, CARPAL TUNNEL RIGHT;  Surgeon: Tameka Bran MD;  Location: [facility];  Service: Orthopedics;  Laterality: Right;    CARPAL TUNNEL RELEASE Right 2020    CATARACT EXTRACTION W/  INTRAOCULAR LENS IMPLANT Left 10/28/2020    COMPLEX ()    CATARACT EXTRACTION W/  INTRAOCULAR LENS IMPLANT Right 2021         SECTION      x3    COLONOSCOPY      COLONOSCOPY N/A 10/08/2020    Procedure: COLONOSCOPY;  Surgeon: Tima Talley MD;  Location: [facility] ENDO (4TH FLR);  Service: Endoscopy;  Laterality: N/A;    ESOPHAGOGASTRODUODENOSCOPY N/A 10/08/2020    Procedure: EGD (ESOPHAGOGASTRODUODENOSCOPY);  Surgeon: Tima Talley MD;  Location: [facility] ENDO (4TH FLR);  Service: Endoscopy;  Laterality: N/A;    GASTRIC BYPASS  2015    Sleeve    INTRAOCULAR PROSTHESES INSERTION Left 10/28/2020    Procedure: INSERTION, IOL PROSTHESIS;  Surgeon: Alma Goodrich MD;  Location: [facility] OR [address] [place] [address];  Service: Ophthalmology;  Laterality: Left;    INTRAOCULAR PROSTHESES INSERTION Right 2021    Procedure: INSERTION, IOL PROSTHESIS;  Surgeon: Alma Goodrich MD;  Location: [facility] OR [address] [place] [address];  Service: Ophthalmology;  Laterality: Right;    LASER PERIPHERAL IRIDOTOMY Bilateral 2017 and 2017        PANRETINAL PHOTOCOAGULATION      Both eyes    PHACOEMULSIFICATION OF CATARACT Left 10/28/2020    Procedure: PHACOEMULSIFICATION, CATARACT;  Surgeon: Alma Goodrich MD;  Location: [facility] OR [address] [place] [address];  Service: Ophthalmology;  Laterality: Left;    PHACOEMULSIFICATION OF CATARACT Right 2021    Procedure: PHACOEMULSIFICATION, CATARACT;  Surgeon: Alma Goodrich MD;  Location: [facility] OR [address] [place] [address];  Service: Ophthalmology;  Laterality:  Right;    TUBAL LIGATION         Patient Active Problem List   Diagnosis    Hyperlipidemia    Primary hypertension    Chronic tension-type headache, intractable    Hypertensive retinopathy    Transient vision disturbance    Cortical cataract    Vitreous hemorrhage    Class 3 severe obesity due to excess calories with serious comorbidity and body mass index (BMI) of 40.0 to 44.9 in adult    Chest pain on exertion    Ocular hypertension    Anatomical narrow angle borderline glaucoma    PVD (peripheral vascular disease)    CAD (coronary artery disease)    BRADFORD on CPAP    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 by proliferative retinopathy without macular edema, without long-term current use of insulin    History of bariatric surgery, 12-    Erythema nodosum    Primary osteoarthritis involving multiple joints    Bilateral carpal tunnel syndrome    Neck pain    Diabetic polyneuropathy associated with type 2 diabetes mellitus    GERD (gastroesophageal reflux disease)    Type 2 diabetes mellitus with hyperglycemia, without long-term current use of insulin    Small pupil    Shallow anterior chamber of eye    Nuclear sclerotic cataract of right eye    Tinea pedis of both feet    COVID       Current Outpatient Medications on File Prior to Visit   Medication Sig Dispense Refill    acetaminophen (TYLENOL) 650 MG TbSR Take 1 tablet (650 mg total) by mouth every 8 (eight) hours as needed (pain). 30 tablet 0    amitriptyline (ELAVIL) 25 MG tablet TAKE 2 TABLETS AT BEDTIME 180 tablet 3    amLODIPine (NORVASC) 10 MG tablet TAKE 1 TABLET(10 MG) BY MOUTH EVERY DAY 90 tablet 2    aspirin 81 MG Chew Take 81 mg by mouth once daily.      atorvastatin (LIPITOR) 20 MG tablet TAKE 1 TABLET(20 MG) BY MOUTH EVERY DAY 90 tablet 3    blood sugar diagnostic Strp To check BG 1-2 times daily, to use with insurance preferred meter 200 each 3    blood-glucose meter,continuous (DEXCOM G6 ) Misc Use as directed, e 11.65 1 each 1     blood-glucose sensor (DEXCOM G6 SENSOR) Carley Change every 10 days. E 11.65 3 each 11    blood-glucose transmitter (DEXCOM G6 TRANSMITTER) Carley Change every 3 months 1 each 3    calcium-vitamin D3 500 mg(1,250mg) -200 unit per tablet Take 1 tablet by mouth once daily.      esomeprazole (NEXIUM) 40 MG capsule TAKE 1 CAPSULE BY MOUTH EVERY DAY BEFORE BREAKFAST 90 capsule 3    FREESTYLE SAV 2 READER Misc use as directed      insulin lispro (HUMALOG KWIKPEN INSULIN) 100 unit/mL pen Use as directed based on sugar level, 150-200+2, 201-250+4, 251-300+6, 301-350+8, >350+10. Max daily 30 units. 1 Box 6    losartan (COZAAR) 100 MG tablet Take 1 tablet (100 mg total) by mouth once daily. 90 tablet 3    metFORMIN (GLUCOPHAGE-XR) 500 MG ER 24hr tablet Take 2 tablets (1,000 mg total) by mouth 2 (two) times daily with meals. 360 tablet 3    metoprolol succinate (TOPROL-XL) 50 MG 24 hr tablet TAKE 1 TABLET BY MOUTH EVERY DAY 90 tablet 3    multivitamin (THERAGRAN) per tablet Take 1 tablet by mouth once daily.      ondansetron (ZOFRAN) 4 MG tablet Take 1 tablet (4 mg total) by mouth 2 (two) times daily as needed for Nausea. 20 tablet 0    semaglutide (OZEMPIC) 2 mg/dose (8 mg/3 mL) PnIj Inject 2 mg into the skin every 7 days. 9 mL 3    traMADoL (ULTRAM) 50 mg tablet Take 1 tablet (50 mg total) by mouth every 6 (six) hours as needed for Pain. 20 tablet 0    [DISCONTINUED] empagliflozin (JARDIANCE) 25 mg tablet TAKE 1 TABLET BY MOUTH DAILY 90 tablet 2     Current Facility-Administered Medications on File Prior to Visit   Medication Dose Route Frequency Provider Last Rate Last Admin    0.9%  NaCl infusion   Intravenous Continuous Aristeo Bueno  mL/hr at 02/17/21 0754 New Bag at 02/17/21 0754           Review of Systems   Constitutional:  Negative for chills and fever.   HENT:  Negative for ear pain.    Eyes:  Negative for pain.   Respiratory:  Negative for chest tightness.    Cardiovascular:  Negative for chest  "pain.   Gastrointestinal:  Negative for abdominal pain.   Genitourinary:  Negative for flank pain.   Musculoskeletal:  Negative for gait problem.   Neurological:  Negative for syncope.   Psychiatric/Behavioral:  Negative for behavioral problems.      Objective:    /86 (BP Location: Left arm, Patient Position: Sitting, BP Method: Large (Manual))   Pulse 77   Ht 5' 6" (1.676 m)   Wt 109.8 kg (242 lb)   SpO2 98%   BMI 39.06 kg/m²     Physical Exam  Vitals and nursing note reviewed.   Constitutional:       Appearance: She is well-developed.   HENT:      Head: Normocephalic and atraumatic.   Cardiovascular:      Rate and Rhythm: Normal rate.      Heart sounds: Normal heart sounds.   Pulmonary:      Effort: No respiratory distress.      Breath sounds: Normal breath sounds. No wheezing or rales.   Abdominal:      Palpations: Abdomen is soft.   Musculoskeletal:      Cervical back: Neck supple.   Skin:     General: Skin is dry.   Neurological:      Mental Status: She is alert.   Psychiatric:         Behavior: Behavior normal.       Assessment:       1. Primary hypertension    2. Hyperlipidemia, unspecified hyperlipidemia type    3. PVD (peripheral vascular disease)    4. Coronary artery disease involving native coronary artery of native heart without angina pectoris    5. Type 2 diabetes mellitus with hyperglycemia, without long-term current use of insulin    6. BRADFORD on CPAP    7. Breast cancer screening by mammogram        Plan:       Aaliyah was seen today for follow-up.    Diagnoses and all orders for this visit:    Primary hypertension  -controlled, stable, no change in meds    Hyperlipidemia, unspecified hyperlipidemia type  PVD (peripheral vascular disease)  Coronary artery disease involving native coronary artery of native heart without angina pectoris  -keep LDL<70, it was 54 last time, meeting goal; continue statin    Type 2 diabetes mellitus with hyperglycemia, without long-term current use of " insulin  -controlled, stable, no change in meds - continue with NP Real for mgmt  -     empagliflozin (JARDIANCE) 25 mg tablet; TAKE 1 TABLET BY MOUTH DAILY - pt elsa a prior auth problem at [facility], will sned Rx to pharmacy in our bl to handle PA, will notify NP Real via cc    BRADFORD on CPAP  -uses with benefits, she says she needs to be more faithful to it    Breast cancer screening by mammogram  -     Mammo Digital Screening Bilat w/ Vick; Future - after 7/1        Follow up in about 6 months (around 11/15/2023) for htn.

## 2023-05-15 NOTE — Clinical Note
Sent jardiance to our pharmacy, she said prior auth problems with it at [facility] and was running out

## 2023-05-18 ENCOUNTER — TELEPHONE (OUTPATIENT)
Dept: INTERNAL MEDICINE | Facility: CLINIC | Age: 68
End: 2023-05-18
Payer: COMMERCIAL

## 2023-05-18 ENCOUNTER — TELEPHONE (OUTPATIENT)
Dept: PHARMACY | Facility: CLINIC | Age: 68
End: 2023-05-18
Payer: COMMERCIAL

## 2023-05-18 NOTE — TELEPHONE ENCOUNTER
----- Message from Alisson Sorto sent at 5/17/2023  3:35 PM CDT -----  Contact: [phone]  Pt is needing a prior authorization on the empagliflozin (JARDIANCE) 25 mg tablet 90 tablet. Per pt, she has been waiting since April 16th. Please call.              Thank you

## 2023-05-18 NOTE — TELEPHONE ENCOUNTER
----- Message from Alisson Sorto sent at 5/17/2023  3:35 PM CDT -----  Contact: [phone]  Pt is needing a prior authorization on the empagliflozin (JARDIANCE) 25 mg tablet 90 tablet. Per pt, she has been waiting since April 16th. Please call.              Thank you

## 2023-05-22 NOTE — TELEPHONE ENCOUNTER
[id number] was submitted via [facility] is unable to respond with clinical questions. Please see more information at the bottom of the page for next steps.    Response from [facility]...    Additional Information Required  [facility] has indicated that it is too soon to refill this medication at the pharmacy for your patient. If you need to renew an existing PA for your patient's medication, please reach out to [facility] directly at [phone]           [id number] - Rx #: [id number] help? Call us at [phone]  Archivedon January 13  Outcome  Approved on January 13, 2023  Drug  Dexcom G6 Transmitter

## 2023-06-07 ENCOUNTER — OFFICE VISIT (OUTPATIENT)
Dept: OPTOMETRY | Facility: CLINIC | Age: 68
End: 2023-06-07
Payer: COMMERCIAL

## 2023-06-07 DIAGNOSIS — Z96.1 PSEUDOPHAKIA OF BOTH EYES: ICD-10-CM

## 2023-06-07 DIAGNOSIS — H52.4 PRESBYOPIA: ICD-10-CM

## 2023-06-07 DIAGNOSIS — H52.203 ASTIGMATISM OF BOTH EYES, UNSPECIFIED TYPE: ICD-10-CM

## 2023-06-07 DIAGNOSIS — H26.493 PCO (POSTERIOR CAPSULAR OPACIFICATION), BILATERAL: Primary | ICD-10-CM

## 2023-06-07 PROBLEM — H25.11 NUCLEAR SCLEROTIC CATARACT OF RIGHT EYE: Status: RESOLVED | Noted: 2021-02-17 | Resolved: 2023-06-07

## 2023-06-07 PROCEDURE — 1101F PR PT FALLS ASSESS DOC 0-1 FALLS W/OUT INJ PAST YR: ICD-10-PCS | Mod: CPTII,S$GLB,, | Performed by: OPTOMETRIST

## 2023-06-07 PROCEDURE — 1126F PR PAIN SEVERITY QUANTIFIED, NO PAIN PRESENT: ICD-10-PCS | Mod: CPTII,S$GLB,, | Performed by: OPTOMETRIST

## 2023-06-07 PROCEDURE — 99202 OFFICE O/P NEW SF 15 MIN: CPT | Mod: S$GLB,,, | Performed by: OPTOMETRIST

## 2023-06-07 PROCEDURE — 3051F PR MOST RECENT HEMOGLOBIN A1C LEVEL 7.0 - < 8.0%: ICD-10-PCS | Mod: CPTII,S$GLB,, | Performed by: OPTOMETRIST

## 2023-06-07 PROCEDURE — 3288F FALL RISK ASSESSMENT DOCD: CPT | Mod: CPTII,S$GLB,, | Performed by: OPTOMETRIST

## 2023-06-07 PROCEDURE — 1126F AMNT PAIN NOTED NONE PRSNT: CPT | Mod: CPTII,S$GLB,, | Performed by: OPTOMETRIST

## 2023-06-07 PROCEDURE — 1101F PT FALLS ASSESS-DOCD LE1/YR: CPT | Mod: CPTII,S$GLB,, | Performed by: OPTOMETRIST

## 2023-06-07 PROCEDURE — 1160F RVW MEDS BY RX/DR IN RCRD: CPT | Mod: CPTII,S$GLB,, | Performed by: OPTOMETRIST

## 2023-06-07 PROCEDURE — 1160F PR REVIEW ALL MEDS BY PRESCRIBER/CLIN PHARMACIST DOCUMENTED: ICD-10-PCS | Mod: CPTII,S$GLB,, | Performed by: OPTOMETRIST

## 2023-06-07 PROCEDURE — 4010F ACE/ARB THERAPY RXD/TAKEN: CPT | Mod: CPTII,S$GLB,, | Performed by: OPTOMETRIST

## 2023-06-07 PROCEDURE — 4010F PR ACE/ARB THEARPY RXD/TAKEN: ICD-10-PCS | Mod: CPTII,S$GLB,, | Performed by: OPTOMETRIST

## 2023-06-07 PROCEDURE — 1159F PR MEDICATION LIST DOCUMENTED IN MEDICAL RECORD: ICD-10-PCS | Mod: CPTII,S$GLB,, | Performed by: OPTOMETRIST

## 2023-06-07 PROCEDURE — 99999 PR PBB SHADOW E&M-EST. PATIENT-LVL III: ICD-10-PCS | Mod: PBBFAC,,, | Performed by: OPTOMETRIST

## 2023-06-07 PROCEDURE — 92015 DETERMINE REFRACTIVE STATE: CPT | Mod: S$GLB,,, | Performed by: OPTOMETRIST

## 2023-06-07 PROCEDURE — 92015 PR REFRACTION: ICD-10-PCS | Mod: S$GLB,,, | Performed by: OPTOMETRIST

## 2023-06-07 PROCEDURE — 3051F HG A1C>EQUAL 7.0%<8.0%: CPT | Mod: CPTII,S$GLB,, | Performed by: OPTOMETRIST

## 2023-06-07 PROCEDURE — 99202 PR OFFICE/OUTPT VISIT, NEW, LEVL II, 15-29 MIN: ICD-10-PCS | Mod: S$GLB,,, | Performed by: OPTOMETRIST

## 2023-06-07 PROCEDURE — 3288F PR FALLS RISK ASSESSMENT DOCUMENTED: ICD-10-PCS | Mod: CPTII,S$GLB,, | Performed by: OPTOMETRIST

## 2023-06-07 PROCEDURE — 99999 PR PBB SHADOW E&M-EST. PATIENT-LVL III: CPT | Mod: PBBFAC,,, | Performed by: OPTOMETRIST

## 2023-06-07 PROCEDURE — 1159F MED LIST DOCD IN RCRD: CPT | Mod: CPTII,S$GLB,, | Performed by: OPTOMETRIST

## 2023-06-07 RX ORDER — CEPHALEXIN 500 MG/1
500 CAPSULE ORAL EVERY 8 HOURS
COMMUNITY
Start: 2023-05-10 | End: 2024-02-22

## 2023-06-07 RX ORDER — COVID-19 ANTIGEN TEST
KIT MISCELLANEOUS
COMMUNITY
Start: 2023-04-13 | End: 2024-03-14

## 2023-06-07 NOTE — PROGRESS NOTES
HPI    Pt sts OS is blurry. Here for Routine exam. Some flashes   Last edited by Doretha Guzman on 6/7/2023  1:13 PM.            Assessment /Plan     For exam results, see Encounter Report.    PCO (posterior capsular opacification), bilateral  Pseudophakia of both eyes   Return to Dr. Goodrich for YAG    Presbyopia  Astigmatism of both eyes, unspecified type   Rx specs

## 2023-07-10 ENCOUNTER — OFFICE VISIT (OUTPATIENT)
Dept: OPHTHALMOLOGY | Facility: CLINIC | Age: 68
End: 2023-07-10
Payer: COMMERCIAL

## 2023-07-10 DIAGNOSIS — H35.373 PRERETINAL FIBROSIS, BILATERAL: ICD-10-CM

## 2023-07-10 DIAGNOSIS — H40.033 ANATOMICAL NARROW ANGLE BORDERLINE GLAUCOMA OF BOTH EYES: ICD-10-CM

## 2023-07-10 DIAGNOSIS — Z79.4 TYPE 2 DIABETES MELLITUS WITH BOTH EYES AFFECTED BY PROLIFERATIVE RETINOPATHY WITHOUT MACULAR EDEMA, WITH LONG-TERM CURRENT USE OF INSULIN: Primary | ICD-10-CM

## 2023-07-10 DIAGNOSIS — E11.3593 TYPE 2 DIABETES MELLITUS WITH BOTH EYES AFFECTED BY PROLIFERATIVE RETINOPATHY WITHOUT MACULAR EDEMA, WITH LONG-TERM CURRENT USE OF INSULIN: Primary | ICD-10-CM

## 2023-07-10 PROCEDURE — 3288F PR FALLS RISK ASSESSMENT DOCUMENTED: ICD-10-PCS | Mod: CPTII,S$GLB,, | Performed by: OPHTHALMOLOGY

## 2023-07-10 PROCEDURE — 3051F HG A1C>EQUAL 7.0%<8.0%: CPT | Mod: CPTII,S$GLB,, | Performed by: OPHTHALMOLOGY

## 2023-07-10 PROCEDURE — 1126F PR PAIN SEVERITY QUANTIFIED, NO PAIN PRESENT: ICD-10-PCS | Mod: CPTII,S$GLB,, | Performed by: OPHTHALMOLOGY

## 2023-07-10 PROCEDURE — 1126F AMNT PAIN NOTED NONE PRSNT: CPT | Mod: CPTII,S$GLB,, | Performed by: OPHTHALMOLOGY

## 2023-07-10 PROCEDURE — 4010F ACE/ARB THERAPY RXD/TAKEN: CPT | Mod: CPTII,S$GLB,, | Performed by: OPHTHALMOLOGY

## 2023-07-10 PROCEDURE — 1101F PR PT FALLS ASSESS DOC 0-1 FALLS W/OUT INJ PAST YR: ICD-10-PCS | Mod: CPTII,S$GLB,, | Performed by: OPHTHALMOLOGY

## 2023-07-10 PROCEDURE — 92134 CPTRZ OPH DX IMG PST SGM RTA: CPT | Mod: S$GLB,,, | Performed by: OPHTHALMOLOGY

## 2023-07-10 PROCEDURE — 99999 PR PBB SHADOW E&M-EST. PATIENT-LVL IV: ICD-10-PCS | Mod: PBBFAC,,, | Performed by: OPHTHALMOLOGY

## 2023-07-10 PROCEDURE — 92202 PR OPHTHALMOSCOPY, EXT, W/DRAW OPTIC NERVE/MACULA, I&R, UNI/BI: ICD-10-PCS | Mod: S$GLB,,, | Performed by: OPHTHALMOLOGY

## 2023-07-10 PROCEDURE — 1160F PR REVIEW ALL MEDS BY PRESCRIBER/CLIN PHARMACIST DOCUMENTED: ICD-10-PCS | Mod: CPTII,S$GLB,, | Performed by: OPHTHALMOLOGY

## 2023-07-10 PROCEDURE — 92014 COMPRE OPH EXAM EST PT 1/>: CPT | Mod: S$GLB,,, | Performed by: OPHTHALMOLOGY

## 2023-07-10 PROCEDURE — 3288F FALL RISK ASSESSMENT DOCD: CPT | Mod: CPTII,S$GLB,, | Performed by: OPHTHALMOLOGY

## 2023-07-10 PROCEDURE — 1101F PT FALLS ASSESS-DOCD LE1/YR: CPT | Mod: CPTII,S$GLB,, | Performed by: OPHTHALMOLOGY

## 2023-07-10 PROCEDURE — 4010F PR ACE/ARB THEARPY RXD/TAKEN: ICD-10-PCS | Mod: CPTII,S$GLB,, | Performed by: OPHTHALMOLOGY

## 2023-07-10 PROCEDURE — 92134 POSTERIOR SEGMENT OCT RETINA (OCULAR COHERENCE TOMOGRAPHY)-BOTH EYES: ICD-10-PCS | Mod: S$GLB,,, | Performed by: OPHTHALMOLOGY

## 2023-07-10 PROCEDURE — 3051F PR MOST RECENT HEMOGLOBIN A1C LEVEL 7.0 - < 8.0%: ICD-10-PCS | Mod: CPTII,S$GLB,, | Performed by: OPHTHALMOLOGY

## 2023-07-10 PROCEDURE — 92202 OPSCPY EXTND ON/MAC DRAW: CPT | Mod: S$GLB,,, | Performed by: OPHTHALMOLOGY

## 2023-07-10 PROCEDURE — 1160F RVW MEDS BY RX/DR IN RCRD: CPT | Mod: CPTII,S$GLB,, | Performed by: OPHTHALMOLOGY

## 2023-07-10 PROCEDURE — 92014 PR EYE EXAM, EST PATIENT,COMPREHESV: ICD-10-PCS | Mod: S$GLB,,, | Performed by: OPHTHALMOLOGY

## 2023-07-10 PROCEDURE — 1159F MED LIST DOCD IN RCRD: CPT | Mod: CPTII,S$GLB,, | Performed by: OPHTHALMOLOGY

## 2023-07-10 PROCEDURE — 1159F PR MEDICATION LIST DOCUMENTED IN MEDICAL RECORD: ICD-10-PCS | Mod: CPTII,S$GLB,, | Performed by: OPHTHALMOLOGY

## 2023-07-10 PROCEDURE — 99999 PR PBB SHADOW E&M-EST. PATIENT-LVL IV: CPT | Mod: PBBFAC,,, | Performed by: OPHTHALMOLOGY

## 2023-07-10 NOTE — PROGRESS NOTES
Subjective:       Patient ID: Aaliyah Angela is a 67 y.o. female      Chief Complaint   Patient presents with    Follow-up     F/u DR and glaucoma as instructed     History of Present Illness  HPI     Follow-up     Additional comments: F/u DR and glaucoma as instructed           Comments    12 mo FU   DLS- 07/08/2022 Dr. Mckeon     Pt sts no change noticed in vision since last visit.     +flashes and floaters OU, longstanding, stable no change   (-)Photophobia  (-)Glare    No gtts           Last edited by Reggie Mckeon MD on 7/10/2023  9:50 AM.        Imaging:    See report    Assessment/Plan:     1. 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 by proliferative retinopathy without macular edema, with long-term current use of insulin  Stable   s/p PRP OU  Observe    Diabetic Retinopathy discussed in detail, all questions answered  Stressed importance of good BS/BP/Chol Control  RTC immediately PRN any vision changes, jonas blurry vision, missing vision, floaters, distortions, etc    - Posterior Segment OCT Retina-Both eyes    2. Anatomical narrow angle borderline glaucoma of both eyes  F/u Dr Goodrich as planned  Excellent result s/p CE/IOL OU    3. Preretinal fibrosis, bilateral  Stable.  Observe    - Posterior Segment OCT Retina-Both eyes      Can see pt yearly in retina or comprehensive eye doctor    Follow up in about 1 year (around 7/10/2024), or if symptoms worsen or fail to improve, for Comprehensive Examination, OCT Mac.

## 2023-07-27 RX ORDER — METFORMIN HYDROCHLORIDE 500 MG/1
TABLET, EXTENDED RELEASE ORAL
Qty: 360 TABLET | Refills: 0 | Status: SHIPPED | OUTPATIENT
Start: 2023-07-27 | End: 2023-08-16

## 2023-07-27 NOTE — TELEPHONE ENCOUNTER
Care Due:                  Date            Visit Type   Department     Provider  --------------------------------------------------------------------------------                                EP -                              PRIMARY      [facility]  Last Visit: 05-      CARE ([facility])   MEDICINE       MARY MCKINLEY  Next Visit: None Scheduled  None         None Found                                                            Last  Test          Frequency    Reason                     Performed    Due Date  --------------------------------------------------------------------------------    HBA1C.......  6 months...  empagliflozin, metFORMIN.  04-   10-    [facility] Embedded Care Due Messages. Reference number: [id number].   7/27/2023 5:52:24 PM CDT

## 2023-07-28 NOTE — TELEPHONE ENCOUNTER
Refill Decision Note   Aaliyah Julio César  is requesting a refill authorization.  Brief Assessment and Rationale for Refill:  Approve     Medication Therapy Plan:  FLOS      Comments:     Note composed:9:21 PM 07/27/2023

## 2023-08-02 ENCOUNTER — HOSPITAL ENCOUNTER (OUTPATIENT)
Dept: RADIOLOGY | Facility: HOSPITAL | Age: 68
Discharge: HOME | End: 2023-08-02
Attending: FAMILY MEDICINE
Payer: COMMERCIAL

## 2023-08-02 DIAGNOSIS — Z12.31 BREAST CANCER SCREENING BY MAMMOGRAM: ICD-10-CM

## 2023-08-02 PROCEDURE — 77067 SCR MAMMO BI INCL CAD: CPT | Mod: TC

## 2023-08-02 PROCEDURE — 77067 MAMMO DIGITAL SCREENING BILAT WITH TOMO: ICD-10-PCS | Mod: 26,,, | Performed by: RADIOLOGY

## 2023-08-02 PROCEDURE — 77063 BREAST TOMOSYNTHESIS BI: CPT | Mod: 26,,, | Performed by: RADIOLOGY

## 2023-08-02 PROCEDURE — 77063 MAMMO DIGITAL SCREENING BILAT WITH TOMO: ICD-10-PCS | Mod: 26,,, | Performed by: RADIOLOGY

## 2023-08-02 PROCEDURE — 77067 SCR MAMMO BI INCL CAD: CPT | Mod: 26,,, | Performed by: RADIOLOGY

## 2023-08-07 DIAGNOSIS — E11.65 TYPE 2 DIABETES MELLITUS WITH HYPERGLYCEMIA, WITHOUT LONG-TERM CURRENT USE OF INSULIN: Primary | ICD-10-CM

## 2023-08-07 RX ORDER — SEMAGLUTIDE 1.34 MG/ML
1 INJECTION, SOLUTION SUBCUTANEOUS
Qty: 9 ML | Refills: 1 | Status: SHIPPED | OUTPATIENT
Start: 2023-08-07 | End: 2023-08-21

## 2023-08-07 NOTE — TELEPHONE ENCOUNTER
No care due was identified.  [facility] Embedded Care Due Messages. Reference number: [id number].   8/07/2023 11:50:38 AM CDT

## 2023-08-08 RX ORDER — METFORMIN HYDROCHLORIDE 500 MG/1
TABLET, EXTENDED RELEASE ORAL
Qty: 360 TABLET | Refills: 0 | OUTPATIENT
Start: 2023-08-08

## 2023-08-08 NOTE — TELEPHONE ENCOUNTER
Refill Decision Note  Quick DC. Request already responded to by other means (e.g. phone or fax)    Aaliyah Angela  is requesting a refill authorization.  Brief Assessment and Rationale for Refill:  Quick Discontinue     Medication Therapy Plan:       Medication Reconciliation Completed: No   Comments:           Note composed:1:06 PM 08/08/2023

## 2023-08-09 ENCOUNTER — TELEPHONE (OUTPATIENT)
Dept: INTERNAL MEDICINE | Facility: CLINIC | Age: 68
End: 2023-08-09
Payer: COMMERCIAL

## 2023-08-09 NOTE — TELEPHONE ENCOUNTER
PA for Rx Ozempic (1 MG/DOSE) 4MG/3ML pen-injectors started  Decision to be sent via fax           CHRISTI LOLA ([place]: [id number])  Rx #: [id number]  Ozempic (1 MG/DOSE) 4MG/3ML pen-injectors  [facility] Electronic PA Form (2017 NCPDP)  Created  Sent to Plan  Wait for Determination

## 2023-08-16 RX ORDER — METFORMIN HYDROCHLORIDE 500 MG/1
TABLET, EXTENDED RELEASE ORAL
Qty: 360 TABLET | Refills: 0 | Status: SHIPPED | OUTPATIENT
Start: 2023-08-16 | End: 2023-11-28

## 2023-08-16 NOTE — TELEPHONE ENCOUNTER
No care due was identified.  [facility] Embedded Care Due Messages. Reference number: [id number].   8/15/2023 8:22:14 PM CDT

## 2023-08-16 NOTE — TELEPHONE ENCOUNTER
Refill Decision Note   Aaliyah Julio César  is requesting a refill authorization.  Brief Assessment and Rationale for Refill:  Approve     Medication Therapy Plan:         Comments:     Note composed:4:10 AM 08/16/2023

## 2023-08-19 ENCOUNTER — LAB VISIT (OUTPATIENT)
Dept: LAB | Facility: HOSPITAL | Age: 68
End: 2023-08-19
Payer: COMMERCIAL

## 2023-08-19 DIAGNOSIS — E11.65 TYPE 2 DIABETES MELLITUS WITH HYPERGLYCEMIA, WITHOUT LONG-TERM CURRENT USE OF INSULIN: ICD-10-CM

## 2023-08-19 DIAGNOSIS — E11.3593 TYPE 2 DIABETES MELLITUS WITH BOTH EYES AFFECTED BY PROLIFERATIVE RETINOPATHY WITHOUT MACULAR EDEMA, WITHOUT LONG-TERM CURRENT USE OF INSULIN: ICD-10-CM

## 2023-08-19 LAB
ESTIMATED AVG GLUCOSE: 169 MG/DL (ref 68–131)
HBA1C MFR BLD: 7.5 % (ref 4–5.6)

## 2023-08-19 PROCEDURE — 36415 COLL VENOUS BLD VENIPUNCTURE: CPT | Performed by: NURSE PRACTITIONER

## 2023-08-19 PROCEDURE — 83036 HEMOGLOBIN GLYCOSYLATED A1C: CPT | Performed by: NURSE PRACTITIONER

## 2023-08-20 ENCOUNTER — PATIENT MESSAGE (OUTPATIENT)
Dept: INTERNAL MEDICINE | Facility: CLINIC | Age: 68
End: 2023-08-20
Payer: COMMERCIAL

## 2023-08-21 ENCOUNTER — OFFICE VISIT (OUTPATIENT)
Dept: INTERNAL MEDICINE | Facility: CLINIC | Age: 68
End: 2023-08-21
Payer: COMMERCIAL

## 2023-08-21 VITALS
SYSTOLIC BLOOD PRESSURE: 124 MMHG | DIASTOLIC BLOOD PRESSURE: 86 MMHG | BODY MASS INDEX: 39.79 KG/M2 | HEIGHT: 66 IN | OXYGEN SATURATION: 98 % | HEART RATE: 80 BPM | WEIGHT: 247.56 LBS

## 2023-08-21 DIAGNOSIS — Z98.84 HISTORY OF BARIATRIC SURGERY: ICD-10-CM

## 2023-08-21 DIAGNOSIS — M79.641 RIGHT HAND PAIN: ICD-10-CM

## 2023-08-21 DIAGNOSIS — E11.65 TYPE 2 DIABETES MELLITUS WITH HYPERGLYCEMIA, WITHOUT LONG-TERM CURRENT USE OF INSULIN: Primary | ICD-10-CM

## 2023-08-21 DIAGNOSIS — E78.5 HYPERLIPIDEMIA, UNSPECIFIED HYPERLIPIDEMIA TYPE: ICD-10-CM

## 2023-08-21 DIAGNOSIS — G47.33 OSA ON CPAP: ICD-10-CM

## 2023-08-21 DIAGNOSIS — E11.3593 TYPE 2 DIABETES MELLITUS WITH BOTH EYES AFFECTED BY PROLIFERATIVE RETINOPATHY WITHOUT MACULAR EDEMA, WITHOUT LONG-TERM CURRENT USE OF INSULIN: ICD-10-CM

## 2023-08-21 DIAGNOSIS — I10 PRIMARY HYPERTENSION: ICD-10-CM

## 2023-08-21 DIAGNOSIS — Z78.0 POSTMENOPAUSAL: ICD-10-CM

## 2023-08-21 DIAGNOSIS — E11.42 DIABETIC POLYNEUROPATHY ASSOCIATED WITH TYPE 2 DIABETES MELLITUS: ICD-10-CM

## 2023-08-21 DIAGNOSIS — K21.9 GASTROESOPHAGEAL REFLUX DISEASE, UNSPECIFIED WHETHER ESOPHAGITIS PRESENT: ICD-10-CM

## 2023-08-21 DIAGNOSIS — I73.9 PVD (PERIPHERAL VASCULAR DISEASE): ICD-10-CM

## 2023-08-21 DIAGNOSIS — E66.01 CLASS 3 SEVERE OBESITY DUE TO EXCESS CALORIES WITH SERIOUS COMORBIDITY AND BODY MASS INDEX (BMI) OF 40.0 TO 44.9 IN ADULT: ICD-10-CM

## 2023-08-21 PROCEDURE — 3288F FALL RISK ASSESSMENT DOCD: CPT | Mod: CPTII,S$GLB,, | Performed by: NURSE PRACTITIONER

## 2023-08-21 PROCEDURE — 3074F SYST BP LT 130 MM HG: CPT | Mod: CPTII,S$GLB,, | Performed by: NURSE PRACTITIONER

## 2023-08-21 PROCEDURE — 3079F PR MOST RECENT DIASTOLIC BLOOD PRESSURE 80-89 MM HG: ICD-10-PCS | Mod: CPTII,S$GLB,, | Performed by: NURSE PRACTITIONER

## 2023-08-21 PROCEDURE — 4010F PR ACE/ARB THEARPY RXD/TAKEN: ICD-10-PCS | Mod: CPTII,S$GLB,, | Performed by: NURSE PRACTITIONER

## 2023-08-21 PROCEDURE — 3288F PR FALLS RISK ASSESSMENT DOCUMENTED: ICD-10-PCS | Mod: CPTII,S$GLB,, | Performed by: NURSE PRACTITIONER

## 2023-08-21 PROCEDURE — 99214 PR OFFICE/OUTPT VISIT, EST, LEVL IV, 30-39 MIN: ICD-10-PCS | Mod: S$GLB,,, | Performed by: NURSE PRACTITIONER

## 2023-08-21 PROCEDURE — 1126F PR PAIN SEVERITY QUANTIFIED, NO PAIN PRESENT: ICD-10-PCS | Mod: CPTII,S$GLB,, | Performed by: NURSE PRACTITIONER

## 2023-08-21 PROCEDURE — 3051F HG A1C>EQUAL 7.0%<8.0%: CPT | Mod: CPTII,S$GLB,, | Performed by: NURSE PRACTITIONER

## 2023-08-21 PROCEDURE — 1101F PR PT FALLS ASSESS DOC 0-1 FALLS W/OUT INJ PAST YR: ICD-10-PCS | Mod: CPTII,S$GLB,, | Performed by: NURSE PRACTITIONER

## 2023-08-21 PROCEDURE — 1160F RVW MEDS BY RX/DR IN RCRD: CPT | Mod: CPTII,S$GLB,, | Performed by: NURSE PRACTITIONER

## 2023-08-21 PROCEDURE — 99214 OFFICE O/P EST MOD 30 MIN: CPT | Mod: S$GLB,,, | Performed by: NURSE PRACTITIONER

## 2023-08-21 PROCEDURE — 1101F PT FALLS ASSESS-DOCD LE1/YR: CPT | Mod: CPTII,S$GLB,, | Performed by: NURSE PRACTITIONER

## 2023-08-21 PROCEDURE — 4010F ACE/ARB THERAPY RXD/TAKEN: CPT | Mod: CPTII,S$GLB,, | Performed by: NURSE PRACTITIONER

## 2023-08-21 PROCEDURE — 3074F PR MOST RECENT SYSTOLIC BLOOD PRESSURE < 130 MM HG: ICD-10-PCS | Mod: CPTII,S$GLB,, | Performed by: NURSE PRACTITIONER

## 2023-08-21 PROCEDURE — 1159F PR MEDICATION LIST DOCUMENTED IN MEDICAL RECORD: ICD-10-PCS | Mod: CPTII,S$GLB,, | Performed by: NURSE PRACTITIONER

## 2023-08-21 PROCEDURE — 3051F PR MOST RECENT HEMOGLOBIN A1C LEVEL 7.0 - < 8.0%: ICD-10-PCS | Mod: CPTII,S$GLB,, | Performed by: NURSE PRACTITIONER

## 2023-08-21 PROCEDURE — 3008F BODY MASS INDEX DOCD: CPT | Mod: CPTII,S$GLB,, | Performed by: NURSE PRACTITIONER

## 2023-08-21 PROCEDURE — 1126F AMNT PAIN NOTED NONE PRSNT: CPT | Mod: CPTII,S$GLB,, | Performed by: NURSE PRACTITIONER

## 2023-08-21 PROCEDURE — 99999 PR PBB SHADOW E&M-EST. PATIENT-LVL V: CPT | Mod: PBBFAC,,, | Performed by: NURSE PRACTITIONER

## 2023-08-21 PROCEDURE — 3079F DIAST BP 80-89 MM HG: CPT | Mod: CPTII,S$GLB,, | Performed by: NURSE PRACTITIONER

## 2023-08-21 PROCEDURE — 1159F MED LIST DOCD IN RCRD: CPT | Mod: CPTII,S$GLB,, | Performed by: NURSE PRACTITIONER

## 2023-08-21 PROCEDURE — 99999 PR PBB SHADOW E&M-EST. PATIENT-LVL V: ICD-10-PCS | Mod: PBBFAC,,, | Performed by: NURSE PRACTITIONER

## 2023-08-21 PROCEDURE — 1160F PR REVIEW ALL MEDS BY PRESCRIBER/CLIN PHARMACIST DOCUMENTED: ICD-10-PCS | Mod: CPTII,S$GLB,, | Performed by: NURSE PRACTITIONER

## 2023-08-21 PROCEDURE — 3008F PR BODY MASS INDEX (BMI) DOCUMENTED: ICD-10-PCS | Mod: CPTII,S$GLB,, | Performed by: NURSE PRACTITIONER

## 2023-08-21 RX ORDER — ESOMEPRAZOLE MAGNESIUM 40 MG/1
40 CAPSULE, DELAYED RELEASE ORAL DAILY
Qty: 90 CAPSULE | Refills: 3 | Status: SHIPPED | OUTPATIENT
Start: 2023-08-21

## 2023-08-21 NOTE — Clinical Note
Referral for hand surgery, dealing with pain, trigger finger in both hands Out of ozempic weeks at a time, working on diet, some stressors

## 2023-08-21 NOTE — PROGRESS NOTES
CHIEF COMPLAINT: Type 2 Diabetes     HPI: Mrs. Aaliyah Angela is a 67 y.o. female who was diagnosed with gestational diabetes age 22s, Type 2 DM age 32s.  Has h/o gastric sleeve 2015.-in [place]   Past Medical History:   Diagnosis Date    Allergy     Anatomical narrow angle borderline glaucoma     AR (allergic rhinitis)     Arthritis     CAD (coronary artery disease) 04/22/2013    Non obstructive, 2012 PET     Diabetes mellitus type II     with retinopathy    Diabetic retinopathy     Epiretinal membrane, both eyes     Hyperlipidemia     Hypertensive retinopathy of both eyes     Lumbar disc disease     Migraines     Morbid obesity 10/03/2012    BRADFORD (obstructive sleep apnea) 08/04/2015    Proliferative diabetic retinopathy(362.02)     PVD (peripheral vascular disease) 04/22/2013    Carotid US 1-39% bilat 2012     Rupture of right Achilles tendon     Unspecified essential hypertension     Unspecified vitamin D deficiency     Vitreous hemorrhage of left eye      Last seen by me in spring 2023   Being seen by me again today.   Lab Results   Component Value Date    HGBA1C 7.5 (H) 08/19/2023     A lot of stressors.  Brother passed away, h/o cva.   (L) leg cellulitis, (R) hand pain x 3 weeks.     Switched to dexcom last visit   Has   Highest: 240s    Am 105    Has f/u with endocrinology for pth, calcium -hyperparathyroidism-annually  -last pth normal    On MDI injections 3 x a day -correction scale   Testing 4 x a day max  Patient is willing and able to use the device  Demonstrated an understanding of the technology and is motivated to use CGM  Patient expected to adhere to a comprehensive diabetes treatment plan and patient has adequate medical supervision  Multiple impaired awareness of hypoglycemia (hypoglycemia unawareness)    Works 40 hours, walking 5 x a week    Current meds: ozempic 2 mg weekly-out x 4 weeks at times, metformin 1000 mg bid, jardiance 25 mg daily, humalog correction scale 150-200+2, etc  -self  "adjustment per correction scale    Diabetes Management Status    Statin: Taking  ACE/ARB: Taking    Screening or Prevention Patient's value Goal Complete/Controlled?   HgA1C Testing and Control   Lab Results   Component Value Date    HGBA1C 7.5 (H) 08/19/2023      Annually/Less than 8% No   Lipid profile : 02/06/2023 Annually No   LDL control Lab Results   Component Value Date    LDLCALC 54.0 (L) 02/06/2023    Annually/Less than 100 mg/dl  No   Nephropathy screening Lab Results   Component Value Date    LABMICR 44.0 12/13/2022     Lab Results   Component Value Date    PROTEINUA Negative 12/13/2022    Annually Yes   Blood pressure BP Readings from Last 1 Encounters:   05/15/23 128/86    Less than 140/90 No   Dilated retinal exam : 07/10/2023 Annually Yes   Foot exam   : 04/13/2023 Annually Yes     Cataract removal sx (L) eye  Then (R) eye     REVIEW OF SYSTEMS  General: no weakness, fatigue, + weight changes 4# loss   Eyes: no double or blurred vision, eye pain, or redness; Last Eye Exam 2021, h/o cataract sx, retinal/glaucoma specialist  Cardiovascular:  No chest pain, palpitations, +trace edema-BLE, or murmurs.   Respiratory: no cough or dyspnea.   GI/: + heartburn, nausea, or changes in bowel patterns; good appetite. +GERD-nexium and pepcid, off zantac   Skin: no rashes, dryness, itching, or reactions at insulin injection sites.  Neuro: + numbness, tingling-off gabapentin, on amitriptyline,  tremors, or vertigo. +OA in knees, +right hand pain, (L) leg cellulitis   Endocrine: no polyuria, polydipsia, polyphagia, heat or cold intolerance.     Vital Signs  /86 (BP Location: Left arm, Patient Position: Sitting, BP Method: Medium (Manual))   Pulse 80   Ht 5' 6" (1.676 m)   Wt 112.3 kg (247 lb 9.2 oz)   SpO2 98%   BMI 39.96 kg/m²     Hemoglobin A1C   Date Value Ref Range Status   08/19/2023 7.5 (H) 4.0 - 5.6 % Final     Comment:     ADA Screening Guidelines:  5.7-6.4%  Consistent with prediabetes  >or=6.5%  " Consistent with diabetes    High levels of fetal hemoglobin interfere with the HbA1C  assay. Heterozygous hemoglobin variants (HbS, HgC, etc)do  not significantly interfere with this assay.   However, presence of multiple variants may affect accuracy.     04/08/2023 7.1 (H) 4.0 - 5.6 % Final     Comment:     ADA Screening Guidelines:  5.7-6.4%  Consistent with prediabetes  >or=6.5%  Consistent with diabetes    High levels of fetal hemoglobin interfere with the HbA1C  assay. Heterozygous hemoglobin variants (HbS, HgC, etc)do  not significantly interfere with this assay.   However, presence of multiple variants may affect accuracy.     02/06/2023 7.4 (H) 4.0 - 5.6 % Final     Comment:     ADA Screening Guidelines:  5.7-6.4%  Consistent with prediabetes  >or=6.5%  Consistent with diabetes    High levels of fetal hemoglobin interfere with the HbA1C  assay. Heterozygous hemoglobin variants (HbS, HgC, etc)do  not significantly interfere with this assay.   However, presence of multiple variants may affect accuracy.          Chemistry        Component Value Date/Time     04/08/2023 1032    K 4.6 04/08/2023 1032     04/08/2023 1032    CO2 26 04/08/2023 1032    BUN 14 04/08/2023 1032    CREATININE 0.9 04/08/2023 1032    GLU 95 04/08/2023 1032        Component Value Date/Time    CALCIUM 9.5 04/08/2023 1032    ALKPHOS 70 02/06/2023 1422    AST 16 02/06/2023 1422    ALT 13 02/06/2023 1422    BILITOT 0.2 02/06/2023 1422    ESTGFRAFRICA >60.0 03/19/2022 0823    EGFRNONAA >60.0 03/19/2022 0823           Lab Results   Component Value Date    TSH 0.999 02/06/2023      Lab Results   Component Value Date    CHOL 119 (L) 02/06/2023    CHOL 119 (L) 03/19/2022    CHOL 120 05/05/2021     Lab Results   Component Value Date    HDL 49 02/06/2023    HDL 54 03/19/2022    HDL 53 05/05/2021     Lab Results   Component Value Date    LDLCALC 54.0 (L) 02/06/2023    LDLCALC 49.4 (L) 03/19/2022    LDLCALC 53.4 (L) 05/05/2021     Lab  Results   Component Value Date    TRIG 80 02/06/2023    TRIG 78 03/19/2022    TRIG 68 05/05/2021     Lab Results   Component Value Date    CHOLHDL 41.2 02/06/2023    CHOLHDL 45.4 03/19/2022    CHOLHDL 44.2 05/05/2021       PHYSICAL EXAMINATION  Constitutional: Appears well, no distress  Neck: Supple, trachea midline.   Respiratory: No wheezes, even and unlabored.  Cardiovascular: deferred  Lymph: no BLE edema  Skin: warm and dry; no injection site reactions, no acanthosis nigracans observed.  Neuro:patient alert and cooperative, normal affect; steady gait.    Diabetes Foot Exam: deferred     Assessment/Plan  1. Type 2 diabetes mellitus with hyperglycemia, without long-term current use of insulin  Hemoglobin A1C      2. 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 by proliferative retinopathy without macular edema, without long-term current use of insulin        3. Diabetic polyneuropathy associated with type 2 diabetes mellitus        4. PVD (peripheral vascular disease)        5. Primary hypertension        6. BRADFORD on CPAP        7. Hyperlipidemia, unspecified hyperlipidemia type        8. History of bariatric surgery, 12-        9. Class 3 severe obesity due to excess calories with serious comorbidity and body mass index (BMI) of 40.0 to 44.9 in adult        10. Right hand pain  Ambulatory referral/consult to Hand Surgery      11. Postmenopausal  DXA Bone Density Axial Skeleton 1 or more sites        1-11. Follow up in 4 months w/ me   A1c prior   A1c goal less than 7%  A1c above goal  Dxa bone density 2023  Bp controlled  Started back cpap   Body mass index is 39.96 kg/m².  May increase insulin resistance   F/u with retinal specialist  F/u with podiatry  Being affected by shortages for ozempic, gaps of 2-4 weeks of not having  Affecting a1c   Pain affecting a1c   Using cgm : dexcom , has   Bring for upload next time

## 2023-08-21 NOTE — PATIENT INSTRUCTIONS
Ozempic 2 mg weekly   Jardiance 25 mg daily  Humalog as needed if sugar is 150-200+2, etc.  Lab Results   Component Value Date    HGBA1C 7.5 (H) 08/19/2023     Goal less than 7%    Dexcom g6, bring  each visit     Hand surgery (R) hand pain   Follow up in 4 months  A1c prior -4 months

## 2023-08-22 DIAGNOSIS — R52 PAIN: Primary | ICD-10-CM

## 2023-08-23 ENCOUNTER — TELEPHONE (OUTPATIENT)
Dept: ORTHOPEDICS | Facility: CLINIC | Age: 68
End: 2023-08-23
Payer: COMMERCIAL

## 2023-08-24 ENCOUNTER — OFFICE VISIT (OUTPATIENT)
Dept: ORTHOPEDICS | Facility: CLINIC | Age: 68
End: 2023-08-24
Payer: COMMERCIAL

## 2023-08-24 ENCOUNTER — HOSPITAL ENCOUNTER (OUTPATIENT)
Dept: RADIOLOGY | Facility: OTHER | Age: 68
Discharge: HOME | End: 2023-08-24
Attending: PHYSICIAN ASSISTANT
Payer: COMMERCIAL

## 2023-08-24 VITALS — WEIGHT: 247 LBS | BODY MASS INDEX: 39.7 KG/M2 | HEIGHT: 66 IN

## 2023-08-24 DIAGNOSIS — M65.4 DE QUERVAIN'S TENOSYNOVITIS, RIGHT: Primary | ICD-10-CM

## 2023-08-24 DIAGNOSIS — M65.349 TRIGGER RING FINGER, UNSPECIFIED LATERALITY: ICD-10-CM

## 2023-08-24 DIAGNOSIS — R52 PAIN: Primary | ICD-10-CM

## 2023-08-24 DIAGNOSIS — R52 PAIN: ICD-10-CM

## 2023-08-24 PROCEDURE — 3051F PR MOST RECENT HEMOGLOBIN A1C LEVEL 7.0 - < 8.0%: ICD-10-PCS | Mod: CPTII,S$GLB,, | Performed by: PHYSICIAN ASSISTANT

## 2023-08-24 PROCEDURE — 4010F PR ACE/ARB THEARPY RXD/TAKEN: ICD-10-PCS | Mod: CPTII,S$GLB,, | Performed by: PHYSICIAN ASSISTANT

## 2023-08-24 PROCEDURE — 1125F PR PAIN SEVERITY QUANTIFIED, PAIN PRESENT: ICD-10-PCS | Mod: CPTII,S$GLB,, | Performed by: PHYSICIAN ASSISTANT

## 2023-08-24 PROCEDURE — 1125F AMNT PAIN NOTED PAIN PRSNT: CPT | Mod: CPTII,S$GLB,, | Performed by: PHYSICIAN ASSISTANT

## 2023-08-24 PROCEDURE — 1159F PR MEDICATION LIST DOCUMENTED IN MEDICAL RECORD: ICD-10-PCS | Mod: CPTII,S$GLB,, | Performed by: PHYSICIAN ASSISTANT

## 2023-08-24 PROCEDURE — 73130 X-RAY EXAM OF HAND: CPT | Mod: 26,RT,, | Performed by: RADIOLOGY

## 2023-08-24 PROCEDURE — 3008F PR BODY MASS INDEX (BMI) DOCUMENTED: ICD-10-PCS | Mod: CPTII,S$GLB,, | Performed by: PHYSICIAN ASSISTANT

## 2023-08-24 PROCEDURE — 20550 PR INJECT TENDON SHEATH/LIGAMENT: ICD-10-PCS | Mod: 50,S$GLB,, | Performed by: PHYSICIAN ASSISTANT

## 2023-08-24 PROCEDURE — 99999 PR PBB SHADOW E&M-EST. PATIENT-LVL IV: CPT | Mod: PBBFAC,,, | Performed by: PHYSICIAN ASSISTANT

## 2023-08-24 PROCEDURE — 97760 ORTHOTIC MGMT&TRAING 1ST ENC: CPT | Mod: GP,S$GLB,, | Performed by: PHYSICIAN ASSISTANT

## 2023-08-24 PROCEDURE — 1101F PT FALLS ASSESS-DOCD LE1/YR: CPT | Mod: CPTII,S$GLB,, | Performed by: PHYSICIAN ASSISTANT

## 2023-08-24 PROCEDURE — 3288F PR FALLS RISK ASSESSMENT DOCUMENTED: ICD-10-PCS | Mod: CPTII,S$GLB,, | Performed by: PHYSICIAN ASSISTANT

## 2023-08-24 PROCEDURE — 3051F HG A1C>EQUAL 7.0%<8.0%: CPT | Mod: CPTII,S$GLB,, | Performed by: PHYSICIAN ASSISTANT

## 2023-08-24 PROCEDURE — 73130 X-RAY EXAM OF HAND: CPT | Mod: TC,FY,RT

## 2023-08-24 PROCEDURE — 1101F PR PT FALLS ASSESS DOC 0-1 FALLS W/OUT INJ PAST YR: ICD-10-PCS | Mod: CPTII,S$GLB,, | Performed by: PHYSICIAN ASSISTANT

## 2023-08-24 PROCEDURE — 99204 OFFICE O/P NEW MOD 45 MIN: CPT | Mod: 25,S$GLB,, | Performed by: PHYSICIAN ASSISTANT

## 2023-08-24 PROCEDURE — 1159F MED LIST DOCD IN RCRD: CPT | Mod: CPTII,S$GLB,, | Performed by: PHYSICIAN ASSISTANT

## 2023-08-24 PROCEDURE — 3008F BODY MASS INDEX DOCD: CPT | Mod: CPTII,S$GLB,, | Performed by: PHYSICIAN ASSISTANT

## 2023-08-24 PROCEDURE — 97760 PR ORTHOTIC MGMT&TRAINJ INITIAL ENC EA 15 MINS: ICD-10-PCS | Mod: GP,S$GLB,, | Performed by: PHYSICIAN ASSISTANT

## 2023-08-24 PROCEDURE — 73130 XR HAND COMPLETE 3 VIEW RIGHT: ICD-10-PCS | Mod: 26,RT,, | Performed by: RADIOLOGY

## 2023-08-24 PROCEDURE — 99999 PR PBB SHADOW E&M-EST. PATIENT-LVL IV: ICD-10-PCS | Mod: PBBFAC,,, | Performed by: PHYSICIAN ASSISTANT

## 2023-08-24 PROCEDURE — 99204 PR OFFICE/OUTPT VISIT, NEW, LEVL IV, 45-59 MIN: ICD-10-PCS | Mod: 25,S$GLB,, | Performed by: PHYSICIAN ASSISTANT

## 2023-08-24 PROCEDURE — 20550 NJX 1 TENDON SHEATH/LIGAMENT: CPT | Mod: 50,S$GLB,, | Performed by: PHYSICIAN ASSISTANT

## 2023-08-24 PROCEDURE — 4010F ACE/ARB THERAPY RXD/TAKEN: CPT | Mod: CPTII,S$GLB,, | Performed by: PHYSICIAN ASSISTANT

## 2023-08-24 PROCEDURE — 3288F FALL RISK ASSESSMENT DOCD: CPT | Mod: CPTII,S$GLB,, | Performed by: PHYSICIAN ASSISTANT

## 2023-08-24 RX ORDER — LIDOCAINE HYDROCHLORIDE 10 MG/ML
0.5 INJECTION, SOLUTION EPIDURAL; INFILTRATION; INTRACAUDAL; PERINEURAL ONCE
Status: COMPLETED | OUTPATIENT
Start: 2023-08-24 | End: 2023-08-24

## 2023-08-24 RX ORDER — DEXAMETHASONE SODIUM PHOSPHATE 4 MG/ML
4 INJECTION, SOLUTION INTRA-ARTICULAR; INTRALESIONAL; INTRAMUSCULAR; INTRAVENOUS; SOFT TISSUE
Status: COMPLETED | OUTPATIENT
Start: 2023-08-24 | End: 2023-08-24

## 2023-08-24 RX ADMIN — DEXAMETHASONE SODIUM PHOSPHATE 4 MG: 4 INJECTION, SOLUTION INTRA-ARTICULAR; INTRALESIONAL; INTRAMUSCULAR; INTRAVENOUS; SOFT TISSUE at 08:08

## 2023-08-24 RX ADMIN — LIDOCAINE HYDROCHLORIDE 5 MG: 10 INJECTION, SOLUTION EPIDURAL; INFILTRATION; INTRACAUDAL; PERINEURAL at 09:08

## 2023-08-24 NOTE — PROGRESS NOTES
Subjective:      Patient ID: Aaliyah Angela is a 67 y.o. female.    Chief Complaint: Swelling of the Left Hand and Swelling and Pain of the Right Hand      HPI  Aaliyah Angela is a  67 y.o. female with DMII presenting today for evaluation of the bilateral hands. She has pain and triggering of the bilateral ring fingers. She has not yet tried anything. In addition she has pain at the right radial wrist, first dorsal compartment, increased with use. Some pain at the base of the right thumb as well. 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     Review of patient's allergies indicates:  No Known Allergies      Current Outpatient Medications   Medication Sig Dispense Refill    acetaminophen (TYLENOL) 650 MG TbSR Take 1 tablet (650 mg total) by mouth every 8 (eight) hours as needed (pain). 30 tablet 0    amitriptyline (ELAVIL) 25 MG tablet TAKE 2 TABLETS AT BEDTIME 180 tablet 3    amLODIPine (NORVASC) 10 MG tablet TAKE 1 TABLET(10 MG) BY MOUTH EVERY DAY 90 tablet 2    aspirin 81 MG Chew Take 81 mg by mouth once daily.      atorvastatin (LIPITOR) 20 MG tablet TAKE 1 TABLET(20 MG) BY MOUTH EVERY DAY 90 tablet 3    blood sugar diagnostic Strp To check BG 1-2 times daily, to use with insurance preferred meter 200 each 3    blood-glucose meter,continuous (DEXCOM G6 ) Misc Use as directed, e 11.65 1 each 1    blood-glucose sensor (DEXCOM G6 SENSOR) Carley Change every 10 days. E 11.65 3 each 11    blood-glucose transmitter (DEXCOM G6 TRANSMITTER) Carley Change every 3 months 1 each 3    calcium-vitamin D3 500 mg(1,250mg) -200 unit per tablet Take 1 tablet by mouth once daily.      cephALEXin (KEFLEX) 500 MG capsule Take 500 mg by mouth every 8 (eight) hours.      empagliflozin (JARDIANCE) 25 mg tablet TAKE 1 TABLET BY MOUTH DAILY 90 tablet 3    esomeprazole (NEXIUM) 40 MG capsule Take 1 capsule (40 mg total) by mouth once daily. 90 capsule 3    FLOWFLEX COVID-19 AG HOME TEST Kit FOLLOW INSTRUCTIONS INCLUDED WITH THE  PACKAGE.      insulin lispro (HUMALOG KWIKPEN INSULIN) 100 unit/mL pen Use as directed based on sugar level, 150-200+2, 201-250+4, 251-300+6, 301-350+8, >350+10. Max daily 30 units. 1 Box 6    losartan (COZAAR) 100 MG tablet Take 1 tablet (100 mg total) by mouth once daily. 90 tablet 3    metFORMIN (GLUCOPHAGE-XR) 500 MG ER 24hr tablet TAKE 2 TABLETS TWICE TWICE A  tablet 0    metoprolol succinate (TOPROL-XL) 50 MG 24 hr tablet TAKE 1 TABLET BY MOUTH EVERY DAY 90 tablet 3    multivitamin (THERAGRAN) per tablet Take 1 tablet by mouth once daily.      ondansetron (ZOFRAN) 4 MG tablet Take 1 tablet (4 mg total) by mouth 2 (two) times daily as needed for Nausea. 20 tablet 0    semaglutide (OZEMPIC) 2 mg/dose (8 mg/3 mL) PnIj Inject 2 mg into the skin every 7 days. 9 mL 3     Current Facility-Administered Medications   Medication Dose Route Frequency Provider Last Rate Last Admin    dexAMETHasone injection 4 mg  4 mg Other 1 time in Clinic/HOD Priya Denton PA-C        dexAMETHasone injection 4 mg  4 mg Other 1 time in Clinic/HOD Priya Denton PA-C        LIDOcaine (PF) 10 mg/ml (1%) injection 5 mg  0.5 mL Other Once Priya Denton PA-C        LIDOcaine (PF) 10 mg/ml (1%) injection 5 mg  0.5 mL Other Once Priya Denton PA-C         Facility-Administered Medications Ordered in Other Visits   Medication Dose Route Frequency Provider Last Rate Last Admin    0.9%  NaCl infusion   Intravenous Continuous Aristeo Bueno  mL/hr at 02/17/21 0754 New Bag at 02/17/21 0754       Past Medical History:   Diagnosis Date    Allergy     Anatomical narrow angle borderline glaucoma     AR (allergic rhinitis)     Arthritis     CAD (coronary artery disease) 04/22/2013    Non obstructive, 2012 PET     Diabetes mellitus type II     with retinopathy    Diabetic retinopathy     Epiretinal membrane, both eyes     Hyperlipidemia     Hypertensive retinopathy of both eyes     Lumbar disc disease     Migraines     Morbid  obesity 10/03/2012    BRADFORD (obstructive sleep apnea) 2015    Proliferative diabetic retinopathy(362.02)     PVD (peripheral vascular disease) 2013    Carotid US 1-39% bilat      Rupture of right Achilles tendon     Unspecified essential hypertension     Unspecified vitamin D deficiency     Vitreous hemorrhage of left eye        Past Surgical History:   Procedure Laterality Date    CARPAL TUNNEL RELEASE Right 2020    Procedure: RELEASE, CARPAL TUNNEL RIGHT;  Surgeon: Tameka Bran MD;  [facility];  Service: Orthopedics;  Laterality: Right;    CARPAL TUNNEL RELEASE Right 2020    CATARACT EXTRACTION W/  INTRAOCULAR LENS IMPLANT Left 10/28/2020    COMPLEX ()    CATARACT EXTRACTION W/  INTRAOCULAR LENS IMPLANT Right 2021         SECTION      x3    COLONOSCOPY      COLONOSCOPY N/A 10/08/2020    Procedure: COLONOSCOPY;  Surgeon: Tima Talley MD;  Location: [facility] (4TH FLR);  Service: Endoscopy;  Laterality: N/A;    ESOPHAGOGASTRODUODENOSCOPY N/A 10/08/2020    Procedure: EGD (ESOPHAGOGASTRODUODENOSCOPY);  Surgeon: Tima Talley MD;  Location: [facility] (4TH FLR);  Service: Endoscopy;  Laterality: N/A;    GASTRIC BYPASS      Sleeve    INTRAOCULAR PROSTHESES INSERTION Left 10/28/2020    Procedure: INSERTION, IOL PROSTHESIS;  Surgeon: Alma Goodrich MD;  [address];  Service: Ophthalmology;  Laterality: Left;    INTRAOCULAR PROSTHESES INSERTION Right 2021    Procedure: INSERTION, IOL PROSTHESIS;  Surgeon: Alma Goodrich MD;  [address];  Service: Ophthalmology;  Laterality: Right;    LASER PERIPHERAL IRIDOTOMY Bilateral 2017 and 2017        PANRETINAL PHOTOCOAGULATION      Both eyes    PHACOEMULSIFICATION OF CATARACT Left 10/28/2020    Procedure: PHACOEMULSIFICATION, CATARACT;  Surgeon: Alma Goodrich MD;  Location: [address];  Service: Ophthalmology;  Laterality:  "Left;    PHACOEMULSIFICATION OF CATARACT Right 02/17/2021    Procedure: PHACOEMULSIFICATION, CATARACT;  Surgeon: Alma Goodrich MD;  Location: [facility] OR [address] [place] [address];  Service: Ophthalmology;  Laterality: Right;    TUBAL LIGATION         Review of Systems:  Constitutional: Negative for chills and fever.   Respiratory: Negative for cough and shortness of breath.    Gastrointestinal: Negative for nausea and vomiting.   Skin: Negative for rash.   Neurological: Negative for dizziness and headaches.   Psychiatric/Behavioral: Negative for depression.   MSK as in HPI       OBJECTIVE:     PHYSICAL EXAM:  Ht 5' 6" (1.676 m)   Wt 112 kg (247 lb)   BMI 39.87 kg/m²     GEN:  NAD, well-developed, well-groomed.  NEURO: Awake, alert, and oriented. Normal attention and concentration.    PSYCH: Normal mood and affect. Behavior is normal.  HEENT: No cervical lymphadenopathy noted.  CARDIOVASCULAR: Radial pulses 2+ bilaterally. No LE edema noted.  PULMONARY: Breath sounds normal. No respiratory distress.  SKIN: Intact, no rashes.      MSK:   BUE:  Good active ROM of the wrist and fingers. Ttp bl ring A1 pulleys with triggering. Ttp right first dorsal compartment and right thumb CMC. AIN/PIN/Radial/Median/Ulnar Nerves assessed in isolation without deficit. Radial & Ulnar arteries palpated 2+. Capillary Refill <3s.    RADIOGRAPHS:  Xray right hand 8/24/23   Impression:   No acute osseous abnormality identified.  Comments: I have personally reviewed the imaging and I agree with the above radiologist's report.    ASSESSMENT/PLAN:       ICD-10-CM ICD-9-CM   1. De Quervain's tenosynovitis, right  M65.4 727.04   2. Trigger ring finger, unspecified laterality  M65.349 727.03       Orders Placed This Encounter    LIDOcaine (PF) 10 mg/ml (1%) injection 5 mg    dexAMETHasone injection 4 mg    LIDOcaine (PF) 10 mg/ml (1%) injection 5 mg    dexAMETHasone injection 4 mg     No orders of the defined types were placed in this encounter.    Plan: "   Treatment options discussed plan for bl ring trigger finger injections. R dequervains plan for bracing and therapy 15 min educating and fitting  RTC 6 wks       PROCEDURE:  I have explained the risks, benefits, and alternatives of the procedure in detail.  The patient voices understanding and all questions have been answered.  The patient agrees to proceed as planned. US used. Verbal consent obtained. So after a sterile prep of the skin in the normal fashion the bilateral ring flexor tendon sheath is injected from the volar  approach using a 25 gauge needle with a combination of 0.5cc 1% plain lidocaine and 4 mg of dexamethasone.  The patient is cautioned and immediate relief of pain is secondary to the local anesthetic and will be temporary.  After the anesthetic wears off there may be a increase in pain that may last for a few hours or a few days and they should use ice to help alleviate this flair up of pain. Patient tolerated the procedure well.         The patient indicates understanding of these issues and agrees to the plan.    Priya Denton PA-C  Hand [facility]   [facility]  [place]

## 2023-09-21 ENCOUNTER — HOSPITAL ENCOUNTER (OUTPATIENT)
Dept: RADIOLOGY | Facility: CLINIC | Age: 68
Discharge: HOME | End: 2023-09-21
Attending: NURSE PRACTITIONER
Payer: COMMERCIAL

## 2023-09-21 DIAGNOSIS — Z78.0 POSTMENOPAUSAL: ICD-10-CM

## 2023-09-21 PROCEDURE — 77080 DXA BONE DENSITY AXIAL: CPT | Mod: TC

## 2023-09-21 PROCEDURE — 77080 DXA BONE DENSITY AXIAL SKELETON 1 OR MORE SITES: ICD-10-PCS | Mod: 26,,, | Performed by: INTERNAL MEDICINE

## 2023-09-21 PROCEDURE — 77080 DXA BONE DENSITY AXIAL: CPT | Mod: 26,,, | Performed by: INTERNAL MEDICINE

## 2023-09-29 ENCOUNTER — PATIENT MESSAGE (OUTPATIENT)
Dept: INTERNAL MEDICINE | Facility: CLINIC | Age: 68
End: 2023-09-29
Payer: COMMERCIAL

## 2023-10-02 DIAGNOSIS — I10 PRIMARY HYPERTENSION: ICD-10-CM

## 2023-10-02 RX ORDER — AMLODIPINE BESYLATE 10 MG/1
10 TABLET ORAL
Qty: 90 TABLET | Refills: 2 | Status: SHIPPED | OUTPATIENT
Start: 2023-10-02

## 2023-10-03 NOTE — TELEPHONE ENCOUNTER
Refill Decision Note   Aaliyah Julio César  is requesting a refill authorization.  Brief Assessment and Rationale for Refill:  Approve     Medication Therapy Plan:         Comments:     Note composed:10:50 PM 10/02/2023

## 2023-10-03 NOTE — TELEPHONE ENCOUNTER
No care due was identified.  [facility] Embedded Care Due Messages. Reference number: [id number].   10/02/2023 7:13:56 PM CDT

## 2023-10-04 ENCOUNTER — TELEPHONE (OUTPATIENT)
Dept: ORTHOPEDICS | Facility: CLINIC | Age: 68
End: 2023-10-04
Payer: COMMERCIAL

## 2023-10-05 ENCOUNTER — OFFICE VISIT (OUTPATIENT)
Dept: ORTHOPEDICS | Facility: CLINIC | Age: 68
End: 2023-10-05
Payer: COMMERCIAL

## 2023-10-05 ENCOUNTER — HOSPITAL ENCOUNTER (OUTPATIENT)
Dept: RADIOLOGY | Facility: OTHER | Age: 68
Discharge: HOME | End: 2023-10-05
Attending: PHYSICIAN ASSISTANT
Payer: COMMERCIAL

## 2023-10-05 VITALS — HEIGHT: 66 IN | BODY MASS INDEX: 39.69 KG/M2 | WEIGHT: 246.94 LBS

## 2023-10-05 DIAGNOSIS — M65.349 TRIGGER RING FINGER, UNSPECIFIED LATERALITY: Primary | ICD-10-CM

## 2023-10-05 DIAGNOSIS — M65.4 DE QUERVAIN'S TENOSYNOVITIS, RIGHT: ICD-10-CM

## 2023-10-05 DIAGNOSIS — R52 PAIN: ICD-10-CM

## 2023-10-05 PROCEDURE — 73130 X-RAY EXAM OF HAND: CPT | Mod: TC,FY,LT

## 2023-10-05 PROCEDURE — 3008F BODY MASS INDEX DOCD: CPT | Mod: CPTII,S$GLB,, | Performed by: PHYSICIAN ASSISTANT

## 2023-10-05 PROCEDURE — 99999 PR PBB SHADOW E&M-EST. PATIENT-LVL IV: CPT | Mod: PBBFAC,,, | Performed by: PHYSICIAN ASSISTANT

## 2023-10-05 PROCEDURE — 99214 PR OFFICE/OUTPT VISIT, EST, LEVL IV, 30-39 MIN: ICD-10-PCS | Mod: 25,S$GLB,, | Performed by: PHYSICIAN ASSISTANT

## 2023-10-05 PROCEDURE — 1125F AMNT PAIN NOTED PAIN PRSNT: CPT | Mod: CPTII,S$GLB,, | Performed by: PHYSICIAN ASSISTANT

## 2023-10-05 PROCEDURE — 99999 PR PBB SHADOW E&M-EST. PATIENT-LVL IV: ICD-10-PCS | Mod: PBBFAC,,, | Performed by: PHYSICIAN ASSISTANT

## 2023-10-05 PROCEDURE — 3051F PR MOST RECENT HEMOGLOBIN A1C LEVEL 7.0 - < 8.0%: ICD-10-PCS | Mod: CPTII,S$GLB,, | Performed by: PHYSICIAN ASSISTANT

## 2023-10-05 PROCEDURE — 20550 PR INJECT TENDON SHEATH/LIGAMENT: ICD-10-PCS | Mod: 50,S$GLB,, | Performed by: PHYSICIAN ASSISTANT

## 2023-10-05 PROCEDURE — 3051F HG A1C>EQUAL 7.0%<8.0%: CPT | Mod: CPTII,S$GLB,, | Performed by: PHYSICIAN ASSISTANT

## 2023-10-05 PROCEDURE — 3008F PR BODY MASS INDEX (BMI) DOCUMENTED: ICD-10-PCS | Mod: CPTII,S$GLB,, | Performed by: PHYSICIAN ASSISTANT

## 2023-10-05 PROCEDURE — 20550 NJX 1 TENDON SHEATH/LIGAMENT: CPT | Mod: 50,S$GLB,, | Performed by: PHYSICIAN ASSISTANT

## 2023-10-05 PROCEDURE — 1125F PR PAIN SEVERITY QUANTIFIED, PAIN PRESENT: ICD-10-PCS | Mod: CPTII,S$GLB,, | Performed by: PHYSICIAN ASSISTANT

## 2023-10-05 PROCEDURE — 99214 OFFICE O/P EST MOD 30 MIN: CPT | Mod: 25,S$GLB,, | Performed by: PHYSICIAN ASSISTANT

## 2023-10-05 PROCEDURE — 1159F PR MEDICATION LIST DOCUMENTED IN MEDICAL RECORD: ICD-10-PCS | Mod: CPTII,S$GLB,, | Performed by: PHYSICIAN ASSISTANT

## 2023-10-05 PROCEDURE — 4010F PR ACE/ARB THEARPY RXD/TAKEN: ICD-10-PCS | Mod: CPTII,S$GLB,, | Performed by: PHYSICIAN ASSISTANT

## 2023-10-05 PROCEDURE — 1159F MED LIST DOCD IN RCRD: CPT | Mod: CPTII,S$GLB,, | Performed by: PHYSICIAN ASSISTANT

## 2023-10-05 PROCEDURE — 73130 XR HAND COMPLETE 3 VIEW LEFT: ICD-10-PCS | Mod: 26,LT,, | Performed by: RADIOLOGY

## 2023-10-05 PROCEDURE — 1101F PT FALLS ASSESS-DOCD LE1/YR: CPT | Mod: CPTII,S$GLB,, | Performed by: PHYSICIAN ASSISTANT

## 2023-10-05 PROCEDURE — 3288F FALL RISK ASSESSMENT DOCD: CPT | Mod: CPTII,S$GLB,, | Performed by: PHYSICIAN ASSISTANT

## 2023-10-05 PROCEDURE — 73130 X-RAY EXAM OF HAND: CPT | Mod: 26,LT,, | Performed by: RADIOLOGY

## 2023-10-05 PROCEDURE — 4010F ACE/ARB THERAPY RXD/TAKEN: CPT | Mod: CPTII,S$GLB,, | Performed by: PHYSICIAN ASSISTANT

## 2023-10-05 PROCEDURE — 1101F PR PT FALLS ASSESS DOC 0-1 FALLS W/OUT INJ PAST YR: ICD-10-PCS | Mod: CPTII,S$GLB,, | Performed by: PHYSICIAN ASSISTANT

## 2023-10-05 PROCEDURE — 3288F PR FALLS RISK ASSESSMENT DOCUMENTED: ICD-10-PCS | Mod: CPTII,S$GLB,, | Performed by: PHYSICIAN ASSISTANT

## 2023-10-05 RX ORDER — LIDOCAINE HYDROCHLORIDE 10 MG/ML
1 INJECTION, SOLUTION EPIDURAL; INFILTRATION; INTRACAUDAL; PERINEURAL ONCE
Status: COMPLETED | OUTPATIENT
Start: 2023-10-05 | End: 2023-10-05

## 2023-10-05 RX ORDER — DEXAMETHASONE SODIUM PHOSPHATE 4 MG/ML
4 INJECTION, SOLUTION INTRA-ARTICULAR; INTRALESIONAL; INTRAMUSCULAR; INTRAVENOUS; SOFT TISSUE
Status: COMPLETED | OUTPATIENT
Start: 2023-10-05 | End: 2023-10-05

## 2023-10-05 RX ORDER — LIDOCAINE HYDROCHLORIDE 10 MG/ML
0.5 INJECTION, SOLUTION EPIDURAL; INFILTRATION; INTRACAUDAL; PERINEURAL ONCE
Status: COMPLETED | OUTPATIENT
Start: 2023-10-05 | End: 2023-10-05

## 2023-10-05 RX ADMIN — DEXAMETHASONE SODIUM PHOSPHATE 4 MG: 4 INJECTION, SOLUTION INTRA-ARTICULAR; INTRALESIONAL; INTRAMUSCULAR; INTRAVENOUS; SOFT TISSUE at 10:10

## 2023-10-05 RX ADMIN — LIDOCAINE HYDROCHLORIDE 5 MG: 10 INJECTION, SOLUTION EPIDURAL; INFILTRATION; INTRACAUDAL; PERINEURAL at 11:10

## 2023-10-05 RX ADMIN — LIDOCAINE HYDROCHLORIDE 10 MG: 10 INJECTION, SOLUTION EPIDURAL; INFILTRATION; INTRACAUDAL; PERINEURAL at 11:10

## 2023-10-20 ENCOUNTER — CLINICAL SUPPORT (OUTPATIENT)
Dept: REHABILITATION | Facility: HOSPITAL | Age: 68
End: 2023-10-20
Attending: PHYSICIAN ASSISTANT
Payer: COMMERCIAL

## 2023-10-20 DIAGNOSIS — M79.642 BILATERAL HAND PAIN: ICD-10-CM

## 2023-10-20 DIAGNOSIS — M79.641 BILATERAL HAND PAIN: ICD-10-CM

## 2023-10-20 DIAGNOSIS — M65.349 TRIGGER RING FINGER, UNSPECIFIED LATERALITY: ICD-10-CM

## 2023-10-20 DIAGNOSIS — M65.4 DE QUERVAIN'S TENOSYNOVITIS, RIGHT: ICD-10-CM

## 2023-10-20 PROCEDURE — 97530 THERAPEUTIC ACTIVITIES: CPT | Mod: PO

## 2023-10-20 PROCEDURE — 97165 OT EVAL LOW COMPLEX 30 MIN: CPT | Mod: PO

## 2023-10-20 NOTE — PATIENT INSTRUCTIONS
"Heat 10-15 minutes before performing exercises. Use ice after to decrease inflammation at areas of pain for 3-5 minutes each area.    Avoid heavy gripping, pinching, and lifting. Lift palm up with open hand when necessary.     Wear thumb spica brace daily and nightly, taking off for hygiene and HEP.          IP Flexion (Active Blocked)        Brace thumb below tip joint. Bend joint as far as possible.  Repeat 20 times. Do 5 sessions per day.    MP Flexion (Active)        Bend thumb to touch base of little finger, keeping tip joint straight.  Repeat 20 times. Do 5 sessions per day.    Opposition (Active)        Touch tip of thumb to nail tip of each finger in turn, making an "O" shape.  Repeat 20 times. Do 5 sessions per day.    Composite Extension (Active)        Bring thumb up and out in hitchhiker position. Repeat 20 times. Do 5 sessions per day.    MP Extension (Active)        With palm on table, lift thumb up. Relax and lower thumb.  Repeat 20 times. Do 5 sessions per day.    Radial Adduction/Abduction (Active)        Move thumb out to side. Move back alongside index finger.  Repeat 20 times. Do 5 sessions per day.       Palmar Adduction/Abduction (Active)        Move thumb down, away from palm. Move back to rest along palm.  Repeat 20 times. Do 5 sessions per day.    Composite Movement Circumduction (Active)        Make circles with thumb clockwise and counter-clockwise.  Repeat 20 times each direction. Do 5 sessions per day.         Finklestein's Stretch            Place your thumb inside of a fist, slowly bend towards your small finger. Go as far as you can without causing hard, sharp pain. Hold 10 seconds relax, repeat. Perform 10 repetitions 3 times a day.        MP Flexion (Active)        With back of hand on table, bend large knuckles as far as they will go, keeping small joints straight.  Repeat 20 times. Do 5 sessions per day.  Activity: Reach into a narrow container.*    PIP Flexion (Active " Blocked)        Hold large knuckle straight using other hand. Bend middle joint of ring finger as far as possible.   Repeat 20 times. Do 5 sessions per day.  Activity: Curl fingers around a jar cap.*     DIP Flexion (Active Blocked)        Hold ring finger firmly at the middle so that only the tip joint can bend.   Repeat 20 times. Do 5 sessions per day.     MP Extension (Active)        With palm on table, straighten fingers completely at large knuckles, and RF finger individually off table.   Repeat 20 times. Do 5 sessions per day.    Abduction / Adduction (Active)        With hand flat on table, spread all fingers apart, then bring them together as close as possible.  Repeat 20 times. Do 5 sessions per day.

## 2023-10-20 NOTE — PLAN OF CARE
[facility]  Occupational Therapy Initial Evaluation     Date: 10/20/2023  Patient: Aaliyah Angela  Chart Number: [id number]  Referring Physician: Priya Denton PA-C  Therapy Diagnosis:   1. Trigger ring finger, unspecified laterality  Ambulatory referral/consult to Physical/Occupational Therapy      2. De Quervain's tenosynovitis, right  Ambulatory referral/consult to Physical/Occupational Therapy      3. Bilateral hand pain            Medical Diagnosis:   M65.349 (ICD-10-CM) - Trigger ring finger, unspecified laterality   M65.4 (ICD-10-CM) - De Quervain's tenosynovitis, right     Physician Orders:   R dequervains, bl trigger finger  2x/wk x 6 wk  Evaluation Date: 10/20/2023  Plan of Care Certification Date: 12/1/23  Authorization Period: 10/4/23  Surgery Date and Procedure: NA  Date of Return to MD: 11/28/23    FOTO: 1/3    Visit #: 1 of 1  Time In: 2:15 PM  Time Out: 3:00 PM  Total Billable Time: 45    Precautions: Standard, Diabetes II    Subjective     Involved Side: bilateral upper extremities trigger finger, right upper extremity DeQuervain   Dominant Side: Right  Date of Onset: Over a month or more  History of Current Condition:   Aaliyah Angela is a  68 y.o. female with DMII presenting today for evaluation of the bilateral hands. She has pain and triggering of the bilateral ring fingers. She has not yet tried anything. In addition she has pain at the right radial wrist, first dorsal compartment, increased with use. Some pain at the base of the right thumb as well. 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   Imaging: The osseous structures, soft tissues and joint spaces are normal.  Specifically there is no fracture, dislocation, abnormal mineralization or osseous erosions.  Previous Therapy: None    Patient's Goals for Therapy:     Pain:  Functional Pain Scale Rating 0-10:   6/10 on average  0/10 at best  7/10 at worst  Location: R 1st dorsal compartment, RF A1 pulley site  on L and R  Description: Aching  Aggravating Factors: Worse when waking up in morning, lifting something heavy, palpation  Easing Factors: Tylenol    Occupation:  Nurse- [employer]   Working presently: employed  Duties: Computer skills    Functional Limitations/Social History:    Previous functional status includes: Independent with all ADLs.     Current Functional Status   Home/Living environment: lives with their nephew      Limitation of Functional Status as follows:   ADLs/IADLs:     - Feeding: Independent    - Bathing: Finger stiff in morning make it difficult to hold washcloth    - Dressing/Grooming: Some difficulty pulling shirts over    - Home Management: Difficulty with wringing mop    - Driving: Sometimes irritation with turning the wheel      Leisure: Watching TV    Past Medical History/Physical Systems Review:   Aaliyah Angela  has a past medical history of Allergy, Anatomical narrow angle borderline glaucoma, AR (allergic rhinitis), Arthritis, CAD (coronary artery disease), Diabetes mellitus type II, Diabetic retinopathy, Epiretinal membrane, both eyes, Hyperlipidemia, Hypertensive retinopathy of both eyes, Lumbar disc disease, Migraines, Morbid obesity, BRADFORD (obstructive sleep apnea), Proliferative diabetic retinopathy(362.02), PVD (peripheral vascular disease), Rupture of right Achilles tendon, Unspecified essential hypertension, Unspecified vitamin D deficiency, and Vitreous hemorrhage of left eye.    Aaliyah Angela  has a past surgical history that includes Tubal ligation; Panretinal photocoagulation;  section; LASER PERIPHERAL IRIDOTOMY (Bilateral, 2017 and 2017); Carpal tunnel release (Right, 2020); Carpal tunnel release (Right, 2020); Gastric bypass (); Colonoscopy; Esophagogastroduodenoscopy (N/A, 10/08/2020); Colonoscopy (N/A, 10/08/2020); Phacoemulsification of cataract (Left, 10/28/2020); Intraocular prosthesis insertion (Left, 10/28/2020);  Phacoemulsification of cataract (Right, 02/17/2021); Intraocular prosthesis insertion (Right, 02/17/2021); Cataract extraction w/  intraocular lens implant (Left, 10/28/2020); and Cataract extraction w/  intraocular lens implant (Right, 02/17/2021).    Aaliyah has a current medication list which includes the following prescription(s): acetaminophen, amitriptyline, amlodipine, aspirin, atorvastatin, blood sugar diagnostic, dexcom g6 , dexcom g6 sensor, dexcom g6 transmitter, calcium-vitamin d3, cephalexin, empagliflozin, esomeprazole, flowflex covid-19 ag home test, insulin lispro, losartan, metformin, metoprolol succinate, multivitamin, ondansetron, and ozempic, and the following Facility-Administered Medications: sodium chloride 0.9%.    Review of patient's allergies indicates:  No Known Allergies       Objective     Mental status: alert, oriented x3    Observation:   Presented with short carpal tunnel brace on R hand. Pt with guarding of all areas of pain. Some numbness/no sensation at IF and LF where she performs diabetic check but not often.    Sensation: Intact    Range of Motion:   Bilateral     Elbow and Wrist ROM. Measured in degrees.   10/20/2023 10/20/2023      Left Right     Wrist Ext 70 70     Wrist Flex 55 70     Wrist RD 20 25     Wrist UD 15 12       Hand ROM. Measured in degrees.   10/20/2023 10/20/2023      Left Right            Index: MP  0/76 0/80                PIP     0/93 0/88                DIP 0/55 0/50            Long:  MP 0/80 0/77                PIP 0/87 0/90                DIP 0/66 0/50            Ring:   MP 0/76 0/75                PIP 0/94 0/98                DIP 0/64 0/55            Small:  MP 0/79 0/79                 PIP 0/91 0/88                 DIP 0/56 0/51            Thumb: MP 0/46 31                  IP 0/74 74         Rad ABD 40 40         Pal ABD 32 40            Opposition SF MP SF PIP         Special Tests:      Right   Finkelstein's Test +      Strength  (Dynamometer) and Pinch Strength (Pinch Gauge)  Measured in pounds.   10/20/2023 10/20/2023      Left Right     Rung II 30.9 16.3     Key Pinch 9 4     3pt Pinch 10 5          Intake Outcome Measure for FOTO Hand/Wrist/Finger Survey    Therapist reviewed FOTO scores for patient this session.  FOTO documents entered into [email or web address] - see Media section.    Intake Score: 49%       Treatment     Treatment Time In: 2:40 PM  Treatment Time Out: 3:00 PM  Total Treatment time separate from Evaluation time:20 minutes    Aaliyah participated in dynamic functional therapeutic activities to improve functional performance for 20  minutes, including:  - Introduced home exercise program and checked for correct performance  - Review precautions - no heavy  or pinch, no heavy lifting, lift with palm up and open when necessary  - Use of hot and cold modalities    - KT tape to dorsal thumb to rest dorsal 1 compartment tendons  - KT tape to Prox phalanx of RF in bilateral upper extremities with stretch over volar area    Home Exercise Program/Education:  Issued HEP (see patient instructions in EMR) and educated on modality use for pain management . Exercises were reviewed and Aaliyah was able to demonstrate them prior to the end of the session.   Pt received a written copy of exercises to perform at home. Aaliyah demonstrated good  understanding of the education provided.  Pt was advised to perform these exercises free of pain, and to stop performing them if pain occurs.    Patient/Family Education: role of OT, goals for OT, scheduling/cancellations - pt verbalized understanding. Discussed insurance limitations with patient.    Additional Education provided: Use of heat/ice, no composite fisting, avoid overstretching at thumb, thumb spica brace to rest dorsal 1 compartment tendons      Assessment     Aaliyah Angela is a 68 y.o. female presents with limitations as described in problem list. Patient can benefit from Occupational Therapy  services for Iontophoresis, ultrasound, moist heat, therapeutic exercises, home exercise program provied with written instructions, ice and strengthening and orthotics, if deemed necessary . The following goals were discussed with the patient and she is in agreement with them as to be addressed in the treatment plan.    The patient's rehab potential is Fair.     Anticipated barriers to occupational therapy: None  Pt has no cultural, educational or language barriers to learning provided.    Medical Necessity is demonstrated by the following:  Occupational Profile/History  Co-morbidities and personal factors that may impact the plan of care [x] LOW: Brief chart review  [] MODERATE: Expanded chart review   [] HIGH: Extensive chart review    Moderate / High Support Documentation     Examination  Performance deficits relating to physical, cognitive or psychosocial skills that result in activity limitations and/or participation restrictions  [x] LOW: addressing 1-3 Performance deficits  [] MODERATE: 3-5 Performance deficits  [] HIGH: 5+ Performance deficits (please support below)    Moderate / High Support Documentation:    Physical:  Joint Mobility  Joint Stability  Muscle Power/Strength   Strength  Pinch Strength  Pain    Cognitive:  No Deficits    Psychosocial:    No Deficits     Treatment Options [x] LOW: Limited options  [] MODERATE: Several options  [] HIGH: Multiple options      Decision Making/ Complexity Score: low         Goals:    LTG's (6 weeks):  1)   Increase ROM to WFL in composite fist/thumb to increase functional bilateral hand use for holding a mop for home cleaning tasks. progressing not met 10/20/2023  2)   Increase  strength 15-20 lbs. to grasp pot handle. progressing not met 10/20/2023   3)   Increase 2 limited pinch 5-7 psis for opening medical packets. progressing not met 10/20/2023   4)   Decrease complaints of pain to  1 out of 10 at worst to increase functional hand use for  ADL/work/leisure activities. progressing not met 10/20/2023  5)   Patient to score at 63% or more on FOTO to demonstrate improved perception of functional BUE use. progressing not met 10/20/2023   6)   Pt will return to near to prior level of function for ADLs and household management reporting I or Mod I with ADLs (dressing, feeding, grooming, toileting). progressing not met 10/20/2023    STG's (3 weeks)  1)   Patient to be IND with HEP and modalities for pain/edema managment. progressing not met 10/20/2023  2)   Increase ROM by 10 degrees in MP flexion of thumb to increase functional hand use for ADLs/work/leisure activities. progressing not met 10/20/2023  3)   Increase  strength by 8-10 lbs. to improve functional grasp for ADLs/work/leisure activities. progressing not met 10/20/2023  4)   Increase 2 limited pinch 3-4  psi's to increase independence with button and FM Coordination. progressing not met 10/20/2023  5)   Patient to be IND with Orthotic use, wear and care precautions. progressing not met 10/20/2023  6)   Patient to score at 55% or more on FOTO to demonstrate improved perception of functional BUE use. progressing not met 10/20/2023  7)   Decrease complaints of pain to  4 out of 10 at worst to increase functional hand use for ADL/work/leisure activities. progressing not met 10/20/2023    Plan     Pt to be treated by Occupational Therapy 2 times per week for 6 weeks during the certification period from 10/20/2023 to 12/1/23 to achieve the established goals.     Treatment to include: Paraffin, Fluidotherapy, Manual therapy/joint mobilizations, Modalities for pain management, US 3 mhz, Therapeutic exercises/activities., Iontophoresis with 2.0 cc Dexamethasone, Strengthening, Orthotic Fabrication/Fit/Training, and Joint Protection, as well as any other treatments deemed necessary based on the patient's needs or progress.     Hawa Aleman, OT

## 2023-10-20 NOTE — PROGRESS NOTES
[facility]  Occupational Therapy Initial Evaluation     Date: 10/20/2023  Patient: Aaliyah Angela  Chart Number: [id number]  Referring Physician: Priya Denton PA-C  Therapy Diagnosis:   1. Trigger ring finger, unspecified laterality  Ambulatory referral/consult to Physical/Occupational Therapy      2. De Quervain's tenosynovitis, right  Ambulatory referral/consult to Physical/Occupational Therapy      3. Bilateral hand pain            Medical Diagnosis:   M65.349 (ICD-10-CM) - Trigger ring finger, unspecified laterality   M65.4 (ICD-10-CM) - De Quervain's tenosynovitis, right     Physician Orders:   R dequervains, bl trigger finger  2x/wk x 6 wk  Evaluation Date: 10/20/2023  Plan of Care Certification Date: 12/1/23  Authorization Period: 10/4/23  Surgery Date and Procedure: NA  Date of Return to MD: 11/28/23    FOTO: 1/3    Visit #: 1 of 1  Time In: 2:15 PM  Time Out: 3:00 PM  Total Billable Time: 45    Precautions: Standard, Diabetes II    Subjective     Involved Side: bilateral upper extremities trigger finger, right upper extremity DeQuervain   Dominant Side: Right  Date of Onset: Over a month or more  History of Current Condition:   Aaliyah Angela is a  68 y.o. female with DMII presenting today for evaluation of the bilateral hands. She has pain and triggering of the bilateral ring fingers. She has not yet tried anything. In addition she has pain at the right radial wrist, first dorsal compartment, increased with use. Some pain at the base of the right thumb as well. 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   Imaging: The osseous structures, soft tissues and joint spaces are normal.  Specifically there is no fracture, dislocation, abnormal mineralization or osseous erosions.  Previous Therapy: None    Patient's Goals for Therapy:     Pain:  Functional Pain Scale Rating 0-10:   6/10 on average  0 /10 at best  7/10 at worst  Location: R 1st dorsal compartment, RF A1 pulley site  on L and R  Description: Aching  Aggravating Factors: Worse when waking up in morning, lifting something heavy, palpation  Easing Factors: Tylenol    Occupation:  Nurse- [employer]   Working presently: employed  Duties: Computer skills    Functional Limitations/Social History:    Previous functional status includes: Independent with all ADLs.     Current Functional Status   Home/Living environment: lives with their nephew      Limitation of Functional Status as follows:   ADLs/IADLs:     - Feeding: Independent    - Bathing: Finger stiff in morning make it difficult to hold washcloth    - Dressing/Grooming: Some difficulty pulling shirts over    - Home Management: Difficulty with wringing mop    - Driving: Sometimes irritation with turning the wheel      Leisure: Watching TV    Past Medical History/Physical Systems Review:   Aaliyah Angela  has a past medical history of Allergy, Anatomical narrow angle borderline glaucoma, AR (allergic rhinitis), Arthritis, CAD (coronary artery disease), Diabetes mellitus type II, Diabetic retinopathy, Epiretinal membrane, both eyes, Hyperlipidemia, Hypertensive retinopathy of both eyes, Lumbar disc disease, Migraines, Morbid obesity, BRADFORD (obstructive sleep apnea), Proliferative diabetic retinopathy(362.02), PVD (peripheral vascular disease), Rupture of right Achilles tendon, Unspecified essential hypertension, Unspecified vitamin D deficiency, and Vitreous hemorrhage of left eye.    Aaliyah Angela  has a past surgical history that includes Tubal ligation; Panretinal photocoagulation;  section; LASER PERIPHERAL IRIDOTOMY (Bilateral, 2017 and 2017); Carpal tunnel release (Right, 2020); Carpal tunnel release (Right, 2020); Gastric bypass (); Colonoscopy; Esophagogastroduodenoscopy (N/A, 10/08/2020); Colonoscopy (N/A, 10/08/2020); Phacoemulsification of cataract (Left, 10/28/2020); Intraocular prosthesis insertion (Left, 10/28/2020);  Phacoemulsification of cataract (Right, 02/17/2021); Intraocular prosthesis insertion (Right, 02/17/2021); Cataract extraction w/  intraocular lens implant (Left, 10/28/2020); and Cataract extraction w/  intraocular lens implant (Right, 02/17/2021).    Aaliyah has a current medication list which includes the following prescription(s): acetaminophen, amitriptyline, amlodipine, aspirin, atorvastatin, blood sugar diagnostic, dexcom g6 , dexcom g6 sensor, dexcom g6 transmitter, calcium-vitamin d3, cephalexin, empagliflozin, esomeprazole, flowflex covid-19 ag home test, insulin lispro, losartan, metformin, metoprolol succinate, multivitamin, ondansetron, and ozempic, and the following Facility-Administered Medications: sodium chloride 0.9%.    Review of patient's allergies indicates:  No Known Allergies       Objective     Mental status: alert, oriented x3    Observation:   Presented with short carpal tunnel brace on R hand. Pt with guarding of all areas of pain. Some numbness/no sensation at IF and LF where she performs diabetic check but not often.    Sensation: Intact    Range of Motion:   Bilateral     Elbow and Wrist ROM. Measured in degrees.   10/20/2023 10/20/2023      Left Right     Wrist Ext 70 70     Wrist Flex 55 70     Wrist RD 20 25     Wrist UD 15 12       Hand ROM. Measured in degrees.   10/20/2023 10/20/2023      Left Right            Index: MP  0/76 0/80                PIP     0/93 0/88                DIP 0/55 0/50            Long:  MP 0/80 0/77                PIP 0/87 0/90                DIP 0/66 0/50            Ring:   MP 0/76 0/75                PIP 0/94 0/98                DIP 0/64 0/55            Small:  MP 0/79 0/79                 PIP 0/91 0/88                 DIP 0/56 0/51            Thumb: MP 0/46 31                  IP 0/74 74         Rad ABD 40 40         Pal ABD 32 40            Opposition SF MP SF PIP         Special Tests:      Right   Finkelstein's Test +      Strength  (Dynamometer) and Pinch Strength (Pinch Gauge)  Measured in pounds.   10/20/2023 10/20/2023      Left Right     Rung II 30.9 16.3     Key Pinch 9 4     3pt Pinch 10 5          Intake Outcome Measure for FOTO Hand/Wrist/Finger Survey    Therapist reviewed FOTO scores for patient this session.  FOTO documents entered into [employer] - see Media section.    Intake Score: 49%       Treatment     Treatment Time In: 2:40 PM  Treatment Time Out: 3:00 PM  Total Treatment time separate from Evaluation time:20 minutes    Aaliyah participated in dynamic functional therapeutic activities to improve functional performance for 20  minutes, including:  - Introduced home exercise program and checked for correct performance  - Review precautions - no heavy  or pinch, no heavy lifting, lift with palm up and open when necessary  - Use of hot and cold modalities    - KT tape to dorsal thumb to rest dorsal 1 compartment tendons  - KT tape to Prox phalanx of RF in bilateral upper extremities with stretch over volar area    Home Exercise Program/Education:  Issued HEP (see patient instructions in EMR) and educated on modality use for pain management . Exercises were reviewed and Aaliyah was able to demonstrate them prior to the end of the session.   Pt received a written copy of exercises to perform at home. Aaliyah demonstrated good  understanding of the education provided.  Pt was advised to perform these exercises free of pain, and to stop performing them if pain occurs.    Patient/Family Education: role of OT, goals for OT, scheduling/cancellations - pt verbalized understanding. Discussed insurance limitations with patient.    Additional Education provided: Use of heat/ice, no composite fisting, avoid overstretching at thumb, thumb spica brace to rest dorsal 1 compartment tendons      Assessment     Aaliyah Angela is a 68 y.o. female presents with limitations as described in problem list. Patient can benefit from Occupational Therapy  services for Iontophoresis, ultrasound, moist heat, therapeutic exercises, home exercise program provied with written instructions, ice and strengthening and orthotics, if deemed necessary . The following goals were discussed with the patient and she is in agreement with them as to be addressed in the treatment plan.    The patient's rehab potential is Fair.     Anticipated barriers to occupational therapy: None  Pt has no cultural, educational or language barriers to learning provided.    Medical Necessity is demonstrated by the following:  Occupational Profile/History  Co-morbidities and personal factors that may impact the plan of care [x] LOW: Brief chart review  [] MODERATE: Expanded chart review   [] HIGH: Extensive chart review    Moderate / High Support Documentation     Examination  Performance deficits relating to physical, cognitive or psychosocial skills that result in activity limitations and/or participation restrictions  [x] LOW: addressing 1-3 Performance deficits  [] MODERATE: 3-5 Performance deficits  [] HIGH: 5+ Performance deficits (please support below)    Moderate / High Support Documentation:    Physical:  Joint Mobility  Joint Stability  Muscle Power/Strength   Strength  Pinch Strength  Pain    Cognitive:  No Deficits    Psychosocial:    No Deficits     Treatment Options [x] LOW: Limited options  [] MODERATE: Several options  [] HIGH: Multiple options      Decision Making/ Complexity Score: low         Goals:    LTG's (6 weeks):  1)   Increase ROM to WFL in composite fist/thumb to increase functional bilateral hand use for holding a mop for home cleaning tasks. progressing not met 10/20/2023  2)   Increase  strength 15-20 lbs. to grasp pot handle. progressing not met 10/20/2023   3)   Increase 2 limited pinch 5-7 psis for opening medical packets. progressing not met 10/20/2023   4)   Decrease complaints of pain to  1 out of 10 at worst to increase functional hand use for  ADL/work/leisure activities. progressing not met 10/20/2023  5)   Patient to score at 63% or more on FOTO to demonstrate improved perception of functional BUE use. progressing not met 10/20/2023   6)   Pt will return to near to prior level of function for ADLs and household management reporting I or Mod I with ADLs (dressing, feeding, grooming, toileting). progressing not met 10/20/2023    STG's (3 weeks)  1)   Patient to be IND with HEP and modalities for pain/edema managment. progressing not met 10/20/2023  2)   Increase ROM by 10 degrees in MP flexion of thumb to increase functional hand use for ADLs/work/leisure activities. progressing not met 10/20/2023  3)   Increase  strength by 8-10 lbs. to improve functional grasp for ADLs/work/leisure activities. progressing not met 10/20/2023  4)   Increase 2 limited pinch 3-4  psi's to increase independence with button and FM Coordination. progressing not met 10/20/2023  5)   Patient to be IND with Orthotic use, wear and care precautions. progressing not met 10/20/2023  6)   Patient to score at 55% or more on FOTO to demonstrate improved perception of functional BUE use. progressing not met 10/20/2023  7)   Decrease complaints of pain to  4 out of 10 at worst to increase functional hand use for ADL/work/leisure activities. progressing not met 10/20/2023    Plan     Pt to be treated by Occupational Therapy 2 times per week for 6 weeks during the certification period from 10/20/2023 to 12/1/23 to achieve the established goals.     Treatment to include: Paraffin, Fluidotherapy, Manual therapy/joint mobilizations, Modalities for pain management, US 3 mhz, Therapeutic exercises/activities., Iontophoresis with 2.0 cc Dexamethasone, Strengthening, Orthotic Fabrication/Fit/Training, and Joint Protection, as well as any other treatments deemed necessary based on the patient's needs or progress.     Hawa Aleman, OT

## 2023-10-27 ENCOUNTER — CLINICAL SUPPORT (OUTPATIENT)
Dept: REHABILITATION | Facility: HOSPITAL | Age: 68
End: 2023-10-27
Payer: COMMERCIAL

## 2023-10-27 DIAGNOSIS — M79.641 BILATERAL HAND PAIN: Primary | ICD-10-CM

## 2023-10-27 DIAGNOSIS — M79.642 BILATERAL HAND PAIN: Primary | ICD-10-CM

## 2023-10-27 PROCEDURE — 97035 APP MDLTY 1+ULTRASOUND EA 15: CPT | Mod: PO

## 2023-10-27 PROCEDURE — 97110 THERAPEUTIC EXERCISES: CPT | Mod: PO

## 2023-10-27 PROCEDURE — 97022 WHIRLPOOL THERAPY: CPT | Mod: PO

## 2023-10-27 NOTE — PROGRESS NOTES
Occupational Therapy Daily Treatment Note     Date: 10/27/2023  Name: Aaliyah Angela  [facility] Number: [id number]    Therapy Diagnosis:   Encounter Diagnosis   Name Primary?    Bilateral hand pain Yes     Physician: Priya Denton PA-C    Medical Diagnosis:   M65.349 (ICD-10-CM) - Trigger ring finger, unspecified laterality   M65.4 (ICD-10-CM) - De Quervain's tenosynovitis, right      Physician Orders:   R dequervains, bl trigger finger  2x/wk x 6 wk  Evaluation Date: 10/20/2023  Plan of Care Certification Date: 12/1/23  Authorization Period: 10/4/23  Surgery Date and Procedure: NA  Date of Return to MD: 11/28/23      Visit # / Visits authorized: 1 / 20  Time In:3:00 PM  Time Out: 3:52 PM  Total Billable Time: 52 minutes    Precautions:  Standard and Diabetes      Subjective     Pt reports: Continued pain at R 1st dorsal compartment  Response to previous treatment:pennie fairly    Pain: Pt did not report pain score/10  Location: 1st dorsal compartment    Objective     Aaliyah received the following supervised modalities after being cleared for contraindications for 10 minutes in order to promote increased blood flow and tissue elasticity:   -Patient received fluidotherapy to BUE hand(s) for 10 minutes to increase blood flow, circulation, desensitization, sensory re-education and for pain management.      Aaliyah received the following manual therapy techniques for 6 minutes in order to maximize tissue function and/or decrease pain:   -Myofascial scraping to dorsal thumb/forearm    Patient received ultrasound  for pain control and decreased inflammation @ 100 % duty cycle, 3.3 Mhz, applied to dorsal wrist, A1 site sof LF bilaterally, intensity = 0.6 w/cm2 for 8 minutes.     Aaliyah performed therapeutic exercises to maximize upper extremity motion and/or strength for 30 minutes including:  -active range of motion finger spreads, finger lifts, PIP flexion with MCP extension, isolated Dip flexion- bilateral 10x each    -Fabricated two trigger finger splints for bilateral LF    Home Exercises and Education Provided     Education provided:   - Use of heat/ice  - Progress towards goals     Written Home Exercises Provided: Patient instructed to cont prior HEP.  Exercises were reviewed and Aaliyah was able to demonstrate them prior to the end of the session.  Aaliyah demonstrated good  understanding of the HEP provided.   .   See EMR under Patient Instructions for exercises provided prior visit.        Assessment     Pt would continue to benefit from skilled OT to maximize bilateral upper extremities functioning. Pt arrived in thumb spica orthosis this session. Pt verbalized she has not been performing HEP or wearing splint at night but has been normally moving her fingers. Provided education on importance of performing HEP and wearing splints to rest involved tendons. Pt verbalized understanding. Provided education on clinic no show policy- pt verbalized understanding. Provided coband for securing trigger finger splints with instructions on wear and care of trigger finger splints.    Aaliyah is progressing towards her goals and there are no updates to goals at this time. Pt prognosis is Fair.     Pt will continue to benefit from skilled outpatient occupational therapy to address the deficits listed in the problem list on initial evaluation provide pt/family education and to maximize pt's level of independence in the home and community environment.     Anticipated barriers to therapy: Conservative trigger finger      Pt's spiritual, cultural and educational needs considered and pt agreeable to plan of care and goals.    Goals:   LTG's (6 weeks):  1)   Increase ROM to WFL in composite fist/thumb to increase functional bilateral hand use for holding a mop for home cleaning tasks. progressing not met 10/20/2023  2)   Increase  strength 15-20 lbs. to grasp pot handle. progressing not met 10/20/2023   3)   Increase 2 limited pinch 5-7  psis for opening medical packets. progressing not met 10/20/2023   4)   Decrease complaints of pain to  1 out of 10 at worst to increase functional hand use for ADL/work/leisure activities. progressing not met 10/20/2023  5)   Patient to score at 63% or more on FOTO to demonstrate improved perception of functional BUE use. progressing not met 10/20/2023   6)   Pt will return to near to prior level of function for ADLs and household management reporting I or Mod I with ADLs (dressing, feeding, grooming, toileting). progressing not met 10/20/2023     STG's (3 weeks)  1)   Patient to be IND with HEP and modalities for pain/edema managment. progressing not met 10/20/2023  2)   Increase ROM by 10 degrees in MP flexion of thumb to increase functional hand use for ADLs/work/leisure activities. progressing not met 10/20/2023  3)   Increase  strength by 8-10 lbs. to improve functional grasp for ADLs/work/leisure activities. progressing not met 10/20/2023  4)   Increase 2 limited pinch 3-4  psi's to increase independence with button and FM Coordination. progressing not met 10/20/2023  5)   Patient to be IND with Orthotic use, wear and care precautions. progressing not met 10/20/2023  6)   Patient to score at 55% or more on FOTO to demonstrate improved perception of functional BUE use. progressing not met 10/20/2023  7)   Decrease complaints of pain to  4 out of 10 at worst to increase functional hand use for ADL/work/leisure activities. progressing not met 10/20/2023    Plan   Cont OT to address above goals.    Hawa Aleman, OT

## 2023-11-02 NOTE — TELEPHONE ENCOUNTER
Refill Routing Note   Medication(s) are not appropriate for processing by [facility] for the following reason(s):      Non-participating provider    [facility] action(s):  Route Care Due:  None identified            Appointments  past 12m or future 3m with PCP    Date Provider   Last Visit   5/15/2023 Reggie Glover MD   Next Visit   Visit date not found Reggie Glover MD   ED visits in past 90 days: 0        Note composed:5:43 PM 11/02/2023

## 2023-11-03 ENCOUNTER — CLINICAL SUPPORT (OUTPATIENT)
Dept: REHABILITATION | Facility: HOSPITAL | Age: 68
End: 2023-11-03
Payer: COMMERCIAL

## 2023-11-03 DIAGNOSIS — M79.642 BILATERAL HAND PAIN: Primary | ICD-10-CM

## 2023-11-03 DIAGNOSIS — M79.641 BILATERAL HAND PAIN: Primary | ICD-10-CM

## 2023-11-03 PROCEDURE — 97035 APP MDLTY 1+ULTRASOUND EA 15: CPT | Mod: PO

## 2023-11-03 PROCEDURE — 97022 WHIRLPOOL THERAPY: CPT | Mod: PO

## 2023-11-03 PROCEDURE — 97110 THERAPEUTIC EXERCISES: CPT | Mod: PO

## 2023-11-03 PROCEDURE — 97140 MANUAL THERAPY 1/> REGIONS: CPT | Mod: PO

## 2023-11-03 NOTE — PROGRESS NOTES
Occupational Therapy Daily Treatment Note     Date: 11/3/2023  Name: Aaliyah Angela  [facility] Number: [id number]    Therapy Diagnosis:   Encounter Diagnosis   Name Primary?    Bilateral hand pain Yes     Physician: Priya Denton PA-C    Medical Diagnosis:   M65.349 (ICD-10-CM) - Trigger ring finger, unspecified laterality   M65.4 (ICD-10-CM) - De Quervain's tenosynovitis, right      Physician Orders:   R dequervains, bl trigger finger  2x/wk x 6 wk  Evaluation Date: 10/20/2023  Plan of Care Certification Date: 12/1/23  Authorization Period: 10/4/23  Surgery Date and Procedure: NA  Date of Return to MD: 11/28/23      Visit # / Visits authorized: 2 / 20  Time In: 2:55 PM  Time Out: 3:40 PM  Total Billable Time: 45 minutes    Precautions:  Standard and Diabetes      Subjective     Pt reports: Continued pain at R 1st dorsal compartment  Response to previous treatment:pennie fairly    Pain: Pt did not report pain score/10  Location: 1st dorsal compartment    Objective     Aaliyah received the following supervised modalities after being cleared for contraindications for 10 minutes in order to promote increased blood flow and tissue elasticity:   -Patient received fluidotherapy to BUE hand(s) for 10 minutes to increase blood flow, circulation, desensitization, sensory re-education and for pain management.      Aaliyah received the following manual therapy techniques for 15 minutes in order to maximize tissue function and/or decrease pain:   -Myofascial scraping to dorsal thumb/forearm  -Massage vibrator to A1 pulley of LF bilaterally     Patient received ultrasound  for pain control and decreased inflammation @ 100 % duty cycle, 3.3 Mhz, applied to dorsal wrist, A1 site sof LF bilaterally, intensity = 0.6 w/cm2 for 8 minutes.     Aaliyah performed therapeutic exercises to maximize upper extremity motion and/or strength for 12 minutes including:  -active range of motion finger spreads, finger lifts, PIP flexion with MCP  "extension, isolated Dip flexion- bilateral 10x each   -Juxicisor 2'  -KT tape to Dorsal thumb for increased resting position of EPB    Home Exercises and Education Provided     Education provided:   - Use of heat/ice  - Progress towards goals     Written Home Exercises Provided: Patient instructed to cont prior HEP.  Exercises were reviewed and Aaliyah was able to demonstrate them prior to the end of the session.  Aaliyah demonstrated good  understanding of the HEP provided.   .   See EMR under Patient Instructions for exercises provided prior visit.        Assessment     Pt would continue to benefit from skilled OT to maximize bilateral upper extremities functioning. Pt arrived without thumb spica orthosis and fabricated trigger finger orthoses this session. Pt states she wears her thumb spica orthosis "most of the day except at night" and does not wear the trigger finger splints while working or at night. Pt notes she is still triggering at LF of both hands during work and quickly jerks away when 1st dorsal compartment is palpated. Reemphasized importance of continued splint wear to rest tendons and decrease pain. Pt verbalized understanding.    Aaliyah is progressing towards her goals and there are no updates to goals at this time. Pt prognosis is Fair.     Pt will continue to benefit from skilled outpatient occupational therapy to address the deficits listed in the problem list on initial evaluation provide pt/family education and to maximize pt's level of independence in the home and community environment.     Anticipated barriers to therapy: Conservative trigger finger      Pt's spiritual, cultural and educational needs considered and pt agreeable to plan of care and goals.    Goals:   LTG's (6 weeks):  1)   Increase ROM to WFL in composite fist/thumb to increase functional bilateral hand use for holding a mop for home cleaning tasks. progressing not met 10/20/2023  2)   Increase  strength 15-20 lbs. to " grasp pot handle. progressing not met 10/20/2023   3)   Increase 2 limited pinch 5-7 psis for opening medical packets. progressing not met 10/20/2023   4)   Decrease complaints of pain to  1 out of 10 at worst to increase functional hand use for ADL/work/leisure activities. progressing not met 10/20/2023  5)   Patient to score at 63% or more on FOTO to demonstrate improved perception of functional BUE use. progressing not met 10/20/2023   6)   Pt will return to near to prior level of function for ADLs and household management reporting I or Mod I with ADLs (dressing, feeding, grooming, toileting). progressing not met 10/20/2023     STG's (3 weeks)  1)   Patient to be IND with HEP and modalities for pain/edema managment. progressing not met 10/20/2023  2)   Increase ROM by 10 degrees in MP flexion of thumb to increase functional hand use for ADLs/work/leisure activities. progressing not met 10/20/2023  3)   Increase  strength by 8-10 lbs. to improve functional grasp for ADLs/work/leisure activities. progressing not met 10/20/2023  4)   Increase 2 limited pinch 3-4  psi's to increase independence with button and FM Coordination. progressing not met 10/20/2023  5)   Patient to be IND with Orthotic use, wear and care precautions. progressing not met 10/20/2023  6)   Patient to score at 55% or more on FOTO to demonstrate improved perception of functional BUE use. progressing not met 10/20/2023  7)   Decrease complaints of pain to  4 out of 10 at worst to increase functional hand use for ADL/work/leisure activities. progressing not met 10/20/2023    Plan   Cont OT to address above goals.    Hawa Aleman, OT

## 2023-11-10 ENCOUNTER — DOCUMENTATION ONLY (OUTPATIENT)
Dept: REHABILITATION | Facility: HOSPITAL | Age: 68
End: 2023-11-10
Payer: COMMERCIAL

## 2023-11-15 ENCOUNTER — TELEPHONE (OUTPATIENT)
Dept: OBSTETRICS AND GYNECOLOGY | Facility: CLINIC | Age: 68
End: 2023-11-15
Payer: COMMERCIAL

## 2023-11-15 NOTE — TELEPHONE ENCOUNTER
----- Message from Damaso Ibanez sent at 11/15/2023  1:27 PM CST -----  Regarding: Appt  Contact: [phone]  Patient is calling to schedule an appointment no dates in epic. Please contact pt

## 2023-11-20 ENCOUNTER — DOCUMENTATION ONLY (OUTPATIENT)
Dept: REHABILITATION | Facility: HOSPITAL | Age: 68
End: 2023-11-20
Payer: COMMERCIAL

## 2023-11-20 NOTE — PROGRESS NOTES
No Show Note/Documentation    Patient: Aaliyah Angela  Date of session: 11/20/2023  Chart Number: [id number]     Aaliyah Angela did not attend her scheduled therapy appointment today. She did not call to cancel nor reschedule. This is the 3rd appointment that she has not attended. Attempted to call the patient and she hung up. She is discharged due to noncompliance.    Florinda Garcia OTR/L

## 2023-11-22 ENCOUNTER — TELEPHONE (OUTPATIENT)
Dept: ORTHOPEDICS | Facility: CLINIC | Age: 68
End: 2023-11-22
Payer: COMMERCIAL

## 2023-11-22 NOTE — TELEPHONE ENCOUNTER
LVM c pt. Attempting to confirm appt location & time c Dr. Jean 11/28/23  Requested a call back to the [facility] at [phone] with any questions, concerns or need for a different appointment time.

## 2023-11-28 ENCOUNTER — OFFICE VISIT (OUTPATIENT)
Dept: ORTHOPEDICS | Facility: CLINIC | Age: 68
End: 2023-11-28
Payer: COMMERCIAL

## 2023-11-28 VITALS — WEIGHT: 246 LBS | BODY MASS INDEX: 39.53 KG/M2 | HEIGHT: 66 IN

## 2023-11-28 DIAGNOSIS — M65.349 TRIGGER RING FINGER, UNSPECIFIED LATERALITY: Primary | ICD-10-CM

## 2023-11-28 DIAGNOSIS — M65.4 DE QUERVAIN'S TENOSYNOVITIS, RIGHT: ICD-10-CM

## 2023-11-28 PROCEDURE — 1159F PR MEDICATION LIST DOCUMENTED IN MEDICAL RECORD: ICD-10-PCS | Mod: CPTII,S$GLB,, | Performed by: ORTHOPAEDIC SURGERY

## 2023-11-28 PROCEDURE — 99214 OFFICE O/P EST MOD 30 MIN: CPT | Mod: S$GLB,,, | Performed by: ORTHOPAEDIC SURGERY

## 2023-11-28 PROCEDURE — 99999 PR PBB SHADOW E&M-EST. PATIENT-LVL III: CPT | Mod: PBBFAC,,, | Performed by: ORTHOPAEDIC SURGERY

## 2023-11-28 PROCEDURE — 3008F BODY MASS INDEX DOCD: CPT | Mod: CPTII,S$GLB,, | Performed by: ORTHOPAEDIC SURGERY

## 2023-11-28 PROCEDURE — 3008F PR BODY MASS INDEX (BMI) DOCUMENTED: ICD-10-PCS | Mod: CPTII,S$GLB,, | Performed by: ORTHOPAEDIC SURGERY

## 2023-11-28 PROCEDURE — 99999 PR PBB SHADOW E&M-EST. PATIENT-LVL III: ICD-10-PCS | Mod: PBBFAC,,, | Performed by: ORTHOPAEDIC SURGERY

## 2023-11-28 PROCEDURE — 3051F PR MOST RECENT HEMOGLOBIN A1C LEVEL 7.0 - < 8.0%: ICD-10-PCS | Mod: CPTII,S$GLB,, | Performed by: ORTHOPAEDIC SURGERY

## 2023-11-28 PROCEDURE — 4010F PR ACE/ARB THEARPY RXD/TAKEN: ICD-10-PCS | Mod: CPTII,S$GLB,, | Performed by: ORTHOPAEDIC SURGERY

## 2023-11-28 PROCEDURE — 3051F HG A1C>EQUAL 7.0%<8.0%: CPT | Mod: CPTII,S$GLB,, | Performed by: ORTHOPAEDIC SURGERY

## 2023-11-28 PROCEDURE — 1159F MED LIST DOCD IN RCRD: CPT | Mod: CPTII,S$GLB,, | Performed by: ORTHOPAEDIC SURGERY

## 2023-11-28 PROCEDURE — 4010F ACE/ARB THERAPY RXD/TAKEN: CPT | Mod: CPTII,S$GLB,, | Performed by: ORTHOPAEDIC SURGERY

## 2023-11-28 PROCEDURE — 99214 PR OFFICE/OUTPT VISIT, EST, LEVL IV, 30-39 MIN: ICD-10-PCS | Mod: S$GLB,,, | Performed by: ORTHOPAEDIC SURGERY

## 2023-11-28 RX ORDER — METFORMIN HYDROCHLORIDE 500 MG/1
TABLET, EXTENDED RELEASE ORAL
Qty: 360 TABLET | Refills: 0 | Status: SHIPPED | OUTPATIENT
Start: 2023-11-28 | End: 2024-02-01 | Stop reason: SDUPTHER

## 2023-11-28 NOTE — PROGRESS NOTES
Patient has failed injections for de Quervain in trigger finger patient is here for surgical consent risks and benefits were explained in detail of note she failed trigger finger injections on the contralateral side as well we will discuss surgery after she recovers from the right side

## 2023-11-28 NOTE — PROGRESS NOTES
Subjective:      Patient ID: Aaliyah Angela is a 68 y.o. female.    Chief Complaint: No chief complaint on file.      HPI  Aaliyah Angela is a  68 y.o. female with DMII presenting today for evaluation of the bilateral hands. She has pain and triggering of the bilateral ring fingers. She has not yet tried anything. In addition she has pain at the right radial wrist, first dorsal compartment, increased with use. Some pain at the base of the right thumb as well. 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     10/5/23   F/u bl ring trigger fingers and R dequervains. Trigger finger injections performed last visit with some relief but does still have pain and triggering. She also has pain at the right long A1 pulley. Continues to have right dequervains symptoms also. She has been using the brace intermittently. She reports a lot of pain at the right radial wrist with use.     Interval history: 11/28/23  Patient returns today for follow-up of her bilateral ring trigger fingers and right de Quervain.  She also endorses new and more frequent triggering of the right long finger.  Her previous injections only provided minimal relief.  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      Review of patient's allergies indicates:  No Known Allergies      Current Outpatient Medications   Medication Sig Dispense Refill    acetaminophen (TYLENOL) 650 MG TbSR Take 1 tablet (650 mg total) by mouth every 8 (eight) hours as needed (pain). 30 tablet 0    amitriptyline (ELAVIL) 25 MG tablet TAKE 2 TABLETS AT BEDTIME 180 tablet 3    amLODIPine (NORVASC) 10 MG tablet TAKE 1 TABLET BY MOUTH ONCE A DAY 90 tablet 2    aspirin 81 MG Chew Take 81 mg by mouth once daily.      atorvastatin (LIPITOR) 20 MG tablet TAKE 1 TABLET(20 MG) BY MOUTH EVERY DAY 90 tablet 3    blood sugar diagnostic Strp To check BG 1-2 times daily, to use with insurance preferred meter 200 each 3    blood-glucose  meter,continuous (DEXCOM G6 ) Misc Use as directed, e 11.65 1 each 1    blood-glucose sensor (DEXCOM G6 SENSOR) Carley Change every 10 days. E 11.65 3 each 11    blood-glucose transmitter (DEXCOM G6 TRANSMITTER) Carley Change every 3 months 1 each 3    calcium-vitamin D3 500 mg(1,250mg) -200 unit per tablet Take 1 tablet by mouth once daily.      cephALEXin (KEFLEX) 500 MG capsule Take 500 mg by mouth every 8 (eight) hours.      empagliflozin (JARDIANCE) 25 mg tablet TAKE 1 TABLET BY MOUTH DAILY 90 tablet 3    esomeprazole (NEXIUM) 40 MG capsule Take 1 capsule (40 mg total) by mouth once daily. 90 capsule 3    FLOWFLEX COVID-19 AG HOME TEST Kit FOLLOW INSTRUCTIONS INCLUDED WITH THE PACKAGE.      insulin lispro (HUMALOG KWIKPEN INSULIN) 100 unit/mL pen Use as directed based on sugar level, 150-200+2, 201-250+4, 251-300+6, 301-350+8, >350+10. Max daily 30 units. 1 Box 6    losartan (COZAAR) 100 MG tablet Take 1 tablet (100 mg total) by mouth once daily. 90 tablet 3    metFORMIN (GLUCOPHAGE-XR) 500 MG ER 24hr tablet TAKE 2 TABLETS TWICE TWICE A  tablet 0    metoprolol succinate (TOPROL-XL) 50 MG 24 hr tablet TAKE 1 TABLET BY MOUTH EVERY DAY 90 tablet 3    multivitamin (THERAGRAN) per tablet Take 1 tablet by mouth once daily.      ondansetron (ZOFRAN) 4 MG tablet Take 1 tablet (4 mg total) by mouth 2 (two) times daily as needed for Nausea. 20 tablet 0    semaglutide (OZEMPIC) 2 mg/dose (8 mg/3 mL) PnIj Inject 2 mg into the skin every 7 days. 9 mL 3     No current facility-administered medications for this visit.     Facility-Administered Medications Ordered in Other Visits   Medication Dose Route Frequency Provider Last Rate Last Admin    0.9%  NaCl infusion   Intravenous Continuous Aristeo Bueno  mL/hr at 02/17/21 0754 New Bag at 02/17/21 0754       Past Medical History:   Diagnosis Date    Allergy     Anatomical narrow angle borderline glaucoma     AR (allergic rhinitis)     Arthritis      CAD (coronary artery disease) 2013    Non obstructive, 2012 PET     Diabetes mellitus type II     with retinopathy    Diabetic retinopathy     Epiretinal membrane, both eyes     Hyperlipidemia     Hypertensive retinopathy of both eyes     Lumbar disc disease     Migraines     Morbid obesity 10/03/2012    [place] (obstructive sleep apnea) 2015    Proliferative diabetic retinopathy(362.02)     PVD (peripheral vascular disease) 2013    Carotid US 1-39% bilat      Rupture of right Achilles tendon     Unspecified essential hypertension     Unspecified vitamin D deficiency     Vitreous hemorrhage of left eye        Past Surgical History:   Procedure Laterality Date    CARPAL TUNNEL RELEASE Right 2020    Procedure: RELEASE, CARPAL TUNNEL RIGHT;  Surgeon: Tameka Bran MD;  Location: [facility];  Service: Orthopedics;  Laterality: Right;    CARPAL TUNNEL RELEASE Right 2020    CATARACT EXTRACTION W/  INTRAOCULAR LENS IMPLANT Left 10/28/2020    COMPLEX ()    CATARACT EXTRACTION W/  INTRAOCULAR LENS IMPLANT Right 2021         SECTION      x3    COLONOSCOPY      COLONOSCOPY N/A 10/08/2020    Procedure: COLONOSCOPY;  Surgeon: Tima Talley MD;  Location: [facility] (4TH FLR);  Service: Endoscopy;  Laterality: N/A;    ESOPHAGOGASTRODUODENOSCOPY N/A 10/08/2020    Procedure: EGD (ESOPHAGOGASTRODUODENOSCOPY);  Surgeon: Tima Talley MD;  Location: [facility] (4TH FLR);  Service: Endoscopy;  Laterality: N/A;    GASTRIC BYPASS      Sleeve    INTRAOCULAR PROSTHESES INSERTION Left 10/28/2020    Procedure: INSERTION, IOL PROSTHESIS;  Surgeon: Alma Goodrich MD;  [address]  Service: Ophthalmology;  Laterality: Left;    INTRAOCULAR PROSTHESES INSERTION Right 2021    Procedure: INSERTION, IOL PROSTHESIS;  Surgeon: Alma Goodrich MD;  [address];  Service: Ophthalmology;  Laterality: Right;    LASER PERIPHERAL IRIDOTOMY  "Bilateral 1/26/2017 and 2/9/2017        PANRETINAL PHOTOCOAGULATION      Both eyes    PHACOEMULSIFICATION OF CATARACT Left 10/28/2020    Procedure: PHACOEMULSIFICATION, CATARACT;  Surgeon: Alma Goodrich MD;  Location: [address];  Service: Ophthalmology;  Laterality: Left;    PHACOEMULSIFICATION OF CATARACT Right 02/17/2021    Procedure: PHACOEMULSIFICATION, CATARACT;  Surgeon: Alma Goodrich MD;  Location: [facility] OR [address] [place] [address];  Service: Ophthalmology;  Laterality: Right;    TUBAL LIGATION         Review of Systems:  Constitutional: Negative for chills and fever.   Respiratory: Negative for cough and shortness of breath.    Gastrointestinal: Negative for nausea and vomiting.   Skin: Negative for rash.   Neurological: Negative for dizziness and headaches.   Psychiatric/Behavioral: Negative for depression.   MSK as in HPI       OBJECTIVE:     PHYSICAL EXAM:  Ht 5' 6" (1.676 m)   Wt 111.6 kg (246 lb)   BMI 39.71 kg/m²     GEN:  NAD, well-developed, well-groomed.  NEURO: Awake, alert, and oriented. Normal attention and concentration.    PSYCH: Normal mood and affect. Behavior is normal.  HEENT: No cervical lymphadenopathy noted.  CARDIOVASCULAR: Radial pulses 2+ bilaterally. No LE edema noted.  PULMONARY: Breath sounds normal. No respiratory distress.  SKIN: Intact, no rashes.      MSK:   BUE:  Good active ROM of the wrist and fingers. Ttp bl ring A1 pulleys with triggering. Ttp right first dorsal compartment and right thumb CMC. AIN/PIN/Radial/Median/Ulnar Nerves assessed in isolation without deficit. Radial & Ulnar arteries palpated 2+. Capillary Refill <3s.    RADIOGRAPHS:  Xray right hand 8/24/23   Impression:   No acute osseous abnormality identified.  Comments: I have personally reviewed the imaging and I agree with the above radiologist's report.    ASSESSMENT/PLAN:     No diagnosis found.  Right long and ring trigger finger, right de Quervain, left ring trigger finger             No " orders of the defined types were placed in this encounter.    Plan:   - plan for right ring and long finger trigger finger release with right de Quervain release  - once she has recovered from the right hand we will plan for left ring finger trigger finger release    We have explained the risks, benefits, and alternatives of the procedure to the patient in great detail. The patient voices understanding and all questions have been answered. The patient agrees with to proceed as planned. Consents were performed in clinic.

## 2023-11-30 DIAGNOSIS — M65.349 TRIGGER RING FINGER, UNSPECIFIED LATERALITY: Primary | ICD-10-CM

## 2023-11-30 DIAGNOSIS — M65.4 DE QUERVAIN'S TENOSYNOVITIS, RIGHT: ICD-10-CM

## 2023-12-07 ENCOUNTER — PATIENT MESSAGE (OUTPATIENT)
Dept: ORTHOPEDICS | Facility: CLINIC | Age: 68
End: 2023-12-07
Payer: COMMERCIAL

## 2023-12-11 ENCOUNTER — PATIENT MESSAGE (OUTPATIENT)
Dept: ORTHOPEDICS | Facility: CLINIC | Age: 68
End: 2023-12-11
Payer: COMMERCIAL

## 2023-12-11 DIAGNOSIS — E11.42 DIABETIC POLYNEUROPATHY ASSOCIATED WITH TYPE 2 DIABETES MELLITUS: Primary | ICD-10-CM

## 2023-12-11 RX ORDER — BLOOD-GLUCOSE,RECEIVER,CONT
EACH MISCELLANEOUS
Qty: 1 EACH | Refills: 1 | Status: SHIPPED | OUTPATIENT
Start: 2023-12-11 | End: 2024-02-01

## 2023-12-11 NOTE — TELEPHONE ENCOUNTER
----- Message from Vinnie Guzman sent at 12/11/2023 12:03 PM CST -----  Contact: Pt  [phone]  Requesting an RX refill or new RX.  Is this a refill or new RX: Refill  RX name and strength (copy/paste from chart):  blood-glucose meter,continuous (DEXCOM G6   Is this a 30 day or 90 day RX:   Pharmacy name and phone # (copy/paste from chart):  [id number] [place] [facility] [address]   Phone: [phone]  Fax: [phone]    The doctors have asked that we provide their patients with the following 2 reminders -- prescription refills can take up to 72 hours, and a friendly reminder that in the future you can use your [facility] account to request refills: yes    Pt states old machine is broken

## 2023-12-26 DIAGNOSIS — Z98.890 POST-OPERATIVE STATE: Primary | ICD-10-CM

## 2023-12-26 RX ORDER — TRAMADOL HYDROCHLORIDE 50 MG/1
50 TABLET ORAL EVERY 6 HOURS PRN
Qty: 10 TABLET | Refills: 0 | Status: SHIPPED | OUTPATIENT
Start: 2024-01-05 | End: 2024-02-01

## 2023-12-26 RX ORDER — BLOOD-GLUCOSE TRANSMITTER
EACH MISCELLANEOUS
Qty: 1 EACH | Refills: 3 | Status: SHIPPED | OUTPATIENT
Start: 2023-12-26 | End: 2024-02-01

## 2023-12-27 ENCOUNTER — ANESTHESIA EVENT (OUTPATIENT)
Dept: SURGERY | Facility: HOSPITAL | Age: 68
End: 2023-12-27
Payer: COMMERCIAL

## 2024-01-04 ENCOUNTER — PATIENT MESSAGE (OUTPATIENT)
Dept: ORTHOPEDICS | Facility: CLINIC | Age: 69
End: 2024-01-04
Payer: COMMERCIAL

## 2024-01-04 ENCOUNTER — TELEPHONE (OUTPATIENT)
Dept: ORTHOPEDICS | Facility: CLINIC | Age: 69
End: 2024-01-04
Payer: COMMERCIAL

## 2024-01-04 NOTE — TELEPHONE ENCOUNTER
Spoke c pt. Informed pt of 7:30 a.m. arrival time for 01/05/24 surgery at the [facility]. Reminded pt of NPO status & PO appt. Pt expressed understanding & was thankful.

## 2024-01-04 NOTE — H&P
Orthopedic Surgery  H&P    Subjective:      Patient ID: Aaliyah Angela is a 68 y.o. female.    Chief Complaint: No chief complaint on file.      HPI  Aaliyah Angela is a  68 y.o. female with DMII presenting today for evaluation of the bilateral hands. She has pain and triggering of the bilateral ring fingers. She has not yet tried anything. In addition she has pain at the right radial wrist, first dorsal compartment, increased with use. Some pain at the base of the right thumb as well. 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     10/5/23   F/u bl ring trigger fingers and R dequervains. Trigger finger injections performed last visit with some relief but does still have pain and triggering. She also has pain at the right long A1 pulley. Continues to have right dequervains symptoms also. She has been using the brace intermittently. She reports a lot of pain at the right radial wrist with use.     Interval history: 11/28/23  Patient returns today for follow-up of her bilateral ring trigger fingers and right de Quervain.  She also endorses new and more frequent triggering of the right long finger.  Her previous injections only provided minimal relief.  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      Review of patient's allergies indicates:  No Known Allergies      No current facility-administered medications for this encounter.     Current Outpatient Medications   Medication Sig Dispense Refill    acetaminophen (TYLENOL) 650 MG TbSR Take 1 tablet (650 mg total) by mouth every 8 (eight) hours as needed (pain). 30 tablet 0    amitriptyline (ELAVIL) 25 MG tablet TAKE 2 TABLETS AT BEDTIME 180 tablet 3    amLODIPine (NORVASC) 10 MG tablet TAKE 1 TABLET BY MOUTH ONCE A DAY 90 tablet 2    aspirin 81 MG Chew Take 81 mg by mouth once daily.      atorvastatin (LIPITOR) 20 MG tablet TAKE 1 TABLET(20 MG) BY MOUTH EVERY DAY 90 tablet 3    blood sugar diagnostic Strp To  check BG 1-2 times daily, to use with insurance preferred meter 200 each 3    blood-glucose meter,continuous (DEXCOM G6 ) Misc Use as directed, e 11.65 1 each 1    blood-glucose sensor (DEXCOM G6 SENSOR) Carley Change every 10 days. E 11.65 3 each 11    calcium-vitamin D3 500 mg(1,250mg) -200 unit per tablet Take 1 tablet by mouth once daily.      cephALEXin (KEFLEX) 500 MG capsule Take 500 mg by mouth every 8 (eight) hours.      DEXCOM G6 TRANSMITTER Carley CHANGE EVERY 3 MONTHS 1 each 3    empagliflozin (JARDIANCE) 25 mg tablet TAKE 1 TABLET BY MOUTH DAILY 90 tablet 3    esomeprazole (NEXIUM) 40 MG capsule Take 1 capsule (40 mg total) by mouth once daily. 90 capsule 3    FLOWFLEX COVID-19 AG HOME TEST Kit FOLLOW INSTRUCTIONS INCLUDED WITH THE PACKAGE.      insulin lispro (HUMALOG KWIKPEN INSULIN) 100 unit/mL pen Use as directed based on sugar level, 150-200+2, 201-250+4, 251-300+6, 301-350+8, >350+10. Max daily 30 units. 1 Box 6    losartan (COZAAR) 100 MG tablet Take 1 tablet (100 mg total) by mouth once daily. 90 tablet 3    metFORMIN (GLUCOPHAGE-XR) 500 MG ER 24hr tablet TAKE 2 TABLETS TWICE TWICE A  tablet 0    metoprolol succinate (TOPROL-XL) 50 MG 24 hr tablet TAKE 1 TABLET BY MOUTH EVERY DAY 90 tablet 3    multivitamin (THERAGRAN) per tablet Take 1 tablet by mouth once daily.      ondansetron (ZOFRAN) 4 MG tablet Take 1 tablet (4 mg total) by mouth 2 (two) times daily as needed for Nausea. 20 tablet 0    semaglutide (OZEMPIC) 2 mg/dose (8 mg/3 mL) PnIj Inject 2 mg into the skin every 7 days. 9 mL 3    [START ON 1/5/2024] traMADoL (ULTRAM) 50 mg tablet Take 1 tablet (50 mg total) by mouth every 6 (six) hours as needed for Pain. 10 tablet 0     Facility-Administered Medications Ordered in Other Encounters   Medication Dose Route Frequency Provider Last Rate Last Admin    0.9%  NaCl infusion   Intravenous Continuous Aristeo Bueno  mL/hr at 02/17/21 0751 New Bag at 02/17/21 0753        Past Medical History:   Diagnosis Date    Allergy     Anatomical narrow angle borderline glaucoma     AR (allergic rhinitis)     Arthritis     CAD (coronary artery disease) 2013    Non obstructive, 2012 PET     Diabetes mellitus type II     with retinopathy    Diabetic retinopathy     Epiretinal membrane, both eyes     Hyperlipidemia     Hypertensive retinopathy of both eyes     Lumbar disc disease     Migraines     Morbid obesity 10/03/2012    BRADFORD (obstructive sleep apnea) 2015    Proliferative diabetic retinopathy(362.02)     PVD (peripheral vascular disease) 2013    Carotid US 1-39% bilat      Rupture of right Achilles tendon     Unspecified essential hypertension     Unspecified vitamin D deficiency     Vitreous hemorrhage of left eye        Past Surgical History:   Procedure Laterality Date    CARPAL TUNNEL RELEASE Right 2020    Procedure: RELEASE, CARPAL TUNNEL RIGHT;  Surgeon: Tameka Bran MD;  Location: [facility];  Service: Orthopedics;  Laterality: Right;    CARPAL TUNNEL RELEASE Right 2020    CATARACT EXTRACTION W/  INTRAOCULAR LENS IMPLANT Left 10/28/2020    COMPLEX ()    CATARACT EXTRACTION W/  INTRAOCULAR LENS IMPLANT Right 2021         SECTION      x3    COLONOSCOPY      COLONOSCOPY N/A 10/08/2020    Procedure: COLONOSCOPY;  Surgeon: Tima Talley MD;  Location: [facility] (4TH FLR);  Service: Endoscopy;  Laterality: N/A;    ESOPHAGOGASTRODUODENOSCOPY N/A 10/08/2020    Procedure: EGD (ESOPHAGOGASTRODUODENOSCOPY);  Surgeon: Tima Talley MD;  Location: [facility] (4TH FLR);  Service: Endoscopy;  Laterality: N/A;    GASTRIC BYPASS      Sleeve    INTRAOCULAR PROSTHESES INSERTION Left 10/28/2020    Procedure: INSERTION, IOL PROSTHESIS;  Surgeon: Alma Goodrich MD;  Location: [facility] 1ST FLR;  Service: Ophthalmology;  Laterality: Left;    INTRAOCULAR PROSTHESES INSERTION Right 2021    Procedure: INSERTION, IOL  PROSTHESIS;  Surgeon: Alma Goodrich MD;  Location: [address];  Service: Ophthalmology;  Laterality: Right;    LASER PERIPHERAL IRIDOTOMY Bilateral 1/26/2017 and 2/9/2017        PANRETINAL PHOTOCOAGULATION      Both eyes    PHACOEMULSIFICATION OF CATARACT Left 10/28/2020    Procedure: PHACOEMULSIFICATION, CATARACT;  Surgeon: Alma Goodrich MD;  Location: [address];  Service: Ophthalmology;  Laterality: Left;    PHACOEMULSIFICATION OF CATARACT Right 02/17/2021    Procedure: PHACOEMULSIFICATION, CATARACT;  Surgeon: Alma Goodrich MD;  Location: [facility] OR [address] [place] [address];  Service: Ophthalmology;  Laterality: Right;    TUBAL LIGATION         Review of Systems:  Constitutional: Negative for chills and fever.   Respiratory: Negative for cough and shortness of breath.    Gastrointestinal: Negative for nausea and vomiting.   Skin: Negative for rash.   Neurological: Negative for dizziness and headaches.   Psychiatric/Behavioral: Negative for depression.   MSK as in HPI       OBJECTIVE:     PHYSICAL EXAM:  There were no vitals taken for this visit.    GEN:  NAD, well-developed, well-groomed.  NEURO: Awake, alert, and oriented. Normal attention and concentration.    PSYCH: Normal mood and affect. Behavior is normal.  HEENT: No cervical lymphadenopathy noted.  CARDIOVASCULAR: Radial pulses 2+ bilaterally. No LE edema noted.  PULMONARY: Breath sounds normal. No respiratory distress.  SKIN: Intact, no rashes.      MSK:   BUE:  Good active ROM of the wrist and fingers. Ttp bl ring A1 pulleys with triggering. Ttp right first dorsal compartment and right thumb CMC. AIN/PIN/Radial/Median/Ulnar Nerves assessed in isolation without deficit. Radial & Ulnar arteries palpated 2+. Capillary Refill <3s.    RADIOGRAPHS:  Xray right hand 8/24/23   Impression:   No acute osseous abnormality identified.  Comments: I have personally reviewed the imaging and I agree with the above radiologist's  report.    ASSESSMENT/PLAN:     No diagnosis found.  Right long and ring trigger finger, right de Quervain, left ring trigger finger      Orders Placed This Encounter    Case Request Operating Room: RELEASE,RIGHT HAND, FOR DEQUERVAIN'S TENOSYNOVITIS, RELEASE, TRIGGER FINGER, RIGHT RING AND LONG FINGER         No orders of the defined types were placed in this encounter.    Plan:   - plan for right ring and long finger trigger finger release with right de Quervain release  - once she has recovered from the right hand we will plan for left ring finger trigger finger release    We have explained the risks, benefits, and alternatives of the procedure to the patient in great detail. The patient voices understanding and all questions have been answered. The patient agrees with to proceed as planned. Consents were performed in clinic.

## 2024-01-05 ENCOUNTER — HOSPITAL ENCOUNTER (OUTPATIENT)
Facility: HOSPITAL | Age: 69
Discharge: HOME | End: 2024-01-05
Attending: ORTHOPAEDIC SURGERY | Admitting: ORTHOPAEDIC SURGERY
Payer: COMMERCIAL

## 2024-01-05 ENCOUNTER — ANESTHESIA (OUTPATIENT)
Dept: SURGERY | Facility: HOSPITAL | Age: 69
End: 2024-01-05
Payer: COMMERCIAL

## 2024-01-05 VITALS
BODY MASS INDEX: 39.53 KG/M2 | RESPIRATION RATE: 18 BRPM | SYSTOLIC BLOOD PRESSURE: 155 MMHG | HEIGHT: 66 IN | OXYGEN SATURATION: 100 % | HEART RATE: 80 BPM | DIASTOLIC BLOOD PRESSURE: 76 MMHG | WEIGHT: 246 LBS | TEMPERATURE: 97 F

## 2024-01-05 DIAGNOSIS — M65.4 TENOSYNOVITIS, DE QUERVAIN: ICD-10-CM

## 2024-01-05 LAB — POCT GLUCOSE: 116 MG/DL (ref 70–110)

## 2024-01-05 PROCEDURE — 27200750 HC INSULATED NEEDLE/ STIMUPLEX: Performed by: ANESTHESIOLOGY

## 2024-01-05 PROCEDURE — 25000003 PHARM REV CODE 250: Performed by: NURSE ANESTHETIST, CERTIFIED REGISTERED

## 2024-01-05 PROCEDURE — 25000003 PHARM REV CODE 250: Performed by: ANESTHESIOLOGY

## 2024-01-05 PROCEDURE — 99900035 HC TECH TIME PER 15 MIN (STAT)

## 2024-01-05 PROCEDURE — D9220A PRA ANESTHESIA: Mod: ANES,,, | Performed by: ANESTHESIOLOGY

## 2024-01-05 PROCEDURE — 82962 GLUCOSE BLOOD TEST: CPT | Performed by: ORTHOPAEDIC SURGERY

## 2024-01-05 PROCEDURE — 27201423 OPTIME MED/SURG SUP & DEVICES STERILE SUPPLY: Performed by: ORTHOPAEDIC SURGERY

## 2024-01-05 PROCEDURE — 71000033 HC RECOVERY, INTIAL HOUR: Performed by: ORTHOPAEDIC SURGERY

## 2024-01-05 PROCEDURE — 63600175 PHARM REV CODE 636 W HCPCS: Performed by: ANESTHESIOLOGY

## 2024-01-05 PROCEDURE — 64415 NJX AA&/STRD BRCH PLXS IMG: CPT | Mod: 59,RT,, | Performed by: ANESTHESIOLOGY

## 2024-01-05 PROCEDURE — 64415 NJX AA&/STRD BRCH PLXS IMG: CPT | Performed by: ANESTHESIOLOGY

## 2024-01-05 PROCEDURE — 25000003 PHARM REV CODE 250: Performed by: STUDENT IN AN ORGANIZED HEALTH CARE EDUCATION/TRAINING PROGRAM

## 2024-01-05 PROCEDURE — 71000015 HC POSTOP RECOV 1ST HR: Performed by: ORTHOPAEDIC SURGERY

## 2024-01-05 PROCEDURE — 36000707: Performed by: ORTHOPAEDIC SURGERY

## 2024-01-05 PROCEDURE — 63600175 PHARM REV CODE 636 W HCPCS: Performed by: STUDENT IN AN ORGANIZED HEALTH CARE EDUCATION/TRAINING PROGRAM

## 2024-01-05 PROCEDURE — 94761 N-INVAS EAR/PLS OXIMETRY MLT: CPT

## 2024-01-05 PROCEDURE — D9220A PRA ANESTHESIA: Mod: CRNA,,, | Performed by: NURSE ANESTHETIST, CERTIFIED REGISTERED

## 2024-01-05 PROCEDURE — 26055 INCISE FINGER TENDON SHEATH: CPT | Mod: 51,F7,, | Performed by: ORTHOPAEDIC SURGERY

## 2024-01-05 PROCEDURE — 25000003 PHARM REV CODE 250: Performed by: ORTHOPAEDIC SURGERY

## 2024-01-05 PROCEDURE — 37000009 HC ANESTHESIA EA ADD 15 MINS: Performed by: ORTHOPAEDIC SURGERY

## 2024-01-05 PROCEDURE — 36000706: Performed by: ORTHOPAEDIC SURGERY

## 2024-01-05 PROCEDURE — 37000008 HC ANESTHESIA 1ST 15 MINUTES: Performed by: ORTHOPAEDIC SURGERY

## 2024-01-05 PROCEDURE — 63600175 PHARM REV CODE 636 W HCPCS: Performed by: NURSE ANESTHETIST, CERTIFIED REGISTERED

## 2024-01-05 PROCEDURE — 25000 INCISION OF TENDON SHEATH: CPT | Mod: RT,,, | Performed by: ORTHOPAEDIC SURGERY

## 2024-01-05 RX ORDER — BACITRACIN ZINC 500 UNIT/G
OINTMENT (GRAM) TOPICAL
Status: DISCONTINUED | OUTPATIENT
Start: 2024-01-05 | End: 2024-01-05 | Stop reason: HOSPADM

## 2024-01-05 RX ORDER — FAMOTIDINE 10 MG/ML
INJECTION INTRAVENOUS
Status: DISCONTINUED | OUTPATIENT
Start: 2024-01-05 | End: 2024-01-05

## 2024-01-05 RX ORDER — DEXMEDETOMIDINE HYDROCHLORIDE 100 UG/ML
INJECTION, SOLUTION INTRAVENOUS
Status: DISCONTINUED | OUTPATIENT
Start: 2024-01-05 | End: 2024-01-05

## 2024-01-05 RX ORDER — ACETAMINOPHEN 500 MG
1000 TABLET ORAL ONCE
Status: COMPLETED | OUTPATIENT
Start: 2024-01-05 | End: 2024-01-05

## 2024-01-05 RX ORDER — MIDAZOLAM HYDROCHLORIDE 1 MG/ML
.5-4 INJECTION INTRAMUSCULAR; INTRAVENOUS
Status: DISCONTINUED | OUTPATIENT
Start: 2024-01-05 | End: 2024-01-05 | Stop reason: HOSPADM

## 2024-01-05 RX ORDER — ONDANSETRON 2 MG/ML
4 INJECTION INTRAMUSCULAR; INTRAVENOUS DAILY PRN
Status: DISCONTINUED | OUTPATIENT
Start: 2024-01-05 | End: 2024-01-05 | Stop reason: HOSPADM

## 2024-01-05 RX ORDER — ONDANSETRON 2 MG/ML
INJECTION INTRAMUSCULAR; INTRAVENOUS
Status: DISCONTINUED | OUTPATIENT
Start: 2024-01-05 | End: 2024-01-05

## 2024-01-05 RX ORDER — HALOPERIDOL 5 MG/ML
0.5 INJECTION INTRAMUSCULAR EVERY 10 MIN PRN
Status: DISCONTINUED | OUTPATIENT
Start: 2024-01-05 | End: 2024-01-05 | Stop reason: HOSPADM

## 2024-01-05 RX ORDER — CELECOXIB 200 MG/1
400 CAPSULE ORAL DAILY
Status: DISPENSED | OUTPATIENT
Start: 2024-01-05

## 2024-01-05 RX ORDER — LIDOCAINE HYDROCHLORIDE 20 MG/ML
INJECTION, SOLUTION EPIDURAL; INFILTRATION; INTRACAUDAL; PERINEURAL
Status: COMPLETED | OUTPATIENT
Start: 2024-01-05 | End: 2024-01-05

## 2024-01-05 RX ORDER — SODIUM CHLORIDE 0.9 % (FLUSH) 0.9 %
10 SYRINGE (ML) INJECTION
Status: DISCONTINUED | OUTPATIENT
Start: 2024-01-05 | End: 2024-01-05 | Stop reason: HOSPADM

## 2024-01-05 RX ORDER — METHOCARBAMOL 500 MG/1
1000 TABLET, FILM COATED ORAL ONCE
Status: DISCONTINUED | OUTPATIENT
Start: 2024-01-05 | End: 2024-01-05 | Stop reason: HOSPADM

## 2024-01-05 RX ORDER — FENTANYL CITRATE 50 UG/ML
25-200 INJECTION, SOLUTION INTRAMUSCULAR; INTRAVENOUS
Status: DISCONTINUED | OUTPATIENT
Start: 2024-01-05 | End: 2024-01-05 | Stop reason: HOSPADM

## 2024-01-05 RX ORDER — DEXAMETHASONE SODIUM PHOSPHATE 4 MG/ML
INJECTION, SOLUTION INTRA-ARTICULAR; INTRALESIONAL; INTRAMUSCULAR; INTRAVENOUS; SOFT TISSUE
Status: DISCONTINUED | OUTPATIENT
Start: 2024-01-05 | End: 2024-01-05

## 2024-01-05 RX ORDER — LIDOCAINE HYDROCHLORIDE 20 MG/ML
INJECTION, SOLUTION EPIDURAL; INFILTRATION; INTRACAUDAL; PERINEURAL
Status: COMPLETED
Start: 2024-01-05 | End: 2024-01-05

## 2024-01-05 RX ORDER — MUPIROCIN 20 MG/G
OINTMENT TOPICAL
Status: DISCONTINUED | OUTPATIENT
Start: 2024-01-05 | End: 2024-01-05 | Stop reason: HOSPADM

## 2024-01-05 RX ADMIN — FENTANYL CITRATE 25 MCG: 50 INJECTION INTRAMUSCULAR; INTRAVENOUS at 08:01

## 2024-01-05 RX ADMIN — DEXMEDETOMIDINE 8 MCG: 100 INJECTION, SOLUTION, CONCENTRATE INTRAVENOUS at 09:01

## 2024-01-05 RX ADMIN — FAMOTIDINE 20 MG: 10 INJECTION, SOLUTION INTRAVENOUS at 09:01

## 2024-01-05 RX ADMIN — MIDAZOLAM 1 MG: 1 INJECTION INTRAMUSCULAR; INTRAVENOUS at 08:01

## 2024-01-05 RX ADMIN — CEFAZOLIN 2 G: 2 INJECTION, POWDER, FOR SOLUTION INTRAMUSCULAR; INTRAVENOUS at 09:01

## 2024-01-05 RX ADMIN — LIDOCAINE HYDROCHLORIDE 20 ML: 20 INJECTION, SOLUTION EPIDURAL; INFILTRATION; INTRACAUDAL; PERINEURAL at 08:01

## 2024-01-05 RX ADMIN — DEXAMETHASONE SODIUM PHOSPHATE 8 MG: 4 INJECTION, SOLUTION INTRAMUSCULAR; INTRAVENOUS at 09:01

## 2024-01-05 RX ADMIN — ACETAMINOPHEN 1000 MG: 500 TABLET ORAL at 07:01

## 2024-01-05 RX ADMIN — ONDANSETRON 4 MG: 2 INJECTION INTRAMUSCULAR; INTRAVENOUS at 09:01

## 2024-01-05 RX ADMIN — SODIUM CHLORIDE: 0.9 INJECTION, SOLUTION INTRAVENOUS at 08:01

## 2024-01-05 RX ADMIN — MUPIROCIN: 20 OINTMENT TOPICAL at 07:01

## 2024-01-05 NOTE — BRIEF OP NOTE
[facility] - Surgery ([facility])  Brief Operative Note    Surgery Date: 1/5/2024     Surgeon(s) and Role:     * Tameka Bran MD - Primary    Assisting Surgeon: None    Pre-op Diagnosis:  Trigger ring finger, unspecified laterality [M65.349]  De Quervain's tenosynovitis, right [M65.4]    Post-op Diagnosis:  Post-Op Diagnosis Codes:     * Trigger ring finger, unspecified laterality [M65.349]     * De Quervain's tenosynovitis, right [M65.4]    Procedure(s) (LRB):  RELEASE,RIGHT HAND, FOR DEQUERVAIN'S TENOSYNOVITIS (Right)  RELEASE, TRIGGER FINGER, RIGHT RING AND LONG FINGER (Right)    Anesthesia: Local MAC    Operative Findings: See op note    Estimated Blood Loss: * No values recorded between 1/5/2024  9:48 AM and 1/5/2024 10:28 AM *         Specimens:   Specimen (24h ago, onward)      None              Discharge Note    OUTCOME: Patient tolerated treatment/procedure well without complication and is now ready for discharge.    DISPOSITION: Home or Self Care    FINAL DIAGNOSIS:    Problem List Items Addressed This Visit    None  Visit Diagnoses       Tenosynovitis, de Quervain        Relevant Medications    midazolam (VERSED) 1 mg/mL injection 0.5-4 mg    fentaNYL 50 mcg/mL injection  mcg    Other Relevant Orders    Pulse Oximetry Q4H    Diet general    Call MD for:  temperature >100.4    Call MD for:  persistent nausea and vomiting    Call MD for:  severe uncontrolled pain    Call MD for:  difficulty breathing, headache or visual disturbances    Call MD for:  redness, tenderness, or signs of infection (pain, swelling, redness, odor or green/yellow discharge around incision site)    Call MD for:  hives    Call MD for:  persistent dizziness or light-headedness    Call MD for:  extreme fatigue    Non weight bearing    No driving, operating heavy equipment or signing legal documents while taking pain medication    Leave dressing on - Keep it clean, dry, and intact until clinic visit    Ambulatory  referral/consult to Physical/Occupational Therapy              FOLLOWUP: In clinic    DISCHARGE INSTRUCTIONS:    Discharge Procedure Orders   Ambulatory referral/consult to Physical/Occupational Therapy   Standing Status: Future   Referral Priority: Routine Referral Type: Physical Medicine   Referral Reason: Specialty Services Required   Requested Specialty: Occupational Therapy   Number of Visits Requested: 1     Diet general     Call MD for:  temperature >100.4     Call MD for:  persistent nausea and vomiting     Call MD for:  severe uncontrolled pain     Call MD for:  difficulty breathing, headache or visual disturbances     Call MD for:  redness, tenderness, or signs of infection (pain, swelling, redness, odor or green/yellow discharge around incision site)     Call MD for:  hives     Call MD for:  persistent dizziness or light-headedness     Call MD for:  extreme fatigue     No driving, operating heavy equipment or signing legal documents while taking pain medication     Leave dressing on - Keep it clean, dry, and intact until clinic visit     Non weight bearing

## 2024-01-05 NOTE — ANESTHESIA PREPROCEDURE EVALUATION
2024  Aaliyah Angela is a 68 y.o., female.    Procedures:      RELEASE,RIGHT HAND, FOR DEQUERVAIN'S TENOSYNOVITIS (Right: Hand)      RELEASE, TRIGGER FINGER, RIGHT RING AND LONG FINGER (Right: Hand)   Anesthesia type: Local MAC   Diagnosis:      Trigger ring finger, unspecified laterality [M65.349]      De Quervain's tenosynovitis, right [M65.4]       Past Medical History:   Diagnosis Date    Allergy     Anatomical narrow angle borderline glaucoma     AR (allergic rhinitis)     Arthritis     CAD (coronary artery disease) 2013    Non obstructive, 2012 PET     Diabetes mellitus type II     with retinopathy    Diabetic retinopathy     Epiretinal membrane, both eyes     Hyperlipidemia     Hypertensive retinopathy of both eyes     Lumbar disc disease     Migraines     Morbid obesity 10/03/2012    BRADFORD (obstructive sleep apnea) 2015    Proliferative diabetic retinopathy(362.02)     PVD (peripheral vascular disease) 2013    Carotid US 1-39% bilat      Rupture of right Achilles tendon     Unspecified essential hypertension     Unspecified vitamin D deficiency     Vitreous hemorrhage of left eye        Past Surgical History:   Procedure Laterality Date    CARPAL TUNNEL RELEASE Right 2020    Procedure: RELEASE, CARPAL TUNNEL RIGHT;  Surgeon: Tameka Bran MD;  Location: [facility];  Service: Orthopedics;  Laterality: Right;    CARPAL TUNNEL RELEASE Right 2020    CATARACT EXTRACTION W/  INTRAOCULAR LENS IMPLANT Left 10/28/2020    COMPLEX ()    CATARACT EXTRACTION W/  INTRAOCULAR LENS IMPLANT Right 2021         SECTION      x3    COLONOSCOPY      COLONOSCOPY N/A 10/08/2020    Procedure: COLONOSCOPY;  Surgeon: Tima Talley MD;  Location: [address]);  Service: Endoscopy;  Laterality: N/A;    ESOPHAGOGASTRODUODENOSCOPY N/A 10/08/2020     Procedure: EGD (ESOPHAGOGASTRODUODENOSCOPY);  Surgeon: Tima Talley MD;  Location: [facility] (4TH FLR);  Service: Endoscopy;  Laterality: N/A;    GASTRIC BYPASS  2015    Sleeve    INTRAOCULAR PROSTHESES INSERTION Left 10/28/2020    Procedure: INSERTION, IOL PROSTHESIS;  Surgeon: Alma Goodrich MD;  Location: [facility] OR 1ST FLR;  Service: Ophthalmology;  Laterality: Left;    INTRAOCULAR PROSTHESES INSERTION Right 02/17/2021    Procedure: INSERTION, IOL PROSTHESIS;  Surgeon: Alma Goodrich MD;  [facility];  Service: Ophthalmology;  Laterality: Right;    LASER PERIPHERAL IRIDOTOMY Bilateral 1/26/2017 and 2/9/2017        PANRETINAL PHOTOCOAGULATION      Both eyes    PHACOEMULSIFICATION OF CATARACT Left 10/28/2020    Procedure: PHACOEMULSIFICATION, CATARACT;  Surgeon: Alma Goodrich MD;  Location: [facility] OR 1ST FLR;  Service: Ophthalmology;  Laterality: Left;    PHACOEMULSIFICATION OF CATARACT Right 02/17/2021    Procedure: PHACOEMULSIFICATION, CATARACT;  Surgeon: Alma Goodrich MD;  [facility];  Service: Ophthalmology;  Laterality: Right;    TUBAL LIGATION       Medication List with Changes/Refills   Current Medications    ACETAMINOPHEN (TYLENOL) 650 MG TBSR    Take 1 tablet (650 mg total) by mouth every 8 (eight) hours as needed (pain).    AMITRIPTYLINE (ELAVIL) 25 MG TABLET    TAKE 2 TABLETS AT BEDTIME    AMLODIPINE (NORVASC) 10 MG TABLET    TAKE 1 TABLET BY MOUTH ONCE A DAY    ASPIRIN 81 MG CHEW    Take 81 mg by mouth once daily.    ATORVASTATIN (LIPITOR) 20 MG TABLET    TAKE 1 TABLET(20 MG) BY MOUTH EVERY DAY    BLOOD SUGAR DIAGNOSTIC STRP    To check BG 1-2 times daily, to use with insurance preferred meter    BLOOD-GLUCOSE METER,CONTINUOUS (DEXCOM G6 ) MISC    Use as directed, e 11.65    BLOOD-GLUCOSE SENSOR (DEXCOM G6 SENSOR) MICHAEL    Change every 10 days. E 11.65    CALCIUM-VITAMIN D3 500 MG(1,250MG) -200 UNIT PER TABLET    Take 1 tablet by mouth  once daily.    CEPHALEXIN (KEFLEX) 500 MG CAPSULE    Take 500 mg by mouth every 8 (eight) hours.    DEXCOM G6 TRANSMITTER MICHAEL    CHANGE EVERY 3 MONTHS    EMPAGLIFLOZIN (JARDIANCE) 25 MG TABLET    TAKE 1 TABLET BY MOUTH DAILY    ESOMEPRAZOLE (NEXIUM) 40 MG CAPSULE    Take 1 capsule (40 mg total) by mouth once daily.    FLOWFLEX COVID-19 AG HOME TEST KIT    FOLLOW INSTRUCTIONS INCLUDED WITH THE PACKAGE.    INSULIN LISPRO (HUMALOG KWIKPEN INSULIN) 100 UNIT/ML PEN    Use as directed based on sugar level, 150-200+2, 201-250+4, 251-300+6, 301-350+8, >350+10. Max daily 30 units.    LOSARTAN (COZAAR) 100 MG TABLET    Take 1 tablet (100 mg total) by mouth once daily.    METFORMIN (GLUCOPHAGE-XR) 500 MG ER 24HR TABLET    TAKE 2 TABLETS TWICE TWICE A DAY    METOPROLOL SUCCINATE (TOPROL-XL) 50 MG 24 HR TABLET    TAKE 1 TABLET BY MOUTH EVERY DAY    MULTIVITAMIN (THERAGRAN) PER TABLET    Take 1 tablet by mouth once daily.    ONDANSETRON (ZOFRAN) 4 MG TABLET    Take 1 tablet (4 mg total) by mouth 2 (two) times daily as needed for Nausea.    SEMAGLUTIDE (OZEMPIC) 2 MG/DOSE (8 MG/3 ML) PNIJ    Inject 2 mg into the skin every 7 days.    TRAMADOL (ULTRAM) 50 MG TABLET    Take 1 tablet (50 mg total) by mouth every 6 (six) hours as needed for Pain.         Anesthesia Evaluation    I have reviewed the Patient Summary Reports.     I have reviewed the Nursing Notes. I have reviewed the NPO Status.      Review of Systems  Anesthesia Hx:  No problems with previous Anesthesia   History of prior surgery of interest to airway management or planning:          Denies Family Hx of Anesthesia complications.    Denies Personal Hx of Anesthesia complications.                    Cardiovascular:  Exercise tolerance: good   Hypertension   CAD       Denies Angina.                                  Pulmonary:        Sleep Apnea                Hepatic/GI:     GERD, well controlled             Musculoskeletal:  Arthritis               Neurological:       Headaches                                 Endocrine:  Diabetes               Physical Exam  General:  Well nourished and Obesity       Airway/Jaw/Neck:  Airway Findings: Mouth Opening: Normal     Tongue: Normal      General Airway Assessment: Adult      Mallampati: II   TM Distance: Normal, at least 6 cm               Dental:  Dental Findings: In tact      Chest/Lungs:  Chest/Lungs Findings:  Normal Respiratory Rate       Heart/Vascular:  Heart Findings: Rate: Normal                         Mental Status:  Mental Status Findings:  Alert and Oriented         Anesthesia Plan  Type of Anesthesia, risks & benefits discussed:  Anesthesia Type:  MAC, general, regional    Patient's Preference: MAC  Plan Factors:          Intra-op Monitoring Plan: standard ASA monitors  Intra-op Monitoring Plan Comments:   Post Op Pain Control Plan: multimodal analgesia, IV/PO Opioids PRN and per primary service following discharge from PACU  Post Op Pain Control Plan Comments:     Induction:   IV  Beta Blocker:  Patient is not currently on a Beta-Blocker (No further documentation required).       Informed Consent: Informed consent signed with the Patient and all parties understand the risks and agree with anesthesia plan.  All questions answered.  Anesthesia consent signed with patient.  ASA Score: 3     Day of Surgery Review of History & Physical:        Anesthesia Plan Notes: The patient took Ozempic 1/1/24.  Will alter plan to regional with minimal to no sedation.        Ready For Surgery From Anesthesia Perspective.             Physical Exam  General: Well nourished and Obesity    Airway:  Mallampati: II   Mouth Opening: Normal  TM Distance: Normal, at least 6 cm  Tongue: Normal    Dental:  In tact    Chest/Lungs:  Normal Respiratory Rate    Heart:  Rate: Normal        Anesthesia Plan  Type of Anesthesia, risks & benefits discussed:    Anesthesia Type: MAC, general, regional  Intra-op Monitoring Plan: standard ASA monitors  Post Op  Pain Control Plan: multimodal analgesia, IV/PO Opioids PRN and per primary service following discharge from PACU  Induction:  IV  Informed Consent: Informed consent signed with the Patient and all parties understand the risks and agree with anesthesia plan.  All questions answered.   ASA Score: 3  Anesthesia Plan Notes: The patient took Ozempic 1/1/24.  Will alter plan to regional with minimal to no sedation.    Ready For Surgery From Anesthesia Perspective.     .

## 2024-01-05 NOTE — ANESTHESIA POSTPROCEDURE EVALUATION
Anesthesia Post Evaluation    Patient: Aaliyah Angela    Procedure(s) Performed: Procedure(s) (LRB):  RELEASE,RIGHT HAND, FOR DEQUERVAIN'S TENOSYNOVITIS (Right)  RELEASE, TRIGGER FINGER, RIGHT RING AND LONG FINGER (Right)    Final Anesthesia Type: general      Patient location during evaluation: PACU  Patient participation: Yes- Able to Participate  Level of consciousness: awake and alert and oriented  Post-procedure vital signs: reviewed and stable  Pain management: adequate  Airway patency: patent    PONV status at discharge: No PONV  Anesthetic complications: no      Cardiovascular status: hemodynamically stable  Respiratory status: nasal cannula  Hydration status: euvolemic  Follow-up not needed.              Vitals Value Taken Time   /76 01/05/24 1115   Temp 36.3 °C (97.4 °F) 01/05/24 1115   Pulse 80 01/05/24 1115   Resp 18 01/05/24 1115   SpO2 100 % 01/05/24 1115         Event Time   Out of Recovery 11:10:00         Pain/Diallo Score: Pain Rating Prior to Med Admin: 0 (1/5/2024  8:28 AM)  Diallo Score: 9 (1/5/2024 10:30 AM)

## 2024-01-05 NOTE — PLAN OF CARE
Need anesthesia consent.    MD saw burn on her operative hand from [facility] was okay with for surgery. Patient took Ozemic shot on Monday 1/1/24. Anesthesia made aware.    Belongings to be locked. Bed in lowest, locked position with side rails up X2. Call light within reach. No apparent distress noted. Will continue to monitor.

## 2024-01-05 NOTE — TRANSFER OF CARE
"Anesthesia Transfer of Care Note    Patient: Aaliyah Angela    Procedure(s) Performed: Procedure(s) (LRB):  RELEASE,RIGHT HAND, FOR DEQUERVAIN'S TENOSYNOVITIS (Right)  RELEASE, TRIGGER FINGER, RIGHT RING AND LONG FINGER (Right)    Patient location: PACU    Anesthesia Type: MAC and regional    Transport from OR: Transported from OR on 6-10 L/min O2 by face mask with adequate spontaneous ventilation    Post pain: adequate analgesia    Post assessment: no apparent anesthetic complications and tolerated procedure well    Post vital signs: stable    Level of consciousness: awake, alert and oriented    Nausea/Vomiting: no nausea/vomiting    Complications: none    Transfer of care protocol was followed      Last vitals: Visit Vitals  /65 (BP Location: Left arm, Patient Position: Sitting)   Pulse 80   Temp 36.4 °C (97.6 °F) (Temporal)   Resp 11   Ht 5' 6" (1.676 m)   Wt 111.6 kg (246 lb)   SpO2 100%   Breastfeeding No   BMI 39.71 kg/m²     "

## 2024-01-05 NOTE — ANESTHESIA PROCEDURE NOTES
Right supraclavicular single shot    Patient location during procedure: pre-op   Block not for primary anesthetic.  Reason for block: at surgeon's request and post-op pain management   Post-op Pain Location: Right hand pain   Start time: 1/5/2024 8:30 AM  Timeout: 1/5/2024 8:28 AM   End time: 1/5/2024 8:34 AM    Staffing  Authorizing Provider: Leah Wagner MD  Performing Provider: Leah Wagner MD    Staffing  Performed by: Leah Wagner MD  Authorized by: Leah Wagner MD    Preanesthetic Checklist  Completed: patient identified, IV checked, site marked, risks and benefits discussed, surgical consent, monitors and equipment checked, pre-op evaluation and timeout performed  Peripheral Block  Patient position: supine  Prep: ChloraPrep  Patient monitoring: heart rate, cardiac monitor, continuous pulse ox, continuous capnometry and frequent blood pressure checks  Block type: supraclavicular  Laterality: right  Injection technique: single shot  Needle  Needle type: Stimuplex   Needle gauge: 22 G  Needle length: 2 in  Needle localization: anatomical landmarks and ultrasound guidance   -ultrasound image captured on disc.  Assessment  Injection assessment: negative aspiration, negative parasthesia and local visualized surrounding nerve  Paresthesia pain: none  Heart rate change: no  Slow fractionated injection: yes  Pain Tolerance: comfortable throughout block and no complaints  Medications:    Medications: lidocaine (PF) 20 mg/mL (2%) injection - Other   20 mL - 1/5/2024 8:30:00 AM    Additional Notes  VSS.  [facility] RN monitoring vitals throughout procedure.  Patient tolerated procedure well.     With 1:200,000 epi

## 2024-01-05 NOTE — OP NOTE
[facility] ([facility])  Surgery Department  Operative Note    SUMMARY     Date of Procedure: 1/5/2024     Procedure: Procedure(s) (LRB):  RELEASE,RIGHT HAND, FOR DEQUERVAIN'S TENOSYNOVITIS (Right)  RELEASE, TRIGGER FINGER, RIGHT RING AND LONG FINGER (Right)     Surgeon(s) and Role:     * Tameka Bran MD - Primary    Assisting Surgeon: trey    Pre-Operative Diagnosis: Trigger ring finger, unspecified laterality [M65.349]  De Quervain's tenosynovitis, right [M65.4]    Post-Operative Diagnosis: Post-Op Diagnosis Codes:     * Trigger ring finger, unspecified laterality [M65.349]     * De Quervain's tenosynovitis, right [M65.4]    Anesthesia: Local MAC    Technical Procedures Used: surgery    Description of the Findings of the Procedure:  Indication for procedure Ms Angela is a 68-year-old female who is failed conservative treatment for right de Quervain tenosynovitis and right ring and long trigger finger after much discussion with the patient elected for surgical intervention risks and benefits were explained to the patient in clinic consents were signed in clinic     Procedure in detail the correct site was marked with the patient's participation in the holding area patient underwent regional anesthesia was brought to the operating placed in supine position underwent MAC anesthesia well-padded nonsterile tourniquet was placed on the right upper extremity right upper extremities prepped draped normal sterile fashion time-out was conducted for the correct procedure to be indicated IV antibiotics given patient preoperatively incision was marked out over the 1st dorsal compartment the tourniquet was insufflated incision was made careful dissection down to the retinaculum all the while protecting the radial sensory branch retinaculum was sharply incised of note patient had significant amount of inflammation but more appropriate slow that she had a separate fiber osseous sheath of the EPB tendon this was  completely released a portion of the retinaculum was excised the areas then irrigated copious amounts normal saline Vicryl Monocryl Dermabond closed the skin our attention was turned to the long and the ring trigger fingers longitudinal incisions were marked out incisions were made careful dissection down the A1 pulley 1st for the long finger than the ring finger A1 pulley was released on both accounts the tendons were retracted out the tendon sheath fingers were placed range of motion showed that it no longer triggered the areas were irrigated copious amounts of normal saline nylon closed the skin sterile dressing was applied      patient was placed in a thumb spica splint tolerated suture was brought to her room stable condition postop plan for this patient keep the dressing clean dry and intact will see the patient back in 2 weeks' time brace to be placed therapy to be initiated after sutures out    Significant Surgical Tasks Conducted by the Assistant(s), if Applicable: retraction    Complications: No    Estimated Blood Loss (EBL): * No values recorded between 1/5/2024  9:48 AM and 1/5/2024 10:28 AM *           Implants: * No implants in log *    Specimens:   Specimen (24h ago, onward)      None                    Condition: Good    Disposition: PACU - hemodynamically stable.    Attestation: I performed the procedure.    Discharge Note    SUMMARY     Admit Date: 1/5/2024    Discharge Date and Time: 1/5/2024 11:27 AM    Hospital Course (synopsis of major diagnoses, care, treatment, and services provided during the course of the hospital stay): surgery     Final Diagnosis: Post-Op Diagnosis Codes:     * Trigger ring finger, unspecified laterality [M65.349]     * De Quervain's tenosynovitis, right [M65.4]    Disposition: Home or Self Care    Follow Up/Patient Instructions:     Medications:  Reconciled Home Medications:      Medication List        CONTINUE taking these medications      acetaminophen 650 MG  Tbsr  Commonly known as: TYLENOL  Take 1 tablet (650 mg total) by mouth every 8 (eight) hours as needed (pain).     amitriptyline 25 MG tablet  Commonly known as: ELAVIL  TAKE 2 TABLETS AT BEDTIME     amLODIPine 10 MG tablet  Commonly known as: NORVASC  TAKE 1 TABLET BY MOUTH ONCE A DAY     aspirin 81 MG Chew  Take 81 mg by mouth once daily.     atorvastatin 20 MG tablet  Commonly known as: LIPITOR  TAKE 1 TABLET(20 MG) BY MOUTH EVERY DAY     blood sugar diagnostic Strp  To check BG 1-2 times daily, to use with insurance preferred meter     calcium-vitamin D3 500 mg-5 mcg (200 unit) per tablet  Commonly known as: OS-JENNIFER 500 + D3  Take 1 tablet by mouth once daily.     cephALEXin 500 MG capsule  Commonly known as: KEFLEX  Take 500 mg by mouth every 8 (eight) hours.     DEXCOM G6  Misc  Generic drug: blood-glucose meter,continuous  Use as directed, e 11.65     DEXCOM G6 SENSOR Carley  Generic drug: blood-glucose sensor  Change every 10 days. E 11.65     DEXCOM G6 TRANSMITTER Carley  Generic drug: blood-glucose transmitter  CHANGE EVERY 3 MONTHS     esomeprazole 40 MG capsule  Commonly known as: NEXIUM  Take 1 capsule (40 mg total) by mouth once daily.     FLOWFLEX COVID-19 AG HOME TEST Kit  Generic drug: COVID-19 antigen test  FOLLOW INSTRUCTIONS INCLUDED WITH THE PACKAGE.     insulin lispro 100 unit/mL pen  Commonly known as: HumaLOG KwikPen Insulin  Use as directed based on sugar level, 150-200+2, 201-250+4, 251-300+6, 301-350+8, >350+10. Max daily 30 units.     JARDIANCE 25 mg tablet  Generic drug: empagliflozin  TAKE 1 TABLET BY MOUTH DAILY     losartan 100 MG tablet  Commonly known as: COZAAR  Take 1 tablet (100 mg total) by mouth once daily.     metFORMIN 500 MG ER 24hr tablet  Commonly known as: GLUCOPHAGE-XR  TAKE 2 TABLETS TWICE TWICE A DAY     metoprolol succinate 50 MG 24 hr tablet  Commonly known as: TOPROL-XL  TAKE 1 TABLET BY MOUTH EVERY DAY     multivitamin per tablet  Commonly known as:  THERAGRAN  Take 1 tablet by mouth once daily.     ondansetron 4 MG tablet  Commonly known as: ZOFRAN  Take 1 tablet (4 mg total) by mouth 2 (two) times daily as needed for Nausea.     OZEMPIC 2 mg/dose (8 mg/3 mL) Pnij  Generic drug: semaglutide  Inject 2 mg into the skin every 7 days.     traMADoL 50 mg tablet  Commonly known as: ULTRAM  Take 1 tablet (50 mg total) by mouth every 6 (six) hours as needed for Pain.            Discharge Procedure Orders   Ambulatory referral/consult to Physical/Occupational Therapy   Standing Status: Future   Referral Priority: Routine Referral Type: Physical Medicine   Referral Reason: Specialty Services Required   Requested Specialty: Occupational Therapy   Number of Visits Requested: 1     Diet general     Call MD for:  temperature >100.4     Call MD for:  persistent nausea and vomiting     Call MD for:  severe uncontrolled pain     Call MD for:  difficulty breathing, headache or visual disturbances     Call MD for:  redness, tenderness, or signs of infection (pain, swelling, redness, odor or green/yellow discharge around incision site)     Call MD for:  hives     Call MD for:  persistent dizziness or light-headedness     Call MD for:  extreme fatigue     No driving, operating heavy equipment or signing legal documents while taking pain medication     Leave dressing on - Keep it clean, dry, and intact until clinic visit     Non weight bearing

## 2024-01-05 NOTE — PLAN OF CARE
VSS.  Patient tolerating oral liquids without difficulty.   No apparent s&s of distress noted at this time, no complaints voiced at this time.   Discharge instructions reviewed with patient and family with good verbal feedback received.   Post op medications bedside delivery  Patient ready for discharge.

## 2024-01-11 ENCOUNTER — TELEPHONE (OUTPATIENT)
Dept: ORTHOPEDICS | Facility: CLINIC | Age: 69
End: 2024-01-11
Payer: COMMERCIAL

## 2024-01-19 ENCOUNTER — OFFICE VISIT (OUTPATIENT)
Dept: ORTHOPEDICS | Facility: CLINIC | Age: 69
End: 2024-01-19
Payer: COMMERCIAL

## 2024-01-19 VITALS — HEIGHT: 66 IN | WEIGHT: 246.06 LBS | BODY MASS INDEX: 39.54 KG/M2

## 2024-01-19 DIAGNOSIS — M65.331 TRIGGER MIDDLE FINGER OF RIGHT HAND: ICD-10-CM

## 2024-01-19 DIAGNOSIS — Z98.890 POST-OPERATIVE STATE: Primary | ICD-10-CM

## 2024-01-19 DIAGNOSIS — M65.341 TRIGGER RING FINGER OF RIGHT HAND: ICD-10-CM

## 2024-01-19 DIAGNOSIS — M65.4 DE QUERVAIN'S TENOSYNOVITIS, RIGHT: ICD-10-CM

## 2024-01-19 PROCEDURE — 1101F PT FALLS ASSESS-DOCD LE1/YR: CPT | Mod: CPTII,S$GLB,, | Performed by: SPECIALIST/TECHNOLOGIST

## 2024-01-19 PROCEDURE — 3288F FALL RISK ASSESSMENT DOCD: CPT | Mod: CPTII,S$GLB,, | Performed by: SPECIALIST/TECHNOLOGIST

## 2024-01-19 PROCEDURE — 99999 PR PBB SHADOW E&M-EST. PATIENT-LVL IV: CPT | Mod: PBBFAC,,, | Performed by: SPECIALIST/TECHNOLOGIST

## 2024-01-19 PROCEDURE — 1126F AMNT PAIN NOTED NONE PRSNT: CPT | Mod: CPTII,S$GLB,, | Performed by: SPECIALIST/TECHNOLOGIST

## 2024-01-19 PROCEDURE — 99024 POSTOP FOLLOW-UP VISIT: CPT | Mod: S$GLB,,, | Performed by: SPECIALIST/TECHNOLOGIST

## 2024-01-19 PROCEDURE — 1159F MED LIST DOCD IN RCRD: CPT | Mod: CPTII,S$GLB,, | Performed by: SPECIALIST/TECHNOLOGIST

## 2024-01-19 NOTE — PROGRESS NOTES
"Ms. Angela is here today for a post-operative visit.  She is 14 days status post Right DeQuervain Release and  right ring and long finger trigger finger release by Dr. Bran on 1/5/24. She reports that she is  minimal pain.  She states she has not taking any pain medication. She denies fever, chills, and sweats since the time of the surgery.     Physical exam:    Vitals:    01/19/24 1002   Weight: 111.6 kg (246 lb 0.5 oz)   Height: 5' 6" (1.676 m)   PainSc: 0-No pain     Vital signs are stable, patient is afebrile.  Patient is well dressed and well groomed, no acute distress.  Alert and oriented to person, place, and time.  Post op dressing taken down.  Incision is clean, dry and intact.  There is no erythema or exudate.  There is no sign of any infection. She is NVI. Sutures removed without difficulty.      Assessment:  s/p Right DeQuervain Release right ring and long finger trigger finger release         Plan:  There are no diagnoses linked to this encounter.    - PO instruction reviewed and provided to patient  - Educated patient on HEP ROM of the Elbow, Wrist and Hand for 15 minutes.  -  educated patient on scar massage   Patient is to begin therapy next week.  She we will follow up in clinic in 4 weeks.  Patient is approved to return to work in light duty.  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    Lincoln Feliciano PA-C, [facility]  Hand [facility]  [facility] [place], [place]    Disclaimer: This note has been generated using voice-recognition software. There may be typographical errors that have been missed during proof-reading.   "

## 2024-01-19 NOTE — LETTER
January 19, 2024      [facility]  [address], [address]  [facility] [address]  Phone: [phone]       Patient: Aaliyah Angela   YOB: 1955  Date of Visit: 01/19/2024    To Whom It May Concern:    Heath Angela  was at [facility] on 01/19/2024. The patient may return to work/school on 1/23/24 with restrictions no lifting, pushing and pulling over 5 lbs for 2 more weeks. She is cleared for light office work and is allowed to take vitals. If you have any questions or concerns, or if I can be of further assistance, please do not hesitate to contact me.    Sincerely,      Lincoln Feliciano PA-C

## 2024-01-22 ENCOUNTER — CLINICAL SUPPORT (OUTPATIENT)
Dept: REHABILITATION | Facility: HOSPITAL | Age: 69
End: 2024-01-22
Payer: COMMERCIAL

## 2024-01-22 DIAGNOSIS — M25.641 STIFFNESS OF RIGHT HAND JOINT: Primary | ICD-10-CM

## 2024-01-22 DIAGNOSIS — M25.631 STIFFNESS OF RIGHT WRIST JOINT: ICD-10-CM

## 2024-01-22 DIAGNOSIS — M65.4 TENOSYNOVITIS, DE QUERVAIN: ICD-10-CM

## 2024-01-22 PROCEDURE — 97530 THERAPEUTIC ACTIVITIES: CPT | Performed by: OCCUPATIONAL THERAPIST

## 2024-01-22 PROCEDURE — 97165 OT EVAL LOW COMPLEX 30 MIN: CPT | Performed by: OCCUPATIONAL THERAPIST

## 2024-01-22 NOTE — PATIENT INSTRUCTIONS
"[facility], OCCUPATIONAL THERAPY  HOME EXERCISE PROGRAM     Use heat as needed    Complete the following massage for 3 minutes, 3x/day:                                Using lotion and medium pressure, massage on and around scars in circles, side to side and up and down    Complete the following exercises for 10 repetitions, 3x/day:     AROM: Wrist Flexion / Extension               Bend your wrist forward and back as far as possible.        AROM: Wrist Radial / Ulnar Deviation  Bend your wrist from side to side as far as possible.      AROM: Supination / Pronation   With your elbow by your side, turn your   palm up then turn your palm down.      AROM: Isolated IPJ Flexion / Extension ("Hook")   Bend only your middle and end knuckles. Hold 3 seconds.   Straighten your fingers.       AROM: MCP and PIP Flexion / Extension ("Straight Fist")  Bend your bottom and middle knuckles, keeping the tips of your fingers straight.   Try to touch the pads of your fingers on your palm. Hold 3 seconds.   Straighten your fingers.       AROM: Composite Flexion / Extension ("Full Fist")  Bend every joint in your hand into a fist. Hold 3 seconds.   Straighten your fingers.       AROM: Composite Flexion   Bend both joints of thumb as far as possible.   Try to touch base of little finger.      AROM: Thumb IP Flexion / Extension  Brace thumb below tip joint. Bend joint as far as   possible then straighten.      AROM: Palmar Adduction / Abduction   Rest your small finger on the table. Move thumb   sideways, out and away from   palm. Move back to rest along palm.      AROM: Radial Adduction / Abduction  Place your palm flat on the table. Move thumb out   to side. Move back alongside index finger.                                                         AROM: Opposition   Touch tip of thumb to nail tip of each  finger in turn, making an "O" shape.             Therapist: Maggie Boasberg, MARNIER/CHT        "

## 2024-01-22 NOTE — PLAN OF CARE
[facility]  Occupational Therapy Initial Evaluation     Name: Aaliyah Angela  Clinic Number: [id number]    Therapy Diagnosis:   Encounter Diagnoses   Name Primary?    Tenosynovitis, de Quervain     Stiffness of right hand joint Yes    Stiffness of right wrist joint      Physician: Nabor Schaeffer MD    Physician Orders: Eval and Treat  Medical Diagnosis:   M65.4 (ICD-10-CM) - Tenosynovitis, de Quervain     Surgical Procedure and Date: R DeQuervain's Release and MF/RF Trigger Release, 1/5/24   Evaluation Date: 1/22/2024  Insurance Authorization Period Expiration: 12/31/24  Plan of Care Certification Period: 4/19/24  Date of Return to MD: 2/16/24  Visit # / Visits authorized: 1 / 1  FOTO: 1/3    Precautions:  Standard and Diabetes    Time In: 1:05 pm  Time Out: 1:45 pm  Total Appointment Time (timed & untimed codes): 40 minutes    Subjective     Date of Onset: 1/5/24    History of Current Condition/Mechanism of Injury: Ms. Angela is here today for a post-operative visit.  She is 14 days status post Right DeQuervain Release and  right ring and long finger trigger finger release by Dr. Bran on 1/5/24. She presents with c/o stiffness in R hand and wrist    Involved Side: Right  Dominant Side: Right    Mechanism of Injury: gradual onset  Surgical Procedure: DeQuervain's Release and Trigger Finger Release  Imaging: see chart for imaging     Prior Therapy: October 2023    Pain:  Functional Pain Scale Rating 0-10:   0 /10 on average  0 /10 at best  7/10 at worst  Location: radial wrist over scar  Description: Aching  Aggravating Factors: wrist movement  Easing Factors: rest    Occupation:  LPN at [employer]  Working presently: employed  Duties: triage    Functional Limitations/Social History:    Previous functional status includes: Independent with all ADLs.     Current Functional Status   Home/Living environment: lives with their family      Limitation of Functional Status as follows:   ADLs/IADLs:      - Feeding: ind    - Bathing: ind    - Dressing/Grooming: ind    - Home Management: ind    - Driving: ind     Leisure: watch tv    Patient's Goals for Therapy: to move hand and wrist without pain    Past Medical History/Physical Systems Review:   Aaliyah Angela  has a past medical history of Allergy, Anatomical narrow angle borderline glaucoma, AR (allergic rhinitis), Arthritis, CAD (coronary artery disease), Diabetes mellitus type II, Diabetic retinopathy, Epiretinal membrane, both eyes, Hyperlipidemia, Hypertensive retinopathy of both eyes, Lumbar disc disease, Migraines, Morbid obesity, BRADFORD (obstructive sleep apnea), Proliferative diabetic retinopathy(362.02), PVD (peripheral vascular disease), Rupture of right Achilles tendon, Unspecified essential hypertension, Unspecified vitamin D deficiency, and Vitreous hemorrhage of left eye.    Aaliyah Angela  has a past surgical history that includes Tubal ligation; Panretinal photocoagulation;  section; LASER PERIPHERAL IRIDOTOMY (Bilateral, 2017 and 2017); Carpal tunnel release (Right, 2020); Carpal tunnel release (Right, 2020); Gastric bypass (); Colonoscopy; Esophagogastroduodenoscopy (N/A, 10/08/2020); Colonoscopy (N/A, 10/08/2020); Phacoemulsification of cataract (Left, 10/28/2020); Intraocular prosthesis insertion (Left, 10/28/2020); Phacoemulsification of cataract (Right, 2021); Intraocular prosthesis insertion (Right, 2021); Cataract extraction w/  intraocular lens implant (Left, 10/28/2020); Cataract extraction w/  intraocular lens implant (Right, 2021); De Quervain's release (Right, 2024); and Trigger finger release (Right, 2024).    Aaliyah has a current medication list which includes the following prescription(s): acetaminophen, amitriptyline, amlodipine, aspirin, atorvastatin, blood sugar diagnostic, dexcom g6 , dexcom g6 sensor, calcium-vitamin d3, cephalexin, dexcom g6 transmitter,  empagliflozin, esomeprazole, flowflex covid-19 ag home test, insulin lispro, losartan, metformin, metoprolol succinate, multivitamin, ondansetron, ozempic, and tramadol, and the following Facility-Administered Medications: sodium chloride 0.9% and celecoxib.    Review of patient's allergies indicates:  No Known Allergies     Objective     Observation/Appearance: healed scars over radial wrist and A-1 pulleys of MF and RF    Edema. Measured in centimeters.   1/22/2024 1/22/2024      Left Right     Wrist Crease  19     Distal Palmar Crease  21.3     MF P1  7.7     RF P1  7.2         Wrist ROM. Measured in degrees.   1/22/2024 1/22/2024      Left Right     Wrist Ext/Flex 60/55 50/50     Wrist RD/UD 20/25 15/25       Hand ROM. Measured in degrees.   1/22/2024 1/22/2024      Left Right            Thumb: MP 35 30                  IP 65 60         Rad ABD 55 45         Pal ABD 55 50            Opposition Base of SF Base of SF        Strength (Dynamometer) and Pinch Strength (Pinch Gauge)  Measured in pounds.   1/22/2024 1/22/2024      Left Right     Rung II NT NT     Key Pinch       3pt Pinch         Sensation: no numbness or tingling reported; not formally assessed    CMS Impairment/Limitation/Restriction for FOTO Wrist Survey    Therapist reviewed FOTO scores for Aaliyah Angela on 1/22/2024.   FOTO documents entered into [employer] - see Media section.    Limitation Score: 43%         Treatment     Total Treatment time (time-based codes) separate from Evaluation: 20 minutes    Aaliyah received the following supervised modalities after being cleared for contradictions for 5 minutes:   -Paraffin to decrease pain and stiffness and increase tissue elasticity    Aaliyah received the following manual therapy techniques for 5 minutes:   -IASTM and scar massage    Aaliyah received therapeutic activities for 10 minutes including:  -Wrist and thumb AROM, TGEs     Patient Education and Home Exercises      Education provided:   -role  of OT, goals for OT, scheduling/cancellations, insurance limitations with patient.  -Additional Education provided: diagnosis, precautions and progression of treatment    Written Home Exercises Provided: yes.  Exercises were reviewed and Aaliyah was able to demonstrate them prior to the end of the session.    Aaliyah demonstrated good  understanding of the education provided.     Pt was advised to perform these exercises free of pain, and to stop performing them if pain occurs.    See EMR under Patient Instructions for exercises provided 1/22/2024.    Assessment     Aaliyah Angela is a 68 y.o. female referred to outpatient occupational therapy and presents with a medical diagnosis of R DeQuervain's Release and MF/RF Trigger Release.      Assessment of Occupational Performance   Performance Deficits    Physical:  Joint Mobility  Muscle Power/Strength  Skin Integrity/Scar Formation  Edema   Strength  Pinch Strength  Pain    Cognitive:  No Deficits    Psychosocial:    No Deficits     Following medical record review it is determined that pt will benefit from occupational therapy services in order to maximize pain free and/or functional use of right wrist and hand. The following goals were discussed with the patient and patient is in agreement with them as to be addressed in the treatment plan. The patient's rehab potential is Excellent.     Anticipated barriers to occupational therapy: none    Plan of care discussed with patient: Yes  Patient's spiritual, cultural and educational needs considered and patient is agreeable to the plan of care and goals as stated below:     Medical Necessity is demonstrated by the following  Occupational Profile/History  Co-morbidities and personal factors that may impact the plan of care [x] LOW: Brief chart review  [] MODERATE: Expanded chart review   [] HIGH: Extensive chart review    Moderate / High Support Documentation:      Examination  Performance deficits relating to physical,  cognitive or psychosocial skills that result in activity limitations and/or participation restrictions  [x] LOW: addressing 1-3 Performance deficits  [] MODERATE: 3-5 Performance deficits  [] HIGH: 5+ Performance deficits (please support below)    Moderate / High Support Documentation:      Treatment Options [x] LOW: Limited options  [] MODERATE: Several options  [] HIGH: Multiple options      Decision Making/ Complexity Score: low       The following goals were discussed with the patient and patient is in agreement with them as to be addressed in the treatment plan.     Goals:   The following goals were discussed with the patient and patient is in agreement with them as to be addressed in the treatment plan.   Short term Goals:  1) Initiate HEP  2) Pt will increase AROM of R wrist by 5-10 degrees in order to assist with ADLs by 4 weeks.  3) Pt will reduce edema by .5-1 cm in affected wrist and hand by 4 weeks.  4) Pt will reduce pain to less than 4/10 by 4 weeks.  5) Measure  and pinch when appropriate     Long Term Goals:  Goals to be met by discharge:  1) Independent with HEP  2) Pt will increase R wrist GALLARDO by 15-20 degrees in order to increase functional use for grasp with home management or work related tasks by d/c.   3) Pt will decrease edema to trace or none to increase functional ROM by d/c.   4) Pt will decrease pain to trace or none while completing light home management tasks or work related tasks by d/c.   5) Patient will return to all ADLs and IADLs without difficulty      Plan     Certification Period/Plan of care expiration: 1/22/2024 to 4/19/24.    Outpatient Occupational Therapy 2 times weekly for 8 weeks to include the following interventions: Paraffin, Manual therapy/joint mobilizations, Modalities for pain management, US 3 mhz, Therapeutic exercises/activities., Strengthening, Edema Control, and Scar Management.    Margaret Boasberg, OT

## 2024-01-23 ENCOUNTER — TELEPHONE (OUTPATIENT)
Dept: OBSTETRICS AND GYNECOLOGY | Facility: CLINIC | Age: 69
End: 2024-01-23
Payer: COMMERCIAL

## 2024-01-23 NOTE — TELEPHONE ENCOUNTER
----- Message from Raya Murdock sent at 1/23/2024  7:46 AM CST -----  Contact: pt  Type:  Appointment Request    Caller is requesting a sooner appointment.  Caller declined first available appointment listed below.  Caller will not accept being placed on the waitlist and is requesting a message be sent to doctor.  Name of Caller:pt   When is the first available appointment?July   Symptoms:annual   Would the patient rather a call back or a response via [employer] Scre[employer] Medianer? call  Best Call Back Number:[phone] ( m pt at work)  Additional Information:

## 2024-01-27 ENCOUNTER — LAB VISIT (OUTPATIENT)
Dept: LAB | Facility: HOSPITAL | Age: 69
End: 2024-01-27
Payer: COMMERCIAL

## 2024-01-27 DIAGNOSIS — E11.65 TYPE 2 DIABETES MELLITUS WITH HYPERGLYCEMIA, WITHOUT LONG-TERM CURRENT USE OF INSULIN: ICD-10-CM

## 2024-01-27 LAB
ESTIMATED AVG GLUCOSE: 169 MG/DL (ref 68–131)
HBA1C MFR BLD: 7.5 % (ref 4–5.6)

## 2024-01-27 PROCEDURE — 83036 HEMOGLOBIN GLYCOSYLATED A1C: CPT | Performed by: NURSE PRACTITIONER

## 2024-01-27 PROCEDURE — 36415 COLL VENOUS BLD VENIPUNCTURE: CPT | Performed by: NURSE PRACTITIONER

## 2024-01-29 ENCOUNTER — PATIENT MESSAGE (OUTPATIENT)
Dept: INTERNAL MEDICINE | Facility: CLINIC | Age: 69
End: 2024-01-29
Payer: COMMERCIAL

## 2024-01-31 NOTE — PROGRESS NOTES
HPI    DLS: 3/16/2023    Pt here for 9 Month Check;  Pt states vision is a little cloudy in the morning in the OS.     Meds;  Refresh PRN OU      1) OHT vs Pre-perimetric POAG   2) Narrow Angles - S/P laser PI's ou   3) PDR OU   4) Hx VH OD   5) NS OD   6) Hx TVO OS   7) PCIOL OU     Last edited by Alma Goodrich MD on 2/6/2024  2:51 PM.            Assessment /Plan     For exam results, see Encounter Report.    Anatomical narrow angle borderline glaucoma of both eyes    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 by proliferative retinopathy without macular edema, with long-term current use of insulin    Preretinal fibrosis, bilateral    Vitreous floaters of right eye    Epiretinal membrane, bilateral    Shallow anterior chamber of eye    PCO (posterior capsular opacification), bilateral    Small pupil    Pseudophakia, both eyes        1. OHT vs Pre-perimetric POAG OU (angles  Narrowing over time)  Followed at [facility] since '04   First seen by Scarlet '08   Never on gtts   VF defects 2/2 PRP     Family history none   Glaucoma meds None   H/O adverse rxn to glaucoma drops none   LASERS Mulitple PRP OU // PI OD 2/9/2017 /// PI os 1/26/2017   GLAUCOMA SURGERIES None   OTHER EYE SURGERIES PC IOL os 10/28/2020 // PC IOL od - 2/17/2021    CDR 0.3/0.35   Tbase 15-23/15-25   Tmax 25 OU   Ttarget ??   HVF 10 tests: 2008 to 2023 - SAD/IAD OU 2/2 PRP OU   Gonio  + 3 ou post laser PI's and PC IOL's   /575   OCT 6 tests: 2008 to 2023 -  RNFL nl od // nl os    HRT 7 tests: 2009 to 2020 -MR -  nl od // nl os /// CDR 0.33 od // 0.26 os   Disc photos 2002, 2003, 2005 (slides) // 2008, 2013  (OIS)     - Ttoday  15/15   - Test done today -IOP / gonio // PCO check     2. Narrow Angles:  +1-3 w/ bowing  s/p PI. Likely will resolve in future once pseudophakic.  Anterior Segment OCT today w/ Open Angles w/ narrow approach OU  Doubt occludable - monitor gonio     3. PDR OU s/p PDR OU -stable exam on last eval w/ Retina on  "7/25/13.   S/p avastin x 4 od - last 1/4/2016     4. Hx of VH OD -stable. Last seen by retina - arend 1/4/2016    5. Hx of Transient Visual Obscurations OS - ? BP elevation. Seen w/ stable Retinal exam on 9/13/12.    6. PC IOL OU - [facility]   OS 10/28/2020 - PCB00 23.0   OD 2/17/2021 - PCB00 23.5     7. Had a gastric "sleve" done 12/18/2015 - for weight loss    So far doing well    On less insulin now     8. PCO - OS > OD   Dense os - rec yag cap   Mild to mod od - monitor for now - consider yag cap if left eye does well     Plan:  Stable IOP (thick k's),  Cont no gtts  Continue to work on blood sugars  oht vs glaucoma    Angles continue narrow but open     S/P PI  os 1/26/2017 -   S/P PI od - 2/9/2017 -      Phaco / IOL OS Date: 10/28/2020 - PCB00 23.0 - trypan / medium pupil - extra viscoat   POY > 2 yrears  - phaco/IOL   Doing well    Cont with regular F/U's with Dr. Mckeon - yearly     Phaco / IOL OD Date: 2/17/2021 - PCB00 23.5  POY > 2 - phaco/IOL   Doing well    Pt is content with out any distance correction - will cont with OTC readers only    Rx for bifocals written - but most likely will not have made     OCT macula - 3/26/2021 - nl ou -- no cme    Photo file updated 3/16/2023    Cesiliao - stable exam - F/U yearly or sooner prn     Dense PCO os -pt says vision is more blurry os than it was a year ago -  schedule yag cap   Once yag cap done  - can have VF / DFE / OCT in 6 months or so     I have seen and personally examined the patient.  I agree with the findings, assessment and plan of the resident and/or fellow.     Alma Goodrich MD  "

## 2024-02-01 ENCOUNTER — OFFICE VISIT (OUTPATIENT)
Dept: INTERNAL MEDICINE | Facility: CLINIC | Age: 69
End: 2024-02-01
Payer: MEDICARE

## 2024-02-01 VITALS
SYSTOLIC BLOOD PRESSURE: 124 MMHG | HEART RATE: 92 BPM | OXYGEN SATURATION: 97 % | BODY MASS INDEX: 39.12 KG/M2 | DIASTOLIC BLOOD PRESSURE: 70 MMHG | HEIGHT: 66 IN | WEIGHT: 243.38 LBS

## 2024-02-01 DIAGNOSIS — I10 PRIMARY HYPERTENSION: ICD-10-CM

## 2024-02-01 DIAGNOSIS — E11.42 DIABETIC POLYNEUROPATHY ASSOCIATED WITH TYPE 2 DIABETES MELLITUS: ICD-10-CM

## 2024-02-01 DIAGNOSIS — E11.3593 TYPE 2 DIABETES MELLITUS WITH BOTH EYES AFFECTED BY PROLIFERATIVE RETINOPATHY WITHOUT MACULAR EDEMA, WITHOUT LONG-TERM CURRENT USE OF INSULIN: ICD-10-CM

## 2024-02-01 DIAGNOSIS — Z98.84 HISTORY OF BARIATRIC SURGERY: ICD-10-CM

## 2024-02-01 DIAGNOSIS — I25.10 CORONARY ARTERY DISEASE INVOLVING NATIVE CORONARY ARTERY OF NATIVE HEART WITHOUT ANGINA PECTORIS: ICD-10-CM

## 2024-02-01 DIAGNOSIS — E78.5 HYPERLIPIDEMIA, UNSPECIFIED HYPERLIPIDEMIA TYPE: ICD-10-CM

## 2024-02-01 DIAGNOSIS — H35.033 HYPERTENSIVE RETINOPATHY OF BOTH EYES: ICD-10-CM

## 2024-02-01 DIAGNOSIS — E66.01 CLASS 3 SEVERE OBESITY DUE TO EXCESS CALORIES WITH SERIOUS COMORBIDITY AND BODY MASS INDEX (BMI) OF 40.0 TO 44.9 IN ADULT: ICD-10-CM

## 2024-02-01 DIAGNOSIS — E11.65 TYPE 2 DIABETES MELLITUS WITH HYPERGLYCEMIA, WITHOUT LONG-TERM CURRENT USE OF INSULIN: Primary | ICD-10-CM

## 2024-02-01 PROCEDURE — 99215 OFFICE O/P EST HI 40 MIN: CPT | Mod: PBBFAC | Performed by: NURSE PRACTITIONER

## 2024-02-01 PROCEDURE — 99999 PR PBB SHADOW E&M-EST. PATIENT-LVL V: CPT | Mod: PBBFAC,,, | Performed by: NURSE PRACTITIONER

## 2024-02-01 PROCEDURE — 99214 OFFICE O/P EST MOD 30 MIN: CPT | Mod: S$PBB,,, | Performed by: NURSE PRACTITIONER

## 2024-02-01 RX ORDER — TIRZEPATIDE 10 MG/.5ML
10 INJECTION, SOLUTION SUBCUTANEOUS
Qty: 4 PEN | Refills: 5 | Status: SHIPPED | OUTPATIENT
Start: 2024-02-01 | End: 2024-06-04 | Stop reason: SDUPTHER

## 2024-02-01 RX ORDER — INSULIN LISPRO 100 [IU]/ML
INJECTION, SOLUTION INTRAVENOUS; SUBCUTANEOUS
Qty: 1 EACH | Refills: 6 | Status: SHIPPED | OUTPATIENT
Start: 2024-02-01 | End: 2024-06-04 | Stop reason: SDUPTHER

## 2024-02-01 RX ORDER — METFORMIN HYDROCHLORIDE 500 MG/1
1000 TABLET, EXTENDED RELEASE ORAL 2 TIMES DAILY WITH MEALS
Qty: 360 TABLET | Refills: 3 | Status: SHIPPED | OUTPATIENT
Start: 2024-02-01

## 2024-02-01 NOTE — PROGRESS NOTES
CHIEF COMPLAINT: Type 2 Diabetes     HPI: Mrs. Aaliyah Angela is a 68 y.o. female who was diagnosed with gestational diabetes age 22s, Type 2 DM age 30s.  Has h/o gastric sleeve 2015.-in [place]   Past Medical History:   Diagnosis Date    Allergy     Anatomical narrow angle borderline glaucoma     AR (allergic rhinitis)     Arthritis     CAD (coronary artery disease) 04/22/2013    Non obstructive, 2012 PET     Diabetes mellitus type II     with retinopathy    Diabetic retinopathy     Epiretinal membrane, both eyes     Hyperlipidemia     Hypertensive retinopathy of both eyes     Lumbar disc disease     Migraines     Morbid obesity 10/03/2012    BRADFORD (obstructive sleep apnea) 08/04/2015    Proliferative diabetic retinopathy(362.02)     PVD (peripheral vascular disease) 04/22/2013    Carotid US 1-39% bilat 2012     Rupture of right Achilles tendon     Unspecified essential hypertension     Unspecified vitamin D deficiency     Vitreous hemorrhage of left eye      Last seen by me in fall 2023.  Not as active  Finally has dexcom g7, .   Avg 182 mg/dl  Sd 60 mg/dl   Gmi 7.7%  TIR 55%   Eats snacks  Meals 2 /day   Being seen by me again today.   Lab Results   Component Value Date    HGBA1C 7.5 (H) 01/27/2024     A lot of stressors.  Brother passed away, h/o cva.   (L) leg cellulitis-fall 2023, (R) hand sx 1/5/24, infection, placed on antibiotics.  Doing better.       Has f/u with endocrinology for pth, calcium -hyperparathyroidism-annually  -last pth normal    On MDI injections 3 x a day -correction scale   Testing 4 x a day max  Patient is willing and able to use the device  Demonstrated an understanding of the technology and is motivated to use CGM  Patient expected to adhere to a comprehensive diabetes treatment plan and patient has adequate medical supervision  Multiple impaired awareness of hypoglycemia (hypoglycemia unawareness)    Works 40 hours, walking 5 x a week    Current meds: ozempic 2 mg weekly-out x 2  "weeks at times, metformin 1000 mg bid, jardiance 25 mg daily, humalog correction scale 150-200+2, etc  -self adjustment per correction scale    Diabetes Management Status    Statin: Taking  ACE/ARB: Taking    Screening or Prevention Patient's value Goal Complete/Controlled?   HgA1C Testing and Control   Lab Results   Component Value Date    HGBA1C 7.5 (H) 01/27/2024      Annually/Less than 8% No   Lipid profile : 02/06/2023 Annually No   LDL control Lab Results   Component Value Date    LDLCALC 54.0 (L) 02/06/2023    Annually/Less than 100 mg/dl  No   Nephropathy screening Lab Results   Component Value Date    LABMICR 44.0 12/13/2022     Lab Results   Component Value Date    PROTEINUA Negative 12/13/2022    Annually Yes   Blood pressure BP Readings from Last 1 Encounters:   02/01/24 124/70    Less than 140/90 No   Dilated retinal exam : 07/10/2023 Annually Yes   Foot exam   : 04/13/2023 Annually Yes     Cataract removal sx (L) eye  Then (R) eye     REVIEW OF SYSTEMS  General: no weakness, fatigue, + weight changes 4-5# loss   Eyes: no double or blurred vision, eye pain, or redness; Last Eye Exam 2021, h/o cataract sx, retinal/glaucoma specialist  Cardiovascular:  No chest pain, palpitations, +trace edema-BLE, or murmurs.   Respiratory: no cough or dyspnea.   GI/: + heartburn, nausea, or changes in bowel patterns; good appetite. +GERD-nexium and pepcid, off zantac   Skin: no rashes, dryness, itching, or reactions at insulin injection sites.  Neuro: + numbness, tingling-off gabapentin, on amitriptyline,  tremors, or vertigo. +OA in knees, +right hand sx (R)  Endocrine: no polyuria, polydipsia, polyphagia, heat or cold intolerance.     Vital Signs  /70 (BP Location: Left arm, Patient Position: Sitting, BP Method: Large (Manual))   Pulse 92   Ht 5' 6" (1.676 m)   Wt 110.4 kg (243 lb 6.2 oz)   SpO2 97%   BMI 39.28 kg/m²     Hemoglobin A1C   Date Value Ref Range Status   01/27/2024 7.5 (H) 4.0 - 5.6 % Final "     Comment:     ADA Screening Guidelines:  5.7-6.4%  Consistent with prediabetes  >or=6.5%  Consistent with diabetes    High levels of fetal hemoglobin interfere with the HbA1C  assay. Heterozygous hemoglobin variants (HbS, HgC, etc)do  not significantly interfere with this assay.   However, presence of multiple variants may affect accuracy.     08/19/2023 7.5 (H) 4.0 - 5.6 % Final     Comment:     ADA Screening Guidelines:  5.7-6.4%  Consistent with prediabetes  >or=6.5%  Consistent with diabetes    High levels of fetal hemoglobin interfere with the HbA1C  assay. Heterozygous hemoglobin variants (HbS, HgC, etc)do  not significantly interfere with this assay.   However, presence of multiple variants may affect accuracy.     04/08/2023 7.1 (H) 4.0 - 5.6 % Final     Comment:     ADA Screening Guidelines:  5.7-6.4%  Consistent with prediabetes  >or=6.5%  Consistent with diabetes    High levels of fetal hemoglobin interfere with the HbA1C  assay. Heterozygous hemoglobin variants (HbS, HgC, etc)do  not significantly interfere with this assay.   However, presence of multiple variants may affect accuracy.          Chemistry        Component Value Date/Time     04/08/2023 1032    K 4.6 04/08/2023 1032     04/08/2023 1032    CO2 26 04/08/2023 1032    BUN 14 04/08/2023 1032    CREATININE 0.9 04/08/2023 1032    GLU 95 04/08/2023 1032        Component Value Date/Time    CALCIUM 9.5 04/08/2023 1032    ALKPHOS 70 02/06/2023 1422    AST 16 02/06/2023 1422    ALT 13 02/06/2023 1422    BILITOT 0.2 02/06/2023 1422    ESTGFRAFRICA >60.0 03/19/2022 0823    EGFRNONAA >60.0 03/19/2022 0823           Lab Results   Component Value Date    TSH 0.999 02/06/2023      Lab Results   Component Value Date    CHOL 119 (L) 02/06/2023    CHOL 119 (L) 03/19/2022    CHOL 120 05/05/2021     Lab Results   Component Value Date    HDL 49 02/06/2023    HDL 54 03/19/2022    HDL 53 05/05/2021     Lab Results   Component Value Date    LDLCALC  54.0 (L) 02/06/2023    LDLCALC 49.4 (L) 03/19/2022    LDLCALC 53.4 (L) 05/05/2021     Lab Results   Component Value Date    TRIG 80 02/06/2023    TRIG 78 03/19/2022    TRIG 68 05/05/2021     Lab Results   Component Value Date    CHOLHDL 41.2 02/06/2023    CHOLHDL 45.4 03/19/2022    CHOLHDL 44.2 05/05/2021       PHYSICAL EXAMINATION  Constitutional: Appears well, no distress  Neck: Supple, trachea midline.   Respiratory: No wheezes, even and unlabored.  Cardiovascular: deferred  Lymph: no BLE edema  Skin: warm and dry; no injection site reactions, no acanthosis nigracans observed.  Neuro:patient alert and cooperative, normal affect; steady gait.    Diabetes Foot Exam: deferred     Assessment/Plan  1. Type 2 diabetes mellitus with hyperglycemia, without long-term current use of insulin  Hemoglobin A1C    Microalbumin/Creatinine Ratio, Urine    metFORMIN (GLUCOPHAGE-XR) 500 MG ER 24hr tablet    insulin lispro (HUMALOG KWIKPEN INSULIN) 100 unit/mL pen    empagliflozin (JARDIANCE) 25 mg tablet    Ambulatory referral/consult to Internal Medicine      2. 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 by proliferative retinopathy without macular edema, without long-term current use of insulin        3. History of bariatric surgery, 12-        4. Hypertensive retinopathy of both eyes        5. Hyperlipidemia, unspecified hyperlipidemia type  Lipid Panel    Lipid Panel      6. Coronary artery disease involving native coronary artery of native heart without angina pectoris        7. Class 3 severe obesity due to excess calories with serious comorbidity and body mass index (BMI) of 40.0 to 44.9 in adult        8. Diabetic polyneuropathy associated with type 2 diabetes mellitus        9. Primary hypertension          1-9. Follow up in 4 months w/ me   A1c lipid urine mac prior -4 months   Continue regimen above except ozempic  Do trial w/ mounjaro 10 mg weekly   Discussed dietary habits, stressors  Lab Results    Component Value Date    LDLCALC 54.0 (L) 02/06/2023     At goal   Lipid next time  Body mass index is 39.28 kg/m².  May increase insulin resistance  Bp controlled   F/u with podiatry   F/u with cards   F/u with retinal specialist

## 2024-02-01 NOTE — PATIENT INSTRUCTIONS
Follow up in 4 months w/Irielle   A1c lipid panel, urine mac in 4 months   Internal medicine referral -new pcp      Lab Results   Component Value Date    HGBA1C 7.5 (H) 01/27/2024     Goal less than 7.5%  Goal  no higher than 180     Dexcom g7   Change from ozempic to mounjaro 10 mg weekly   Metformin xr 1000 mg twice a day   Jardiance 25 mg daily     [email or web address]  Eat fit milena  Motigapal milena  [email or web address]    [employer] milena

## 2024-02-06 ENCOUNTER — OFFICE VISIT (OUTPATIENT)
Dept: OPHTHALMOLOGY | Facility: CLINIC | Age: 69
End: 2024-02-06
Payer: COMMERCIAL

## 2024-02-06 DIAGNOSIS — H35.373 PRERETINAL FIBROSIS, BILATERAL: ICD-10-CM

## 2024-02-06 DIAGNOSIS — Z79.4 TYPE 2 DIABETES MELLITUS WITH BOTH EYES AFFECTED BY PROLIFERATIVE RETINOPATHY WITHOUT MACULAR EDEMA, WITH LONG-TERM CURRENT USE OF INSULIN: ICD-10-CM

## 2024-02-06 DIAGNOSIS — Z96.1 PSEUDOPHAKIA, BOTH EYES: ICD-10-CM

## 2024-02-06 DIAGNOSIS — H26.492 PCO (POSTERIOR CAPSULAR OPACIFICATION), LEFT: Primary | ICD-10-CM

## 2024-02-06 DIAGNOSIS — H40.033 ANATOMICAL NARROW ANGLE BORDERLINE GLAUCOMA OF BOTH EYES: ICD-10-CM

## 2024-02-06 DIAGNOSIS — H57.9 SHALLOW ANTERIOR CHAMBER OF EYE: ICD-10-CM

## 2024-02-06 DIAGNOSIS — E11.3593 TYPE 2 DIABETES MELLITUS WITH BOTH EYES AFFECTED BY PROLIFERATIVE RETINOPATHY WITHOUT MACULAR EDEMA, WITH LONG-TERM CURRENT USE OF INSULIN: ICD-10-CM

## 2024-02-06 DIAGNOSIS — H57.03 SMALL PUPIL: ICD-10-CM

## 2024-02-06 DIAGNOSIS — H43.391 VITREOUS FLOATERS OF RIGHT EYE: ICD-10-CM

## 2024-02-06 DIAGNOSIS — H26.493 PCO (POSTERIOR CAPSULAR OPACIFICATION), BILATERAL: ICD-10-CM

## 2024-02-06 DIAGNOSIS — H35.373 EPIRETINAL MEMBRANE, BILATERAL: ICD-10-CM

## 2024-02-06 PROCEDURE — 99213 OFFICE O/P EST LOW 20 MIN: CPT | Mod: PBBFAC,25 | Performed by: OPHTHALMOLOGY

## 2024-02-06 PROCEDURE — 92020 GONIOSCOPY: CPT | Mod: PBBFAC | Performed by: OPHTHALMOLOGY

## 2024-02-06 PROCEDURE — 99999 PR PBB SHADOW E&M-EST. PATIENT-LVL III: CPT | Mod: PBBFAC,,, | Performed by: OPHTHALMOLOGY

## 2024-02-07 ENCOUNTER — TELEPHONE (OUTPATIENT)
Dept: PODIATRY | Facility: CLINIC | Age: 69
End: 2024-02-07
Payer: MEDICARE

## 2024-02-09 ENCOUNTER — DOCUMENTATION ONLY (OUTPATIENT)
Dept: REHABILITATION | Facility: HOSPITAL | Age: 69
End: 2024-02-09
Payer: MEDICARE

## 2024-02-09 NOTE — PROGRESS NOTES
"NO Show or Cancellation Documentation only     Patient:  Aaliyah Angela  Date of visit: 2/9/2024  MRN: [id number]    Aaliyah Angela did not show up for his/her OT Hand Therapy Appointment as scheduled today.  They did not call or cancel their appointment prior to today.  No charges were posted this date.  She also no showed on 2/2/24. This is her second "No Show" visit.     MARJ Reilly, CHT    "

## 2024-02-22 ENCOUNTER — OFFICE VISIT (OUTPATIENT)
Dept: INTERNAL MEDICINE | Facility: CLINIC | Age: 69
End: 2024-02-22
Payer: MEDICARE

## 2024-02-22 VITALS — OXYGEN SATURATION: 100 % | SYSTOLIC BLOOD PRESSURE: 110 MMHG | HEART RATE: 80 BPM | DIASTOLIC BLOOD PRESSURE: 80 MMHG

## 2024-02-22 DIAGNOSIS — Z76.89 ENCOUNTER TO ESTABLISH CARE: Primary | ICD-10-CM

## 2024-02-22 DIAGNOSIS — I10 HYPERTENSION, UNSPECIFIED TYPE: ICD-10-CM

## 2024-02-22 DIAGNOSIS — Z00.00 NORMAL FOOT EXAM: ICD-10-CM

## 2024-02-22 DIAGNOSIS — E11.65 TYPE 2 DIABETES MELLITUS WITH HYPERGLYCEMIA, WITHOUT LONG-TERM CURRENT USE OF INSULIN: ICD-10-CM

## 2024-02-22 DIAGNOSIS — K21.9 GASTROESOPHAGEAL REFLUX DISEASE, UNSPECIFIED WHETHER ESOPHAGITIS PRESENT: ICD-10-CM

## 2024-02-22 PROCEDURE — 99213 OFFICE O/P EST LOW 20 MIN: CPT | Mod: PBBFAC

## 2024-02-22 PROCEDURE — 99999 PR PBB SHADOW E&M-EST. PATIENT-LVL III: CPT | Mod: PBBFAC,GC,,

## 2024-02-22 PROCEDURE — 99213 OFFICE O/P EST LOW 20 MIN: CPT | Mod: S$PBB,GC,,

## 2024-02-22 NOTE — PROGRESS NOTES
68F with T2DM with neuropathy, HTN with retinopathy, CAD, history of gastric sleeve presenting to establish care.    T2DM - on insulin, Mounjaro, jardiance, and metformin, followed by NP Maribell Noble.     HTN - on amlodipine, losartan, and Toprol XL; well controlled in clinic today. Not checking on daily basis     GERD - not constant; only after eating indulgent foods and responds well to Nexium.     CAD - on ASA and statin; no prior history of CVA or actual cardiac event; recommend to stop aspirin as primary prevention not indicated. Continue statin for risk mitigation given DM and HTN.    HCM - Vaccines offered today f/u in 6 months for labs.

## 2024-02-22 NOTE — PATIENT INSTRUCTIONS
Labs for next visit:  CBC , THS, along with lipid panel and others ordered in prior visits.  Follow up 6 months

## 2024-02-22 NOTE — PROGRESS NOTES
Clinic Note  2/22/2024      Subjective:       Patient ID:  Aaliyah is a 68 y.o. female being seen for an established visit.    Chief Complaint: No chief complaint on file.    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 Morales who recently retired. Referred to me by LIVIER Wong with diabetes clinic with whom she follows regularly.      She has no particular concerns today, only complains of occasional heartburn which happens after heavy meals and responds to PPIs  Her medical hx is notable for gastric sleeve several years ago, DM with ophthalmologic complications , CAD, HLD, HTN etc.    For her DM started Mounjaro 10mg to aid in weightloss, she was on ozempic, metformin 1000 mg bid, jardiance 25 mg daily, humalog correction scale     For her hypertension she is on amlodipine, metoprolol, losartan, doesn't check bp at home.    No hx of cardiovascular events , recommended d/c aspirin     Review of Systems   Constitutional:  Positive for weight loss.   Eyes:  Negative for photophobia, discharge and redness.   Respiratory:  Negative for shortness of breath.    Cardiovascular:  Negative for chest pain, palpitations and leg swelling.   Gastrointestinal:  Positive for heartburn. Negative for diarrhea.   Genitourinary:  Negative for dysuria and hematuria.   Neurological:  Negative for dizziness, sensory change and focal weakness.   Psychiatric/Behavioral:  Negative for depression and suicidal ideas.        Past Medical History:   Diagnosis Date    Allergy     Anatomical narrow angle borderline glaucoma     AR (allergic rhinitis)     Arthritis     CAD (coronary artery disease) 04/22/2013    Non obstructive, 2012 PET     Diabetes mellitus type II     with retinopathy    Diabetic retinopathy     Epiretinal membrane, both eyes     Hyperlipidemia     Hypertensive retinopathy of both eyes     Lumbar disc disease     Migraines     Morbid obesity  10/03/2012    BRADFORD (obstructive sleep apnea) 08/04/2015    Proliferative diabetic retinopathy(362.02)     PVD (peripheral vascular disease) 04/22/2013    Carotid US 1-39% bilat 2012     Rupture of right Achilles tendon     Unspecified essential hypertension     Unspecified vitamin D deficiency     Vitreous hemorrhage of left eye        Family History   Problem Relation Age of Onset    Diabetes Mother     Hypertension Mother     Diabetes Father     Hypertension Father     Stroke Father     Diabetes Sister     Diabetes Brother     Cancer Brother         prostate    Prostate cancer Brother     Diabetes Sister     Diabetes Sister     Diabetes Sister     Diabetes Brother     Stroke Brother     Diabetes Brother     Diabetes Brother     Breast cancer Neg Hx     Colon cancer Neg Hx     Ovarian cancer Neg Hx     Amblyopia Neg Hx     Blindness Neg Hx     Cataracts Neg Hx     Glaucoma Neg Hx     Macular degeneration Neg Hx     Retinal detachment Neg Hx     Strabismus Neg Hx     Calcium disorder Neg Hx     Osteoporosis Neg Hx         reports that she has never smoked. She has never used smokeless tobacco. She reports that she does not drink alcohol and does not use drugs.    Medication List with Changes/Refills   New Medications    SARS-COV-2, COVID-19, (SPIKEVAX, MODERNA,, 12YRS AND UP 2023,) 50 MCG/0.5 ML INJECTION    Inject 0.5 mLs into the muscle once. Inject 0.5 mL into the muscle once. for 1 dose   Current Medications    ACETAMINOPHEN (TYLENOL) 650 MG TBSR    Take 1 tablet (650 mg total) by mouth every 8 (eight) hours as needed (pain).    AMITRIPTYLINE (ELAVIL) 25 MG TABLET    TAKE 2 TABLETS AT BEDTIME    AMLODIPINE (NORVASC) 10 MG TABLET    TAKE 1 TABLET BY MOUTH ONCE A DAY    ASPIRIN 81 MG CHEW    Take 81 mg by mouth once daily.    ATORVASTATIN (LIPITOR) 20 MG TABLET    TAKE 1 TABLET(20 MG) BY MOUTH EVERY DAY    BLOOD SUGAR DIAGNOSTIC STRP    To check BG 1-2 times daily, to use with  insurance preferred meter    CALCIUM-VITAMIN D3 500 MG(1,250MG) -200 UNIT PER TABLET    Take 1 tablet by mouth once daily.    EMPAGLIFLOZIN (JARDIANCE) 25 MG TABLET    TAKE 1 TABLET BY MOUTH DAILY    ESOMEPRAZOLE (NEXIUM) 40 MG CAPSULE    Take 1 capsule (40 mg total) by mouth once daily.    FLOWFLEX COVID-19 AG HOME TEST KIT    FOLLOW INSTRUCTIONS INCLUDED WITH THE PACKAGE.    INSULIN LISPRO (HUMALOG KWIKPEN INSULIN) 100 UNIT/ML PEN    Use as directed based on sugar level, 150-200+2, 201-250+4, 251-300+6, 301-350+8, >350+10. Max daily 30 units.    LOSARTAN (COZAAR) 100 MG TABLET    Take 1 tablet (100 mg total) by mouth once daily.    METFORMIN (GLUCOPHAGE-XR) 500 MG ER 24HR TABLET    Take 2 tablets (1,000 mg total) by mouth 2 (two) times daily with meals.    METOPROLOL SUCCINATE (TOPROL-XL) 50 MG 24 HR TABLET    TAKE 1 TABLET BY MOUTH EVERY DAY    MULTIVITAMIN (THERAGRAN) PER TABLET    Take 1 tablet by mouth once daily.    TIRZEPATIDE (MOUNJARO) 10 MG/0.5 ML PNIJ    Inject 10 mg into the skin every 7 days.   Discontinued Medications    CEPHALEXIN (KEFLEX) 500 MG CAPSULE    Take 500 mg by mouth every 8 (eight) hours.     Review of patient's allergies indicates:  No Known Allergies    Patient Active Problem List   Diagnosis    Hyperlipidemia    Primary hypertension    Chronic tension-type headache, intractable    Hypertensive retinopathy    Transient vision disturbance    Vitreous hemorrhage    Class 3 severe obesity due to excess calories with serious comorbidity and body mass index (BMI) of 40.0 to 44.9 in adult    Chest pain on exertion    Ocular hypertension    Anatomical narrow angle borderline glaucoma    PVD (peripheral vascular disease)    CAD (coronary artery disease)    BRADFORD on CPAP    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 by proliferative retinopathy without macular edema, without long-term current use of insulin    History of bariatric surgery, 12-    Erythema nodosum     "Primary osteoarthritis involving multiple joints    Bilateral carpal tunnel syndrome    Neck pain    Diabetic polyneuropathy associated with type 2 diabetes mellitus    GERD (gastroesophageal reflux disease)    Type 2 diabetes mellitus with hyperglycemia, without long-term current use of insulin    Small pupil    Shallow anterior chamber of eye    Tinea pedis of both feet    COVID    Right hand pain    Bilateral hand pain    Stiffness of right hand joint    Stiffness of right wrist joint           Objective:      /80 (BP Location: Right arm, Patient Position: Sitting)   Pulse 80   SpO2 100%   Estimated body mass index is 39.28 kg/m² as calculated from the following:    Height as of 2/1/24: 5' 6" (1.676 m).    Weight as of 2/1/24: 110.4 kg (243 lb 6.2 oz).  Physical Exam  Constitutional:       General: She is not in acute distress.     Appearance: She is not ill-appearing.   HENT:      Head: Normocephalic and atraumatic.      Right Ear: Tympanic membrane, ear canal and external ear normal.      Left Ear: Ear canal and external ear normal.      Nose: Nose normal.   Eyes:      General: No scleral icterus.        Right eye: No discharge.         Left eye: No discharge.   Cardiovascular:      Rate and Rhythm: Normal rate and regular rhythm.      Pulses: Normal pulses.      Heart sounds: No murmur heard.     No friction rub.   Pulmonary:      Effort: No respiratory distress.      Breath sounds: No wheezing or rales.   Musculoskeletal:      Right lower leg: No edema.      Left lower leg: No edema.        Feet:    Feet:      Right foot:      Skin integrity: No ulcer, skin breakdown or dry skin.      Left foot:      Skin integrity: No ulcer or dry skin. Left foot fissure: no signs of infx,.     Comments: Appears to have pes planus    Protective Sensation (w/ 10 gram monofilament):  Right: Intact  Left: Intact    Visual Inspection:  Normal -  Bilateral    Pedal Pulses:   Right: Present  Left: " Present    Posterior Tibialis Pulses:   Right:Present  Left: Present     Neurological:      Mental Status: She is alert.         Assessment and Plan:         Diagnoses and all orders for this visit:    Type 2 diabetes mellitus with hyperglycemia, without long-term current use of insulin  Foot exam performed during visit.  Pending lipid panel update  Will update tsh   Amenable for covid vaccine  No changes in medications  Can d/c aspirin based on current guidelines and no prior hx of cardiovascular events            Follow up in about 6 months (around 8/22/2024), or if symptoms worsen or fail to improve.    Other Orders Placed This Visit:  Orders Placed This Encounter   Procedures    TSH    CBC W/ AUTO DIFFERENTIAL             JAbram Orozco MD  PGY-2 Internal Medicine  [facility]

## 2024-02-23 NOTE — PROGRESS NOTES
I have reviewed the notes, assessments, and/or procedures performed by Dr. Orozco, I concur with her/his documentation of Aaliyah Angela.  Date of Service: 2/22/2024

## 2024-02-27 ENCOUNTER — TELEPHONE (OUTPATIENT)
Dept: ORTHOPEDICS | Facility: CLINIC | Age: 69
End: 2024-02-27
Payer: MEDICARE

## 2024-03-01 ENCOUNTER — OFFICE VISIT (OUTPATIENT)
Dept: ORTHOPEDICS | Facility: CLINIC | Age: 69
End: 2024-03-01
Payer: COMMERCIAL

## 2024-03-01 VITALS — WEIGHT: 243.38 LBS | BODY MASS INDEX: 39.12 KG/M2 | HEIGHT: 66 IN

## 2024-03-01 DIAGNOSIS — Z98.890 POST-OPERATIVE STATE: Primary | ICD-10-CM

## 2024-03-01 DIAGNOSIS — E11.3593 TYPE 2 DIABETES MELLITUS WITH BOTH EYES AFFECTED BY PROLIFERATIVE RETINOPATHY WITHOUT MACULAR EDEMA, WITHOUT LONG-TERM CURRENT USE OF INSULIN: Primary | ICD-10-CM

## 2024-03-01 DIAGNOSIS — M65.4 DE QUERVAIN'S TENOSYNOVITIS, RIGHT: ICD-10-CM

## 2024-03-01 DIAGNOSIS — M65.341 TRIGGER RING FINGER OF RIGHT HAND: ICD-10-CM

## 2024-03-01 DIAGNOSIS — M65.331 TRIGGER MIDDLE FINGER OF RIGHT HAND: ICD-10-CM

## 2024-03-01 PROCEDURE — 1101F PT FALLS ASSESS-DOCD LE1/YR: CPT | Mod: CPTII,S$GLB,, | Performed by: SPECIALIST/TECHNOLOGIST

## 2024-03-01 PROCEDURE — 3288F FALL RISK ASSESSMENT DOCD: CPT | Mod: CPTII,S$GLB,, | Performed by: SPECIALIST/TECHNOLOGIST

## 2024-03-01 PROCEDURE — 1159F MED LIST DOCD IN RCRD: CPT | Mod: CPTII,S$GLB,, | Performed by: SPECIALIST/TECHNOLOGIST

## 2024-03-01 PROCEDURE — 99024 POSTOP FOLLOW-UP VISIT: CPT | Mod: S$GLB,,, | Performed by: SPECIALIST/TECHNOLOGIST

## 2024-03-01 PROCEDURE — 3051F HG A1C>EQUAL 7.0%<8.0%: CPT | Mod: CPTII,S$GLB,, | Performed by: SPECIALIST/TECHNOLOGIST

## 2024-03-01 PROCEDURE — 99999 PR PBB SHADOW E&M-EST. PATIENT-LVL III: CPT | Mod: PBBFAC,,, | Performed by: SPECIALIST/TECHNOLOGIST

## 2024-03-01 PROCEDURE — 1126F AMNT PAIN NOTED NONE PRSNT: CPT | Mod: CPTII,S$GLB,, | Performed by: SPECIALIST/TECHNOLOGIST

## 2024-03-01 RX ORDER — BLOOD-GLUCOSE SENSOR
EACH MISCELLANEOUS
Qty: 3 EACH | Refills: 11 | Status: SHIPPED | OUTPATIENT
Start: 2024-03-01 | End: 2024-06-04

## 2024-03-01 NOTE — PROGRESS NOTES
"3/1/24  Was clinic today 6 weeks status post right de Quervain release and right ring and long finger trigger finger release.  She reports she attended 1 therapy session and has been doing very well.  She states she has been doing regular scar massage.  She denies any numbness or tingling.    1/19/24  Ms. Angela is here today for a post-operative visit.  She is 14 days status post Right DeQuervain Release and  right ring and long finger trigger finger release by Dr. Bran on 1/5/24. She reports that she is  minimal pain.  She states she has not taking any pain medication. She denies fever, chills, and sweats since the time of the surgery.     Physical exam:    Vitals:    03/01/24 0757   Weight: 110.4 kg (243 lb 6.2 oz)   Height: 5' 6" (1.676 m)   PainSc: 0-No pain     Vital signs are stable, patient is afebrile.  Patient is well dressed and well groomed, no acute distress.  Alert and oriented to person, place, and time.  Incision is clean, dry and intact.  There is no erythema or exudate.  There is no sign of any infection. She is NVI.  Able to make a composite fist.  Opposition to the base of the small finger    Assessment:  s/p Right DeQuervain Release right ring and long finger trigger finger release         Plan:  Aaliyah was seen today for post-op evaluation.    Diagnoses and all orders for this visit:    Post-operative state    Trigger ring finger of right hand    Trigger middle finger of right hand    De Quervain's tenosynovitis, right        Continue with regular scar massage and range-of-motion exercises  Patient can follow up in clinic as needed.    Lincoln Feliciano PA-C, ATC  [facility]  [facility] [place], [place]    Disclaimer: This note has been generated using voice-recognition software. There may be typographical errors that have been missed during proof-reading.   "

## 2024-03-09 NOTE — PROGRESS NOTES
HPI     Laser Treatment            Comments: Yag cap os          Comments    YAG CAP OS TODAY    1) OHT vs Pre-perimetric POAG  2) Narrow Angles  3) Type 2 DM with PDR OU  4) Hx VH OD  5) Hx TVO OS  6) PCIOL OU  7) PCO OS>OD    MEDS:  Refresh AT's PRN OU          Last edited by Josselin Barros MA on 3/14/2024  9:48 AM.            Assessment /Plan     For exam results, see Encounter Report.    PCO (posterior capsular opacification), left    Anatomical narrow angle borderline glaucoma of both eyes    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 by proliferative retinopathy without macular edema, with long-term current use of insulin    Preretinal fibrosis, bilateral    Vitreous floaters of right eye    Epiretinal membrane, bilateral    Small pupil    Pseudophakia, both eyes        1. OHT vs Pre-perimetric POAG OU (angles  Narrowing over time)  Followed at [facility] since '04   First seen by Scarlet '08   Never on gtts   VF defects 2/2 PRP     Family history none   Glaucoma meds None   H/O adverse rxn to glaucoma drops none   LASERS Mulitple PRP OU // PI OD 2/9/2017 /// PI os 1/26/2017   GLAUCOMA SURGERIES None   OTHER EYE SURGERIES PC IOL os 10/28/2020 // PC IOL od - 2/17/2021    CDR 0.3/0.35   Tbase 15-23/15-25   Tmax 25 OU   Ttarget ??   HVF 10 tests: 2008 to 2023 - SAD/IAD OU 2/2 PRP OU   Gonio  + 3 ou post laser PI's and PC IOL's   /575   OCT 6 tests: 2008 to 2023 -  RNFL nl od // nl os    HRT 7 tests: 2009 to 2020 -MR -  nl od // nl os /// CDR 0.33 od // 0.26 os   Disc photos 2002, 2003, 2005 (slides) // 2008, 2013  (OIS)     - Ttoday  18/18  - Test done today -yag cap os     2. Narrow Angles:  +1-3 w/ bowing  s/p PI. Likely will resolve in future once pseudophakic.  Anterior Segment OCT today w/ Open Angles w/ narrow approach OU  Doubt occludable - monitor gonio     3. PDR OU s/p PDR OU -stable exam on last eval w/ Retina on 7/25/13.   S/p avastin x 4 od - last 1/4/2016     4. Hx of VH OD -stable. Last  "seen by retina - jesse 1/4/2016    5. Hx of Transient Visual Obscurations OS - ? BP elevation. Seen w/ stable Retinal exam on 9/13/12.    [facility]   OS 10/28/2020 - PCB00 23.0   OD 2/17/2021 - PCB00 23.5     7. Had a gastric "sleve" done 12/18/2015 - for weight loss    So far doing well    On less insulin now     8. PCO - OS > OD   Dense os - rec yag cap   Mild to mod od - monitor for now - consider yag cap if left eye does well     Plan:  Stable IOP (thick k's),  Cont no gtts  Continue to work on blood sugars  oht vs glaucoma    Angles continue narrow but open     S/P PI  os 1/26/2017 -   S/P PI od - 2/9/2017 -      Phaco / IOL OS Date: 10/28/2020 - PCB00 23.0 - trypan / medium pupil - extra viscoat   POY > 3 yrears  - phaco/IOL   Doing well    Cont with regular F/U's with Dr. Mckeon - yearly     Phaco / IOL OD Date: 2/17/2021 - PCB00 23.5  POY > 3 -- phaco/IOL   Doing well    [place] - stable exam - F/U yearly or sooner prn     Dense PCO os -pt says vision is more blurry os than it was a year ago -  schedule yag cap   Yag cap os - 3/14/2024    Steroid taper over 12 days - sample and instructions given     Pt does not feel yag cap needs doing od at this time - monitor     Glasses - use to see Evangelista who is no longer here     F/U 4 months with HVF / DFE / OCT - sooner prn     I have seen and personally examined the patient.  I agree with the findings, assessment and plan of the resident and/or fellow.     Alma Goodrich MD      "

## 2024-03-14 ENCOUNTER — OFFICE VISIT (OUTPATIENT)
Dept: OPHTHALMOLOGY | Facility: CLINIC | Age: 69
End: 2024-03-14
Payer: COMMERCIAL

## 2024-03-14 DIAGNOSIS — H43.391 VITREOUS FLOATERS OF RIGHT EYE: ICD-10-CM

## 2024-03-14 DIAGNOSIS — H57.03 SMALL PUPIL: ICD-10-CM

## 2024-03-14 DIAGNOSIS — H26.492 PCO (POSTERIOR CAPSULAR OPACIFICATION), LEFT: Primary | ICD-10-CM

## 2024-03-14 DIAGNOSIS — H40.033 ANATOMICAL NARROW ANGLE BORDERLINE GLAUCOMA OF BOTH EYES: ICD-10-CM

## 2024-03-14 DIAGNOSIS — H35.373 PRERETINAL FIBROSIS, BILATERAL: ICD-10-CM

## 2024-03-14 DIAGNOSIS — H35.373 EPIRETINAL MEMBRANE, BILATERAL: ICD-10-CM

## 2024-03-14 DIAGNOSIS — Z79.4 TYPE 2 DIABETES MELLITUS WITH BOTH EYES AFFECTED BY PROLIFERATIVE RETINOPATHY WITHOUT MACULAR EDEMA, WITH LONG-TERM CURRENT USE OF INSULIN: ICD-10-CM

## 2024-03-14 DIAGNOSIS — Z96.1 PSEUDOPHAKIA, BOTH EYES: ICD-10-CM

## 2024-03-14 DIAGNOSIS — E11.3593 TYPE 2 DIABETES MELLITUS WITH BOTH EYES AFFECTED BY PROLIFERATIVE RETINOPATHY WITHOUT MACULAR EDEMA, WITH LONG-TERM CURRENT USE OF INSULIN: ICD-10-CM

## 2024-03-14 PROCEDURE — 99999 PR PBB SHADOW E&M-EST. PATIENT-LVL III: CPT | Mod: PBBFAC,,, | Performed by: OPHTHALMOLOGY

## 2024-03-14 PROCEDURE — 66821 AFTER CATARACT LASER SURGERY: CPT | Mod: PBBFAC,LT | Performed by: OPHTHALMOLOGY

## 2024-03-14 PROCEDURE — 99213 OFFICE O/P EST LOW 20 MIN: CPT | Mod: PBBFAC,25 | Performed by: OPHTHALMOLOGY

## 2024-03-14 RX ORDER — SEMAGLUTIDE 2.68 MG/ML
2 INJECTION, SOLUTION SUBCUTANEOUS
COMMUNITY
Start: 2024-02-27 | End: 2024-03-27

## 2024-03-27 DIAGNOSIS — E11.65 TYPE 2 DIABETES MELLITUS WITH HYPERGLYCEMIA: ICD-10-CM

## 2024-03-27 RX ORDER — SEMAGLUTIDE 2.68 MG/ML
2 INJECTION, SOLUTION SUBCUTANEOUS
Qty: 3 EACH | Refills: 11 | Status: SHIPPED | OUTPATIENT
Start: 2024-03-27 | End: 2024-06-04

## 2024-04-05 ENCOUNTER — NURSE TRIAGE (OUTPATIENT)
Dept: ADMINISTRATIVE | Facility: CLINIC | Age: 69
End: 2024-04-05
Payer: MEDICARE

## 2024-04-05 DIAGNOSIS — I10 PRIMARY HYPERTENSION: ICD-10-CM

## 2024-04-05 RX ORDER — METOPROLOL SUCCINATE 50 MG/1
50 TABLET, EXTENDED RELEASE ORAL DAILY
Qty: 90 TABLET | Refills: 3 | Status: SHIPPED | OUTPATIENT
Start: 2024-04-05

## 2024-04-05 NOTE — TELEPHONE ENCOUNTER
----- Message from Shruti Posadas sent at 4/5/2024 10:11 AM CDT -----  Contact: [phone]@patient  Requesting an RX refill or new RX.losartan (COZAAR) 100 MG tablet  Is this a refill or new RX: refill  RX name and strength (copy/paste from chart):    Is this a 30 day or 90 day RX:   Pharmacy name and phone # [id number] [place], [place] - [address] [facility] [address]   Phone: [phone]  The doctors have asked that we provide their patients with the following 2 reminders -- prescription refills can take up to 72 hours, and a friendly reminder that in the future you can use your [facility] account to request refills:     Requesting an RX refill or new RX.metoprolol succinate (TOPROL-XL) 50 MG 24 hr tablet  Is this a refill or new RX: refill  RX name and strength (copy/paste from chart):    Is this a 30 day or 90 day RX:   Pharmacy name and phone #[facility] #[id number] [place], [place] - [address] [facility] [address]   Phone: [phone]  The doctors have asked that we provide their patients with the following 2 reminders -- prescription refills can take up to 72 hours, and a friendly reminder that in the future you can use your [facility] account to request refills:

## 2024-04-05 NOTE — TELEPHONE ENCOUNTER
Reached out to patient, patient was unavailable. Medication has been pend and pharmacy was updated.Attempted to leave message but couldn't.

## 2024-04-05 NOTE — TELEPHONE ENCOUNTER
[place]    PCP:  Dr. Angel Orozco    Pt escalated to [facility] queue.  She reports been trying to get Metoprolol-XL for over a wk.  Pharmacy:  [facility] at [address] [facility] [address]  C/O missed doses of Toprol-XL, HA, and BP elevated (140's/90's).  She reports been out of med for going on 2 wks.  Denies blurred vision, CP, SOB, and weakness.  Last BP: 146/79 TX: 99 (last night).  BP: 152/81 TX: 104.  She reports that she was on the DM Program but her MD just retired so it's been reordered.  Per protocol, care advised is call PCP now.  NT spoke with OCP, Dr. Garcias.  [employer] message sent to OCP per OCP request.  OCP will send a med refill in for the pt to the pharmacy of her choice.  NT attempted to contact pt to notify her of OCP recommendations but NA and unable to LVM d/t mailbox is full.  Pt contacted and notified of OCP recommendations.  Pt VU.  Advised to call for worsening/questions/concerns.  VU.    Reason for Disposition   Ran out of BP medications   [1] Prescription refill request for ESSENTIAL medicine (i.e., likelihood of harm to patient if not taken) AND [2] triager unable to refill per department policy    Additional Information   Negative: Difficult to awaken or acting confused (e.g., disoriented, slurred speech)   Negative: SEVERE difficulty breathing (e.g., struggling for each breath, speaks in single words)   Negative: [1] Weakness of the face, arm or leg on one side of the body AND [2] new-onset   Negative: [1] Numbness (i.e., loss of sensation) of the face, arm or leg on one side of the body AND [2] new-onset   Negative: [1] Chest pain lasts > 5 minutes AND [2] history of heart disease (i.e., heart attack, bypass surgery, angina, angioplasty, CHF)   Negative: [1] Chest pain AND [2] took nitrogylcerin AND [3] pain was not relieved   Negative: Sounds like a life-threatening emergency to the triager   Negative: [1] Systolic BP  >= 160 OR Diastolic >= 100 AND [2] cardiac (e.g., breathing difficulty,  chest pain) or neurologic symptoms (e.g., new-onset blurred or double vision, unsteady gait)   Negative: [1] Pregnant 20 or more weeks (or postpartum < 6 weeks) AND [2] new hand or face swelling   Negative: [1] Pregnant 20 or more weeks (or postpartum < 6 weeks) AND [2] Systolic BP >= 160 OR Diastolic >= 110   Negative: [1] Systolic BP  >= 200 OR Diastolic >= 120 AND [2] having NO cardiac or neurologic symptoms   Negative: [1] Pregnant 20 or more weeks (or postpartum < 6 weeks) AND [2] Systolic BP  >= 140 OR Diastolic >= 90   Negative: [1] Systolic BP  >= 180 OR Diastolic >= 110 AND [2] missed most recent dose of blood pressure medication   Negative: Systolic BP  >= 180 OR Diastolic >= 110    Protocols used: Blood Pressure - High-A-, Medication Refill and Renewal Call-A-

## 2024-04-06 ENCOUNTER — PATIENT MESSAGE (OUTPATIENT)
Dept: INTERNAL MEDICINE | Facility: CLINIC | Age: 69
End: 2024-04-06
Payer: MEDICARE

## 2024-04-08 ENCOUNTER — TELEPHONE (OUTPATIENT)
Dept: INTERNAL MEDICINE | Facility: CLINIC | Age: 69
End: 2024-04-08
Payer: MEDICARE

## 2024-04-08 RX ORDER — CIPROFLOXACIN 500 MG/1
500 TABLET ORAL 2 TIMES DAILY
Qty: 10 TABLET | Refills: 0 | Status: SHIPPED | OUTPATIENT
Start: 2024-04-08

## 2024-04-08 RX ORDER — FLUCONAZOLE 150 MG/1
TABLET ORAL
Qty: 2 TABLET | Refills: 1 | Status: SHIPPED | OUTPATIENT
Start: 2024-04-08

## 2024-04-08 NOTE — TELEPHONE ENCOUNTER
----- Message from Francesnarendra Strauss sent at 4/8/2024  1:24 PM CDT -----  Contact: PT [phone]  Patient is calling for Medical Advice regarding:--UTI--    How long has patient had these symptoms:--3-days ago--    Pharmacy name and phone#:   [id number] - [place], [place] - [address]  [address]  [place] [address]  Phone: [phone] Fax: [phone]      Would like response via [employer]: --Call back--      Comments:PT calling to see if the provider is able to send something in for the information listed above. Please call to advise.

## 2024-04-10 ENCOUNTER — TELEPHONE (OUTPATIENT)
Dept: INTERNAL MEDICINE | Facility: CLINIC | Age: 69
End: 2024-04-10
Payer: MEDICARE

## 2024-04-10 DIAGNOSIS — E78.5 HYPERLIPIDEMIA, UNSPECIFIED HYPERLIPIDEMIA TYPE: ICD-10-CM

## 2024-04-10 NOTE — TELEPHONE ENCOUNTER
----- Message from Catrachita Hall sent at 4/10/2024  2:51 PM CDT -----  Contact: [phone] or portal message  Requesting an RX refill or new RX.  Is this a refill or new RX:   RX name and strength atorvastatin (LIPITOR) 20 MG tablet  Is this a 30 day or 90 day RX: 90  Pharmacy name and phone #      [id number] - [place], [place] - [address] [facility] [address]  [address] [address]  Phone: [phone] Fax: [phone]        The doctors have asked that we provide their patients with the following 2 reminders -- prescription refills can take up to 72 hours, and a friendly reminder that in the future you can use your [facility] account to request refills: yes

## 2024-04-11 ENCOUNTER — PATIENT MESSAGE (OUTPATIENT)
Dept: INTERNAL MEDICINE | Facility: CLINIC | Age: 69
End: 2024-04-11
Payer: MEDICARE

## 2024-04-11 RX ORDER — ATORVASTATIN CALCIUM 20 MG/1
20 TABLET, FILM COATED ORAL DAILY
Qty: 90 TABLET | Refills: 3 | Status: SHIPPED | OUTPATIENT
Start: 2024-04-11

## 2024-04-19 DIAGNOSIS — I10 PRIMARY HYPERTENSION: ICD-10-CM

## 2024-04-19 RX ORDER — LOSARTAN POTASSIUM 100 MG/1
100 TABLET ORAL DAILY
Qty: 90 TABLET | Refills: 3 | Status: SHIPPED | OUTPATIENT
Start: 2024-04-19

## 2024-04-19 NOTE — TELEPHONE ENCOUNTER
----- Message from Cecilia Ibarra sent at 4/19/2024  9:17 AM CDT -----  Contact: Self [phone]  Prescription refill request.  RX name and strength (copy/paste from chart):   losartan (COZAAR) 100 MG tablet     Pharmacy name and phone # (copy/paste from chart):   [id number] - [facility] [address] [facility] [address]      Additional information:

## 2024-04-25 ENCOUNTER — PATIENT MESSAGE (OUTPATIENT)
Dept: INTERNAL MEDICINE | Facility: CLINIC | Age: 69
End: 2024-04-25
Payer: MEDICARE

## 2024-04-30 ENCOUNTER — PATIENT MESSAGE (OUTPATIENT)
Dept: INTERNAL MEDICINE | Facility: CLINIC | Age: 69
End: 2024-04-30
Payer: MEDICARE

## 2024-05-06 RX ORDER — LOSARTAN POTASSIUM 100 MG/1
100 TABLET ORAL DAILY
Qty: 90 TABLET | Refills: 3 | OUTPATIENT
Start: 2024-05-06

## 2024-05-21 DIAGNOSIS — E11.65 TYPE 2 DIABETES MELLITUS WITH HYPERGLYCEMIA, WITHOUT LONG-TERM CURRENT USE OF INSULIN: ICD-10-CM

## 2024-05-21 RX ORDER — EMPAGLIFLOZIN 25 MG/1
25 TABLET, FILM COATED ORAL
Qty: 90 TABLET | Refills: 2 | Status: SHIPPED | OUTPATIENT
Start: 2024-05-21

## 2024-05-28 ENCOUNTER — OFFICE VISIT (OUTPATIENT)
Dept: PODIATRY | Facility: CLINIC | Age: 69
End: 2024-05-28
Payer: COMMERCIAL

## 2024-05-28 VITALS
BODY MASS INDEX: 39.12 KG/M2 | WEIGHT: 243.38 LBS | DIASTOLIC BLOOD PRESSURE: 80 MMHG | HEART RATE: 84 BPM | SYSTOLIC BLOOD PRESSURE: 143 MMHG | HEIGHT: 66 IN

## 2024-05-28 DIAGNOSIS — E11.9 ENCOUNTER FOR DIABETIC FOOT EXAM: Primary | ICD-10-CM

## 2024-05-28 DIAGNOSIS — E11.65 TYPE 2 DIABETES MELLITUS WITH HYPERGLYCEMIA, WITHOUT LONG-TERM CURRENT USE OF INSULIN: ICD-10-CM

## 2024-05-28 PROCEDURE — 3288F FALL RISK ASSESSMENT DOCD: CPT | Mod: CPTII,S$GLB,, | Performed by: PODIATRIST

## 2024-05-28 PROCEDURE — 99213 OFFICE O/P EST LOW 20 MIN: CPT | Mod: S$GLB,,, | Performed by: PODIATRIST

## 2024-05-28 PROCEDURE — 1159F MED LIST DOCD IN RCRD: CPT | Mod: CPTII,S$GLB,, | Performed by: PODIATRIST

## 2024-05-28 PROCEDURE — 4010F ACE/ARB THERAPY RXD/TAKEN: CPT | Mod: CPTII,S$GLB,, | Performed by: PODIATRIST

## 2024-05-28 PROCEDURE — 3051F HG A1C>EQUAL 7.0%<8.0%: CPT | Mod: CPTII,S$GLB,, | Performed by: PODIATRIST

## 2024-05-28 PROCEDURE — 3077F SYST BP >= 140 MM HG: CPT | Mod: CPTII,S$GLB,, | Performed by: PODIATRIST

## 2024-05-28 PROCEDURE — 1126F AMNT PAIN NOTED NONE PRSNT: CPT | Mod: CPTII,S$GLB,, | Performed by: PODIATRIST

## 2024-05-28 PROCEDURE — 99999 PR PBB SHADOW E&M-EST. PATIENT-LVL IV: CPT | Mod: PBBFAC,,, | Performed by: PODIATRIST

## 2024-05-28 PROCEDURE — 1160F RVW MEDS BY RX/DR IN RCRD: CPT | Mod: CPTII,S$GLB,, | Performed by: PODIATRIST

## 2024-05-28 PROCEDURE — 3060F POS MICROALBUMINURIA REV: CPT | Mod: CPTII,S$GLB,, | Performed by: PODIATRIST

## 2024-05-28 PROCEDURE — 3079F DIAST BP 80-89 MM HG: CPT | Mod: CPTII,S$GLB,, | Performed by: PODIATRIST

## 2024-05-28 PROCEDURE — 3066F NEPHROPATHY DOC TX: CPT | Mod: CPTII,S$GLB,, | Performed by: PODIATRIST

## 2024-05-28 PROCEDURE — 1101F PT FALLS ASSESS-DOCD LE1/YR: CPT | Mod: CPTII,S$GLB,, | Performed by: PODIATRIST

## 2024-05-28 PROCEDURE — 3008F BODY MASS INDEX DOCD: CPT | Mod: CPTII,S$GLB,, | Performed by: PODIATRIST

## 2024-05-28 RX ORDER — BLOOD-GLUCOSE TRANSMITTER
EACH MISCELLANEOUS
COMMUNITY
Start: 2024-04-27 | End: 2024-06-04

## 2024-05-30 ENCOUNTER — LAB VISIT (OUTPATIENT)
Dept: LAB | Facility: HOSPITAL | Age: 69
End: 2024-05-30
Payer: MEDICARE

## 2024-05-30 DIAGNOSIS — E11.65 TYPE 2 DIABETES MELLITUS WITH HYPERGLYCEMIA, WITHOUT LONG-TERM CURRENT USE OF INSULIN: ICD-10-CM

## 2024-05-30 DIAGNOSIS — K21.9 GASTROESOPHAGEAL REFLUX DISEASE, UNSPECIFIED WHETHER ESOPHAGITIS PRESENT: ICD-10-CM

## 2024-05-30 DIAGNOSIS — E78.5 HYPERLIPIDEMIA, UNSPECIFIED HYPERLIPIDEMIA TYPE: ICD-10-CM

## 2024-05-30 LAB
BASOPHILS # BLD AUTO: 0.05 K/UL (ref 0–0.2)
BASOPHILS NFR BLD: 1 % (ref 0–1.9)
CHOLEST SERPL-MCNC: 112 MG/DL (ref 120–199)
CHOLEST/HDLC SERPL: 2.4 {RATIO} (ref 2–5)
DIFFERENTIAL METHOD BLD: ABNORMAL
EOSINOPHIL # BLD AUTO: 0.2 K/UL (ref 0–0.5)
EOSINOPHIL NFR BLD: 3.6 % (ref 0–8)
ERYTHROCYTE [DISTWIDTH] IN BLOOD BY AUTOMATED COUNT: 14 % (ref 11.5–14.5)
ESTIMATED AVG GLUCOSE: 163 MG/DL (ref 68–131)
HBA1C MFR BLD: 7.3 % (ref 4–5.6)
HCT VFR BLD AUTO: 36.9 % (ref 37–48.5)
HDLC SERPL-MCNC: 47 MG/DL (ref 40–75)
HDLC SERPL: 42 % (ref 20–50)
HGB BLD-MCNC: 11.4 G/DL (ref 12–16)
IMM GRANULOCYTES # BLD AUTO: 0.01 K/UL (ref 0–0.04)
IMM GRANULOCYTES NFR BLD AUTO: 0.2 % (ref 0–0.5)
LDLC SERPL CALC-MCNC: 52.8 MG/DL (ref 63–159)
LYMPHOCYTES # BLD AUTO: 2.3 K/UL (ref 1–4.8)
LYMPHOCYTES NFR BLD: 45.2 % (ref 18–48)
MCH RBC QN AUTO: 29.2 PG (ref 27–31)
MCHC RBC AUTO-ENTMCNC: 30.9 G/DL (ref 32–36)
MCV RBC AUTO: 95 FL (ref 82–98)
MONOCYTES # BLD AUTO: 0.4 K/UL (ref 0.3–1)
MONOCYTES NFR BLD: 8.7 % (ref 4–15)
NEUTROPHILS # BLD AUTO: 2.1 K/UL (ref 1.8–7.7)
NEUTROPHILS NFR BLD: 41.3 % (ref 38–73)
NONHDLC SERPL-MCNC: 65 MG/DL
NRBC BLD-RTO: 0 /100 WBC
PLATELET # BLD AUTO: 340 K/UL (ref 150–450)
PMV BLD AUTO: 10.6 FL (ref 9.2–12.9)
RBC # BLD AUTO: 3.9 M/UL (ref 4–5.4)
TRIGL SERPL-MCNC: 61 MG/DL (ref 30–150)
TSH SERPL DL<=0.005 MIU/L-ACNC: 1.68 UIU/ML (ref 0.4–4)
WBC # BLD AUTO: 5.04 K/UL (ref 3.9–12.7)

## 2024-05-30 PROCEDURE — 85025 COMPLETE CBC W/AUTO DIFF WBC: CPT

## 2024-05-30 PROCEDURE — 84443 ASSAY THYROID STIM HORMONE: CPT

## 2024-05-30 PROCEDURE — 83036 HEMOGLOBIN GLYCOSYLATED A1C: CPT | Performed by: NURSE PRACTITIONER

## 2024-05-30 PROCEDURE — 80061 LIPID PANEL: CPT | Performed by: NURSE PRACTITIONER

## 2024-05-30 PROCEDURE — 36415 COLL VENOUS BLD VENIPUNCTURE: CPT | Performed by: NURSE PRACTITIONER

## 2024-05-30 NOTE — PROGRESS NOTES
CHIEF COMPLAINT: Type 2 Diabetes     HPI: Mrs. Aaliyah Angela is a 68 y.o. female who was diagnosed with gestational diabetes age 20s, Type 2 DM age 30s.  Has h/o gastric sleeve 2015.-in [place]   Past Medical History:   Diagnosis Date    Allergy     Anatomical narrow angle borderline glaucoma     AR (allergic rhinitis)     Arthritis     CAD (coronary artery disease) 04/22/2013    Non obstructive, 2012 PET     Diabetes mellitus type II     with retinopathy    Diabetic retinopathy     Epiretinal membrane, both eyes     Hyperlipidemia     Hypertensive retinopathy of both eyes     Lumbar disc disease     Migraines     Morbid obesity 10/03/2012    BRADFORD (obstructive sleep apnea) 08/04/2015    Proliferative diabetic retinopathy(362.02)     PVD (peripheral vascular disease) 04/22/2013    Carotid US 1-39% bilat 2012     Rupture of right Achilles tendon     Unspecified essential hypertension     Unspecified vitamin D deficiency     Vitreous hemorrhage of left eye      Last seen by me in winter 2024.  Loss 3# in the past 4 months   Finally has dexcom g7- in 2023  Avg 154 mg/dl  Sd 442 mg/dl  TIR 73%   Eats snacks  Meals 2 /day   Being seen by me again today.   A1c 7.5 to 7.3%   Lab Results   Component Value Date    HGBA1C 7.3 (H) 05/30/2024     A lot of stressors.  Brother passed away, h/o cva.   (L) leg cellulitis-fall 2023, (R) hand sx 1/5/24, infection, placed on antibiotics.  Doing better.     Has f/u with endocrinology for pth, calcium -hyperparathyroidism-annually  -last pth normal    On MDI injections 3 x a day -correction scale   Testing 4 x a day max  Patient is willing and able to use the device  Demonstrated an understanding of the technology and is motivated to use CGM  Patient expected to adhere to a comprehensive diabetes treatment plan and patient has adequate medical supervision  Multiple impaired awareness of hypoglycemia (hypoglycemia unawareness)    Works 40 hours, walking 5 x a week    Current meds:  "mounjaro 10 mg weekly\, metformin 1000 mg bid, jardiance 25 mg daily, humalog correction scale 150-200+2, etc  -self adjustment per correction scale    Diabetes Management Status    Statin: Taking  ACE/ARB: Taking    Screening or Prevention Patient's value Goal Complete/Controlled?   HgA1C Testing and Control   Lab Results   Component Value Date    HGBA1C 7.5 (H) 01/27/2024      Annually/Less than 8% No   Lipid profile : 02/06/2023 Annually No   LDL control Lab Results   Component Value Date    LDLCALC 54.0 (L) 02/06/2023    Annually/Less than 100 mg/dl  No   Nephropathy screening Lab Results   Component Value Date    LABMICR 44.0 12/13/2022     Lab Results   Component Value Date    PROTEINUA Negative 12/13/2022    Annually Yes   Blood pressure BP Readings from Last 1 Encounters:   06/04/24 122/72    Less than 140/90 No   Dilated retinal exam : 07/10/2023 Annually Yes   Foot exam   : 02/22/2024 Annually Yes     Cataract removal sx (L) eye  Then (R) eye     REVIEW OF SYSTEMS  General: no weakness, fatigue, + weight changes 3# loss   Eyes: no double or blurred vision, eye pain, or redness; Last Eye Exam 2021, h/o cataract sx, retinal/glaucoma specialist  Cardiovascular:  No chest pain, palpitations, +trace edema-BLE, or murmurs.   Respiratory: no cough or dyspnea.   GI/: + heartburn, nausea, or changes in bowel patterns; good appetite. +GERD-nexium and pepcid, off zantac   Skin: no rashes, dryness, itching, or reactions at insulin injection sites.  Neuro: + numbness, tingling-off gabapentin, on amitriptyline,  tremors, or vertigo. +OA in knees, +right hand sx (R)  Endocrine: no polyuria, polydipsia, polyphagia, heat or cold intolerance.     Vital Signs  /72 (BP Location: Left arm, Patient Position: Sitting, BP Method: Large (Manual))   Pulse 65   Ht 5' 6" (1.676 m)   Wt 109.2 kg (240 lb 11.9 oz)   SpO2 100%   BMI 38.86 kg/m²     Hemoglobin A1C   Date Value Ref Range Status   01/27/2024 7.5 (H) 4.0 - 5.6 % " Final     Comment:     ADA Screening Guidelines:  5.7-6.4%  Consistent with prediabetes  >or=6.5%  Consistent with diabetes    High levels of fetal hemoglobin interfere with the HbA1C  assay. Heterozygous hemoglobin variants (HbS, HgC, etc)do  not significantly interfere with this assay.   However, presence of multiple variants may affect accuracy.     08/19/2023 7.5 (H) 4.0 - 5.6 % Final     Comment:     ADA Screening Guidelines:  5.7-6.4%  Consistent with prediabetes  >or=6.5%  Consistent with diabetes    High levels of fetal hemoglobin interfere with the HbA1C  assay. Heterozygous hemoglobin variants (HbS, HgC, etc)do  not significantly interfere with this assay.   However, presence of multiple variants may affect accuracy.     04/08/2023 7.1 (H) 4.0 - 5.6 % Final     Comment:     ADA Screening Guidelines:  5.7-6.4%  Consistent with prediabetes  >or=6.5%  Consistent with diabetes    High levels of fetal hemoglobin interfere with the HbA1C  assay. Heterozygous hemoglobin variants (HbS, HgC, etc)do  not significantly interfere with this assay.   However, presence of multiple variants may affect accuracy.          Chemistry        Component Value Date/Time     04/08/2023 1032    K 4.6 04/08/2023 1032     04/08/2023 1032    CO2 26 04/08/2023 1032    BUN 14 04/08/2023 1032    CREATININE 0.9 04/08/2023 1032    GLU 95 04/08/2023 1032        Component Value Date/Time    CALCIUM 9.5 04/08/2023 1032    ALKPHOS 70 02/06/2023 1422    AST 16 02/06/2023 1422    ALT 13 02/06/2023 1422    BILITOT 0.2 02/06/2023 1422    ESTGFRAFRICA >60.0 03/19/2022 0823    EGFRNONAA >60.0 03/19/2022 0823           Lab Results   Component Value Date    TSH 0.999 02/06/2023      Lab Results   Component Value Date    CHOL 119 (L) 02/06/2023    CHOL 119 (L) 03/19/2022    CHOL 120 05/05/2021     Lab Results   Component Value Date    HDL 49 02/06/2023    HDL 54 03/19/2022    HDL 53 05/05/2021     Lab Results   Component Value Date     LDLCALC 54.0 (L) 02/06/2023    LDLCALC 49.4 (L) 03/19/2022    LDLCALC 53.4 (L) 05/05/2021     Lab Results   Component Value Date    TRIG 80 02/06/2023    TRIG 78 03/19/2022    TRIG 68 05/05/2021     Lab Results   Component Value Date    CHOLHDL 41.2 02/06/2023    CHOLHDL 45.4 03/19/2022    CHOLHDL 44.2 05/05/2021       PHYSICAL EXAMINATION  Constitutional: Appears well, no distress  Neck: Supple, trachea midline.   Respiratory: No wheezes, even and unlabored.  Cardiovascular: deferred  Lymph: no BLE edema  Skin: warm and dry; no injection site reactions, no acanthosis nigracans observed.  Neuro:patient alert and cooperative, normal affect; steady gait.    Diabetes Foot Exam: deferred     Assessment/Plan  1. 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 by proliferative retinopathy without macular edema, without long-term current use of insulin  Hemoglobin A1C    Microalbumin/Creatinine Ratio, Urine      2. Type 2 diabetes mellitus with hyperglycemia, without long-term current use of insulin  insulin lispro (HUMALOG KWIKPEN INSULIN) 100 unit/mL pen      3. Obesity, Class II, BMI 35-39.9        4. Diabetic polyneuropathy associated with type 2 diabetes mellitus        5. History of bariatric surgery, 12-        6. Hyperlipidemia, unspecified hyperlipidemia type  Lipid Panel      7. Hypertensive retinopathy of both eyes        8. BRADFORD on CPAP        9. Primary hypertension        10. PVD (peripheral vascular disease)          1-10. Follow up in 4 months w/ me   A1c prior -4 months   A1c goal less than 7%   Continue regimen above   Refills sent, dexcom g7 -sensors  Lab Results   Component Value Date    LDLCALC 52.8 (L) 05/30/2024     At goal   Discussed dietary habits, stressors  at goal   Body mass index is 38.86 kg/m².  May increase insulin resistance  Bp controlled   F/u with podiatry -2024 -done  F/u with cards , vascular prn   F/u with retinal specialist

## 2024-05-31 ENCOUNTER — PATIENT MESSAGE (OUTPATIENT)
Dept: INTERNAL MEDICINE | Facility: CLINIC | Age: 69
End: 2024-05-31
Payer: MEDICARE

## 2024-06-04 ENCOUNTER — OFFICE VISIT (OUTPATIENT)
Dept: INTERNAL MEDICINE | Facility: CLINIC | Age: 69
End: 2024-06-04
Payer: MEDICARE

## 2024-06-04 VITALS
HEIGHT: 66 IN | DIASTOLIC BLOOD PRESSURE: 72 MMHG | SYSTOLIC BLOOD PRESSURE: 122 MMHG | HEART RATE: 65 BPM | BODY MASS INDEX: 38.69 KG/M2 | WEIGHT: 240.75 LBS | OXYGEN SATURATION: 100 %

## 2024-06-04 DIAGNOSIS — E11.42 DIABETIC POLYNEUROPATHY ASSOCIATED WITH TYPE 2 DIABETES MELLITUS: ICD-10-CM

## 2024-06-04 DIAGNOSIS — E11.65 TYPE 2 DIABETES MELLITUS WITH HYPERGLYCEMIA, WITHOUT LONG-TERM CURRENT USE OF INSULIN: ICD-10-CM

## 2024-06-04 DIAGNOSIS — E66.9 OBESITY, CLASS II, BMI 35-39.9: ICD-10-CM

## 2024-06-04 DIAGNOSIS — E11.3593 TYPE 2 DIABETES MELLITUS WITH BOTH EYES AFFECTED BY PROLIFERATIVE RETINOPATHY WITHOUT MACULAR EDEMA, WITHOUT LONG-TERM CURRENT USE OF INSULIN: Primary | ICD-10-CM

## 2024-06-04 DIAGNOSIS — E78.5 HYPERLIPIDEMIA, UNSPECIFIED HYPERLIPIDEMIA TYPE: ICD-10-CM

## 2024-06-04 DIAGNOSIS — H35.033 HYPERTENSIVE RETINOPATHY OF BOTH EYES: ICD-10-CM

## 2024-06-04 DIAGNOSIS — Z98.84 HISTORY OF BARIATRIC SURGERY: ICD-10-CM

## 2024-06-04 DIAGNOSIS — I10 PRIMARY HYPERTENSION: ICD-10-CM

## 2024-06-04 DIAGNOSIS — I73.9 PVD (PERIPHERAL VASCULAR DISEASE): ICD-10-CM

## 2024-06-04 DIAGNOSIS — G47.33 OSA ON CPAP: ICD-10-CM

## 2024-06-04 PROCEDURE — 99214 OFFICE O/P EST MOD 30 MIN: CPT | Mod: S$PBB,,, | Performed by: NURSE PRACTITIONER

## 2024-06-04 PROCEDURE — 99215 OFFICE O/P EST HI 40 MIN: CPT | Mod: PBBFAC | Performed by: NURSE PRACTITIONER

## 2024-06-04 PROCEDURE — 99999 PR PBB SHADOW E&M-EST. PATIENT-LVL V: CPT | Mod: PBBFAC,,, | Performed by: NURSE PRACTITIONER

## 2024-06-04 RX ORDER — TIRZEPATIDE 10 MG/.5ML
10 INJECTION, SOLUTION SUBCUTANEOUS
Qty: 4 PEN | Refills: 5 | Status: SHIPPED | OUTPATIENT
Start: 2024-06-04

## 2024-06-04 RX ORDER — BLOOD-GLUCOSE SENSOR
EACH MISCELLANEOUS
Qty: 3 EACH | Refills: 11 | Status: SHIPPED | OUTPATIENT
Start: 2024-06-04

## 2024-06-04 RX ORDER — INSULIN LISPRO 100 [IU]/ML
INJECTION, SOLUTION INTRAVENOUS; SUBCUTANEOUS
Qty: 1 EACH | Refills: 6 | Status: SHIPPED | OUTPATIENT
Start: 2024-06-04 | End: 2024-06-10

## 2024-06-04 NOTE — PATIENT INSTRUCTIONS
Follow up in 4 months w/Irielle   A1c prior -4 months  Lab Results   Component Value Date    HGBA1C 7.3 (H) 05/30/2024     Goal less than 7%    [email or web address]  Eat fit milena  [email or web address] milena  [email or web address]    mySugr milena     Jardiance 25 mg daily  Mounjaro 10 mg weekly   Metformin xr 1000 mg twice a day   Humalog correction scale 180-230+2,etc.  Goal  no higher than 180

## 2024-06-10 DIAGNOSIS — E11.3593 TYPE 2 DIABETES MELLITUS WITH BOTH EYES AFFECTED BY PROLIFERATIVE RETINOPATHY WITHOUT MACULAR EDEMA, WITHOUT LONG-TERM CURRENT USE OF INSULIN: Primary | ICD-10-CM

## 2024-06-10 DIAGNOSIS — E11.65 TYPE 2 DIABETES MELLITUS WITH HYPERGLYCEMIA, WITHOUT LONG-TERM CURRENT USE OF INSULIN: ICD-10-CM

## 2024-06-10 RX ORDER — INSULIN ASPART 100 [IU]/ML
INJECTION, SOLUTION INTRAVENOUS; SUBCUTANEOUS
Qty: 15 ML | Refills: 6 | Status: SHIPPED | OUTPATIENT
Start: 2024-06-10 | End: 2024-06-15

## 2024-06-12 ENCOUNTER — TELEPHONE (OUTPATIENT)
Dept: INTERNAL MEDICINE | Facility: CLINIC | Age: 69
End: 2024-06-12
Payer: MEDICARE

## 2024-06-12 RX ORDER — BLOOD-GLUCOSE,RECEIVER,CONT
EACH MISCELLANEOUS
Qty: 1 EACH | Refills: 1 | Status: SHIPPED | OUTPATIENT
Start: 2024-06-12 | End: 2024-06-12

## 2024-06-12 RX ORDER — BLOOD-GLUCOSE,RECEIVER,CONT
EACH MISCELLANEOUS
Qty: 1 EACH | Refills: 1 | Status: SHIPPED | OUTPATIENT
Start: 2024-06-12

## 2024-06-12 NOTE — TELEPHONE ENCOUNTER
----- Message from Beverly Nguyen sent at 6/12/2024  8:25 AM CDT -----  Contact: [phone]  Pt received her Dexcom G7 supplies but did not get the Dexcom G7 meter. Can you please order the meter for her? Please let her know.     [id number] - [place], [place] - [address] [facility] [address]  [address] [facility] [address] [facility] [address]  Phone: [phone] Fax: [phone]

## 2024-06-15 RX ORDER — INSULIN ASPART INJECTION 100 [IU]/ML
INJECTION, SOLUTION SUBCUTANEOUS
Qty: 5 PEN | Refills: 6 | Status: SHIPPED | OUTPATIENT
Start: 2024-06-15

## 2024-07-09 ENCOUNTER — OFFICE VISIT (OUTPATIENT)
Dept: OBSTETRICS AND GYNECOLOGY | Facility: CLINIC | Age: 69
End: 2024-07-09
Payer: COMMERCIAL

## 2024-07-09 VITALS
HEIGHT: 66 IN | BODY MASS INDEX: 38.48 KG/M2 | SYSTOLIC BLOOD PRESSURE: 134 MMHG | WEIGHT: 239.44 LBS | DIASTOLIC BLOOD PRESSURE: 68 MMHG

## 2024-07-09 DIAGNOSIS — E11.42 DIABETIC POLYNEUROPATHY ASSOCIATED WITH TYPE 2 DIABETES MELLITUS: ICD-10-CM

## 2024-07-09 DIAGNOSIS — Z01.419 ENCOUNTER FOR GYNECOLOGICAL EXAMINATION WITHOUT ABNORMAL FINDING: ICD-10-CM

## 2024-07-09 DIAGNOSIS — Z12.31 VISIT FOR SCREENING MAMMOGRAM: Primary | ICD-10-CM

## 2024-07-09 DIAGNOSIS — E11.3593 TYPE 2 DIABETES MELLITUS WITH BOTH EYES AFFECTED BY PROLIFERATIVE RETINOPATHY WITHOUT MACULAR EDEMA, WITHOUT LONG-TERM CURRENT USE OF INSULIN: ICD-10-CM

## 2024-07-09 DIAGNOSIS — E78.5 HYPERLIPIDEMIA, UNSPECIFIED HYPERLIPIDEMIA TYPE: ICD-10-CM

## 2024-07-09 DIAGNOSIS — H35.033 HYPERTENSIVE RETINOPATHY OF BOTH EYES: ICD-10-CM

## 2024-07-09 DIAGNOSIS — K21.9 GASTROESOPHAGEAL REFLUX DISEASE, UNSPECIFIED WHETHER ESOPHAGITIS PRESENT: ICD-10-CM

## 2024-07-09 PROCEDURE — 1160F RVW MEDS BY RX/DR IN RCRD: CPT | Mod: CPTII,S$GLB,, | Performed by: OBSTETRICS & GYNECOLOGY

## 2024-07-09 PROCEDURE — 3051F HG A1C>EQUAL 7.0%<8.0%: CPT | Mod: CPTII,S$GLB,, | Performed by: OBSTETRICS & GYNECOLOGY

## 2024-07-09 PROCEDURE — 1126F AMNT PAIN NOTED NONE PRSNT: CPT | Mod: CPTII,S$GLB,, | Performed by: OBSTETRICS & GYNECOLOGY

## 2024-07-09 PROCEDURE — 3078F DIAST BP <80 MM HG: CPT | Mod: CPTII,S$GLB,, | Performed by: OBSTETRICS & GYNECOLOGY

## 2024-07-09 PROCEDURE — 99999 PR PBB SHADOW E&M-EST. PATIENT-LVL IV: CPT | Mod: PBBFAC,,, | Performed by: OBSTETRICS & GYNECOLOGY

## 2024-07-09 PROCEDURE — 3008F BODY MASS INDEX DOCD: CPT | Mod: CPTII,S$GLB,, | Performed by: OBSTETRICS & GYNECOLOGY

## 2024-07-09 PROCEDURE — 4010F ACE/ARB THERAPY RXD/TAKEN: CPT | Mod: CPTII,S$GLB,, | Performed by: OBSTETRICS & GYNECOLOGY

## 2024-07-09 PROCEDURE — 1159F MED LIST DOCD IN RCRD: CPT | Mod: CPTII,S$GLB,, | Performed by: OBSTETRICS & GYNECOLOGY

## 2024-07-09 PROCEDURE — 3066F NEPHROPATHY DOC TX: CPT | Mod: CPTII,S$GLB,, | Performed by: OBSTETRICS & GYNECOLOGY

## 2024-07-09 PROCEDURE — 3288F FALL RISK ASSESSMENT DOCD: CPT | Mod: CPTII,S$GLB,, | Performed by: OBSTETRICS & GYNECOLOGY

## 2024-07-09 PROCEDURE — 3060F POS MICROALBUMINURIA REV: CPT | Mod: CPTII,S$GLB,, | Performed by: OBSTETRICS & GYNECOLOGY

## 2024-07-09 PROCEDURE — 1101F PT FALLS ASSESS-DOCD LE1/YR: CPT | Mod: CPTII,S$GLB,, | Performed by: OBSTETRICS & GYNECOLOGY

## 2024-07-09 PROCEDURE — 99397 PER PM REEVAL EST PAT 65+ YR: CPT | Mod: S$GLB,,, | Performed by: OBSTETRICS & GYNECOLOGY

## 2024-07-09 PROCEDURE — 3075F SYST BP GE 130 - 139MM HG: CPT | Mod: CPTII,S$GLB,, | Performed by: OBSTETRICS & GYNECOLOGY

## 2024-07-09 NOTE — PROGRESS NOTES
HISTORY OF PRESENT ILLNESS:    Aaliyah Angela is a 68 y.o. female, , No LMP recorded. Patient is postmenopausal.,  presents for a routine exam and has no complaints.    Past Medical History:   Diagnosis Date    Allergy     Anatomical narrow angle borderline glaucoma     AR (allergic rhinitis)     Arthritis     CAD (coronary artery disease) 2013    Non obstructive,  PET     Diabetes mellitus type II     with retinopathy    Diabetic retinopathy     Epiretinal membrane, both eyes     Hyperlipidemia     Hypertensive retinopathy of both eyes     Lumbar disc disease     Migraines     Morbid obesity 10/03/2012    [place] (obstructive sleep apnea) 2015    Proliferative diabetic retinopathy(362.02)     PVD (peripheral vascular disease) 2013    Carotid US 1-39% bilat      Rupture of right Achilles tendon     Unspecified essential hypertension     Unspecified vitamin D deficiency     Vitreous hemorrhage of left eye        Past Surgical History:   Procedure Laterality Date    CARPAL TUNNEL RELEASE Right 2020    Procedure: RELEASE, CARPAL TUNNEL RIGHT;  Surgeon: Tameka Bran MD;  Location: [facility];  Service: Orthopedics;  Laterality: Right;    CARPAL TUNNEL RELEASE Right 2020    CATARACT EXTRACTION W/  INTRAOCULAR LENS IMPLANT Left 10/28/2020    COMPLEX ()    CATARACT EXTRACTION W/  INTRAOCULAR LENS IMPLANT Right 2021         SECTION      x3    COLONOSCOPY      COLONOSCOPY N/A 10/08/2020    Procedure: COLONOSCOPY;  Surgeon: Tima Talley MD;  [facility]  Service: Endoscopy;  Laterality: N/A;    DE QUERVAIN'S RELEASE Right 2024    Procedure: RELEASE,RIGHT HAND, FOR DEQUERVAIN'S TENOSYNOVITIS;  Surgeon: Tameka Bran MD;  Location: [facility] OR;  Service: Orthopedics;  Laterality: Right;    ESOPHAGOGASTRODUODENOSCOPY N/A 10/08/2020    Procedure: EGD (ESOPHAGOGASTRODUODENOSCOPY);  Surgeon: Tima Talley MD;  Location:  [facility] ENDO (4TH FLR);  Service: Endoscopy;  Laterality: N/A;    GASTRIC BYPASS  2015    Sleeve    INTRAOCULAR PROSTHESES INSERTION Left 10/28/2020    Procedure: INSERTION, IOL PROSTHESIS;  Surgeon: Alma Goodrich MD;  Location: [facility] OR 1ST FLR;  Service: Ophthalmology;  Laterality: Left;    INTRAOCULAR PROSTHESES INSERTION Right 02/17/2021    Procedure: INSERTION, IOL PROSTHESIS;  Surgeon: Alma Goodrich MD;  [facility];  Service: Ophthalmology;  Laterality: Right;    LASER PERIPHERAL IRIDOTOMY Bilateral 1/26/2017 and 2/9/2017        PANRETINAL PHOTOCOAGULATION      Both eyes    PHACOEMULSIFICATION OF CATARACT Left 10/28/2020    Procedure: PHACOEMULSIFICATION, CATARACT;  Surgeon: Alma Goodrich MD;  Location: [facility] OR 1ST FLR;  Service: Ophthalmology;  Laterality: Left;    PHACOEMULSIFICATION OF CATARACT Right 02/17/2021    Procedure: PHACOEMULSIFICATION, CATARACT;  Surgeon: Alma Goodrich MD;  [facility];  Service: Ophthalmology;  Laterality: Right;    TRIGGER FINGER RELEASE Right 01/05/2024    Procedure: RELEASE, TRIGGER FINGER, RIGHT RING AND LONG FINGER;  Surgeon: Tameka Bran MD;  Location: [facility];  Service: Orthopedics;  Laterality: Right;    TUBAL LIGATION      YAG CAPSULOTOMY Left 03/14/2024           MEDICATIONS AND ALLERGIES:      Current Outpatient Medications:     acetaminophen (TYLENOL) 650 MG TbSR, Take 1 tablet (650 mg total) by mouth every 8 (eight) hours as needed (pain)., Disp: 30 tablet, Rfl: 0    amitriptyline (ELAVIL) 25 MG tablet, TAKE 2 TABLETS AT BEDTIME, Disp: 180 tablet, Rfl: 3    amLODIPine (NORVASC) 10 MG tablet, TAKE 1 TABLET BY MOUTH ONCE A DAY, Disp: 90 tablet, Rfl: 2    aspirin 81 MG Chew, Take 81 mg by mouth once daily., Disp: , Rfl:     atorvastatin (LIPITOR) 20 MG tablet, Take 1 tablet (20 mg total) by mouth once daily., Disp: 90 tablet, Rfl: 3    blood sugar diagnostic (ONETOUCH ULTRA TEST)  [facility], TO CHECK BLOOD GLUCOSE 1-2 TIMES DAILY, TO USE WITH INSURANCE PREFERRED METER, Disp: 100 strip, Rfl: 7    blood-glucose meter,continuous (DEXCOM G7 ) Misc, Use as directed, dexcom g7 , e 11.65, Disp: 1 each, Rfl: 1    blood-glucose sensor (DEXCOM G7 SENSOR) Carley, Change every 10 days, dexcom g7, Disp: 3 each, Rfl: 11    calcium-vitamin D3 500 mg(1,250mg) -200 unit per tablet, Take 1 tablet by mouth once daily., Disp: , Rfl:     esomeprazole (NEXIUM) 40 MG capsule, Take 1 capsule (40 mg total) by mouth once daily., Disp: 90 capsule, Rfl: 3    insulin aspart, niacinamide, (FIASP FLEXTOUCH U-100 INSULIN) 100 unit/mL (3 mL) InPn, Inject up to 4 x a day, if sugar is >180, 180-230+2 ,231-280+4, 281-330+6, 331-380+8, >380+10. Max daily 40 units., Disp: 5 pen , Rfl: 6    JARDIANCE 25 mg tablet, TAKE 1 TABLET BY MOUTH EVERY DAY, Disp: 90 tablet, Rfl: 2    losartan (COZAAR) 100 MG tablet, Take 1 tablet (100 mg total) by mouth once daily., Disp: 90 tablet, Rfl: 3    metFORMIN (GLUCOPHAGE-XR) 500 MG ER 24hr tablet, Take 2 tablets (1,000 mg total) by mouth 2 (two) times daily with meals., Disp: 360 tablet, Rfl: 3    metoprolol succinate (TOPROL-XL) 50 MG 24 hr tablet, Take 1 tablet (50 mg total) by mouth once daily., Disp: 90 tablet, Rfl: 3    multivitamin (THERAGRAN) per tablet, Take 1 tablet by mouth once daily., Disp: , Rfl:   No current facility-administered medications for this visit.    Facility-Administered Medications Ordered in Other Visits:     0.9%  NaCl infusion, , Intravenous, Continuous, Aristeo Bueno MD, Last Rate: 200 mL/hr at 02/17/21 0754, New Bag at 01/05/24 0824    celecoxib capsule 400 mg, 400 mg, Oral, Daily, Sara Schroeder MD    Review of patient's allergies indicates:  No Known Allergies    Family History   Problem Relation Name Age of Onset    Diabetes Mother      Hypertension Mother      Diabetes Father      Hypertension Father      Stroke Father      Diabetes Sister       Diabetes Brother      Cancer Brother          prostate    Prostate cancer Brother      Diabetes Sister      Diabetes Sister      Diabetes Sister      Diabetes Brother      Stroke Brother      Diabetes Brother      Diabetes Brother      Breast cancer Neg Hx      Colon cancer Neg Hx      Ovarian cancer Neg Hx      Amblyopia Neg Hx      Blindness Neg Hx      Cataracts Neg Hx      Glaucoma Neg Hx      Macular degeneration Neg Hx      Retinal detachment Neg Hx      Strabismus Neg Hx      Calcium disorder Neg Hx      Osteoporosis Neg Hx         Social History     Socioeconomic History    Marital status: Single   Tobacco Use    Smoking status: Never    Smokeless tobacco: Never   Substance and Sexual Activity    Alcohol use: No    Drug use: No    Sexual activity: Not Currently     Birth control/protection: Post-menopausal   Social History Narrative    Work a LPN at [employer]    Single, lives alone       COMPREHENSIVE GYN HISTORY:  PAP History: Denies abnormal Paps.  Infection History: Denies STDs. Denies PID.  Benign History: Denies uterine fibroids. Denies ovarian cysts. Denies endometriosis. Denies other conditions.  Cancer History: Denies cervical cancer. Denies uterine cancer or hyperplasia. Denies ovarian cancer. Denies vulvar cancer or pre-cancer. Denies vaginal cancer or pre-cancer. Denies breast cancer. Denies colon cancer.  Sexual Activity History: Reports currently being sexually active  Menstrual History: Monthly. Mod then light flow.   Dysmenorrhea History: Reports mild dysmenorrhea.       ROS:  GENERAL: No weight changes. No swelling. No fatigue. No fever.  CARDIOVASCULAR: No chest pain. No shortness of breath. No leg cramps.   NEUROLOGICAL: No headaches. No vision changes.  BREASTS: No pain. No lumps. No discharge.  ABDOMEN: No pain. No nausea. No vomiting. No diarrhea. No constipation.  REPRODUCTIVE: No abnormal bleeding.   VULVA: No pain. No lesions. No itching.  VAGINA: No relaxation. No itching. No odor.  "No discharge. No lesions.  URINARY: No incontinence. No nocturia. No frequency. No dysuria.    /68   Ht 5' 6" (1.676 m)   Wt 108.6 kg (239 lb 6.7 oz)   BMI 38.64 kg/m²     PE:  APPEARANCE: Well nourished, well developed, in no acute distress.  AFFECT: WNL, alert and oriented x 3.  SKIN: No acne or hirsutism.  NECK: Neck symmetric, without masses or thyromegaly.  NODES: No inguinal, cervical, axillary or femoral lymph node enlargement.  CHEST: Good respiratory effort.   ABDOMEN: Soft. No tenderness or masses. No hepatosplenomegaly. No hernias.  BREASTS: Symmetrical, no skin changes, visible lesions, palpable masses or nipple discharge bilaterally.  PELVIC: External female genitalia without lesions.  Female hair distribution. Adequate perineal body, Normal urethral meatus. Vagina moist and well rugated without lesions or discharge.  No significant cystocele or rectocele present. Cervix pink without lesions, discharge or tenderness. Uterus is 4-6 week size, regular, mobile and nontender. Adnexa without masses or tenderness. Exam limited by body habitus, patient made aware and voices understanding.   EXTREMITIES: No edema    DIAGNOSIS:  1. Visit for screening mammogram    2. Encounter for gynecological examination without abnormal finding    3. Diabetic polyneuropathy associated with type 2 diabetes mellitus    4. Hypertensive retinopathy of both eyes    5. Hyperlipidemia, unspecified hyperlipidemia type    6. 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 by proliferative retinopathy without macular edema, without long-term current use of insulin    7. Gastroesophageal reflux disease, unspecified whether esophagitis present        PLAN:  COUNSELING:  The patient was counseled today on:  -A.C.S. Pap and pelvic exam guidelines (pap every 3 years), recomendations for yearly mammogram;  -to follow up with her PCP for other health maintenance.    FOLLOW-UP with me annually.         "

## 2024-07-14 NOTE — PROGRESS NOTES
HPI    DLS: 3/14/2024    Pt here for HVF review/OCT:  S/P YAG OS- 3/14/2024    Pt states no eye pain or discomfort and vision seems to be doing okay.     Meds;  Refresh PRN OU    1) OHT vs Pre-perimetric POAG   2) Narrow Angles   3) Type 2 DM with PDR OU   4) Hx VH OD   5) Hx TVO OS   6) PCIOL OU   7) PCO OS>OD       Last edited by Tomasa Montilla on 7/16/2024 11:24 AM.            Assessment /Plan     For exam results, see Encounter Report.    Anatomical narrow angle borderline glaucoma of both eyes  -     Posterior Segment OCT Optic Nerve- Both eyes    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 by proliferative retinopathy without macular edema, with long-term current use of insulin    Preretinal fibrosis, bilateral    Vitreous floaters of right eye    Epiretinal membrane, bilateral    PCO (posterior capsular opacification), bilateral    Small pupil    Pseudophakia, both eyes               1. OHT vs Pre-perimetric POAG OU (angles  Narrowing over time)  Followed at [facility] since '04   First seen by Scarlet '08   Never on gtts   VF defects 2/2 PRP     Family history none   Glaucoma meds None   H/O adverse rxn to glaucoma drops none   LASERS Mulitple PRP OU // PI OD 2/9/2017 /// PI os 1/26/2017 // yag cap os - 3/13/2024  GLAUCOMA SURGERIES None   OTHER EYE SURGERIES PC IOL os 10/28/2020 // PC IOL od - 2/17/2021    CDR 0.3/0.35   Tbase 15-23/15-25   Tmax 25 OU   Ttarget ??   HVF 11 tests: 2008 to 2024 - SAD/IAD OU 2/2 PRP OU   Gonio  + 3 ou post laser PI's and PC IOL's   /575   OCT 4 tests: 2017 to 2024 -  RNFL nl od // nl os    HRT 7 tests: 2009 to 2020 -MR -  nl od // nl os /// CDR 0.33 od // 0.26 os   Disc photos 2002, 2003, 2005 (slides) // 2008, 2013  (OIS)     - Ttoday  17/16  - Test done today -IOP // HVF / DFE / OCT     2. Narrow Angles:  +1-3 w/ bowing  s/p PI. Likely will resolve in future once pseudophakic.  Anterior Segment OCT today w/ Open Angles w/ narrow approach OU  Doubt occludable - monitor  "gonio     3. PDR OU s/p PDR OU -stable exam on last eval w/ Retina on 7/25/13.   S/p avastin x 4 od - last 1/4/2016     4. Hx of VH OD -stable. Last seen by retina - arend 1/4/2016    5. Hx of Transient Visual Obscurations OS - ? BP elevation. Seen w/ stable Retinal exam on 9/13/12.    6. PC IOL OU - Kp   OS 10/28/2020 - PCB00 23.0   OD 2/17/2021 - PCB00 23.5     7. Had a gastric "sleve" done 12/18/2015 - for weight loss    So far doing well    On less insulin now     8. PCO - OS > OD   Dense os - rec yag cap   Mild to mod od - monitor for now - consider yag cap if left eye does well     Plan:  Stable IOP (thick k's),  Cont no gtts  Continue to work on blood sugars  oht vs glaucoma    Angles continue narrow but open     S/P PI  os 1/26/2017 -   S/P PI od - 2/9/2017 -      Phaco / IOL OS Date: 10/28/2020 - PCB00 23.0 - trypan / medium pupil - extra viscoat   POY > 3 yrears  - phaco/IOL   Doing well    Cont with regular F/U's with Dr. Mckeon - yearly     Phaco / IOL OD Date: 2/17/2021 - PCB00 23.5  POY > 3 -- phaco/IOL   Doing well    [place] - stable exam - F/U yearly or sooner prn     Pt does not feel yag cap needs doing od at this time - monitor     Glasses - has seen Evangelista in past     Glaucoma suspect   VF changes 2/2 PRP  IOP ok off gtts  OCT RNFL nl ou   PI's patent   PC IOL ou   Angles open now     Low risk - F/u 9-12 months IOP and gonio - monitor PCO OD   Keep diabetic F/U with Mike     I have seen and personally examined the patient.  I agree with the findings, assessment and plan of the resident and/or fellow.     Alma Goodrich MD            "

## 2024-07-16 ENCOUNTER — OFFICE VISIT (OUTPATIENT)
Dept: OPHTHALMOLOGY | Facility: CLINIC | Age: 69
End: 2024-07-16
Payer: COMMERCIAL

## 2024-07-16 ENCOUNTER — CLINICAL SUPPORT (OUTPATIENT)
Dept: OPHTHALMOLOGY | Facility: CLINIC | Age: 69
End: 2024-07-16
Payer: COMMERCIAL

## 2024-07-16 DIAGNOSIS — E11.3593 TYPE 2 DIABETES MELLITUS WITH BOTH EYES AFFECTED BY PROLIFERATIVE RETINOPATHY WITHOUT MACULAR EDEMA, WITH LONG-TERM CURRENT USE OF INSULIN: ICD-10-CM

## 2024-07-16 DIAGNOSIS — Z79.4 TYPE 2 DIABETES MELLITUS WITH BOTH EYES AFFECTED BY PROLIFERATIVE RETINOPATHY WITHOUT MACULAR EDEMA, WITH LONG-TERM CURRENT USE OF INSULIN: ICD-10-CM

## 2024-07-16 DIAGNOSIS — H35.373 PRERETINAL FIBROSIS, BILATERAL: ICD-10-CM

## 2024-07-16 DIAGNOSIS — Z96.1 PSEUDOPHAKIA, BOTH EYES: ICD-10-CM

## 2024-07-16 DIAGNOSIS — H26.493 PCO (POSTERIOR CAPSULAR OPACIFICATION), BILATERAL: ICD-10-CM

## 2024-07-16 DIAGNOSIS — H40.033 ANATOMICAL NARROW ANGLE BORDERLINE GLAUCOMA OF BOTH EYES: Primary | ICD-10-CM

## 2024-07-16 DIAGNOSIS — H57.03 SMALL PUPIL: ICD-10-CM

## 2024-07-16 DIAGNOSIS — H40.033 ANATOMICAL NARROW ANGLE BORDERLINE GLAUCOMA OF BOTH EYES: ICD-10-CM

## 2024-07-16 DIAGNOSIS — H43.391 VITREOUS FLOATERS OF RIGHT EYE: ICD-10-CM

## 2024-07-16 DIAGNOSIS — H35.373 EPIRETINAL MEMBRANE, BILATERAL: ICD-10-CM

## 2024-07-16 PROCEDURE — 3066F NEPHROPATHY DOC TX: CPT | Mod: CPTII,S$GLB,, | Performed by: OPHTHALMOLOGY

## 2024-07-16 PROCEDURE — 1159F MED LIST DOCD IN RCRD: CPT | Mod: CPTII,S$GLB,, | Performed by: OPHTHALMOLOGY

## 2024-07-16 PROCEDURE — 1160F RVW MEDS BY RX/DR IN RCRD: CPT | Mod: CPTII,S$GLB,, | Performed by: OPHTHALMOLOGY

## 2024-07-16 PROCEDURE — 3060F POS MICROALBUMINURIA REV: CPT | Mod: CPTII,S$GLB,, | Performed by: OPHTHALMOLOGY

## 2024-07-16 PROCEDURE — 92014 COMPRE OPH EXAM EST PT 1/>: CPT | Mod: S$GLB,,, | Performed by: OPHTHALMOLOGY

## 2024-07-16 PROCEDURE — 1126F AMNT PAIN NOTED NONE PRSNT: CPT | Mod: CPTII,S$GLB,, | Performed by: OPHTHALMOLOGY

## 2024-07-16 PROCEDURE — 2022F DILAT RTA XM EVC RTNOPTHY: CPT | Mod: CPTII,S$GLB,, | Performed by: OPHTHALMOLOGY

## 2024-07-16 PROCEDURE — 3051F HG A1C>EQUAL 7.0%<8.0%: CPT | Mod: CPTII,S$GLB,, | Performed by: OPHTHALMOLOGY

## 2024-07-16 PROCEDURE — 92133 CPTRZD OPH DX IMG PST SGM ON: CPT | Mod: S$GLB,,, | Performed by: OPHTHALMOLOGY

## 2024-07-16 PROCEDURE — 3288F FALL RISK ASSESSMENT DOCD: CPT | Mod: CPTII,S$GLB,, | Performed by: OPHTHALMOLOGY

## 2024-07-16 PROCEDURE — 4010F ACE/ARB THERAPY RXD/TAKEN: CPT | Mod: CPTII,S$GLB,, | Performed by: OPHTHALMOLOGY

## 2024-07-16 PROCEDURE — 99999 PR PBB SHADOW E&M-EST. PATIENT-LVL III: CPT | Mod: PBBFAC,,, | Performed by: OPHTHALMOLOGY

## 2024-07-16 PROCEDURE — 1101F PT FALLS ASSESS-DOCD LE1/YR: CPT | Mod: CPTII,S$GLB,, | Performed by: OPHTHALMOLOGY

## 2024-07-16 NOTE — PROGRESS NOTES
VISUAL FIELD TEST 24-2 SS-OU-DONE/AB  OU-REL-GOOD-FIX-POOR-COOP-GOOD/AB  PT HAS NO KNOWN ALLERGIES TO LATEX OR ADHESIVES.AB    MRX: OD -1.25 +0.75 X 175            OS -0.75 +1.50 X 150

## 2024-08-01 DIAGNOSIS — K21.9 GASTROESOPHAGEAL REFLUX DISEASE, UNSPECIFIED WHETHER ESOPHAGITIS PRESENT: ICD-10-CM

## 2024-08-01 RX ORDER — ESOMEPRAZOLE MAGNESIUM 40 MG/1
40 CAPSULE, DELAYED RELEASE ORAL DAILY
Qty: 90 CAPSULE | Refills: 3 | Status: CANCELLED | OUTPATIENT
Start: 2024-08-01

## 2024-08-01 NOTE — TELEPHONE ENCOUNTER
----- Message from Eloinahiram Jones sent at 8/1/2024  2:43 PM CDT -----  Contact: [phone]  Cc:Angel Orozco MD  Requesting an RX refill or new RX.    Is this a refill or new RX: refill 1    RX name and strength (copy/paste from chart):  Nexium    Is this a 30 day or 90 day RX:     Pharmacy name and phone # (copy/paste from chart):  [id number] [address] [place] [facility] [address]   Phone: [phone]  Fax: [phone]          The doctors have asked that we provide their patients with the following 2 reminders -- prescription refills can take up to 72 hours, and a friendly reminder that in the future you can use your [facility] account to request refills:

## 2024-08-06 ENCOUNTER — TELEPHONE (OUTPATIENT)
Dept: RADIOLOGY | Facility: HOSPITAL | Age: 69
End: 2024-08-06
Payer: MEDICARE

## 2024-08-14 DIAGNOSIS — E11.42 DIABETIC POLYNEUROPATHY ASSOCIATED WITH TYPE 2 DIABETES MELLITUS: ICD-10-CM

## 2024-08-14 DIAGNOSIS — G56.03 BILATERAL CARPAL TUNNEL SYNDROME: ICD-10-CM

## 2024-08-14 NOTE — TELEPHONE ENCOUNTER
----- Message from Beverly Nguyen sent at 8/14/2024  4:21 PM CDT -----  Contact: [phone]  Requesting an RX refill or new RX.    Is this a refill or new RX: refill    RX name and strength (copy/paste from chart):  amitriptyline (ELAVIL) 25 MG tablet     Is this a 30 day or 90 day RX: 90    Pharmacy name and phone # (copy/paste from chart):    [facility] #[id number] - [place], [place] - [address]  [address]  [place] [address]  Phone: [phone] Fax: [phone]    The doctors have asked that we provide their patients with the following 2 reminders -- prescription refills can take up to 72 hours, and a friendly reminder that in the future you can use your [facility] account to request refills: yes

## 2024-08-15 RX ORDER — AMITRIPTYLINE HYDROCHLORIDE 25 MG/1
50 TABLET, FILM COATED ORAL NIGHTLY
Qty: 180 TABLET | Refills: 3 | Status: SHIPPED | OUTPATIENT
Start: 2024-08-15

## 2024-08-15 NOTE — TELEPHONE ENCOUNTER
----- Message from Debby Shrestha sent at 8/15/2024 11:27 AM CDT -----  Contact: Self/[phone]  1MEDICALADVICE     Patient is calling for Medical Advice regarding:insurance need quantity for medication     How long has patient had these symptoms:NA    Pharmacy name and phone#: [id number] - [place], [place] - [address]  [address]  [place] [address]  Phone: [phone] Fax: [phone]      Patient wants a call back or thru [facility]: call back     Comments: pt said that she was told by the insurance  that the doctor needs to contact them at :[phone] to let them know the quantity of the medication     Please advise patient replies from provider may take up to 48 hours.

## 2024-08-28 ENCOUNTER — HOSPITAL ENCOUNTER (OUTPATIENT)
Dept: RADIOLOGY | Facility: HOSPITAL | Age: 69
Discharge: HOME | End: 2024-08-28
Attending: OBSTETRICS & GYNECOLOGY
Payer: COMMERCIAL

## 2024-08-28 VITALS — WEIGHT: 240 LBS | BODY MASS INDEX: 38.74 KG/M2

## 2024-08-28 DIAGNOSIS — Z12.31 VISIT FOR SCREENING MAMMOGRAM: ICD-10-CM

## 2024-08-28 PROCEDURE — 77063 BREAST TOMOSYNTHESIS BI: CPT | Mod: TC

## 2024-08-28 PROCEDURE — 77067 SCR MAMMO BI INCL CAD: CPT | Mod: TC

## 2024-09-03 ENCOUNTER — TELEPHONE (OUTPATIENT)
Dept: OPHTHALMOLOGY | Facility: CLINIC | Age: 69
End: 2024-09-03
Payer: MEDICARE

## 2024-09-23 DIAGNOSIS — E11.3593 TYPE 2 DIABETES MELLITUS WITH BOTH EYES AFFECTED BY PROLIFERATIVE RETINOPATHY WITHOUT MACULAR EDEMA, WITH LONG-TERM CURRENT USE OF INSULIN: Primary | ICD-10-CM

## 2024-09-23 DIAGNOSIS — H35.373 EPIRETINAL MEMBRANE, BILATERAL: ICD-10-CM

## 2024-09-23 DIAGNOSIS — Z79.4 TYPE 2 DIABETES MELLITUS WITH BOTH EYES AFFECTED BY PROLIFERATIVE RETINOPATHY WITHOUT MACULAR EDEMA, WITH LONG-TERM CURRENT USE OF INSULIN: Primary | ICD-10-CM

## 2024-09-28 ENCOUNTER — LAB VISIT (OUTPATIENT)
Dept: LAB | Facility: HOSPITAL | Age: 69
End: 2024-09-28
Payer: MEDICARE

## 2024-09-28 DIAGNOSIS — E11.3593 TYPE 2 DIABETES MELLITUS WITH BOTH EYES AFFECTED BY PROLIFERATIVE RETINOPATHY WITHOUT MACULAR EDEMA, WITHOUT LONG-TERM CURRENT USE OF INSULIN: ICD-10-CM

## 2024-09-28 DIAGNOSIS — E78.5 HYPERLIPIDEMIA, UNSPECIFIED HYPERLIPIDEMIA TYPE: ICD-10-CM

## 2024-09-28 LAB
CHOLEST SERPL-MCNC: 111 MG/DL (ref 120–199)
CHOLEST/HDLC SERPL: 2.3 {RATIO} (ref 2–5)
ESTIMATED AVG GLUCOSE: 148 MG/DL (ref 68–131)
HBA1C MFR BLD: 6.8 % (ref 4–5.6)
HDLC SERPL-MCNC: 49 MG/DL (ref 40–75)
HDLC SERPL: 44.1 % (ref 20–50)
LDLC SERPL CALC-MCNC: 53.6 MG/DL (ref 63–159)
NONHDLC SERPL-MCNC: 62 MG/DL
TRIGL SERPL-MCNC: 42 MG/DL (ref 30–150)

## 2024-09-28 PROCEDURE — 83036 HEMOGLOBIN GLYCOSYLATED A1C: CPT | Performed by: NURSE PRACTITIONER

## 2024-09-28 PROCEDURE — 80061 LIPID PANEL: CPT | Performed by: NURSE PRACTITIONER

## 2024-09-28 PROCEDURE — 36415 COLL VENOUS BLD VENIPUNCTURE: CPT | Performed by: NURSE PRACTITIONER

## 2024-09-30 ENCOUNTER — PATIENT MESSAGE (OUTPATIENT)
Dept: INTERNAL MEDICINE | Facility: CLINIC | Age: 69
End: 2024-09-30
Payer: MEDICARE

## 2024-09-30 RX ORDER — TIRZEPATIDE 10 MG/.5ML
10 INJECTION, SOLUTION SUBCUTANEOUS
Qty: 4 PEN | Refills: 5 | Status: SHIPPED | OUTPATIENT
Start: 2024-09-30 | End: 2024-10-04

## 2024-10-04 ENCOUNTER — OFFICE VISIT (OUTPATIENT)
Dept: INTERNAL MEDICINE | Facility: CLINIC | Age: 69
End: 2024-10-04
Payer: COMMERCIAL

## 2024-10-04 ENCOUNTER — TELEPHONE (OUTPATIENT)
Dept: INTERNAL MEDICINE | Facility: CLINIC | Age: 69
End: 2024-10-04

## 2024-10-04 VITALS
OXYGEN SATURATION: 100 % | SYSTOLIC BLOOD PRESSURE: 138 MMHG | HEIGHT: 66 IN | HEART RATE: 78 BPM | BODY MASS INDEX: 38.72 KG/M2 | WEIGHT: 240.94 LBS | DIASTOLIC BLOOD PRESSURE: 80 MMHG

## 2024-10-04 DIAGNOSIS — I73.9 PVD (PERIPHERAL VASCULAR DISEASE): ICD-10-CM

## 2024-10-04 DIAGNOSIS — G44.221 CHRONIC TENSION-TYPE HEADACHE, INTRACTABLE: ICD-10-CM

## 2024-10-04 DIAGNOSIS — I10 PRIMARY HYPERTENSION: ICD-10-CM

## 2024-10-04 DIAGNOSIS — E78.5 HYPERLIPIDEMIA, UNSPECIFIED HYPERLIPIDEMIA TYPE: ICD-10-CM

## 2024-10-04 DIAGNOSIS — E11.3593 TYPE 2 DIABETES MELLITUS WITH BOTH EYES AFFECTED BY PROLIFERATIVE RETINOPATHY WITHOUT MACULAR EDEMA, WITHOUT LONG-TERM CURRENT USE OF INSULIN: Primary | ICD-10-CM

## 2024-10-04 DIAGNOSIS — H35.033 HYPERTENSIVE RETINOPATHY OF BOTH EYES: ICD-10-CM

## 2024-10-04 DIAGNOSIS — I25.10 CORONARY ARTERY DISEASE INVOLVING NATIVE CORONARY ARTERY OF NATIVE HEART WITHOUT ANGINA PECTORIS: ICD-10-CM

## 2024-10-04 DIAGNOSIS — G47.33 OSA ON CPAP: ICD-10-CM

## 2024-10-04 DIAGNOSIS — E66.812 OBESITY, CLASS II, BMI 35-39.9: ICD-10-CM

## 2024-10-04 DIAGNOSIS — K21.9 GASTROESOPHAGEAL REFLUX DISEASE, UNSPECIFIED WHETHER ESOPHAGITIS PRESENT: ICD-10-CM

## 2024-10-04 DIAGNOSIS — Z98.84 HISTORY OF BARIATRIC SURGERY: ICD-10-CM

## 2024-10-04 PROCEDURE — 99999 PR PBB SHADOW E&M-EST. PATIENT-LVL V: CPT | Mod: PBBFAC,,, | Performed by: NURSE PRACTITIONER

## 2024-10-04 RX ORDER — INSULIN ASPART INJECTION 100 [IU]/ML
INJECTION, SOLUTION SUBCUTANEOUS
Qty: 5 EACH | Refills: 6 | Status: SHIPPED | OUTPATIENT
Start: 2024-10-04

## 2024-10-04 RX ORDER — ESOMEPRAZOLE MAGNESIUM 40 MG/1
40 CAPSULE, DELAYED RELEASE ORAL DAILY
Qty: 90 CAPSULE | Refills: 3 | Status: SHIPPED | OUTPATIENT
Start: 2024-10-04

## 2024-10-04 RX ORDER — AMLODIPINE BESYLATE 10 MG/1
10 TABLET ORAL DAILY
Qty: 90 TABLET | Refills: 3 | Status: SHIPPED | OUTPATIENT
Start: 2024-10-04

## 2024-10-04 RX ORDER — TIRZEPATIDE 12.5 MG/.5ML
12.5 INJECTION, SOLUTION SUBCUTANEOUS
Qty: 4 PEN | Refills: 6 | Status: SHIPPED | OUTPATIENT
Start: 2024-10-04

## 2024-10-04 NOTE — PATIENT INSTRUCTIONS
Follow up in 4 months w/Irielle  A1c prior -4 months     Jardiance 25 mg daily  Mounjaro 12.5 mg weekly  Metformin 1000 mg twice a day  Fiasp scale use 180-230+2, etc. =as needed     Lab Results   Component Value Date    HGBA1C 6.8 (H) 09/28/2024     Goal less than 7%    [email or web address]  Eat fit milena  MyCleanFishpal milena  [email or web address]    mySugr milena     Goal  no higher than 180

## 2024-10-04 NOTE — TELEPHONE ENCOUNTER
----- Message from Thuanpolly sent at 10/4/2024 12:23 PM CDT -----  Contact: RAHEEL Campos/TONY Montgomery [phone]  She faxed a form to complete on 10/2/24 the authorization for the tirzepatide (MOUNJARO) 10 mg/0.5 mL PnIj and she need it to include the diagnoses.She can take if verbally the reply from the fax sent.    Thank you

## 2024-10-04 NOTE — TELEPHONE ENCOUNTER
----- Message from [place]dez sent at 10/3/2024 10:46 AM CDT -----  Contact: [phone]    ----- Message -----  From: Laisha Arauz  Sent: 10/3/2024  10:32 AM CDT  To: Real Reyna Staff    Patient is calling to clarify an RX.    RX name:  blood-glucose meter,continuous (DEXCOM G7 ) Misc    What do they need to clarify:  need authorization, Robles sent it over to be signed    Comments:

## 2024-10-04 NOTE — PROGRESS NOTES
CHIEF COMPLAINT: Type 2 Diabetes     HPI: Mrs. Aaliyah Angela is a 69 y.o. female who was diagnosed with gestational diabetes age 22s, Type 2 DM age 30s.  Has h/o gastric sleeve 2015.-in [place]   Past Medical History:   Diagnosis Date    Allergy     Anatomical narrow angle borderline glaucoma     AR (allergic rhinitis)     Arthritis     CAD (coronary artery disease) 04/22/2013    Non obstructive, 2012 PET     Diabetes mellitus type II     with retinopathy    Diabetic retinopathy     Epiretinal membrane, both eyes     Hyperlipidemia     Hypertensive retinopathy of both eyes     Lumbar disc disease     Migraines     Morbid obesity 10/03/2012    BRADFORD (obstructive sleep apnea) 08/04/2015    Proliferative diabetic retinopathy(362.02)     PVD (peripheral vascular disease) 04/22/2013    Carotid US 1-39% bilat 2012     Rupture of right Achilles tendon     Unspecified essential hypertension     Unspecified vitamin D deficiency     Vitreous hemorrhage of left eye      Last seen by me in spring 2024.  Last visit switched from ozempic to mounjaro.  No weight loss but A1c much improved 0.5%.  Pending pa-dexcom g7 .    Eats snacks  Meals 2 /day   Being seen by me again today.   A1c 7.5 to 7.3% to 6.8%     Lab Results   Component Value Date    HGBA1C 6.8 (H) 09/28/2024     A lot of stressors.  Brother passed away, h/o cva.   (L) leg cellulitis-fall 2023, (R) hand sx 1/5/24, infection, placed on antibiotics.  Doing better.   (R) leg- watching site    Has f/u with endocrinology for pth, calcium -hyperparathyroidism-annually  -last pth normal    On MDI injections 3 x a day -correction scale   Testing 4 x a day max  Patient is willing and able to use the device  Demonstrated an understanding of the technology and is motivated to use CGM  Patient expected to adhere to a comprehensive diabetes treatment plan and patient has adequate medical supervision  Multiple impaired awareness of hypoglycemia (hypoglycemia unawareness)    Works 40  "hours, walking 5 x a week    Current meds: mounjaro 10 mg weekly, metformin 1000 mg bid, jardiance 25 mg daily, humalog correction scale 150-200+2, etc  -self adjustment per correction scale    Diabetes Management Status    Statin: Taking  ACE/ARB: Taking    Screening or Prevention Patient's value Goal Complete/Controlled?   HgA1C Testing and Control   Lab Results   Component Value Date    HGBA1C 6.8 (H) 09/28/2024      Annually/Less than 8% No   Lipid profile : 09/28/2024 Annually No   LDL control Lab Results   Component Value Date    LDLCALC 53.6 (L) 09/28/2024    Annually/Less than 100 mg/dl  No   Nephropathy screening Lab Results   Component Value Date    LABMICR 6.0 09/28/2024     Lab Results   Component Value Date    PROTEINUA Negative 12/13/2022    Annually Yes   Blood pressure BP Readings from Last 1 Encounters:   10/04/24 (!) 142/76    Less than 140/90 No   Dilated retinal exam : 07/16/2024 Annually Yes   Foot exam   : 02/22/2024 Annually Yes     Cataract removal sx (L) eye  Then (R) eye     REVIEW OF SYSTEMS  General: no weakness, fatigue, + weight changes 3# loss   Eyes: no double or blurred vision, eye pain, or redness; Last Eye Exam 2021, h/o cataract sx, retinal/glaucoma specialist  Cardiovascular:  No chest pain, palpitations, +trace edema-BLE, or murmurs.   Respiratory: no cough or dyspnea.   GI/: + heartburn, nausea, or changes in bowel patterns; good appetite. +GERD-nexium and pepcid, off zantac   Skin: no rashes, dryness, itching, or reactions at insulin injection sites.  Neuro: + numbness, tingling-off gabapentin, on amitriptyline,  tremors, or vertigo. +OA in knees, +right hand sx (R)  Endocrine: no polyuria, polydipsia, polyphagia, heat or cold intolerance.     Vital Signs  BP (!) 142/76 (BP Location: Left arm, Patient Position: Sitting)   Pulse 78   Ht 5' 6" (1.676 m)   Wt 109.3 kg (240 lb 15.4 oz)   SpO2 100%   BMI 38.89 kg/m²     Hemoglobin A1C   Date Value Ref Range Status "   09/28/2024 6.8 (H) 4.0 - 5.6 % Final     Comment:     ADA Screening Guidelines:  5.7-6.4%  Consistent with prediabetes  >or=6.5%  Consistent with diabetes    High levels of fetal hemoglobin interfere with the HbA1C  assay. Heterozygous hemoglobin variants (HbS, HgC, etc)do  not significantly interfere with this assay.   However, presence of multiple variants may affect accuracy.     05/30/2024 7.3 (H) 4.0 - 5.6 % Final     Comment:     ADA Screening Guidelines:  5.7-6.4%  Consistent with prediabetes  >or=6.5%  Consistent with diabetes    High levels of fetal hemoglobin interfere with the HbA1C  assay. Heterozygous hemoglobin variants (HbS, HgC, etc)do  not significantly interfere with this assay.   However, presence of multiple variants may affect accuracy.     01/27/2024 7.5 (H) 4.0 - 5.6 % Final     Comment:     ADA Screening Guidelines:  5.7-6.4%  Consistent with prediabetes  >or=6.5%  Consistent with diabetes    High levels of fetal hemoglobin interfere with the HbA1C  assay. Heterozygous hemoglobin variants (HbS, HgC, etc)do  not significantly interfere with this assay.   However, presence of multiple variants may affect accuracy.          Chemistry        Component Value Date/Time     04/08/2023 1032    K 4.6 04/08/2023 1032     04/08/2023 1032    CO2 26 04/08/2023 1032    BUN 14 04/08/2023 1032    CREATININE 0.9 04/08/2023 1032    GLU 95 04/08/2023 1032        Component Value Date/Time    CALCIUM 9.5 04/08/2023 1032    ALKPHOS 70 02/06/2023 1422    AST 16 02/06/2023 1422    ALT 13 02/06/2023 1422    BILITOT 0.2 02/06/2023 1422    ESTGFRAFRICA >60.0 03/19/2022 0823    EGFRNONAA >60.0 03/19/2022 0823           Lab Results   Component Value Date    TSH 1.683 05/30/2024      Lab Results   Component Value Date    CHOL 111 (L) 09/28/2024    CHOL 112 (L) 05/30/2024    CHOL 119 (L) 02/06/2023     Lab Results   Component Value Date    HDL 49 09/28/2024    HDL 47 05/30/2024    HDL 49 02/06/2023     Lab  Results   Component Value Date    LDLCALC 53.6 (L) 09/28/2024    LDLCALC 52.8 (L) 05/30/2024    LDLCALC 54.0 (L) 02/06/2023     Lab Results   Component Value Date    TRIG 42 09/28/2024    TRIG 61 05/30/2024    TRIG 80 02/06/2023     Lab Results   Component Value Date    CHOLHDL 44.1 09/28/2024    CHOLHDL 42.0 05/30/2024    CHOLHDL 41.2 02/06/2023       PHYSICAL EXAMINATION  Constitutional: Appears well, no distress  Neck: Supple, trachea midline.   Respiratory: No wheezes, even and unlabored.  Cardiovascular: deferred  Lymph: no BLE edema  Skin: warm and dry; no injection site reactions, no acanthosis nigracans observed.  Neuro:patient alert and cooperative, normal affect; steady gait.    Diabetes Foot Exam: deferred     Assessment/Plan  1. 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 by proliferative retinopathy without macular edema, without long-term current use of insulin  tirzepatide (MOUNJARO) 12.5 mg/0.5 mL PnIj    Hemoglobin A1C next time   Continue jardiance and metformin   Dexcom g7- pending pa -working on this by nurse team  Sample given  A1c below goal   Goal < 7%  Discussed dietary habits, stressors       2. Hyperlipidemia, unspecified hyperlipidemia type  Lab Results   Component Value Date    LDLCALC 53.6 (L) 09/28/2024     On statin   At goal       3. Primary hypertension  amLODIPine (NORVASC) 10 MG tablet rx sent   On arb/acei   Sl above goal   Out of med       4. Chronic tension-type headache, intractable  F/ u with neuro  May increase insulin resistance       5. Hypertensive retinopathy of both eyes  F/u with retinal specialist   Stable       6. Class 2 severe obesity due to excess calories /bariatric sx Body mass index is 38.89 kg/m².  Loss weight   Bracket change to class 2 obesity       7. PVD (peripheral vascular disease)  F/ u with vascular medicine   Stable       8. Coronary artery disease involving native coronary artery of native heart without angina pectoris  See above  Avoid  hypoglycemia       9. BRADFORD on CPAP  Not using, will affect cardiovascular system, metabolism

## 2024-10-11 ENCOUNTER — TELEPHONE (OUTPATIENT)
Dept: INTERNAL MEDICINE | Facility: CLINIC | Age: 69
End: 2024-10-11
Payer: MEDICARE

## 2024-10-11 DIAGNOSIS — E11.3593 TYPE 2 DIABETES MELLITUS WITH BOTH EYES AFFECTED BY PROLIFERATIVE RETINOPATHY WITHOUT MACULAR EDEMA, WITHOUT LONG-TERM CURRENT USE OF INSULIN: ICD-10-CM

## 2024-10-11 RX ORDER — TIRZEPATIDE 12.5 MG/.5ML
12.5 INJECTION, SOLUTION SUBCUTANEOUS
Qty: 4 PEN | Refills: 6 | Status: SHIPPED | OUTPATIENT
Start: 2024-10-11

## 2024-10-11 RX ORDER — BLOOD-GLUCOSE SENSOR
EACH MISCELLANEOUS
Qty: 3 EACH | Refills: 11 | Status: SHIPPED | OUTPATIENT
Start: 2024-10-11

## 2024-10-11 NOTE — TELEPHONE ENCOUNTER
Spoke to pt. Pt stated she received a denial letter regarding her Mounjaro. She stated insurance company informed her they can not fill her Mounjaro and Dexcom. Pt faxed over denial letter.

## 2024-10-11 NOTE — TELEPHONE ENCOUNTER
----- Message from Paul sent at 10/11/2024  9:42 AM CDT -----  Contact: Self [phone]  Would like to receive medical advice.     Would they like a call back or a response via [facility]:  call back    Additional information:  Calling to speak with the office about paperwork that needs to be filled out for her insulin and dexcom. The pt states she insurance sent her some thing about the office filling out the wrong papers. But she will be sending over the correct paper work that needs to be filled out and submitted.

## 2024-10-17 DIAGNOSIS — E11.3593 TYPE 2 DIABETES MELLITUS WITH BOTH EYES AFFECTED BY PROLIFERATIVE RETINOPATHY WITHOUT MACULAR EDEMA, WITHOUT LONG-TERM CURRENT USE OF INSULIN: ICD-10-CM

## 2024-10-17 RX ORDER — BLOOD-GLUCOSE SENSOR
EACH MISCELLANEOUS
Qty: 3 EACH | Refills: 11 | Status: SHIPPED | OUTPATIENT
Start: 2024-10-17

## 2024-10-17 RX ORDER — TIRZEPATIDE 12.5 MG/.5ML
12.5 INJECTION, SOLUTION SUBCUTANEOUS
Qty: 4 PEN | Refills: 6 | Status: SHIPPED | OUTPATIENT
Start: 2024-10-17

## 2024-10-17 NOTE — TELEPHONE ENCOUNTER
----- Message from Vinnie sent at 10/17/2024 12:50 PM CDT -----  Contact: Pt  [phone]  Requesting an RX refill or new RX.    Is this a refill or new RX: Refill/ transfer    RX name and strength (copy/paste from chart):  tirzepatide (MOUNJARO) 12.5 mg/0.5 mL PnIj    Is this a 30 day or 90 day RX:     Pharmacy name and phone # (copy/paste from chart):[facility]/pharmacy #[id number] - [place], [place] - [address]   Phone: [phone]  Fax: [phone]      The doctors have asked that we provide their patients with the following 2 reminders -- prescription refills can take up to 72 hours, and a friendly reminder that in the future you can use your [facility] account to request refills: yes  Requesting an RX refill or new RX.    Is this a refill or new RX:     RX name and strength (copy/paste from chart): blood-glucose sensor (DEXCOM G7 SENSOR) Carley     Is this a 30 day or 90 day RX:     Pharmacy name and phone # (copy/paste from chart):      The doctors have asked that we provide their patients with the following 2 reminders -- prescription refills can take up to 72 hours, and a friendly reminder that in the future you can use your [facility] account to request refills: yes    Pt states she would like prescriptions transferred to [facility] pharmacy

## 2024-10-21 ENCOUNTER — OFFICE VISIT (OUTPATIENT)
Dept: PRIMARY CARE CLINIC | Facility: CLINIC | Age: 69
End: 2024-10-21
Payer: COMMERCIAL

## 2024-10-21 ENCOUNTER — TELEPHONE (OUTPATIENT)
Dept: INTERNAL MEDICINE | Facility: CLINIC | Age: 69
End: 2024-10-21
Payer: MEDICARE

## 2024-10-21 ENCOUNTER — NURSE TRIAGE (OUTPATIENT)
Dept: ADMINISTRATIVE | Facility: CLINIC | Age: 69
End: 2024-10-21
Payer: MEDICARE

## 2024-10-21 ENCOUNTER — TELEPHONE (OUTPATIENT)
Dept: PRIMARY CARE CLINIC | Facility: CLINIC | Age: 69
End: 2024-10-21
Payer: MEDICARE

## 2024-10-21 DIAGNOSIS — L03.90 CELLULITIS, UNSPECIFIED CELLULITIS SITE: Primary | ICD-10-CM

## 2024-10-21 PROCEDURE — 99499 UNLISTED E&M SERVICE: CPT | Mod: 95,,, | Performed by: NURSE PRACTITIONER

## 2024-10-21 NOTE — TELEPHONE ENCOUNTER
Pt states she has Cellulitus on right leg with pain and redness and wants to know if rx can be called in or if she should be seen in person. Please let me know how to advise.

## 2024-10-21 NOTE — TELEPHONE ENCOUNTER
OOC RN patient calling c/o cellulitis.  Want to talk to NP.   Has cellulitis right leg going on 2-3 pain,  not weeping.  Denies sputum, it is red and ankle is swollen.  2 inches long.  Wants to know if she should have ATB.   Care advise is to see  today. Made   Reason for Disposition   Bright red, wide-spread, sunburn-like rash    Protocols used: Wound Infection-A-OH

## 2024-10-21 NOTE — TELEPHONE ENCOUNTER
Has a virtual appointment today \advise to   call me back in the morning  Will forward message to Dr Orozco

## 2024-10-21 NOTE — PROGRESS NOTES
The patient location is: [place]  The chief complaint leading to consultation is: complains of cellulitis    Visit type: audiovisual    Face to Face time with patient: 2  4 minutes of total time spent on the encounter, which includes face to face time and non-face to face time preparing to see the patient (eg, review of tests), Obtaining and/or reviewing separately obtained history, Documenting clinical information in the electronic or other health record, Independently interpreting results (not separately reported) and communicating results to the patient/family/caregiver, or Care coordination (not separately reported).         Each patient to whom he or she provides medical services by telemedicine is:  (1) informed of the relationship between the physician and patient and the respective role of any other health care provider with respect to management of the patient; and (2) notified that he or she may decline to receive medical services by telemedicine and may withdraw from such care at any time.    Notes:     Patient informed that her complaints/concerns necessitate an in-person evaluation.  Plans to schedule an appointment for evaluation.

## 2024-10-21 NOTE — TELEPHONE ENCOUNTER
----- Message from Cindy sent at 10/21/2024 11:13 AM CDT -----  Contact: [phone] Patient  .[email or web address]     Patient is calling for Medical Advice regarding: Pt states she has Cellulitus on right leg with pain and redness and wants to know if rx can be called in or if she should be seen in person.    How long has patient had these symptoms: 3 weeks    Pharmacy name and phone#:   [facility] #[id number] - [place], [place] - [address]  [address]  [place] [address]  Phone: [phone] Fax: [phone]    Patient wants a call back or thru [facility]: call back    Comments:    Please advise patient replies from provider may take up to 48 hours.

## 2024-10-22 ENCOUNTER — OFFICE VISIT (OUTPATIENT)
Dept: INTERNAL MEDICINE | Facility: CLINIC | Age: 69
End: 2024-10-22
Payer: COMMERCIAL

## 2024-10-22 VITALS
HEIGHT: 66 IN | DIASTOLIC BLOOD PRESSURE: 86 MMHG | TEMPERATURE: 98 F | HEART RATE: 81 BPM | WEIGHT: 231.56 LBS | RESPIRATION RATE: 18 BRPM | BODY MASS INDEX: 37.21 KG/M2 | SYSTOLIC BLOOD PRESSURE: 128 MMHG | OXYGEN SATURATION: 99 %

## 2024-10-22 DIAGNOSIS — I10 PRIMARY HYPERTENSION: ICD-10-CM

## 2024-10-22 DIAGNOSIS — E11.65 TYPE 2 DIABETES MELLITUS WITH HYPERGLYCEMIA, WITH LONG-TERM CURRENT USE OF INSULIN: ICD-10-CM

## 2024-10-22 DIAGNOSIS — E78.5 HYPERLIPIDEMIA, UNSPECIFIED HYPERLIPIDEMIA TYPE: ICD-10-CM

## 2024-10-22 DIAGNOSIS — L03.115 CELLULITIS OF RIGHT LOWER EXTREMITY: Primary | ICD-10-CM

## 2024-10-22 DIAGNOSIS — Z79.4 TYPE 2 DIABETES MELLITUS WITH HYPERGLYCEMIA, WITH LONG-TERM CURRENT USE OF INSULIN: ICD-10-CM

## 2024-10-22 PROCEDURE — 3061F NEG MICROALBUMINURIA REV: CPT | Mod: CPTII,S$GLB,, | Performed by: PHYSICIAN ASSISTANT

## 2024-10-22 PROCEDURE — 1126F AMNT PAIN NOTED NONE PRSNT: CPT | Mod: CPTII,S$GLB,, | Performed by: PHYSICIAN ASSISTANT

## 2024-10-22 PROCEDURE — 1160F RVW MEDS BY RX/DR IN RCRD: CPT | Mod: CPTII,S$GLB,, | Performed by: PHYSICIAN ASSISTANT

## 2024-10-22 PROCEDURE — 3079F DIAST BP 80-89 MM HG: CPT | Mod: CPTII,S$GLB,, | Performed by: PHYSICIAN ASSISTANT

## 2024-10-22 PROCEDURE — 3008F BODY MASS INDEX DOCD: CPT | Mod: CPTII,S$GLB,, | Performed by: PHYSICIAN ASSISTANT

## 2024-10-22 PROCEDURE — 3044F HG A1C LEVEL LT 7.0%: CPT | Mod: CPTII,S$GLB,, | Performed by: PHYSICIAN ASSISTANT

## 2024-10-22 PROCEDURE — 3066F NEPHROPATHY DOC TX: CPT | Mod: CPTII,S$GLB,, | Performed by: PHYSICIAN ASSISTANT

## 2024-10-22 PROCEDURE — 99999 PR PBB SHADOW E&M-EST. PATIENT-LVL V: CPT | Mod: PBBFAC,,, | Performed by: PHYSICIAN ASSISTANT

## 2024-10-22 PROCEDURE — 1159F MED LIST DOCD IN RCRD: CPT | Mod: CPTII,S$GLB,, | Performed by: PHYSICIAN ASSISTANT

## 2024-10-22 PROCEDURE — 99214 OFFICE O/P EST MOD 30 MIN: CPT | Mod: S$GLB,,, | Performed by: PHYSICIAN ASSISTANT

## 2024-10-22 PROCEDURE — 4010F ACE/ARB THERAPY RXD/TAKEN: CPT | Mod: CPTII,S$GLB,, | Performed by: PHYSICIAN ASSISTANT

## 2024-10-22 PROCEDURE — 3074F SYST BP LT 130 MM HG: CPT | Mod: CPTII,S$GLB,, | Performed by: PHYSICIAN ASSISTANT

## 2024-10-22 RX ORDER — CEPHALEXIN 500 MG/1
500 CAPSULE ORAL EVERY 8 HOURS
Qty: 30 CAPSULE | Refills: 0 | Status: SHIPPED | OUTPATIENT
Start: 2024-10-22

## 2024-10-22 NOTE — PROGRESS NOTES
Subjective:       Patient ID: Aaliyah Angela is a 69 y.o. female.        Chief Complaint: Recurrent Skin Infections    Aaliyah Angela is an established patient of Angel Orozco MD here today for urgent care visit.    2-3 weeks ago started with redness RLE.  Over past few days, more red and painful.  No fever.      LPN at [facility].    HTN -   /86 today  Amlodipine 10 mg daily  Losartan 100 mg daily  Toprol XL 50 mg daily    GERD -   Nexium 40 mg daily    DM -   Follows with Maribell Noble NP  Dexcom  Aspart  Metformin  mg 2 BID  Mounjaro 12.5 mg/week    Lab Results       Component                Value               Date                       HGBA1C                   6.8 (H)             09/28/2024                 HGBA1C                   7.3 (H)             05/30/2024                 HGBA1C                   7.5 (H)             01/27/2024              Lipids -   Lipitor 20 mg daily    BMI 37           Review of Systems   Constitutional:  Negative for chills, diaphoresis, fatigue and fever.   HENT:  Negative for congestion and sore throat.    Eyes:  Negative for visual disturbance.   Respiratory:  Negative for cough, chest tightness and shortness of breath.    Cardiovascular:  Negative for chest pain, palpitations and leg swelling.   Gastrointestinal:  Negative for abdominal pain, blood in stool, constipation, diarrhea, nausea and vomiting.   Genitourinary:  Negative for dysuria, frequency, hematuria and urgency.   Musculoskeletal:  Negative for arthralgias and back pain.   Skin:  Positive for color change. Negative for rash.   Neurological:  Negative for dizziness, syncope, weakness and headaches.   Psychiatric/Behavioral:  Negative for dysphoric mood and sleep disturbance. The patient is not nervous/anxious.        Objective:      Physical Exam  Vitals and nursing note reviewed.   Constitutional:       Appearance: Normal appearance. She is well-developed.   HENT:      Head: Normocephalic.       Right Ear: External ear normal.      Left Ear: External ear normal.   Eyes:      Pupils: Pupils are equal, round, and reactive to light.   Cardiovascular:      Rate and Rhythm: Normal rate and regular rhythm.      Heart sounds: Normal heart sounds. No murmur heard.     No friction rub. No gallop.   Pulmonary:      Effort: Pulmonary effort is normal. No respiratory distress.      Breath sounds: Normal breath sounds.   Abdominal:      Palpations: Abdomen is soft.      Tenderness: There is no abdominal tenderness.   Skin:     General: Skin is warm and dry.   Neurological:      Mental Status: She is alert.           Assessment:       Right lower leg - area of erythema, mild tenderness, warmth, neurovascularly intact    1. Cellulitis of right lower extremity    2. Type 2 diabetes mellitus with hyperglycemia, with long-term current use of insulin    3. Primary hypertension    4. Hyperlipidemia, unspecified hyperlipidemia type        Plan:       Aaliyah was seen today for recurrent skin infections.    Diagnoses and all orders for this visit:    Cellulitis of right lower extremity - area of erythema demarcated today, ED prompts reviewed at length  -     cephALEXin (KEFLEX) 500 MG capsule; Take 1 capsule (500 mg total) by mouth every 8 (eight) hours.    Type 2 diabetes mellitus with hyperglycemia, with long-term current use of insulin - stable and controlled, continue current regimen  Lab Results   Component Value Date    HGBA1C 6.8 (H) 09/28/2024    HGBA1C 7.3 (H) 05/30/2024    HGBA1C 7.5 (H) 01/27/2024     Primary hypertension - BP great today, continue all meds  BP Readings from Last 5 Encounters:   10/22/24 128/86   10/04/24 138/80   07/09/24 134/68   06/04/24 122/72   05/28/24 (!) 143/80      Hyperlipidemia, unspecified hyperlipidemia type - stable and controlled, continue current regimen  Lab Results   Component Value Date    CHOL 111 (L) 09/28/2024    TRIG 42 09/28/2024    HDL 49 09/28/2024    LDLCALC 53.6 (L)  "09/28/2024     Pt has been given instructions populated from patient instructions database and has verbalized understanding of the after visit summary and information contained wherein.    Follow up with a primary care provider. May go to ER for acute shortness of breath, lightheadedness, fever, or any other emergent complaints or changes in condition.    "This note will be shared with the patient"    Future Appointments   Date Time Provider Department Center   10/31/2024  2:30 PM Reggie Mckeon MD [facility]   11/5/2024  9:00 AM Angel Orozco MD [facility] [place] [facility]   11/13/2024  9:00 AM Jacinta Naidu OD [facility] [place]   2/8/2025  9:00 AM LAB, APPOINTMENT [facility]  [place] [facility]   2/11/2025  4:00 PM [facility]                 "

## 2024-10-29 NOTE — PATIENT INSTRUCTIONS
We discussed whole food plant based diets and the good outcomes from recent clinical trails. Recommended the book How Not to Die  by Edmundo Grimaldo M.D     English

## 2024-10-30 ENCOUNTER — TELEPHONE (OUTPATIENT)
Dept: INTERNAL MEDICINE | Facility: CLINIC | Age: 69
End: 2024-10-30
Payer: MEDICARE

## 2024-10-31 ENCOUNTER — OFFICE VISIT (OUTPATIENT)
Dept: OPHTHALMOLOGY | Facility: CLINIC | Age: 69
End: 2024-10-31
Payer: COMMERCIAL

## 2024-10-31 DIAGNOSIS — H40.033 ANATOMICAL NARROW ANGLE BORDERLINE GLAUCOMA OF BOTH EYES: ICD-10-CM

## 2024-10-31 DIAGNOSIS — E11.3593 TYPE 2 DIABETES MELLITUS WITH BOTH EYES AFFECTED BY PROLIFERATIVE RETINOPATHY WITHOUT MACULAR EDEMA, WITH LONG-TERM CURRENT USE OF INSULIN: Primary | ICD-10-CM

## 2024-10-31 DIAGNOSIS — Z79.4 TYPE 2 DIABETES MELLITUS WITH BOTH EYES AFFECTED BY PROLIFERATIVE RETINOPATHY WITHOUT MACULAR EDEMA, WITH LONG-TERM CURRENT USE OF INSULIN: Primary | ICD-10-CM

## 2024-10-31 DIAGNOSIS — H35.373 EPIRETINAL MEMBRANE, BILATERAL: ICD-10-CM

## 2024-10-31 PROCEDURE — 99999 PR PBB SHADOW E&M-EST. PATIENT-LVL II: CPT | Mod: PBBFAC,,, | Performed by: OPHTHALMOLOGY

## 2024-10-31 PROCEDURE — 92134 CPTRZ OPH DX IMG PST SGM RTA: CPT | Mod: S$GLB,,, | Performed by: OPHTHALMOLOGY

## 2024-10-31 PROCEDURE — 3061F NEG MICROALBUMINURIA REV: CPT | Mod: CPTII,S$GLB,, | Performed by: OPHTHALMOLOGY

## 2024-10-31 PROCEDURE — 3066F NEPHROPATHY DOC TX: CPT | Mod: CPTII,S$GLB,, | Performed by: OPHTHALMOLOGY

## 2024-10-31 PROCEDURE — 1160F RVW MEDS BY RX/DR IN RCRD: CPT | Mod: CPTII,S$GLB,, | Performed by: OPHTHALMOLOGY

## 2024-10-31 PROCEDURE — 2022F DILAT RTA XM EVC RTNOPTHY: CPT | Mod: CPTII,S$GLB,, | Performed by: OPHTHALMOLOGY

## 2024-10-31 PROCEDURE — 92014 COMPRE OPH EXAM EST PT 1/>: CPT | Mod: S$GLB,,, | Performed by: OPHTHALMOLOGY

## 2024-10-31 PROCEDURE — 4010F ACE/ARB THERAPY RXD/TAKEN: CPT | Mod: CPTII,S$GLB,, | Performed by: OPHTHALMOLOGY

## 2024-10-31 PROCEDURE — 1126F AMNT PAIN NOTED NONE PRSNT: CPT | Mod: CPTII,S$GLB,, | Performed by: OPHTHALMOLOGY

## 2024-10-31 PROCEDURE — 1159F MED LIST DOCD IN RCRD: CPT | Mod: CPTII,S$GLB,, | Performed by: OPHTHALMOLOGY

## 2024-10-31 PROCEDURE — 3044F HG A1C LEVEL LT 7.0%: CPT | Mod: CPTII,S$GLB,, | Performed by: OPHTHALMOLOGY

## 2024-11-05 ENCOUNTER — OFFICE VISIT (OUTPATIENT)
Dept: INTERNAL MEDICINE | Facility: CLINIC | Age: 69
End: 2024-11-05
Payer: COMMERCIAL

## 2024-11-05 VITALS
SYSTOLIC BLOOD PRESSURE: 154 MMHG | WEIGHT: 239.88 LBS | DIASTOLIC BLOOD PRESSURE: 66 MMHG | BODY MASS INDEX: 38.55 KG/M2 | HEART RATE: 90 BPM | HEIGHT: 66 IN | OXYGEN SATURATION: 97 %

## 2024-11-05 DIAGNOSIS — L03.115 CELLULITIS OF RIGHT LOWER EXTREMITY: ICD-10-CM

## 2024-11-05 DIAGNOSIS — D64.9 ANEMIA, UNSPECIFIED TYPE: ICD-10-CM

## 2024-11-05 DIAGNOSIS — E11.65 TYPE 2 DIABETES MELLITUS WITH HYPERGLYCEMIA, WITH LONG-TERM CURRENT USE OF INSULIN: Primary | ICD-10-CM

## 2024-11-05 DIAGNOSIS — Z86.39 HISTORY OF VITAMIN D DEFICIENCY: ICD-10-CM

## 2024-11-05 DIAGNOSIS — Z79.4 TYPE 2 DIABETES MELLITUS WITH HYPERGLYCEMIA, WITH LONG-TERM CURRENT USE OF INSULIN: Primary | ICD-10-CM

## 2024-11-05 DIAGNOSIS — I10 PRIMARY HYPERTENSION: ICD-10-CM

## 2024-11-05 PROCEDURE — 99999 PR PBB SHADOW E&M-EST. PATIENT-LVL V: CPT | Mod: PBBFAC,,,

## 2024-11-05 RX ORDER — DOXYCYCLINE HYCLATE 100 MG
100 TABLET ORAL 2 TIMES DAILY
Qty: 28 TABLET | Refills: 0 | Status: SHIPPED | OUTPATIENT
Start: 2024-11-05 | End: 2024-11-19

## 2024-11-05 NOTE — PROGRESS NOTES
"    [facility]  RESIDENT CONTINUITY CLINIC NOTE    Name: Aaliyah Angela  : 1955  MRN: [id number]  Date of Service: 2024   PCP: Angel Orozco MD          HPI:           Aaliyah Angela is a 69 y.o. female with HPI Summary:    Ms. 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 She has  a history of diabetes, hypertension, hyperlipidemia, and prior gastric sleeve surgery.      She had a recent encounter on 10/21 for cellulitis in her right lower extremity (RLE), which was treated with cephalexin for 6 days.  Reports symptoms have slightly improved but still erythematous, edematous, warm and tender to palpation. Negative for Homans sign, swelling and erythema is localized (see media).    Medical history includes diabetes mellitus (complicated by retinopathy), hypertension, hyperlipidemia, and coronary artery disease (CAD). She has also reported a family history of sickle cell disease and has had persistent borderline anemia without workup for the past 6 years.    For diabetes, shes on Mounjaro (tirzepatide), metformin, Jardiance, and occasional Humalog correction as needed, with her A1c trending downward. For hypertension, shes on amlodipine, metoprolol, and losartan, with good BP control (110/60).    Review of systems as above; all unmentioned systems are negative.     PE:     Vitals:    24 0916   BP: (!) 154/66   BP Location: Right arm   Patient Position: Sitting   Pulse: 90   SpO2: 97%   Weight: 108.8 kg (239 lb 13.8 oz)   Height: 5' 6" (1.676 m)     Body mass index is 38.71 kg/m².    Physical exam: Oropharynx is clear.  No adenopathy.  Lungs CTA.  No murmurs, gallops, or rubs.  No abdominal tenderness.  Left lower extremity appears normal , right LE with evidence of erythema , warmth, edema, tenderness to " palpation  and no fluctuance nor suspected soft tissue underlying abscess        Other pertinent data from chart that formed part of my MDM:           Past Medical History:   Diagnosis Date    Allergy     Anatomical narrow angle borderline glaucoma     AR (allergic rhinitis)     Arthritis     CAD (coronary artery disease) 04/22/2013    Non obstructive, 2012 PET     Diabetes mellitus type II     with retinopathy    Diabetic retinopathy     Epiretinal membrane, both eyes     Hyperlipidemia     Hypertensive retinopathy of both eyes     Lumbar disc disease     Migraines     Morbid obesity 10/03/2012    BRADFORD (obstructive sleep apnea) 08/04/2015    Proliferative diabetic retinopathy(362.02)     PVD (peripheral vascular disease) 04/22/2013    Carotid US 1-39% bilat 2012     Rupture of right Achilles tendon     Unspecified essential hypertension     Unspecified vitamin D deficiency     Vitreous hemorrhage of left eye      Current Outpatient Medications   Medication Instructions    acetaminophen (TYLENOL) 650 mg, Oral, Every 8 hours PRN    amitriptyline (ELAVIL) 50 mg, Oral, Nightly    amLODIPine (NORVASC) 10 mg, Oral, Daily    aspirin 81 mg, Daily    atorvastatin (LIPITOR) 20 mg, Oral, Daily    blood sugar diagnostic (ONETOUCH ULTRA TEST) Strp TO CHECK BLOOD GLUCOSE 1-2 TIMES DAILY, TO USE WITH INSURANCE PREFERRED METER    blood-glucose meter,continuous (DEXCOM G7 ) Misc Use as directed, dexcom g7 , e 11.65    blood-glucose sensor (DEXCOM G7 SENSOR) Carley Change every 10 days, dexcom g7    calcium-vitamin D3 500 mg(1,250mg) -200 unit per tablet 1 tablet, Daily    cephALEXin (KEFLEX) 500 mg, Oral, Every 8 hours    doxycycline (VIBRA-TABS) 100 mg, Oral, 2 times daily    esomeprazole (NEXIUM) 40 mg, Oral, Daily    insulin aspart, niacinamide, (FIASP FLEXTOUCH U-100 INSULIN) 100 unit/mL (3 mL) InPn Inject up to 4 x a day, if sugar is >180, 180-230+2 ,231-280+4, 281-330+6, 331-380+8, >380+10. Max daily 40 units.     JARDIANCE 25 mg, Oral    losartan (COZAAR) 100 mg, Oral, Daily    metFORMIN (GLUCOPHAGE-XR) 1,000 mg, Oral, 2 times daily with meals    metoprolol succinate (TOPROL-XL) 50 mg, Oral, Daily    MOUNJARO 12.5 mg, Subcutaneous, Every 7 days, Diagnosis e 11.65    multivitamin (THERAGRAN) per tablet 1 tablet, Daily     No current facility-administered medications for this visit.       Current Outpatient Medications:     acetaminophen (TYLENOL) 650 MG TbSR, Take 1 tablet (650 mg total) by mouth every 8 (eight) hours as needed (pain)., Disp: 30 tablet, Rfl: 0    amitriptyline (ELAVIL) 25 MG tablet, Take 2 tablets (50 mg total) by mouth every evening., Disp: 180 tablet, Rfl: 3    amLODIPine (NORVASC) 10 MG tablet, Take 1 tablet (10 mg total) by mouth once daily., Disp: 90 tablet, Rfl: 3    aspirin 81 MG Chew, Take 81 mg by mouth once daily., Disp: , Rfl:     atorvastatin (LIPITOR) 20 MG tablet, Take 1 tablet (20 mg total) by mouth once daily., Disp: 90 tablet, Rfl: 3    blood sugar diagnostic (ONETOUCH ULTRA TEST) [facility], TO CHECK BLOOD GLUCOSE 1-2 TIMES DAILY, TO USE WITH INSURANCE PREFERRED METER, Disp: 100 strip, Rfl: 7    blood-glucose meter,continuous (DEXCOM G7 ) Misc, Use as directed, dexcom g7 , e 11.65, Disp: 1 each, Rfl: 1    blood-glucose sensor (DEXCOM G7 SENSOR) Carley, Change every 10 days, dexcom g7, Disp: 3 each, Rfl: 11    calcium-vitamin D3 500 mg(1,250mg) -200 unit per tablet, Take 1 tablet by mouth once daily., Disp: , Rfl:     esomeprazole (NEXIUM) 40 MG capsule, Take 1 capsule (40 mg total) by mouth once daily., Disp: 90 capsule, Rfl: 3    insulin aspart, niacinamide, (FIASP FLEXTOUCH U-100 INSULIN) 100 unit/mL (3 mL) InPn, Inject up to 4 x a day, if sugar is >180, 180-230+2 ,231-280+4, 281-330+6, 331-380+8, >380+10. Max daily 40 units., Disp: 5 each, Rfl: 6    JARDIANCE 25 mg tablet, TAKE 1 TABLET BY MOUTH EVERY DAY, Disp: 90 tablet, Rfl: 2    losartan (COZAAR) 100 MG tablet, Take 1 tablet  (100 mg total) by mouth once daily., Disp: 90 tablet, Rfl: 3    metFORMIN (GLUCOPHAGE-XR) 500 MG ER 24hr tablet, Take 2 tablets (1,000 mg total) by mouth 2 (two) times daily with meals., Disp: 360 tablet, Rfl: 3    metoprolol succinate (TOPROL-XL) 50 MG 24 hr tablet, Take 1 tablet (50 mg total) by mouth once daily., Disp: 90 tablet, Rfl: 3    multivitamin (THERAGRAN) per tablet, Take 1 tablet by mouth once daily., Disp: , Rfl:     tirzepatide (MOUNJARO) 12.5 mg/0.5 mL PnIj, Inject 12.5 mg into the skin every 7 days. Diagnosis e 11.65, Disp: 4 Pen, Rfl: 6    cephALEXin (KEFLEX) 500 MG capsule, Take 1 capsule (500 mg total) by mouth every 8 (eight) hours. (Patient not taking: Reported on 11/5/2024), Disp: 30 capsule, Rfl: 0    doxycycline (VIBRA-TABS) 100 MG tablet, Take 1 tablet (100 mg total) by mouth 2 (two) times daily. for 14 days, Disp: 28 tablet, Rfl: 0  No current facility-administered medications for this visit.    Facility-Administered Medications Ordered in Other Visits:     0.9%  NaCl infusion, , Intravenous, Continuous, Aristeo Bueno MD, Last Rate: 200 mL/hr at 02/17/21 0754, New Bag at 01/05/24 0824    celecoxib capsule 400 mg, 400 mg, Oral, Daily, Sara Schroeder MD   Past Medical History:   Diagnosis Date    Allergy     Anatomical narrow angle borderline glaucoma     AR (allergic rhinitis)     Arthritis     CAD (coronary artery disease) 04/22/2013    Non obstructive, 2012 PET     Diabetes mellitus type II     with retinopathy    Diabetic retinopathy     Epiretinal membrane, both eyes     Hyperlipidemia     Hypertensive retinopathy of both eyes     Lumbar disc disease     Migraines     Morbid obesity 10/03/2012    BRADFORD (obstructive sleep apnea) 08/04/2015    Proliferative diabetic retinopathy(362.02)     PVD (peripheral vascular disease) 04/22/2013    Carotid US 1-39% bilat 2012     Rupture of right Achilles tendon     Unspecified essential hypertension     Unspecified vitamin D deficiency      Vitreous hemorrhage of left eye      Past Surgical History:   Procedure Laterality Date    CARPAL TUNNEL RELEASE Right 2020    Procedure: RELEASE, CARPAL TUNNEL RIGHT;  Surgeon: Tameka Bran MD;  Location: [facility];  Service: Orthopedics;  Laterality: Right;    CARPAL TUNNEL RELEASE Right 2020    CATARACT EXTRACTION W/  INTRAOCULAR LENS IMPLANT Left 10/28/2020    COMPLEX ()    CATARACT EXTRACTION W/  INTRAOCULAR LENS IMPLANT Right 2021         SECTION      x3    COLONOSCOPY      COLONOSCOPY N/A 10/08/2020    Procedure: COLONOSCOPY;  Surgeon: Tima Talley MD;  Location: [facility] (4TH FLR);  Service: Endoscopy;  Laterality: N/A;    DE QUERVAIN'S RELEASE Right 2024    Procedure: RELEASE,RIGHT HAND, FOR DEQUERVAIN'S TENOSYNOVITIS;  Surgeon: Tameka Bran MD;  Location: [facility];  Service: Orthopedics;  Laterality: Right;    ESOPHAGOGASTRODUODENOSCOPY N/A 10/08/2020    Procedure: EGD (ESOPHAGOGASTRODUODENOSCOPY);  Surgeon: Tima Talley MD;  Location: [facility] (4TH FLR);  Service: Endoscopy;  Laterality: N/A;    GASTRIC BYPASS  2015    Sleeve    INTRAOCULAR PROSTHESES INSERTION Left 10/28/2020    Procedure: INSERTION, IOL PROSTHESIS;  Surgeon: Alma Goodrich MD;  Location: [facility] OR 1ST FLR;  Service: Ophthalmology;  Laterality: Left;    INTRAOCULAR PROSTHESES INSERTION Right 2021    Procedure: INSERTION, IOL PROSTHESIS;  Surgeon: Alma Goodrich MD;  [facility];  Service: Ophthalmology;  Laterality: Right;    LASER PERIPHERAL IRIDOTOMY Bilateral 2017 and 2017        PANRETINAL PHOTOCOAGULATION      Both eyes    PHACOEMULSIFICATION OF CATARACT Left 10/28/2020    Procedure: PHACOEMULSIFICATION, CATARACT;  Surgeon: Alma Goodrich MD;  Location: [facility] OR 1ST FLR;  Service: Ophthalmology;  Laterality: Left;    PHACOEMULSIFICATION OF CATARACT Right 2021    Procedure: PHACOEMULSIFICATION, CATARACT;   Surgeon: Alma Goodrich MD;  Location: [address];  Service: Ophthalmology;  Laterality: Right;    TRIGGER FINGER RELEASE Right 01/05/2024    Procedure: RELEASE, TRIGGER FINGER, RIGHT RING AND LONG FINGER;  Surgeon: Tameka Bran MD;  Location: [facility] OR;  Service: Orthopedics;  Laterality: Right;    TUBAL LIGATION      YAG CAPSULOTOMY Left 03/14/2024         Family History   Problem Relation Name Age of Onset    Diabetes Mother      Hypertension Mother      Diabetes Father      Hypertension Father      Stroke Father      Diabetes Sister      Diabetes Brother      Cancer Brother          prostate    Prostate cancer Brother      Diabetes Sister      Diabetes Sister      Diabetes Sister      Diabetes Brother      Stroke Brother      Diabetes Brother      Diabetes Brother      Breast cancer Neg Hx      Colon cancer Neg Hx      Ovarian cancer Neg Hx      Amblyopia Neg Hx      Blindness Neg Hx      Cataracts Neg Hx      Glaucoma Neg Hx      Macular degeneration Neg Hx      Retinal detachment Neg Hx      Strabismus Neg Hx      Calcium disorder Neg Hx      Osteoporosis Neg Hx           Labs: Previous labs reviewed.  Lab Results   Component Value Date    WBC 5.04 05/30/2024    HGB 11.4 (L) 05/30/2024    HCT 36.9 (L) 05/30/2024    MCV 95 05/30/2024     05/30/2024           Assessment and Plan:       Type 2 diabetes mellitus with hyperglycemia, with long-term current use of insulin  A1c trending downward on Mounjaro, metformin, and Jardiance. No recent need for Humalog correction. Does not have dexcom 7 as insurance changed, able to get back on mounjaro, no significant weight loss though , will consider increase dose  for weight loss next visit   Plan:    Continue current diabetes regimen.    Monitor A1c every 3-6 months and adjust medications if A1c trends towards hypoglycemia or further decrease is needed.      Cellulitis of right lower extremity     Ms. Angela was treated with  cephalexin for cellulitis in the RLE. Symptomps still presentete 7 days of cephalexin started om 10/21     Plan:       doxycycline (VIBRA-TABS) 100 MG tablet; Take 1 tablet (100 mg total) by mouth 2 (two) times daily. for 14 days  Dispense: 28 tablet; Refill: 0  - will follow up in 2 weeks        History of vitamin D deficiency  -     Calcitriol; Future; Expected date: 11/05/2024    Anemia, unspecified type    Persistent borderline anemia noted, with no anemia workup in the past 6 years. Given the family history of sickle cell disease and chronicity of anemia, further investigation is warranted.  -     Ferritin; Future; Expected date: 11/05/2024  -     Iron and TIBC; Future; Expected date: 11/05/2024  -     Vitamin B12; Future; Expected date: 11/05/2024  -     Folate; Future; Expected date: 11/05/2024  -     CBC W/ AUTO DIFFERENTIAL; Future; Expected date: 11/05/2024        RTC in 2 weeks      Discussed with Dr. Fercho Desir, further recommendations as per attending addendum. Please feel free to contact me with any questions or concerns.    Timi Orozco MD  Internal Medicine , PGY-3    [facility]  [address] [place] [id number]  [phone]

## 2024-11-05 NOTE — PROGRESS NOTES
[facility]  RESIDENT CONTINUITY CLINIC NOTE    Name: Aaliyah Angela  : 1955  MRN: [id number]  Date of Service: 2024   PCP: Angel Orozco MD          HPI:           Aaliyah Angela is a 69 y.o. 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 Morales who . Referred to me by LIVIER Wong with diabetes clinic with whom she follows regularly.       Saw primary care 10/21 for cellulitis after redness in rle, treated with cep[halexin   ...    She has no particular concerns today, only complains of occasional heartburn which happens after heavy meals and responds to PPIs  Her medical hx is notable for gastric sleeve several years ago, DM with ophthalmologic complications , CAD, HLD, HTN etc.     For her DM started  on  Mounjaro, she was on ozempic, metformin 1000 mg bid, jardiance 25 mg daily, humalog correction scale , has not used humalog in a while given proper control , A1c trending down    Constipation colace , recommending psyllium husk     For her hypertension she is on amlodipine, metoprolol, losartan, chekin bp at home normotensitve bp repeat 110/60      tsh back in may normal     Labs show persisten anemia borderline hgb , has not have anemia work up in the last 6 years. There is family hx of sickle cell disease.  No hx of cardiovascular events , recommended d/c aspirin        AMAENABLE FOR RSV vaccine  Other pertinent data from chart that formed part of my MDM:           Past Medical History:   Diagnosis Date    Allergy     Anatomical narrow angle borderline glaucoma     AR (allergic rhinitis)     Arthritis     CAD (coronary artery disease) 2013    Non obstructive,  PET     Diabetes mellitus type II     with retinopathy    Diabetic retinopathy     Epiretinal membrane, both eyes     Hyperlipidemia     Hypertensive retinopathy of  both eyes     Lumbar disc disease     Migraines     Morbid obesity 10/03/2012    BRADFORD (obstructive sleep apnea) 08/04/2015    Proliferative diabetic retinopathy(362.02)     PVD (peripheral vascular disease) 04/22/2013    Carotid US 1-39% bilat 2012     Rupture of right Achilles tendon     Unspecified essential hypertension     Unspecified vitamin D deficiency     Vitreous hemorrhage of left eye      Current Outpatient Medications   Medication Instructions    acetaminophen (TYLENOL) 650 mg, Oral, Every 8 hours PRN    amitriptyline (ELAVIL) 50 mg, Oral, Nightly    amLODIPine (NORVASC) 10 mg, Oral, Daily    aspirin 81 mg, Daily    atorvastatin (LIPITOR) 20 mg, Oral, Daily    blood sugar diagnostic (ONETOUCH ULTRA TEST) Strp TO CHECK BLOOD GLUCOSE 1-2 TIMES DAILY, TO USE WITH INSURANCE PREFERRED METER    blood-glucose meter,continuous (DEXCOM G7 ) Misc Use as directed, dexcom g7 , e 11.65    blood-glucose sensor (DEXCOM G7 SENSOR) Carley Change every 10 days, dexcom g7    calcium-vitamin D3 500 mg(1,250mg) -200 unit per tablet 1 tablet, Daily    cephALEXin (KEFLEX) 500 mg, Oral, Every 8 hours    esomeprazole (NEXIUM) 40 mg, Oral, Daily    insulin aspart, niacinamide, (FIASP FLEXTOUCH U-100 INSULIN) 100 unit/mL (3 mL) InPn Inject up to 4 x a day, if sugar is >180, 180-230+2 ,231-280+4, 281-330+6, 331-380+8, >380+10. Max daily 40 units.    JARDIANCE 25 mg, Oral    losartan (COZAAR) 100 mg, Oral, Daily    metFORMIN (GLUCOPHAGE-XR) 1,000 mg, Oral, 2 times daily with meals    metoprolol succinate (TOPROL-XL) 50 mg, Oral, Daily    MOUNJARO 12.5 mg, Subcutaneous, Every 7 days, Diagnosis e 11.65    multivitamin (THERAGRAN) per tablet 1 tablet, Daily     No current facility-administered medications for this visit.       Current Outpatient Medications:     acetaminophen (TYLENOL) 650 MG TbSR, Take 1 tablet (650 mg total) by mouth every 8 (eight) hours as needed (pain)., Disp: 30 tablet, Rfl: 0    amitriptyline  (ELAVIL) 25 MG tablet, Take 2 tablets (50 mg total) by mouth every evening., Disp: 180 tablet, Rfl: 3    amLODIPine (NORVASC) 10 MG tablet, Take 1 tablet (10 mg total) by mouth once daily., Disp: 90 tablet, Rfl: 3    aspirin 81 MG Chew, Take 81 mg by mouth once daily., Disp: , Rfl:     atorvastatin (LIPITOR) 20 MG tablet, Take 1 tablet (20 mg total) by mouth once daily., Disp: 90 tablet, Rfl: 3    blood sugar diagnostic (ONETOUCH ULTRA TEST) [facility], TO CHECK BLOOD GLUCOSE 1-2 TIMES DAILY, TO USE WITH INSURANCE PREFERRED METER, Disp: 100 strip, Rfl: 7    blood-glucose meter,continuous (DEXCOM G7 ) Misc, Use as directed, dexcom g7 , e 11.65, Disp: 1 each, Rfl: 1    blood-glucose sensor (DEXCOM G7 SENSOR) Carley, Change every 10 days, dexcom g7, Disp: 3 each, Rfl: 11    calcium-vitamin D3 500 mg(1,250mg) -200 unit per tablet, Take 1 tablet by mouth once daily., Disp: , Rfl:     esomeprazole (NEXIUM) 40 MG capsule, Take 1 capsule (40 mg total) by mouth once daily., Disp: 90 capsule, Rfl: 3    insulin aspart, niacinamide, (FIASP FLEXTOUCH U-100 INSULIN) 100 unit/mL (3 mL) InPn, Inject up to 4 x a day, if sugar is >180, 180-230+2 ,231-280+4, 281-330+6, 331-380+8, >380+10. Max daily 40 units., Disp: 5 each, Rfl: 6    JARDIANCE 25 mg tablet, TAKE 1 TABLET BY MOUTH EVERY DAY, Disp: 90 tablet, Rfl: 2    losartan (COZAAR) 100 MG tablet, Take 1 tablet (100 mg total) by mouth once daily., Disp: 90 tablet, Rfl: 3    metFORMIN (GLUCOPHAGE-XR) 500 MG ER 24hr tablet, Take 2 tablets (1,000 mg total) by mouth 2 (two) times daily with meals., Disp: 360 tablet, Rfl: 3    metoprolol succinate (TOPROL-XL) 50 MG 24 hr tablet, Take 1 tablet (50 mg total) by mouth once daily., Disp: 90 tablet, Rfl: 3    multivitamin (THERAGRAN) per tablet, Take 1 tablet by mouth once daily., Disp: , Rfl:     tirzepatide (MOUNJARO) 12.5 mg/0.5 mL PnIj, Inject 12.5 mg into the skin every 7 days. Diagnosis e 11.65, Disp: 4 Pen, Rfl: 6    cephALEXin  (KEFLEX) 500 MG capsule, Take 1 capsule (500 mg total) by mouth every 8 (eight) hours. (Patient not taking: Reported on 2024), Disp: 30 capsule, Rfl: 0  No current facility-administered medications for this visit.    Facility-Administered Medications Ordered in Other Visits:     0.9%  NaCl infusion, , Intravenous, Continuous, Aristeo Bueno MD, Last Rate: 200 mL/hr at 21 0754, New Bag at 24 0824    celecoxib capsule 400 mg, 400 mg, Oral, Daily, Sara Schroeder MD   Past Medical History:   Diagnosis Date    Allergy     Anatomical narrow angle borderline glaucoma     AR (allergic rhinitis)     Arthritis     CAD (coronary artery disease) 2013    Non obstructive,  PET     Diabetes mellitus type II     with retinopathy    Diabetic retinopathy     Epiretinal membrane, both eyes     Hyperlipidemia     Hypertensive retinopathy of both eyes     Lumbar disc disease     Migraines     Morbid obesity 10/03/2012    BRADFORD (obstructive sleep apnea) 2015    Proliferative diabetic retinopathy(362.02)     PVD (peripheral vascular disease) 2013    Carotid US 1-39% bilat      Rupture of right Achilles tendon     Unspecified essential hypertension     Unspecified vitamin D deficiency     Vitreous hemorrhage of left eye      Past Surgical History:   Procedure Laterality Date    CARPAL TUNNEL RELEASE Right 2020    Procedure: RELEASE, CARPAL TUNNEL RIGHT;  Surgeon: Tameka Bran MD;  Location: [facility];  Service: Orthopedics;  Laterality: Right;    CARPAL TUNNEL RELEASE Right 2020    CATARACT EXTRACTION W/  INTRAOCULAR LENS IMPLANT Left 10/28/2020    COMPLEX ()    CATARACT EXTRACTION W/  INTRAOCULAR LENS IMPLANT Right 2021         SECTION      x3    COLONOSCOPY      COLONOSCOPY N/A 10/08/2020    Procedure: COLONOSCOPY;  Surgeon: Tima Talley MD;  Location: [facility] ([address] [place] [address]);  Service: Endoscopy;  Laterality: N/A;    DE QUERVAIN'S  RELEASE Right 01/05/2024    Procedure: RELEASE,RIGHT HAND, FOR DEQUERVAIN'S TENOSYNOVITIS;  Surgeon: Tameka Bran MD;  Location: [facility] OR;  Service: Orthopedics;  Laterality: Right;    ESOPHAGOGASTRODUODENOSCOPY N/A 10/08/2020    Procedure: EGD (ESOPHAGOGASTRODUODENOSCOPY);  Surgeon: Tima Talley MD;  Location: [facility] ENDO (4TH FLR);  Service: Endoscopy;  Laterality: N/A;    GASTRIC BYPASS  2015    Sleeve    INTRAOCULAR PROSTHESES INSERTION Left 10/28/2020    Procedure: INSERTION, IOL PROSTHESIS;  Surgeon: Alma Goodrich MD;  Location: [facility] OR 1ST FLR;  Service: Ophthalmology;  Laterality: Left;    INTRAOCULAR PROSTHESES INSERTION Right 02/17/2021    Procedure: INSERTION, IOL PROSTHESIS;  Surgeon: Alma Goodrich MD;  Location: [facility] OR 1ST FLR;  Service: Ophthalmology;  Laterality: Right;    LASER PERIPHERAL IRIDOTOMY Bilateral 1/26/2017 and 2/9/2017        PANRETINAL PHOTOCOAGULATION      Both eyes    PHACOEMULSIFICATION OF CATARACT Left 10/28/2020    Procedure: PHACOEMULSIFICATION, CATARACT;  Surgeon: Alma Goodrich MD;  Location: [facility] OR 1ST FLR;  Service: Ophthalmology;  Laterality: Left;    PHACOEMULSIFICATION OF CATARACT Right 02/17/2021    Procedure: PHACOEMULSIFICATION, CATARACT;  Surgeon: Alma Goodrich MD;  Location: [facility] OR 1ST FLR;  Service: Ophthalmology;  Laterality: Right;    TRIGGER FINGER RELEASE Right 01/05/2024    Procedure: RELEASE, TRIGGER FINGER, RIGHT RING AND LONG FINGER;  Surgeon: Tameka Bran MD;  Location: [facility] OR;  Service: Orthopedics;  Laterality: Right;    TUBAL LIGATION      YAG CAPSULOTOMY Left 03/14/2024         Family History   Problem Relation Name Age of Onset    Diabetes Mother      Hypertension Mother      Diabetes Father      Hypertension Father      Stroke Father      Diabetes Sister      Diabetes Brother      Cancer Brother          prostate    Prostate cancer Brother      Diabetes Sister      Diabetes Sister       Diabetes Sister      Diabetes Brother      Stroke Brother      Diabetes Brother      Diabetes Brother      Breast cancer Neg Hx      Colon cancer Neg Hx      Ovarian cancer Neg Hx      Amblyopia Neg Hx      Blindness Neg Hx      Cataracts Neg Hx      Glaucoma Neg Hx      Macular degeneration Neg Hx      Retinal detachment Neg Hx      Strabismus Neg Hx      Calcium disorder Neg Hx      Osteoporosis Neg Hx       Social History     Socioeconomic History    Marital status: Single   Tobacco Use    Smoking status: Never    Smokeless tobacco: Never   Substance and Sexual Activity    Alcohol use: No    Drug use: No    Sexual activity: Not Currently     Birth control/protection: Post-menopausal   Social History Narrative    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[place]    Single, lives alone     Social Drivers of Health     Financial Resource Strain: Medium Risk (10/21/2024)    Overall Financial Resource Strain (CARDIA)     Difficulty of Paying Living Expenses: Somewhat hard   Food Insecurity: Food Insecurity Present (10/21/2024)    Hunger Vital Sign     Worried About Running Out of Food in the Last Year: Sometimes true     Ran Out of Food in the Last Year: Sometimes true   Physical Activity: Sufficiently Active (10/21/2024)    Exercise Vital Sign     Days of Exercise per Week: 7 days     Minutes of Exercise per Session: 30 min   Stress: No Stress Concern Present (10/21/2024)    [employer] - Occupational Stress Questionnaire     Feeling of Stress : Not at all   Housing Stability: Unknown (10/21/2024)    Housing Stability Vital Sign     Unable to Pay for Housing in the Last Year: No       Labs: Previous labs reviewed.  Lab Results   Component Value Date    WBC 5.04 05/30/2024    HGB 11.4 (L) 05/30/2024    HCT 36.9 (L) 05/30/2024    MCV 95 05/30/2024     05/30/2024         Lab Results   Component Value Date     04/08/2023    K 4.6 04/08/2023     04/08/2023    CO2 26 04/08/2023    GLU 95 04/08/2023     "BUN 14 04/08/2023    CREATININE 0.9 04/08/2023    CALCIUM 9.5 04/08/2023    PROT 7.5 02/06/2023    ALBUMIN 3.8 02/06/2023    BILITOT 0.2 02/06/2023    ALKPHOS 70 02/06/2023    AST 16 02/06/2023    ALT 13 02/06/2023    ANIONGAP 9 04/08/2023    EGFRNORACEVR >60.0 04/08/2023       Imaging: No results found in the last 24 hours.    Social:    Psych/Mood    Cardiovascular    Results for orders placed or performed in visit on 08/08/22   IN OFFICE EKG 12-LEAD (to [place])    Collection Time: 08/08/22  3:02 PM    Narrative    Test Reason : R00.0,    Vent. Rate : 105 BPM     Atrial Rate : 105 BPM     P-R Int : 164 ms          QRS Dur : 086 ms      QT Int : 308 ms       P-R-T Axes : 060 026 081 degrees     QTc Int : 407 ms    Sinus tachycardia  Nonspecific T wave abnormality  Abnormal ECG  When compared with ECG of 17-AUG-2015 21:54,  Nonspecific T wave abnormality now evident in Lateral leads  Confirmed by Paulie Thornton MD ([id number]) on 8/8/2022 4:17:52 PM    Referred By: MARY MCKINLEY           Confirmed By:Paulie Thornton MD     No results found for this or any previous visit.      Renal:  Lab Results   Component Value Date     04/08/2023    K 4.6 04/08/2023     04/08/2023    CO2 26 04/08/2023    GLU 95 04/08/2023    BUN 14 04/08/2023    CREATININE 0.9 04/08/2023    CALCIUM 9.5 04/08/2023    PROT 7.5 02/06/2023    ALBUMIN 3.8 02/06/2023    BILITOT 0.2 02/06/2023    ALKPHOS 70 02/06/2023    AST 16 02/06/2023    ALT 13 02/06/2023    ANIONGAP 9 04/08/2023    EGFRNORACEVR >60.0 04/08/2023         Metabolic :  .  Hematology:      Infectious  @[id number](wbc:3)@  Antibiotics (72h ago, onward)      None          No results found for: "LABBLOO"  Lab Results   Component Value Date    LABURIN No significant growth 12/13/2022    LABURIN ESCHERICHIA COLI  >100,000 cfu/ml   10/16/2015    LABURIN NO GROWTH - FINAL REPORT. 06/15/2011    LABURIN  02/25/2011     MULTIPLE ORGANISMS ISOLATED. NONE IN PREDOMINANCE  REPEAT IF CLINICALLY " NECESSARY.    LABURIN  07/02/2010     50,000 ORGANISMS/ML -ESCHERICHIA COLI  Amikacin             <=16       SENSITIVE     Amox/K Clav          <=8/4      SENSITIVE     Ceftriaxone          >32        RESISTANT     Ceftazidime          <=1        RESISTANT     Cefazolin            >16        RESISTANT     Ciprofloxacin        <=1        SENSITIVE     Nitrofurantoin       <=32       SENSITIVE     Gentamicin           >8         RESISTANT     Bactrim              >2/38      RESISTANT     Tetracycline         >8         RESISTANT     Timentin             <=16       SENSITIVE     Tobramycin           8          INTERMEDIATE      LABURIN ESBL PRODUCING ORGANISM 07/02/2010    LABURIN NO OTHER SIGNIFICANT ISOLATE. 07/02/2010                     Assessment and Plan:       There are no diagnoses linked to this encounter.    Preventative Health:     RTC in ***      Discussed with  ***, further recommendations as per attending addendum. Please feel free to contact me with any questions or concerns.    Timi Orozco MD  Internal Medicine , PGY-3    [facility]  [address] [place] [phone]  [phone]

## 2024-11-12 ENCOUNTER — TELEPHONE (OUTPATIENT)
Dept: OPTOMETRY | Facility: CLINIC | Age: 69
End: 2024-11-12
Payer: MEDICARE

## 2024-11-13 ENCOUNTER — OFFICE VISIT (OUTPATIENT)
Dept: OPTOMETRY | Facility: CLINIC | Age: 69
End: 2024-11-13
Payer: COMMERCIAL

## 2024-11-13 DIAGNOSIS — H52.4 PRESBYOPIA: Primary | ICD-10-CM

## 2024-11-13 DIAGNOSIS — Z96.1 PSEUDOPHAKIA OF BOTH EYES: ICD-10-CM

## 2024-11-13 DIAGNOSIS — Z98.890 HISTORY OF BILATERAL YAG LASER IRIDOTOMY: ICD-10-CM

## 2024-11-13 DIAGNOSIS — H40.033 ANATOMICAL NARROW ANGLE BORDERLINE GLAUCOMA OF BOTH EYES: ICD-10-CM

## 2024-11-13 PROCEDURE — 92015 DETERMINE REFRACTIVE STATE: CPT | Mod: S$GLB,,, | Performed by: OPTOMETRIST

## 2024-11-13 PROCEDURE — 99999 PR PBB SHADOW E&M-EST. PATIENT-LVL II: CPT | Mod: PBBFAC,,, | Performed by: OPTOMETRIST

## 2024-11-13 PROCEDURE — 92012 INTRM OPH EXAM EST PATIENT: CPT | Mod: S$GLB,,, | Performed by: OPTOMETRIST

## 2024-11-13 NOTE — PROGRESS NOTES
RICH    NEREIDA: 6/7/2023 - Dr. Naidu     CC: Pt is here today for a routine eye exam. Pt states that she lost her   glasses and needs updated rx. Pt states that had no complaints about her   vision in her glasses.     (-)Dryness  (-)Burning  (-)Itchiness  (-)Tearing  (+)Flashes  (-)Floaters   (+)Photophobia  (-)Eye Pain      Diabetic: yes  A1C: 6.8 on 9/28/2024    Contact Lens: no    Eye Meds: no    PD:68.0    Last edited by Lola Hernandez MA on 11/13/2024  8:08 AM.            Assessment /Plan     For exam results, see Encounter Report.    Presbyopia   Rx specs    Anatomical narrow angle borderline glaucoma of both eyes  History of bilateral YAG laser iridotomy  Pseudophakia of both eyes      Return to [place] [facility] [place] as scheduled

## 2024-11-16 ENCOUNTER — LAB VISIT (OUTPATIENT)
Dept: LAB | Facility: HOSPITAL | Age: 69
End: 2024-11-16
Payer: MEDICARE

## 2024-11-16 DIAGNOSIS — Z86.39 HISTORY OF VITAMIN D DEFICIENCY: ICD-10-CM

## 2024-11-16 DIAGNOSIS — D64.9 ANEMIA, UNSPECIFIED TYPE: ICD-10-CM

## 2024-11-16 LAB
BASOPHILS # BLD AUTO: 0.05 K/UL (ref 0–0.2)
BASOPHILS NFR BLD: 1.1 % (ref 0–1.9)
DIFFERENTIAL METHOD BLD: ABNORMAL
EOSINOPHIL # BLD AUTO: 0.1 K/UL (ref 0–0.5)
EOSINOPHIL NFR BLD: 2.7 % (ref 0–8)
ERYTHROCYTE [DISTWIDTH] IN BLOOD BY AUTOMATED COUNT: 13.6 % (ref 11.5–14.5)
FERRITIN SERPL-MCNC: 18 NG/ML (ref 20–300)
FOLATE SERPL-MCNC: 16.7 NG/ML (ref 4–24)
HCT VFR BLD AUTO: 35.4 % (ref 37–48.5)
HGB BLD-MCNC: 11 G/DL (ref 12–16)
IMM GRANULOCYTES # BLD AUTO: 0.01 K/UL (ref 0–0.04)
IMM GRANULOCYTES NFR BLD AUTO: 0.2 % (ref 0–0.5)
IRON SERPL-MCNC: 73 UG/DL (ref 30–160)
LYMPHOCYTES # BLD AUTO: 2.2 K/UL (ref 1–4.8)
LYMPHOCYTES NFR BLD: 48.5 % (ref 18–48)
MCH RBC QN AUTO: 29.2 PG (ref 27–31)
MCHC RBC AUTO-ENTMCNC: 31.1 G/DL (ref 32–36)
MCV RBC AUTO: 94 FL (ref 82–98)
MONOCYTES # BLD AUTO: 0.5 K/UL (ref 0.3–1)
MONOCYTES NFR BLD: 10.8 % (ref 4–15)
NEUTROPHILS # BLD AUTO: 1.6 K/UL (ref 1.8–7.7)
NEUTROPHILS NFR BLD: 36.7 % (ref 38–73)
NRBC BLD-RTO: 0 /100 WBC
PLATELET # BLD AUTO: 295 K/UL (ref 150–450)
PMV BLD AUTO: 10.5 FL (ref 9.2–12.9)
RBC # BLD AUTO: 3.77 M/UL (ref 4–5.4)
SATURATED IRON: 20 % (ref 20–50)
TOTAL IRON BINDING CAPACITY: 363 UG/DL (ref 250–450)
TRANSFERRIN SERPL-MCNC: 245 MG/DL (ref 200–375)
VIT B12 SERPL-MCNC: 461 PG/ML (ref 210–950)
WBC # BLD AUTO: 4.45 K/UL (ref 3.9–12.7)

## 2024-11-16 PROCEDURE — 85025 COMPLETE CBC W/AUTO DIFF WBC: CPT

## 2024-11-16 PROCEDURE — 83540 ASSAY OF IRON: CPT

## 2024-11-16 PROCEDURE — 82728 ASSAY OF FERRITIN: CPT

## 2024-11-16 PROCEDURE — 36415 COLL VENOUS BLD VENIPUNCTURE: CPT

## 2024-11-16 PROCEDURE — 82607 VITAMIN B-12: CPT

## 2024-11-16 PROCEDURE — 82746 ASSAY OF FOLIC ACID SERUM: CPT

## 2024-11-16 PROCEDURE — 82652 VIT D 1 25-DIHYDROXY: CPT

## 2024-11-19 ENCOUNTER — OFFICE VISIT (OUTPATIENT)
Dept: INTERNAL MEDICINE | Facility: CLINIC | Age: 69
End: 2024-11-19
Payer: COMMERCIAL

## 2024-11-19 ENCOUNTER — TELEPHONE (OUTPATIENT)
Dept: INTERNAL MEDICINE | Facility: CLINIC | Age: 69
End: 2024-11-19

## 2024-11-19 VITALS
SYSTOLIC BLOOD PRESSURE: 140 MMHG | DIASTOLIC BLOOD PRESSURE: 80 MMHG | HEART RATE: 78 BPM | WEIGHT: 238.13 LBS | BODY MASS INDEX: 38.43 KG/M2 | OXYGEN SATURATION: 98 %

## 2024-11-19 DIAGNOSIS — E66.812 OBESITY, CLASS II, BMI 35-39.9: Primary | ICD-10-CM

## 2024-11-19 DIAGNOSIS — L03.115 CELLULITIS OF RIGHT LOWER EXTREMITY: ICD-10-CM

## 2024-11-19 DIAGNOSIS — E11.3593 TYPE 2 DIABETES MELLITUS WITH BOTH EYES AFFECTED BY PROLIFERATIVE RETINOPATHY WITHOUT MACULAR EDEMA, WITHOUT LONG-TERM CURRENT USE OF INSULIN: ICD-10-CM

## 2024-11-19 DIAGNOSIS — K21.9 GASTROESOPHAGEAL REFLUX DISEASE, UNSPECIFIED WHETHER ESOPHAGITIS PRESENT: ICD-10-CM

## 2024-11-19 DIAGNOSIS — D64.9 ANEMIA, UNSPECIFIED TYPE: ICD-10-CM

## 2024-11-19 PROCEDURE — 99999 PR PBB SHADOW E&M-EST. PATIENT-LVL III: CPT | Mod: PBBFAC,,,

## 2024-11-19 RX ORDER — BLOOD-GLUCOSE SENSOR
EACH MISCELLANEOUS
Qty: 3 EACH | Refills: 11 | Status: SHIPPED | OUTPATIENT
Start: 2024-11-19

## 2024-11-19 RX ORDER — TIRZEPATIDE 15 MG/.5ML
15 INJECTION, SOLUTION SUBCUTANEOUS
Qty: 4 PEN | Refills: 5 | Status: SHIPPED | OUTPATIENT
Start: 2024-11-19

## 2024-11-19 RX ORDER — ESOMEPRAZOLE MAGNESIUM 40 MG/1
40 CAPSULE, DELAYED RELEASE ORAL DAILY
Qty: 90 CAPSULE | Refills: 3 | Status: SHIPPED | OUTPATIENT
Start: 2024-11-19

## 2024-11-19 NOTE — PROGRESS NOTES
[facility]  RESIDENT CONTINUITY CLINIC NOTE    Name: Aaliyah Angela  : 1955  MRN: [id number]  Date of Service: 2024   PCP: Angel Orozco MD          HPI:         Ms. 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 She has  a history of diabetes, hypertension, hyperlipidemia, and prior gastric sleeve surgery.     She is here for follow up after recurrent episode of cellulitis of right leg, she is completed doxy prescribed 2 weeks ago, reports no tenderness and resolution of edema and erythema.     Plan was also to discuss labs in the setting of anemia, fairly unremarkable, hgb 11, ferritin is low , has not been taking iron , will resume oral supplement.    Requests dexcom refilled and we had discussed increasing mounjaro to max dose to aid in weightloss also., emailed Ms Maribell Noble and prescription was sent           LABS:    CBC:  Lab Results   Component Value Date    WBC 4.45 2024    HGB 11.0 (L) 2024    HCT 35.4 (L) 2024    MCV 94 2024     2024           CMP:  Lab Results   Component Value Date     2023    K 4.6 2023     2023    CO2 26 2023    GLU 95 2023    BUN 14 2023    CREATININE 0.9 2023    CALCIUM 9.5 2023    PROT 7.5 2023    ALBUMIN 3.8 2023    BILITOT 0.2 2023    ALKPHOS 70 2023    AST 16 2023    ALT 13 2023    ANIONGAP 9 2023    EGFRNORACEVR >60.0 2023       LIPIDS:  Lab Results   Component Value Date    LDLCALC 53.6 (L) 2024    LDLCALC 52.8 (L) 2024    LDLCALC 54.0 (L) 2023    HDL 49 2024    HDL 47 2024    HDL 49 2023    TRIG 42 2024    TRIG 61 2024    TRIG 80 2023    CHOL 111 (L) 2024    CHOL  112 (L) 05/30/2024    CHOL 119 (L) 02/06/2023       A1C:  Lab Results   Component Value Date    HGBA1C 6.8 (H) 09/28/2024    HGBA1C 7.3 (H) 05/30/2024    HGBA1C 7.5 (H) 01/27/2024       Thyroid:  Lab Results   Component Value Date    TSH 1.683 05/30/2024     Labs: Previous labs reviewed.    Imaging:   No results found in the last 24 hours.    EKG/ECHO  Results for orders placed or performed in visit on 08/08/22   IN OFFICE EKG 12-LEAD (to [employer])    Collection Time: 08/08/22  3:02 PM    Narrative    Test Reason : R00.0,    Vent. Rate : 105 BPM     Atrial Rate : 105 BPM     P-R Int : 164 ms          QRS Dur : 086 ms      QT Int : 308 ms       P-R-T Axes : 060 026 081 degrees     QTc Int : 407 ms    Sinus tachycardia  Nonspecific T wave abnormality  Abnormal ECG  When compared with ECG of 17-AUG-2015 21:54,  Nonspecific T wave abnormality now evident in Lateral leads  Confirmed by Paulie Thornton MD ([id number]) on 8/8/2022 4:17:52 PM    Referred By: MARY MCKINLEY           Confirmed By:Paulie Thornton MD     No results found for this or any previous visit.      Review of systems as above; all unmentioned systems are negative.     PE:         Oropharynx is clear.  No adenopathy.  Lungs CTA.  No murmurs, gallops, or rubs.  No abdominal tenderness.  Left lower extremity appears normal , right LE without evidence of erythema , warmth, edema, tenderness to palpation  and no fluctuance nor suspected soft tissue underlying abscess. Significant improvement, no tenderness to palpation.      Other pertinent data from chart that formed part of my MDM:           Past Medical History:   Diagnosis Date    Allergy     Anatomical narrow angle borderline glaucoma     AR (allergic rhinitis)     Arthritis     CAD (coronary artery disease) 04/22/2013    Non obstructive, 2012 PET     Diabetes mellitus type II     with retinopathy    Diabetic retinopathy     Epiretinal membrane, both eyes     Hyperlipidemia     Hypertensive retinopathy of both  eyes     Lumbar disc disease     Migraines     Morbid obesity 10/03/2012    BRADFORD (obstructive sleep apnea) 08/04/2015    Proliferative diabetic retinopathy(362.02)     PVD (peripheral vascular disease) 04/22/2013    Carotid US 1-39% bilat 2012     Rupture of right Achilles tendon     Unspecified essential hypertension     Unspecified vitamin D deficiency     Vitreous hemorrhage of left eye      Current Outpatient Medications   Medication Instructions    acetaminophen (TYLENOL) 650 mg, Oral, Every 8 hours PRN    amitriptyline (ELAVIL) 50 mg, Oral, Nightly    amLODIPine (NORVASC) 10 mg, Oral, Daily    aspirin 81 mg, Daily    atorvastatin (LIPITOR) 20 mg, Oral, Daily    blood sugar diagnostic (ONETOUCH ULTRA TEST) Strp TO CHECK BLOOD GLUCOSE 1-2 TIMES DAILY, TO USE WITH INSURANCE PREFERRED METER    blood-glucose meter,continuous (DEXCOM G7 ) Misc Use as directed, dexcom g7 , e 11.65    blood-glucose sensor (DEXCOM G7 SENSOR) Carley Change every 10 days, dexcom g7    calcium-vitamin D3 500 mg(1,250mg) -200 unit per tablet 1 tablet, Daily    doxycycline (VIBRA-TABS) 100 mg, Oral, 2 times daily    esomeprazole (NEXIUM) 40 mg, Oral, Daily    insulin aspart, niacinamide, (FIASP FLEXTOUCH U-100 INSULIN) 100 unit/mL (3 mL) InPn Inject up to 4 x a day, if sugar is >180, 180-230+2 ,231-280+4, 281-330+6, 331-380+8, >380+10. Max daily 40 units.    JARDIANCE 25 mg, Oral    losartan (COZAAR) 100 mg, Oral, Daily    metFORMIN (GLUCOPHAGE-XR) 1,000 mg, Oral, 2 times daily with meals    metoprolol succinate (TOPROL-XL) 50 mg, Oral, Daily    MOUNJARO 15 mg, Subcutaneous, Every 7 days, Diagnosis code e 11.65    multivitamin (THERAGRAN) per tablet 1 tablet, Daily     No current facility-administered medications for this visit.     Past Medical History:   Diagnosis Date    Allergy     Anatomical narrow angle borderline glaucoma     AR (allergic rhinitis)     Arthritis     CAD (coronary artery disease) 04/22/2013    Non  obstructive, 2012 PET     Diabetes mellitus type II     with retinopathy    Diabetic retinopathy     Epiretinal membrane, both eyes     Hyperlipidemia     Hypertensive retinopathy of both eyes     Lumbar disc disease     Migraines     Morbid obesity 10/03/2012    BRADFORD (obstructive sleep apnea) 2015    Proliferative diabetic retinopathy(362.02)     PVD (peripheral vascular disease) 2013    Carotid US 1-39% bilat      Rupture of right Achilles tendon     Unspecified essential hypertension     Unspecified vitamin D deficiency     Vitreous hemorrhage of left eye      Past Surgical History:   Procedure Laterality Date    CARPAL TUNNEL RELEASE Right 2020    Procedure: RELEASE, CARPAL TUNNEL RIGHT;  Surgeon: Tameka Bran MD;  Location: [facility];  Service: Orthopedics;  Laterality: Right;    CARPAL TUNNEL RELEASE Right 2020    CATARACT EXTRACTION W/  INTRAOCULAR LENS IMPLANT Left 10/28/2020    COMPLEX ()    CATARACT EXTRACTION W/  INTRAOCULAR LENS IMPLANT Right 2021         SECTION      x3    COLONOSCOPY      COLONOSCOPY N/A 10/08/2020    Procedure: COLONOSCOPY;  Surgeon: Tima Talley MD;  Location: [facility] (4TH FLR);  Service: Endoscopy;  Laterality: N/A;    DE QUERVAIN'S RELEASE Right 2024    Procedure: RELEASE,RIGHT HAND, FOR DEQUERVAIN'S TENOSYNOVITIS;  Surgeon: Tameka Bran MD;  Location: [facility] OR;  Service: Orthopedics;  Laterality: Right;    ESOPHAGOGASTRODUODENOSCOPY N/A 10/08/2020    Procedure: EGD (ESOPHAGOGASTRODUODENOSCOPY);  Surgeon: Tima Talley MD;  Location: [facility] (4TH FLR);  Service: Endoscopy;  Laterality: N/A;    GASTRIC BYPASS      Sleeve    INTRAOCULAR PROSTHESES INSERTION Left 10/28/2020    Procedure: INSERTION, IOL PROSTHESIS;  Surgeon: Alma Goodrich MD;  Location: [facility] 1ST FLR;  Service: Ophthalmology;  Laterality: Left;    INTRAOCULAR PROSTHESES INSERTION Right 2021    Procedure: INSERTION,  IOL PROSTHESIS;  Surgeon: Alma Goodrich MD;  Location: [facility] OR [address] [place] [address];  Service: Ophthalmology;  Laterality: Right;    LASER PERIPHERAL IRIDOTOMY Bilateral 1/26/2017 and 2/9/2017        PANRETINAL PHOTOCOAGULATION      Both eyes    PHACOEMULSIFICATION OF CATARACT Left 10/28/2020    Procedure: PHACOEMULSIFICATION, CATARACT;  Surgeon: Alma Goodrich MD;  Location: [facility] OR [address] [place] [address];  Service: Ophthalmology;  Laterality: Left;    PHACOEMULSIFICATION OF CATARACT Right 02/17/2021    Procedure: PHACOEMULSIFICATION, CATARACT;  Surgeon: Alma Goodrich MD;  Location: [facility] OR [address] [place] [address];  Service: Ophthalmology;  Laterality: Right;    TRIGGER FINGER RELEASE Right 01/05/2024    Procedure: RELEASE, TRIGGER FINGER, RIGHT RING AND LONG FINGER;  Surgeon: Tameka Bran MD;  Location: [facility];  Service: Orthopedics;  Laterality: Right;    TUBAL LIGATION      YAG CAPSULOTOMY Left 03/14/2024         Family History   Problem Relation Name Age of Onset    Diabetes Mother      Hypertension Mother      Diabetes Father      Hypertension Father      Stroke Father      Diabetes Sister      Diabetes Brother      Cancer Brother          prostate    Prostate cancer Brother      Diabetes Sister      Diabetes Sister      Diabetes Sister      Diabetes Brother      Stroke Brother      Diabetes Brother      Diabetes Brother      Breast cancer Neg Hx      Colon cancer Neg Hx      Ovarian cancer Neg Hx      Amblyopia Neg Hx      Blindness Neg Hx      Cataracts Neg Hx      Glaucoma Neg Hx      Macular degeneration Neg Hx      Retinal detachment Neg Hx      Strabismus Neg Hx      Calcium disorder Neg Hx      Osteoporosis Neg Hx       Social History     Socioeconomic History    Marital status: Single   Tobacco Use    Smoking status: Never    Smokeless tobacco: Never   Substance and Sexual Activity    Alcohol use: No    Drug use: No    Sexual activity: Not Currently     Birth control/protection:  "Post-menopausal   Social History Narrative    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[place]    Single, lives alone     Social Drivers of Health     Financial Resource Strain: Medium Risk (10/21/2024)    Overall Financial Resource Strain (CARDIA)     Difficulty of Paying Living Expenses: Somewhat hard   Food Insecurity: Food Insecurity Present (10/21/2024)    Hunger Vital Sign     Worried About Running Out of Food in the Last Year: Sometimes true     Ran Out of Food in the Last Year: Sometimes true   Physical Activity: Sufficiently Active (10/21/2024)    Exercise Vital Sign     Days of Exercise per Week: 7 days     Minutes of Exercise per Session: 30 min   Stress: No Stress Concern Present (10/21/2024)    [employer] - Occupational Stress Questionnaire     Feeling of Stress : Not at all   Housing Stability: Unknown (10/21/2024)    Housing Stability Vital Sign     Unable to Pay for Housing in the Last Year: No       MICRO/CULTURES  No results found for: "LABBLOO"  Lab Results   Component Value Date    LABURIN No significant growth 12/13/2022    LABURIN ESCHERICHIA COLI  >100,000 cfu/ml   10/16/2015    LABURIN NO GROWTH - FINAL REPORT. 06/15/2011    LABURIN  02/25/2011     MULTIPLE ORGANISMS ISOLATED. NONE IN PREDOMINANCE  REPEAT IF CLINICALLY NECESSARY.    LABURIN  07/02/2010     50,000 ORGANISMS/ML -ESCHERICHIA COLI  Amikacin             <=16       SENSITIVE     Amox/K Clav          <=8/4      SENSITIVE     Ceftriaxone          >32        RESISTANT     Ceftazidime          <=1        RESISTANT     Cefazolin            >16        RESISTANT     Ciprofloxacin        <=1        SENSITIVE     Nitrofurantoin       <=32       SENSITIVE     Gentamicin           >8         RESISTANT     Bactrim              >2/38      RESISTANT     Tetracycline         >8         RESISTANT     Timentin             <=16       SENSITIVE     Tobramycin           8          INTERMEDIATE      LABURIN ESBL PRODUCING ORGANISM 07/02/2010    " LABURIN NO OTHER SIGNIFICANT ISOLATE. 07/02/2010             Assessment and Plan:       Obesity, Class II, BMI 35-39.9  On mounjaro , will increase to max dose to obtain weightloss benefit  Reports losing about 10 lbs  Contacted Ms Maribell ennis who sent rx with dose of 15mg weekly.      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 by proliferative retinopathy without macular edema, without long-term current use of insulin  -     blood-glucose sensor (DEXCOM G7 SENSOR) Carley; Change every 10 days, dexcom g7  Dispense: 3 each; Refill: 11    Gastroesophageal reflux disease, unspecified whether esophagitis present  Reports ocassional GERD, discussed smaller portions  -     esomeprazole (NEXIUM) 40 MG capsule; Take 1 capsule (40 mg total) by mouth once daily.  Dispense: 90 capsule; Refill: 3    Cellulitis of right lower extremity    Resolved      Anemia:    Labs fairly unremarkable, hgb 11, ferritin is low , has not been taking iron , will resume oral supplement.            RTC in 3months            Timi Orozco MD  Internal Medicine , PGY-3  [facility]  [address] [place] [id number]  [phone]    Discussed with Dr. Jonathan Desir, further recommendations as per attending addendum. Please feel free to contact me with any questions or concerns.

## 2024-11-19 NOTE — TELEPHONE ENCOUNTER
----- Message from Sudeep sent at 11/19/2024 11:21 AM CST -----  Contact: pt @ [phone]  Pharmacy is calling to clarify an RX.     RX name:  Dexcom sensor      What do they need to clarify:  pharmacy authorization      Comments:     [facility] #[id number] @ [place], [place] - [address]  [facility] [address]  Phone: [phone] Fax: [phone]

## 2024-11-19 NOTE — PATIENT INSTRUCTIONS
Anemia labs look good just need to check hemoglobin in 3 months, resume your iron supplement.  I will reach once I hear from ms loli to increase dose of mounjaro for weightloss  Email at [email or web address] or call our office if any problems arises with your leg.  Refills sent [facility].

## 2024-11-20 LAB — 1,25(OH)2D3 SERPL-MCNC: 45 PG/ML (ref 20–79)

## 2024-11-22 ENCOUNTER — TELEPHONE (OUTPATIENT)
Dept: INTERNAL MEDICINE | Facility: CLINIC | Age: 69
End: 2024-11-22
Payer: MEDICARE

## 2024-11-22 NOTE — TELEPHONE ENCOUNTER
Your request for Mounjaro has been denied but according to the letter the quantity info is incorrect.  I'm calling back to give corrected information which may result in an approved claim.

## 2024-11-26 RX ORDER — TIRZEPATIDE 15 MG/.5ML
15 INJECTION, SOLUTION SUBCUTANEOUS
Qty: 4 PEN | Refills: 5 | Status: SHIPPED | OUTPATIENT
Start: 2024-11-26

## 2024-12-26 ENCOUNTER — TELEPHONE (OUTPATIENT)
Dept: INTERNAL MEDICINE | Facility: CLINIC | Age: 69
End: 2024-12-26
Payer: MEDICARE

## 2024-12-26 NOTE — TELEPHONE ENCOUNTER
----- Message from Med Assistant Mallory sent at 12/26/2024  2:20 PM CST -----  Contact: [phone]@patient    ----- Message -----  From: Shruti Posadas  Sent: 12/26/2024  12:18 PM CST  To: Real Reyna Staff    Good afternoon patient would like to request a PA be called in for her med blood-glucose sensor (DEXCOM G7 SENSOR) Carley. Patient wants it sent to [facility]/pharmacy #[id number] - [place] - [address]   Phone: [phone]      Please call patient to advise [phone]

## 2024-12-27 ENCOUNTER — TELEPHONE (OUTPATIENT)
Dept: INTERNAL MEDICINE | Facility: CLINIC | Age: 69
End: 2024-12-27
Payer: MEDICARE

## 2024-12-27 ENCOUNTER — PATIENT MESSAGE (OUTPATIENT)
Dept: OPTOMETRY | Facility: CLINIC | Age: 69
End: 2024-12-27
Payer: MEDICARE

## 2024-12-27 NOTE — TELEPHONE ENCOUNTER
----- Message from Med Assistant Mallory sent at 12/27/2024  8:09 AM CST -----  Contact: [phone]@patient    ----- Message -----  From: Shruti Posadas  Sent: 12/26/2024  12:18 PM CST  To: Real Reyna Staff    Good afternoon patient would like to request a PA be called in for her med blood-glucose sensor (DEXCOM G7 SENSOR) Carley. Patient wants it sent to [facility]/pharmacy #[id number] - [place] - [address]   Phone: [phone]      Please call patient to advise [phone]

## 2025-01-08 DIAGNOSIS — I10 PRIMARY HYPERTENSION: ICD-10-CM

## 2025-01-08 RX ORDER — METOPROLOL SUCCINATE 50 MG/1
50 TABLET, EXTENDED RELEASE ORAL
Qty: 90 TABLET | Refills: 3 | Status: SHIPPED | OUTPATIENT
Start: 2025-01-08

## 2025-01-08 NOTE — TELEPHONE ENCOUNTER
Refill Routing Note   Medication(s) are not appropriate for processing by [facility] for the following reason(s):        Non-participating provider    [facility] action(s):  Route             Appointments  past 12m or future 3m with PCP    Date Provider   Last Visit   11/19/2024 Angel Orozco MD   Next Visit   2/14/2025 Angel Orozco MD   ED visits in past 90 days: 0        Note composed:1:36 PM 01/08/2025

## 2025-02-01 DIAGNOSIS — E11.65 TYPE 2 DIABETES MELLITUS WITH HYPERGLYCEMIA, WITHOUT LONG-TERM CURRENT USE OF INSULIN: ICD-10-CM

## 2025-02-03 RX ORDER — METFORMIN HYDROCHLORIDE 500 MG/1
1000 TABLET, EXTENDED RELEASE ORAL 2 TIMES DAILY WITH MEALS
Qty: 360 TABLET | Refills: 3 | Status: SHIPPED | OUTPATIENT
Start: 2025-02-03

## 2025-02-04 DIAGNOSIS — K21.9 GASTROESOPHAGEAL REFLUX DISEASE, UNSPECIFIED WHETHER ESOPHAGITIS PRESENT: ICD-10-CM

## 2025-02-04 DIAGNOSIS — E11.3593 TYPE 2 DIABETES MELLITUS WITH BOTH EYES AFFECTED BY PROLIFERATIVE RETINOPATHY WITHOUT MACULAR EDEMA, WITHOUT LONG-TERM CURRENT USE OF INSULIN: ICD-10-CM

## 2025-02-04 RX ORDER — BLOOD-GLUCOSE SENSOR
EACH MISCELLANEOUS
Qty: 3 EACH | Refills: 11 | Status: SHIPPED | OUTPATIENT
Start: 2025-02-04 | End: 2025-02-11 | Stop reason: SDUPTHER

## 2025-02-04 RX ORDER — ESOMEPRAZOLE MAGNESIUM 40 MG/1
40 CAPSULE, DELAYED RELEASE ORAL DAILY
Qty: 90 CAPSULE | Refills: 3 | Status: SHIPPED | OUTPATIENT
Start: 2025-02-04

## 2025-02-04 NOTE — TELEPHONE ENCOUNTER
----- Message from [employer] sent at 2/4/2025  1:08 PM CST -----  Requesting an RX refill or new RX.    Is this a refill or new RX: Refill    RX name and strength (copy/paste from chart): esomeprazole (NEXIUM) 40 MG capsule     Is this a 30 day or 90 day RX: 90    Pharmacy name and phone # (copy/paste from chart):  [id number] - [place] - [address]   Phone: [phone]  Fax: [phone]  
----- Message from Vinnie sent at 2/4/2025  2:26 PM CST -----  Contact: Pt  [phone]  Requesting an RX refill or new RX.    Is this a refill or new RX: Refill    RX name and strength (copy/paste from chart):  blood-glucose sensor (DEXCOM G7 SENSOR) Carley    Is this a 30 day or 90 day RX:   [id number] [place] [facility] [address]   Phone: [phone]  Fax: [phone]    Pharmacy name and phone # (copy/paste from chart):      The doctors have asked that we provide their patients with the following 2 reminders -- prescription refills can take up to 72 hours, and a friendly reminder that in the future you can use your [facility] account to request refills: yes    Pt states she never received this script back in November she states send to [facility] and they will need a diagnosis code  
English

## 2025-02-08 ENCOUNTER — LAB VISIT (OUTPATIENT)
Dept: LAB | Facility: HOSPITAL | Age: 70
End: 2025-02-08
Payer: COMMERCIAL

## 2025-02-08 DIAGNOSIS — E11.3593 TYPE 2 DIABETES MELLITUS WITH BOTH EYES AFFECTED BY PROLIFERATIVE RETINOPATHY WITHOUT MACULAR EDEMA, WITHOUT LONG-TERM CURRENT USE OF INSULIN: ICD-10-CM

## 2025-02-08 LAB
ESTIMATED AVG GLUCOSE: 134 MG/DL (ref 68–131)
HBA1C MFR BLD: 6.3 % (ref 4–5.6)

## 2025-02-08 PROCEDURE — 36415 COLL VENOUS BLD VENIPUNCTURE: CPT | Performed by: NURSE PRACTITIONER

## 2025-02-08 PROCEDURE — 83036 HEMOGLOBIN GLYCOSYLATED A1C: CPT | Performed by: NURSE PRACTITIONER

## 2025-02-10 ENCOUNTER — PATIENT MESSAGE (OUTPATIENT)
Dept: INTERNAL MEDICINE | Facility: CLINIC | Age: 70
End: 2025-02-10
Payer: MEDICARE

## 2025-02-11 ENCOUNTER — OFFICE VISIT (OUTPATIENT)
Dept: INTERNAL MEDICINE | Facility: CLINIC | Age: 70
End: 2025-02-11
Payer: COMMERCIAL

## 2025-02-11 VITALS
HEART RATE: 68 BPM | DIASTOLIC BLOOD PRESSURE: 68 MMHG | BODY MASS INDEX: 36.81 KG/M2 | WEIGHT: 229.06 LBS | SYSTOLIC BLOOD PRESSURE: 138 MMHG | OXYGEN SATURATION: 100 % | HEIGHT: 66 IN

## 2025-02-11 DIAGNOSIS — Z98.84 HISTORY OF BARIATRIC SURGERY: ICD-10-CM

## 2025-02-11 DIAGNOSIS — I73.9 PVD (PERIPHERAL VASCULAR DISEASE): ICD-10-CM

## 2025-02-11 DIAGNOSIS — Z79.4 TYPE 2 DIABETES MELLITUS WITH HYPERGLYCEMIA, WITH LONG-TERM CURRENT USE OF INSULIN: ICD-10-CM

## 2025-02-11 DIAGNOSIS — I10 PRIMARY HYPERTENSION: ICD-10-CM

## 2025-02-11 DIAGNOSIS — E11.65 TYPE 2 DIABETES MELLITUS WITH HYPERGLYCEMIA, WITH LONG-TERM CURRENT USE OF INSULIN: ICD-10-CM

## 2025-02-11 DIAGNOSIS — G47.33 OSA ON CPAP: ICD-10-CM

## 2025-02-11 DIAGNOSIS — E78.5 HYPERLIPIDEMIA, UNSPECIFIED HYPERLIPIDEMIA TYPE: ICD-10-CM

## 2025-02-11 DIAGNOSIS — E11.42 DIABETIC POLYNEUROPATHY ASSOCIATED WITH TYPE 2 DIABETES MELLITUS: ICD-10-CM

## 2025-02-11 DIAGNOSIS — E66.812 OBESITY, CLASS II, BMI 35-39.9: ICD-10-CM

## 2025-02-11 DIAGNOSIS — I25.10 CORONARY ARTERY DISEASE INVOLVING NATIVE CORONARY ARTERY OF NATIVE HEART WITHOUT ANGINA PECTORIS: ICD-10-CM

## 2025-02-11 DIAGNOSIS — E11.3593 TYPE 2 DIABETES MELLITUS WITH BOTH EYES AFFECTED BY PROLIFERATIVE RETINOPATHY WITHOUT MACULAR EDEMA, WITHOUT LONG-TERM CURRENT USE OF INSULIN: Primary | ICD-10-CM

## 2025-02-11 PROBLEM — E66.01 CLASS 3 SEVERE OBESITY DUE TO EXCESS CALORIES WITH SERIOUS COMORBIDITY AND BODY MASS INDEX (BMI) OF 40.0 TO 44.9 IN ADULT: Status: RESOLVED | Noted: 2025-02-11 | Resolved: 2025-02-11

## 2025-02-11 PROBLEM — E66.813 CLASS 3 SEVERE OBESITY DUE TO EXCESS CALORIES WITH SERIOUS COMORBIDITY AND BODY MASS INDEX (BMI) OF 40.0 TO 44.9 IN ADULT: Status: ACTIVE | Noted: 2025-02-11

## 2025-02-11 PROBLEM — E66.01 CLASS 3 SEVERE OBESITY DUE TO EXCESS CALORIES WITH SERIOUS COMORBIDITY AND BODY MASS INDEX (BMI) OF 40.0 TO 44.9 IN ADULT: Status: ACTIVE | Noted: 2025-02-11

## 2025-02-11 PROBLEM — E66.813 CLASS 3 SEVERE OBESITY DUE TO EXCESS CALORIES WITH SERIOUS COMORBIDITY AND BODY MASS INDEX (BMI) OF 40.0 TO 44.9 IN ADULT: Status: RESOLVED | Noted: 2025-02-11 | Resolved: 2025-02-11

## 2025-02-11 PROCEDURE — 3075F SYST BP GE 130 - 139MM HG: CPT | Mod: CPTII,S$GLB,, | Performed by: NURSE PRACTITIONER

## 2025-02-11 PROCEDURE — 3288F FALL RISK ASSESSMENT DOCD: CPT | Mod: CPTII,S$GLB,, | Performed by: NURSE PRACTITIONER

## 2025-02-11 PROCEDURE — 1126F AMNT PAIN NOTED NONE PRSNT: CPT | Mod: CPTII,S$GLB,, | Performed by: NURSE PRACTITIONER

## 2025-02-11 PROCEDURE — 99999 PR PBB SHADOW E&M-EST. PATIENT-LVL V: CPT | Mod: PBBFAC,,, | Performed by: NURSE PRACTITIONER

## 2025-02-11 PROCEDURE — 1160F RVW MEDS BY RX/DR IN RCRD: CPT | Mod: CPTII,S$GLB,, | Performed by: NURSE PRACTITIONER

## 2025-02-11 PROCEDURE — 1159F MED LIST DOCD IN RCRD: CPT | Mod: CPTII,S$GLB,, | Performed by: NURSE PRACTITIONER

## 2025-02-11 PROCEDURE — 3078F DIAST BP <80 MM HG: CPT | Mod: CPTII,S$GLB,, | Performed by: NURSE PRACTITIONER

## 2025-02-11 PROCEDURE — 3044F HG A1C LEVEL LT 7.0%: CPT | Mod: CPTII,S$GLB,, | Performed by: NURSE PRACTITIONER

## 2025-02-11 PROCEDURE — 3008F BODY MASS INDEX DOCD: CPT | Mod: CPTII,S$GLB,, | Performed by: NURSE PRACTITIONER

## 2025-02-11 PROCEDURE — 4010F ACE/ARB THERAPY RXD/TAKEN: CPT | Mod: CPTII,S$GLB,, | Performed by: NURSE PRACTITIONER

## 2025-02-11 PROCEDURE — 99214 OFFICE O/P EST MOD 30 MIN: CPT | Mod: S$GLB,,, | Performed by: NURSE PRACTITIONER

## 2025-02-11 PROCEDURE — 1101F PT FALLS ASSESS-DOCD LE1/YR: CPT | Mod: CPTII,S$GLB,, | Performed by: NURSE PRACTITIONER

## 2025-02-11 RX ORDER — BLOOD-GLUCOSE SENSOR
EACH MISCELLANEOUS
Qty: 3 EACH | Refills: 11 | Status: SHIPPED | OUTPATIENT
Start: 2025-02-11 | End: 2025-02-11

## 2025-02-11 RX ORDER — INSULIN ASPART INJECTION 100 [IU]/ML
INJECTION, SOLUTION SUBCUTANEOUS
Qty: 15 ML | Refills: 5 | Status: SHIPPED | OUTPATIENT
Start: 2025-02-11

## 2025-02-11 RX ORDER — BLOOD-GLUCOSE SENSOR
EACH MISCELLANEOUS
Qty: 3 EACH | Refills: 11 | Status: SHIPPED | OUTPATIENT
Start: 2025-02-11 | End: 2025-02-13

## 2025-02-11 NOTE — PATIENT INSTRUCTIONS
Follow up in 4-4.5 months w/Irielle  A1c urine mac in 4 months     Mounjaro 15 mg weekly  Jardiance 25 mg daily   Fiasp as needed if sugar is >180   180-230 2 units etc  Metformin 1000 mg twice a day  Lab Results   Component Value Date    HGBA1C 6.3 (H) 02/08/2025         Dexcom g7     [email or web address]  Eat fit milena  [employer] milena  [email or web address]    [employer] milena

## 2025-02-11 NOTE — PROGRESS NOTES
CHIEF COMPLAINT: Type 2 Diabetes     HPI: Mrs. Aaliyah Angela is a 69 y.o. female who was diagnosed with gestational diabetes age 22s, Type 2 DM age 30s.  Has h/o gastric sleeve 2015.-in [place]   Past Medical History:   Diagnosis Date    Allergy     Anatomical narrow angle borderline glaucoma     AR (allergic rhinitis)     Arthritis     CAD (coronary artery disease) 04/22/2013    Non obstructive, 2012 PET     Diabetes mellitus type II     with retinopathy    Diabetic retinopathy     Epiretinal membrane, both eyes     Hyperlipidemia     Hypertensive retinopathy of both eyes     Lumbar disc disease     Migraines     Morbid obesity 10/03/2012    BRADFORD (obstructive sleep apnea) 08/04/2015    Proliferative diabetic retinopathy(362.02)     PVD (peripheral vascular disease) 04/22/2013    Carotid US 1-39% bilat 2012     Rupture of right Achilles tendon     Unspecified essential hypertension     Unspecified vitamin D deficiency     Vitreous hemorrhage of left eye      Last seen by me in fall 2024.  Last visit switched from ozempic to mounjaro.  Doing better w/ change.   A1c looks great  Loss 9#   (R) leg cellulitis healed well, R hand doing better.   Lab Results   Component Value Date    HGBA1C 6.3 (H) 02/08/2025     Pending pa-dexcom g7 .  H/o hypoglycemia, bg < 54 mg/dl   Eats snacks  Meals 2 /day     Brother passed away, h/o cva.   (L) leg cellulitis-fall 2023, (R) hand sx 1/5/24, infection, placed on antibiotics.  Doing better.   (R) leg- watching site    Has f/u with endocrinology for pth, calcium -hyperparathyroidism-annually  -last pth normal    On MDI injections 3 x a day -correction scale   Testing 4 x a day max  Patient is willing and able to use the device  Demonstrated an understanding of the technology and is motivated to use CGM  Patient expected to adhere to a comprehensive diabetes treatment plan and patient has adequate medical supervision  Multiple impaired awareness of hypoglycemia (hypoglycemia  "unawareness)    Works 40 hours, walking 5 x a week    Current meds: mounjaro 15 mg weekly, metformin 1000 mg bid, jardiance 25 mg daily, humalog correction scale 150-200+2, etc  -self adjustment per correction scale    Diabetes Management Status    Statin: Taking  ACE/ARB: Taking    Screening or Prevention Patient's value Goal Complete/Controlled?   HgA1C Testing and Control   Lab Results   Component Value Date    HGBA1C 6.3 (H) 02/08/2025      Annually/Less than 8% No   Lipid profile : 09/28/2024 Annually No   LDL control Lab Results   Component Value Date    LDLCALC 53.6 (L) 09/28/2024    Annually/Less than 100 mg/dl  No   Nephropathy screening Lab Results   Component Value Date    LABMICR 6.0 09/28/2024     Lab Results   Component Value Date    PROTEINUA Negative 12/13/2022    Annually Yes   Blood pressure BP Readings from Last 1 Encounters:   11/19/24 (!) 140/80    Less than 140/90 No   Dilated retinal exam : 10/31/2024 Annually Yes   Foot exam   : 02/22/2024 Annually Yes     Cataract removal sx (L) eye  Then (R) eye     REVIEW OF SYSTEMS  General: no weakness, fatigue, + weight changes 9# loss   Eyes: no double or blurred vision, eye pain, or redness; Last Eye Exam 2021, h/o cataract sx, retinal/glaucoma specialist  Cardiovascular:  No chest pain, palpitations, +trace edema-BLE, or murmurs.   Respiratory: no cough or dyspnea.   GI/: + heartburn, nausea, or changes in bowel patterns; good appetite. +GERD-nexium and pepcid, off zantac   Skin: no rashes, dryness, itching, or reactions at insulin injection sites.  Neuro: + numbness, tingling-off gabapentin, on amitriptyline,  tremors, or vertigo. +OA in knees, +right hand sx (R)  Endocrine: no polyuria, polydipsia, polyphagia, heat or cold intolerance.     Vital Signs  BP (!) 142/66 (BP Location: Left arm, Patient Position: Sitting)   Pulse 68   Ht 5' 6" (1.676 m)   Wt 103.9 kg (229 lb 0.9 oz)   SpO2 100%   BMI 36.97 kg/m²     Hemoglobin A1C   Date Value Ref " Range Status   02/08/2025 6.3 (H) 4.0 - 5.6 % Final     Comment:     ADA Screening Guidelines:  5.7-6.4%  Consistent with prediabetes  >or=6.5%  Consistent with diabetes    High levels of fetal hemoglobin interfere with the HbA1C  assay. Heterozygous hemoglobin variants (HbS, HgC, etc)do  not significantly interfere with this assay.   However, presence of multiple variants may affect accuracy.     09/28/2024 6.8 (H) 4.0 - 5.6 % Final     Comment:     ADA Screening Guidelines:  5.7-6.4%  Consistent with prediabetes  >or=6.5%  Consistent with diabetes    High levels of fetal hemoglobin interfere with the HbA1C  assay. Heterozygous hemoglobin variants (HbS, HgC, etc)do  not significantly interfere with this assay.   However, presence of multiple variants may affect accuracy.     05/30/2024 7.3 (H) 4.0 - 5.6 % Final     Comment:     ADA Screening Guidelines:  5.7-6.4%  Consistent with prediabetes  >or=6.5%  Consistent with diabetes    High levels of fetal hemoglobin interfere with the HbA1C  assay. Heterozygous hemoglobin variants (HbS, HgC, etc)do  not significantly interfere with this assay.   However, presence of multiple variants may affect accuracy.          Chemistry        Component Value Date/Time     04/08/2023 1032    K 4.6 04/08/2023 1032     04/08/2023 1032    CO2 26 04/08/2023 1032    BUN 14 04/08/2023 1032    CREATININE 0.9 04/08/2023 1032    GLU 95 04/08/2023 1032        Component Value Date/Time    CALCIUM 9.5 04/08/2023 1032    ALKPHOS 70 02/06/2023 1422    AST 16 02/06/2023 1422    ALT 13 02/06/2023 1422    BILITOT 0.2 02/06/2023 1422    ESTGFRAFRICA >60.0 03/19/2022 0823    EGFRNONAA >60.0 03/19/2022 0823           Lab Results   Component Value Date    TSH 1.683 05/30/2024      Lab Results   Component Value Date    CHOL 111 (L) 09/28/2024    CHOL 112 (L) 05/30/2024    CHOL 119 (L) 02/06/2023     Lab Results   Component Value Date    HDL 49 09/28/2024    HDL 47 05/30/2024    HDL 49  02/06/2023     Lab Results   Component Value Date    LDLCALC 53.6 (L) 09/28/2024    LDLCALC 52.8 (L) 05/30/2024    LDLCALC 54.0 (L) 02/06/2023     Lab Results   Component Value Date    TRIG 42 09/28/2024    TRIG 61 05/30/2024    TRIG 80 02/06/2023     Lab Results   Component Value Date    CHOLHDL 44.1 09/28/2024    CHOLHDL 42.0 05/30/2024    CHOLHDL 41.2 02/06/2023       PHYSICAL EXAMINATION  Constitutional: Appears well, no distress  Neck: Supple, trachea midline.   Respiratory: No wheezes, even and unlabored.  Cardiovascular: deferred  Lymph: R leg- edema (mild)  Skin: warm and dry; no injection site reactions, no acanthosis nigracans observed.  Neuro:patient alert and cooperative, normal affect; steady gait.    Diabetes Foot Exam: deferred     Assessment/Plan    1. 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 by proliferative retinopathy without macular edema, without long-term current use of insulin  Hemoglobin A1C    blood-glucose sensor (DEXCOM G7 SENSOR) [place]    F/u with retinal specialist  Doing very well   A1c below goal   Goal < 7%   Send fiasp correction scale and dexcom g7 to [facility]   Continue max jardiance and mounjaro         2. Type 2 diabetes mellitus with hyperglycemia, with long-term current use of insulin  See above       3. Primary hypertension  Microalbumin/Creatinine Ratio, Urine  Bp controlled  On arb/acei         4. Hyperlipidemia, unspecified hyperlipidemia type  Lab Results   Component Value Date    LDLCALC 53.6 (L) 09/28/2024     At goal   On statin      5. Obesity, Class II, BMI 35-39.9  Body mass index is 36.97 kg/m².  Losing weight   On glp1a/gip      6. Coronary artery disease involving native coronary artery of native heart without angina pectoris  Avoid hypoglycemia, f/u with cards       7. PVD (peripheral vascular disease)  F/u with vascular   Stable       8. BRADFORD on CPAP  If not consistent, may affect metabolism and cardiovascular system         9. History of  bariatric surgery, 12-  Mindful, no gi issues       10. Diabetic polyneuropathy associated with type 2 diabetes mellitus  Podiatry 2025-2

## 2025-02-13 DIAGNOSIS — E11.3593 TYPE 2 DIABETES MELLITUS WITH BOTH EYES AFFECTED BY PROLIFERATIVE RETINOPATHY WITHOUT MACULAR EDEMA, WITHOUT LONG-TERM CURRENT USE OF INSULIN: ICD-10-CM

## 2025-02-13 RX ORDER — BLOOD-GLUCOSE SENSOR
EACH MISCELLANEOUS
Qty: 3 EACH | Refills: 11 | Status: SHIPPED | OUTPATIENT
Start: 2025-02-13

## 2025-02-19 ENCOUNTER — TELEPHONE (OUTPATIENT)
Dept: INTERNAL MEDICINE | Facility: CLINIC | Age: 70
End: 2025-02-19
Payer: MEDICARE

## 2025-02-19 DIAGNOSIS — E11.3593 TYPE 2 DIABETES MELLITUS WITH BOTH EYES AFFECTED BY PROLIFERATIVE RETINOPATHY WITHOUT MACULAR EDEMA, WITHOUT LONG-TERM CURRENT USE OF INSULIN: ICD-10-CM

## 2025-02-19 RX ORDER — BLOOD-GLUCOSE SENSOR
EACH MISCELLANEOUS
Qty: 3 EACH | Refills: 11 | Status: SHIPPED | OUTPATIENT
Start: 2025-02-19

## 2025-02-19 NOTE — TELEPHONE ENCOUNTER
----- Message from Nando sent at 2/19/2025 12:57 PM CST -----  Regarding: Refill  Contact: Pt [phone]  Requesting an RX refill or new RX.Is this a refill or new RX:  Refill: Diagnosis Code neededRX name and strength (copy/paste from chart):  Dexcom G7 SensorIs this a 30 day or 90 day RX: Pharmacy name and phone # (copy/paste from chart):  [id number] - [place], [place] - [id number] [id number]: [phone] Fax: [phone]

## 2025-02-19 NOTE — TELEPHONE ENCOUNTER
Spoke to pharmacy. They stated they need the dx code on the Rx. Let them know it is on Rx but they asked for it to be resent

## 2025-02-21 NOTE — TELEPHONE ENCOUNTER
Close reason: Other- Dexcom g7 sensors -  Hard copy of ov notes, lab, order was faxed to Medicare today after speaking to pharmacy staff.  Payer: [employer] ([employer] [employer]) Case ID: [id number]    [phone]    [phone]  Note from payer: [facility] is not able to process this request through ePA, please contact the plan at [phone] or fax in request to [phone]. NOTICE FOR OPIOID REQUESTS ONLY: Effective Jan 1, 2019 Medicare Part D plans have implemented new opioid safety edits, including a 7 day supply limit for opioid naive patients.

## 2025-04-10 ENCOUNTER — OFFICE VISIT (OUTPATIENT)
Dept: INTERNAL MEDICINE | Facility: CLINIC | Age: 70
End: 2025-04-10
Payer: COMMERCIAL

## 2025-04-10 VITALS
WEIGHT: 226.63 LBS | BODY MASS INDEX: 36.42 KG/M2 | SYSTOLIC BLOOD PRESSURE: 142 MMHG | DIASTOLIC BLOOD PRESSURE: 84 MMHG | HEART RATE: 84 BPM | HEIGHT: 66 IN

## 2025-04-10 DIAGNOSIS — E11.65 TYPE 2 DIABETES MELLITUS WITH HYPERGLYCEMIA, WITH LONG-TERM CURRENT USE OF INSULIN: Primary | ICD-10-CM

## 2025-04-10 DIAGNOSIS — I10 PRIMARY HYPERTENSION: ICD-10-CM

## 2025-04-10 DIAGNOSIS — E11.42 DIABETIC POLYNEUROPATHY ASSOCIATED WITH TYPE 2 DIABETES MELLITUS: ICD-10-CM

## 2025-04-10 DIAGNOSIS — E78.5 HYPERLIPIDEMIA, UNSPECIFIED HYPERLIPIDEMIA TYPE: ICD-10-CM

## 2025-04-10 DIAGNOSIS — M25.561 RIGHT KNEE PAIN, UNSPECIFIED CHRONICITY: ICD-10-CM

## 2025-04-10 DIAGNOSIS — D64.9 ANEMIA, UNSPECIFIED TYPE: ICD-10-CM

## 2025-04-10 DIAGNOSIS — Z79.4 TYPE 2 DIABETES MELLITUS WITH HYPERGLYCEMIA, WITH LONG-TERM CURRENT USE OF INSULIN: Primary | ICD-10-CM

## 2025-04-10 PROCEDURE — 1101F PT FALLS ASSESS-DOCD LE1/YR: CPT | Mod: CPTII,S$GLB,,

## 2025-04-10 PROCEDURE — 4010F ACE/ARB THERAPY RXD/TAKEN: CPT | Mod: CPTII,S$GLB,,

## 2025-04-10 PROCEDURE — 99212 OFFICE O/P EST SF 10 MIN: CPT | Mod: S$GLB,,,

## 2025-04-10 PROCEDURE — 3288F FALL RISK ASSESSMENT DOCD: CPT | Mod: CPTII,S$GLB,,

## 2025-04-10 PROCEDURE — 1159F MED LIST DOCD IN RCRD: CPT | Mod: CPTII,S$GLB,,

## 2025-04-10 PROCEDURE — 3079F DIAST BP 80-89 MM HG: CPT | Mod: CPTII,S$GLB,,

## 2025-04-10 PROCEDURE — 3044F HG A1C LEVEL LT 7.0%: CPT | Mod: CPTII,S$GLB,,

## 2025-04-10 PROCEDURE — 3077F SYST BP >= 140 MM HG: CPT | Mod: CPTII,S$GLB,,

## 2025-04-10 PROCEDURE — 1160F RVW MEDS BY RX/DR IN RCRD: CPT | Mod: CPTII,S$GLB,,

## 2025-04-10 PROCEDURE — 99999 PR PBB SHADOW E&M-EST. PATIENT-LVL III: CPT | Mod: PBBFAC,,,

## 2025-04-10 PROCEDURE — 1170F FXNL STATUS ASSESSED: CPT | Mod: CPTII,S$GLB,,

## 2025-04-10 PROCEDURE — 3008F BODY MASS INDEX DOCD: CPT | Mod: CPTII,S$GLB,,

## 2025-04-10 RX ORDER — TIRZEPATIDE 15 MG/.5ML
15 INJECTION, SOLUTION SUBCUTANEOUS
Qty: 4 PEN | Refills: 5 | Status: SHIPPED | OUTPATIENT
Start: 2025-04-10

## 2025-04-10 RX ORDER — ATORVASTATIN CALCIUM 20 MG/1
20 TABLET, FILM COATED ORAL DAILY
Qty: 90 TABLET | Refills: 3 | Status: SHIPPED | OUTPATIENT
Start: 2025-04-10

## 2025-04-10 NOTE — PROGRESS NOTES
[facility]  RESIDENT CONTINUITY CLINIC NOTE    Name: Aaliyah Angela  : 1955  MRN: [id number]  Date of Service: 04/10/2025   PCP: Angel Orozco MD          HPI:             Ms. 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 She has a history of diabetes, hypertension, hyperlipidemia, and prior gastric sleeve surgery.     Celulitis: Completely resolved    DM. Last A1C 6.3 2025,has dexcom , on mounjaro, metfromin , jardiance , insulin Sliding scale, use very sporadically (1 -2 monthyly 3 units)    Anemia: Has been taking iron supplements, some constipation , on miralax     BRADFORD: Using nightly cpap    HLD: Continues on statin    HTN: 130s at home/80s,on losartan 100pm / metoprol 50 in am , amlodipine 10 at noon , T    MSK: Lumbar disc disease, no longer on gabapentin , states back is doing fine , on amytryptiline       Protective Sensation (w/ 10 gram monofilament):  Right: Decreased  Left: Decreased    Visual Inspection:  Normal -  Bilateral    Pedal Pulses:   Right: Present  Left: Present    Posterior Tibialis Pulses:   Right:Present  Left: Present       LABS:    CBC:  Lab Results   Component Value Date    WBC 4.45 2024    HGB 11.0 (L) 2024    HCT 35.4 (L) 2024    MCV 94 2024     2024           CMP:  Lab Results   Component Value Date     2023    K 4.6 2023     2023    CO2 26 2023    GLU 95 2023    BUN 14 2023    CREATININE 0.9 2023    CALCIUM 9.5 2023    PROT 7.5 2023    ALBUMIN 3.8 2023    BILITOT 0.2 2023    ALKPHOS 70 2023    AST 16 2023    ALT 13 2023    ANIONGAP 9 2023    EGFRNORACEVR >60.0 2023       LIPIDS:  Lab Results   Component Value Date    LDLCALC 53.6 (L)  "09/28/2024    LDLCALC 52.8 (L) 05/30/2024    LDLCALC 54.0 (L) 02/06/2023    HDL 49 09/28/2024    HDL 47 05/30/2024    HDL 49 02/06/2023    TRIG 42 09/28/2024    TRIG 61 05/30/2024    TRIG 80 02/06/2023    CHOL 111 (L) 09/28/2024    CHOL 112 (L) 05/30/2024    CHOL 119 (L) 02/06/2023       A1C:  Lab Results   Component Value Date    HGBA1C 6.3 (H) 02/08/2025    HGBA1C 6.8 (H) 09/28/2024    HGBA1C 7.3 (H) 05/30/2024       Thyroid:  Lab Results   Component Value Date    TSH 1.683 05/30/2024     Labs: Previous labs reviewed.    Imaging:   No results found in the last 24 hours.    EKG/ECHO  Results for orders placed or performed in visit on 08/08/22   IN OFFICE EKG 12-LEAD (to [place])    Collection Time: 08/08/22  3:02 PM    Narrative    Test Reason : R00.0,    Vent. Rate : 105 BPM     Atrial Rate : 105 BPM     P-R Int : 164 ms          QRS Dur : 086 ms      QT Int : 308 ms       P-R-T Axes : 060 026 081 degrees     QTc Int : 407 ms    Sinus tachycardia  Nonspecific T wave abnormality  Abnormal ECG  When compared with ECG of 17-AUG-2015 21:54,  Nonspecific T wave abnormality now evident in Lateral leads  Confirmed by Paulie Thornton MD ([id number]) on 8/8/2022 4:17:52 PM    Referred By: MARY MCKINLEY           Confirmed By:Paulie Thornton MD     No results found for this or any previous visit.      Review of systems as above; all unmentioned systems are negative.     PE:     Vitals:    04/10/25 0845   BP: (!) 142/84   BP Location: Left arm   Patient Position: Sitting   Pulse: 84   Weight: 102.8 kg (226 lb 10.1 oz)   Height: 5' 6" (1.676 m)     Body mass index is 36.58 kg/m².    Trends:  @LASTRESULTS("WEIGHT",5)@  @LASTRESULTS("BMI",5)@  @LASTRESULTS("BP",5)@    Physical exam:  Oropharynx is clear.  No adenopathy.  Lungs CTA.  No murmurs, gallops, or rubs.  No abdominal tenderness.  No NEREIDA.        Other pertinent data from chart that formed part of my MDM:           Past Medical History:   Diagnosis Date    Allergy     Anatomical " narrow angle borderline glaucoma     AR (allergic rhinitis)     Arthritis     CAD (coronary artery disease) 04/22/2013    Non obstructive, 2012 PET     Diabetes mellitus type II     with retinopathy    Diabetic retinopathy     Epiretinal membrane, both eyes     Hyperlipidemia     Hypertensive retinopathy of both eyes     Lumbar disc disease     Migraines     Morbid obesity 10/03/2012    BRADFORD (obstructive sleep apnea) 08/04/2015    Proliferative diabetic retinopathy(362.02)     PVD (peripheral vascular disease) 04/22/2013    Carotid US 1-39% bilat 2012     Rupture of right Achilles tendon     Unspecified essential hypertension     Unspecified vitamin D deficiency     Vitreous hemorrhage of left eye      Current Outpatient Medications   Medication Instructions    acetaminophen (TYLENOL) 650 mg, Oral, Every 8 hours PRN    amitriptyline (ELAVIL) 50 mg, Oral, Nightly    amLODIPine (NORVASC) 10 mg, Oral, Daily    aspirin 81 mg, Daily    atorvastatin (LIPITOR) 20 mg, Oral, Daily    blood sugar diagnostic (ONETOUCH ULTRA TEST) Strp TO CHECK BLOOD GLUCOSE 1-2 TIMES DAILY, TO USE WITH INSURANCE PREFERRED METER    blood-glucose meter,continuous (DEXCOM G7 ) Misc Use as directed, dexcom g7 , e 11.65    blood-glucose sensor (DEXCOM G7 SENSOR) Carley Change every 10 days, dexcom g7, e 11.65 type 2 diabetes    calcium-vitamin D3 500 mg(1,250mg) -200 unit per tablet 1 tablet, Daily    esomeprazole (NEXIUM) 40 mg, Oral, Daily    insulin aspart, niacinamide, (FIASP FLEXTOUCH U-100 INSULIN) 100 unit/mL (3 mL) InPn Inject up to 4 x a day if blood sugar is > 180, 180-230 2 units, 231-280 4 units, >280 6 units, >330 8 units, >380 10 units. Max daily 40 units.    JARDIANCE 25 mg, Oral    losartan (COZAAR) 100 mg, Oral, Daily    metFORMIN (GLUCOPHAGE-XR) 1,000 mg, Oral, 2 times daily with meals    metoprolol succinate (TOPROL-XL) 50 mg, Oral    MOUNJARO 15 mg, Subcutaneous, Every 7 days, Diagnosis code e 11.65    multivitamin  (THERAGRAN) per tablet 1 tablet, Daily     No current facility-administered medications for this visit.     Past Medical History:   Diagnosis Date    Allergy     Anatomical narrow angle borderline glaucoma     AR (allergic rhinitis)     Arthritis     CAD (coronary artery disease) 2013    Non obstructive, 2012 PET     Diabetes mellitus type II     with retinopathy    Diabetic retinopathy     Epiretinal membrane, both eyes     Hyperlipidemia     Hypertensive retinopathy of both eyes     Lumbar disc disease     Migraines     Morbid obesity 10/03/2012    BRADFORD (obstructive sleep apnea) 2015    Proliferative diabetic retinopathy(362.02)     PVD (peripheral vascular disease) 2013    Carotid US 1-39% bilat      Rupture of right Achilles tendon     Unspecified essential hypertension     Unspecified vitamin D deficiency     Vitreous hemorrhage of left eye      Past Surgical History:   Procedure Laterality Date    CARPAL TUNNEL RELEASE Right 2020    Procedure: RELEASE, CARPAL TUNNEL RIGHT;  Surgeon: Tameka Bran MD;  Location: [facility];  Service: Orthopedics;  Laterality: Right;    CARPAL TUNNEL RELEASE Right 2020    CATARACT EXTRACTION W/  INTRAOCULAR LENS IMPLANT Left 10/28/2020    COMPLEX ()    CATARACT EXTRACTION W/  INTRAOCULAR LENS IMPLANT Right 2021         SECTION      x3    COLONOSCOPY      COLONOSCOPY N/A 10/08/2020    Procedure: COLONOSCOPY;  Surgeon: Tima Talley MD;  Location: [facility] (4TH FLR);  Service: Endoscopy;  Laterality: N/A;    DE QUERVAIN'S RELEASE Right 2024    Procedure: RELEASE,RIGHT HAND, FOR DEQUERVAIN'S TENOSYNOVITIS;  Surgeon: Tameka Bran MD;  Location: [facility] OR;  Service: Orthopedics;  Laterality: Right;    ESOPHAGOGASTRODUODENOSCOPY N/A 10/08/2020    Procedure: EGD (ESOPHAGOGASTRODUODENOSCOPY);  Surgeon: Tima Talley MD;  Location: [facility] (4TH FLR);  Service: Endoscopy;  Laterality: N/A;    GASTRIC  BYPASS  2015    Sleeve    INTRAOCULAR PROSTHESES INSERTION Left 10/28/2020    Procedure: INSERTION, IOL PROSTHESIS;  Surgeon: Alma Goodrich MD;  Location: [facility] OR [address] [place] [address];  Service: Ophthalmology;  Laterality: Left;    INTRAOCULAR PROSTHESES INSERTION Right 02/17/2021    Procedure: INSERTION, IOL PROSTHESIS;  Surgeon: Alma Goodrich MD;  Location: [facility] OR [address] [place] [address];  Service: Ophthalmology;  Laterality: Right;    LASER PERIPHERAL IRIDOTOMY Bilateral 1/26/2017 and 2/9/2017        PANRETINAL PHOTOCOAGULATION      Both eyes    PHACOEMULSIFICATION OF CATARACT Left 10/28/2020    Procedure: PHACOEMULSIFICATION, CATARACT;  Surgeon: Alma Goodrich MD;  Location: [facility] OR [address] [place] [address];  Service: Ophthalmology;  Laterality: Left;    PHACOEMULSIFICATION OF CATARACT Right 02/17/2021    Procedure: PHACOEMULSIFICATION, CATARACT;  Surgeon: Alma Goodrich MD;  Location: [facility] OR [address] [place] [address];  Service: Ophthalmology;  Laterality: Right;    TRIGGER FINGER RELEASE Right 01/05/2024    Procedure: RELEASE, TRIGGER FINGER, RIGHT RING AND LONG FINGER;  Surgeon: Tameka Bran MD;  Location: [facility];  Service: Orthopedics;  Laterality: Right;    TUBAL LIGATION      YAG CAPSULOTOMY Left 03/14/2024         Family History   Problem Relation Name Age of Onset    Diabetes Mother      Hypertension Mother      Diabetes Father      Hypertension Father      Stroke Father      Diabetes Sister      Diabetes Brother      Cancer Brother          prostate    Prostate cancer Brother      Diabetes Sister      Diabetes Sister      Diabetes Sister      Diabetes Brother      Stroke Brother      Diabetes Brother      Diabetes Brother      Breast cancer Neg Hx      Colon cancer Neg Hx      Ovarian cancer Neg Hx      Amblyopia Neg Hx      Blindness Neg Hx      Cataracts Neg Hx      Glaucoma Neg Hx      Macular degeneration Neg Hx      Retinal detachment Neg Hx      Strabismus Neg Hx      Calcium disorder Neg Hx       Osteoporosis Neg Hx       Social History     Socioeconomic History    Marital status: Single   Tobacco Use    Smoking status: Never    Smokeless tobacco: Never   Substance and Sexual Activity    Alcohol use: No    Drug use: No    Sexual activity: Not Currently     Birth control/protection: Post-menopausal   Social History Narrative    Work a LPN at [employer]    Single, lives alone     Social Drivers of Health     Financial Resource Strain: Medium Risk (10/21/2024)    Overall Financial Resource Strain (CARDIA)     Difficulty of Paying Living Expenses: Somewhat hard   Food Insecurity: Food Insecurity Present (10/21/2024)    Hunger Vital Sign     Worried About Running Out of Food in the Last Year: Sometimes true     Ran Out of Food in the Last Year: Sometimes true   Physical Activity: Sufficiently Active (10/21/2024)    Exercise Vital Sign     Days of Exercise per Week: 7 days     Minutes of Exercise per Session: 30 min   Stress: No Stress Concern Present (10/21/2024)    [employer] - Occupational Stress Questionnaire     Feeling of Stress : Not at all   Housing Stability: Unknown (10/21/2024)    Housing Stability Vital Sign     Unable to Pay for Housing in the Last Year: No     @4Ms for Medical Decision-Making in Older Adults    MOBILITY:  Activities of Daily Livin/10/2025     3:16 PM   Activities of Daily Living   Ambulation Independent   Dressing Independent   Transfers Independent   Toileting Continent of bowel;Continent of bladder   Feeding Independent   Cleaning home/Chores Independent   Telephone use Independent   Shopping Independent   Paying bills Independent   Taking meds Independent     Fall Risk:      4/10/2025     9:00 AM 2025     4:00 PM 2024     9:00 AM   Fall Risk Assessment - Outpatient   Mobility Status Ambulatory Ambulatory Ambulatory   Number of falls 0 0 0   Identified as fall risk False False False     Disability Status:      2020    12:41 PM    Disability Status   Are you deaf or do you have serious difficulty hearing? N   Are you blind or do you have serious difficulty seeing, even when wearing glasses? N   Because of a physical, mental, or emotional condition, do you have serious difficulty concentrating, remembering, or making decisions? N   Do you have serious difficulty walking or climbing stairs? N   Do you have difficulty dressing or bathing? N   Because of a physical, mental, or emotional condition, do you have difficulty doing errands alone such as visiting a doctor's office or shopping? N     Nutrition Screenin/10/2025     3:17 PM   Nutrition Screening   Has food intake declined over the past three months due to loss of appetite, digestive problems, chewing or swallowing difficulties? Moderate decrease in food intake   Involuntary weight loss during the last 3 months? Weight loss greater than 3 kg (6.6 pounds)   Mobility? Goes out   Has the patient suffered psychological stress or acute disease in the past three months? No   Neuropsychological problems? No psychological problems   Body Mass Index (BMI)?  BMI 23 or greater   Screening Score 10   Interpretation At risk of malnutrition    Screening Score: 0-7 Malnourished, 8-11 At Risk, 12-14 Normal  Get Up and Go:      4/10/2025     3:16 PM   Get Up and Go   Trial 1 10 seconds     Whisper Test:       No data to display                    MENTATION:   Has Dementia Dx: No  Has Anxiety Dx: No        MEDICATIONS:  High Risk Medications:  Total Active Medications: 1  amitriptyline - 25 MG    WHAT MATTERS MOST:    Advance Care Planning   ACP Status:   Patient has had an ACP conversation  Living Will: No  Power of : No  LaPOST: No    What is most important right now is to focus on remaining as independent as possible    Accordingly, we have decided that the best plan to meet the patient's goals includes continuing with treatment      What matters most to patient today is: Being as  independent as possible, is achieving her goals for weight loss, overall making her feel better.        Assessment and Plan:       Type 2 diabetes mellitus with hyperglycemia, with long-term current use of insulin  -Continues to improve A1c  -Usinng Dexcom   -     tirzepatide (MOUNJARO) 15 mg/0.5 mL PnIj; Inject 15 mg into the skin every 7 days. Diagnosis code e 11.65  Dispense: 4 Pen; Refill: 5    Hyperlipidemia, unspecified hyperlipidemia type   -Well controlled, continue:  -     atorvastatin (LIPITOR) 20 MG tablet; Take 1 tablet (20 mg total) by mouth once daily.  Dispense: 90 tablet; Refill: 3    Primary hypertension  Well controlled, check b/ps at home 120-130s systolics at home  -continue amlodipine, losartan metoprol    Anemia, unspecified type  On iron supplements, will recheck in june  -     CBC Auto Differential; Standing    Diabetic polyneuropathy associated with type 2 diabetes mellitus  Sensation slightly decrease in plantar area of foot , no lesions  Continue amtriptyline     Right knee pain, unspecified chronicity  -     Ambulatory referral/consult to Orthopedics; Future; Expected date: 04/17/2025                  Timi Orozco MD  Internal Medicine , PGY-3  [facility]  [address] [place] [id number]  [phone]

## 2025-04-11 NOTE — PROGRESS NOTES
I have reviewed the notes, assessments, and/or procedures performed by Dr. Orozco, I concur with his documentation of Aaliyah Angela.  Date of Service: 4/10/2025

## 2025-04-14 DIAGNOSIS — I10 PRIMARY HYPERTENSION: ICD-10-CM

## 2025-04-14 RX ORDER — METOPROLOL SUCCINATE 50 MG/1
50 TABLET, EXTENDED RELEASE ORAL DAILY
Qty: 90 TABLET | Refills: 3 | Status: SHIPPED | OUTPATIENT
Start: 2025-04-14

## 2025-04-14 NOTE — TELEPHONE ENCOUNTER
----- Message from Erika sent at 4/14/2025 11:46 AM CDT -----  Contact: [phone]  Requesting an RX refill or new RX.Is this a refill or new RX: RX name and strength metoprolol succinate (TOPROL-XL) 50 MG 24 hr tablet 90 tabletIs this a 30 day or 90 day RX: 90Pharmacy name and phone #  [id number] @ [facility] [address] [facility] Phone: [phone]: [phone] doctors have asked that we provide their patients with the following 2 reminders -- prescription refills can take up to 72 hours, and a friendly reminder that in the future you can use your [facility] account to request refills: yes

## 2025-04-16 DIAGNOSIS — I10 PRIMARY HYPERTENSION: ICD-10-CM

## 2025-04-16 RX ORDER — METOPROLOL SUCCINATE 50 MG/1
50 TABLET, EXTENDED RELEASE ORAL DAILY
Qty: 90 TABLET | Refills: 3 | OUTPATIENT
Start: 2025-04-16

## 2025-04-16 NOTE — TELEPHONE ENCOUNTER
----- Message from Ashley sent at 4/15/2025  2:59 PM CDT -----    Patient Returning CallWho Called:ptDoes the patient know what this is regarding?:metoprolol succinate (TOPROL-XL) 50 MG 24 hr tablet need approval to pharmacy Would the patient rather a call back or a response via [facility]? [facility] Call Back Number:[phone] Information: call back

## 2025-04-25 ENCOUNTER — TELEPHONE (OUTPATIENT)
Dept: INTERNAL MEDICINE | Facility: CLINIC | Age: 70
End: 2025-04-25
Payer: COMMERCIAL

## 2025-04-25 NOTE — TELEPHONE ENCOUNTER
----- Message from Debby sent at 4/25/2025 10:33 AM CDT -----  Contact: Self/ [phone]  Requesting an RX refill or new RX.Is this a refill or new RX: NewRX name and strength:  losartan (COZAAR) 100 MG tabletIs this a 30 day or 90 day RX: 90Pharmacy name and phone # :  [facility]/pharmacy #[id number] - [place], [place] - [id number] [id number]: [phone] Fax: [phone] The doctors have asked that we provide their patients with the following 2 reminders -- prescription refills can take up to 72 hours, and a friendly reminder that in the future you can use your [facility] account to request refills: Yes

## 2025-04-25 NOTE — TELEPHONE ENCOUNTER
Pt last saw Dr Orozco 4/10/25   Acitretin Pregnancy And Lactation Text: This medication is Pregnancy Category X and should not be given to women who are pregnant or may become pregnant in the future. This medication is excreted in breast milk.

## 2025-04-28 DIAGNOSIS — E11.65 TYPE 2 DIABETES MELLITUS WITH HYPERGLYCEMIA, WITHOUT LONG-TERM CURRENT USE OF INSULIN: ICD-10-CM

## 2025-04-28 DIAGNOSIS — I10 PRIMARY HYPERTENSION: ICD-10-CM

## 2025-04-29 ENCOUNTER — OFFICE VISIT (OUTPATIENT)
Dept: ORTHOPEDICS | Facility: CLINIC | Age: 70
End: 2025-04-29
Payer: COMMERCIAL

## 2025-04-29 ENCOUNTER — HOSPITAL ENCOUNTER (OUTPATIENT)
Dept: RADIOLOGY | Facility: HOSPITAL | Age: 70
Discharge: HOME | End: 2025-04-29
Attending: NURSE PRACTITIONER
Payer: COMMERCIAL

## 2025-04-29 DIAGNOSIS — M25.561 CHRONIC PAIN OF RIGHT KNEE: ICD-10-CM

## 2025-04-29 DIAGNOSIS — G89.29 CHRONIC PAIN OF RIGHT KNEE: Primary | ICD-10-CM

## 2025-04-29 DIAGNOSIS — M25.561 CHRONIC PAIN OF RIGHT KNEE: Primary | ICD-10-CM

## 2025-04-29 DIAGNOSIS — G89.29 CHRONIC PAIN OF RIGHT KNEE: ICD-10-CM

## 2025-04-29 DIAGNOSIS — M17.0 PRIMARY OSTEOARTHRITIS OF BOTH KNEES: ICD-10-CM

## 2025-04-29 PROCEDURE — 4010F ACE/ARB THERAPY RXD/TAKEN: CPT | Mod: CPTII,S$GLB,, | Performed by: NURSE PRACTITIONER

## 2025-04-29 PROCEDURE — 99999 PR PBB SHADOW E&M-EST. PATIENT-LVL III: CPT | Mod: PBBFAC,,, | Performed by: NURSE PRACTITIONER

## 2025-04-29 PROCEDURE — 20610 DRAIN/INJ JOINT/BURSA W/O US: CPT | Mod: 50,S$GLB,, | Performed by: NURSE PRACTITIONER

## 2025-04-29 PROCEDURE — 1160F RVW MEDS BY RX/DR IN RCRD: CPT | Mod: CPTII,S$GLB,, | Performed by: NURSE PRACTITIONER

## 2025-04-29 PROCEDURE — 3044F HG A1C LEVEL LT 7.0%: CPT | Mod: CPTII,S$GLB,, | Performed by: NURSE PRACTITIONER

## 2025-04-29 PROCEDURE — 1125F AMNT PAIN NOTED PAIN PRSNT: CPT | Mod: CPTII,S$GLB,, | Performed by: NURSE PRACTITIONER

## 2025-04-29 PROCEDURE — 99214 OFFICE O/P EST MOD 30 MIN: CPT | Mod: 25,S$GLB,, | Performed by: NURSE PRACTITIONER

## 2025-04-29 PROCEDURE — 1159F MED LIST DOCD IN RCRD: CPT | Mod: CPTII,S$GLB,, | Performed by: NURSE PRACTITIONER

## 2025-04-29 PROCEDURE — 73564 X-RAY EXAM KNEE 4 OR MORE: CPT | Mod: TC,RT

## 2025-04-29 RX ORDER — TRIAMCINOLONE ACETONIDE 40 MG/ML
80 INJECTION, SUSPENSION INTRA-ARTICULAR; INTRAMUSCULAR
Status: COMPLETED | OUTPATIENT
Start: 2025-04-29 | End: 2025-04-29

## 2025-04-29 RX ORDER — LOSARTAN POTASSIUM 100 MG/1
100 TABLET ORAL DAILY
Qty: 90 TABLET | Refills: 3 | Status: SHIPPED | OUTPATIENT
Start: 2025-04-29

## 2025-04-29 RX ADMIN — TRIAMCINOLONE ACETONIDE 80 MG: 40 INJECTION, SUSPENSION INTRA-ARTICULAR; INTRAMUSCULAR at 07:04

## 2025-04-29 NOTE — PROGRESS NOTES
CC: bilateral knee pain    HPI:   Pt with c/o bilateral knee pain. The right hurts more than the left. The pain in the right knee is over the lateral and medial joint lines and the left knee pain is located behind the knee. The pain is aching and worse with increased activity and better with rest. 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 She was seen in 2022 by Radha Wylie and had cortisone injections which helped her pain until now. She has taken advil and tylenol with minimal relief. She comes in today to repeat cortisone injections. She is ambulating without assistive device. There is not a limp.      ROS  General: denies fever and chills  Resp: no c/o sob  CVS: no c/o cp  MSK: c/o bilateral knee pain    PE  General: AAOx3, pleasant and cooperative  Resp: respirations even and unlabored  MSK: bilateral knee exam  5 degrees extension  100 degrees flexion  No warmth or erythema   - effusion  + crepitus  5/5 quad and hamstring strength     Xray:  Personally interpreted by me and reviewed with the patient: there are tricompartmental degenerative changes with medial space narrowing bilaterally    Assessment:  Bilateral knee djd    Plan:  Cortisone injections bilateral knees today  RICE  Tylenol or advil prn  F/u as needed    Knee Injection Procedure Note  Diagnosis: bilateral knee degenerative arthritis  Indications: bilateral knee pain  Procedure Details: Verbal consent was obtained for the procedure. The injection site was identified and the skin was prepared with alcohol. The bilateral knee was injected from an anterolateral approach with 1 ml of Kenalog and 4 ml Lidocaine each under sterile technique using a 25 gauge needle. The needle was removed and the area cleansed and dressed.  Complications:  Patient tolerated the procedure well.    she was advised to rest the knee today, using ice and elevation as needed for comfort and swelling.Immediate relief of  the knee pain may be short lived and secondary to the lidocaine. she may have an increase in discomfort tonight followed by steady improvement over the next several days. It may take 1-2 weeks following the injection to get the full benefit of the medication.

## 2025-05-18 NOTE — PROGRESS NOTES
HPI    DLS: 7/16/2024    Pt here for 9 Month Check;  Pt states no eye pain or discomfort and no vision changes.     Meds:  Refresh PRN OU    1) OHT vs Pre-perimetric POAG   2) Narrow Angles   3) Type 2 DM with PDR OU   4) H x VH OD   5) Hx TVO OS   6) PCIOL OU   7) PCO OS>OD     Last edited by Tomasa Montilla MA on 5/20/2025  2:33 PM.            Assessment /Plan     For exam results, see Encounter Report.    Anatomical narrow angle borderline glaucoma of both eyes    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 by proliferative retinopathy without macular edema, with long-term current use of insulin    Preretinal fibrosis, bilateral    Vitreous floaters of right eye    PCO (posterior capsular opacification), bilateral    Small pupil    PCO (posterior capsular opacification), left    Pseudophakia, both eyes         1. OHT vs Pre-perimetric POAG OU (angles  Narrowing over time)  Followed at [facility] since '04   First seen by Scarlet '08   Never on gtts   VF defects 2/2 PRP     Family history none   Glaucoma meds None   H/O adverse rxn to glaucoma drops none   LASERS Mulitple PRP OU // PI OD 2/9/2017 /// PI os 1/26/2017 // yag cap os - 3/13/2024  GLAUCOMA SURGERIES None   OTHER EYE SURGERIES PC IOL os 10/28/2020 // PC IOL od - 2/17/2021    CDR 0.3/0.35   Tbase 15-23/15-25   Tmax 25 OU   Ttarget ??   HVF 11 tests: 2008 to 2024 - SAD/IAD OU 2/2 PRP OU   Gonio  + 3 ou post laser PI's and PC IOL's   /575   OCT 4 tests: 2017 to 2024 -  RNFL nl od // nl os    HRT 7 tests: 2009 to 2020 -MR -  nl od // nl os /// CDR 0.33 od // 0.26 os   Disc photos 2002, 2003, 2005 (slides) // 2008, 2013  (OIS)     - Ttoday  17/17  - Test done today -IOP // gonio / PCO check     2. Narrow Angles:  +1-3 w/ bowing  s/p PI. Likely will resolve in future once pseudophakic.   Angle more open post PC IOL ou     3. PDR OU s/p PDR OU -stable exam on last eval w/ Retina on 7/25/13.   S/p avastin x 4 od - last 1/4/2016     4. Hx of VH OD -stable.  "Last seen by retina - jesse 1/4/2016    5. Hx of Transient Visual Obscurations OS - ? BP elevation. Seen w/ stable Retinal exam on 9/13/12.    6. PC IOL OU - Kp   OS 10/28/2020 - PCB00 23.0   OD 2/17/2021 - PCB00 23.5     7. Had a gastric "sleve" done 12/18/2015 - for weight loss    So far doing well    On less insulin now     8. PCO - OS > OD   Dense os - rec yag cap   Mild to mod od - monitor for now - consider yag cap if left eye does well     Plan:  Stable IOP (thick k's),  Cont no gtts  Continue to work on blood sugars  oht vs glaucoma    Angles continue narrow but open     S/P PI  os 1/26/2017 -   S/P PI od - 2/9/2017 -      Phaco / IOL OS Date: 10/28/2020 - PCB00 23.0 - trypan / medium pupil - extra viscoat   POY > 3 yrears  - phaco/IOL   Doing well    Cont with regular F/U's with Dr. Mckeon - yearly     Phaco / IOL OD Date: 2/17/2021 - PCB00 23.5  POY > 4 -- phaco/IOL   Doing well    Benevento - stable exam - F/U yearly or sooner prn     Pt does not feel yag cap needs doing od at this time - monitor     Glasses - has seen Evangelista in past     Glaucoma suspect   VF changes 2/2 PRP  IOP ok off gtts  OCT RNFL nl ou   PI's patent   PC IOL ou   Angles open now     Low risk - F/u 9-12 months IOP and gonio - monitor PCO OD   F/U 1 year HVF / DFE / OCT - diabetic retina check     I have seen and personally examined the patient.  I agree with the findings, assessment and plan of the resident and/or fellow.     Alma Goodrich MD      "

## 2025-05-20 ENCOUNTER — OFFICE VISIT (OUTPATIENT)
Dept: OPHTHALMOLOGY | Facility: CLINIC | Age: 70
End: 2025-05-20
Payer: COMMERCIAL

## 2025-05-20 DIAGNOSIS — H26.492 PCO (POSTERIOR CAPSULAR OPACIFICATION), LEFT: ICD-10-CM

## 2025-05-20 DIAGNOSIS — Z79.4 TYPE 2 DIABETES MELLITUS WITH BOTH EYES AFFECTED BY PROLIFERATIVE RETINOPATHY WITHOUT MACULAR EDEMA, WITH LONG-TERM CURRENT USE OF INSULIN: ICD-10-CM

## 2025-05-20 DIAGNOSIS — E11.3593 TYPE 2 DIABETES MELLITUS WITH BOTH EYES AFFECTED BY PROLIFERATIVE RETINOPATHY WITHOUT MACULAR EDEMA, WITH LONG-TERM CURRENT USE OF INSULIN: ICD-10-CM

## 2025-05-20 DIAGNOSIS — Z96.1 PSEUDOPHAKIA, BOTH EYES: ICD-10-CM

## 2025-05-20 DIAGNOSIS — H43.391 VITREOUS FLOATERS OF RIGHT EYE: ICD-10-CM

## 2025-05-20 DIAGNOSIS — H35.373 PRERETINAL FIBROSIS, BILATERAL: ICD-10-CM

## 2025-05-20 DIAGNOSIS — H40.033 ANATOMICAL NARROW ANGLE BORDERLINE GLAUCOMA OF BOTH EYES: Primary | ICD-10-CM

## 2025-05-20 DIAGNOSIS — H57.03 SMALL PUPIL: ICD-10-CM

## 2025-05-20 DIAGNOSIS — H26.493 PCO (POSTERIOR CAPSULAR OPACIFICATION), BILATERAL: ICD-10-CM

## 2025-05-20 PROCEDURE — 1160F RVW MEDS BY RX/DR IN RCRD: CPT | Mod: CPTII,S$GLB,, | Performed by: OPHTHALMOLOGY

## 2025-05-20 PROCEDURE — 3288F FALL RISK ASSESSMENT DOCD: CPT | Mod: CPTII,S$GLB,, | Performed by: OPHTHALMOLOGY

## 2025-05-20 PROCEDURE — 3044F HG A1C LEVEL LT 7.0%: CPT | Mod: CPTII,S$GLB,, | Performed by: OPHTHALMOLOGY

## 2025-05-20 PROCEDURE — 99999 PR PBB SHADOW E&M-EST. PATIENT-LVL III: CPT | Mod: PBBFAC,,, | Performed by: OPHTHALMOLOGY

## 2025-05-20 PROCEDURE — 92014 COMPRE OPH EXAM EST PT 1/>: CPT | Mod: S$GLB,,, | Performed by: OPHTHALMOLOGY

## 2025-05-20 PROCEDURE — 1159F MED LIST DOCD IN RCRD: CPT | Mod: CPTII,S$GLB,, | Performed by: OPHTHALMOLOGY

## 2025-05-20 PROCEDURE — 1101F PT FALLS ASSESS-DOCD LE1/YR: CPT | Mod: CPTII,S$GLB,, | Performed by: OPHTHALMOLOGY

## 2025-05-20 PROCEDURE — 92020 GONIOSCOPY: CPT | Mod: S$GLB,,, | Performed by: OPHTHALMOLOGY

## 2025-05-20 PROCEDURE — 1126F AMNT PAIN NOTED NONE PRSNT: CPT | Mod: CPTII,S$GLB,, | Performed by: OPHTHALMOLOGY

## 2025-05-20 PROCEDURE — 4010F ACE/ARB THERAPY RXD/TAKEN: CPT | Mod: CPTII,S$GLB,, | Performed by: OPHTHALMOLOGY

## 2025-06-21 ENCOUNTER — LAB VISIT (OUTPATIENT)
Dept: LAB | Facility: HOSPITAL | Age: 70
End: 2025-06-21
Payer: COMMERCIAL

## 2025-06-21 DIAGNOSIS — E11.3593 TYPE 2 DIABETES MELLITUS WITH BOTH EYES AFFECTED BY PROLIFERATIVE RETINOPATHY WITHOUT MACULAR EDEMA, WITHOUT LONG-TERM CURRENT USE OF INSULIN: ICD-10-CM

## 2025-06-21 DIAGNOSIS — I10 PRIMARY HYPERTENSION: ICD-10-CM

## 2025-06-21 LAB
ALBUMIN/CREAT UR: NORMAL
CREAT UR-MCNC: 92 MG/DL (ref 15–325)
EAG (OHS): 131 MG/DL (ref 68–131)
HBA1C MFR BLD: 6.2 % (ref 4–5.6)
MICROALBUMIN UR-MCNC: <5 UG/ML (ref ?–5000)

## 2025-06-21 PROCEDURE — 82570 ASSAY OF URINE CREATININE: CPT

## 2025-06-21 PROCEDURE — 83036 HEMOGLOBIN GLYCOSYLATED A1C: CPT

## 2025-06-21 PROCEDURE — 36415 COLL VENOUS BLD VENIPUNCTURE: CPT

## 2025-06-23 ENCOUNTER — RESULTS FOLLOW-UP (OUTPATIENT)
Dept: DIABETES | Facility: CLINIC | Age: 70
End: 2025-06-23

## 2025-06-23 DIAGNOSIS — Z00.00 ENCOUNTER FOR MEDICARE ANNUAL WELLNESS EXAM: ICD-10-CM

## 2025-06-26 ENCOUNTER — TELEPHONE (OUTPATIENT)
Dept: INTERNAL MEDICINE | Facility: CLINIC | Age: 70
End: 2025-06-26
Payer: COMMERCIAL

## 2025-06-26 DIAGNOSIS — Z79.4 TYPE 2 DIABETES MELLITUS WITH HYPERGLYCEMIA, WITH LONG-TERM CURRENT USE OF INSULIN: Primary | ICD-10-CM

## 2025-06-26 DIAGNOSIS — E11.65 TYPE 2 DIABETES MELLITUS WITH HYPERGLYCEMIA, WITH LONG-TERM CURRENT USE OF INSULIN: Primary | ICD-10-CM

## 2025-06-26 NOTE — TELEPHONE ENCOUNTER
----- Message from DAMARI Lozano FNP sent at 6/26/2025  4:33 PM CDT -----  Regarding: [email or web address]  Reschedule pt  Thanks  Maribell   A1c looks great    She can reschedule --- A1c placed-sept f/u

## 2025-06-28 RX ORDER — BLOOD-GLUCOSE,RECEIVER,CONT
EACH MISCELLANEOUS
Qty: 1 EACH | Refills: 1 | Status: SHIPPED | OUTPATIENT
Start: 2025-06-28

## 2025-06-28 NOTE — TELEPHONE ENCOUNTER
Refill Routing Note   Medication(s) are not appropriate for processing by [facility] for the following reason(s):        Non-participating provider    [facility] action(s):  Route               Appointments  past 12m or future 3m with PCP    Date Provider   Last Visit   2/11/2025 Maribell Noble APRN, FNP   Next Visit   Visit date not found Maribell Noble APRN, FNP   ED visits in past 90 days: 0        Note composed:8:16 PM 06/27/2025

## 2025-07-14 ENCOUNTER — OFFICE VISIT (OUTPATIENT)
Dept: INTERNAL MEDICINE | Facility: CLINIC | Age: 70
End: 2025-07-14
Payer: COMMERCIAL

## 2025-07-14 ENCOUNTER — TELEPHONE (OUTPATIENT)
Dept: INTERNAL MEDICINE | Facility: CLINIC | Age: 70
End: 2025-07-14

## 2025-07-14 VITALS
SYSTOLIC BLOOD PRESSURE: 134 MMHG | DIASTOLIC BLOOD PRESSURE: 76 MMHG | HEIGHT: 66 IN | HEART RATE: 80 BPM | BODY MASS INDEX: 35.72 KG/M2 | OXYGEN SATURATION: 98 % | WEIGHT: 222.25 LBS

## 2025-07-14 DIAGNOSIS — E11.3593 TYPE 2 DIABETES MELLITUS WITH BOTH EYES AFFECTED BY PROLIFERATIVE RETINOPATHY WITHOUT MACULAR EDEMA, WITHOUT LONG-TERM CURRENT USE OF INSULIN: ICD-10-CM

## 2025-07-14 DIAGNOSIS — E11.65 TYPE 2 DIABETES MELLITUS WITH HYPERGLYCEMIA, WITH LONG-TERM CURRENT USE OF INSULIN: Primary | ICD-10-CM

## 2025-07-14 DIAGNOSIS — Z79.4 TYPE 2 DIABETES MELLITUS WITH HYPERGLYCEMIA, WITH LONG-TERM CURRENT USE OF INSULIN: Primary | ICD-10-CM

## 2025-07-14 DIAGNOSIS — I10 PRIMARY HYPERTENSION: ICD-10-CM

## 2025-07-14 DIAGNOSIS — L03.115 CELLULITIS OF RIGHT LOWER EXTREMITY: Primary | ICD-10-CM

## 2025-07-14 PROBLEM — U07.1 COVID: Status: RESOLVED | Noted: 2022-06-24 | Resolved: 2025-07-14

## 2025-07-14 PROCEDURE — 3288F FALL RISK ASSESSMENT DOCD: CPT | Mod: CPTII,S$GLB,, | Performed by: INTERNAL MEDICINE

## 2025-07-14 PROCEDURE — 3044F HG A1C LEVEL LT 7.0%: CPT | Mod: CPTII,S$GLB,, | Performed by: INTERNAL MEDICINE

## 2025-07-14 PROCEDURE — 3061F NEG MICROALBUMINURIA REV: CPT | Mod: CPTII,S$GLB,, | Performed by: INTERNAL MEDICINE

## 2025-07-14 PROCEDURE — 99999 PR PBB SHADOW E&M-EST. PATIENT-LVL V: CPT | Mod: PBBFAC,,, | Performed by: INTERNAL MEDICINE

## 2025-07-14 PROCEDURE — 1126F AMNT PAIN NOTED NONE PRSNT: CPT | Mod: CPTII,S$GLB,, | Performed by: INTERNAL MEDICINE

## 2025-07-14 PROCEDURE — 1159F MED LIST DOCD IN RCRD: CPT | Mod: CPTII,S$GLB,, | Performed by: INTERNAL MEDICINE

## 2025-07-14 PROCEDURE — 3008F BODY MASS INDEX DOCD: CPT | Mod: CPTII,S$GLB,, | Performed by: INTERNAL MEDICINE

## 2025-07-14 PROCEDURE — 4010F ACE/ARB THERAPY RXD/TAKEN: CPT | Mod: CPTII,S$GLB,, | Performed by: INTERNAL MEDICINE

## 2025-07-14 PROCEDURE — 3078F DIAST BP <80 MM HG: CPT | Mod: CPTII,S$GLB,, | Performed by: INTERNAL MEDICINE

## 2025-07-14 PROCEDURE — 3066F NEPHROPATHY DOC TX: CPT | Mod: CPTII,S$GLB,, | Performed by: INTERNAL MEDICINE

## 2025-07-14 PROCEDURE — 99214 OFFICE O/P EST MOD 30 MIN: CPT | Mod: S$GLB,,, | Performed by: INTERNAL MEDICINE

## 2025-07-14 PROCEDURE — 1101F PT FALLS ASSESS-DOCD LE1/YR: CPT | Mod: CPTII,S$GLB,, | Performed by: INTERNAL MEDICINE

## 2025-07-14 PROCEDURE — 1160F RVW MEDS BY RX/DR IN RCRD: CPT | Mod: CPTII,S$GLB,, | Performed by: INTERNAL MEDICINE

## 2025-07-14 PROCEDURE — 3075F SYST BP GE 130 - 139MM HG: CPT | Mod: CPTII,S$GLB,, | Performed by: INTERNAL MEDICINE

## 2025-07-14 RX ORDER — DOXYCYCLINE 100 MG/1
100 CAPSULE ORAL EVERY 12 HOURS
Qty: 28 CAPSULE | Refills: 0 | Status: SHIPPED | OUTPATIENT
Start: 2025-07-14 | End: 2025-07-28

## 2025-07-14 NOTE — PROGRESS NOTES
Assessment & Plan  Assessment & Plan    IMPRESSION:  - Diagnosed cellulitis of the leg based on report of bite (possibly ant), tenderness, heat, and swelling.  - Considered adjusting BP medication regimen due to reports of occasionally low BP, but deferred changes pending enrollment in digital monitoring program.  - Noted potential need to adjust diabetes medications (Mounjaro, Jardiance) as they may be affecting BP.    PLAN SUMMARY:  - Prescribed doxycycline for 14 days to treat cellulitis  - Advised on increased photosensitivity and importance of photoprotection while on medication  - Recommend restarting digital blood pressure monitoring program  - Instructed to take medication every 12 hours with food  - Photographic documentation obtained for comparison  - Potential adjustment of diabetes medications (Mounjaro and Jardiance) due to effects on blood pressure  - Follow-up to assess response to treatment and adjust medications as needed    CELLULITIS OF LEG:  - Ms. Angela presents with tenderness, swelling, and heat in the leg with peeled skin.  - Examination confirmed infection.  - Ms. Angela has been using A+D ointment and cocoa butter.  - Prescribed doxycycline for 14 days based on previous successful treatment.  - Instructed to take medication every 12 hours with food (can be timed with breakfast and dinner) to reduce GI side effects.  - Advised about increased photosensitivity and importance of photoprotection while on medication.  - Photographic documentation obtained for comparison if condition does not respond to treatment.    INSECT BITE:  - Ms. Angela reports ant bites in the backyard as the cause of the cellulitis.    HYPERTENSION:  - Blood pressure readings: 108/50-something last night (leading to skipped medication dose), 128/77 this morning, and 134/76 during current visit.  - Ms. Valeros blood pressure fluctuates, occasionally requiring dose adjustments.  - Current regimen includes metoprolol  (morning), amlodipine (midday), and losartan (night).  - Recommend restarting digital blood pressure monitoring program to track readings and guide medication adjustments.    TYPE 2 DIABETES:  - Ms. Angela currently on Mounjaro and Jardiance for diabetes management.  - Discussed potential adjustment of diabetes medications due to their effects on blood pressure.    GENERAL EXAMINATION:  - Heart and lung auscultation normal.         1. Cellulitis of right lower extremity  -     doxycycline (VIBRAMYCIN) 100 MG Cap; Take 1 capsule (100 mg total) by mouth every 12 (twelve) hours. for 14 days  Dispense: 28 capsule; Refill: 0    2. Primary hypertension  -     Hypertension Digital Medicine (HDMP) Enrollment Order    3. 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 by proliferative retinopathy without macular edema, without long-term current use of insulin  -     Diabetes Digital Medicine (DDMP) Enrollment Order          Follow-up: No follow-ups on file.    This note was generated with the assistance of ambient listening technology. Verbal consent was obtained by the patient and accompanying visitor(s) for the recording of patient appointment to facilitate this note. I attest to having reviewed and edited the generated note for accuracy, though some syntax or spelling errors may persist. Please contact the author of this note for any clarification.      ______________________________________________________________________    Chief Complaint  Chief Complaint   Patient presents with    Leg Swelling       History of Present Illness    CHIEF COMPLAINT:  Ms. Angela presents today with concern for cellulitis of her leg.    LEG CELLULITIS:  She reports current leg cellulitis originating from an ant bite in her backyard. The affected area is tender and hot. Leg swelling occurs with activity, appearing normal in the morning but increasing throughout the day. She has been self-treating with A+D ointment, antibiotic ointment, and  "cocoa butter. She had a previous episode of cellulitis two months ago requiring two antibiotic treatments. Initially treated with Keflex 500 mg 3 times daily for 10 days, which was ineffective. Subsequently treated with doxycycline for 14 days, which successfully resolved the previous infection. This is not a recurring condition prior to these two recent episodes.    HYPERTENSION:  She takes three blood pressure medications: metoprolol in the morning, amlodipine midday, and losartan at night. The medication schedule is intentionally staggered to prevent significant blood pressure fluctuations. She occasionally skips evening doses when home blood pressure readings are low, specifically when readings are around 108/50. Her most recent home blood pressure reading was 128/77, which she describes as good. She previously used a digital blood pressure monitoring program but discontinued due to equipment malfunction. She sometimes skips evening medication doses up to 3 times when blood pressure is low.    CURRENT MEDICATIONS:  She is currently taking Mounjaro, Jardiance, and Metformin. Metformin may potentially be discontinued per her recent discussion with prior physician.                ROS  Review of Systems        Physical Exam  Vitals:    07/14/25 1001 07/14/25 1018   BP: (!) 144/64 134/76   BP Location: Left arm    Patient Position: Sitting    Pulse: 80    SpO2: 98%    Weight: 100.8 kg (222 lb 3.6 oz)    Height: 5' 6" (1.676 m)     Body mass index is 35.87 kg/m².  Weight: 100.8 kg (222 lb 3.6 oz)   Height: 5' 6" (167.6 cm)   Physical Exam  Constitutional:       General: She is not in acute distress.     Appearance: She is well-developed.   HENT:      Head: Normocephalic and atraumatic.   Eyes:      General: Lids are normal. No scleral icterus.     Conjunctiva/sclera: Conjunctivae normal.      Pupils: Pupils are equal, round, and reactive to light.   Neck:      Thyroid: No thyromegaly.      Vascular: No carotid bruit " or JVD.   Cardiovascular:      Rate and Rhythm: Normal rate and regular rhythm.      Pulses: Normal pulses.      Heart sounds: Normal heart sounds. No murmur heard.     No friction rub. No S3 or S4 sounds.   Pulmonary:      Effort: Pulmonary effort is normal.      Breath sounds: Normal breath sounds. No wheezing, rhonchi or rales.   Abdominal:      General: Bowel sounds are normal.      Palpations: Abdomen is soft.      Tenderness: There is no guarding.   Musculoskeletal:      Cervical back: Full passive range of motion without pain and neck supple.      Right lower leg: No edema.      Left lower leg: No edema.   Skin:     General: Skin is warm and dry.      Findings: Lesion (red, hot, and TTP of the lower right lateral leg.  See photos) present. No rash.   Neurological:      Mental Status: She is alert and oriented to person, place, and time.   Psychiatric:         Speech: Speech normal.         Behavior: Behavior normal.         Thought Content: Thought content normal.

## 2025-07-26 ENCOUNTER — LAB VISIT (OUTPATIENT)
Dept: LAB | Facility: HOSPITAL | Age: 70
End: 2025-07-26
Payer: COMMERCIAL

## 2025-07-26 DIAGNOSIS — Z79.4 TYPE 2 DIABETES MELLITUS WITH HYPERGLYCEMIA, WITH LONG-TERM CURRENT USE OF INSULIN: ICD-10-CM

## 2025-07-26 DIAGNOSIS — E11.65 TYPE 2 DIABETES MELLITUS WITH HYPERGLYCEMIA, WITH LONG-TERM CURRENT USE OF INSULIN: ICD-10-CM

## 2025-07-26 LAB
EAG (OHS): 134 MG/DL (ref 68–131)
HBA1C MFR BLD: 6.3 % (ref 4–5.6)

## 2025-07-26 PROCEDURE — 83036 HEMOGLOBIN GLYCOSYLATED A1C: CPT

## 2025-07-26 PROCEDURE — 36415 COLL VENOUS BLD VENIPUNCTURE: CPT

## 2025-07-27 ENCOUNTER — PATIENT MESSAGE (OUTPATIENT)
Dept: INTERNAL MEDICINE | Facility: CLINIC | Age: 70
End: 2025-07-27
Payer: COMMERCIAL

## 2025-07-31 NOTE — PROGRESS NOTES
CHIEF COMPLAINT: Type 2 Diabetes     HPI: Mrs. Aaliyah Angela is a 69 y.o. female who was diagnosed with gestational diabetes age 20s, Type 2 DM age 30s.  Has h/o gastric sleeve 2015.-in [place]   Past Medical History:   Diagnosis Date    Allergy     Anatomical narrow angle borderline glaucoma     AR (allergic rhinitis)     Arthritis     CAD (coronary artery disease) 04/22/2013    Non obstructive, 2012 PET     COVID 06/24/2022    Diabetes mellitus type II     with retinopathy    Diabetic retinopathy     Epiretinal membrane, both eyes     Hyperlipidemia     Hypertensive retinopathy of both eyes     Lumbar disc disease     Migraines     Morbid obesity 10/03/2012    BRADFORD (obstructive sleep apnea) 08/04/2015    Proliferative diabetic retinopathy(362.02)     PVD (peripheral vascular disease) 04/22/2013    Carotid US 1-39% bilat 2012     Rupture of right Achilles tendon     Unspecified essential hypertension     Unspecified vitamin D deficiency     Vitreous hemorrhage of left eye      Last seen by me in early 2025.   Dealing with acid reflux after cellulitis treatment.  Doing well with mounjaro.   Dexcom g7 -  Gmi 6.9%   Avg 149 mg/dl  Sd 37 mg/dl  Co eff 25.2%   TIR 82%  Loss 6#  A1c looks great.    (R) leg cellulitis healed well, R hand doing better.   Now working q other week, 5 days   Lab Results   Component Value Date    HGBA1C 6.3 (H) 07/26/2025     H/o hypoglycemia, bg < 54 mg/dl   Eats snacks  Meals 2 /day   Brother passed away, h/o cva.   (L) leg cellulitis-fall 2023, (R) hand sx 1/5/24, infection, placed on antibiotics.  Doing better.   (R) leg- watching site    Has f/u with endocrinology for pth, calcium -hyperparathyroidism-annually  -last pth normal    On MDI injections 3 x a day -correction scale   Testing 4 x a day max  Patient is willing and able to use the device  Demonstrated an understanding of the technology and is motivated to use CGM  Patient expected to adhere to a comprehensive diabetes  "treatment plan and patient has adequate medical supervision  Multiple impaired awareness of hypoglycemia (hypoglycemia unawareness)    Current meds: mounjaro 15 mg weekly, metformin 1000 mg bid, jardiance 25 mg daily, humalog correction scale 150-200+2, etc  -self adjustment per correction scale    Diabetes Management Status    Statin: Taking  ACE/ARB: Taking    Screening or Prevention Patient's value Goal Complete/Controlled?   HgA1C Testing and Control   Lab Results   Component Value Date    HGBA1C 6.3 (H) 07/26/2025      Annually/Less than 8% No   Lipid profile : 09/28/2024 Annually No   LDL control Lab Results   Component Value Date    LDLCALC 53.6 (L) 09/28/2024    Annually/Less than 100 mg/dl  No   Nephropathy screening Lab Results   Component Value Date    LABMICR <5.0 06/21/2025     Lab Results   Component Value Date    PROTEINUA Negative 12/13/2022    Annually Yes   Blood pressure BP Readings from Last 1 Encounters:   08/01/25 122/64    Less than 140/90 No   Dilated retinal exam : 05/20/2025 Annually Yes   Foot exam   : 08/01/2025 Annually Yes     Cataract removal sx (L) eye  Then (R) eye     REVIEW OF SYSTEMS  General: no weakness, fatigue, + weight changes 6# loss   Eyes: no double or blurred vision, eye pain, or redness; Last Eye Exam 2021, h/o cataract sx, retinal/glaucoma specialist  Cardiovascular:  No chest pain, palpitations, +trace edema-BLE, or murmurs.   Respiratory: no cough or dyspnea.   GI/: + heartburn, nausea, or changes in bowel patterns; good appetite. +GERD-nexium and pepcid, off zantac   Skin: no rashes, dryness, itching, or reactions at insulin injection sites.  Neuro: + numbness, tingling-off gabapentin, on amitriptyline,  tremors, or vertigo. +OA in knees, +right hand sx (R)  Endocrine: no polyuria, polydipsia, polyphagia, heat or cold intolerance.     Vital Signs  /64 (BP Location: Left arm, Patient Position: Sitting)   Pulse 88   Ht 5' 6" (1.676 m)   Wt 100.2 kg (220 lb " 14.4 oz)   SpO2 100%   BMI 35.65 kg/m²     Hemoglobin A1C   Date Value Ref Range Status   02/08/2025 6.3 (H) 4.0 - 5.6 % Final     Comment:     ADA Screening Guidelines:  5.7-6.4%  Consistent with prediabetes  >or=6.5%  Consistent with diabetes    High levels of fetal hemoglobin interfere with the HbA1C  assay. Heterozygous hemoglobin variants (HbS, HgC, etc)do  not significantly interfere with this assay.   However, presence of multiple variants may affect accuracy.     09/28/2024 6.8 (H) 4.0 - 5.6 % Final     Comment:     ADA Screening Guidelines:  5.7-6.4%  Consistent with prediabetes  >or=6.5%  Consistent with diabetes    High levels of fetal hemoglobin interfere with the HbA1C  assay. Heterozygous hemoglobin variants (HbS, HgC, etc)do  not significantly interfere with this assay.   However, presence of multiple variants may affect accuracy.     05/30/2024 7.3 (H) 4.0 - 5.6 % Final     Comment:     ADA Screening Guidelines:  5.7-6.4%  Consistent with prediabetes  >or=6.5%  Consistent with diabetes    High levels of fetal hemoglobin interfere with the HbA1C  assay. Heterozygous hemoglobin variants (HbS, HgC, etc)do  not significantly interfere with this assay.   However, presence of multiple variants may affect accuracy.       Hemoglobin A1c   Date Value Ref Range Status   07/26/2025 6.3 (H) 4.0 - 5.6 % Final     Comment:     ADA Screening Guidelines:  5.7-6.4%  Consistent with prediabetes  >=6.5%  Consistent with diabetes    High levels of fetal hemoglobin interfere with the HbA1C  assay. Heterozygous hemoglobin variants (HbS, HgC, etc)do  not significantly interfere with this assay.   However, presence of multiple variants may affect accuracy.   06/21/2025 6.2 (H) 4.0 - 5.6 % Final     Comment:     ADA Screening Guidelines:  5.7-6.4%  Consistent with prediabetes  >=6.5%  Consistent with diabetes    High levels of fetal hemoglobin interfere with the HbA1C  assay. Heterozygous hemoglobin variants (HbS, HgC,  etc)do  not significantly interfere with this assay.   However, presence of multiple variants may affect accuracy.        Chemistry        Component Value Date/Time     04/08/2023 1032    K 4.6 04/08/2023 1032     04/08/2023 1032    CO2 26 04/08/2023 1032    BUN 14 04/08/2023 1032    CREATININE 0.9 04/08/2023 1032    GLU 95 04/08/2023 1032        Component Value Date/Time    CALCIUM 9.5 04/08/2023 1032    ALKPHOS 70 02/06/2023 1422    AST 16 02/06/2023 1422    ALT 13 02/06/2023 1422    BILITOT 0.2 02/06/2023 1422    ESTGFRAFRICA >60.0 03/19/2022 0823    EGFRNONAA >60.0 03/19/2022 0823           Lab Results   Component Value Date    TSH 1.683 05/30/2024      Lab Results   Component Value Date    CHOL 111 (L) 09/28/2024    CHOL 112 (L) 05/30/2024    CHOL 119 (L) 02/06/2023     Lab Results   Component Value Date    HDL 49 09/28/2024    HDL 47 05/30/2024    HDL 49 02/06/2023     Lab Results   Component Value Date    LDLCALC 53.6 (L) 09/28/2024    LDLCALC 52.8 (L) 05/30/2024    LDLCALC 54.0 (L) 02/06/2023     Lab Results   Component Value Date    TRIG 42 09/28/2024    TRIG 61 05/30/2024    TRIG 80 02/06/2023     Lab Results   Component Value Date    CHOLHDL 44.1 09/28/2024    CHOLHDL 42.0 05/30/2024    CHOLHDL 41.2 02/06/2023       PHYSICAL EXAMINATION  Constitutional: Appears well, no distress  Neck: Supple, trachea midline.   Respiratory: No wheezes, even and unlabored.  Cardiovascular: deferred  Lymph: R leg- edema (mild)  Skin: warm and dry; no injection site reactions, no acanthosis nigracans observed.  Neuro:patient alert and cooperative, normal affect; steady gait.    Diabetes Foot Exam: deferred     Assessment/Plan  1. Type 2 diabetes mellitus with hyperglycemia, with long-term current use of insulin  Hemoglobin A1C next time   F/u in 4 months w/ me   A1c goal less than 7%  Continue regimen above   TIR > 70%         2. 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 by proliferative retinopathy  without macular edema, without long-term current use of insulin  F/u with retinal specialist  Stable       3. Diabetic polyneuropathy associated with type 2 diabetes mellitus  Ambulatory referral/consult to Podiatry  Stable       4. PVD (peripheral vascular disease)  F/u with vascular   Stable       5. BRADFORD on CPAP  Not consistent   May affect bg, metabolism, cardiovascular system       6. Obesity, Class II, BMI 35-39.9  This condition increases insulin resistance  Encourage exercise 3-5 x a week, goal 150 minutes or more/week  If limited, encourage chair exercises -via youtube  Movement is key, walk after meals 15-30 minutes   Watch portions, protein  gm /day, carbs  gm /day, more fiber  Hydrate well, at least 64 oz, half body weight (lbs) in ounces per day  Look at resources per AVS w/ apps/websites   On mounjaro       7. Hypertensive retinopathy of both eyes  F/u with retinal specialist  Stable       8. History of bariatric surgery, 12-  See above  On mounjaro       9. Coronary artery disease involving native coronary artery of native heart without angina pectoris  F/u with cards   Stable

## 2025-08-01 ENCOUNTER — OFFICE VISIT (OUTPATIENT)
Dept: INTERNAL MEDICINE | Facility: CLINIC | Age: 70
End: 2025-08-01
Payer: COMMERCIAL

## 2025-08-01 ENCOUNTER — TELEPHONE (OUTPATIENT)
Dept: INTERNAL MEDICINE | Facility: CLINIC | Age: 70
End: 2025-08-01
Payer: COMMERCIAL

## 2025-08-01 VITALS
WEIGHT: 220.88 LBS | OXYGEN SATURATION: 100 % | HEIGHT: 66 IN | BODY MASS INDEX: 35.5 KG/M2 | SYSTOLIC BLOOD PRESSURE: 122 MMHG | DIASTOLIC BLOOD PRESSURE: 64 MMHG | HEART RATE: 88 BPM

## 2025-08-01 DIAGNOSIS — H35.033 HYPERTENSIVE RETINOPATHY OF BOTH EYES: ICD-10-CM

## 2025-08-01 DIAGNOSIS — E11.42 DIABETIC POLYNEUROPATHY ASSOCIATED WITH TYPE 2 DIABETES MELLITUS: ICD-10-CM

## 2025-08-01 DIAGNOSIS — G47.33 OSA ON CPAP: ICD-10-CM

## 2025-08-01 DIAGNOSIS — E66.812 OBESITY, CLASS II, BMI 35-39.9: ICD-10-CM

## 2025-08-01 DIAGNOSIS — E11.65 TYPE 2 DIABETES MELLITUS WITH HYPERGLYCEMIA, WITH LONG-TERM CURRENT USE OF INSULIN: Primary | ICD-10-CM

## 2025-08-01 DIAGNOSIS — Z79.4 TYPE 2 DIABETES MELLITUS WITH HYPERGLYCEMIA, WITH LONG-TERM CURRENT USE OF INSULIN: Primary | ICD-10-CM

## 2025-08-01 DIAGNOSIS — I73.9 PVD (PERIPHERAL VASCULAR DISEASE): ICD-10-CM

## 2025-08-01 DIAGNOSIS — I25.10 CORONARY ARTERY DISEASE INVOLVING NATIVE CORONARY ARTERY OF NATIVE HEART WITHOUT ANGINA PECTORIS: ICD-10-CM

## 2025-08-01 DIAGNOSIS — Z98.84 HISTORY OF BARIATRIC SURGERY: ICD-10-CM

## 2025-08-01 DIAGNOSIS — E11.3593 TYPE 2 DIABETES MELLITUS WITH BOTH EYES AFFECTED BY PROLIFERATIVE RETINOPATHY WITHOUT MACULAR EDEMA, WITHOUT LONG-TERM CURRENT USE OF INSULIN: ICD-10-CM

## 2025-08-01 PROCEDURE — 99999 PR PBB SHADOW E&M-EST. PATIENT-LVL V: CPT | Mod: PBBFAC,,, | Performed by: NURSE PRACTITIONER

## 2025-08-01 NOTE — TELEPHONE ENCOUNTER
Copied from CRM #[id number]. Topic: General Inquiry - Patient Advice  >> Aug 1, 2025  2:17 PM Karli wrote:  .[email or web address]     Patient is calling for Medical Advice regarding: pt is calling to advise that she is running behind but she is on her way     How long has patient had these symptoms:    Pharmacy name and phone#:    [facility] provide patient's call back phone number:    Comments:    Please advise patient replies from provider may take up to 48 hours.

## 2025-08-07 ENCOUNTER — TELEPHONE (OUTPATIENT)
Dept: INTERNAL MEDICINE | Facility: CLINIC | Age: 70
End: 2025-08-07
Payer: COMMERCIAL

## 2025-08-07 DIAGNOSIS — Z12.31 ENCOUNTER FOR SCREENING MAMMOGRAM FOR MALIGNANT NEOPLASM OF BREAST: Primary | ICD-10-CM

## 2025-08-07 NOTE — TELEPHONE ENCOUNTER
Copied from CRM #[id number]. Topic: General Inquiry - Patient Advice  >> Aug 7, 2025 12:52 PM Bianca wrote:  Type:  Mammogram    Caller is requesting to schedule their annual mammogram appointment.  Order is not listed in EPIC.  Please enter order and contact patient to schedule.  Name of Caller: Pt  Where would they like the mammogram performed? [facility]  Would the patient rather a call back or a response via [facility]?  Call  back  Best Call Back Number: [phone]  Additional Information:

## 2025-08-21 DIAGNOSIS — I10 PRIMARY HYPERTENSION: ICD-10-CM

## 2025-08-22 RX ORDER — AMLODIPINE BESYLATE 10 MG/1
10 TABLET ORAL DAILY
Qty: 90 TABLET | Refills: 3 | Status: SHIPPED | OUTPATIENT
Start: 2025-08-22

## 2025-08-27 ENCOUNTER — OFFICE VISIT (OUTPATIENT)
Dept: INTERNAL MEDICINE | Facility: CLINIC | Age: 70
End: 2025-08-27
Payer: COMMERCIAL

## 2025-08-27 VITALS
DIASTOLIC BLOOD PRESSURE: 60 MMHG | SYSTOLIC BLOOD PRESSURE: 120 MMHG | BODY MASS INDEX: 35.5 KG/M2 | HEIGHT: 66 IN | WEIGHT: 220.88 LBS | OXYGEN SATURATION: 99 % | HEART RATE: 83 BPM

## 2025-08-27 DIAGNOSIS — E78.49 OTHER HYPERLIPIDEMIA: Chronic | ICD-10-CM

## 2025-08-27 DIAGNOSIS — K59.01 SLOW TRANSIT CONSTIPATION: Chronic | ICD-10-CM

## 2025-08-27 DIAGNOSIS — G44.221 CHRONIC TENSION-TYPE HEADACHE, INTRACTABLE: ICD-10-CM

## 2025-08-27 DIAGNOSIS — Z00.00 ROUTINE MEDICAL EXAM: Primary | ICD-10-CM

## 2025-08-27 DIAGNOSIS — E11.3593 TYPE 2 DIABETES MELLITUS WITH BOTH EYES AFFECTED BY PROLIFERATIVE RETINOPATHY WITHOUT MACULAR EDEMA, WITHOUT LONG-TERM CURRENT USE OF INSULIN: ICD-10-CM

## 2025-08-27 DIAGNOSIS — K21.9 GASTROESOPHAGEAL REFLUX DISEASE WITHOUT ESOPHAGITIS: Chronic | ICD-10-CM

## 2025-08-27 DIAGNOSIS — Z98.84 HISTORY OF BARIATRIC SURGERY: ICD-10-CM

## 2025-08-27 DIAGNOSIS — I25.10 CORONARY ARTERY DISEASE INVOLVING NATIVE CORONARY ARTERY OF NATIVE HEART WITHOUT ANGINA PECTORIS: Chronic | ICD-10-CM

## 2025-08-27 PROBLEM — M25.641 STIFFNESS OF RIGHT HAND JOINT: Status: RESOLVED | Noted: 2024-01-22 | Resolved: 2025-08-27

## 2025-08-27 PROBLEM — M54.2 NECK PAIN: Status: RESOLVED | Noted: 2019-05-01 | Resolved: 2025-08-27

## 2025-08-27 PROBLEM — M79.641 RIGHT HAND PAIN: Status: RESOLVED | Noted: 2023-08-21 | Resolved: 2025-08-27

## 2025-08-27 PROBLEM — B35.3 TINEA PEDIS OF BOTH FEET: Status: RESOLVED | Noted: 2021-11-19 | Resolved: 2025-08-27

## 2025-08-27 PROBLEM — H57.03 SMALL PUPIL: Status: RESOLVED | Noted: 2021-02-05 | Resolved: 2025-08-27

## 2025-08-27 PROCEDURE — 3078F DIAST BP <80 MM HG: CPT | Mod: CPTII,S$GLB,, | Performed by: INTERNAL MEDICINE

## 2025-08-27 PROCEDURE — 1159F MED LIST DOCD IN RCRD: CPT | Mod: CPTII,S$GLB,, | Performed by: INTERNAL MEDICINE

## 2025-08-27 PROCEDURE — 1160F RVW MEDS BY RX/DR IN RCRD: CPT | Mod: CPTII,S$GLB,, | Performed by: INTERNAL MEDICINE

## 2025-08-27 PROCEDURE — 3066F NEPHROPATHY DOC TX: CPT | Mod: CPTII,S$GLB,, | Performed by: INTERNAL MEDICINE

## 2025-08-27 PROCEDURE — 99999 PR PBB SHADOW E&M-EST. PATIENT-LVL V: CPT | Mod: PBBFAC,,, | Performed by: INTERNAL MEDICINE

## 2025-08-27 PROCEDURE — 1101F PT FALLS ASSESS-DOCD LE1/YR: CPT | Mod: CPTII,S$GLB,, | Performed by: INTERNAL MEDICINE

## 2025-08-27 PROCEDURE — 3008F BODY MASS INDEX DOCD: CPT | Mod: CPTII,S$GLB,, | Performed by: INTERNAL MEDICINE

## 2025-08-27 PROCEDURE — 1126F AMNT PAIN NOTED NONE PRSNT: CPT | Mod: CPTII,S$GLB,, | Performed by: INTERNAL MEDICINE

## 2025-08-27 PROCEDURE — 3061F NEG MICROALBUMINURIA REV: CPT | Mod: CPTII,S$GLB,, | Performed by: INTERNAL MEDICINE

## 2025-08-27 PROCEDURE — 3074F SYST BP LT 130 MM HG: CPT | Mod: CPTII,S$GLB,, | Performed by: INTERNAL MEDICINE

## 2025-08-27 PROCEDURE — 99397 PER PM REEVAL EST PAT 65+ YR: CPT | Mod: S$GLB,,, | Performed by: INTERNAL MEDICINE

## 2025-08-27 PROCEDURE — 3044F HG A1C LEVEL LT 7.0%: CPT | Mod: CPTII,S$GLB,, | Performed by: INTERNAL MEDICINE

## 2025-08-27 PROCEDURE — 3288F FALL RISK ASSESSMENT DOCD: CPT | Mod: CPTII,S$GLB,, | Performed by: INTERNAL MEDICINE

## 2025-08-27 PROCEDURE — 4010F ACE/ARB THERAPY RXD/TAKEN: CPT | Mod: CPTII,S$GLB,, | Performed by: INTERNAL MEDICINE

## (undated) DEVICE — PAD UNDERPAD 30X30

## (undated) DEVICE — DRAPE STERI-DRAPE APERTURE

## (undated) DEVICE — PAD EYE OVAL CNTOUR 1.62X2.62

## (undated) DEVICE — SHEILD PLASTIC EYE

## (undated) DEVICE — BANDAGE CONFORM 3IN STRL

## (undated) DEVICE — GAUZE SPONGE 4X4 12PLY

## (undated) DEVICE — COVER LIGHT HANDLE 80/CA

## (undated) DEVICE — CORD BIPOLAR 12 FOOT

## (undated) DEVICE — SHIELD COLLAGEN 12HR CORNEAL

## (undated) DEVICE — SCRUB 10% POVIDONE IODINE 4OZ

## (undated) DEVICE — NDL 18GA X1 1/2 REG BEVEL

## (undated) DEVICE — SUT CTD VICRYL 4-0 P-3 18IN

## (undated) DEVICE — BANDAGE ELASTIC 2X5 VELCRO ST

## (undated) DEVICE — CORD FOR BIPOLAR FORCEPS 12

## (undated) DEVICE — SOL WATER STRL IRR 1000ML

## (undated) DEVICE — GLOVE BIOGEL SKINSENSE PI 7.0

## (undated) DEVICE — SOL IRR SOD CHL .9% POUR

## (undated) DEVICE — SEE MEDLINE ITEM 157131

## (undated) DEVICE — TOURNIQUET SB QC DP 18X4IN

## (undated) DEVICE — GOWN SURGICAL X-LARGE

## (undated) DEVICE — SOL PVP-I SCRUB 7.5% 4OZ

## (undated) DEVICE — DRESSING N ADH OIL EMUL 3X3

## (undated) DEVICE — KIT GREY EYE

## (undated) DEVICE — SOL BETADINE 5%

## (undated) DEVICE — BANDAGE GAUZE 6PLY FLUFF 2X3

## (undated) DEVICE — Device

## (undated) DEVICE — FORCEP STRAIGHT DISP

## (undated) DEVICE — DRESSING TRANS 4X4 TEGADERM

## (undated) DEVICE — SLING ARM MEDIUM FOAM STRAP

## (undated) DEVICE — GLOVE BIOGEL ECLIPSE SZ 7

## (undated) DEVICE — APPLICATOR CHLORAPREP ORN 26ML

## (undated) DEVICE — ADHESIVE DERMABOND ADVANCED

## (undated) DEVICE — SUT MONOCRYL PLUS 4-0 P3

## (undated) DEVICE — BANDAGE MATRIX HK LOOP 2IN 5YD

## (undated) DEVICE — COVER CAMERA OPERATING ROOM

## (undated) DEVICE — SUT 4/0 18IN ETHILON BL P3

## (undated) DEVICE — SOL IRR BSS OPHTH 500ML STRL

## (undated) DEVICE — SYR 3CC 21 X 1 LUER LOCK

## (undated) DEVICE — GLOVE BIOGEL PI MICRO INDIC 7

## (undated) DEVICE — NDL SAFETY 22G X 1.5 ECLIPSE

## (undated) DEVICE — GLOVE BIOGEL ECLIPSE SZ 6.5

## (undated) DEVICE — SYR B-D DISP CONTROL 10CC100/C

## (undated) DEVICE — PACK UPPER EXTREMITY BAPTIST

## (undated) DEVICE — DRAPE SURG W/TWL 17 5/8X23

## (undated) DEVICE — BANDAGE ELASTIC ACE 2IN 10/CA

## (undated) DEVICE — SHIELD EYE PLASTIC 3100G

## (undated) DEVICE — BANDAGE MATRIX HK LOOP 4IN 5YD

## (undated) DEVICE — DRAPE STERI-DRAPE 1000 17X11IN